# Patient Record
Sex: MALE | Race: WHITE | Employment: OTHER | URBAN - METROPOLITAN AREA
[De-identification: names, ages, dates, MRNs, and addresses within clinical notes are randomized per-mention and may not be internally consistent; named-entity substitution may affect disease eponyms.]

---

## 2017-03-09 ENCOUNTER — OFFICE VISIT (OUTPATIENT)
Dept: INTERNAL MEDICINE CLINIC | Age: 77
End: 2017-03-09

## 2017-03-09 VITALS
DIASTOLIC BLOOD PRESSURE: 90 MMHG | TEMPERATURE: 98.4 F | HEIGHT: 71 IN | WEIGHT: 193 LBS | OXYGEN SATURATION: 97 % | BODY MASS INDEX: 27.02 KG/M2 | SYSTOLIC BLOOD PRESSURE: 150 MMHG | RESPIRATION RATE: 18 BRPM | HEART RATE: 67 BPM

## 2017-03-09 DIAGNOSIS — R10.9 LEFT FLANK PAIN: ICD-10-CM

## 2017-03-09 DIAGNOSIS — M79.10 MUSCULAR PAIN: Primary | ICD-10-CM

## 2017-03-09 LAB
BILIRUB UR QL STRIP: NEGATIVE
GLUCOSE UR-MCNC: NEGATIVE MG/DL
KETONES P FAST UR STRIP-MCNC: NEGATIVE MG/DL
PH UR STRIP: 6.5 [PH] (ref 4.6–8)
PROT UR QL STRIP: NORMAL MG/DL
SP GR UR STRIP: 1.01 (ref 1–1.03)
UA UROBILINOGEN AMB POC: NORMAL (ref 0.2–1)
URINALYSIS CLARITY POC: CLEAR
URINALYSIS COLOR POC: YELLOW
URINE BLOOD POC: NEGATIVE
URINE LEUKOCYTES POC: NEGATIVE
URINE NITRITES POC: NEGATIVE

## 2017-03-09 RX ORDER — TRAMADOL HYDROCHLORIDE 50 MG/1
50 TABLET ORAL
Qty: 15 TAB | Refills: 0 | Status: SHIPPED | OUTPATIENT
Start: 2017-03-09 | End: 2017-04-18

## 2017-03-09 RX ORDER — METAXALONE 800 MG/1
800 TABLET ORAL 3 TIMES DAILY
Qty: 30 TAB | Refills: 0 | Status: SHIPPED | OUTPATIENT
Start: 2017-03-09 | End: 2017-04-18

## 2017-03-09 NOTE — PROGRESS NOTES
Patient presents for   Chief Complaint   Patient presents with    Flank Pain     left side x1 week     Fall risk assessment was not indicated. Depression screening was not indicated Follow up questions were not indicated. 1. Have you been to the ER, urgent care clinic since your last visit? Hospitalized since your last visit? No    2. Have you seen or consulted any other health care providers outside of the [address] [place] [address] since your last visit? Include any pap smears or colon screening. No     POC Urine performed per provider order, provider made aware of results.

## 2017-03-09 NOTE — PROGRESS NOTES
Hortencia Ybarra is a 64 y.o. male presenting today for Flank Pain (left side x1 week)  . HPI:  Hortencia Ybarra presents to the office today for left flank pain x 1 week. Patient states the only thing he can recall is he bent down to  a water hose and the next day he had left flank pain. He denied any urinary symptoms. Pain is increased with flexion of the spine. Review of Systems   Constitutional: Negative for fever. Respiratory: Positive for cough. Negative for sputum production and shortness of breath. Cardiovascular: Negative for chest pain and palpitations. Gastrointestinal: Negative for abdominal pain, nausea and vomiting. Genitourinary: Positive for flank pain (left flank). Negative for dysuria and urgency. Musculoskeletal: Positive for back pain. Allergies   Allergen Reactions    Pcn [Penicillins] Rash       Current Outpatient Prescriptions   Medication Sig Dispense Refill    metaxalone (SKELAXIN) 800 mg tablet Take 1 Tab by mouth three (3) times daily. 30 Tab 0    traMADol (ULTRAM) 50 mg tablet Take 1 Tab by mouth every six (6) hours as needed for Pain. Max Daily Amount: 200 mg. Indications: Pain 15 Tab 0    pantoprazole (PROTONIX) 40 mg tablet TAKE 1 TABLET BY MOUTH ONCE DAILY 90 Tab 3    FOLIC ACID/MULTIVIT-MIN/LUTEIN (CENTRUM SILVER PO) Take  by mouth.  montelukast (SINGULAIR) 10 mg tablet Take 10 mg by mouth daily.  VITAMIN E/QUININE SULFATE (LEG CRAMP TREATMENT PO) Take  by mouth.  terbinafine HCl (LAMISIL) 1 % topical cream Apply  to affected area daily. Use cream for 3 weeks. 30 g 0    levothyroxine (SYNTHROID) 150 mcg tablet Take 1 Tab by mouth daily. 90 Tab 3    Cholecalciferol, Vitamin D3, (VITAMIN D3) 1,000 unit Cap Take  by mouth.  cyanocobalamin (VITAMIN B-12) 1,000 mcg tablet Take 1,000 mcg by mouth daily.            Past Medical History:   Diagnosis Date    Asbestosis [facility])     Chest pain, unspecified     Nl scan and EF    Chronic cough     Chronic kidney disease     Chronic obstructive lung disease (HCC)     Dizziness and giddiness     Erectile dysfunction     GERD (gastroesophageal reflux disease)     Hypothyroidism     Incontinence     Malignant neoplasm of prostate ([facility] [address]     1999 perineal prostatectomy    Osteoarthrosis involving multiple sites     Pure hyperglyceridemia     Right inguinal hernia     Thyroid disease        Past Surgical History:   Procedure Laterality Date    COLONOSCOPY      HX CHOLECYSTECTOMY      HX HERNIA REPAIR Bilateral     inguinal    HX RADICAL PROSTATECTOMY  1-    perineal approach       Social History     Social History    Marital status:      Spouse name: N/A    Number of children: N/A    Years of education: N/A     Occupational History    Not on file. Social History Main Topics    Smoking status: Never Smoker    Smokeless tobacco: Never Used    Alcohol use No    Drug use: No    Sexual activity: Yes     Partners: Female     Other Topics Concern    Not on file     Social History Narrative       Patient does not have an advanced directive on file    Vitals:    03/09/17 1004   BP: 150/90   Pulse: 67   Resp: 18   Temp: 98.4 °F (36.9 °C)   TempSrc: Tympanic   SpO2: 97%   Weight: 193 lb (87.5 kg)   Height: 5' 11\" (1.803 m)   PainSc:   3   PainLoc: Flank       Physical Exam   Cardiovascular: Normal rate, regular rhythm and normal heart sounds. No murmur heard. Pulmonary/Chest: Effort normal and breath sounds normal. No respiratory distress. Abdominal: Soft. There is CVA tenderness. Musculoskeletal: He exhibits no edema. Lymphadenopathy:     He has cervical adenopathy. Neurological: He is alert. Nursing note and vitals reviewed.       Office Visit on 03/09/2017   Component Date Value Ref Range Status    Color (UA POC) 03/09/2017 Yellow   Final    Clarity (UA POC) 03/09/2017 Clear   Final    Glucose (UA POC) 03/09/2017 Negative  Negative Final    Bilirubin (UA POC) 03/09/2017 Negative  Negative Final    Ketones (UA POC) 03/09/2017 Negative  Negative Final    Specific gravity (UA POC) 03/09/2017 1.015  1.001 - 1.035 Final    Blood (UA POC) 03/09/2017 Negative  Negative Final    pH (UA POC) 03/09/2017 6.5  4.6 - 8.0 Final    Protein (UA POC) 03/09/2017 Trace  Negative mg/dL Final    Urobilinogen (UA POC) 03/09/2017 0.2 mg/dL  0.2 - 1 Final    Nitrites (UA POC) 03/09/2017 Negative  Negative Final    Leukocyte esterase (UA POC) 03/09/2017 Negative  Negative Final       .  Results for orders placed or performed in visit on 03/09/17   AMB POC URINALYSIS DIP STICK AUTO W/O MICRO   Result Value Ref Range    Color (UA POC) Yellow     Clarity (UA POC) Clear     Glucose (UA POC) Negative Negative    Bilirubin (UA POC) Negative Negative    Ketones (UA POC) Negative Negative    Specific gravity (UA POC) 1.015 1.001 - 1.035    Blood (UA POC) Negative Negative    pH (UA POC) 6.5 4.6 - 8.0    Protein (UA POC) Trace Negative mg/dL    Urobilinogen (UA POC) 0.2 mg/dL 0.2 - 1    Nitrites (UA POC) Negative Negative    Leukocyte esterase (UA POC) Negative Negative       Assessment / Plan:      ICD-10-CM ICD-9-CM    1. Muscular pain M79.1 729.1 metaxalone (SKELAXIN) 800 mg tablet      traMADol (ULTRAM) 50 mg tablet   2. Left flank pain R10.9 789.09 AMB POC URINALYSIS DIP STICK AUTO W/O MICRO     POC UA- negative  Muscle pain- Skelaxin rx  Tramadol (15) tabs given  Stretching exercises  F/u if no improvement in 7-10 days      Follow-up Disposition:  Return if symptoms worsen or fail to improve. I asked the patient if he  had any questions and answered his  questions.   The patient stated that he understands the treatment plan and agrees with the treatment plan

## 2017-03-09 NOTE — MR AVS SNAPSHOT
Visit Information Date & Time Provider Department Dept. Phone Encounter #  
 3/9/2017 10:00 AM Thanh Daley NP [facility] INTERNAL MEDICINE OF [address] [id number] Follow-up Instructions Return if symptoms worsen or fail to improve. Follow-up and Disposition History Your Appointments 4/18/2017 11:00 AM  
Follow Up with Jocelyn Hyde MD  
[address]) Appt Note: 6mo; 6 mo  
 [address] [place], [address] [facility] [address]  
  
   
 [address] [place], [address] [facility] [address] Upcoming Health Maintenance Date Due DTaP/Tdap/Td series (1 - Tdap) 8/1/1959 ZOSTER VACCINE AGE 60> 8/1/1998 GLAUCOMA SCREENING Q2Y 8/1/2003 Pneumococcal 65+ High/Highest Risk (1 of 2 - PCV13) 8/1/2003 MEDICARE YEARLY EXAM 8/1/2003 Allergies as of 3/9/2017  Review Complete On: 3/9/2017 By: Thanh Daley NP Severity Noted Reaction Type Reactions Pcn [Penicillins]    Rash Current Immunizations  Never Reviewed Name Date Influenza Vaccine (Quad) PF 10/31/2016 Not reviewed this visit You Were Diagnosed With   
  
 Codes Comments Muscular pain    -  Primary ICD-10-CM: M79.1 ICD-9-CM: 729.1 Left flank pain     ICD-10-CM: R10.9 ICD-9-CM: 789.09 Vitals BP Pulse Temp Resp Height(growth percentile) 150/90 (BP 1 Location: Left arm, BP Patient Position: Sitting) 67 98.4 °F (36.9 °C) (Tympanic) 18 5' 11\" (1.803 m) Weight(growth percentile) SpO2 BMI Smoking Status 193 lb (87.5 kg) 97% 26.92 kg/m2 Never Smoker Vitals History BMI and BSA Data Body Mass Index Body Surface Area  
 26.92 kg/m 2 2.09 m 2 Preferred Pharmacy Pharmacy Name Phone [address], [address] [place] [address] [phone] Your Updated Medication List  
  
   
 This list is accurate as of: 3/9/17 11:22 AM.  Always use your most recent med list.  
  
  
  
  
 CENTRUM SILVER PO Take  by mouth. LEG CRAMP TREATMENT PO Take  by mouth.  
  
 levothyroxine 150 mcg tablet Commonly known as:  SYNTHROID Take 1 Tab by mouth daily. metaxalone 800 mg tablet Commonly known as:  SKELAXIN Take 1 Tab by mouth three (3) times daily. pantoprazole 40 mg tablet Commonly known as:  PROTONIX  
TAKE 1 TABLET BY MOUTH ONCE DAILY  
  
 SINGULAIR 10 mg tablet Generic drug:  montelukast  
Take 10 mg by mouth daily. terbinafine HCl 1 % topical cream  
Commonly known as:  LAMISIL Apply  to affected area daily. Use cream for 3 weeks. traMADol 50 mg tablet Commonly known as:  ULTRAM  
Take 1 Tab by mouth every six (6) hours as needed for Pain. Max Daily Amount: 200 mg. Indications: Pain VITAMIN B-12 1,000 mcg tablet Generic drug:  cyanocobalamin Take 1,000 mcg by mouth daily. VITAMIN D3 1,000 unit Cap Generic drug:  cholecalciferol Take  by mouth. Prescriptions Printed Refills  
 traMADol (ULTRAM) 50 mg tablet 0 Sig: Take 1 Tab by mouth every six (6) hours as needed for Pain. Max Daily Amount: 200 mg. Indications: Pain Class: Print Route: Oral  
  
Prescriptions Sent to Pharmacy Refills  
 metaxalone (SKELAXIN) 800 mg tablet 0 Sig: Take 1 Tab by mouth three (3) times daily. Class: Normal  
 Pharmacy: [id number] [place] [address], [address] [facility] Ph #: [phone] Route: Oral  
  
We Performed the Following AMB POC URINALYSIS DIP STICK AUTO W/O MICRO [28958 CPT(R)] Follow-up Instructions Return if symptoms worsen or fail to improve. Patient Instructions Musculoskeletal Pain: Care Instructions Your Care Instructions Different problems with the bones, muscles, nerves, ligaments, and tendons in the body can cause pain. One or more areas of your body may ache or burn. Or they may feel tired, stiff, or sore. The medical term for this type of pain is musculoskeletal pain. It can have many different causes. Sometimes the pain is caused by an injury such as a strain or sprain. Or you might have pain from using one part of your body in the same way over and over again. This is called overuse. In some cases, the cause of the pain is another health problem such as arthritis or fibromyalgia. The doctor will examine you and ask you questions about your health to help find the cause of your pain. Blood tests or imaging tests like an X-ray may also be helpful. But sometimes doctors can't find a cause of the pain. Treatment depends on your symptoms and the cause of the pain, if known. The doctor has checked you carefully, but problems can develop later. If you notice any problems or new symptoms, get medical treatment right away. Follow-up care is a key part of your treatment and safety. Be sure to make and go to all appointments, and call your doctor if you are having problems. It's also a good idea to know your test results and keep a list of the medicines you take. How can you care for yourself at home? · Rest until you feel better. · Do not do anything that makes the pain worse. Return to exercise gradually if you feel better and your doctor says it's okay. · Be safe with medicines. Read and follow all instructions on the label. ¨ If the doctor gave you a prescription medicine for pain, take it as prescribed. ¨ If you are not taking a prescription pain medicine, ask your doctor if you can take an over-the-counter medicine. · Put ice or a cold pack on the area for 10 to 20 minutes at a time to ease pain. Put a thin cloth between the ice and your skin. When should you call for help? Call your doctor now or seek immediate medical care if: 
· You have new pain, or your pain gets worse. · You have new symptoms such as a fever, a rash, or chills. Watch closely for changes in your health, and be sure to contact your doctor if: 
· You do not get better as expected. Where can you learn more? Go to http://sudarshan-shaneka.info/. Enter W127 in the search box to learn more about \"Musculoskeletal Pain: Care Instructions. \" Current as of: February 19, 2016 Content Version: 11.1 © 9341-1078 [employer]. Care instructions adapted under license by [employer] (which disclaims liability or warranty for this information). If you have questions about a medical condition or this instruction, always ask your healthcare professional. [address] any warranty or liability for your use of this information. Back Stretches: Exercises Your Care Instructions Here are some examples of exercises for stretching your back. Start each exercise slowly. Ease off the exercise if you start to have pain. Your doctor or physical therapist will tell you when you can start these exercises and which ones will work best for you. How to do the exercises Overhead stretch 1. Stand comfortably with your feet shoulder-width apart. 2. Looking straight ahead, raise both arms over your head and reach toward the ceiling. Do not allow your head to tilt back. 3. Hold for 15 to 30 seconds, then lower your arms to your sides. 4. Repeat 2 to 4 times. Side stretch 1. Stand comfortably with your feet shoulder-width apart. 2. Raise one arm over your head, and then lean to the other side. 3. Slide your hand down your leg as you let the weight of your arm gently stretch your side muscles. Hold for 15 to 30 seconds. 4. Repeat 2 to 4 times on each side. Press-up 1. Lie on your stomach, supporting your body with your forearms. 2. Press your elbows down into the floor to raise your upper back.  As you do this, relax your stomach muscles and allow your back to arch without using your back muscles. As your press up, do not let your hips or pelvis come off the floor. 3. Hold for 15 to 30 seconds, then relax. 4. Repeat 2 to 4 times. Relax and rest 
 
1. Lie on your back with a rolled towel under your neck and a pillow under your knees. Extend your arms comfortably to your sides. 2. Relax and breathe normally. 3. Remain in this position for about 10 minutes. 4. If you can, do this 2 or 3 times each day. Follow-up care is a key part of your treatment and safety. Be sure to make and go to all appointments, and call your doctor if you are having problems. It's also a good idea to know your test results and keep a list of the medicines you take. Where can you learn more? Go to http://sudarshan-shaneka.info/. Enter R394 in the search box to learn more about \"Back Stretches: Exercises. \" Current as of: May 23, 2016 Content Version: 11.1 © 2878-4505 [employer]. Care instructions adapted under license by [employer] (which disclaims liability or warranty for this information). If you have questions about a medical condition or this instruction, always ask your healthcare professional. [address] any warranty or liability for your use of this information. Plantar Fasciitis: Exercises Your Care Instructions Here are some examples of typical rehabilitation exercises for your condition. Start each exercise slowly. Ease off the exercise if you start to have pain. Your doctor or physical therapist will tell you when you can start these exercises and which ones will work best for you. How to do the exercises Note: Each exercise should create a pulling feeling but should not cause pain. Towel stretch 5. Sit with your legs extended and knees straight. 6. Place a towel around your foot just under the toes. 7. Hold each end of the towel in each hand, with your hands above your knees. 8. Pull back with the towel so that your foot stretches toward you. 9. Hold the position for at least 15 to 30 seconds. 10. Repeat 2 to 4 times a session, up to 5 sessions a day. Calf stretch Note: This exercise stretches the muscles at the back of the lower leg (the calf) and the Achilles tendon. Do this exercise 3 or 4 times a day, 5 days a week. 5. Stand facing a wall with your hands on the wall at about eye level. Put the leg you want to stretch about a step behind your other leg. 6. Keeping your back heel on the floor, bend your front knee until you feel a stretch in the back leg. 7. Hold the stretch for 15 to 30 seconds. Repeat 2 to 4 times. Plantar fascia and calf stretch Note: Stretching the plantar fascia and calf muscles can increase flexibility and decrease heel pain. You can do this exercise several times each day and before and after activity. 5. Stand on a step as shown above. Be sure to hold on to the banister. 6. Slowly let your heels down over the edge of the step as you relax your calf muscles. You should feel a gentle stretch across the bottom of your foot and up the back of your leg to your knee. 7. Hold the stretch about 15 to 30 seconds, and then tighten your calf muscle a little to bring your heel back up to the level of the step. Repeat 2 to 4 times. Towel curls 5. While sitting, place your foot on a towel on the floor and scrunch the towel toward you with your toes. 6. Then, also using your toes, push the towel away from you. Note: Make this exercise more challenging by placing a weighted object, such as a soup can, on the other end of the towel. [place] pickups 1. Put marbles on the floor next to a cup. 
2. Using your toes, try to lift the marbles up from the floor and put them in the cup. Follow-up care is a key part of your treatment and safety.  Be sure to make and go to all appointments, and call your doctor if you are having problems. It's also a good idea to know your test results and keep a list of the medicines you take. Where can you learn more? Go to http://sudarshan-shaneka.info/. Yulia Vale in the search box to learn more about \"Plantar Fasciitis: Exercises. \" Current as of: May 23, 2016 Content Version: 11.1 © 1366-9473 [employer]. Care instructions adapted under license by [employer] (which disclaims liability or warranty for this information). If you have questions about a medical condition or this instruction, always ask your healthcare professional. [address] any warranty or liability for your use of this information. Introducing [facility]! [facility] introduces [employer] patient portal. Now you can access parts of your medical record, email your doctor's office, and request medication refills online. 1. In your internet browser, go to [email or web address] 2. Click on the First Time User? Click Here link in the Sign In box. You will see the New Member Sign Up page. 3. Enter your [employer] Access Code exactly as it appears below. You will not need to use this code after youve completed the sign-up process. If you do not sign up before the expiration date, you must request a new code. · [employer] Access Code: [email or web address] Expires: 6/7/2017 11:22 AM 
 
4. Enter the last four digits of your Social Security Number (xxxx) and Date of Birth (mm/dd/yyyy) as indicated and click Submit. You will be taken to the next sign-up page. 5. Create a [employer] ID. This will be your [employer] login ID and cannot be changed, so think of one that is secure and easy to remember. 6. Create a [employer] password. You can change your password at any time. 7. Enter your Password Reset Question and Answer. This can be used at a later time if you forget your password. 8. Enter your e-mail address. You will receive e-mail notification when new information is available in [address] 9. Click Sign Up. You can now view and download portions of your medical record. 10. Click the Download Summary menu link to download a portable copy of your medical information. If you have questions, please visit the Frequently Asked Questions section of the [employer] website. Remember, [employer] is NOT to be used for urgent needs. For medical emergencies, dial 911. Now available from your iPhone and Android! Please provide this summary of care documentation to your next provider. Your primary care clinician is listed as [place]. If you have any questions after today's visit, please call [phone].

## 2017-03-09 NOTE — PATIENT INSTRUCTIONS
Musculoskeletal Pain: Care Instructions  Your Care Instructions  Different problems with the bones, muscles, nerves, ligaments, and tendons in the body can cause pain. One or more areas of your body may ache or burn. Or they may feel tired, stiff, or sore. The medical term for this type of pain is musculoskeletal pain. It can have many different causes. Sometimes the pain is caused by an injury such as a strain or sprain. Or you might have pain from using one part of your body in the same way over and over again. This is called overuse. In some cases, the cause of the pain is another health problem such as arthritis or fibromyalgia. The doctor will examine you and ask you questions about your health to help find the cause of your pain. Blood tests or imaging tests like an X-ray may also be helpful. But sometimes doctors can't find a cause of the pain. Treatment depends on your symptoms and the cause of the pain, if known. The doctor has checked you carefully, but problems can develop later. If you notice any problems or new symptoms, get medical treatment right away. Follow-up care is a key part of your treatment and safety. Be sure to make and go to all appointments, and call your doctor if you are having problems. It's also a good idea to know your test results and keep a list of the medicines you take. How can you care for yourself at home? · Rest until you feel better. · Do not do anything that makes the pain worse. Return to exercise gradually if you feel better and your doctor says it's okay. · Be safe with medicines. Read and follow all instructions on the label. ¨ If the doctor gave you a prescription medicine for pain, take it as prescribed. ¨ If you are not taking a prescription pain medicine, ask your doctor if you can take an over-the-counter medicine. · Put ice or a cold pack on the area for 10 to 20 minutes at a time to ease pain. Put a thin cloth between the ice and your skin.   When should you call for help? Call your doctor now or seek immediate medical care if:  · You have new pain, or your pain gets worse. · You have new symptoms such as a fever, a rash, or chills. Watch closely for changes in your health, and be sure to contact your doctor if:  · You do not get better as expected. Where can you learn more? Go to http://sudarshan-shaneka.info/. Enter U117 in the search box to learn more about \"Musculoskeletal Pain: Care Instructions. \"  Current as of: February 19, 2016  Content Version: 11.1  © 8587-5316 [employer]. Care instructions adapted under license by [employer] (which disclaims liability or warranty for this information). If you have questions about a medical condition or this instruction, always ask your healthcare professional. [address] any warranty or liability for your use of this information. Back Stretches: Exercises  Your Care Instructions  Here are some examples of exercises for stretching your back. Start each exercise slowly. Ease off the exercise if you start to have pain. Your doctor or physical therapist will tell you when you can start these exercises and which ones will work best for you. How to do the exercises  Overhead stretch    1. Stand comfortably with your feet shoulder-width apart. 2. Looking straight ahead, raise both arms over your head and reach toward the ceiling. Do not allow your head to tilt back. 3. Hold for 15 to 30 seconds, then lower your arms to your sides. 4. Repeat 2 to 4 times. Side stretch    1. Stand comfortably with your feet shoulder-width apart. 2. Raise one arm over your head, and then lean to the other side. 3. Slide your hand down your leg as you let the weight of your arm gently stretch your side muscles. Hold for 15 to 30 seconds. 4. Repeat 2 to 4 times on each side. Press-up    1.  Lie on your stomach, supporting your body with your forearms. 2. Press your elbows down into the floor to raise your upper back. As you do this, relax your stomach muscles and allow your back to arch without using your back muscles. As your press up, do not let your hips or pelvis come off the floor. 3. Hold for 15 to 30 seconds, then relax. 4. Repeat 2 to 4 times. Relax and rest    1. Lie on your back with a rolled towel under your neck and a pillow under your knees. Extend your arms comfortably to your sides. 2. Relax and breathe normally. 3. Remain in this position for about 10 minutes. 4. If you can, do this 2 or 3 times each day. Follow-up care is a key part of your treatment and safety. Be sure to make and go to all appointments, and call your doctor if you are having problems. It's also a good idea to know your test results and keep a list of the medicines you take. Where can you learn more? Go to http://sudarshan-shaneka.info/. Enter Z853 in the search box to learn more about \"Back Stretches: Exercises. \"  Current as of: May 23, 2016  Content Version: 11.1  © 8938-8464 [employer]. Care instructions adapted under license by [employer] (which disclaims liability or warranty for this information). If you have questions about a medical condition or this instruction, always ask your healthcare professional. [place] [address] any warranty or liability for your use of this information. Plantar Fasciitis: Exercises  Your Care Instructions  Here are some examples of typical rehabilitation exercises for your condition. Start each exercise slowly. Ease off the exercise if you start to have pain. Your doctor or physical therapist will tell you when you can start these exercises and which ones will work best for you. How to do the exercises  Note: Each exercise should create a pulling feeling but should not cause pain. Towel stretch    5. Sit with your legs extended and knees straight.   6. Place a towel around your foot just under the toes. 7. Hold each end of the towel in each hand, with your hands above your knees. 8. Pull back with the towel so that your foot stretches toward you. 9. Hold the position for at least 15 to 30 seconds. 10. Repeat 2 to 4 times a session, up to 5 sessions a day. Calf stretch    Note: This exercise stretches the muscles at the back of the lower leg (the calf) and the Achilles tendon. Do this exercise 3 or 4 times a day, 5 days a week. 5. Stand facing a wall with your hands on the wall at about eye level. Put the leg you want to stretch about a step behind your other leg. 6. Keeping your back heel on the floor, bend your front knee until you feel a stretch in the back leg. 7. Hold the stretch for 15 to 30 seconds. Repeat 2 to 4 times. Plantar fascia and calf stretch    Note: Stretching the plantar fascia and calf muscles can increase flexibility and decrease heel pain. You can do this exercise several times each day and before and after activity. 5. Stand on a step as shown above. Be sure to hold on to the banister. 6. Slowly let your heels down over the edge of the step as you relax your calf muscles. You should feel a gentle stretch across the bottom of your foot and up the back of your leg to your knee. 7. Hold the stretch about 15 to 30 seconds, and then tighten your calf muscle a little to bring your heel back up to the level of the step. Repeat 2 to 4 times. Towel curls    5. While sitting, place your foot on a towel on the floor and scrunch the towel toward you with your toes. 6. Then, also using your toes, push the towel away from you. Note: Make this exercise more challenging by placing a weighted object, such as a soup can, on the other end of the towel. [place] pickups    1. Put marbles on the floor next to a cup.  2. Using your toes, try to lift the marbles up from the floor and put them in the cup.   Follow-up care is a key part of your treatment and safety. Be sure to make and go to all appointments, and call your doctor if you are having problems. It's also a good idea to know your test results and keep a list of the medicines you take. Where can you learn more? Go to http://sudarshan-shaneka.info/. Jayna Plata in the search box to learn more about \"Plantar Fasciitis: Exercises. \"  Current as of: May 23, 2016  Content Version: 11.1  © 1605-6831 [employer]. Care instructions adapted under license by [employer] (which disclaims liability or warranty for this information). If you have questions about a medical condition or this instruction, always ask your healthcare professional. [address] any warranty or liability for your use of this information.

## 2017-04-18 PROBLEM — I63.9 CVA (CEREBRAL VASCULAR ACCIDENT) (HCC): Status: ACTIVE | Noted: 2017-04-18

## 2017-04-24 ENCOUNTER — HOSPITAL ENCOUNTER (INPATIENT)
Age: 77
LOS: 18 days | Discharge: HOME HEALTH | DRG: 057 | End: 2017-05-12
Attending: INTERNAL MEDICINE | Admitting: INTERNAL MEDICINE
Payer: MEDICARE

## 2017-04-24 DIAGNOSIS — E78.5 DYSLIPIDEMIA (HIGH LDL; LOW HDL): Chronic | ICD-10-CM

## 2017-04-24 DIAGNOSIS — I63.9 ACUTE ISCHEMIC STROKE (HCC): Primary | ICD-10-CM

## 2017-04-24 PROBLEM — I69.322 DYSARTHRIA AS LATE EFFECT OF CEREBROVASCULAR ACCIDENT (CVA): Status: ACTIVE | Noted: 2017-04-19

## 2017-04-24 PROBLEM — Z78.9 IMPAIRED MOBILITY AND ADLS: Status: ACTIVE | Noted: 2017-04-19

## 2017-04-24 PROBLEM — I69.391 DYSPHAGIA AS LATE EFFECT OF CEREBROVASCULAR ACCIDENT (CVA): Status: ACTIVE | Noted: 2017-04-19

## 2017-04-24 PROBLEM — I69.351 HEMIPARESIS AFFECTING RIGHT SIDE AS LATE EFFECT OF CEREBROVASCULAR ACCIDENT (CVA) (HCC): Status: ACTIVE | Noted: 2017-04-19

## 2017-04-24 PROBLEM — Z53.20 PROCEDURE REFUSED: Status: ACTIVE | Noted: 2017-04-21

## 2017-04-24 PROBLEM — R94.4 DECREASED CALCULATED GLOMERULAR FILTRATION RATE (GFR): Status: ACTIVE | Noted: 2017-04-19

## 2017-04-24 PROBLEM — Z74.09 IMPAIRED MOBILITY AND ADLS: Status: ACTIVE | Noted: 2017-04-19

## 2017-04-24 PROCEDURE — 74011250637 HC RX REV CODE- 250/637: Performed by: INTERNAL MEDICINE

## 2017-04-24 PROCEDURE — 74011250636 HC RX REV CODE- 250/636: Performed by: INTERNAL MEDICINE

## 2017-04-24 PROCEDURE — 65310000000 HC RM PRIVATE REHAB

## 2017-04-24 RX ORDER — HEPARIN SODIUM 5000 [USP'U]/ML
5000 INJECTION, SOLUTION INTRAVENOUS; SUBCUTANEOUS EVERY 12 HOURS
Status: DISCONTINUED | OUTPATIENT
Start: 2017-04-24 | End: 2017-05-11

## 2017-04-24 RX ORDER — FACIAL-BODY WIPES
10 EACH TOPICAL
Status: DISCONTINUED | OUTPATIENT
Start: 2017-04-24 | End: 2017-05-12 | Stop reason: HOSPADM

## 2017-04-24 RX ORDER — CLOPIDOGREL BISULFATE 75 MG/1
75 TABLET ORAL
Status: DISCONTINUED | OUTPATIENT
Start: 2017-04-24 | End: 2017-05-12 | Stop reason: HOSPADM

## 2017-04-24 RX ORDER — PANTOPRAZOLE SODIUM 40 MG/1
40 GRANULE, DELAYED RELEASE ORAL
Status: DISCONTINUED | OUTPATIENT
Start: 2017-04-25 | End: 2017-05-11

## 2017-04-24 RX ORDER — GUAIFENESIN 100 MG/5ML
81 LIQUID (ML) ORAL
Status: DISCONTINUED | OUTPATIENT
Start: 2017-04-25 | End: 2017-05-12 | Stop reason: HOSPADM

## 2017-04-24 RX ORDER — DOCUSATE SODIUM 100 MG/1
100 CAPSULE, LIQUID FILLED ORAL 2 TIMES DAILY
Status: DISCONTINUED | OUTPATIENT
Start: 2017-04-24 | End: 2017-05-12 | Stop reason: HOSPADM

## 2017-04-24 RX ORDER — ATORVASTATIN CALCIUM 10 MG/1
20 TABLET, FILM COATED ORAL
Status: DISCONTINUED | OUTPATIENT
Start: 2017-04-24 | End: 2017-05-12 | Stop reason: HOSPADM

## 2017-04-24 RX ORDER — MELATONIN
1000 DAILY
Status: DISCONTINUED | OUTPATIENT
Start: 2017-04-25 | End: 2017-04-25

## 2017-04-24 RX ADMIN — ATORVASTATIN CALCIUM 20 MG: 10 TABLET, FILM COATED ORAL at 20:49

## 2017-04-24 RX ADMIN — CLOPIDOGREL BISULFATE 75 MG: 75 TABLET, FILM COATED ORAL at 17:39

## 2017-04-24 RX ADMIN — HEPARIN SODIUM 5000 UNITS: 5000 INJECTION, SOLUTION INTRAVENOUS; SUBCUTANEOUS at 17:39

## 2017-04-24 NOTE — IP AVS SNAPSHOT
Current Discharge Medication List  
  
START taking these medications Dose & Instructions Dispensing Information Comments Morning Noon Evening Bedtime  
 aspirin 81 mg chewable tablet Your last dose was: Your next dose is:    
   
   
 Dose:  81 mg Take 1 Tab by mouth daily (with breakfast). Indications: prevention of cerebrovascular accident Quantity:  15 Tab Refills:  0  
     
   
   
   
  
 atorvastatin 20 mg tablet Commonly known as:  LIPITOR Your last dose was: Your next dose is:    
   
   
 Dose:  20 mg Take 1 Tab by mouth nightly. Indications: DYSLIPIDEMIA Quantity:  15 Tab Refills:  0  
     
   
   
   
  
 clopidogrel 75 mg Tab Commonly known as:  PLAVIX Your last dose was: Your next dose is:    
   
   
 Dose:  75 mg Take 1 Tab by mouth daily (with dinner). Indications: Cerebral Thromboembolism Prevention Quantity:  15 Tab Refills:  0 CONTINUE these medications which have NOT CHANGED Dose & Instructions Dispensing Information Comments Morning Noon Evening Bedtime CENTRUM SILVER PO Your last dose was: Your next dose is:    
   
   
 Dose:  1 Tab Take 1 Tab by mouth daily. Refills:  0  
     
   
   
   
  
 levothyroxine 150 mcg tablet Commonly known as:  SYNTHROID Your last dose was: Your next dose is:    
   
   
 Dose:  150 mcg Take 1 Tab by mouth daily. Quantity:  90 Tab Refills:  3 Omeprazole delayed release 20 mg tablet Commonly known as:  PRILOSEC D/R Your last dose was: Your next dose is:    
   
   
 Dose:  20 mg Take 20 mg by mouth Daily (before breakfast). Indications: gastroesophageal reflux disease Refills:  0  
     
   
   
   
  
 VITAMIN D3 1,000 unit Cap Generic drug:  cholecalciferol Your last dose was: Your next dose is:    
   
   
 Dose:  1000 Units Take 1,000 Units by mouth daily. Indications: PREVENTION OF VITAMIN D DEFICIENCY Refills:  0 STOP taking these medications   
 folic acid 1 mg tablet Commonly known as:  FOLVITE  
   
  
 VITAMIN B-12 1,000 mcg tablet Generic drug:  cyanocobalamin Where to Get Your Medications These medications were sent to [id number] [facility], [address], [address] [place] [id number] Phone:  [phone]  
  aspirin 81 mg chewable tablet  
 atorvastatin 20 mg tablet  
 clopidogrel 75 mg Tab

## 2017-04-24 NOTE — IP AVS SNAPSHOT
Kenna Hernandez 
 
 
 [address] Patient: Rubin Singh MRN: [id number] SRG:[id number]/[id number] You are allergic to the following Allergen Reactions Pcn (Penicillins) Rash Recent Documentation Height Weight BMI Smoking Status 1.778 m 81.5 kg 25.78 kg/m2 Never Smoker Emergency Contacts Name Discharge Info Relation Home Work Mobile Enoc Aj  Child [2] [phone] Giuseppe Broach [3] [phone] Asha Nettles  Daughter [21] [phone] Cheri Nettles  Spouse [3] [phone] Sheri Guajardo  Spouse [3] [phone] About your hospitalization You were admitted on:  April 24, 2017 You last received care in the:  [facility] IP REHAB UNIT You were discharged on:  May 12, 2017 Unit phone number:  [phone] Why you were hospitalized Your primary diagnosis was:  Acute Ischemic Stroke (Hcc) Your diagnoses also included:  Impaired Mobility And Adls, Dyslipidemia (High Ldl; Low Hdl), Hemiparesis Affecting Right Side As Late Effect Of Cerebrovascular Accident (Cva) (Hcc), Dysphagia As Late Effect Of Cerebrovascular Accident (Cva), 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  
  
  
 
  
  
Providers Seen During Your Hospitalizations Provider Role Specialty Primary office phone Magan Meyers MD Attending Provider Internal Medicine [phone] Your Primary Care Physician (PCP) Primary Care Physician Office Phone Office Fax [address], [address] [place], [place] [address] [phone] Follow-up Information Follow up With Details Comments Contact Info [address] 
[address] [facility] [address] 
[phone] Lamine Johnson MD On 5/23/2017 Patient has an appointment scheduled with PCP Dr. Manoj Cash on May 23, 2017 @ 10:30am. [place] [address] 6 [facility] [address] 
[phone] Spencer Garvey MD On 7/12/2017 Patient has an appointment scheduled with Neurology Dr. Sandra Bettencourt on July 12, 2017 @ 3:00pm. [address] [place] [address] [address] [place] [address] [facility] [address] 
[phone] Your Appointments Tuesday May 23, 2017 10:30 AM EDT TRANSITIONAL CARE MANAGEMENT with Lamine Johnson MD  
[facility] [place] [facility] [address] [place] [address] 6 [facility] [address] [phone] Current Discharge Medication List  
  
START taking these medications Dose & Instructions Dispensing Information Comments Morning Noon Evening Bedtime  
 aspirin 81 mg chewable tablet Your last dose was: Your next dose is:    
   
   
 Dose:  81 mg Take 1 Tab by mouth daily (with breakfast). Indications: prevention of cerebrovascular accident Quantity:  15 Tab Refills:  0  
     
   
   
   
  
 atorvastatin 20 mg tablet Commonly known as:  LIPITOR Your last dose was: Your next dose is:    
   
   
 Dose:  20 mg Take 1 Tab by mouth nightly. Indications: DYSLIPIDEMIA Quantity:  15 Tab Refills:  0  
     
   
   
   
  
 clopidogrel 75 mg Tab Commonly known as:  PLAVIX Your last dose was: Your next dose is:    
   
   
 Dose:  75 mg Take 1 Tab by mouth daily (with dinner). Indications: Cerebral Thromboembolism Prevention Quantity:  15 Tab Refills:  0 CONTINUE these medications which have NOT CHANGED Dose & Instructions Dispensing Information Comments Morning Noon Evening Bedtime CENTRUM SILVER PO Your last dose was: Your next dose is:    
   
   
 Dose:  1 Tab Take 1 Tab by mouth daily. Refills:  0  
     
   
   
   
  
 levothyroxine 150 mcg tablet Commonly known as:  SYNTHROID Your last dose was: Your next dose is:    
   
   
 Dose:  150 mcg Take 1 Tab by mouth daily. Quantity:  90 Tab Refills:  3 Omeprazole delayed release 20 mg tablet Commonly known as:  PRILOSEC D/R Your last dose was: Your next dose is:    
   
   
 Dose:  20 mg Take 20 mg by mouth Daily (before breakfast). Indications: gastroesophageal reflux disease Refills:  0  
     
   
   
   
  
 VITAMIN D3 1,000 unit Cap Generic drug:  cholecalciferol Your last dose was: Your next dose is:    
   
   
 Dose:  1000 Units Take 1,000 Units by mouth daily. Indications: PREVENTION OF VITAMIN D DEFICIENCY Refills:  0 STOP taking these medications   
 folic acid 1 mg tablet Commonly known as:  FOLVITE  
   
  
 VITAMIN B-12 1,000 mcg tablet Generic drug:  cyanocobalamin Where to Get Your Medications These medications were sent to [address] [place] [address], [address] [place] [address] Phone:  [phone]  
  aspirin 81 mg chewable tablet  
 atorvastatin 20 mg tablet  
 clopidogrel 75 mg Tab Discharge Instructions DISCHARGE SUMMARY from Nurse The following personal items are in your possession at time of discharge: 
 
Dental Appliances: Lowers, Uppers Visual Aid: None Home Medications: None Jewelry: None Clothing: Shirt, Undergarments, Pants, Socks, Slippers, [employer] Other Valuables: None Personal Items Sent to Safe: none PATIENT INSTRUCTIONS: 
 
 
Recognize signs and symptoms of STROKE: 
 
 F-face looks uneven A-arms unable to move or move unevenly S-speech slurred or non-existent T-time-call 911 as soon as signs and symptoms begin-DO NOT go Back to bed or wait to see if you get better-TIME IS BRAIN. Warning Signs of HEART ATTACK Call 911 if you have these symptoms: 
? Chest discomfort. Most heart attacks involve discomfort in the center of the chest that lasts more than a few minutes, or that goes away and comes back. It can feel like uncomfortable pressure, squeezing, fullness, or pain. ? Discomfort in other areas of the upper body. Symptoms can include pain or discomfort in one or both arms, the back, neck, jaw, or stomach. ? Shortness of breath with or without chest discomfort. ? Other signs may include breaking out in a cold sweat, nausea, or lightheadedness. Don't wait more than five minutes to call [address]! Fast action can save your life. Calling 911 is almost always the fastest way to get lifesaving treatment. Emergency Medical Services staff can begin treatment when they arrive  up to an hour sooner than if someone gets to the hospital by car. The discharge information has been reviewed with the patient. The patient verbalized understanding. Discharge medications reviewed with the patient Taking Aspirin to Prevent Heart Attack and Stroke: Care Instructions Your Care Instructions Aspirin acts as a \"blood thinner. \" It prevents blood clots from forming. When taken during and after a heart attack, it can reduce your chance of dying. And it's used if you have a stent in your coronary artery. Also, aspirin helps certain people lower their risk of a heart attack or stroke. Be sure you know what dose of aspirin to take and how often to take it. Low-dose aspirin is typically 81 mg. But the dose for daily aspirin can range from 81 mg to 325 mg. Taking aspirin every day can cause bleeding.  It may not be safe if you have stomach ulcers. And it may not be safe if you have high blood pressure that is not controlled. If you take aspirin pills every day, do not take ones that have other ingredients such as caffeine or sodium. Before you start to take aspirin, tell your doctor all the medicines, vitamins, herbal products, and supplements you take. Follow-up care is a key part of your treatment and safety. Be sure to make and go to all appointments, and call your doctor if you are having problems. It's also a good idea to know your test results and keep a list of the medicines you take. How can you care for yourself at home? · Take aspirin with a full glass of water unless your doctor tells you not to. Do not lie down right after you take it. · If you have a stent in your coronary artery, take your aspirin as your heart doctor says to. If another doctor says to stop taking the aspirin for any reason, talk to your heart doctor before you stop. · Do not chew or crush the coated or sustained-release forms of aspirin. · Ask your doctor if you can drink alcohol while you take aspirin. And ask how much you can drink. Too much alcohol with aspirin can cause stomach bleeding. · Do not take aspirin if you are pregnant, unless your doctor says it is okay. · Keep all aspirin out of children's reach. · Throw aspirin away if it starts to smell like vinegar. · Do not take aspirin if you have gout or if you take prescription blood thinners, unless your doctor has told you to. · Do not take prescription or over-the-counter medicines, vitamins, herbal products, or supplements without talking to your doctor first. Olu Odomin the label before you take another over-the-counter medicine. Many contain aspirin. So they could cause you to take too much aspirin. · Talk with your doctor before you take a pain medicine. Ask which type of medicine you can take and how to take it safely with aspirin. · Tell your doctor or dentist before a surgery or procedure that you take aspirin. He or she will tell you if you should stop taking aspirin before your surgery or procedure. Make sure that you understand exactly what your doctor wants you to do. Where can you learn more? Go to http://sudarshan-shaneka.info/. Enter Y717 in the search box to learn more about \"Taking Aspirin to Prevent Heart Attack and Stroke: Care Instructions. \" Current as of: January 27, 2016 Content Version: 11.2 © 1330-8285 [employer]. Care instructions adapted under license by [employer] (which disclaims liability or warranty for this information). If you have questions about a medical condition or this instruction, always ask your healthcare professional. [address] any warranty or liability for your use of this information. Learning About Antiplatelet Medicines After a Stroke Introduction If you have had a stroke, you may have concerns about having another one. You want to do all you can do to avoid this. If your stroke was caused by a blood clot, one of the best things you can do is to take antiplatelet medicines. They can help prevent another stroke. In most cases, you don't take them if you had a stroke caused by a leak in an artery. These medicines are often called blood thinners. But they don't thin your blood. They work to keep platelets from sticking together and forming blood clots. (A platelet is a type of blood cell.) Blood clots can cause a stroke if they block a blood vessel in the brain. So by preventing blood clots, you are helping to prevent a stroke. Examples · Aspirin (Erica, Bufferin, Ecotrin) · Aspirin with dipyridamole (Aggrenox) · Clopidogrel (Plavix) Possible side effects These medicines make your blood take longer than normal to clot. This can cause bleeding, and you may bruise easily.  In rare cases, they can cause you to bleed inside your body without an injury. If you have an injury, you might have bleeding that is hard to control. These medicines may have other side effects. Depending on which one you take, you may: 
· Have diarrhea. · Feel sick to your stomach. · Have a headache. · Have some mild belly pain. You may have other side effects or reactions not listed here. Check the information that comes with your medicine. What to know about taking this medicine · Be sure you get instructions about how to take your medicine safely. Blood thinners can cause serious bleeding problems. · Be safe with medicines. Take your medicines exactly as prescribed. Call your doctor if you think you are having a problem with your medicine. · Check with your doctor or pharmacist before you use any other medicines, including over-the-counter medicines. Make sure your doctor knows all of the medicines, vitamins, herbal products, and supplements you take. Taking some medicines together can cause problems. Where can you learn more? Go to http://sudarshanTalentEarthshaneka.info/. Enter H458 in the search box to learn more about \"Learning About Antiplatelet Medicines After a Stroke. \" Current as of: January 27, 2016 Content Version: 11.2 © 4238-0874 [employer]. Care instructions adapted under license by [employer] (which disclaims liability or warranty for this information). If you have questions about a medical condition or this instruction, always ask your healthcare professional. [address] any warranty or liability for your use of this information. and appropriate educational materials and side effects teaching were provided. ------------------------------------------------------------------------------------------------------------ 
 
DISCHARGE INSTRUCTIONS 1.  Make sure that when you request refills at the pharmacy that the refill requests are sent to your PCP (NOT to the prescriber at the [facility]) to avoid any delays in getting your medication refills. The physician at the [facility] [address] will not able to order medications or refills after discharge -- Please understand that though we would like to help, it is simply not safe for our physician to order you a medication that we cannot monitor. 2. If any of the prescribed medications require a prior authorization, contact your Primary Care Physician or specialist to EITHER complete prior authorizations and paperwork on your behalf OR prescribe an alternative medication. -- Please understand that though we would like to help, it is simply not safe for our physician to order you a medication that we cannot monitor. 3. Over-the-counter (OTC) medications will NOT be prescribed. You need to buy these medications over-the-counter. ------------------------------------------------------------------------------------------------------------------- Discharge Orders None ACO Transitions of Care Introducing [employer] [address] offers a voluntary care coordination program to provide high quality service and care to [facility] fee-for-service beneficiaries. Chanell Tello was designed to help you enhance your health and well-being through the following services: ? Transitions of Care  support for individuals who are transitioning from one care setting to another (example: Hospital to home). ? Chronic and Complex Care Coordination  support for individuals and caregivers of those with serious or chronic illnesses or with more than one chronic (ongoing) condition and those who take a number of different medications.   
 
 
If you meet specific medical criteria, a [address] [facility] [facility] Coordinator may call you directly to coordinate your care with your primary care physician and your other care providers. For questions about the [facility] programs, please, contact your physicians office. For general questions or additional information about Accountable Care Organizations: 
Please visit www.medicare.gov/acos. html or call 1-800-MEDICARE (3-602.298.4922) TTY users should call 0-678.637.3769. Introducing [facility]! Liza Kain introduces Jamalon patient portal. Now you can access parts of your medical record, email your doctor's office, and request medication refills online. 1. In your internet browser, go to [email or web address] 2. Click on the First Time User? Click Here link in the Sign In box. You will see the New Member Sign Up page. 3. Enter your Jamalon Access Code exactly as it appears below. You will not need to use this code after youve completed the sign-up process. If you do not sign up before the expiration date, you must request a new code. · Jamalon Access Code: [email or web address] Expires: 6/7/2017 12:22 PM 
 
4. Enter the last four digits of your Social Security Number (xxxx) and Date of Birth (mm/dd/yyyy) as indicated and click Submit. You will be taken to the next sign-up page. 5. Create a Jamalon ID. This will be your Jamalon login ID and cannot be changed, so think of one that is secure and easy to remember. 6. Create a Jamalon password. You can change your password at any time. 7. Enter your Password Reset Question and Answer. This can be used at a later time if you forget your password. 8. Enter your e-mail address. You will receive e-mail notification when new information is available in [address] 9. Click Sign Up. You can now view and download portions of your medical record. 10. Click the Download Summary menu link to download a portable copy of your medical information. If you have questions, please visit the Frequently Asked Questions section of the MyChart website. Remember, MyChart is NOT to be used for urgent needs. For medical emergencies, dial 911. Now available from your iPhone and Android! General Information Please provide this summary of care documentation to your next provider. Patient Signature:  ____________________________________________________________ Date:  ____________________________________________________________  
  
Theone Spencer Provider Signature:  ____________________________________________________________ Date:  ____________________________________________________________
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
## 2017-04-24 NOTE — PROGRESS NOTES
physical Therapy Treatment    Patient: Luther Santo (78 y.o. male)  Room: 178/    Date: 4/24/2017  Start Time:  1120  End Time:  1210    Primary Diagnosis: Left CVA  Acute ischemic stroke [facility])          Precautions: Falls and R sided weakness. Weight bearing precautions: WBAT and RLE    Orders reviewed, chart reviewed, and initial evaluation completed on Luther Santo. ASSESSMENT :  Based on the objective data described below,     - patient able to ambulate a distance of 50 feet x 4 using RW with min A x 1  - Patient requires SBA x 1 with bed mobility  - patient requires min A x 1 with transfers  - patient tolerating B LE exercises fairly  - patient pleasant and motivated  - min VC's and cueing needed for correct technique and safety. Patient's response to today's session was tolerated: well with no complications. Progression toward achievement of goals: Good Progression. Patients rehabilitation potential is considered to be Excellent for stated goals          PLAN :  Planned Interventions:  Functional mobility training Gait Training Stair training Balance Training Therapeutic exercises Therapeutic activities AD training Patient/caregiver education    Frequency/Duration:   Patient will be followed by physical therapy to address goals: Continue PT per intial POC. Recommendations:  Physical Therapy  Discharge Recommendations: Inpatient rehab  Further Equipment Recommendations for Discharge: to be determined   Orders reviewed, chart reviewed . Discussed with nurse                  SUBJECTIVE:   Patient Patient agrees to PT, pleasant, motivated    OBJECTIVE DATA SUMMARY:     Patient found: Bed, Telemetry and Family present.     Pain Assessment before PT session: None observed and None reported  Pain Location:    Pain Assessment after PT session: None observed and None reported  Pain Location:    []          Yes, patient had pain medications  []          No, Patient has not had pain medications  []          Nurse notified    COGNITIVE STATUS:        Patient awake, alert, NAD, follows one step commands consistently       Functional Mobility and Balance Status:     Supine to sit -  standby assist, increased time and 1 person  Sit to Supine -  not tested  Sit to Stand -  min. assist, verbal cues, increased time, 1 person, increased height of bed and using RW  Stand to Sit -  min. assist, increased time, 1 person and rail      Transfer to bedside chair in between and after ambulation with min A x 1  Focused treatment on sit to stand, VC's and cueing for correct technique  Focused treatment on compensatory techniques to prevent R foot drag during ambulation      Balance:   Static Sitting Balance -  good  Dynamic Sitting Balance -  good-  Static Standing Balance -  fair and w/ assistive device  Dynamic Standing Balance -  fair- and w/ assistive device      Ambulation/Gait Training:  unsteady, step to, decreased riccardo, decreased step height/length, downward gaze, difficulty/unable to weight shift, rest break(s) required and R foot drag, VC's and cueing required, manual assistance to improve R hand  on RW, VC''s and cueing to correct posture and to regain balance, min R knee buckling.  seated rest breaks required Rolling walker min assist, verbal cues, manual cues, safety concerns, increased time and 1 person  50 feet x 4     Stair Training:  not tested      Therapeutic Exercises:        Seated        EXERCISE    Sets    Reps    Active  Active Assist    Passive  Self ROM    Comments    Heel/Toe raises  1 10 X    Tolerating well/independent    Quad Sets/Glut Sets            Hamstring Sets            Short Arc Quads           Heel Slides  1 10 X    Increased time, fatigues easily   Straight Leg Raises            Hip Abd/Add  1 10 X    Increased time, fatigues easily   Long Arc Quads  1 10 X    Fatigues easily   Mini squats                      Activity Tolerance:   fair+ and requires rest breaks    Final Location:   bedside chair, all needs close, agrees to call for assistance and family present    COMMUNICATION/EDUCATION:   Education: Patient, Benefit of activity while hospitalized, Call for assistance, Out of bed 2-3 times/day, Staff assistance with mobility, Changes positions frequently, Sit out of bed for 45-60 minutes or as tolerated, HEP, Safety, Functional mobility, Demonstrates adequately, Verbalized understanding, Reinforce needed, Teaching method and Verbal  Barriers to Learning/Limitations: None    Please refer to care plan and patient education section for further details. Thank you for this referral.  Radha Vang.  Sarah Seward, [place]  Pager [phone]

## 2017-04-24 NOTE — PROGRESS NOTES
SHIFT CHANGE NOTE FOR [facility]    Bedside and Verbal shift change report given to [address] (oncoming nurse) by Aileen Bowers LPN   (offgoing nurse). Report included the following information SBAR, Kardex, MAR and Recent Results. Situation:   Code Status: Full Code   Reason for Admission: CVA  Hospital Day: 0   Problem List:   Hospital Problems  Date Reviewed: 3/9/2017          Codes Class Noted POA    Procedure refused ICD-10-CM: Z53.29  ICD-9-CM: V64.2  4/21/2017 Yes    Overview Signed 4/24/2017  3:28 PM by Michelet Antunez MD     Speech Pathologist recommended NPO and PEG placement; Patient refused             * (Principal)Acute ischemic stroke [facility]) ICD-10-CM: I63.9  ICD-9-CM: 434.91  4/19/2017 Yes    Overview Signed 4/24/2017  3:15 PM by Michelet Antunez MD     Acute Ischemic Stroke (acute to subacute ischemia involving the posterior limb of the left internal capsule, along the lateral aspect of the left thalamus) with residual right hemiparesis, dysphagia and dysarthria             Impaired mobility and ADLs ICD-10-CM: Z74.09  ICD-9-CM: 799.89  4/19/2017 Yes        Decreased calculated glomerular filtration rate (GFR) ICD-10-CM: R94.4  ICD-9-CM: 794.4  4/19/2017 Yes    Overview Addendum 4/24/2017  3:21 PM by Michelet Antunez MD     Estimated calculated glomerular filtration rate equivalent to that of stage 3 chronic kidney disease = 30-59 ml/min             Dyslipidemia (high LDL; low HDL) (Chronic) ICD-10-CM: E78.4  ICD-9-CM: 272.4  4/19/2017 Yes    Overview Signed 4/24/2017  3:25 PM by Michelet Antunez MD     Lipid profile (4/19/2017): , .  HDL 42, LDL 97             Hemiparesis affecting right side as late effect of cerebrovascular accident (CVA) ([facility] [address] [address] ICD-10-CM: W87.721  ICD-9-CM: 438.20  4/19/2017 Yes        Dysphagia as late effect of cerebrovascular accident (CVA) ICD-10-CM: D21.757  ICD-9-CM: 438.82  4/19/2017 Yes        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) ICD-10-CM: Q05.987  ICD-9-CM: 438.13  4/19/2017 Yes              Background:   Past Medical History:   Past Medical History:   Diagnosis Date    Acute ischemic stroke ([facility] [address] 4/19/2017    Acute Ischemic Stroke (acute to subacute ischemia involving the posterior limb of the left internal capsule, along the lateral aspect of the left thalamus) with residual right hemiparesis, dysphagia and dysarthria    Asbestosis ([facility] [address]     Chronic kidney disease     Chronic obstructive pulmonary disease (COPD) ([facility] [address]     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  HDL 42, LDL 97    Dysphagia as late effect of cerebrovascular accident (CVA) 4/19/2017    Erectile dysfunction     Gastroesophageal reflux disease     Hemiparesis affecting right side as late effect of cerebrovascular accident (CVA) ([facility] [address] 4/19/2017    History of endogenous hypertriglyceridemia     History of malignant neoplasm of prostate     Genoveva score 7     Hypothyroidism     Osteoarthrosis involving multiple sites     Procedure refused 4/21/2017    Speech Pathologist recommended NPO and PEG placement; Patient refused    Urinary incontinence     Male incontinence       Patient taking anticoagulants YES   Patient has a defibrillator: no     Assessment:   Changes in Assessment throughout shift: NONE              Last Vitals:     Vitals:    04/24/17 1600   BP: 166/87   Pulse: 71   Resp: 18   Temp: 96.6 °F (35.9 °C)   SpO2: 97%        PAIN    Pain Assessment    Pain Intensity 1: 0 (04/24/17 1609) Pain Intensity 1: 2 (12/29/14 1105)      Pain Location 1: Abdomen      Pain Intervention(s) 1: Medication (see MAR)  Patient Stated Pain Goal: 0 Patient Stated Pain Goal: 0  o Intervention effective: no    o Other actions taken for pain: NONE     Skin Assessment  Skin color    Condition/Temperature Skin Condition/Temp: Dry  Integrity Skin Integrity: Intact  Turgor Turgor: Non-tenting  Weekly Pressure Ulcer Documentation Weekly Pressure Ulcer Documentation: No Pressure Ulcer Noted-Pressure Ulcer Prevention Initiated  Wound Prevention & Protection Methods  Orientation of wound Orientation of Wound Prevention: Posterior  Location of Prevention Location of Wound Prevention: Buttocks, Sacrum/Coccyx  Dressing Present    Dressing Status    Wound Offloading Wound Offloading (Prevention Methods): Bed, pressure reduction mattress     INTAKE/OUPUT       Recommendations:  1. Patient needs and requests: NONE    2. Diet: CARDIAC PUREED WITH NECTAR THICK LIQUIDS    3. Pending tests/procedures: LABS     4. Functional Level/Equipment: WHEELCHAIR    5. Estimated Discharge Date: TBD Posted on Whiteboard in Patients Room: no     \[facility]\"" Safety Check    A safety check occurred in the patient's room between off going nurse and oncoming nurse listed above. The safety check included the below items  Area Items   H  High Alert Medications - Verify all high alert medication drips (heparin, PCA, etc.)   E  Equipment - Suction is set up for ALL patients (with ton)  - Red plugs utilized for all equipment (IV pumps, etc.)  - WOWs wiped down at end of shift.  - Room stocked with oxygen, suction, and other unit-specific supplies   A  Alarms - Bed alarm is set for fall risk patients  - Ensure chair alarm is in place and activated if patient is up in a chair   L  Lines - Check IV for any infiltration  - Syed bag is empty if patient has a Syed   - Tubing and IV bags are labeled   S  Safety   - Room is clean, patient is clean, and equipment is clean. - Hallways are clear from equipment besides carts. - Fall bracelet on for fall risk patients  - Ensure room is clear and free of clutter  - Suction is set up for ALL patients (with ton)  - Hallways are clear from equipment besides carts.    - Isolation precautions followed, supplies available outside room, sign posted

## 2017-04-25 ENCOUNTER — PATIENT OUTREACH (OUTPATIENT)
Dept: INTERNAL MEDICINE CLINIC | Age: 77
End: 2017-04-25

## 2017-04-25 LAB
25(OH)D3 SERPL-MCNC: 30.1 NG/ML (ref 30–100)
ANION GAP BLD CALC-SCNC: 10 MMOL/L (ref 3–18)
BASOPHILS # BLD AUTO: 0 K/UL (ref 0–0.06)
BASOPHILS # BLD: 0 % (ref 0–2)
BUN SERPL-MCNC: 9 MG/DL (ref 7–18)
BUN/CREAT SERPL: 6 (ref 12–20)
CALCIUM SERPL-MCNC: 9.3 MG/DL (ref 8.5–10.1)
CHLORIDE SERPL-SCNC: 101 MMOL/L (ref 100–108)
CO2 SERPL-SCNC: 24 MMOL/L (ref 21–32)
CREAT SERPL-MCNC: 1.44 MG/DL (ref 0.6–1.3)
DIFFERENTIAL METHOD BLD: ABNORMAL
EOSINOPHIL # BLD: 0.3 K/UL (ref 0–0.4)
EOSINOPHIL NFR BLD: 2 % (ref 0–5)
ERYTHROCYTE [DISTWIDTH] IN BLOOD BY AUTOMATED COUNT: 13.8 % (ref 11.6–14.5)
FOLATE SERPL-MCNC: >20 NG/ML (ref 3.1–17.5)
GLUCOSE SERPL-MCNC: 100 MG/DL (ref 74–99)
HCT VFR BLD AUTO: 43.2 % (ref 36–48)
HGB BLD-MCNC: 15 G/DL (ref 13–16)
LYMPHOCYTES # BLD AUTO: 22 % (ref 21–52)
LYMPHOCYTES # BLD: 2.8 K/UL (ref 0.9–3.6)
MAGNESIUM SERPL-MCNC: 1.9 MG/DL (ref 1.6–2.6)
MCH RBC QN AUTO: 29 PG (ref 24–34)
MCHC RBC AUTO-ENTMCNC: 34.7 G/DL (ref 31–37)
MCV RBC AUTO: 83.4 FL (ref 74–97)
MONOCYTES # BLD: 1.1 K/UL (ref 0.05–1.2)
MONOCYTES NFR BLD AUTO: 9 % (ref 3–10)
NEUTS SEG # BLD: 8.3 K/UL (ref 1.8–8)
NEUTS SEG NFR BLD AUTO: 67 % (ref 40–73)
PLATELET # BLD AUTO: 203 K/UL (ref 135–420)
PMV BLD AUTO: 11.3 FL (ref 9.2–11.8)
POTASSIUM SERPL-SCNC: 4.2 MMOL/L (ref 3.5–5.5)
RBC # BLD AUTO: 5.18 M/UL (ref 4.7–5.5)
SODIUM SERPL-SCNC: 135 MMOL/L (ref 136–145)
VIT B12 SERPL-MCNC: 903 PG/ML (ref 211–911)
WBC # BLD AUTO: 12.5 K/UL (ref 4.6–13.2)

## 2017-04-25 PROCEDURE — 97530 THERAPEUTIC ACTIVITIES: CPT

## 2017-04-25 PROCEDURE — 83090 ASSAY OF HOMOCYSTEINE: CPT | Performed by: INTERNAL MEDICINE

## 2017-04-25 PROCEDURE — 85025 COMPLETE CBC W/AUTO DIFF WBC: CPT | Performed by: INTERNAL MEDICINE

## 2017-04-25 PROCEDURE — 96125 COGNITIVE TEST BY HC PRO: CPT

## 2017-04-25 PROCEDURE — 97162 PT EVAL MOD COMPLEX 30 MIN: CPT

## 2017-04-25 PROCEDURE — 97116 GAIT TRAINING THERAPY: CPT

## 2017-04-25 PROCEDURE — 83735 ASSAY OF MAGNESIUM: CPT | Performed by: INTERNAL MEDICINE

## 2017-04-25 PROCEDURE — 97166 OT EVAL MOD COMPLEX 45 MIN: CPT

## 2017-04-25 PROCEDURE — 92610 EVALUATE SWALLOWING FUNCTION: CPT

## 2017-04-25 PROCEDURE — 74011250637 HC RX REV CODE- 250/637: Performed by: INTERNAL MEDICINE

## 2017-04-25 PROCEDURE — 74011250636 HC RX REV CODE- 250/636: Performed by: INTERNAL MEDICINE

## 2017-04-25 PROCEDURE — 36415 COLL VENOUS BLD VENIPUNCTURE: CPT | Performed by: INTERNAL MEDICINE

## 2017-04-25 PROCEDURE — 82607 VITAMIN B-12: CPT | Performed by: INTERNAL MEDICINE

## 2017-04-25 PROCEDURE — 97535 SELF CARE MNGMENT TRAINING: CPT

## 2017-04-25 PROCEDURE — 80048 BASIC METABOLIC PNL TOTAL CA: CPT | Performed by: INTERNAL MEDICINE

## 2017-04-25 PROCEDURE — 82306 VITAMIN D 25 HYDROXY: CPT | Performed by: INTERNAL MEDICINE

## 2017-04-25 PROCEDURE — 92522 EVALUATE SPEECH PRODUCTION: CPT

## 2017-04-25 PROCEDURE — 96105 ASSESSMENT OF APHASIA: CPT

## 2017-04-25 PROCEDURE — 65310000000 HC RM PRIVATE REHAB

## 2017-04-25 RX ORDER — MELATONIN
2000 DAILY
Status: DISCONTINUED | OUTPATIENT
Start: 2017-04-26 | End: 2017-05-12 | Stop reason: HOSPADM

## 2017-04-25 RX ADMIN — VITAMIN D, TAB 1000IU (100/BT) 1000 UNITS: 25 TAB at 08:48

## 2017-04-25 RX ADMIN — CLOPIDOGREL BISULFATE 75 MG: 75 TABLET, FILM COATED ORAL at 17:27

## 2017-04-25 RX ADMIN — LEVOTHYROXINE SODIUM 150 MCG: 100 TABLET ORAL at 06:37

## 2017-04-25 RX ADMIN — ASPIRIN 81 MG CHEWABLE TABLET 81 MG: 81 TABLET CHEWABLE at 08:47

## 2017-04-25 RX ADMIN — MULTIPLE VITAMIN LIQUID 30 ML: LIQUID at 08:48

## 2017-04-25 RX ADMIN — ATORVASTATIN CALCIUM 20 MG: 10 TABLET, FILM COATED ORAL at 20:27

## 2017-04-25 RX ADMIN — PANTOPRAZOLE SODIUM 40 MG: 40 GRANULE, DELAYED RELEASE ORAL at 06:37

## 2017-04-25 RX ADMIN — HEPARIN SODIUM 5000 UNITS: 5000 INJECTION, SOLUTION INTRAVENOUS; SUBCUTANEOUS at 06:20

## 2017-04-25 RX ADMIN — HEPARIN SODIUM 5000 UNITS: 5000 INJECTION, SOLUTION INTRAVENOUS; SUBCUTANEOUS at 17:27

## 2017-04-25 NOTE — PROGRESS NOTES
[x] Psychology  [] Social Work [] Recreational Therapy    INTERVENTION  UNITS/TIME OF SERVICE   Assessment April 25, 2017   Supportive Counseling    Orientation    Discharge Planning    Resource Linkage              Progress/Current Status    Patient seen for Psychological Evaluation as requested on admission to ARU by Dr. Lucetta Bloch. Patient found lying supine in bed but immediately engaged in conversation about himself, his circumstances and his determination for recovery. 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 Patient will need education to best understand his need to pace himself in recovery and, perhaps, distribute the work he has done at home as her caretaker, while he is in recovery. Fortunately, it appears that the family will be able to meet these needs. Patient is sometimes abruptly labile during conversation but can regain composure. He is not currently Rx psychotropic medication. He will be monitored for emotional and/or behavioral difficulties while in rehabilitation.       Patrick Fenton, THE [facility] 4/25/2017 12:33 PM

## 2017-04-25 NOTE — CONSULTS
ARU PSYCHOLOGICAL SCREENING    Assessment Initiated:  April 25, 2017    Rehab Diagnosis:  Left CVA with Right Side Weakness    Pertinent Physical/Psychiatric History:     Patient Active Problem List   Diagnosis Code    Urinary incontinence R32    History of malignant neoplasm of prostate Z85.46    Hypothyroidism E03.9    [facility]) [facility] J44.9    Asbestosis ([facility] J61    Osteoarthrosis involving multiple sites M15.9    History of endogenous hypertriglyceridemia Z86.39    Acute ischemic stroke ([facility] I63.9    Impaired mobility and ADLs Z74.09    Decreased calculated glomerular filtration rate (GFR) R94.4    Dyslipidemia (high LDL; low HDL) E78.4    Gastroesophageal reflux disease K21.9    Hemiparesis affecting right side as late effect of cerebrovascular accident (CVA) ([facility] I69.351    Dysphagia as late effect of cerebrovascular accident (CVA) I74.200    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 Patient denies history of substance abuse nor dependence. OBJECTIVE  GENERAL OBSERVATIONS  Willingness to participate in program: [x] good   [] fair [] indifferent [] poor    General Appearance:  Patient observed casually and appropriately dressed and groomed for ARU and lying supine and passively in bed. Sensory Impairments:  Patient has mild dysarthria and sometimes difficulty with verbalization; however, he is intelligible and seems to have satisfactory auditory reception and comprehension. He is wearing eyeglasses for correction.     [employer] Affiliation:  Unknown    Admission Assessment  Discharge Status   [x] alert  [] lethargic  [] difficult to arouse  [] fluctuating  [] other: Level of Consciousness [x] alert  [] lethargic  [] difficult to arouse  [] fluctuating  [] other: [x] person  [x] place  [x] time  [x] situation Oriented [x] person  [x] place  [x] time  [x] situation   [x] within normal limits  [x] impaired       [x] mild        [] moderate        [] severe Attention [x] within normal limits  [] impaired       [] mild        [] moderate        [] severe   [x] within normal limits  [x] impaired       [x] mild        [] moderate        [] severe Memory [x] within normal limits  [x] impaired       [x] mild        [] moderate        [] severe   [] appropriate to situation  [x] depressed  [x] anxious  [] angry   [x] fearful  [x] emotionally labile  [] other:  Mood [] appropriate to situation  [] depressed  [x] anxious  [] angry   [x] fearful  [] emotionally labile  [] other:   [] appropriate  [] flat  [x] inappropriate to content of speech Affect [x] appropriate  [] flat  [] inappropriate to content of speech   [x] appropriate  [] aggressive/agitated  [] withdrawn  [] inappropriate  [] other: Behavior [x] appropriate  [] aggressive/agitated  [] withdrawn  [] inappropriate  [] other:   [] good  [x] limited  [] denial  [] none Insight Into Illness [x] good  [] limited  [] denial  [] none   [x] intact  [x] impaired       [x] mild        [] moderate        [] severe       Describe:  Cognition [x] intact  [x] impaired       [x] mild        [] moderate        [] severe       Describe:    [x] coping  [] demonstrates poor adjustment  [] undetermined       As evidenced by:  Patient Adjustment to Disability [] coping  [] demonstrates poor adjustment  [] undetermined       As evidenced by:    [x] coping  [] demonstrates poor adjustment  [] undetermined      As evidenced by: Patient reports having much family support and similarly observed.  Family Adjustment to Disability [] coping  [] demonstrates poor adjustment  [] undetermined      As evidenced by:      ASSESSMENT  Clinical Impression:  Patient is a 64year old, , retired,  gentleman who resides in [place] with spouse in handicap-designed residence that apparently will pose no barriers for him. Patient's wife has been diagnosed with Alzheimer's Dementia for a number of years and he has been her primary caretaker. In addition, he describes himself as fiercely independent and that this stroke occurrence is a traumatic and worrisome event for him. However, he insists that he will be able to have assistance in residence for them, as necessary, post hospital.  He feels well supported by a large, extended family, although several children are out of town. Patient is retired but has remained very active and has especially enjoyed outside activity, such as yard work for himself and his sister, also debilitated. He will certainly benefit from stroke education so that he can increase his insight about all parameters of recovery and in order to identify realistic goals for himself in recovery. Emotionally, patient does not report any history of psychiatric services and he is not Rx psychotropic medication on admit to ARU. However, he appears stressed by the sudden functional changes that he will face and especially his fear of losing his independence. He is sometimes abruptly labile in his conversation but can recover and continue his conversation. While the trauma of stroke is often accompanied by emergent feelings of distress, he may have good resources on which he can draw, including the fact that he has been so active up until now, and even wants to continue, as able. He will be monitored for emotional and/or behavioral difficulties while on ARU. He will be encouraged to understand all safety and pace himself accordingly. Cognitively, patient is alert and oriented and able to understand and respond to all inquiry with intelligibility.   He will benefit from cues, prompts and redirection for maximum attention, concentration, problem solving and recall, especially with novel and/or complex treatment information as well as when possibly distracted secondary to situational circumstances. Patient Strengths:  Alert, oriented, pleasant, cooperative and motivated to improve. Patient Preferences:  Return to home with spouse and support as necessary. Rehab Potential:  Good    Educational Needs: Under each heading list the specific items in which the patient or family will need education/training.  Example: hip precautions, use of walker, ADL equipment, neglect, judgment, adjustment, etc.     Special considerations or accommodations for teaching:  [x] Yes     [] No     [] NA  If Yes, explain: Emotional and cognitive sequelae to stroke Discharge Status    Completed Demonstrated/ Verbalized Understanding    Yes No Yes No   Info regarding disability: Limited insight about stroke recovery  [x] [] [x] []   Adjustment: Forced dependency [x] [] [x] []   Cognition: Variable attention and problem solving [x] [] [x] []   Other: Decreased self esteem and concept [x] [] [x] []   Other: Emotional distress secondary to loss of independence issues [x] [] [x] []   If education not completed, explain: [] [] [] []     PLAN  Problem: Limited insight  Long Term Goal: Increase insight  Intervention: Stroke education  At Discharge - LTG Achieved: [x] Yes [] No If not achieved, explain:    Problem: Emotional sequelae  Long Term Goal: Mood stability  Intervention: Support ; r/o Rx  At Discharge - LTG Achieved: [x] Yes [] No If not achieved, explain:    Problem: Cognitive sequelae  Long Term Goal: Sustain attention and concentration to task  Intervention: Cues, prompts and redirection  At Discharge - LTG Achieved: [x] Yes [] No If not achieved, explain:    Problem: Impaired self concept and self esteem  Long Term Goal: Improve self concept  Intervention: Positive reinforcement and support   At Discharge - LTG Achieved: [x] Yes [] No If not achieved, explain:    Problem: Dependency versus independence issues  Long Term Goal: Accept increased dependency in recovery  Intervention: Support   At Discharge - LTG Achieved: [x] Yes [] No If not achieved, explain:    Ondina Alvarez, THE [facility]  4/25/2017 12:37 PM    DISCHARGE STATUS    Clinical Impressions: Patient persevered in his therapy effort and made excellent gains in his recovery. Patient feeling encouraged by same on discharge and certainly looking forward to his return to home. He has been thoroughly educated about all aspects of stroke recovery and especially risk factors for recurrence. Patient is accepting family decision to have assist in the residence for patient and his spouse.   He has been strongly encouraged to pace himself and to give himself \"time to recover\" post hospital.    Follow-up Services Recommended Purpose                 Ondina Alvarez, PHD  Discharge Date/Time:

## 2017-04-25 NOTE — PROGRESS NOTES
Problem: Mobility Impaired (Adult and Pediatric)  Goal: *Acute Goals and Plan of Care (Insert Text)  PHYSICAL THERAPY EXAMINATION  Time In: 1130  Time Out: 1233  Patient Name: Gene Ortez  Patient Age: 64 y.o. Patient presents A/O x 4 lying supine in bed reporting comfort and demonstrating good mentation. Supine BP: 158/70 mmHg; SaO2: 95% HR: 76 BPM. Patient participates in functional assessment as noted below as well as additional functional challenge including. Picking up object from floor from standing position deferred 22 safety concern. Patient appropriate with safety education verbalizing understanding of call bell and indications for use. Patient reports motivation for therapy stating \"I want to get back like this never happened. \" Patient offered time for questions regarding diagnosis/prognosis as it relate to physical recovery and mobility. Patient educated on motor recovery process following a CVA with education on exercises to perform outside of as well as treatment techniques to improve recovery. Patient positioned sitting up following evaluation; nursing notified of patient status; call bell in reach. Past Medical History:   Past Medical History:   Diagnosis Date    Acute ischemic stroke ([address] 4/19/2017     Acute Ischemic Stroke (acute to subacute ischemia involving the posterior limb of the left internal capsule, along the lateral aspect of the left thalamus) with residual right hemiparesis, dysphagia and dysarthria    Asbestosis ([address]      Chronic obstructive pulmonary disease (COPD) ([address]      CKD (chronic kidney disease) stage 3, GFR 30-59 ml/min      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) 4/19/2017    Dyslipidemia (high LDL; low HDL) 4/19/2017     Lipid profile (4/19/2017): , .  HDL 42, LDL 97    Dysphagia as late effect of cerebrovascular accident (CVA) 4/19/2017    Erectile dysfunction      Gastroesophageal reflux disease      Hemiparesis affecting right side as late effect of cerebrovascular accident (CVA) ([facility] 2017    History of endogenous hypertriglyceridemia      History of malignant neoplasm of prostate       Genoveva score 7     Hypothyroidism      Osteoarthrosis involving multiple sites      Procedure refused 2017     Speech Pathologist recommended NPO and PEG placement; Patient refused    Urinary incontinence       Male incontinence         Pain at start of tx: 0/10  Pain at stop of tx:0/10     Patient identified with name and : YES     Medical Diagnosis:  Left CVA  Acute ischemic stroke ([facility] Acute ischemic stroke ([facility]      Therapy Diagnosis:   Difficulty with bed mobility  [X]     Difficulty with functional transfers  [X]     Difficulty with ambulation  [X]     Difficulty with stair negotiations  [X]        Problem List:    Decreased strength R LE  [X]     Decreased strength trunk/core  [X]     Decreased AROM   [X]     Decreased PROM  [ ]    Decreased endurance  [X]     Decreased balance sitting  [ ]     Decreased balance standing  [X]     Pain   [ ]     Reduced ambulation independence  [X]    Decreased coordination  [X]    Decreased safety awareness  [X]       Functional Limitations:   Decreased independence with bed mobility  [X]     Decreased independence with functional transfers  [X]     Decreased independence with ambulation  [X]     Decreased independence with stair negotiation  [X]        Previous Functional Level: Patient reports complete independence in home and in community. Patient also reports that he is the primary caregiver for his wife who is dx with alzheimer's.       Home Environment: Home Environment: Private residence  # Steps to Enter: 0  One/Two Story Residence: One story  Living Alone: No  Support Systems: Child(blul)  Patient Expects to be Discharged to[de-identified] Rehabilitation facility  Current DME Used/Available at Home: None             Outcome Measures: FIM/MMT                 MMT Initial Assessment    Right Lower Extremity Left Lower Extremity   Hip Flexion 4 5   Knee Extension 4+ 5   Knee Flexion 4+ 5   Ankle Dorsiflexion 3- 5   0/5       No palpable muscle contraction  1/5       Palpable muscle contraction, no joint movement  2-/5      Less than full range of motion in gravity eliminated position  2/5       Able to complete full range of motion in gravity eliminated position  2+/5     Able to initiate movement against gravity  3-/5      More than half but not full range of motion against gravity  3/5       Able to complete full range of motion against gravity  3+/5     Completes full range of motion against gravity with minimal resistance  4-/5      Completes full range of motion against gravity with minimal-moderate resistance  4/5       Completes full range of motion against gravity with moderate resistance  4+/5     Completes full range of motion against gravity with moderate-maximum resistance  5/5       Completes full range of motion against gravity with maximum resistance                 AROM: R DF >50% of AROM, though not full       FIM SCORES Initial Assessment   Bed/Chair/Wheelchair Transfers 3   Wheelchair Mobility 2   Walking Hector 1   Steps/Stairs 0   PRIMARY MODE OF LOCOMOTION: AMB w/o device  Please see [facility] Interdisciplinary Eval: Coordination/Balance Section for details regarding FIM score description. BED/CHAIR/WHEELCHAIR TRANSFERS Initial Assessment   Rolling Right 5 (Supervision)   Rolling Left 5 (Supervision)   Supine to Sit 5 (Supervision)   Sit to Stand Moderate assistance   Sit to Supine 5 (Supervision)   Transfer Assist Score Transfer Type: Other  Other: Patient performs stand step transfer with moderate assist from PT for anterior approach R LE knee block with loading 2/2 hx of buckling, trunk stability, pivot assist, and controlled lowering.  Patient demonstrates increased momentum utilization for STS transfer and R genu recurvatum requiring verbal cueing to correct these; pacing cues successful, however genu recurvatum requires tactile cueing with physical assist to prevent buckling. Patient demonstrates pass retract technique with R LE advancement as well as anterior COG placement to attempts to maintain TKE 2/2 functional quad weakness. Transfer Assistance : 3 (Moderate assistance )  Sit to Stand Assistance: Moderate assistance  Car Transfers: Not tested  Car Type: NA   Transfer Type Other   Comments Patient performs stand step transfer with moderate assist from PT for anterior approach R LE knee block with loading 2/2 hx of buckling, trunk stability, pivot assist, and controlled lowering. Patient demonstrates increased momentum utilization for STS transfer and R genu recurvatum requiring verbal cueing to correct these; pacing cues successful, however genu recurvatum requires tactile cueing with physical assist to prevent buckling. Patient demonstrates pass retract technique with R LE advancement as well as anterior COG placement to attempts to maintain TKE 2/2 functional quad weakness. Car Transfer Not tested   Car Type NA          WHEELCHAIR MOBILITY/MANAGEMENT Initial Assessment   Able to Propel 70 feet (verbal cueing for technique)   Functional Level 2   Curbs/ramps assistance required 0 (Not tested)   Wheelchair set up assistance required 2 (Maximal assistance)   Wheelchair management Manages left brake, Manages right brake          WALKING INDEPENDENCE Initial Assessment   Assistive device Gait belt   Ambulation assistance - level surface 2 (Maximal assistance) (2/2 R LOB)   Distance 5 Feet (ft)   Functional Level 1   Comments 5 ft with decreased step length B shortest on L. Patient also demonstrates significant trunk sway with trendelenburg during R SLS. Patient demonstrates intermittent anterior COG progression requiring physical assist to recover COG w/i CATA.  Patient demonstrates R LE genu recurvatum with pass retract advancement technique; Patient able to ambulates 20 ft with moderate a x 1 for trunk control, R LE advancement, postural assistance and assisted weight shift with pacing cues. Ambulation assistance - unlevel surface NT          STEPS/STAIRS Initial Assessment   Steps/Stairs ambulated Steps/Stairs Ambulated (#): 0  Level of Assist : 0 (Not tested)  Rail Use:  (NA)   Rail Use  (NA)   Functional Level 0   Comments Pt unsafe to perform at this time   Curbs/Ramps NT              PHYSICAL THERAPY PLAN OF CARE     Therapy Diagnosis:   Please see table above     Order received from MD for physical therapy services and chart reviewed. Pt to be seen 5 times per week for 3 hours of total therapy per day for 4 weeks. Thank you for the referral.     Physical Therapy Short Term Goals  Initiated 4/25/2017 and to be accomplished within 7 day(s) (05/02/2017)  1. Patient will move from supine to sit and sit to supine , scoot up and down and roll side to side in bed with modified independence. 2.  Patient will transfer from bed to chair and chair to bed with minimal assistance/contact guard assist using the least restrictive device. 3.  Patient will perform sit to stand with minimal assistance/contact guard assist.  4.  Patient will ambulate with moderate assistance  for 50 feet with the least restrictive device. 5.  Patient will ascend/descend 6 stairs with 2 handrail(s) with moderate assistance . Physical Therapy Long Term Goals  Initiated 4/25/2017 and to be accomplished within 28 day(s) (05/23/2017)  1. Patient will move from supine to sit and sit to supine , scoot up and down and roll side to side in bed with independence. 2.  Patient will transfer from bed to chair and chair to bed with modified independence using the least restrictive device. 3.  Patient will perform sit to stand with modified independence. 4.  Patient will ambulate with modified independence for 250 feet with the least restrictive device.    5.  Patient will ascend/descend 6 stairs with 2 handrail(s) with modified independence. Pt would benefit from skilled physical therapy in order to improve independent functional mobility within the home. Interventions may include range of motion (AROM, PROM B LE/trunk), motor function (B LE/trunk strengthening/coordination), activity tolerance (vitals, oxygen saturation levels), bed mobility training, balance activities, gait training (progressive ambulation program), and functional transfer training. Please see IRC; Interdisciplinary Eval, Care Plan, and Patient Education for further information regarding physical therapy examination and plan of care. Clemente Wiseman, PT DPT  4/25/2017

## 2017-04-25 NOTE — PROGRESS NOTES
Pt is a 64year old male admitted to ARU for CVA. Sw attempted to interview pt however he was in therapy. Two of the pt's daughters were in the room with the pt's wife, who is noted to have dementia. Florina introduced self and role in dc planning. Florina provided pt's daughter with a tour of the unit, orienting her to the room and the schedule board. Florina reviewed team conference noting that initial conference will be this Friday but then occur every Tuesday. Florina will reattempt interview with pt at a later time. Sw was later able to speak with pt who confirms that he lives in a 1 level home with no steps to enter. Pt states that he lives with his wife and was able to self care prior to admission. 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 Pt confirms insurance as medicare and [employer]. Florina provided requested letters of admission, signed by MD for family to cancel a trip.

## 2017-04-25 NOTE — PROGRESS NOTES
NUTRITION    Nutrition Consult: General Nutrition Management & Supplements      RECOMMENDATIONS / PLAN:     - Continue with current nutrition intervention; monitor po intake  - Continue RD inpatient monitoring and evaluation. NUTRITION INTERVENTIONS & DIAGNOSIS:     [x] Meals/Snacks: modified diet  [x] Coordination of care: discussed with SLP     Nutrition Diagnosis:  Inadequate energy intake related to dislike pureed consistency diet as evidenced by fair meal intake of meals    ASSESSMENT:     Subjective/Objective:   Patient unavailable at time of visit. Has fair meal intake per SLP. Pt disliked pureed consistency; noted diet advanced to mechanical soft.  Will continue to monitor    Average po intake adequate to meet patients estimated nutritional needs:   [] Yes     [x] No   [] Unable to determine at this time    Diet: DIET CARDIAC Mechanical Soft; 1 NECTAR      Food Allergies:  None known  Current Appetite:   [] Good     [] Fair     [] Poor     [x] Other: unknown   Appetite/meal intake prior to admission:   [] Good     [] Fair     [] Poor     [x] Other:  Unknown   Feeding Limitations:  [x] Swallowing difficulty: SLP following    [] Chewing difficulty    [] Other:  Current Meal Intake: Patient Vitals for the past 100 hrs:   % Diet Eaten   04/25/17 1330 35 %   04/25/17 0930 65 %   04/24/17 1800 80 %       BM:  4/25  Skin Integrity:  No pressure ulcer or wound  Edema:  None   Pertinent Medications: Reviewed    Recent Labs      04/25/17   0618   NA  135*   K  4.2   CL  101   CO2  24   GLU  100*   BUN  9   CREA  1.44*   CA  9.3   MG  1.9       Intake/Output Summary (Last 24 hours) at 04/25/17 1555  Last data filed at 04/25/17 1330   Gross per 24 hour   Intake              580 ml   Output                0 ml   Net              580 ml       Anthropometrics:  Ht Readings from Last 1 Encounters:   04/25/17 5' 10\" (1.778 m)     Last 3 Recorded Weights in this Encounter    04/25/17 1552   Weight: 81.5 kg (179 lb 10.8 oz) Body mass index is 25.78 kg/(m^2). Weight History:  14 lb (7.2%) weight loss in past 2 months PTA per chart. Per nursing screen, pt denied experiencing unplanned weight loss PTA    Weight Metrics 4/25/2017 4/22/2017 4/18/2017 3/9/2017 10/28/2016 8/11/2016 8/4/2016   Weight 179 lb 10.8 oz 179 lb 10.8 oz - 193 lb 190 lb 186 lb 182 lb 8 oz   BMI 25.78 kg/m2 - 25.78 kg/m2 26.92 kg/m2 26.5 kg/m2 25.94 kg/m2 25.46 kg/m2        Admitting Diagnosis: Left CVA  Acute ischemic stroke [facility])  Past Medical History:   Diagnosis Date    Acute ischemic stroke ([facility] [address] 4/19/2017    Acute Ischemic Stroke (acute to subacute ischemia involving the posterior limb of the left internal capsule, along the lateral aspect of the left thalamus) with residual right hemiparesis, dysphagia and dysarthria    Asbestosis ([facility]     Chronic obstructive pulmonary disease (COPD) ([facility] [facility]     CKD (chronic kidney disease) stage 3, GFR 30-59 ml/min     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  HDL 42, LDL 97    Dysphagia as late effect of cerebrovascular accident (CVA) 4/19/2017    Erectile dysfunction     Gastroesophageal reflux disease     Hemiparesis affecting right side as late effect of cerebrovascular accident (CVA) ([facility] 4/19/2017    History of endogenous hypertriglyceridemia     History of malignant neoplasm of prostate     Genoveva score 7     Hypothyroidism     Osteoarthrosis involving multiple sites     Procedure refused 4/21/2017    Speech Pathologist recommended NPO and PEG placement; Patient refused    Urinary incontinence     Male incontinence        Education Needs:        [x] None identified  [] Identified - Not appropriate at this time  []  Identified and addressed - refer to education log  Learning Limitations:   [x] None identified  [] Identified    Cultural, Bahai & ethnic food preferences: [x] None identified    [] Identified and addressed     ESTIMATED NUTRITION NEEDS:     Calories: 8184-3046 kcal (MSJx1.2-1.3) based on  [x] Actual BW:  82 kg      [] IBW   CHO: 232-251 gm (50% kcal)   Protein: 66-82 gm (0.8-1 gm/kg) based on  [x] Actual BW      [] IBW   Fluid: 1 mL/kcal     MONITORING & EVALUATION:     Nutrition Goal(s):   1. Po intake of meals will meet >75% of patient estimated nutritional needs within the next 7 days.   Outcome:  [] Met/Ongoing    []  Not Met    [x] New/Initial Goal     Monitoring:   [x] Diet tolerance   [x] Meal intake   [] Supplement intake   [] GI symptoms/ability to tolerate po diet   [] Respiratory status   [] Plan of care      Previous Recommendations (for follow-up assessments only):     []   Implemented       []   Not Implemented (RD to address)     [] No Recommendation Made     Discharge Planning:   Cardiac diet; consistency per SLP  [x] Participated in care planning, discharge planning, & interdisciplinary rounds as appropriate      Raymundo Malik, [address] [place] [address]   Pager: [phone]

## 2017-04-25 NOTE — PROGRESS NOTES
Problem: Self Care Deficits Care Plan (Adult)  Goal: *Acute Goals and Plan of Care (Insert Text)  Long Term Goals (to be met upon discharge date) in order to increase pts functional independence and safety, and decrease burden of care:  1. Pt will perform self-feeding with independence. 2. Pt will perform grooming with independence. 3. Pt will perform UB bathing with modified independence. 4. Pt will perform LB bathing with modified independence. 5. Pt will perform tshower transfer with modified independence. 6. Pt will perform UB dressing with independence. 7. Pt will perform LB dressing with modified independence. 8. Pt will perform toileting task with modified independence. 9. Pt will perform toilet transfer with modified independence. Short Term Weekly Goals for (4/25/2017 to 5/2/2017) in order to increase pts functional independence and safety, and decrease burden of care:  1. Pt will perform self-feeding with modified independence. 2. Pt will perform grooming with modified independence. 3. Pt will perform UB bathing with supervision/setup. 4. Pt will perform LB bathing with Min A.  5. Pt will perform shower transfer with Min A.  6. Pt will perform UB dressing with Min A.  7. Pt will perform LB dressing with Mod A.  8. Pt will perform toileting task with Mod A.  9. Pt will perform toilet transfer with [address]     Patient Name: Kris Dickson  Patient Age: 64 y.o.   Past Medical History:   Past Medical History:   Diagnosis Date    Acute ischemic stroke ([facility] [address] 4/19/2017     Acute Ischemic Stroke (acute to subacute ischemia involving the posterior limb of the left internal capsule, along the lateral aspect of the left thalamus) with residual right hemiparesis, dysphagia and dysarthria    Asbestosis ([address]      Chronic obstructive pulmonary disease (COPD) ([address]      CKD (chronic kidney disease) stage 3, GFR 30-59 ml/min      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) 4/19/2017    Dyslipidemia (high LDL; low HDL) 4/19/2017     Lipid profile (4/19/2017): , . HDL 42, LDL 97    Dysphagia as late effect of cerebrovascular accident (CVA) 4/19/2017    Erectile dysfunction      Gastroesophageal reflux disease      Hemiparesis affecting right side as late effect of cerebrovascular accident (CVA) ([facility] [address] 4/19/2017    History of endogenous hypertriglyceridemia      History of malignant neoplasm of prostate       Higginson score 7     Hypothyroidism      Osteoarthrosis involving multiple sites      Procedure refused 4/21/2017     Speech Pathologist recommended NPO and PEG placement; Patient refused    Urinary incontinence       Male incontinence          Medical Diagnosis:  Left CVA  Acute ischemic stroke ([facility] [address] Acute ischemic stroke ([facility] [address]   Therapy Diagnosis:   Difficulty with ADLs  [X]     Difficulty with functional transfers  [X]     Difficulty with ambulation  [X]     Difficulty with IADLs  [X]        Problem List:    Decreased strength B UE  [X]     Decreased strength trunk/core  [X]     Decreased AROM   [ ]     Decreased endurance  [X]     Decreased balance sitting  [X]     Decreased balance standing  [X]     Pain   [ ]     Decreased PROM  [ ]        Functional Limitations:   Decreased independence with ADL  [X]     Decreased independence with functional transfers  [X]     Decreased independence with ambulation  [X]     Decreased independence with IADL  [X]        Previous Functional Level:  Pt reports he was independent with all self-care and functional mobility prior to hospitalization and 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      Home Environment: Home Situation  Home Environment: Private residence  # Steps to Enter: 0  One/Two Story Residence: One story  Living Alone: No  Support Systems: Child(bull)  Patient Expects to be Discharged to[de-identified] Rehabilitation facility  Current DME Used/Available at Home: None Precautions: Fall; Safety     Time In: 8235  Time Out: 1100     Pain at start of tx: Pt does not report any pain or discomfort throughout treatment. Pain at stop of tx: Pt does not report any pain or discomfort throughout treatment. Patient identified with name and : yes  Objective: Pt was seen for initial OT evaluation. Pt performed full body shower; see below for ADL and functional transfer levels. Evaluation session started due to OT being present in team conference meeting with another pt that ran over into the start of this pt's session. OT attempted to see pt in the afternoon to make up missed time, however unit MD meeting with the pt at the time.          Outcome Measures:                  MMT Initial Assessment    Right Upper Extremity  Left Upper Extremity    UE AROM  WFL  WFL; Grossly 4+/5   Shoulder flexion  3  -   Shoulder extension  3  -   Shoulder ABDuction  3  -   Shoulder ADDUction  3  -   Elbow Flexion  3  -   Elbow Extension  3  -   Wrist Extension/Flexion  3  -     3+  -   0/5       No palpable muscle contraction  1/5       Palpable muscle contraction, no joint movement  2-/5      Less than full range of motion in gravity eliminated position  2/5       Able to complete full range of motion in gravity eliminated position  2+/5     Able to initiate movement against gravity  3-/5      More than half but not full range of motion against gravity  3/5       Able to complete full range of motion against gravity  3+/5     Completes full range of motion against gravity with minimal resistance  4-/5      Completes full range of motion against gravity with minimal-moderate resistance  4/5       Completes full range of motion against gravity with moderate resistance  4+/5     Completes full range of motion against gravity with moderate-maximum resistance  5/5       Completes full range of motion against gravity with maximum resistance     Sensation: Impaired RUE  Coordination: Impaired RUE FIM SCORES Initial Assessment   Bladder - level of assist -   Bladder - accident frequency score -   Bowel - level of assist -   Bowel - accident frequency score -   Please see IRC Interdisciplinary Eval: Coordination/Balance Section for details regarding FIM score description. COGNITION/PERCEPTION Initial Assessment   Premorbid Reading Status     Premorbid Writing Status     Arousal/Alertness  Alert   Orientation Level Oriented X4   Visual Fields  (Appears Intact)   Praxis Impaired (RUE)   Body Scheme Cues to maintain midline in standing, Tactile, Verbal, Visual       COMPREHENSION MODE Initial Assessment   Primary Mode of Comprehension Auditory   Hearing Aide     Corrective Lenses     Score 5       EXPRESSION Initial Assessment   Primary Mode of Expression Verbal   Score 5   Comments         SOCIAL INTERACTION/PRAGMATICS Initial Assessment   Score 6   Comments         PROBLEM SOLVING Initial Assessment   Score 4   Comments         MEMORY Initial Assessment   Score 4   Comments         EATING Initial Assessment   Functional Level 5   Comments Pt may require some assistance with opening packages and cutting food during meals. Pt able to feed himself using his L hand. GROOMING Initial Assessment   Functional Level 5     Oral Hygiene FIM: 5   Tasks completed by patient Brushed hair, Brushed teeth, Washed face   Comments Pt combed his hair and brushed his teeth using his L hand and needed extra time in order to open toothpaste tube due to decreased R hand strength and coordination. BATHING Initial Assessment   Functional Level 2 (Pt stood in shower at [facility])   Body parts patient bathed Abdomen, Arm, left, Arm, right, Chest, Devorah area, Thigh, left, Thigh, right   Comments Pt performed full body bathing while seated on shower bench. He requires Min A for thoroughly washing LUE due to decreased RUE coordination.  Pt able to wash devorah area and B thighs when seated with assistance required for thoroughly washing B feet due to difficulty reaching. He needs additional assistance for washing the R side of his buttocks. TUB/SHOWER TRANSFER INDEPENDENCE Initial Assessment   Score 3   Comments Pt performed stand step transfer w/c <> shower bench without an AD with Mod A for balance, weightshift, and posture. UPPER BODY DRESSING/UNDRESSING Initial Assessment   Functional Level 3   Items applied/Steps completed Pullover (4 steps)   Comments Pt required Mod A for managing shirt over head and on BUEs while doffing and donning t-shirt. LOWER BODY DRESSING/UNDRESSING Initial Assessment   Functional Level 2     Sock and/or Shoe Management FIM: 1   Items applied/Steps completed Button/zip pants (4 steps), Sock, left (1 step), Sock, right (1 step), Underpants (3 steps)   Comments Pt requires Max A for managing all LB clothing over B feet. He is able to stand with Min-Mod A for standing balance and safety and pull underwear/pants up over buttocks with slight assistance required for getting them up all the way on the R side due to decreased hand strength and coordination. TOILETING Initial Assessment   Functional Level 3   Comments Pt requires Min-Mod A for static standing balance while managing clothing over his hips and buttocks with Mod A.       TOILET TRANSFER INDEPENDENCE Initial Assessment   Transfer score 3   Comments Based on performance of shower transfer and w/c to bed transfer, pt able to perform stand step transfer to/from commode with Mod A for balance, weightshift, and posture.        INSTRUMENTAL ADL Initial Assessment (PLOF)   Meal preparation Independent   Homemaking Independent   Medicine Management Independent   Financial Management Independent      OCCUPATIONAL THERAPY PLAN OF CARE  Areas to Assess:   Self Care/Functional transfer/IADL  [X]     NMRE/ estim  [X]     UE Ther ex/Ther act  [X]     Cognitive Training  [X]     Patient/Family/Caregiver Education  [X]       Order received from MD for occupational therapy services and chart reviewed. Pt to be seen 5 times per week for 3 hours of total therapy per day for 4 weeks. Thank you for the referral.     LTGs: See Care Plan     Pt would benefit from skilled occupational therapy in order to improve independent functional mobility/ADLs,/IADLs within the home. Interventions may include range of motion (AROM, PROM B UE), motor function (B UE/ strengthening/coordination), activity tolerance (vitals, oxygen saturation levels), balance training, ADL/IADL training and functional transfer training. Please see IRC; Interdisciplinary Eval, Care Plan, and Patient Education for further information regarding occupational therapy examination and plan of care.       Emil Fountain, OTR  4/25/2017

## 2017-04-25 NOTE — PROGRESS NOTES
Transitions of Care Coordination    Sarah Mays was hospitalized at [facility] 4/18-4/24/17 for an acute ischemic CVA, s/p tPA, AMS, CKD,GERD and transferred to the [facility] 4/24 for continued rehab. Discharge Summary written by Dr. Peyton Pérez is below in [address] [facility]. Discharge Diagnoses/Hospital Course:     1. Acute to subacute ischemia involving the posterior limb of the left internal capsule, status post tPA administration. Neurology and Pulmonary critical care was consulted, continue statin, TSH, hemoglobin A1c 5.9, negative 2-D echocardiogram bubble study, carotid PVL no significant stenosis. Off pressor. MRI brain showed acute stroke. Patient tolerating puréed diet. Cont. purée diet. Cont. aspirin and Plavix.      2. Acute metabolic encephalopathy, resolving, most likely due to cerebrovascular accident.       3. Chronic kidney disease stage II, creatinine at baseline. Avoid nephrotoxic medications. Adjust medications to creatinine clearance.      4. Gastroesophageal reflux disease. Continue Protonix.      5. Hypothyroidism. Switch Synthroid to IV      6. History of asbestosis.      7. Deep venous thrombosis prophylaxis with sequential compression devices and heparin. Discharge Diet:  Diet: Nectar thick liquids and pureed food     Treatment Team: Treatment Team: Attending Provider: Peyton Pérez DO; Consulting Provider: Edy Martínez MD; Hospitalist: Peyton Pérez DO; Physical Therapist: Lubna Cameron. Lesia Browne, PT; Occupational Therapist: Eugene Manley OT; Care Manager: Josué Flowers RN; Care Manager: Angeline Estrada RN     Procedures:   * No surgery found *        History of presenting illness:  This is a 73-year-old gentleman with past medical history significant for chest pain, asbestosis, chronic kidney disease, COPD, dizziness, GERD, hypothyroidism who presents to the emergency room with episodes of aphasia and right-sided weakness that started around 6:00 p.m. on 04/18/2017. The patient presented to the emergency room around 9 or 10 p.m. At that time, was found to be a candidate for tPA, which was given. CT of the head and neck with contrast did not reveal any acute findings per radiology read. The case was also discussed with Dr. Jennifer Tamayo and Dr. Alison Moss and patient was admitted to the critical care unit status post tPA administration.     Will follow for discharge planning.

## 2017-04-25 NOTE — PROGRESS NOTES
SHIFT CHANGE NOTE FOR [facility]    Bedside and Verbal shift change report given to  Jennifer Aldridge LPN   (oncoming nurse) by Jesse Santos, RN   (offgoing nurse). Report included the following information SBAR, Kardex, MAR and Recent Results. Situation:   Code Status: Full Code   Reason for Admission: CVA  Hospital Day: 1   Problem List:   Hospital Problems  Date Reviewed: 3/9/2017          Codes Class Noted POA    Procedure refused ICD-10-CM: Z53.29  ICD-9-CM: V64.2  4/21/2017 Yes    Overview Signed 4/24/2017  3:28 PM by Amanda Khan MD     Speech Pathologist recommended NPO and PEG placement; Patient refused             * (Principal)Acute ischemic stroke [facility]) ICD-10-CM: I63.9  ICD-9-CM: 434.91  4/19/2017 Yes    Overview Signed 4/24/2017  3:15 PM by Amanda Khan MD     Acute Ischemic Stroke (acute to subacute ischemia involving the posterior limb of the left internal capsule, along the lateral aspect of the left thalamus) with residual right hemiparesis, dysphagia and dysarthria             Impaired mobility and ADLs ICD-10-CM: Z74.09  ICD-9-CM: 799.89  4/19/2017 Yes        Decreased calculated glomerular filtration rate (GFR) ICD-10-CM: R94.4  ICD-9-CM: 794.4  4/19/2017 Yes    Overview Addendum 4/24/2017  3:21 PM by Amanda Khan MD     Estimated calculated glomerular filtration rate equivalent to that of stage 3 chronic kidney disease = 30-59 ml/min             Dyslipidemia (high LDL; low HDL) (Chronic) ICD-10-CM: E78.4  ICD-9-CM: 272.4  4/19/2017 Yes    Overview Signed 4/24/2017  3:25 PM by Amanda Khan MD     Lipid profile (4/19/2017): , .  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) ICD-10-CM: T03.970  ICD-9-CM: 438.13  4/19/2017 Yes              Background:   Past Medical History:   Past Medical History:   Diagnosis Date    [facility] 4/19/2017    Acute Ischemic Stroke (acute to subacute ischemia involving the posterior limb of the left internal capsule, along the lateral aspect of the left thalamus) with residual right hemiparesis, dysphagia and dysarthria    Asbestosis ([facility] [address]     Chronic obstructive pulmonary disease (COPD) ([facility] [address]     CKD (chronic kidney disease) stage 3, GFR 30-59 ml/min     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  HDL 42, LDL 97    Dysphagia as late effect of cerebrovascular accident (CVA) 4/19/2017    Erectile dysfunction     Gastroesophageal reflux disease     Hemiparesis affecting right side as late effect of cerebrovascular accident ([facility]) [facility] 4/19/2017    History of endogenous hypertriglyceridemia     History of malignant neoplasm of prostate     Talmage score 7     Hypothyroidism     Osteoarthrosis involving multiple sites     Procedure refused 4/21/2017    Speech Pathologist recommended NPO and PEG placement; Patient refused    Urinary incontinence     Male incontinence       Patient taking anticoagulants YES   Patient has a defibrillator: no     Assessment:   Changes in Assessment throughout shift: NONE              Last Vitals:     Vitals:    04/24/17 1600 04/24/17 2006   BP: 166/87 155/83   Pulse: 71 67   Resp: 18 18   Temp: 96.6 °F (35.9 °C) 97.7 °F (36.5 °C)   SpO2: 97% 96%        PAIN    Pain Assessment    Pain Intensity 1: 0 (04/25/17 0400) Pain Intensity 1: 2 (12/29/14 1105)      Pain Location 1: Abdomen      Pain Intervention(s) 1: Medication (see MAR)  Patient Stated Pain Goal: 0 Patient Stated Pain Goal: 0  o Intervention effective: no    o Other actions taken for pain: NONE     Skin Assessment  Skin color    Condition/Temperature Skin Condition/Temp: Dry  Integrity Skin Integrity: Intact  Turgor Turgor: Non-tenting  Weekly Pressure Ulcer Documentation Weekly Pressure Ulcer Documentation: No Pressure Ulcer Noted-Pressure Ulcer Prevention Initiated  Wound Prevention & Protection Methods  Orientation of wound Orientation of Wound Prevention: Posterior  Location of Prevention Location of Wound Prevention: Sacrum/Coccyx  Dressing Present Dressing Present : No  Dressing Status    Wound Offloading Wound Offloading (Prevention Methods): Bed, pressure redistribution/air     INTAKE/OUPUT    Date 04/24/17 0700 - 04/25/17 0659 04/25/17 0700 - 04/26/17 0659   Shift 0700-1859 1900-0659 24 Hour Total 0700-1859 1900-0659 24 Hour Total   I  N  T  A  K  E   P.O. 200  200         P. O. 200  200       Shift Total 200  200      O  U  T  P  U  T   Urine            Urine Occurrence(s) 1 x 4 x 5 x       Stool            Stool Occurrence(s) 1 x 1 x 2 x       Shift Total           200      Weight (kg)             Recommendations:  1. Patient needs and requests: NONE    2. Diet: CARDIAC PUREED WITH NECTAR THICK LIQUIDS    3. Pending tests/procedures: LABS     4. Functional Level/Equipment: WHEELCHAIR    5. Estimated Discharge Date: TBD Posted on Whiteboard in Patients Room: no     [facility] Safety Check    A safety check occurred in the patient's room between off going nurse and oncoming nurse listed above.     The safety check included the below items  Area Items   H  High Alert Medications - Verify all high alert medication drips (heparin, PCA, etc.)   E  Equipment - Suction is set up for ALL patients (with yanker)  - Red plugs utilized for all equipment (IV pumps, etc.)  - WOWs wiped down at end of shift.  - Room stocked with oxygen, suction, and other unit-specific supplies   A  Alarms - Bed alarm is set for fall risk patients  - Ensure chair alarm is in place and activated if patient is up in a chair   L  Lines - Check IV for any infiltration  - Syed bag is empty if patient has a Syed   - Tubing and IV bags are labeled   S  Safety   - Room is clean, patient is clean, and equipment is clean. - Hallways are clear from equipment besides carts. - Fall bracelet on for fall risk patients  - Ensure room is clear and free of clutter  - Suction is set up for ALL patients (with yanker)  - Hallways are clear from equipment besides carts.    - Isolation precautions followed, supplies available outside room, sign posted

## 2017-04-25 NOTE — PROGRESS NOTES
SHIFT CHANGE NOTE FOR [facility]    Bedside and Verbal shift change report given to  [address]   (oncoming nurse) by Alexandra Lyons LPN   (offgoing nurse). Report included the following information SBAR, Kardex, MAR and Recent Results. Situation:   Code Status: Full Code   Reason for Admission: CVA  Hospital Day: 1   Problem List:   Hospital Problems  Date Reviewed: 4/25/2017          Codes Class Noted POA    Procedure refused ICD-10-CM: Z53.29  ICD-9-CM: V64.2  4/21/2017 Yes    Overview Signed 4/24/2017  3:28 PM by Daria Carmona MD     Speech Pathologist recommended NPO and PEG placement; Patient refused             * (Principal)Acute ischemic stroke [facility]) ICD-10-CM: I63.9  ICD-9-CM: 434.91  4/19/2017 Yes    Overview Signed 4/24/2017  3:15 PM by Daria Carmona MD     Acute Ischemic Stroke (acute to subacute ischemia involving the posterior limb of the left internal capsule, along the lateral aspect of the left thalamus) with residual right hemiparesis, dysphagia and dysarthria             Impaired mobility and ADLs ICD-10-CM: Z74.09  ICD-9-CM: 799.89  4/19/2017 Yes        Decreased calculated glomerular filtration rate (GFR) ICD-10-CM: R94.4  ICD-9-CM: 794.4  4/19/2017 Yes    Overview Addendum 4/24/2017  3:21 PM by Daria Carmona MD     Estimated calculated glomerular filtration rate equivalent to that of stage 3 chronic kidney disease = 30-59 ml/min             Dyslipidemia (high LDL; low HDL) (Chronic) ICD-10-CM: E78.4  ICD-9-CM: 272.4  4/19/2017 Yes    Overview Signed 4/24/2017  3:25 PM by Daria Carmona MD     Lipid profile (4/19/2017): , .  HDL 42, LDL 97             Hemiparesis affecting right side as late effect of cerebrovascular accident (CVA) ([facility] ICD-10-CM: R98.645  ICD-9-CM: 438.20  4/19/2017 Yes        Dysphagia as late effect of cerebrovascular accident (CVA) ICD-10-CM: W16.731  ICD-9-CM: 438.82  4/19/2017 Yes        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) ICD-10-CM: R79.033  ICD-9-CM: 438.13  4/19/2017 Yes              Background:   Past Medical History:   Past Medical History:   Diagnosis Date    Acute ischemic stroke ([facility] 4/19/2017    Acute Ischemic Stroke (acute to subacute ischemia involving the posterior limb of the left internal capsule, along the lateral aspect of the left thalamus) with residual right hemiparesis, dysphagia and dysarthria    [facility]     Chronic obstructive pulmonary disease (COPD) ([facility]     CKD (chronic kidney disease) stage 3, GFR 30-59 ml/min     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  HDL 42, LDL 97    Dysphagia as late effect of cerebrovascular accident (CVA) 4/19/2017    Erectile dysfunction     Gastroesophageal reflux disease     Hemiparesis affecting right side as late effect of cerebrovascular accident (CVA) ([facility] 4/19/2017    History of endogenous hypertriglyceridemia     History of malignant neoplasm of prostate     Genoveva score 7     Hypothyroidism     Osteoarthrosis involving multiple sites     Procedure refused 4/21/2017    Speech Pathologist recommended NPO and PEG placement; Patient refused    Urinary incontinence     Male incontinence       Patient taking anticoagulants YES   Patient has a defibrillator: no     Assessment:   Changes in Assessment throughout shift: NONE              Last Vitals:     Vitals:    04/24/17 1600 04/24/17 2006 04/25/17 0744   BP: 166/87 155/83 141/75   Pulse: 71 67 68   Resp: 18 18 18   Temp: 96.6 °F (35.9 °C) 97.7 °F (36.5 °C) 97.6 °F (36.4 °C)   SpO2: 97% 96% 96%        PAIN    Pain Assessment    Pain Intensity 1: 0 (04/25/17 1400) Pain Intensity 1: 2 (12/29/14 1105)      Pain Location 1: Abdomen      Pain Intervention(s) 1: Medication (see MAR)  Patient Stated Pain Goal: 0 Patient Stated Pain Goal: 0  o Intervention effective: no    o Other actions taken for pain: NONE     Skin Assessment  Skin color    Condition/Temperature Skin Condition/Temp: Dry  Integrity Skin Integrity: Intact  Turgor Turgor: Non-tenting  Weekly Pressure Ulcer Documentation  Pressure  Injury Documentation: No Pressure Ulcer Noted-Pressure Ulcer Prevention Initiated  Wound Prevention & Protection Methods  Orientation of wound Orientation of Wound Prevention: Posterior  Location of Prevention Location of Wound Prevention: Buttocks, Sacrum/Coccyx  Dressing Present Dressing Present : No  Dressing Status    Wound Offloading Wound Offloading (Prevention Methods): Bed, pressure redistribution/air     INTAKE/OUPUT    Date 04/24/17 0700 - 04/25/17 0659 04/25/17 0700 - 04/26/17 0659   Shift 0943-5621 2043-7351 24 Hour Total 0700-1859 1900-0659 24 Hour Total   I  N  T  A  K  E   P.O. 200  200 380  380      P. O. 200  200 380  380    Shift Total 200  200 380  380   O  U  T  P  U  T   Urine            Urine Occurrence(s) 1 x 5 x 6 x 1 x  1 x    Stool            Stool Occurrence(s) 1 x 1 x 2 x 1 x  1 x    Shift Total           200 380  380   Weight (kg)             Recommendations:  1. Patient needs and requests: NONE    2. Diet: CARDIAC PUREED WITH NECTAR THICK LIQUIDS    3. Pending tests/procedures: LABS     4. Functional Level/Equipment: WHEELCHAIR    5. Estimated Discharge Date: TBD Posted on Whiteboard in Patients Room: no     [facility] Safety Check    A safety check occurred in the patient's room between off going nurse and oncoming nurse listed above.     The safety check included the below items  Area Items   H  High Alert Medications - Verify all high alert medication drips (heparin, PCA, etc.)   E  Equipment - Suction is set up for ALL patients (with yanker)  - Red plugs utilized for all equipment (IV pumps, etc.)  - WOWs wiped down at end of shift.  - Room stocked with oxygen, suction, and other unit-specific supplies   A  Alarms - Bed alarm is set for fall risk patients  - Ensure chair alarm is in place and activated if patient is up in a chair   L  Lines - Check IV for any infiltration  - Syed bag is empty if patient has a Syed   - Tubing and IV bags are labeled   S  Safety   - Room is clean, patient is clean, and equipment is clean. - Hallways are clear from equipment besides carts. - Fall bracelet on for fall risk patients  - Ensure room is clear and free of clutter  - Suction is set up for ALL patients (with ton)  - Hallways are clear from equipment besides carts.    - Isolation precautions followed, supplies available outside room, sign posted

## 2017-04-26 LAB — HCYS SERPL-SCNC: 11.9 UMOL/L (ref 0–15)

## 2017-04-26 PROCEDURE — 74011250636 HC RX REV CODE- 250/636: Performed by: INTERNAL MEDICINE

## 2017-04-26 PROCEDURE — 92526 ORAL FUNCTION THERAPY: CPT

## 2017-04-26 PROCEDURE — 97530 THERAPEUTIC ACTIVITIES: CPT

## 2017-04-26 PROCEDURE — 92507 TX SP LANG VOICE COMM INDIV: CPT

## 2017-04-26 PROCEDURE — 97116 GAIT TRAINING THERAPY: CPT

## 2017-04-26 PROCEDURE — 74011250637 HC RX REV CODE- 250/637: Performed by: INTERNAL MEDICINE

## 2017-04-26 PROCEDURE — 97112 NEUROMUSCULAR REEDUCATION: CPT

## 2017-04-26 PROCEDURE — 65310000000 HC RM PRIVATE REHAB

## 2017-04-26 RX ADMIN — CHOLECALCIFEROL TAB 25 MCG (1000 UNIT) 2000 UNITS: 25 TAB at 09:00

## 2017-04-26 RX ADMIN — HEPARIN SODIUM 5000 UNITS: 5000 INJECTION, SOLUTION INTRAVENOUS; SUBCUTANEOUS at 17:42

## 2017-04-26 RX ADMIN — PANTOPRAZOLE SODIUM 40 MG: 40 GRANULE, DELAYED RELEASE ORAL at 06:43

## 2017-04-26 RX ADMIN — HEPARIN SODIUM 5000 UNITS: 5000 INJECTION, SOLUTION INTRAVENOUS; SUBCUTANEOUS at 06:10

## 2017-04-26 RX ADMIN — ASPIRIN 81 MG CHEWABLE TABLET 81 MG: 81 TABLET CHEWABLE at 08:29

## 2017-04-26 RX ADMIN — CLOPIDOGREL BISULFATE 75 MG: 75 TABLET, FILM COATED ORAL at 17:42

## 2017-04-26 RX ADMIN — MULTIPLE VITAMIN LIQUID 30 ML: LIQUID at 08:29

## 2017-04-26 RX ADMIN — ATORVASTATIN CALCIUM 20 MG: 10 TABLET, FILM COATED ORAL at 22:00

## 2017-04-26 RX ADMIN — LEVOTHYROXINE SODIUM 150 MCG: 100 TABLET ORAL at 06:43

## 2017-04-26 RX ADMIN — DOCUSATE SODIUM 100 MG: 100 CAPSULE, LIQUID FILLED ORAL at 08:29

## 2017-04-26 NOTE — PROGRESS NOTES
Problem: Self Care Deficits Care Plan (Adult)  Goal: *Acute Goals and Plan of Care (Insert Text)  Long Term Goals (to be met upon discharge date) in order to increase pts functional independence and safety, and decrease burden of care:  1. Pt will perform self-feeding with independence. 2. Pt will perform grooming with independence. 3. Pt will perform UB bathing with modified independence. 4. Pt will perform LB bathing with modified independence. 5. Pt will perform tshower transfer with modified independence. 6. Pt will perform UB dressing with independence. 7. Pt will perform LB dressing with modified independence. 8. Pt will perform toileting task with modified independence. 9. Pt will perform toilet transfer with modified independence. Short Term Weekly Goals for (2017 to 2017) in order to increase pts functional independence and safety, and decrease burden of care:  1. Pt will perform self-feeding with modified independence. 2. Pt will perform grooming with modified independence. 3. Pt will perform UB bathing with supervision/setup. 4. Pt will perform LB bathing with Min A.  5. Pt will perform shower transfer with Min A.  6. Pt will perform UB dressing with Min A.  7. Pt will perform LB dressing with Mod A.  8. Pt will perform toileting task with Mod A.  9. Pt will perform toilet transfer with Min A.   OCCUPATIONAL THERAPY DAILY NOTE  Patient Name:Stewart Rothman  Time Spent With Patient  Time In: 1000  Time Out: 1100     Medical Diagnosis:  Left CVA  Acute ischemic stroke ([facility] Acute ischemic stroke ([facility]      Pain at start of tx: Pt does not report any pain throughout treatment session. Pain at stop of tx: Pt does not report any pain throughout treatment session. Patient identified with name and : yes  Subjective: \"I don't know why I'm so tired. \"     Objective:      THERAPEUTIC ACTIVITY Daily Assessment     Pt worked on RUE functional gross grasp to stack 10 tones on tabletop in front of him. Pt able to complete with intermittent verbal cues for digit extension to release the cone as opposed to sliding his hand off. Pt also worked on [employer] coordination, copying pegboard pattern utilizing 2\" pegs and foam pegboard. He demonstrated increased difficulty with attempting to obtain peg from bin on the table, therefore OT placed ~10 pegs on the table at a time. He needed extra time to pinch pegs to obtain them and coordinate placement in the pegboard. Pt also required moderate verbal and visual cueing in order to accurately copy the asterick pattern of 3 different colors. THERAPEUTIC EXERCISE Daily Assessment     Pt sat unsupported edge of mat while participated in RUE AROM x10 reps each shoulder flexion/extension, elbow flexion/extension, forearm pronation/supination, and wrist flexion/extension against minimal resistance provided by OT. GROOMING Daily Assessment     Grooming  Grooming Assistance : 6 (Modified independent)  Comments: Pt performed hand hygiene at while seated in w/c with extra time and effort required for incorporating use of R hand. TOILETING Daily Assessment     Toileting  Toileting Assistance (FIM Score): 4 (Minimal assistance)  Cues: Verbal cues provided   Pt required Min A for balance and safety while standing in order to manage his clothing. Pt was able to use his R hand with extra time and effort to assist with managing his clothing on his R side today. MOBILITY/TRANSFERS Daily Assessment     Functional Transfers  Toilet Transfer : Stand pivot transfer without device;Grab bars  Amount of Assistance Required: 4 (Minimal assistance)   Pt performed stand step transfers bed to w/c, w/c <> commode, and w/c <> mat table without an AD with Min A for balance and verbal cues for posture and pacing to slow down throughout the transfers. Assessment: Pt demonstrates improved standing balance during toileting and transfers today.       Plan of Care: Continue POC to maximize pt independence and safety performing ADLs and functional transfers/mobility.      SERENITY Castro  4/26/2017

## 2017-04-26 NOTE — PROGRESS NOTES
Late Entry for 4/25/17:    Problem: Neurolinguistics Impaired (Adult)  Goal: *Speech Goal: (INSERT TEXT)  Long term goals:  1. Patient will tolerate a regular/thin liquid diet without overt s/s of aspiration, 4 consecutive meals. 2. Patient will use safe swallowing techniques with supervision for all PO intake. 3. Patient will answer complex yes/no questions with 90% accuracy. 4 patient will follow complex commands for object manipulation wit 80-90% accuracy. 5. Patient will name 10-12 items within simple categories with supervision. 6. Patient will perform varied word retrieval tasks with % accuracy. 7. Patient will recall 3 words after 5 minutes, supervision. 8. Patient will perform functional problem solving/reasoning tasks with supervision. Short term goals (by 5/2/17):  1. Patient will tolerate a soft, nectar thick liquid diet without overt s/s of aspiration, 4 consecutive meals. 2. Patient will use safe swallowing techniques with min-mod cues for all PO intake. 3. Patient will answer complex yes/no questions with 75-80%% accuracy. 4 patient will follow complex commands for object manipulation wit 80-90% accuracy. 5. Patient will name 7-9 items within simple categories with supervision. 6. Patient will perform varied word retrieval tasks with 70-80% accuracy. 7. Patient will recall 3 words after 5 minutes, min-mod assist.  8. Patient will perform functional problem solving/reasoning tasks with 75-85% accuracy. SPEECH LANGUAGE PATHOLOGY EVALUATION     Patient: Mirian Francois (14 y.o. male)  Date: 4/26/2017  Primary Diagnosis: Left CVA  Acute ischemic stroke [facility])        Precautions:   Aspiration, Fall      SUBJECTIVE:   Patient stated I am having trouble thinking of words.       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  HDL 42, LDL 97    Dysphagia as late effect of cerebrovascular accident (CVA) 4/19/2017    Erectile dysfunction      Gastroesophageal reflux disease      Hemiparesis affecting right side as late effect of cerebrovascular accident (CVA) ([facility] 4/19/2017    History of endogenous hypertriglyceridemia      History of malignant neoplasm of prostate       [place] score 7     Hypothyroidism      Osteoarthrosis involving multiple sites      Procedure refused 4/21/2017     Speech Pathologist recommended NPO and PEG placement; Patient refused    Urinary incontinence       Male incontinence      Past Surgical History:   Procedure Laterality Date    COLONOSCOPY        HX CHOLECYSTECTOMY        HX HERNIA REPAIR Bilateral       inguinal    HX RADICAL PROSTATECTOMY   1-     perineal approach     Prior Level of Function/Home Situation:   Home Situation  Home Environment: Private residence  # Steps to Enter: 0  One/Two Story Residence: One story  Living Alone: No  Support Systems: Child(bull)  Patient Expects to be Discharged to[de-identified] Rehabilitation facility  Current DME Used/Available at Home: None  Mental Status:  Neurologic State: Alert  Orientation Level: Oriented X4  Cognition: Impaired decision making, Impulsive, Memory loss, Poor safety awareness  Perception: Appears intact  Perseveration: Perseverates during conversation  Safety/Judgement: Awareness of environment, Decreased awareness of need for assistance, Decreased awareness of need for safety  Motor Speech:  Oral-Motor Structure/Motor Speech  Face: No impairment  Labial: No impairment  Dentition: Upper & lower dentures  Oral Hygiene: WFL  Lingual: Right deviation (Mild)  Velum: No impairment  Mandible: No impairment  Apraxic Characteristics: None  Dysarthric Characteristics: None  Intelligibility: No impairment  Intonation: WFL  Rate: WFL  Prosody: WFL  Overall Impairment Severity: Minimal  Language Comprehension and Expression:  Auditory Comprehension  Auditory Impairment: Yes  Response to Basic Yes/No Questions (%): 100 %  Response to Moderately Complex Yes/No Questions (%): 90 %  Response to Complex Yes/No Questions (%) : 80 %  Three-Step Basic Commands (%): 80 %  Complex Commands (%): 50 %  Interfering Components: Attention to detail; Impulsivity; Working memory  Effective Techniques: Repetition; Slowed speech;Stressing words  Cueing type: Verbal  Cueing amount: Min-mod  Verbal Expression  Primary Mode of Expression: Verbal  Initiation: No impairment  Automatic Speech Task: No impairment  Repetition: Impaired  Word Repetition (%): 100 %  Phrase Repetition (%): 90 %  Sentence Repetition (%): 50 %  Repetition cueing type: Repetition  Repetition cueing amount: Moderate  Naming: Impaired  Divergent (%): 50 %  Sentence Completion: No impairment  Sentence Formulation: Impaired (%)  Sentence forumlation cueing type: Verbal;Phonemic;Sentence completion cue;Imitation  Sentence formulation cueing amount: Moderate  Conversation: Fluent  Speech Characteristics: Perseveration; Word retrieval  Interfering Components: Impaired thought organization; Impulsive  Effective Techniques: Cueing;Decreased rate;Provide extra time; Word retrieval strategies  Reading Comprehension  Visual Impairment: No impairment  Pre-Morbid Reading Status: Literate  Scanning/Tracking : No impairment  Words : Yes  Sentence: No Impairment  Paragraph : Impaired  Oral Reading: No impairment  Interfering Components: Processing time; Working memory  Effective Techniques: [employer] print;Pacing  Overall Impairment Severity: Minimal  Written Expression  Pre-Morbid Dominant Hand: Right  Pre-Morbid Writing Skills: WFL  Legibility : Decreased legibility;Uses dominant hand;Uses non-dominant hand  Dictation : No impairment  Interfering Components: Sputial organization;Utilizes non-dominant hand  Effective Techniques: Language cues  Overall Impairment Severity: Moderate  Neuro-Linguistics:  Verbal Reasoning Tasks: Impaired  Verbal Problem Solving: Impaired  Verbal Organization: Impaired  Thought Organization: Impaired-poor use of strategies  Mathematical:  (DNT)  Memory: Impaired  Attention : No Impairement  Pragmatics:  Pragmatics Impairment: No impairment  Pragmatics Impairment Severity: None  Voice:  Vocal Quality: No impairment (at baseline)      Pain:  Pain Scale 1: Numeric (0 - 10)  Pain Intensity 1: 0     After treatment:   [X]              Patient left in no apparent distress sitting up in chair  [ ]              Patient left in no apparent distress in bed  [ ]              Call bell left within reach  [ ]              Nursing notified  [X]              Caregiver present  [ ]              Bed alarm activated      ASSESSMENT:   Based on the objective data described below, the patient presents with mild to moderate language deficits (comprehension and expression) and modeaate cognitive impairment secondary to left hemisphere CVA. SPEECH LANGUAGE PATHOLOGY BEDSIDE SWALLOW EVALUATION     Patient: Karen Alaniz [de-identified]64 y.o. male)      SUBJECTIVE:   Patient stated I don't hav trouble swallowing. Diet prior to admission: Puree, nectar thick liquids (based on MBS done 4/20/17, SLP at [facility] and recommended NPO). Current Diet:  Puree, nectar thick liquids. Family is bringing in food items not on his prescribed diet.   Cognitive and Communication Status:  Neurologic State: Alert  Orientation Level: Oriented X4  Cognition: Impaired decision making, Impulsive, Memory loss, Poor safety awareness  Perception: Appears intact  Perseveration: Perseverates during conversation  Safety/Judgement: Awareness of environment, Decreased awareness of need for assistance, Decreased awareness of need for safety  Oral Assessment:  Oral Assessment  Labial: No impairment  Dentition: Upper & lower dentures  Oral Hygiene: WFL  Lingual: Right deviation (Mild)  Velum: No impairment  Mandible: No impairment  P.O. Trials:  Patient Position: Seated  Vocal quality prior to P.O.: No impairment (at baseline)  Consistency Presented: Mechanical soft; Nectar thick liquid;Pudding;Puree; Solid; Thin liquid  How Presented: Self-fed/presented;SLP-fed/presented;Cup/sip;Spoon     Bolus Acceptance: No impairment  Bolus Formation/Control: Impaired  Type of Impairment: Piecemeal  Propulsion: No impairment  Oral Residue: 10-50% of bolus; Lingual  Initiation of Swallow: Delayed (# of seconds)  Laryngeal Elevation: Functional  Aspiration Signs/Symptoms: Change vocal quality  Pharyngeal Phase Characteristics: Multiple swallows; Suspected pharyngeal residue  Effective Modifications: Double swallow  Cues for Modifications: Minimal     Oral Phase Severity: Mild  Pharyngeal Phase Severity : Mild-moderate  Pain:  Pain Scale 1: Numeric (0 - 10)  Pain Intensity 1: 0     After treatment:   [X]            Patient left in no apparent distress sitting up in chair  [ ]            Patient left in no apparent distress in bed  [ ]            Call bell left within reach  [ ]            Nursing notified  [X]            Caregiver present  [ ]            Bed alarm activated      COMMUNICATION/EDUCATION:   [X]            Posted safety precautions in patient's room. [X]            Patient/family have participated as able in goal setting and plan of care. [X]            Patient/family agree to work toward stated goals and plan of care. [ ]            Patient understands intent and goals of therapy, but is neutral about his/her participation.   [ ] Patient is unable to participate in goal setting and plan of care. ASSESSMENT:   Based on the objective data described above, the patient presents with mild language deficits (comprehension and expression) as well as mild to moderate oral-pharyngeal dysphagia. Patient will benefit from skilled intervention to address the above impairments. Patients rehabilitation potential is considered to be Good  Factors which may influence rehabilitation potential include:   [ ]            None noted  [ ]            Mental ability/status  [ ]            Medical condition  [X]            Home/family situation and support systems  [X]            Safety awareness  [ ]            Pain tolerance/management  [ ]            Other:      PLAN:   Recommendations and Planned Interventions:  SLP will follow daily to address training in safe swallowing techniques, diet modification and advancement, pharyngeal strengthening exercises, repeat MBS as needed. Frequency/Duration: Patient will be followed by speech-language pathology 1-2 times per day/4-7 days per week to address goals.   Discharge Recommendations: Outpatient     Thank you for this referral.  AUGUSTINA Santillan  Time Calculation: 60 mins

## 2017-04-26 NOTE — PROGRESS NOTES
Problem: Mobility Impaired (Adult and Pediatric)  Goal: *Acute Goals and Plan of Care (Insert Text)  Physical Therapy Short Term Goals  Initiated 2017 and to be accomplished within 7 day(s) (2017)  1. Patient will move from supine to sit and sit to supine , scoot up and down and roll side to side in bed with modified independence. 2. Patient will transfer from bed to chair and chair to bed with minimal assistance/contact guard assist using the least restrictive device. 3. Patient will perform sit to stand with minimal assistance/contact guard assist.  4. Patient will ambulate with moderate assistance for 50 feet with the least restrictive device. 5. Patient will ascend/descend 6 stairs with 2 handrail(s) with moderate assistance . Physical Therapy Long Term Goals  Initiated 2017 and to be accomplished within 28 day(s) (2017)  1. Patient will move from supine to sit and sit to supine , scoot up and down and roll side to side in bed with independence. 2. Patient will transfer from bed to chair and chair to bed with modified independence using the least restrictive device. 3. Patient will perform sit to stand with modified independence. 4. Patient will ambulate with modified independence for 250 feet with the least restrictive device. 5. Patient will ascend/descend 6 stairs with 2 handrail(s) with modified independence. PHYSICAL THERAPY DAILY NOTE  Patient Name:Stewart Montero Luisito  Time In: 1330  Time Out: 1430  Patient Seen For: Wheelchair mobility;Transfer training; Therapeutic exercise;Gait training;Patient education;Balance activities  Diagnosis: Left CVA  Acute ischemic stroke [facility]) Acute ischemic stroke [facility])  Precautions: Fall Risk     Subjective: Patient reports motivation to participate though he notes that he has been \"unusually tired today. \"     Pain at start of tx:0/10  Pain at stop of tx:0/10     Patient identified with name and : YES         Objective: BP: Supine: 148/74 mmHg; BP short sittin/78 mmHg      BED/MAT MOBILITY Daily Assessment     Rolling Right : 5 (Supervision)  Rolling Left : 5 (Supervision)  Supine to Sit : 5 (Supervision)  Sit to Supine : 5 (Supervision)          TRANSFERS Daily Assessment     Transfer Type: Other  Other: Patient performs stand step transfer w/ min a x 1 for trunk stability, assisted weight shift, and cues for technique. Patient utilizes B UE on R distal femur for improved joint approximation and motor return. Patient requires verbal cueing for pacing to slow progression and R LE varus shift prevention to maintain stability and prevent compensation. Patient performs STS transfer x 15 in session with L LE on 6\" block to promote R LE utilization and increased load. Patient utilizes mirror for STS transfer to promote improved understanding of COG positioning as well as to provide biofeedback. Transfer Assistance : 4 (Minimal assistance)  Sit to Stand Assistance: Minimal assistance          GAIT Daily Assessment     Patient ambulates 70 ft with Moderate assist for trunk positional assistance, weight shift, COG control, R LE knee flexion support, postural cues with facilitation of pelvis list as opposed to trunk lean, and assisted step clearance. Patient requires constant verbal cueing for pace and safety with minimal effect at this time. BALANCE Daily Assessment     Sitting - Static: Good (unsupported)  Sitting - Dynamic: Good (unsupported)  Standing - Static: Fair  Standing - Dynamic : Impaired          WHEELCHAIR MOBILITY Daily Assessment     Able to Propel (ft): 232 feet (B LE technique with verbal cueing for inc R LE advancement)  Functional Level: 5      Patient requires safety cues for pacing and R LE advancement 2/2 inattention to R LE placement during reciprocation.                  Assessment: Patient demonstrates improved strength and STS progression, however he remains limited by pacing and inattention to quality of movement resulting in increased risk of falls. Patient will require continued training to improve attendance and carry-over of safety cues. Plan of Care: Cont current POC; focus on pacing with slow progression and increased attention to form. Clemente Wiseman, PT DPT  4/26/2017

## 2017-04-26 NOTE — PROGRESS NOTES
Problem: Neurolinguistics Impaired (Adult)  Goal: *Speech Goal: (INSERT TEXT)  Long term goals:  1. Patient will tolerate a regular/thin liquid diet without overt s/s of aspiration, 4 consecutive meals. 2. Patient will use safe swallowing techniques with supervision for all PO intake. 3. Patient will answer complex yes/no questions with 90% accuracy. 4 patient will follow complex commands for object manipulation wit 80-90% accuracy. 5. Patient will name 10-12 items within simple categories with supervision. 6. Patient will perform varied word retrieval tasks with % accuracy. 7. Patient will recall 3 words after 5 minutes, supervision. 8. Patient will perform functional problem solving/reasoning tasks with supervision. Short term goals (by 5/2/17):  1. Patient will tolerate a soft, nectar thick liquid diet without overt s/s of aspiration, 4 consecutive meals. 2. Patient will use safe swallowing techniques with min-mod cues for all PO intake. 3. Patient will answer complex yes/no questions with 75-80%% accuracy. 4 patient will follow complex commands for object manipulation wit 80-90% accuracy. 5. Patient will name 7-9 items within simple categories with supervision. 6. Patient will perform varied word retrieval tasks with 70-80% accuracy. 7. Patient will recall 3 words after 5 minutes, min-mod assist.  8. Patient will perform functional problem solving/reasoning tasks with 75-85% accuracy. SPEECH LANGUAGE PATHOLOGY TREATMENT     Patient: Juventino Ramos (83 y.o. male)  Date: 4/26/2017  Diagnosis: Left CVA  Acute ischemic stroke [facility]) Acute ischemic stroke [facility])       SUBJECTIVE:   Patient stated They wore me out. OBJECTIVE:   Mental Status:  Mr. Cynthia Gamble was alert and seen in the SLP's office this morning. This afternoon, he was quite fatigued and seen at his bedside.      Treatment & Interventions:   Patient was seen in the dining room at breakfast time this morning in the therapeutic dining group. Patient needed cues to limit bolus size for both liquids and solids. He also needed cuing to slow his rate of intake, as he was quite impulsive with self feeding. Patient with immediate cough after one large bolus nectar thick liquid. Suspected penetration or aspiration. Patient was also seen for two half hour speech therapy sessions. In these sessions, he participated in the following treatment tasks:     Language Comprehension and Expression:  Yes/no responses (mildly complex):  90% accuracy  Name item given a description:          90% accuracy  Generative naming:                              Vegetables:                             Patient named 7, some perseveration noted              US States:                              15 named with sufficient time, again some perseveration     Neuro-Linguistics:   Orientation:                                         Supervision  Recent memory:                                 Min assist  Recall 3 words: Mod assist  Problem solving (basic):                     100% accuracy     Response & Tolerance to Activities:  Mr. Henrry López was cooperative with all tasks presented in speech therapy sessions. However, he was somewhat resistant to cues to limit bolus size and slow his rate while eating lunch. He did not come to the dining room for his lunch. Pain:  Pain Scale 1: Numeric (0 - 10)     After treatment:   [X]       Patient left in no apparent distress sitting up in chair  [X]       Patient left in no apparent distress in bed  [ ]       Call bell left within reach  [ ]       Nursing notified  [X]       Caregiver present  [ ]       Bed alarm activated      ASSESSMENT:   Mr. Bhanu Edward is relatively unaware of noted s/s of aspiration while drinking.   His family has been giving him thinner than recommended liquids since he was at the acute hospital.  Progression toward goals:  [ ]       Improving appropriately and progressing toward goals  [X]       Improving slowly and progressing toward goals  [ ]       Not making progress toward goals and plan of care will be adjusted      PLAN:   Patient continues to benefit from skilled intervention to address the above impairments. Continue treatment per established plan of care.   Discharge Recommendations:  Outpatient     AUGUSTINA Juan  Time Calculation: 90 Minutes

## 2017-04-26 NOTE — PROGRESS NOTES
conducted an initial consultation and Spiritual Assessment for Dominique Bowden, who is a 64 y. o.,male. Patients Primary Language is: [place]. According to the patients EMR Restorationist Affiliation is: Other. The reason the Patient came to the hospital is:   Patient Active Problem List    Diagnosis Date Noted    Gastroesophageal reflux disease     Erectile dysfunction     Procedure refused 04/21/2017    Acute ischemic stroke ([facility] [address] 04/19/2017    Impaired mobility and ADLs 04/19/2017    Decreased calculated glomerular filtration rate (GFR) 04/19/2017    Dyslipidemia (high LDL; low HDL) 04/19/2017    Hemiparesis affecting right side as late effect of cerebrovascular accident (CVA) ([address] 04/19/2017    Dysphagia as late effect of cerebrovascular accident (CVA) 04/19/2017    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) 04/19/2017    Chronic obstructive pulmonary disease (COPD) ([facility] [address] [facility]     Asbestosis ([facility] [address]     Osteoarthrosis involving multiple sites     History of endogenous hypertriglyceridemia     Hypothyroidism     Urinary incontinence     History of malignant neoplasm of prostate         The  provided the following Interventions:  Initiated a relationship of care and support. Explored issues of satinder, belief, spirituality and Cheondoism/ritual needs while hospitalized. Listened empathically. Provided chaplaincy education. Provided information about Spiritual Care Services. Offered prayer and assurance of continued prayers on patient's behalf. Chart reviewed. The following outcomes where achieved:  Patient shared limited information about both their medical narrative and spiritual journey/beliefs.  confirmed Patient's Restorationist Affiliation. Patient processed feeling about current hospitalization. Patient expressed gratitude for 's visit.     Assessment:  Patient does not have any Cheondoism/cultural needs that will affect patients preferences in health care. There are no spiritual or Quaker issues which require intervention at this time. Plan:  Chaplains will continue to follow and will provide pastoral care on an as needed/requested basis.  recommends bedside caregivers page  on duty if patient shows signs of acute spiritual or emotional distress. Chaplain Yasmeen BURGOS  [address] [facility]  [phone]

## 2017-04-26 NOTE — PROGRESS NOTES
SHIFT CHANGE NOTE FOR [facility]    Bedside and Verbal shift change report given to  Gaby Jewell RN   (oncoming nurse) by Jadiel Hunter RN   (offgoing nurse). Report included the following information SBAR, Kardex, MAR and Recent Results. Situation:   Code Status: Full Code   Reason for Admission: CVA  Hospital Day: 2   Problem List:   Hospital Problems  Date Reviewed: 4/25/2017          Codes Class Noted POA    Procedure refused ICD-10-CM: Z53.29  ICD-9-CM: V64.2  4/21/2017 Yes    Overview Signed 4/24/2017  3:28 PM by Tania Jesus MD     Speech Pathologist recommended NPO and PEG placement; Patient refused             * (Principal)Acute ischemic stroke [facility]) ICD-10-CM: I63.9  ICD-9-CM: 434.91  4/19/2017 Yes    Overview Signed 4/24/2017  3:15 PM by Tania Jesus MD     Acute Ischemic Stroke (acute to subacute ischemia involving the posterior limb of the left internal capsule, along the lateral aspect of the left thalamus) with residual right hemiparesis, dysphagia and dysarthria             Impaired mobility and ADLs ICD-10-CM: Z74.09  ICD-9-CM: 799.89  4/19/2017 Yes        Decreased calculated glomerular filtration rate (GFR) ICD-10-CM: R94.4  ICD-9-CM: 794.4  4/19/2017 Yes    Overview Addendum 4/24/2017  3:21 PM by Tania Jesus MD     Estimated calculated glomerular filtration rate equivalent to that of stage 3 chronic kidney disease = 30-59 ml/min             Dyslipidemia (high LDL; low HDL) (Chronic) ICD-10-CM: E78.4  ICD-9-CM: 272.4  4/19/2017 Yes    Overview Signed 4/24/2017  3:25 PM by Tania Jesus MD     Lipid profile (4/19/2017): , .  HDL 42, LDL 97             Hemiparesis affecting right side as late effect of cerebrovascular accident (CVA) ([facility] [address] ICD-10-CM: B78.536  ICD-9-CM: 438.20  4/19/2017 Yes        Dysphagia as late effect of cerebrovascular accident (CVA) ICD-10-CM: T57.466  ICD-9-CM: 438.82  4/19/2017 Yes        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) ICD-10-CM: H54.156  ICD-9-CM: 438.13  4/19/2017 Yes              Background:   Past Medical History:   Past Medical History:   Diagnosis Date    Acute ischemic stroke ([facility] 4/19/2017    Acute Ischemic Stroke (acute to subacute ischemia involving the posterior limb of the left internal capsule, along the lateral aspect of the left thalamus) with residual right hemiparesis, dysphagia and dysarthria    [facility] ([facility]     Chronic obstructive pulmonary disease (COPD) ([facility]     CKD (chronic kidney disease) stage 3, GFR 30-59 ml/min     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  HDL 42, LDL 97    Dysphagia as late effect of cerebrovascular accident (CVA) 4/19/2017    Erectile dysfunction     Gastroesophageal reflux disease     Hemiparesis affecting right side as late effect of cerebrovascular accident (CVA) ([facility] 4/19/2017    History of endogenous hypertriglyceridemia     History of malignant neoplasm of prostate     Genoveva score 7     Hypothyroidism     Osteoarthrosis involving multiple sites     Procedure refused 4/21/2017    Speech Pathologist recommended NPO and PEG placement; Patient refused    Urinary incontinence     Male incontinence       Patient taking anticoagulants YES   Patient has a defibrillator: no     Assessment:   Changes in Assessment throughout shift: NONE              Last Vitals:     Vitals:    04/25/17 0744 04/25/17 1552 04/25/17 1602 04/25/17 2010   BP: 141/75  147/87 110/69   Pulse: 68  78 68   Resp: 18  18 18   Temp: 97.6 °F (36.4 °C)  97.6 °F (36.4 °C) 97.6 °F (36.4 °C)   SpO2: 96%  96% 100%   Weight:  81.5 kg (179 lb 10.8 oz)     Height:  5' 10\" (1.778 m)          PAIN    Pain Assessment    Pain Intensity 1: 0 (04/26/17 0351) Pain Intensity 1: 2 (12/29/14 1105)      Pain Location 1: Abdomen      Pain Intervention(s) 1: Medication (see MAR)  Patient Stated Pain Goal: 0 Patient Stated Pain Goal: 0  o Intervention effective: no    o Other actions taken for pain: NONE     Skin Assessment  Skin color    Condition/Temperature Skin Condition/Temp: Dry  Integrity Skin Integrity: Intact  Turgor Turgor: Non-tenting  Weekly Pressure Ulcer Documentation  Pressure  Injury Documentation: No Pressure Injury Noted-Pressure Ulcer Prevention Initiated  Wound Prevention & Protection Methods  Orientation of wound Orientation of Wound Prevention: Posterior  Location of Prevention Location of Wound Prevention: Sacrum/Coccyx  Dressing Present Dressing Present : No  Dressing Status    Wound Offloading Wound Offloading (Prevention Methods): Bed, pressure redistribution/air     INTAKE/OUPUT    Date 04/25/17 0700 - 04/26/17 0659 04/26/17 0700 - 04/27/17 0659   Shift 3446-0900 4255-5833 24 Hour Total 0700-1859 1900-0659 24 Hour Total   I  N  T  A  K  E   P.O. 620  620         P. O. 620  620       Shift Total  (mL/kg) 620  (7.6)  620  (7.6)      O  U  T  P  U  T   Urine  (mL/kg/hr)            Urine Occurrence(s) 3 x 6 x 9 x       Stool            Stool Occurrence(s) 1 x 0 x 1 x       Shift Total  (mL/kg)           620      Weight (kg) 81.5 81.5 81.5 81.5 81.5 81.5       Recommendations:  1. Patient needs and requests: NONE    2. Diet: CARDIAC PUREED WITH NECTAR THICK LIQUIDS    3. Pending tests/procedures: LABS     4. Functional Level/Equipment: WHEELCHAIR    5. Estimated Discharge Date: TBD Posted on Whiteboard in Patients Room: no     [facility] Safety Check    A safety check occurred in the patient's room between off going nurse and oncoming nurse listed above.     The safety check included the below items  Area Items   H  High Alert Medications - Verify all high alert medication drips (heparin, PCA, etc.)   E  Equipment - Suction is set up for ALL patients (with estherker)  - Red plugs utilized for all equipment (IV pumps, etc.)  - WOWs wiped down at end of shift.  - Room stocked with oxygen, suction, and other unit-specific supplies   A  Alarms - Bed alarm is set for fall risk patients  - Ensure chair alarm is in place and activated if patient is up in a chair   L  Lines - Check IV for any infiltration  - Syed bag is empty if patient has a Syed   - Tubing and IV bags are labeled   S  Safety   - Room is clean, patient is clean, and equipment is clean. - Hallways are clear from equipment besides carts. - Fall bracelet on for fall risk patients  - Ensure room is clear and free of clutter  - Suction is set up for ALL patients (with ton)  - Hallways are clear from equipment besides carts.    - Isolation precautions followed, supplies available outside room, sign posted

## 2017-04-26 NOTE — REHAB NOTE
[facility] PHYSICAL REHABILITATION  [address] [place] [address], [address]     INPATIENT REHABILITATION  OVERALL PLAN OF CARE    Name: Kylah Garcia CSN: [id number]   Age: 64 y.o. MRN: [id number]   Sex: male Admit Date: 4/24/2017     Primary Rehabilitation Diagnosis  1. Impaired Mobility and ADLs  2. Acute Ischemic Stroke (acute to subacute ischemia involving the posterior limb of the left internal capsule, along the lateral aspect of the left thalamus) with residual right hemiparesis, dysphagia and dysarthria      Comorbidities   Urinary incontinence    History of malignant neoplasm of prostate    Hypothyroidism    Chronic obstructive pulmonary disease (COPD)    Asbestosis    Osteoarthrosis involving multiple sites    History of endogenous hypertriglyceridemia    CKD (chronic kidney disease) stage 3, GFR 30-59 ml/min    Dyslipidemia (high LDL; low HDL)    Gastroesophageal reflux disease    Procedure refused    Erectile dysfunction       ANTICIPATED INTERVENTIONS THAT SUPPORT THE MEDICAL NECESSITY OF THIS ADMISSION:    I. Physical Therapy              A. Intensity: 1 hour per day              B. Frequency: 5 times per week              C. Duration: 4 weeks              D. Long Term Goals:    1. Patient will move from supine to sit and sit to supine , scoot up and down and roll side to side in bed with independence. 2. Patient will transfer from bed to chair and chair to bed with modified independence using the least restrictive device. 3. Patient will perform sit to stand with modified independence. 4. Patient will ambulate with modified independence for 250 feet with the least restrictive device. 5. Patient will ascend/descend 6 stairs with 2 handrail(s) with modified independence.    E. Interventions: Interventions may include range of motion (AROM, PROM B LE/trunk), motor function (B LE/trunk strengthening/coordination), activity tolerance (vitals, oxygen saturation levels), bed mobility training, balance activities, gait training (progressive ambulation program), and functional transfer training. II. Occupational Therapy  21 . Intensity: 1 hour per day              B. Frequency: 5 times per week              C. Duration: 4 weeks              D. Long Term Goals:    1. Pt will perform self-feeding with independence. 2. Pt will perform grooming with independence. 3. Pt will perform UB bathing with modified independence. 4. Pt will perform LB bathing with modified independence. 5. Pt will perform tshower transfer with modified independence. 6. Pt will perform UB dressing with independence. 7. Pt will perform LB dressing with modified independence. 8. Pt will perform toileting task with modified independence. 9. Pt will perform toilet transfer with modified independence. E. Interventions: Interventions may include range of motion (AROM, PROM B UE), motor function (B UE/ strengthening/coordination), activity tolerance (vitals, oxygen saturation levels), balance training, ADL/IADL training and functional transfer training. III. Speech Therapy              A. Intensity: 1-2 times per day              B. Frequency: 4-7 times per week              C. Duration: 4 weeks              D. Long Term Goals:    1. Patient will tolerate a regular/thin liquid diet without overt s/s of aspiration, 4 consecutive meals. 2. Patient will use safe swallowing techniques with supervision for all PO intake. 3. Patient will answer complex yes/no questions with 90% accuracy. 4 patient will follow complex commands for object manipulation wit 80-90% accuracy. 5. Patient will name 10-12 items within simple categories with supervision. 6. Patient will perform varied word retrieval tasks with % accuracy. 7. Patient will recall 3 words after 5 minutes, supervision.     8. Patient will perform functional problem solving/reasoning tasks with supervision. E. Interventions: Patient continues to benefit from skilled intervention to address the above impairments. Continue treatment per established plan of care. PHYSICIAN'S ASSESSMENT OF FINDINGS:    Are the established goals sufficient for achieving the optimal level of function? [x]  Yes      []  No    What changes would you recommend to the goals as written? None      Are the interventions noted sufficient for achieving the optimal level of function? [x]  Yes      []  No    What changes would you recommend to the interventions noted? If therapy staff is unable to provide 3 hr of total therapy per day in 5 days due to medical issues or decreased patient tolerance, may modify treatment schedule to 15 hr/week.       Estimated length of stay: 3 weeks      Medical rehabilitation prognosis:    []  Excellent     [x]  Good     []  Fair     []  Guarded       Discharge Destination:     [x]  Home     []  [address]     []  [place]     []  [address]     []  [place]     []  Other:       Signed:    Berlin Mohs, MD    April 26, 2017

## 2017-04-26 NOTE — PROGRESS NOTES
SHIFT CHANGE NOTE FOR [facility]    Bedside and Verbal shift change report given to  Carlette Duverney, RN   (oncoming nurse) by Tequila Duncan RN   (offgoing nurse). Report included the following information SBAR, Kardex, MAR and Recent Results. Situation:   Code Status: Full Code   Reason for Admission: CVA  Hospital Day: 2   Problem List:   Hospital Problems  Date Reviewed: 4/26/2017          Codes Class Noted POA    Procedure refused ICD-10-CM: Z53.29  ICD-9-CM: V64.2  4/21/2017 Yes    Overview Signed 4/24/2017  3:28 PM by Breezy Odell MD     Speech Pathologist recommended NPO and PEG placement; Patient refused             * (Principal)Acute ischemic stroke [facility]) ICD-10-CM: I63.9  ICD-9-CM: 434.91  4/19/2017 Yes    Overview Signed 4/24/2017  3:15 PM by Breezy Odell MD     Acute Ischemic Stroke (acute to subacute ischemia involving the posterior limb of the left internal capsule, along the lateral aspect of the left thalamus) with residual right hemiparesis, dysphagia and dysarthria             Impaired mobility and ADLs ICD-10-CM: Z74.09  ICD-9-CM: 799.89  4/19/2017 Yes        Decreased calculated glomerular filtration rate (GFR) ICD-10-CM: R94.4  ICD-9-CM: 794.4  4/19/2017 Yes    Overview Addendum 4/24/2017  3:21 PM by Breezy Odell MD     Estimated calculated glomerular filtration rate equivalent to that of stage 3 chronic kidney disease = 30-59 ml/min             Dyslipidemia (high LDL; low HDL) (Chronic) ICD-10-CM: E78.4  ICD-9-CM: 272.4  4/19/2017 Yes    Overview Signed 4/24/2017  3:25 PM by Breezy Odell MD     Lipid profile (4/19/2017): , .  HDL 42, LDL 97             Hemiparesis affecting right side as late effect of cerebrovascular accident (CVA) ([facility] [address] ICD-10-CM: Q42.171  ICD-9-CM: 438.20  4/19/2017 Yes        Dysphagia as late effect of cerebrovascular accident (CVA) ICD-10-CM: C57.354  ICD-9-CM: 438.82  4/19/2017 Yes        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) ICD-10-CM: N38.410  ICD-9-CM: 438.13  4/19/2017 Yes              Background:   Past Medical History:   Past Medical History:   Diagnosis Date    Acute ischemic stroke ([facility] 4/19/2017    Acute Ischemic Stroke (acute to subacute ischemia involving the posterior limb of the left internal capsule, along the lateral aspect of the left thalamus) with residual right hemiparesis, dysphagia and dysarthria    [facility]     Chronic obstructive pulmonary disease (COPD) ([facility]     CKD (chronic kidney disease) stage 3, GFR 30-59 ml/min     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  HDL 42, LDL 97    Dysphagia as late effect of cerebrovascular accident (CVA) 4/19/2017    Erectile dysfunction     Gastroesophageal reflux disease     Hemiparesis affecting right side as late effect of cerebrovascular accident (CVA) ([facility] 4/19/2017    History of endogenous hypertriglyceridemia     History of malignant neoplasm of prostate     Genoveva score 7     Hypothyroidism     Osteoarthrosis involving multiple sites     Procedure refused 4/21/2017    Speech Pathologist recommended NPO and PEG placement; Patient refused    Urinary incontinence     Male incontinence       Patient taking anticoagulants YES   Patient has a defibrillator: no     Assessment:   Changes in Assessment throughout shift: NONE              Last Vitals:     Vitals:    04/26/17 1157 04/26/17 1342 04/26/17 1345 04/26/17 1622   BP: (!) 143/91 148/74 154/78 150/89   Pulse: 62   67   Resp: 18   18   Temp: 97.1 °F (36.2 °C)   97.5 °F (36.4 °C)   SpO2: 97%   96%   Weight:       Height:            PAIN    Pain Assessment    Pain Intensity 1: 0 (04/26/17 1600) Pain Intensity 1: 2 (12/29/14 1105)      Pain Location 1: Abdomen      Pain Intervention(s) 1: Medication (see MAR)  Patient Stated Pain Goal: 0 Patient Stated Pain Goal: 0  o Intervention effective: no    o Other actions taken for pain: NONE     Skin Assessment  Skin color Skin Color: Appropriate for ethnicity  Condition/Temperature Skin Condition/Temp: Warm  Integrity Skin Integrity: Intact  Turgor Turgor: Non-tenting  Weekly Pressure Ulcer Documentation  Pressure  Injury Documentation: No Pressure Injury Noted-Pressure Ulcer Prevention Initiated  Wound Prevention & Protection Methods  Orientation of wound Orientation of Wound Prevention: Posterior  Location of Prevention Location of Wound Prevention: Sacrum/Coccyx  Dressing Present Dressing Present : No  Dressing Status    Wound Offloading Wound Offloading (Prevention Methods): Bed, pressure redistribution/air     INTAKE/OUPUT    Date 04/25/17 1900 - 04/26/17 0659 04/26/17 0700 - 04/27/17 0659   Shift 5023-0374 24 Hour Total 5490-8528 0409-2222 24 Hour Total   I  N  T  A  K  E   P.O.  620 720  720      P. O.  620 720  720    Shift Total  (mL/kg)  620  (7.6) 720  (8.8)  720  (8.8)   O  U  T  P  U  T   Urine  (mL/kg/hr)           Urine Occurrence(s) 6 x 9 x 8 x  8 x    Stool           Stool Occurrence(s) 0 x 1 x 0 x  0 x    Shift Total  (mL/kg)        NET  620 720  720   Weight (kg) 81.5 81.5 81.5 81.5 81.5       Recommendations:  1. Patient needs and requests: NONE    2. Diet: CARDIAC PUREED WITH NECTAR THICK LIQUIDS    3. Pending tests/procedures: LABS     4. Functional Level/Equipment: WHEELCHAIR    5. Estimated Discharge Date: TBD Posted on Whiteboard in Patients Room: no     [facility] Safety Check    A safety check occurred in the patient's room between off going nurse and oncoming nurse listed above.     The safety check included the below items  Area Items   H  High Alert Medications - Verify all high alert medication drips (heparin, PCA, etc.)   E  Equipment - Suction is set up for ALL patients (with yanker)  - Red plugs utilized for all equipment (IV pumps, etc.)  - WOWs wiped down at end of shift.  - Room stocked with oxygen, suction, and other unit-specific supplies   A  Alarms - Bed alarm is set for fall risk patients  - Ensure chair alarm is in place and activated if patient is up in a chair   L  Lines - Check IV for any infiltration  - Syed bag is empty if patient has a Syed   - Tubing and IV bags are labeled   S  Safety   - Room is clean, patient is clean, and equipment is clean. - Hallways are clear from equipment besides carts. - Fall bracelet on for fall risk patients  - Ensure room is clear and free of clutter  - Suction is set up for ALL patients (with ton)  - Hallways are clear from equipment besides carts.    - Isolation precautions followed, supplies available outside room, sign posted

## 2017-04-27 LAB
HCT VFR BLD AUTO: 40.5 % (ref 36–48)
HGB BLD-MCNC: 14.2 G/DL (ref 13–16)
PLATELET # BLD AUTO: 222 K/UL (ref 135–420)

## 2017-04-27 PROCEDURE — 97110 THERAPEUTIC EXERCISES: CPT

## 2017-04-27 PROCEDURE — 97112 NEUROMUSCULAR REEDUCATION: CPT

## 2017-04-27 PROCEDURE — 74011250636 HC RX REV CODE- 250/636: Performed by: INTERNAL MEDICINE

## 2017-04-27 PROCEDURE — 85049 AUTOMATED PLATELET COUNT: CPT | Performed by: INTERNAL MEDICINE

## 2017-04-27 PROCEDURE — 97530 THERAPEUTIC ACTIVITIES: CPT

## 2017-04-27 PROCEDURE — 92526 ORAL FUNCTION THERAPY: CPT

## 2017-04-27 PROCEDURE — 74011250637 HC RX REV CODE- 250/637: Performed by: INTERNAL MEDICINE

## 2017-04-27 PROCEDURE — 85018 HEMOGLOBIN: CPT | Performed by: INTERNAL MEDICINE

## 2017-04-27 PROCEDURE — 97116 GAIT TRAINING THERAPY: CPT

## 2017-04-27 PROCEDURE — 36415 COLL VENOUS BLD VENIPUNCTURE: CPT | Performed by: INTERNAL MEDICINE

## 2017-04-27 PROCEDURE — 92507 TX SP LANG VOICE COMM INDIV: CPT

## 2017-04-27 PROCEDURE — 65310000000 HC RM PRIVATE REHAB

## 2017-04-27 PROCEDURE — 97535 SELF CARE MNGMENT TRAINING: CPT

## 2017-04-27 RX ADMIN — PANTOPRAZOLE SODIUM 40 MG: 40 GRANULE, DELAYED RELEASE ORAL at 05:50

## 2017-04-27 RX ADMIN — HEPARIN SODIUM 5000 UNITS: 5000 INJECTION, SOLUTION INTRAVENOUS; SUBCUTANEOUS at 18:00

## 2017-04-27 RX ADMIN — HEPARIN SODIUM 5000 UNITS: 5000 INJECTION, SOLUTION INTRAVENOUS; SUBCUTANEOUS at 05:51

## 2017-04-27 RX ADMIN — ATORVASTATIN CALCIUM 20 MG: 10 TABLET, FILM COATED ORAL at 21:39

## 2017-04-27 RX ADMIN — CHOLECALCIFEROL TAB 25 MCG (1000 UNIT) 2000 UNITS: 25 TAB at 10:15

## 2017-04-27 RX ADMIN — LEVOTHYROXINE SODIUM 150 MCG: 100 TABLET ORAL at 05:50

## 2017-04-27 RX ADMIN — ASPIRIN 81 MG CHEWABLE TABLET 81 MG: 81 TABLET CHEWABLE at 10:15

## 2017-04-27 RX ADMIN — MULTIPLE VITAMIN LIQUID 30 ML: LIQUID at 10:15

## 2017-04-27 RX ADMIN — CLOPIDOGREL BISULFATE 75 MG: 75 TABLET, FILM COATED ORAL at 18:00

## 2017-04-27 NOTE — INTERDISCIPLINARY ROUNDS
[facility] PHYSICAL REHABILITATION  [address] [place] [address], [address]    INPATIENT REHABILITATION  TEAM CONFERENCE SUMMARY     Date of Conference: 4/28/17    Name: Abner Cedeno Age / Sex: 64 y.o. / male   CSN: [id number] MRN: [id number]   [facility] Date: 4/24/2017 Length of Stay: 3 days     Primary Rehab Diagnosis: Impaired Mobility and ADLs secondary to Acute ischemic stroke ([facility]    1. Impaired Mobility and ADLs  2. Acute Ischemic Stroke (acute to subacute ischemia involving the posterior limb of the left internal capsule, along the lateral aspect of the left thalamus) with residual right hemiparesis, dysphagia and dysarthria     Therapy:     FIM SCORES Initial Assessment Weekly Progress Assessment 4/27/2017   Eating Functional Level: 5  Comments: Pt may require some assistance with opening packages and cutting food during meals. Pt able to feed himself using his L hand. 5 - Setup   Swallowing     Grooming  5  5 - Setup   Bathing 2 (Pt stood in shower at PLOF)  UB: 4 - Min A   LB: 3 - Mod A   Upper Body Dressing Functional Level: 3  Items Applied/Steps Completed: Pullover (4 steps)  Comments: Pt required Mod A for managing shirt over head and on BUEs while doffing and donning t-shirt. 3 - Mod A   Lower Body Dressing Functional Level: 2  Items Applied/Steps Completed: Button/zip pants (4 steps), Sock, left (1 step), Sock, right (1 step), Underpants (3 steps)  Comments: Pt requires Max A for managing all LB clothing over B feet. He is able to stand with Min-Mod A for standing balance and safety and pull underwear/pants up over buttocks with slight assistance required for getting them up all the way on the R side due to decreased hand strength and coordination.   2 - Max A   Toileting Functional Level: 3  Comments: Pt requires Min-Mod A for static standing balance while managing clothing over his hips and buttocks with Mod A.  3 - Mod A   Bladder - level of assist 4 6   Bladder - accident frequency score 6 6   Bowel - level of assist 4 4   Bowel - accident frequency score 5     Toilet Transfer [place] Toilet Transfer Score: 3  Comments: Based on performance of shower transfer and w/c to bed transfer, pt able to perform stand step transfer to/from commode with Mod A for balance, weightshift, and posture. 3 - Mod A   Tub/Shower Transfer [place] Tub/Shower Transfer Score: 3  Comments: Pt performed stand step transfer w/c <> shower bench without an AD with Mod A for balance, weightshift, and posture. 3 - Mod A      Comprehension Primary Mode of Comprehension: Auditory  Score: 4 Auditory  4   Expression Primary Mode of Expression: Verbal  Score: 4 Verbal  4   Social Interaction Score: 4 5   Problem Solving Score: 4 3   Memory Score: 4 3     FIM SCORES Initial Assessment Weekly Progress Assessment 4/27/2017   Bed/Chair/Wheelchair Transfers Transfer Type: Other  Other: Patient performs stand step transfer with moderate assist from PT for anterior approach R LE knee block with loading 2/2 hx of buckling, trunk stability, pivot assist, and controlled lowering. Patient demonstrates increased momentum utilization for STS transfer and R genu recurvatum requiring verbal cueing to crrect these; pacing cues successful, however genu recurvatum requires tactile cueing with physical assist to prevent buckling. Patient demosntrates pass retract technique with R LE advancement as well as anterior COG placement to attempts to maintain TKE 2/2 functional quad weakness. Transfer Assistance : 3 (Moderate assistance )  Sit to Stand Assistance: Moderate assistance  Car Transfers: Not tested  Car Type: NA   Patient performs stand step transfer at min a x 1 level requiring assistance for anterior COG positioning and prevention of rocking initiation. Patient requires mild weight shift assistance and verbal sequencing cues with constant cues to maintain appropriate hand placement.    Bed Mobility Rolling Right 5 (Supervision)   Rolling Left 5 (Supervision)   Supine to Sit 5 (Supervision)   Sit to Stand Moderate assistance   Sit to Supine 5 (Supervision)    Rolling Right   SPV   Rolling Left   SPV   Supine to Sit   SPV   Sit to Stand   Min a x 1   Sit to Supine   SPV      Locomotion (W/C) Able to Propel (ft): 70 feet (verbal cueing for technique)  Functional Level: 2  Curbs/Ramps Assist Required (FIM Score): 0 (Not tested)  Wheelchair Setup Assist Required : 2 (Maximal assistance)  Wheelchair Management: Manages left brake, Manages right brake Function 6  Setup Assistance  5     Locomotion (W/C distance) 70 Feet 255 ft   Locomotion (Walk) 2 (Maximal assistance) (2/2 R LOB) Mod/max a x 1      Locomotion (Walk dist.) 5 Feet (ft) 80 ft   Steps/Stairs Steps/Stairs Ambulated (#): 0  Level of Assist : 0 (Not tested)  Rail Use:  (NA) Steps/Stairs Ambulated (#): 0  Level of Assist : 0 (Not tested)  Rail Use:  (NA)         Nursing:     Neuro:   A&O x__2-3__                   Respiratory:   [x] WNL   [] O2   [] LPM ______   Other:  Peripheral Vascular:   [] TEDS present   [] Edema present ____ Grade   Cardiac:   [x] WNL   [] Other  Genitourinary:   [x] continent   [] incontinent   [] marie  Abdominal _______ LBM  GI: _cardiac, soft solids______ Diet ___nectar___ Liquids _____ tube feeds  Musculoskeletal: _stand-step___ ROM Transfers _____ Assistive Device Used  ___mod_ Level of Assistance  Skin Integumentary:   [x] Intact   [] Not Intact   __________Preventative Measures  Details______________________________________________________________  Pain: [x] Controlled   [] Not Controlled   Pain Meds:   [] Scheduled   [] PRN        Registered Dietitian / Nutrition:   Patient Vitals for the past 100 hrs:   % Diet Eaten   04/26/17 1851 25 %   04/26/17 1336 100 %   04/26/17 1016 100 %   04/25/17 1808 80 %   04/25/17 1330 35 %   04/25/17 0930 65 %   04/24/17 1800 80 %     Patient reported having good appetite; variable meal intake due to dislike diet consistency. Discussed adding nutrition supplement; pt agreed with plan; discussed options. Supplements:          [] Yes   [x] No      Amount of supplement consumed:  not applicable       Intake/Output Summary (Last 24 hours) at 04/27/17 1126  Last data filed at 04/26/17 1851   Gross per 24 hour   Intake              480 ml   Output                0 ml   Net              480 ml                                Last bowel movement: 4/26      Interdisciplinary Team Goals:     1. Goal  Patient will ambulate 25 ft with proper pace and step length at min a x 1 level. Barrier  pacing; impulsivity; inattention to verbal cues    Intervention  TA; inhibitory cues; gait training     2. Goal  Pt will utilize RUE to perform self-feeding for 50% of a meal.    Barrier  Decreased RUE coordination, Impulsivity, Decreased attention    Intervention  ADL training, Therapeutic activity, Therapeutic exercise     3. Goal  Patient will use safe swallowing techniques for all PO intake. Barrier  Cognitive-linguistic deficits, dysphagia    Intervention  Language drill, cognitive retraining, safe swallowing techniques, diet modifications and adjustments, MBS as needed. 4. Goal  PATIENT WILL HAVE NO FALLS WHILE ON REHAB    Barrier  COGNITION, BALANCE    Intervention  FALLS PREVENTION, BED/CHAIR ALARM, FREQUENT ROUNDING     5. Goal  Increase insight about parameters of recovery; mood stability    Barrier  Limited insight about stroke occurrence and recovery and situational stressors    Intervention  Patient/stroke education and support      6. Goal  Po intake of meals will meet >75% of patient estimated nutritional needs within the next 7 days. Outcome:  [] Met/Ongoing    [x]  Not Met    [] New/Initial Goal     Barrier  dislike diet consistency    Intervention  modified diet; add nutrition supplement       Disposition / Discharge Planning:      Follow-up therapy services:  tbd   DME recommendations:  tbd   Estimated discharge date:  5/23/17   Discharge Location:  d         Electronic Signatures:      Signature Date Signed   Physical Therapist    Adelita Ames PT DPT  04/27/2017   Occupational Therapist    Rick Lindsey, [address]  4/27/2017   Speech Therapist         Recreational Therapist   Marcie Galindo, [address] 4/27/17   Nursing    Anamaria Zambrano RN  4/28/17   Dietitian    Marc Woods, NABILA  4/27/17   Clinical Psychologist    Hazel Thomas, Ph.D. 4/28/17    Physician    Flaca Jimenez MD    4/28/2017         KYRA Thorpe  4/27/17         The above information has been reviewed with the patient in a language that they can understand. Opportunity for comments and questions has been provided and a signed attestation has been scanned into the \"media tab\" of the EMR.       Patient Signature: ______________________________________________________    Date Signed: __________________________________________________________

## 2017-04-27 NOTE — PROGRESS NOTES
Problem: Mobility Impaired (Adult and Pediatric)  Goal: *Acute Goals and Plan of Care (Insert Text)  Physical Therapy Short Term Goals  Initiated 2017 and to be accomplished within 7 day(s) (2017)  1. Patient will move from supine to sit and sit to supine , scoot up and down and roll side to side in bed with modified independence. 2. Patient will transfer from bed to chair and chair to bed with minimal assistance/contact guard assist using the least restrictive device. 3. Patient will perform sit to stand with minimal assistance/contact guard assist.  4. Patient will ambulate with moderate assistance for 50 feet with the least restrictive device. 5. Patient will ascend/descend 6 stairs with 2 handrail(s) with moderate assistance . Physical Therapy Long Term Goals  Initiated 2017 and to be accomplished within 28 day(s) (2017)  1. Patient will move from supine to sit and sit to supine , scoot up and down and roll side to side in bed with independence. 2. Patient will transfer from bed to chair and chair to bed with modified independence using the least restrictive device. 3. Patient will perform sit to stand with modified independence. 4. Patient will ambulate with modified independence for 250 feet with the least restrictive device. 5. Patient will ascend/descend 6 stairs with 2 handrail(s) with modified independence. PHYSICAL THERAPY DAILY NOTE  Patient Name:Stewrat Mendez Nica  Time In: 1000  Time Out: 1100  Patient Seen For: Wheelchair mobility;Transfer training;Patient education;Gait training;Balance activities  Diagnosis: Left CVA  Acute ischemic stroke ([facility] [address] Acute ischemic stroke [facility])  Precautions: Fall Risk     Subjective: Patient A/O x 4 demonstrating motivation for therapy session. Pain at start of tx:0/10  Pain at stop of tx:0/10     Patient identified with name and : YES         Objective:       TRANSFERS Daily Assessment     Transfer Type:  Other  Other: Patient performs stand step transfer at min a x 1 level requiring assistance for anterior COG positioning and prevention of rocking initiation. Patient requires mild weight shift assistance and verbal sequencing cues with constant cues to maintain appropriate hand placement. Transfer Assistance : 4 (Minimal assistance)  Sit to Stand Assistance: Minimal assistance (for pacing, form balance; mod a x 1 for staggered stance STS)          GAIT Daily Assessment     Amount of Assistance: 3 (Moderate assistance) (trunk stabilization; weight shift; step length cues)  Distance (ft): 85 Feet (ft)  Assistive Device: Gait belt      Patient demonstrates lateral postural sway increased with lateral trunk lean compensation. Patient demonstrates antalgic L step with progressive increase in pace and forward trunk lean. Patient requires verbal cueing and physical assist to prevent significant COG shift outside of CATA. Patient trunk flexion with L LE swing phase and decreased stance time bilaterally. Patient require verbal and tactile cueing to address limitations with fair/poor effect 2/2 pt increased impulsivity. BALANCE Daily Assessment     Sitting - Static: Good (unsupported)  Sitting - Dynamic: Good (unsupported)  Standing - Static: Fair  Standing - Dynamic : Impaired          WHEELCHAIR MOBILITY Daily Assessment     Able to Propel (ft): 255 feet  Functional Level: 5 (B LE technique requiring cueing to ensure proper R LE clearance and step length)                 Assessment: Patient demonstrates improved tolerance for ambulation, however he demonstrates reduced attention to form, pace and safety requiring 1 instance of max a x 1 to prevent gross LOB. Lateral trunk lean compensation likely due to R glut med weakness and decreased proprioceptive awareness of R LE. Plan of Care: Cont current POC; increase focus on pacing and R LE loading with ambulation. Clemente Wiseman, PT DPT  4/27/2017

## 2017-04-27 NOTE — PROGRESS NOTES
SHIFT CHANGE NOTE FOR [facility]    Bedside and Verbal shift change report given to   Arthur Zhou LPN  (oncoming nurse) by Estrella Owens RN   (offgoing nurse). Report included the following information SBAR, Kardex, MAR and Recent Results. Situation:   Code Status: Full Code   Reason for Admission: CVA  Hospital Day: 3   Problem List:   Hospital Problems  Date Reviewed: 4/26/2017          Codes Class Noted POA    Procedure refused ICD-10-CM: Z53.29  ICD-9-CM: V64.2  4/21/2017 Yes    Overview Signed 4/24/2017  3:28 PM by Gisella Bailey MD     Speech Pathologist recommended NPO and PEG placement; Patient refused             * (Principal)Acute ischemic stroke [facility]) ICD-10-CM: I63.9  ICD-9-CM: 434.91  4/19/2017 Yes    Overview Signed 4/24/2017  3:15 PM by Gisella Bailey MD     Acute Ischemic Stroke (acute to subacute ischemia involving the posterior limb of the left internal capsule, along the lateral aspect of the left thalamus) with residual right hemiparesis, dysphagia and dysarthria             Impaired mobility and ADLs ICD-10-CM: Z74.09  ICD-9-CM: 799.89  4/19/2017 Yes        Decreased calculated glomerular filtration rate (GFR) ICD-10-CM: R94.4  ICD-9-CM: 794.4  4/19/2017 Yes    Overview Addendum 4/24/2017  3:21 PM by Gisella Bailey MD     Estimated calculated glomerular filtration rate equivalent to that of stage 3 chronic kidney disease = 30-59 ml/min             Dyslipidemia (high LDL; low HDL) (Chronic) ICD-10-CM: E78.4  ICD-9-CM: 272.4  4/19/2017 Yes    Overview Signed 4/24/2017  3:25 PM by Gisella Bailey MD     Lipid profile (4/19/2017): , .  HDL 42, LDL 97             Hemiparesis affecting right side as late effect of cerebrovascular accident (CVA) ([facility] ICD-10-CM: H43.922  ICD-9-CM: 438.20  4/19/2017 Yes        Dysphagia as late effect of cerebrovascular accident (CVA) ICD-10-CM: M84.933  ICD-9-CM: 438.82  4/19/2017 Yes        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) ICD-10-CM: F99.837  ICD-9-CM: 438.13  4/19/2017 Yes              Background:   Past Medical History:   Past Medical History:   Diagnosis Date    Acute ischemic stroke ([facility] 4/19/2017    Acute Ischemic Stroke (acute to subacute ischemia involving the posterior limb of the left internal capsule, along the lateral aspect of the left thalamus) with residual right hemiparesis, dysphagia and dysarthria    [facility]bestosis ([facility]     Chronic obstructive pulmonary disease (COPD) ([facility]     CKD (chronic kidney disease) stage 3, GFR 30-59 ml/min     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  HDL 42, LDL 97    Dysphagia as late effect of cerebrovascular accident (CVA) 4/19/2017    Erectile dysfunction     Gastroesophageal reflux disease     Hemiparesis affecting right side as late effect of cerebrovascular accident (CVA) ([facility] 4/19/2017    History of endogenous hypertriglyceridemia     History of malignant neoplasm of prostate     [place] score 7     Hypothyroidism     Osteoarthrosis involving multiple sites     Procedure refused 4/21/2017    Speech Pathologist recommended NPO and PEG placement; Patient refused    Urinary incontinence     Male incontinence       Patient taking anticoagulants YES   Patient has a defibrillator: no     Assessment:   Changes in Assessment throughout shift: NONE              Last Vitals:     Vitals:    04/26/17 1342 04/26/17 1345 04/26/17 1622 04/26/17 1930   BP: 148/74 154/78 150/89 142/78   Pulse:   67 73   Resp:   18 18   Temp:   97.5 °F (36.4 °C) 98.6 °F (37 °C)   SpO2:   96% 98%   Weight:       Height:            PAIN    Pain Assessment    Pain Intensity 1: 0 (04/27/17 0400) Pain Intensity 1: 2 (12/29/14 1105)      Pain Location 1: Abdomen      Pain Intervention(s) 1: Medication (see MAR)  Patient Stated Pain Goal: 0 Patient Stated Pain Goal: 0  o Intervention effective: no    o Other actions taken for pain: NONE     Skin Assessment  Skin color Skin Color: Appropriate for ethnicity  Condition/Temperature Skin Condition/Temp: Warm  Integrity Skin Integrity: Intact  Turgor Turgor: Non-tenting  Weekly Pressure Ulcer Documentation  Pressure  Injury Documentation: No Pressure Injury Noted-Pressure Ulcer Prevention Initiated  Wound Prevention & Protection Methods  Orientation of wound Orientation of Wound Prevention: Posterior  Location of Prevention Location of Wound Prevention: Sacrum/Coccyx  Dressing Present Dressing Present : No  Dressing Status    Wound Offloading Wound Offloading (Prevention Methods): Bed, pressure redistribution/air, Chair cushion     INTAKE/OUPUT    Date 04/26/17 0700 - 04/27/17 0659 04/27/17 0700 - 04/28/17 0659   Shift 3838-1860 6052-1855 24 Hour Total 1876-7765 8280-0032 24 Hour Total   I  N  T  A  K  E   P. O. 720  720         P. O. 720  720       Shift Total  (mL/kg) 720  (8.8)  720  (8.8)      O  U  T  P  U  T   Urine  (mL/kg/hr)            Urine Occurrence(s) 8 x 3 x 11 x       Stool            Stool Occurrence(s) 0 x 2 x 2 x       Shift Total  (mL/kg)           720      Weight (kg) 81.5 81.5 81.5 81.5 81.5 81.5       Recommendations:  1. Patient needs and requests: NONE    2. Diet: CARDIAC PUREED WITH NECTAR THICK LIQUIDS    3. Pending tests/procedures: LABS     4. Functional Level/Equipment: WHEELCHAIR    5. Estimated Discharge Date: TBD Posted on Whiteboard in Patients Room: no     [facility] Safety Check    A safety check occurred in the patient's room between off going nurse and oncoming nurse listed above.     The safety check included the below items  Area Items   H  High Alert Medications - Verify all high alert medication drips (heparin, PCA, etc.)   E  Equipment - Suction is set up for ALL patients (with yanker)  - Red plugs utilized for all equipment (IV pumps, etc.)  - WOWs wiped down at end of shift.  - Room stocked with oxygen, suction, and other unit-specific supplies   A  Alarms - Bed alarm is set for fall risk patients  - Ensure chair alarm is in place and activated if patient is up in a chair   L  Lines - Check IV for any infiltration  - Syed bag is empty if patient has a Syed   - Tubing and IV bags are labeled   S  Safety   - Room is clean, patient is clean, and equipment is clean. - Hallways are clear from equipment besides carts. - Fall bracelet on for fall risk patients  - Ensure room is clear and free of clutter  - Suction is set up for ALL patients (with ton)  - Hallways are clear from equipment besides carts.    - Isolation precautions followed, supplies available outside room, sign posted

## 2017-04-27 NOTE — PROGRESS NOTES
SHIFT CHANGE NOTE FOR [facility]    Bedside and Verbal shift change report given to   Paula Alfaro RN  (oncoming nurse) by Jayme Newell LPN   (offgoing nurse). Report included the following information SBAR, Kardex, MAR and Recent Results. Situation:   Code Status: Full Code   Reason for Admission: CVA  Hospital Day: 3   Problem List:   Hospital Problems  Date Reviewed: 4/26/2017          Codes Class Noted POA    Procedure refused ICD-10-CM: Z53.29  ICD-9-CM: V64.2  4/21/2017 Yes    Overview Signed 4/24/2017  3:28 PM by Prashant Duncan MD     Speech Pathologist recommended NPO and PEG placement; Patient refused             * (Principal)Acute ischemic stroke [facility]) ICD-10-CM: I63.9  ICD-9-CM: 434.91  4/19/2017 Yes    Overview Signed 4/24/2017  3:15 PM by Prashant Duncan MD     Acute Ischemic Stroke (acute to subacute ischemia involving the posterior limb of the left internal capsule, along the lateral aspect of the left thalamus) with residual right hemiparesis, dysphagia and dysarthria             Impaired mobility and ADLs ICD-10-CM: Z74.09  ICD-9-CM: 799.89  4/19/2017 Yes        Decreased calculated glomerular filtration rate (GFR) ICD-10-CM: R94.4  ICD-9-CM: 794.4  4/19/2017 Yes    Overview Addendum 4/24/2017  3:21 PM by Prashant Duncan MD     Estimated calculated glomerular filtration rate equivalent to that of stage 3 chronic kidney disease = 30-59 ml/min             Dyslipidemia (high LDL; low HDL) (Chronic) ICD-10-CM: E78.4  ICD-9-CM: 272.4  4/19/2017 Yes    Overview Signed 4/24/2017  3:25 PM by Prashant Duncan MD     Lipid profile (4/19/2017): , .  HDL 42, LDL 97             Hemiparesis affecting right side as late effect of cerebrovascular accident (CVA) ([facility] ICD-10-CM: M04.376  ICD-9-CM: 438.20  4/19/2017 Yes        Dysphagia as late effect of cerebrovascular accident (CVA) ICD-10-CM: L53.045  ICD-9-CM: 438.82  4/19/2017 Yes        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) ICD-10-CM: C86.821  ICD-9-CM: 438.13  4/19/2017 Yes              Background:   Past Medical History:   Past Medical History:   Diagnosis Date    [facility] ([facility] 4/19/2017    Acute Ischemic Stroke (acute to subacute ischemia involving the posterior limb of the left internal capsule, along the lateral aspect of the left thalamus) with residual right hemiparesis, dysphagia and dysarthria    [facility]bestosis ([facility]     Chronic obstructive pulmonary disease (COPD) ([facility]     CKD (chronic kidney disease) stage 3, GFR 30-59 ml/min     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  HDL 42, LDL 97    Dysphagia as late effect of cerebrovascular accident (CVA) 4/19/2017    Erectile dysfunction     Gastroesophageal reflux disease     Hemiparesis affecting right side as late effect of cerebrovascular accident (CVA) ([facility] 4/19/2017    History of endogenous hypertriglyceridemia     History of malignant neoplasm of prostate     [place] score 7     Hypothyroidism     Osteoarthrosis involving multiple sites     Procedure refused 4/21/2017    Speech Pathologist recommended NPO and PEG placement; Patient refused    Urinary incontinence     Male incontinence       Patient taking anticoagulants YES   Patient has a defibrillator: no     Assessment:   Changes in Assessment throughout shift: NONE              Last Vitals:     Vitals:    04/26/17 1345 04/26/17 1622 04/26/17 1930 04/27/17 0746   BP: 154/78 150/89 142/78 157/90   Pulse:  67 73 73   Resp:  18 18 19   Temp:  97.5 °F (36.4 °C) 98.6 °F (37 °C) 97.9 °F (36.6 °C)   SpO2:  96% 98% 98%   Weight:       Height:            PAIN    Pain Assessment    Pain Intensity 1: 0 (04/27/17 0400) Pain Intensity 1: 2 (12/29/14 1105)      Pain Location 1: Abdomen      Pain Intervention(s) 1: Medication (see MAR)  Patient Stated Pain Goal: 0 Patient Stated Pain Goal: 0  o Intervention effective: no    o Other actions taken for pain: NONE     Skin Assessment  Skin color Skin Color: Appropriate for ethnicity  Condition/Temperature Skin Condition/Temp: Warm  Integrity Skin Integrity: Intact  Turgor Turgor: Non-tenting  Weekly Pressure Ulcer Documentation  Pressure  Injury Documentation: No Pressure Injury Noted-Pressure Ulcer Prevention Initiated  Wound Prevention & Protection Methods  Orientation of wound Orientation of Wound Prevention: Posterior  Location of Prevention Location of Wound Prevention: Sacrum/Coccyx  Dressing Present Dressing Present : No  Dressing Status    Wound Offloading Wound Offloading (Prevention Methods): Bed, pressure redistribution/air     INTAKE/OUPUT    Date 04/26/17 0700 - 04/27/17 0659 04/27/17 0700 - 04/28/17 0659   Shift 9757-5239 5597-6769 24 Hour Total 4312-5759 4341-2257 24 Hour Total   I  N  T  A  K  E   P. O. 720  720         P. O. 720  720       Shift Total  (mL/kg) 720  (8.8)  720  (8.8)      O  U  T  P  U  T   Urine  (mL/kg/hr)            Urine Occurrence(s) 8 x 3 x 11 x       Stool            Stool Occurrence(s) 0 x 2 x 2 x       Shift Total  (mL/kg)           720      Weight (kg) 81.5 81.5 81.5 81.5 81.5 81.5       Recommendations:  1. Patient needs and requests: NONE    2. Diet: CARDIAC PUREED WITH NECTAR THICK LIQUIDS    3. Pending tests/procedures: LABS     4. Functional Level/Equipment: WHEELCHAIR    5. Estimated Discharge Date: TBD Posted on Whiteboard in Patients Room: no     [facility] Safety Check    A safety check occurred in the patient's room between off going nurse and oncoming nurse listed above.     The safety check included the below items  Area Items   H  High Alert Medications - Verify all high alert medication drips (heparin, PCA, etc.)   E  Equipment - Suction is set up for ALL patients (with estherker)  - Red plugs utilized for all equipment (IV pumps, etc.)  - WOWs wiped down at end of shift.  - Room stocked with oxygen, suction, and other unit-specific supplies   A  Alarms - Bed alarm is set for fall risk patients  - Ensure chair alarm is in place and activated if patient is up in a chair   L  Lines - Check IV for any infiltration  - Syed bag is empty if patient has a Syed   - Tubing and IV bags are labeled   S  Safety   - Room is clean, patient is clean, and equipment is clean. - Hallways are clear from equipment besides carts. - Fall bracelet on for fall risk patients  - Ensure room is clear and free of clutter  - Suction is set up for ALL patients (with estherker)  - Hallways are clear from equipment besides carts.    - Isolation precautions followed, supplies available outside room, sign posted

## 2017-04-27 NOTE — PROGRESS NOTES
NUTRITION    Nutrition Consult: General Nutrition Management & Supplements      RECOMMENDATIONS / PLAN:     - Add supplement: Ensure Enlive once daily  - Continue RD inpatient monitoring and evaluation. NUTRITION INTERVENTIONS & DIAGNOSIS:     [x] Meals/Snacks: modified diet  [x] Medical food supplement: add    Nutrition Diagnosis:  Inadequate energy intake related to dislike mechanical soft consistency diet as evidenced by variable meal intake    ASSESSMENT:     Subjective/Objective:   Patient reported having good appetite; variable meal intake due to dislike diet consistency. Discussed adding nutrition supplement; pt agreed with plan; discussed options.      Average po intake adequate to meet patients estimated nutritional needs:   [] Yes     [x] No   [] Unable to determine at this time    Diet: DIET CARDIAC Mechanical Soft; 1 NECTAR      Food Allergies:  None known  Current Appetite:   [x] Good     [] Fair     [] Poor     [] Other:     Appetite/meal intake prior to admission:   [x] Good     [] Fair     [] Poor     [] Other:     Feeding Limitations:  [x] Swallowing difficulty: SLP following    [] Chewing difficulty    [] Other:  Current Meal Intake:   Patient Vitals for the past 100 hrs:   % Diet Eaten   04/26/17 1851 25 %   04/26/17 1336 100 %   04/26/17 1016 100 %   04/25/17 1808 80 %   04/25/17 1330 35 %   04/25/17 0930 65 %   04/24/17 1800 80 %       BM:  4/26  Skin Integrity:  No pressure ulcer or wound  Edema:  None   Pertinent Medications: Reviewed    Recent Labs      04/25/17   0618   NA  135*   K  4.2   CL  101   CO2  24   GLU  100*   BUN  9   CREA  1.44*   CA  9.3   MG  1.9       Intake/Output Summary (Last 24 hours) at 04/27/17 1119  Last data filed at 04/26/17 1851   Gross per 24 hour   Intake              480 ml   Output                0 ml   Net              480 ml       Anthropometrics:  Ht Readings from Last 1 Encounters:   04/25/17 5' 10\" (1.778 m)     Last 3 Recorded Weights in this Encounter 04/25/17 1552   Weight: 81.5 kg (179 lb 10.8 oz)     Body mass index is 25.78 kg/(m^2). Weight History:  14 lb (7.2%) weight loss in past 2 months PTA per chart. Patient denied experiencing any recent changes in weight PTA    Weight Metrics 4/25/2017 4/22/2017 4/18/2017 3/9/2017 10/28/2016 8/11/2016 8/4/2016   Weight 179 lb 10.8 oz 179 lb 10.8 oz - 193 lb 190 lb 186 lb 182 lb 8 oz   BMI 25.78 kg/m2 - 25.78 kg/m2 26.92 kg/m2 26.5 kg/m2 25.94 kg/m2 25.46 kg/m2        Admitting Diagnosis: Left CVA  Acute ischemic stroke [facility])  Past Medical History:   Diagnosis Date    Acute ischemic stroke ([facility] 4/19/2017    Acute Ischemic Stroke (acute to subacute ischemia involving the posterior limb of the left internal capsule, along the lateral aspect of the left thalamus) with residual right hemiparesis, dysphagia and dysarthria    Asbestosis ([address]     Chronic obstructive pulmonary disease (COPD) ([facility]     CKD (chronic kidney disease) stage 3, GFR 30-59 ml/min     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  HDL 42, LDL 97    Dysphagia as late effect of cerebrovascular accident (CVA) 4/19/2017    Erectile dysfunction     Gastroesophageal reflux disease     Hemiparesis affecting right side as late effect of cerebrovascular accident (CVA) ([facility] 4/19/2017    History of endogenous hypertriglyceridemia     History of malignant neoplasm of prostate     Genoveva score 7     Hypothyroidism     Osteoarthrosis involving multiple sites     Procedure refused 4/21/2017    Speech Pathologist recommended NPO and PEG placement; Patient refused    Urinary incontinence     Male incontinence        Education Needs:        [x] None identified  [] Identified - Not appropriate at this time  []  Identified and addressed - refer to education log  Learning Limitations:   [x] None identified  [] Identified Cultural, [employer] & ethnic food preferences:  [x] None identified    [] Identified and addressed     ESTIMATED NUTRITION NEEDS:     Calories: 5602-7193 kcal (MSJx1.2-1.3) based on  [x] Actual BW:  82 kg      [] IBW   CHO: 232-251 gm (50% kcal)   Protein: 66-82 gm (0.8-1 gm/kg) based on  [x] Actual BW      [] IBW   Fluid: 1 mL/kcal     MONITORING & EVALUATION:     Nutrition Goal(s):   1. Po intake of meals will meet >75% of patient estimated nutritional needs within the next 7 days.   Outcome:  [] Met/Ongoing    [x]  Not Met; Progressing    [] New/Initial Goal     Monitoring:   [x] Diet tolerance   [x] Meal intake   [x] Supplement intake   [] GI symptoms/ability to tolerate po diet   [] Respiratory status   [] Plan of care      Previous Recommendations (for follow-up assessments only):     [x]   Implemented       []   Not Implemented (RD to address)     [] No Recommendation Made     Discharge Planning:   Cardiac diet; consistency per SLP  [x] Participated in care planning, discharge planning, & interdisciplinary rounds as appropriate      Fidel Baker, [address] [place] [address]   Pager: [phone]

## 2017-04-27 NOTE — PROGRESS NOTES
Problem: Neurolinguistics Impaired (Adult)  Goal: *Speech Goal: (INSERT TEXT)  Long term goals:  1. Patient will tolerate a regular/thin liquid diet without overt s/s of aspiration, 4 consecutive meals. 2. Patient will use safe swallowing techniques with supervision for all PO intake. 3. Patient will answer complex yes/no questions with 90% accuracy. 4 patient will follow complex commands for object manipulation wit 80-90% accuracy. 5. Patient will name 10-12 items within simple categories with supervision. 6. Patient will perform varied word retrieval tasks with % accuracy. 7. Patient will recall 3 words after 5 minutes, supervision. 8. Patient will perform functional problem solving/reasoning tasks with supervision. Short term goals (by 5/2/17):  1. Patient will tolerate a soft, nectar thick liquid diet without overt s/s of aspiration, 4 consecutive meals. 2. Patient will use safe swallowing techniques with min-mod cues for all PO intake. 3. Patient will answer complex yes/no questions with 75-80%% accuracy. 4 patient will follow complex commands for object manipulation wit 80-90% accuracy. 5. Patient will name 7-9 items within simple categories with supervision. 6. Patient will perform varied word retrieval tasks with 70-80% accuracy. 7. Patient will recall 3 words after 5 minutes, min-mod assist.  8. Patient will perform functional problem solving/reasoning tasks with 75-85% accuracy. SPEECH LANGUAGE PATHOLOGY TREATMENT     Patient: Riaz Viera (23 y.o. male)  Date: 4/27/2017  Diagnosis: Left CVA  [facility] Acute [facility] stroke [facility])       SUBJECTIVE:   Patient stated I've known them for more than 40 years . OBJECTIVE:   Mental Status:  Mr. Randy Rueda was awake and alert during treatment sessions. He was quite tired this afternoon and seen at his bedside.      Treatment & Interventions:   Patient was seen in the dining room for breakfast and lunch in the therapeutic dining group. He demonstrates decreased appetite, as with the new menu, some items are less appealing than before (e.g. slurried sandwich). He does not like the nectar thick liquids either, but will drink these. Patient often takes large boluses of liquid and demonstrates frequent throat clearing at meals. When asked he says, \"this thick liquid is causing my cough\". SLP is concerned re: penetration/aspiration given the premature spilling to the pharyngeal recesses seen on he MBS last week. Would like to advance diet to advanced (regular soft) with the nectar thick liquids. MD notified and will advance the diet. In two half hour speech therapy sessions, Mr. Dylon Cox participated in the following treatmint tasks:  Language Comprehension and Expression:   Generating antonyms:                        80% accuracy  ID category given 3 items:      90% accuracy (responses at times delayed)  Name \"Holidays\":        `           Patient provided 6  Name item given description: 90% accuracy  Neuro-Linguistics:   Orientation:                             Supervision  Determine causes:                 85% accuracy     Response & Tolerance to Activities:  Mr. Dylon Cox participated well in all activities presented.   In conversation, mild to moderate word retrieval deficits persist.  Pain:  Pain Scale 1: Numeric (0 - 10)     After treatment:   [X]       Patient left in no apparent distress sitting up in chair  [X]       Patient left in no apparent distress in bed  [X]       Call bell left within reach  [X]       Nursing notified  [X]       Caregiver present  [ ]       Bed alarm activated      ASSESSMENT:      Progression toward goals:  [X]       Improving appropriately and progressing toward goals  [ ]       Improving slowly and progressing toward goals  [ ]       Not making progress toward goals and plan of care will be adjusted      PLAN:   Patient continues to benefit from skilled intervention to address the above impairments. Continue treatment per established plan of care. Discharge Recommendations:   To Be Determined     AUGUSTINA Weems  Time calculation:  120 minutes

## 2017-04-27 NOTE — PROGRESS NOTES
[facility] PHYSICAL REHABILITATION  [address] [place] [address], [address]     INPATIENT REHABILITATION  DAILY PROGRESS NOTE     Date: 4/27/2017    Name: Juventino Ramos Age / Sex: 64 y.o. / male   CSN: [id number] MRN: [id number]   [facility] Date: 4/24/2017 Length of Stay: 3 days     Primary Rehab Diagnosis: Impaired Mobility and ADLs secondary to Acute Ischemic Stroke (acute to subacute ischemia involving the posterior limb of the left internal capsule, along the lateral aspect of the left thalamus) with residual right hemiparesis, dysphagia and dysarthria       Subjective:     Patient in NAD, sitting in a chair , family at bedside      Objective:     Vital Signs:  Patient Vitals for the past 24 hrs:   BP Temp Pulse Resp SpO2   04/27/17 1631 149/87 98.3 °F (36.8 °C) 63 18 95 %   04/27/17 0746 157/90 97.9 °F (36.6 °C) 73 19 98 %   04/26/17 1930 142/78 98.6 °F (37 °C) 73 18 98 %        General:  Awake, alert  Cardiovascular:  S1S2+, RRR  Pulmonary:  CTA b/l  GI:  Soft, BS+, NT, ND  Extremities:  No edema  Right Hemiparesis, dysarthria      Current Medications:  Current Facility-Administered Medications   Medication Dose Route Frequency    cholecalciferol (VITAMIN D3) tablet 2,000 Units  2,000 Units Oral DAILY    bisacodyl (DULCOLAX) suppository 10 mg  10 mg Rectal Q48H PRN    heparin (porcine) injection 5,000 Units  5,000 Units SubCUTAneous Q12H    acetaminophen (TYLENOL) solution 650 mg  650 mg Oral Q4H PRN    docusate sodium (COLACE) capsule 100 mg  100 mg Oral BID    aspirin chewable tablet 81 mg  81 mg Oral DAILY WITH BREAKFAST    clopidogrel (PLAVIX) tablet 75 mg  75 mg Oral DAILY WITH DINNER    atorvastatin (LIPITOR) tablet 20 mg  20 mg Oral QHS    pantoprazole (PROTONIX) granules for oral suspension 40 mg  40 mg Oral ACB    multivitamin (MULTI-DELYN, WELLESSE) oral liquid 30 mL  30 mL Oral DAILY    levothyroxine (SYNTHROID) tablet 150 mcg  150 mcg Oral ACB Allergies: Allergies   Allergen Reactions    Pcn [Penicillins] Rash       Functional Progress:    PHYSICAL THERAPY    ON ADMISSION MOST RECENT   Wheelchair Mobility/Management  Able to Propel (ft): 70 feet (verbal cueing for technique)  Functional Level: 2  Curbs/Ramps Assist Required (FIM Score): 0 (Not tested)  Wheelchair Setup Assist Required : 2 (Maximal assistance)  Wheelchair Management: Manages left brake, Manages right brake Wheelchair Mobility/Management  Able to Propel (ft): 255 feet  Functional Level: 5 (B LE technique requiring cueing to ensure proper R LE clear)  Curbs/Ramps Assist Required (FIM Score): 0 (Not tested)  Wheelchair Setup Assist Required : 2 (Maximal assistance)  Wheelchair Management: Manages left brake, Manages right brake     Gait  Amount of Assistance: 2 (Maximal assistance) (2/2 R LOB)  Distance (ft): 5 Feet (ft)  Assistive Device: Gait belt Gait  Amount of Assistance: 3 (Moderate assistance) (trunk stabilization; weight shift; step length cues)  Distance (ft): 85 Feet (ft)  Assistive Device: Gait belt     Balance-Sitting/Standing  Sitting - Static: Good (unsupported)  Sitting - Dynamic: Good (unsupported)  Standing - Static: Fair  Standing - Dynamic : Impaired Balance-Sitting/Standing  Sitting - Static: Good (unsupported)  Sitting - Dynamic: Good (unsupported)  Standing - Static: Fair  Standing - Dynamic : Impaired     Bed/Mat Mobility  Rolling Right : 5 (Supervision)  Rolling Left : 5 (Supervision)  Supine to Sit : 5 (Supervision)  Sit to Supine : 5 (Supervision) Bed/Mat Mobility  Rolling Right : 5 (Supervision)  Rolling Left : 5 (Supervision)  Supine to Sit : 5 (Supervision)  Sit to Supine : 5 (Supervision)     Transfers  Transfer Type: Other  Other: Patient performs stand step transfer with moderate assist from PT for anterior approach R LE knee block with loading 2/2 hx of buckling, trunk stability, pivot assist, and controlled lowering.  Patient demonstrates increased momentum utilization for STS transfer and R genu recurvatum requiring verbal cueing to crrect these; pacing cues successful, however genu recurvatum requires tactile cueing with physical assist to prevent buckling. Patient demosntrates pass retract technique with R LE advancement as well as anterior COG placement to attempts to maintain TKE 2/2 functional quad weakness. Transfer Assistance : 3 (Moderate assistance )  Sit to Stand Assistance: Moderate assistance  Car Transfers: Not tested  Car Type: NA Transfers  Transfer Type: Other  Other: Patient perfomrs stand step transfer at min a x 1 level requiring assistance for anterior COG positioning and prevention of rocking initiation. Patient requires mild weight shift assistance and verbal sequencing cues with constant cues to maintain appropriate hand placement. Transfer Assistance : 4 (Minimal assistance)  Sit to Stand Assistance: Minimal assistance (for pacing, form balance; mod a x 1 for staggered stance STS)  Car Transfers: Not tested  Car Type: NA     Steps or Stairs  Steps/Stairs Ambulated (#): 0  Level of Assist : 0 (Not tested)  Rail Use:  (NA) Steps or Stairs  Steps/Stairs Ambulated (#): 0  Level of Assist : 0 (Not tested)  Rail Use:  (NA)         Lab/Data Review:  Recent Results (from the past 24 hour(s))   HGB & HCT    Collection Time: 04/27/17  6:16 AM   Result Value Ref Range    HGB 14.2 13.0 - 16.0 g/dL    HCT 40.5 36.0 - 48.0 %   PLATELET    Collection Time: 04/27/17  6:16 AM   Result Value Ref Range    PLATELET 945 990 - 577 K/uL           Assessment:     Primary Rehabilitation Diagnosis  1. Impaired Mobility and ADLs  2.  Acute Ischemic Stroke (acute to subacute ischemia involving the posterior limb of the left internal capsule, along the lateral aspect of the left thalamus) with residual right hemiparesis, dysphagia and dysarthria      Comorbidities   Urinary incontinence    History of malignant neoplasm of prostate    Hypothyroidism    Chronic obstructive pulmonary disease (COPD)    Asbestosis    Osteoarthrosis involving multiple sites    History of endogenous hypertriglyceridemia    CKD (chronic kidney disease) stage 3, GFR 30-59 ml/min    Dyslipidemia (high LDL; low HDL)    Gastroesophageal reflux disease    Procedure refused    Erectile dysfunction       Plan:     1. Justification for continued stay: Good progression towards established rehabilitation goals. 2. Medical Issues being followed closely:    [x]  Fall and safety precautions     []  Wound Care     [x]  Bowel and Bladder Function     [x]  Fluid Electrolyte and Nutrition Balance     []  Pain Control      3. Issues that 24 hour rehabilitation nursing is following:    [x]  Fall and safety precautions     []  Wound Care     [x]  Bowel and Bladder Function     [x]  Fluid Electrolyte and Nutrition Balance     []  Pain Control      [x]  Assistance with and education on in-room safety with transfers to and from the bed, wheelchair, toilet and shower. 4. Acute rehabilitation plan of care:    [x]  Continue current care and rehab. [x]  Physical Therapy           [x]  Occupational Therapy           [x]  Speech Therapy     []  Hold Rehab until further notice     5. Medications:    [x]  MAR Reviewed     [x]  Continue Present Medications     6. DVT Prophylaxis:      []  Lovenox     [x]  Unfractionated Heparin     []  Coumadin     []  NOAC     [x]  GRIS Stockings     [x]  Sequential Compression Device     []  None     7.  Orders:   > Acute Ischemic Stroke (acute to subacute ischemia involving the posterior limb of the left internal capsule, along the lateral aspect of the left thalamus) with residual right hemiparesis, dysphagia and dysarthria    On asa, plavix, statin        > Dyslipidemia   On lipitor      D/w patient and family, d/w KIMMY Wells      Signed:    Kenneth Bentley MD      April 27, 2017

## 2017-04-27 NOTE — PROGRESS NOTES
Patient Name: Alexandre Hamm [id number]  Patient Age: 64 y.o. 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 HDL 42, LDL 97    Dysphagia as late effect of cerebrovascular accident (CVA) 4/19/2017    Erectile dysfunction     Gastroesophageal reflux disease     Hemiparesis affecting right side as late effect of cerebrovascular accident (CVA) ([facility] [address] 4/19/2017    History of endogenous hypertriglyceridemia     History of malignant neoplasm of prostate     New Site score 7     Hypothyroidism     Osteoarthrosis involving multiple sites     Procedure refused 4/21/2017    Speech Pathologist recommended NPO and PEG placement; Patient refused    Urinary incontinence     Male incontinence        Medical Diagnosis:  Left CVA  Acute ischemic stroke ([facility] [address] Acute ischemic stroke [facility])           Recreation Therapy Initial Assessment    Living arrangements:  __ alone   _x_spouse   __relative/family   __roommate    __other (pt is caregiver for spouse)    Transportation: _x_drivers license   __public transportation   __friends/family   __none    Cognitive status:   Oriented to:   _x_person   _x__place   __x_time   _x__situation  Responsiveness:  __x_alert   ___inconsistent   ___unresponsive    Mood: Pt was cooperative and participatory in interview.     Decision-making abilities: _x_demonstrates initiative   __with prompting   __dependent    Attention span: _x_good (+15 min.)   __fair(5-15min.)   __poor(5min. or less)  Follows directions: _x_good   ___fair   __poor prompts needed? __yes   __no    Communication:   _x_verbalizes needs   ___uses gestures   __unable to verbalize  __verbal - difficult to understand __augmentative device    Ability to understand conversation: _x_good   __fair   __poor    Vision: _x_good   __fair   __poor   __blind   __wears glasses    Hearing:  _x_good   __fair   __poor   __deaf   __hearing aid   __sign    Hears best in :___R ear   ___L ear    Premorbid mobility needs: _x_Independent   __assistance   __cane   __walker   __w/c    POSSIBLE LEISURE BARRIERS:         Cognitive Skills  Social Skills/Approp. Communication     Paralysis  Financial x General Weakness    x ROM Limitations decreased in R x Mobility x Endurance     Perceptual Problems x Grasp/Release R UE  Fears/Phobias     Hearing Deficits  Visual Acuity  Motivation     Spasticity  Pain  Self Confidence     Attitude         Patient Goals: Pt reports that he was very independent prior to admission. He is the caregiver for his wife, who he reports has dementia. He endorsed that he enjoys spending time outside completing the yard work and mowing the lawn. Pt reports that he has 3 of his 4 children living in [address], another child lives in [place]. Pt reports he has several grandchildren that live along the [address] [place] [address] as well as great grandchildren. Pt reports that much of his time is spent with his wife, but he is able to attend [employer] and Sunday school. Pt would like to return home as independent as possible.        Darin Ferreira, CTRS  4/27/2017

## 2017-04-27 NOTE — PROGRESS NOTES
Problem: Self Care Deficits Care Plan (Adult)  Goal: *Acute Goals and Plan of Care (Insert Text)  Long Term Goals (to be met upon discharge date) in order to increase pts functional independence and safety, and decrease burden of care:  1. Pt will perform self-feeding with independence. 2. Pt will perform grooming with independence. 3. Pt will perform UB bathing with modified independence. 4. Pt will perform LB bathing with modified independence. 5. Pt will perform tshower transfer with modified independence. 6. Pt will perform UB dressing with independence. 7. Pt will perform LB dressing with modified independence. 8. Pt will perform toileting task with modified independence. 9. Pt will perform toilet transfer with modified independence. Short Term Weekly Goals for (2017 to 2017) in order to increase pts functional independence and safety, and decrease burden of care:  1. Pt will perform self-feeding with modified independence. 2. Pt will perform grooming with modified independence. 3. Pt will perform UB bathing with supervision/setup. 4. Pt will perform LB bathing with Min A.  5. Pt will perform shower transfer with Min A.  6. Pt will perform UB dressing with Min A.  7. Pt will perform LB dressing with Mod A.  8. Pt will perform toileting task with Mod A.  9. Pt will perform toilet transfer with Min A.   OCCUPATIONAL THERAPY DAILY NOTE  Patient Name:Stewart Chance  Time Spent With Patient  Time In: 1330  Time Out: 1430      Time In: 5  Time Out: 18     Medical Diagnosis:  Left CVA  Acute ischemic stroke ([facility] Acute ischemic stroke ([facility]      Pain at start of tx: 0/10  Pain at stop of tx: 0/10     Patient identified with name and : yes  Subjective: Pt states, \"I don't know why I'm so tired all the time. I wish I could stay awake. \"     Objective: Pt presents asleep in bed when OT arrived for first treatment session, however pt easily aroused and willing to participate in OT session. He completed toileting task, w/c mobility ~150 feet utilizing BLEs to the therapy gym, and transferred to low mat table to participate in RUE therex and therapeutic activity. In the second tx session, pt presented sitting on the commode after nursing had assisted pt to commode, and OT assisted pt with finishing the task. After toileting, pt participated in RUE weightbearing and FM coordination task. THERAPEUTIC ACTIVITY Daily Assessment     1st session:   Pt utilized pincer grasp or three jaw ruthann pinch to obtain resistive clothespins from bin placed on the table in front of the pt, then performed R shoulder flexion in order to apply clothespins to the clothespin tree with extra time required for coordinating placement of the clothespin on each gm due to dysmetria noted during reaching. 2nd session:   Pt worked on B FM coordination to complete button board on tabletop in front of pt. He required minimal verbal cues to utilize R handed pinch to assist with holding the cloth or the button during the task to increase functional use of his R hand. Pt also performed light theraputty exercise utilizing yellow theraputty in which pt performed digit extension to roll the putty out and then performed pinch with thumb to each digit with increased focus required to utilize only 1 finger at a time and exclude pinching with other digits. THERAPEUTIC EXERCISE Daily Assessment     1st session:  Pt performed 1 set x10-12 reps each R shoulder flexion/extension, R shoulder ab-/adduction, elbow flexion/extension, forearm pronation/supination, wrist flexion/extension, and digit flexion/extension with minimal-moderate resistance provided by therapist.        NMRE Daily Assessment     2nd session:  Pt sat unsupported edge of mat to perform R lateral trunk flexion onto R hand and forearm to promote increased RUE weightbearing.  He performed 2 sets x 10 reps with min verbal and manual cues for positioning as well as min verbal cues for pacing to promote maximum benefit and increased RUE utilization as pt initially utilized momentum to perform press up to midline. TOILETING Daily Assessment     1st and 2nd session: Toileting  Toileting Assistance (FIM Score): 4 (Minimal assistance)  Cues: Physical assistance for pants up;Verbal cues provided   Pt requires CGA for safety while standing to perform clothing management with slight assistance needed at times for managing underwear and pants completely on the R side due to decreased RUE coordination. MOBILITY/TRANSFERS Daily Assessment     Functional Transfers  Toilet Transfer : Stand pivot transfer without device;Grab bars  Amount of Assistance Required: 4 (Minimal assistance)   Pt performed stand step transfer w/c <> commode utilizing wall bar for support with verbal cues to use NDT technique for sit to stand as opposed to pulling up on the bar. Pt able to maintain balance with CGA. Upon OT arrival in pt's room for afternoon tx session, pt presents sitting on commode after nursing had assisted pt to the commode. After pt finished toileting, OT assisted pt back to w/c via stand step transfer again with CGA and min manual cues for NDT technique with stand to sit. In therapy gym, pt performs stand step transfer w/c <> high mat table x2 today (once in 1st session and once in 2nd session) with Min A for balance and min verbal/manual cues for NDT technique during sit <> stand portions of the transfer. Assessment: Pt is progressing well with RUE strength, however he continues to demonstrate impaired RUE coordination that limits his ability to utilize RUE during functional daily tasks. Plan of Care: Continue POC to maximize pt independence and safety performing ADLs and functional transfers/mobility.      Tonya Blair, OTR  4/27/2017

## 2017-04-28 PROCEDURE — 97112 NEUROMUSCULAR REEDUCATION: CPT

## 2017-04-28 PROCEDURE — 74011250636 HC RX REV CODE- 250/636: Performed by: INTERNAL MEDICINE

## 2017-04-28 PROCEDURE — 74011250637 HC RX REV CODE- 250/637: Performed by: INTERNAL MEDICINE

## 2017-04-28 PROCEDURE — 97530 THERAPEUTIC ACTIVITIES: CPT

## 2017-04-28 PROCEDURE — 97116 GAIT TRAINING THERAPY: CPT

## 2017-04-28 PROCEDURE — 97535 SELF CARE MNGMENT TRAINING: CPT

## 2017-04-28 PROCEDURE — 92507 TX SP LANG VOICE COMM INDIV: CPT

## 2017-04-28 PROCEDURE — 92526 ORAL FUNCTION THERAPY: CPT

## 2017-04-28 PROCEDURE — 65310000000 HC RM PRIVATE REHAB

## 2017-04-28 RX ADMIN — ATORVASTATIN CALCIUM 20 MG: 10 TABLET, FILM COATED ORAL at 22:01

## 2017-04-28 RX ADMIN — PANTOPRAZOLE SODIUM 40 MG: 40 GRANULE, DELAYED RELEASE ORAL at 06:07

## 2017-04-28 RX ADMIN — MULTIPLE VITAMIN LIQUID 30 ML: LIQUID at 08:58

## 2017-04-28 RX ADMIN — HEPARIN SODIUM 5000 UNITS: 5000 INJECTION, SOLUTION INTRAVENOUS; SUBCUTANEOUS at 06:08

## 2017-04-28 RX ADMIN — HEPARIN SODIUM 5000 UNITS: 5000 INJECTION, SOLUTION INTRAVENOUS; SUBCUTANEOUS at 19:01

## 2017-04-28 RX ADMIN — ASPIRIN 81 MG CHEWABLE TABLET 81 MG: 81 TABLET CHEWABLE at 08:58

## 2017-04-28 RX ADMIN — CHOLECALCIFEROL TAB 25 MCG (1000 UNIT) 2000 UNITS: 25 TAB at 08:58

## 2017-04-28 RX ADMIN — CLOPIDOGREL BISULFATE 75 MG: 75 TABLET, FILM COATED ORAL at 19:01

## 2017-04-28 RX ADMIN — LEVOTHYROXINE SODIUM 150 MCG: 100 TABLET ORAL at 06:07

## 2017-04-28 NOTE — PROGRESS NOTES
[facility] PHYSICAL REHABILITATION  [address] [place] [address], [address]     INPATIENT REHABILITATION  DAILY PROGRESS NOTE     Date: 4/28/2017    Name: Eduin Yeh Age / Sex: 64 y.o. / male   CSN: [id number] MRN: [id number]   [facility] Date: 4/24/2017 Length of Stay: 4 days     Primary Rehab Diagnosis: Impaired Mobility and ADLs secondary to Acute Ischemic Stroke (acute to subacute ischemia involving the posterior limb of the left internal capsule, along the lateral aspect of the left thalamus) with residual right hemiparesis, dysphagia and dysarthria       Subjective:     Patient lying in bed in NAD, son at bedside      Objective:     Vital Signs:  Patient Vitals for the past 24 hrs:   BP Temp Pulse Resp SpO2   04/28/17 1614 152/88 96.7 °F (35.9 °C) 78 16 98 %   04/28/17 0743 151/87 98.1 °F (36.7 °C) 63 16 97 %   04/27/17 1930 155/85 98.2 °F (36.8 °C) 63 18 99 %        General:  Awake, alert  Cardiovascular:  S1S2+, RRR  Pulmonary:  CTA b/l  GI:  Soft, BS+, NT, ND  Extremities:  No edema  Right Hemiparesis, dysarthria      Current Medications:  Current Facility-Administered Medications   Medication Dose Route Frequency    cholecalciferol (VITAMIN D3) tablet 2,000 Units  2,000 Units Oral DAILY    bisacodyl (DULCOLAX) suppository 10 mg  10 mg Rectal Q48H PRN    heparin (porcine) injection 5,000 Units  5,000 Units SubCUTAneous Q12H    acetaminophen (TYLENOL) solution 650 mg  650 mg Oral Q4H PRN    docusate sodium (COLACE) capsule 100 mg  100 mg Oral BID    aspirin chewable tablet 81 mg  81 mg Oral DAILY WITH BREAKFAST    clopidogrel (PLAVIX) tablet 75 mg  75 mg Oral DAILY WITH DINNER    atorvastatin (LIPITOR) tablet 20 mg  20 mg Oral QHS    pantoprazole (PROTONIX) granules for oral suspension 40 mg  40 mg Oral ACB    multivitamin (MULTI-DELYN, WELLESSE) oral liquid 30 mL  30 mL Oral DAILY    levothyroxine (SYNTHROID) tablet 150 mcg  150 mcg Oral ACB       Allergies:   Allergies Allergen Reactions    Pcn [Penicillins] Rash       Functional Progress:    PHYSICAL THERAPY    ON ADMISSION MOST RECENT   Wheelchair Mobility/Management  Able to Propel (ft): 70 feet (verbal cueing for technique)  Functional Level: 2  Curbs/Ramps Assist Required (FIM Score): 0 (Not tested)  Wheelchair Setup Assist Required : 2 (Maximal assistance)  Wheelchair Management: Manages left brake, Manages right brake Wheelchair Mobility/Management  Able to Propel (ft): 255 feet  Functional Level: 6  Curbs/Ramps Assist Required (FIM Score): 0 (Not tested)  Wheelchair Setup Assist Required : 2 (Maximal assistance)  Wheelchair Management: Manages left brake, Manages right brake     Gait  Amount of Assistance: 2 (Maximal assistance) (2/2 R LOB)  Distance (ft): 5 Feet (ft)  Assistive Device: Gait belt Gait  Amount of Assistance: 4 (Minimal assistance) (for trunk stabilization and postural cues)  Distance (ft): 25 Feet (ft) (x 3 sets; v/cing for pacing, step clearance and COG mgmt)  Assistive Device: Gait belt     Balance-Sitting/Standing  Sitting - Static: Good (unsupported)  Sitting - Dynamic: Good (unsupported)  Standing - Static: Fair  Standing - Dynamic : Impaired Balance-Sitting/Standing  Sitting - Static: Good (unsupported)  Sitting - Dynamic: Good (unsupported)  Standing - Static: Fair  Standing - Dynamic : Impaired     Bed/Mat Mobility  Rolling Right : 5 (Supervision)  Rolling Left : 5 (Supervision)  Supine to Sit : 5 (Supervision)  Sit to Supine : 5 (Supervision) Bed/Mat Mobility  Rolling Right : 6 (Modified independent)  Rolling Left : 6 (Modified independent)  Supine to Sit : 6 (Modified independent)  Sit to Supine : 6 (Modified independent)     Transfers  Transfer Type: Other  Other: Patient performs stand step transfer with moderate assist from PT for anterior approach R LE knee block with loading 2/2 hx of buckling, trunk stability, pivot assist, and controlled lowering.  Patient demonstrates increased momentum utilization for STS transfer and R genu recurvatum requiring verbal cueing to crrect these; pacing cues successful, however genu recurvatum requires tactile cueing with physical assist to prevent buckling. Patient demosntrates pass retract technique with R LE advancement as well as anterior COG placement to attempts to maintain TKE 2/2 functional quad weakness. Transfer Assistance : 3 (Moderate assistance )  Sit to Stand Assistance: Moderate assistance  Car Transfers: Not tested  Car Type: NA Transfers  Transfer Type: Other  Other: Patient performs stand step transfer x 7 in session with B UE on R distal femur to promote increased weightbearing and motor control. Patient utilizes staggered stance in session with R LE in 3/4 full DF and L foot 6-10\" anterior to R foot to increased challenge of R LE and reduce L LE over-utilization via removal of mechanical advantage. Patietn encouraged to prevent \"rocking\" and increased utilization of momentum to ensure maximal R LE challenge and safety via min A x 1 initially progressing to tactile cueing by the end of the session. Patient requires verbal and tactile cueing for increased step length, reduced pace and increased stance time bilaterally. Transfer Assistance : 4 (Minimal assistance)  Sit to Stand Assistance: Contact guard assistance (min a x 1 progressing to tactile cueing only; 17\" surface)  Car Transfers: Not tested  Car Type: NA     Steps or Stairs  Steps/Stairs Ambulated (#): 0  Level of Assist : 0 (Not tested)  Rail Use:  (NA) Steps or Stairs  Steps/Stairs Ambulated (#): 0  Level of Assist : 0 (Not tested)  Rail Use:  (NA)         Lab/Data Review:  No results found for this or any previous visit (from the past 24 hour(s)). Assessment:     Primary Rehabilitation Diagnosis  1. Impaired Mobility and ADLs  2.  Acute Ischemic Stroke (acute to subacute ischemia involving the posterior limb of the left internal capsule, along the lateral aspect of the left thalamus) with residual right hemiparesis, dysphagia and dysarthria      Comorbidities   Urinary incontinence    History of malignant neoplasm of prostate    Hypothyroidism    Chronic obstructive pulmonary disease (COPD)    Asbestosis    Osteoarthrosis involving multiple sites    History of endogenous hypertriglyceridemia    CKD (chronic kidney disease) stage 3, GFR 30-59 ml/min    Dyslipidemia (high LDL; low HDL)    Gastroesophageal reflux disease    Procedure refused    Erectile dysfunction       Plan:     1. Justification for continued stay: Good progression towards established rehabilitation goals. 2. Medical Issues being followed closely:    [x]  Fall and safety precautions     []  Wound Care     [x]  Bowel and Bladder Function     [x]  Fluid Electrolyte and Nutrition Balance     []  Pain Control      3. Issues that 24 hour rehabilitation nursing is following:    [x]  Fall and safety precautions     []  Wound Care     [x]  Bowel and Bladder Function     [x]  Fluid Electrolyte and Nutrition Balance     []  Pain Control      [x]  Assistance with and education on in-room safety with transfers to and from the bed, wheelchair, toilet and shower. 4. Acute rehabilitation plan of care:    [x]  Continue current care and rehab. [x]  Physical Therapy           [x]  Occupational Therapy           [x]  Speech Therapy     []  Hold Rehab until further notice     5. Medications:    [x]  MAR Reviewed     [x]  Continue Present Medications     6. DVT Prophylaxis:      []  Lovenox     [x]  Unfractionated Heparin     []  Coumadin     []  NOAC     [x]  GRIS Stockings     [x]  Sequential Compression Device     []  None     7.  Orders:   > Acute Ischemic Stroke (acute to subacute ischemia involving the posterior limb of the left internal capsule, along the lateral aspect of the left thalamus) with residual right hemiparesis, dysphagia and dysarthria    Asa, plavix, statin     > Dyslipidemia   lipitor      D/w patient and son at bedside      Signed:    Vicenta Chu MD      April 28, 2017

## 2017-04-28 NOTE — PROGRESS NOTES
[x] Psychology  [] Social Work [] Recreational Therapy    INTERVENTION  UNITS/TIME OF SERVICE   Assessment    Supportive Counseling April 27, 2017   Orientation    Discharge Planning    Resource Linkage              Progress/Current Status    Patient seen for individual support  on ARU and to discuss his integration intro treatment milieu. He is seen immediately following scheduled therapy and he appears tired; in fact, he reflected that therapy is requiring much more effort than he expected. Patient was educated about what has occurred with stroke, including neurological injury and that more effort than is typical is required, often, for what were almost automatic activities. He remains focused on his recovery and is not discouraged. He is preoccupied about his spouse with dementia but acknowledges that his daughters are providing her with care while he is hospitalized. Unfortunately, patient is going to have to alter his expectations and activity in support of his wife, with her progressive illness, and this is obviously a real concern that weighs on him. Further support and education will be offered to him while on ARU.     Umer Ruiz, THE [facility] 4/28/2017 8:49 AM

## 2017-04-28 NOTE — PROGRESS NOTES
Problem: Self Care Deficits Care Plan (Adult)  Goal: *Acute Goals and Plan of Care (Insert Text)  Long Term Goals (to be met upon discharge date) in order to increase pts functional independence and safety, and decrease burden of care:  1. Pt will perform self-feeding with independence. 2. Pt will perform grooming with independence. 3. Pt will perform UB bathing with modified independence. 4. Pt will perform LB bathing with modified independence. 5. Pt will perform tshower transfer with modified independence. 6. Pt will perform UB dressing with independence. 7. Pt will perform LB dressing with modified independence. 8. Pt will perform toileting task with modified independence. 9. Pt will perform toilet transfer with modified independence. Short Term Weekly Goals for (2017 to 2017) in order to increase pts functional independence and safety, and decrease burden of care:  1. Pt will perform self-feeding with modified independence. 2. Pt will perform grooming with modified independence. 3. Pt will perform UB bathing with supervision/setup. 4. Pt will perform LB bathing with Min A.  5. Pt will perform shower transfer with Min A.  6. Pt will perform UB dressing with Min A.  7. Pt will perform LB dressing with Mod A.  8. Pt will perform toileting task with Mod A.  9. Pt will perform toilet transfer with Min A.   OCCUPATIONAL THERAPY DAILY NOTE  Patient Name:Stewart Rothman  Time Spent With Patient  Time In: 200  Time Out: 200     Medical Diagnosis:  Left CVA  Acute ischemic stroke ([address] Acute ischemic stroke ([address]      Pain at start of tx: 0/10  Pain at stop of tx: 0/10     Patient identified with name and : yes  Subjective: \"I feel a little more alert today. I wear out easily though. \"     Objective:      THERAPEUTIC ACTIVITY Daily Assessment     OT provided pt with handout for sign language alphabet and phrases to work on intrinsic hand strengthening and coordination.  Pt required max verbal and visual cues in order to form each sign. OT encouraged pt to work on these exercises in his room for HEP this weekend. Pt also worked on [employer] coordination picking up pennies, one at a time, from the table and pressing them through slit of container lid. Pt needed additional time to pick them up using pincer grasp. GROOMING Daily Assessment     Grooming  Grooming Assistance : 6 (Modified independent)  Comments: Pt completed oral care, combed his hair, and shaved his face while standing at the sink with CGA for safety with standing only. He was able to manage all grooming items with increased time and effort due to using non-dominant L hand. Pt did intermittently initiate using his R hand to assist with holding/stabilizing items while opening/closing them. UPPER BODY BATHING Daily Assessment     Upper Body Bathing  Bathing Assistance, Upper: 5 (Supervision)  Position Performed: Seated in chair  Comments: Pt required supervision with verbal cues for overall thoroughness to wash RUE. Pt was able to utilize his R hand to wash his L arm with extra time and effort. LOWER BODY BATHING Daily Assessment     Lower Body Bathing  Bathing Assistance, Lower : 4 (Minimal assistance)  Position Performed: Seated in chair;Standing  Comments: Pt is able to wash LEs while seated on shower bench with verbal cues and encouragement for thoroughness. Pt requires Min A for balance and safety while standing in order to wash buttocks. UPPER BODY DRESSING Daily Assessment     Upper Body Dressing   Dressing Assistance : 5 (Supervision)  Comments: Pt required assistance for obtaining shirt. He is able to doff and aldo with significantly increased effort.        LOWER BODY DRESSING Daily Assessment     Lower Body Dressing   Dressing Assistance : 4 (Minimal assistance)  Leg Crossed Method Used: Yes  Position Performed: Seated in chair;Standing  Comments: Pt requires CGA/Min A for standing balance while managing underwear and pants over hips and buttocks. Intermittent assistance required for adjusting them on the R side. Pt also requires assistance for tying drawstring due to impaired RUE coordination. Pt is able to thread LEs in and out of underwear and pants while seated with verbal cues to sit down while doing it for improved safety. Pt is able to demo crossed leg technique to aldo socks with education on one-handed technique due to difficulty when attempting to use his R hand to assist. To aldo tennis shoes, pt requires assistance only for tying laces. MOBILITY/TRANSFERS Daily Assessment     Functional Transfers  Tub or Shower Type: Shower  Amount of Assistance Required: 4 (Minimal assistance)  Adaptive Equipment: Tub transfer bench   Pt performed stand step transfer w/c <> shower bench utilizing grab bar for support. He needed max verbal cues for using NDT technique during sit to stand as he attempts to pull up on grab bar during all sit to stand transfers during ADL. Assessment: Pt presents with improved activity tolerance today as he appears less fatigued throughout the treatment session. He demonstrates decreased carryover of transfer techniques as he consistently requires cueing to incorporate use of NDT technique as opposed to pulling up on the grab bars in the bathroom during ADLs. Pt demonstrates progress with LB dressing today as he was able to perform with Min A. Plan of Care: Continue POC to maximize pt independence and safety performing ADLs and functional transfers/mobility.      Zenaida Oscar, OTR  4/28/2017

## 2017-04-28 NOTE — PROGRESS NOTES
Problem: Neurolinguistics Impaired (Adult)  Goal: *Speech Goal: (INSERT TEXT)  Long term goals:  1. Patient will tolerate a regular/thin liquid diet without overt s/s of aspiration, 4 consecutive meals. 2. Patient will use safe swallowing techniques with supervision for all PO intake. 3. Patient will answer complex yes/no questions with 90% accuracy. 4 patient will follow complex commands for object manipulation wit 80-90% accuracy. 5. Patient will name 10-12 items within simple categories with supervision. 6. Patient will perform varied word retrieval tasks with % accuracy. 7. Patient will recall 3 words after 5 minutes, supervision. 8. Patient will perform functional problem solving/reasoning tasks with supervision. Short term goals (by 5/2/17):  1. Patient will tolerate a soft, nectar thick liquid diet without overt s/s of aspiration, 4 consecutive meals. 2. Patient will use safe swallowing techniques with min-mod cues for all PO intake. 3. Patient will answer complex yes/no questions with 75-80%% accuracy. 4 patient will follow complex commands for object manipulation wit 80-90% accuracy. 5. Patient will name 7-9 items within simple categories with supervision. 6. Patient will perform varied word retrieval tasks with 70-80% accuracy. 7. Patient will recall 3 words after 5 minutes, min-mod assist.  8. Patient will perform functional problem solving/reasoning tasks with 75-85% accuracy. SPEECH LANGUAGE PATHOLOGY TREATMENT     Patient: Samantha Andujar (36 y.o. male)  Date: 4/28/2017  Diagnosis: Left CVA  Acute ischemic stroke [facility]) Acute ischemic stroke [facility])       SUBJECTIVE:   Patient stated Clemente wore me out. OBJECTIVE:   Mental Status:  Mr. Dread Hartmann was alert in all treatment sessions today. This afternoon after PT he did appear quite tired. Treatment & Interventions:   Mr. Dread Hartmann was seen in the dining room today for breakfast and lunch.   This morning he was seen concurrently with another patient and this afternoon in the therapeutic dining group. Patient continues to clear his throat after large boluses of the nectar thick liquids. He needs min-mod cues to limit bolus size. He generally tolerates the more solid consistencies well. Patient, at lunchtime agreed that he is drinking too fast and it causes him to clear his throat and at times cough. Patient was seen this morning in the weekly CVA support/educaiton group. This morning the four members present shared their experiences surrounding their CVA and initial hospitalization as well as their experience on the ARU. Patient was very detailed regarding his experiences and the obstacles that he has overcome so far as well as well as those that remain. He was also seen for a 1:1 session late this afternoon. Neuro-Linguistics:   Orientation:                                         Supervision  Recent memory:                                 Supervision  Generative naming: Things in spray form:              Patient named 5; more with cues              Things in a man's pocket:       Patient provided 7 itesm  Answering with restricted features:    80% success     Response & Tolerance to Activities:  Mr. Marco Guzman is consistently cooperative with all tasks presented. He is also realizing that he does have some difficulty with liquid swallows.      Pain:  Pain Scale 1: Numeric (0 - 10)        After treatment:   [X]       Patient left in no apparent distress sitting up in chair  [ ]       Patient left in no apparent distress in bed  [X]       Call bell left within reach  [ ]       Nursing notified  [X]       Caregiver present  [ ]       Bed alarm activated      ASSESSMENT:   Progression toward goals:  [X]       Improving appropriately and progressing toward goals  [ ]       Improving slowly and progressing toward goals  [ ]       Not making progress toward goals and plan of care will be adjusted      PLAN:   Patient continues to benefit from skilled intervention to address the above impairments. Continue treatment per established plan of care. Discharge Recommendations:   To Be Determined     Chely Woodson, SLP  Time calculation 120 minutes

## 2017-04-28 NOTE — PROGRESS NOTES
Problem: Mobility Impaired (Adult and Pediatric)  Goal: *Acute Goals and Plan of Care (Insert Text)  Physical Therapy Short Term Goals  Initiated 4/25/2017 and to be accomplished within 7 day(s) (05/02/2017)  1. Patient will move from supine to sit and sit to supine , scoot up and down and roll side to side in bed with modified independence. 2. Patient will transfer from bed to chair and chair to bed with minimal assistance/contact guard assist using the least restrictive device. 3. Patient will perform sit to stand with minimal assistance/contact guard assist.  4. Patient will ambulate with moderate assistance for 50 feet with the least restrictive device. 5. Patient will ascend/descend 6 stairs with 2 handrail(s) with moderate assistance . Physical Therapy Long Term Goals  Initiated 4/25/2017 and to be accomplished within 28 day(s) (05/23/2017)  1. Patient will move from supine to sit and sit to supine , scoot up and down and roll side to side in bed with independence. 2. Patient will transfer from bed to chair and chair to bed with modified independence using the least restrictive device. 3. Patient will perform sit to stand with modified independence. 4. Patient will ambulate with modified independence for 250 feet with the least restrictive device. 5. Patient will ascend/descend 6 stairs with 2 handrail(s) with modified independence. PHYSICAL THERAPY DAILY NOTE  Patient Name:Stewart Balwdin  Time In: 1000  Time Out: 1030  Time In: 1430  Time Out: 1639  Patient Seen For: Transfer training; Wheelchair mobility; Therapeutic exercise;Patient education;Gait training;Balance activities  Diagnosis: Left CVA  Acute ischemic stroke [facility]) Acute ischemic stroke [facility])  Precautions: Fall risk     Subjective: Patient reports motivation to participate in therapy noting the he \"can really feel it working\" also stating \"it can wear you out\".      Pain at start of tx:0/10  Pain at stop of tx:0/10 Patient identified with name and : YES         Objective: Patient participates in transfer training in restroom x 2 and STS transfer training with static standing while managing self perturbative activities to complete self care as well as hand washing and teeth brushing during treatment session with another patient under SPV of PT.      BED/MAT MOBILITY Daily Assessment     Rolling Right : 6 (Modified independent)  Rolling Left : 6 (Modified independent)  Supine to Sit : 6 (Modified independent)  Sit to Supine : 6 (Modified independent)     Patient participates in bed mobility assessment today x 2 demonstrates consistent MOD I level requiring only bed rails to complete task. TRANSFERS Daily Assessment     Transfer Type: Other  Other: Patient performs stand step transfer x 7 in session with B UE on R distal femur to promote increased weightbearing and motor control. Patient utilizes staggered stance in session with R LE in 3/4 full DF and L foot 6-10\" anterior to R foot to increased challenge of R LE and reduce L LE over-utilization via removal of mechanical advantage. Patient encouraged to prevent \"rocking\" and increased utilization of momentum to ensure maximal R LE challenge and safety via min A x 1 initially progressing to tactile cueing by the end of the session. Patient requires verbal and tactile cueing for increased step length, reduced pace and increased stance time bilaterally.    Transfer Assistance : 4 (Minimal assistance)  Sit to Stand Assistance: Contact guard assistance (min a x 1 progressing to tactile cueing only; 17\" surface)          GAIT Daily Assessment     Amount of Assistance: 4 (Minimal assistance) (for trunk stabilization and postural cues)  Distance (ft): 25 Feet (ft) (x 3 sets; v/cing for pacing, step clearance and COG mgmt)  Assistive Device: Gait belt  Patient educated on rationale for decreased distance with ambulation, reporting understanding that he will utilize this as an opportunity to improve his gait quality. Patient provided tactile cueing and min a x 1 for trunk stabilization demonstrating improved step clearance, pacing, step length, reduced trunk sway, improved pathway adherence, and improved posture. Patient also utilizes mirror for visual biofeedback cues to improve pattern with good results. WHEELCHAIR MOBILITY Daily Assessment     Able to Propel (ft): 255 feet  Functional Level: 6              Assessment: Patient demonstrates improved control, attention to cues, quality of movement today with discussion about expectations and present durations for activities to prevent increased focus on distance or speed and promote attention to quality and safety. Patient responds well to these cues in regards to STS, stand step, and ambulatory training today, however he remains functionally limited and will require continued focus on independence with MRADL's. Plan of Care: Cont current POC; focus on continued quality of movement over pace and quantity. Clemente Wiseman, PT DPT  4/28/2017

## 2017-04-28 NOTE — PROGRESS NOTES
[x] Psychology  [] Social Work [] Recreational Therapy    INTERVENTION  UNITS/TIME OF SERVICE   Assessment    Supportive Counseling April 28, 2017   Orientation    Discharge Planning    Resource Linkage              Progress/Current Status    Patient attended stroke support group and actively participated in discussion; he became significantly labile as he described his personal circumstances leading to admit to ARU and reflected on what he must now do in order to improve his function. If patient's lability becomes an interference in continued therapy effort, then MD might be consulted to consider anti-depressant medication for him, in order to maintain satisfactory mood stability. Patient is still trying to come to terms with the fact that stroke recovery will take longer than he would prefer, and he must realign his expectations.     Krishna Piedra, THE [facility] 4/28/2017 11:26 AM

## 2017-04-28 NOTE — PROGRESS NOTES
SHIFT CHANGE NOTE FOR [facility]    Bedside and Verbal shift change report given to   Miguel Mays LPN (oncoming nurse) by Gabi Price LPN   (offgoing nurse). Report included the following information SBAR, Kardex, MAR and Recent Results. Situation:   Code Status: Full Code   Reason for Admission: CVA  Hospital Day: 4   Problem List:   Hospital Problems  Date Reviewed: 4/26/2017          Codes Class Noted POA    Procedure refused ICD-10-CM: Z53.29  ICD-9-CM: V64.2  4/21/2017 Yes    Overview Signed 4/24/2017  3:28 PM by Delano Holter, MD     Speech Pathologist recommended NPO and PEG placement; Patient refused             * (Principal)Acute ischemic stroke [facility]) ICD-10-CM: I63.9  ICD-9-CM: 434.91  4/19/2017 Yes    Overview Signed 4/24/2017  3:15 PM by Delano Holter, MD     Acute Ischemic Stroke (acute to subacute ischemia involving the posterior limb of the left internal capsule, along the lateral aspect of the left thalamus) with residual right hemiparesis, dysphagia and dysarthria             Impaired mobility and ADLs ICD-10-CM: Z74.09  ICD-9-CM: 799.89  4/19/2017 Yes        Decreased calculated glomerular filtration rate (GFR) ICD-10-CM: R94.4  ICD-9-CM: 794.4  4/19/2017 Yes    Overview Addendum 4/24/2017  3:21 PM by Delano Holter, MD     Estimated calculated glomerular filtration rate equivalent to that of stage 3 chronic kidney disease = 30-59 ml/min             Dyslipidemia (high LDL; low HDL) (Chronic) ICD-10-CM: E78.4  ICD-9-CM: 272.4  4/19/2017 Yes    Overview Signed 4/24/2017  3:25 PM by Delano Holter, MD     Lipid profile (4/19/2017): , .  HDL 42, LDL 97             Hemiparesis affecting right side as late effect of cerebrovascular accident (CVA) ([facility] ICD-10-CM: C53.570  ICD-9-CM: 438.20  4/19/2017 Yes        Dysphagia as late effect of cerebrovascular accident (CVA) ICD-10-CM: X54.348  ICD-9-CM: 438.82  4/19/2017 Yes        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) ICD-10-CM: Q09.763  ICD-9-CM: 438.13  4/19/2017 Yes              Background:   Past Medical History:   Past Medical History:   Diagnosis Date    Acute ischemic stroke ([facility] 4/19/2017    Acute Ischemic Stroke (acute to subacute ischemia involving the posterior limb of the left internal capsule, along the lateral aspect of the left thalamus) with residual right hemiparesis, dysphagia and dysarthria    Asbestosis ([facility]     [facility]) [facility]     CKD (chronic kidney disease) stage 3, GFR 30-59 ml/min     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  HDL 42, LDL 97    Dysphagia as late effect of cerebrovascular accident (CVA) 4/19/2017    Erectile dysfunction     Gastroesophageal reflux disease     Hemiparesis affecting right side as late effect of cerebrovascular accident (CVA) ([facility] 4/19/2017    History of endogenous hypertriglyceridemia     History of malignant neoplasm of prostate     [place] score 7     Hypothyroidism     Osteoarthrosis involving multiple sites     Procedure refused 4/21/2017    Speech Pathologist recommended NPO and PEG placement; Patient refused    Urinary incontinence     Male incontinence       Patient taking anticoagulants YES   Patient has a defibrillator: no     Assessment:   Changes in Assessment throughout shift: NONE              Last Vitals:     Vitals:    04/27/17 0746 04/27/17 1631 04/27/17 1930 04/28/17 0743   BP: 157/90 149/87 155/85 151/87   Pulse: 73 63 63 63   Resp: 19 18 18 16   Temp: 97.9 °F (36.6 °C) 98.3 °F (36.8 °C) 98.2 °F (36.8 °C) 98.1 °F (36.7 °C)   SpO2: 98% 95% 99% 97%   Weight:       Height:            PAIN    Pain Assessment    Pain Intensity 1: 0 (04/28/17 0400) Pain Intensity 1: 2 (12/29/14 1105)      Pain Location 1: Abdomen      Pain Intervention(s) 1: Medication (see MAR)  Patient Stated Pain Goal: 0 Patient Stated Pain Goal: 0  o Intervention effective: no    o Other actions taken for pain: NONE     Skin Assessment  Skin color Skin Color: Appropriate for ethnicity  Condition/Temperature Skin Condition/Temp: Dry, Warm  Integrity Skin Integrity: Intact  Turgor Turgor: Non-tenting  Weekly Pressure Ulcer Documentation  Pressure  Injury Documentation: No Pressure Injury Noted-Pressure Ulcer Prevention Initiated  Wound Prevention & Protection Methods  Orientation of wound Orientation of Wound Prevention: Posterior  Location of Prevention Location of Wound Prevention: Sacrum/Coccyx  Dressing Present Dressing Present : No  Dressing Status    Wound Offloading Wound Offloading (Prevention Methods): Bed, pressure redistribution/air     INTAKE/OUPUT    Date 04/27/17 0700 - 04/28/17 0659 04/28/17 0700 - 04/29/17 0659   Shift 5046-4860 4608-3473 24 Hour Total 0700-1859 1900-0659 24 Hour Total   I  N  T  A  K  E   P. O. 700  700         P. O. 700  700       Shift Total  (mL/kg) 700  (8.6)  700  (8.6)      O  U  T  P  U  T   Urine  (mL/kg/hr)            Urine Occurrence(s) 8 x 4 x 12 x       Stool            Stool Occurrence(s) 3 x 1 x 4 x       Shift Total  (mL/kg)           700      Weight (kg) 81.5 81.5 81.5 81.5 81.5 81.5       Recommendations:  1. Patient needs and requests: NONE    2. Diet: CARDIAC PUREED WITH NECTAR THICK LIQUIDS    3. Pending tests/procedures: LABS     4. Functional Level/Equipment: WHEELCHAIR    5. Estimated Discharge Date: TBD Posted on Whiteboard in Patients Room: [facility] Safety Check    A safety check occurred in the patient's room between off going nurse and oncoming nurse listed above.     The safety check included the below items  Area Items   H  High Alert Medications - Verify all high alert medication drips (heparin, PCA, etc.)   E  Equipment - Suction is set up for ALL patients (with yanker)  - Red plugs utilized for all equipment (IV pumps, etc.)  - WOWs wiped down at end of shift.  - Room stocked with oxygen, suction, and other unit-specific supplies   A  Alarms - Bed alarm is set for fall risk patients  - Ensure chair alarm is in place and activated if patient is up in a chair   L  Lines - Check IV for any infiltration  - Syed bag is empty if patient has a Syed   - Tubing and IV bags are labeled   S  Safety   - Room is clean, patient is clean, and equipment is clean. - Hallways are clear from equipment besides carts. - Fall bracelet on for fall risk patients  - Ensure room is clear and free of clutter  - Suction is set up for ALL patients (with yanker)  - Hallways are clear from equipment besides carts.    - Isolation precautions followed, supplies available outside room, sign posted

## 2017-04-28 NOTE — PROGRESS NOTES
SHIFT CHANGE NOTE FOR [facility]    Bedside and Verbal shift change report given to   Shalini Ashraf LPN (oncoming nurse) by Vikash Andrews RN   (offgoing nurse). Report included the following information SBAR, Kardex, MAR and Recent Results. Situation:   Code Status: Full Code   Reason for Admission: CVA  Hospital Day: 4   Problem List:   Hospital Problems  Date Reviewed: 4/26/2017          Codes Class Noted POA    Procedure refused ICD-10-CM: Z53.29  ICD-9-CM: V64.2  4/21/2017 Yes    Overview Signed 4/24/2017  3:28 PM by Kermit Cassidy MD     Speech Pathologist recommended NPO and PEG placement; Patient refused             * (Principal)Acute ischemic stroke [facility]) ICD-10-CM: I63.9  ICD-9-CM: 434.91  4/19/2017 Yes    Overview Signed 4/24/2017  3:15 PM by Kermit Cassidy MD     Acute Ischemic Stroke (acute to subacute ischemia involving the posterior limb of the left internal capsule, along the lateral aspect of the left thalamus) with residual right hemiparesis, dysphagia and dysarthria             Impaired mobility and ADLs ICD-10-CM: Z74.09  ICD-9-CM: 799.89  4/19/2017 Yes        Decreased calculated glomerular filtration rate (GFR) ICD-10-CM: R94.4  ICD-9-CM: 794.4  4/19/2017 Yes    Overview Addendum 4/24/2017  3:21 PM by Kermit Cassidy MD     Estimated calculated glomerular filtration rate equivalent to that of stage 3 chronic kidney disease = 30-59 ml/min             Dyslipidemia (high LDL; low HDL) (Chronic) ICD-10-CM: E78.4  ICD-9-CM: 272.4  4/19/2017 Yes    Overview Signed 4/24/2017  3:25 PM by Kermit Cassidy MD     Lipid profile (4/19/2017): , .  HDL 42, LDL 97             Hemiparesis affecting right side as late effect of cerebrovascular accident (CVA) ([facility] ICD-10-CM: Q56.452  ICD-9-CM: 438.20  4/19/2017 Yes        Dysphagia as late effect of cerebrovascular accident (CVA) ICD-10-CM: B31.156  ICD-9-CM: 438.82  4/19/2017 Yes        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) ICD-10-CM: G83.382  ICD-9-CM: 438.13  4/19/2017 Yes              Background:   Past Medical History:   Past Medical History:   Diagnosis Date    Acute ischemic stroke ([facility] 4/19/2017    Acute Ischemic Stroke (acute to subacute ischemia involving the posterior limb of the left internal capsule, along the lateral aspect of the left thalamus) with residual right hemiparesis, dysphagia and dysarthria    [facility]     Chronic obstructive pulmonary disease (COPD) ([facility]     CKD (chronic kidney disease) stage 3, GFR 30-59 ml/min     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  HDL 42, LDL 97    Dysphagia as late effect of cerebrovascular accident (CVA) 4/19/2017    Erectile dysfunction     Gastroesophageal reflux disease     Hemiparesis affecting right side as late effect of cerebrovascular accident (CVA) ([facility] 4/19/2017    History of endogenous hypertriglyceridemia     History of malignant neoplasm of prostate     [place] score 7     Hypothyroidism     Osteoarthrosis involving multiple sites     Procedure refused 4/21/2017    Speech Pathologist recommended NPO and PEG placement; Patient refused    Urinary incontinence     Male incontinence       Patient taking anticoagulants YES   Patient has a defibrillator: no     Assessment:   Changes in Assessment throughout shift: NONE              Last Vitals:     Vitals:    04/26/17 1930 04/27/17 0746 04/27/17 1631 04/27/17 1930   BP: 142/78 157/90 149/87 155/85   Pulse: 73 73 63 63   Resp: 18 19 18 18   Temp: 98.6 °F (37 °C) 97.9 °F (36.6 °C) 98.3 °F (36.8 °C) 98.2 °F (36.8 °C)   SpO2: 98% 98% 95% 99%   Weight:       Height:            PAIN    Pain Assessment    Pain Intensity 1: 0 (04/28/17 0400) Pain Intensity 1: 2 (12/29/14 1105)      Pain Location 1: Abdomen      Pain Intervention(s) 1: Medication (see MAR)  Patient Stated Pain Goal: 0 Patient Stated Pain Goal: 0  o Intervention effective: no    o Other actions taken for pain: NONE     Skin Assessment  Skin color Skin Color: Appropriate for ethnicity  Condition/Temperature Skin Condition/Temp: Dry, Warm  Integrity Skin Integrity: Intact  Turgor Turgor: Non-tenting  Weekly Pressure Ulcer Documentation  Pressure  Injury Documentation: No Pressure Injury Noted-Pressure Ulcer Prevention Initiated  Wound Prevention & Protection Methods  Orientation of wound Orientation of Wound Prevention: Posterior  Location of Prevention Location of Wound Prevention: Sacrum/Coccyx  Dressing Present Dressing Present : No  Dressing Status    Wound Offloading Wound Offloading (Prevention Methods): Bed, pressure redistribution/air     INTAKE/OUPUT    Date 04/27/17 0700 - 04/28/17 0659 04/28/17 0700 - 04/29/17 0659   Shift 9558-1396 4902-6502 24 Hour Total 0700-1859 1900-0659 24 Hour Total   I  N  T  A  K  E   P. O. 700  700         P. O. 700  700       Shift Total  (mL/kg) 700  (8.6)  700  (8.6)      O  U  T  P  U  T   Urine  (mL/kg/hr)            Urine Occurrence(s) 8 x 4 x 12 x       Stool            Stool Occurrence(s) 3 x 1 x 4 x       Shift Total  (mL/kg)           700      Weight (kg) 81.5 81.5 81.5 81.5 81.5 81.5       Recommendations:  1. Patient needs and requests: NONE    2. Diet: CARDIAC PUREED WITH NECTAR THICK LIQUIDS    3. Pending tests/procedures: LABS     4. Functional Level/Equipment: WHEELCHAIR    5. Estimated Discharge Date: TBD Posted on Whiteboard in Patients Room: no     [facility] Safety Check    A safety check occurred in the patient's room between off going nurse and oncoming nurse listed above.     The safety check included the below items  Area Items   H  High Alert Medications - Verify all high alert medication drips (heparin, PCA, etc.)   E  Equipment - Suction is set up for ALL patients (with yanker)  - Red plugs utilized for all equipment (IV pumps, etc.)  - WOWs wiped down at end of shift.  - Room stocked with oxygen, suction, and other unit-specific supplies   A  Alarms - Bed alarm is set for fall risk patients  - Ensure chair alarm is in place and activated if patient is up in a chair   L  Lines - Check IV for any infiltration  - Syed bag is empty if patient has a Syed   - Tubing and IV bags are labeled   S  Safety   - Room is clean, patient is clean, and equipment is clean. - Hallways are clear from equipment besides carts. - Fall bracelet on for fall risk patients  - Ensure room is clear and free of clutter  - Suction is set up for ALL patients (with yanker)  - Hallways are clear from equipment besides carts.    - Isolation precautions followed, supplies available outside room, sign posted

## 2017-04-29 PROCEDURE — 97110 THERAPEUTIC EXERCISES: CPT

## 2017-04-29 PROCEDURE — 74011250637 HC RX REV CODE- 250/637: Performed by: INTERNAL MEDICINE

## 2017-04-29 PROCEDURE — 65310000000 HC RM PRIVATE REHAB

## 2017-04-29 PROCEDURE — 97530 THERAPEUTIC ACTIVITIES: CPT

## 2017-04-29 PROCEDURE — 74011250636 HC RX REV CODE- 250/636: Performed by: INTERNAL MEDICINE

## 2017-04-29 RX ADMIN — PANTOPRAZOLE SODIUM 40 MG: 40 GRANULE, DELAYED RELEASE ORAL at 06:33

## 2017-04-29 RX ADMIN — CHOLECALCIFEROL TAB 25 MCG (1000 UNIT) 2000 UNITS: 25 TAB at 09:12

## 2017-04-29 RX ADMIN — ASPIRIN 81 MG CHEWABLE TABLET 81 MG: 81 TABLET CHEWABLE at 09:12

## 2017-04-29 RX ADMIN — ATORVASTATIN CALCIUM 20 MG: 10 TABLET, FILM COATED ORAL at 20:56

## 2017-04-29 RX ADMIN — HEPARIN SODIUM 5000 UNITS: 5000 INJECTION, SOLUTION INTRAVENOUS; SUBCUTANEOUS at 17:30

## 2017-04-29 RX ADMIN — LEVOTHYROXINE SODIUM 150 MCG: 100 TABLET ORAL at 06:32

## 2017-04-29 RX ADMIN — CLOPIDOGREL BISULFATE 75 MG: 75 TABLET, FILM COATED ORAL at 17:30

## 2017-04-29 RX ADMIN — HEPARIN SODIUM 5000 UNITS: 5000 INJECTION, SOLUTION INTRAVENOUS; SUBCUTANEOUS at 06:33

## 2017-04-29 RX ADMIN — MULTIPLE VITAMIN LIQUID 30 ML: LIQUID at 09:12

## 2017-04-29 NOTE — PROGRESS NOTES
SHIFT CHANGE NOTE FOR [facility]    Bedside and Verbal shift change report given to PRAVEENA Francisco RN (oncoming nurse) by Chance Sher LPN   (offgoing nurse). Report included the following information SBAR, Kardex, MAR and Recent Results. Situation:   Code Status: Full Code   Reason for Admission: CVA  Hospital Day: 5   Problem List:   Hospital Problems  Date Reviewed: 4/26/2017          Codes Class Noted POA    Procedure refused ICD-10-CM: Z53.29  ICD-9-CM: V64.2  4/21/2017 Yes    Overview Signed 4/24/2017  3:28 PM by Magan Meyers MD     Speech Pathologist recommended NPO and PEG placement; Patient refused             * (Principal)Acute ischemic stroke [facility]) ICD-10-CM: I63.9  ICD-9-CM: 434.91  4/19/2017 Yes    Overview Signed 4/24/2017  3:15 PM by Magan Meyers MD     Acute Ischemic Stroke (acute to subacute ischemia involving the posterior limb of the left internal capsule, along the lateral aspect of the left thalamus) with residual right hemiparesis, dysphagia and dysarthria             Impaired mobility and ADLs ICD-10-CM: Z74.09  ICD-9-CM: 799.89  4/19/2017 Yes        Decreased calculated glomerular filtration rate (GFR) ICD-10-CM: R94.4  ICD-9-CM: 794.4  4/19/2017 Yes    Overview Addendum 4/24/2017  3:21 PM by Magan Meyers MD     Estimated calculated glomerular filtration rate equivalent to that of stage 3 chronic kidney disease = 30-59 ml/min             Dyslipidemia (high LDL; low HDL) (Chronic) ICD-10-CM: E78.4  ICD-9-CM: 272.4  4/19/2017 Yes    Overview Signed 4/24/2017  3:25 PM by Magan Meyers MD     Lipid profile (4/19/2017): , .  HDL 42, LDL 97             Hemiparesis affecting right side as late effect of cerebrovascular accident (CVA) ([facility] ICD-10-CM: Z73.082  ICD-9-CM: 438.20  4/19/2017 Yes        Dysphagia as late effect of cerebrovascular accident (CVA) ICD-10-CM: Y20.841  ICD-9-CM: 438.82  4/19/2017 Yes        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) ICD-10-CM: P98.515  ICD-9-CM: 438.13  4/19/2017 Yes              Background:   Past Medical History:   Past Medical History:   Diagnosis Date    Acute ischemic stroke ([facility] 4/19/2017    Acute Ischemic Stroke (acute to subacute ischemia involving the posterior limb of the left internal capsule, along the lateral aspect of the left thalamus) with residual right hemiparesis, dysphagia and dysarthria    Asbestosis ([facility]     Chronic obstructive pulmonary disease (COPD) ([facility]     CKD (chronic kidney disease) stage 3, GFR 30-59 ml/min     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  HDL 42, LDL 97    Dysphagia as late effect of cerebrovascular accident (CVA) 4/19/2017    Erectile dysfunction     Gastroesophageal reflux disease     Hemiparesis affecting right side as late effect of cerebrovascular accident (CVA) ([facility] 4/19/2017    History of endogenous hypertriglyceridemia     History of malignant neoplasm of prostate     [place] score 7     Hypothyroidism     Osteoarthrosis involving multiple sites     Procedure refused 4/21/2017    Speech Pathologist recommended NPO and PEG placement; Patient refused    Urinary incontinence     Male incontinence       Patient taking anticoagulants YES   Patient has a defibrillator: no     Assessment:   Changes in Assessment throughout shift: NONE              Last Vitals:     Vitals:    04/28/17 0743 04/28/17 1614 04/28/17 1930 04/29/17 0737   BP: 151/87 152/88 151/84 146/79   Pulse: 63 78 68 68   Resp: 16 16 18 18   Temp: 98.1 °F (36.7 °C) 96.7 °F (35.9 °C) 98.2 °F (36.8 °C) 97.6 °F (36.4 °C)   SpO2: 97% 98% 97% 93%   Weight:       Height:            PAIN    Pain Assessment    Pain Intensity 1: 0 (04/29/17 0600) Pain Intensity 1: 2 (12/29/14 1105)      Pain Location 1: Abdomen      Pain Intervention(s) 1: Medication (see MAR)  Patient Stated Pain Goal: 0 Patient Stated Pain Goal: 0  o Intervention effective: no    o Other actions taken for pain: NONE     Skin Assessment  Skin color Skin Color: Appropriate for ethnicity  Condition/Temperature Skin Condition/Temp: Dry, Warm  Integrity Skin Integrity: Intact  Turgor Turgor: Non-tenting  Weekly Pressure Ulcer Documentation  Pressure  Injury Documentation: No Pressure Injury Noted-Pressure Ulcer Prevention Initiated  Wound Prevention & Protection Methods  Orientation of wound Orientation of Wound Prevention: Posterior  Location of Prevention Location of Wound Prevention: Buttocks, Sacrum/Coccyx  Dressing Present Dressing Present : No  Dressing Status    Wound Offloading Wound Offloading (Prevention Methods): Bed, pressure reduction mattress     INTAKE/OUPUT    Date 04/28/17 0700 - 04/29/17 0659 04/29/17 0700 - 04/30/17 0659   Shift 9629-1266 9397-8373 24 Hour Total 0700-1859 1900-0659 24 Hour Total   I  N  T  A  K  E   P. O. 560  560         P. O. 560  560       Shift Total  (mL/kg) 560  (6.9)  560  (6.9)      O  U  T  P  U  T   Urine  (mL/kg/hr)            Urine Occurrence(s) 1 x 3 x 4 x       Stool            Stool Occurrence(s) 0 x 0 x 0 x       Shift Total  (mL/kg)           560      Weight (kg) 81.5 81.5 81.5 81.5 81.5 81.5       Recommendations:  1. Patient needs and requests: NONE    2. Diet: CARDIAC PUREED WITH NECTAR THICK LIQUIDS    3. Pending tests/procedures: LABS     4. Functional Level/Equipment: WHEELCHAIR    5. Estimated Discharge Date: TBD Posted on Whiteboard in Patients Room: [facility] Safety Check    A safety check occurred in the patient's room between off going nurse and oncoming nurse listed above.     The safety check included the below items  Area Items   H  High Alert Medications - Verify all high alert medication drips (heparin, PCA, etc.)   E  Equipment - Suction is set up for ALL patients (with estherker)  - Red plugs utilized for all equipment (IV pumps, etc.)  - WOWs wiped down at end of shift.  - Room stocked with oxygen, suction, and other unit-specific supplies   A  Alarms - Bed alarm is set for fall risk patients  - Ensure chair alarm is in place and activated if patient is up in a chair   L  Lines - Check IV for any infiltration  - Syed bag is empty if patient has a Syed   - Tubing and IV bags are labeled   S  Safety   - Room is clean, patient is clean, and equipment is clean. - Hallways are clear from equipment besides carts. - Fall bracelet on for fall risk patients  - Ensure room is clear and free of clutter  - Suction is set up for ALL patients (with yanker)  - Hallways are clear from equipment besides carts.    - Isolation precautions followed, supplies available outside room, sign posted

## 2017-04-29 NOTE — PROGRESS NOTES
SHIFT CHANGE NOTE FOR [facility]    Bedside and Verbal shift change report given to   ANN MCCLELLAND (oncoming nurse) by Alexandra Lyons LPN   (offgoing nurse). Report included the following information SBAR, Kardex, MAR and Recent Results. Situation:   Code Status: Full Code   Reason for Admission: CVA  Hospital Day: 4   Problem List:   Hospital Problems  Date Reviewed: 4/26/2017          Codes Class Noted POA    Procedure refused ICD-10-CM: Z53.29  ICD-9-CM: V64.2  4/21/2017 Yes    Overview Signed 4/24/2017  3:28 PM by Daria Carmona MD     Speech Pathologist recommended NPO and PEG placement; Patient refused             * (Principal)Acute ischemic stroke [facility]) ICD-10-CM: I63.9  ICD-9-CM: 434.91  4/19/2017 Yes    Overview Signed 4/24/2017  3:15 PM by Daria Carmona MD     Acute Ischemic Stroke (acute to subacute ischemia involving the posterior limb of the left internal capsule, along the lateral aspect of the left thalamus) with residual right hemiparesis, dysphagia and dysarthria             Impaired mobility and ADLs ICD-10-CM: Z74.09  ICD-9-CM: 799.89  4/19/2017 Yes        Decreased calculated glomerular filtration rate (GFR) ICD-10-CM: R94.4  ICD-9-CM: 794.4  4/19/2017 Yes    Overview Addendum 4/24/2017  3:21 PM by Daria Carmona MD     Estimated calculated glomerular filtration rate equivalent to that of stage 3 chronic kidney disease = 30-59 ml/min             Dyslipidemia (high LDL; low HDL) (Chronic) ICD-10-CM: E78.4  ICD-9-CM: 272.4  4/19/2017 Yes    Overview Signed 4/24/2017  3:25 PM by Daria Carmona MD     Lipid profile (4/19/2017): , .  HDL 42, LDL 97             Hemiparesis affecting right side as late effect of cerebrovascular accident (CVA) ([facility] ICD-10-CM: G57.955  ICD-9-CM: 438.20  4/19/2017 Yes        Dysphagia as late effect of cerebrovascular accident (CVA) ICD-10-CM: C80.905  ICD-9-CM: 438.82  4/19/2017 Yes        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) ICD-10-CM: Y11.893  ICD-9-CM: 438.13  4/19/2017 Yes              Background:   Past Medical History:   Past Medical History:   Diagnosis Date    Acute ischemic stroke ([facility] 4/19/2017    Acute Ischemic Stroke (acute to subacute ischemia involving the posterior limb of the left internal capsule, along the lateral aspect of the left thalamus) with residual right hemiparesis, dysphagia and dysarthria    [facility]     Chronic obstructive pulmonary disease (COPD) ([facility]     CKD (chronic kidney disease) stage 3, GFR 30-59 ml/min     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  HDL 42, LDL 97    Dysphagia as late effect of cerebrovascular accident (CVA) 4/19/2017    Erectile dysfunction     Gastroesophageal reflux disease     Hemiparesis affecting right side as late effect of cerebrovascular accident (CVA) ([facility] 4/19/2017    History of endogenous hypertriglyceridemia     History of malignant neoplasm of prostate     Genoveva score 7     Hypothyroidism     Osteoarthrosis involving multiple sites     Procedure refused 4/21/2017    Speech Pathologist recommended NPO and PEG placement; Patient refused    Urinary incontinence     Male incontinence       Patient taking anticoagulants YES   Patient has a defibrillator: no     Assessment:   Changes in Assessment throughout shift: NONE              Last Vitals:     Vitals:    04/27/17 1930 04/28/17 0743 04/28/17 1614 04/28/17 1930   BP: 155/85 151/87 152/88 151/84   Pulse: 63 63 78 68   Resp: 18 16 16 18   Temp: 98.2 °F (36.8 °C) 98.1 °F (36.7 °C) 96.7 °F (35.9 °C) 98.2 °F (36.8 °C)   SpO2: 99% 97% 98% 97%   Weight:       Height:            PAIN    Pain Assessment    Pain Intensity 1: 0 (04/28/17 2200) Pain Intensity 1: 2 (12/29/14 1105)      Pain Location 1: Abdomen      Pain Intervention(s) 1: Medication (see MAR)  Patient Stated Pain Goal: 0 Patient Stated Pain Goal: 0  o Intervention effective: no    o Other actions taken for pain: NONE     Skin Assessment  Skin color Skin Color: Appropriate for ethnicity  Condition/Temperature Skin Condition/Temp: Dry, Warm  Integrity Skin Integrity: Intact  Turgor Turgor: Non-tenting  Weekly Pressure Ulcer Documentation  Pressure  Injury Documentation: No Pressure Injury Noted-Pressure Ulcer Prevention Initiated  Wound Prevention & Protection Methods  Orientation of wound Orientation of Wound Prevention: Posterior  Location of Prevention Location of Wound Prevention: Buttocks, Sacrum/Coccyx  Dressing Present Dressing Present : No  Dressing Status    Wound Offloading Wound Offloading (Prevention Methods): Bed, pressure redistribution/air     INTAKE/OUPUT    Date 04/27/17 1900 - 04/28/17 0659 04/28/17 0700 - 04/29/17 0659   Shift 5076-0288 24 Hour Total 1673-2021 6177-6017 24 Hour Total   I  N  T  A  K  E   P. O.  700 560  560      P. O.  700 560  560    Shift Total  (mL/kg)  700  (8.6) 560  (6.9)  560  (6.9)   O  U  T  P  U  T   Urine  (mL/kg/hr)           Urine Occurrence(s) 4 x 12 x 1 x 0 x 1 x    Stool           Stool Occurrence(s) 1 x 4 x 0 x 0 x 0 x    Shift Total  (mL/kg)        NET  700 560  560   Weight (kg) 81.5 81.5 81.5 81.5 81.5       Recommendations:  1. Patient needs and requests: NONE    2. Diet: CARDIAC PUREED WITH NECTAR THICK LIQUIDS    3. Pending tests/procedures: LABS     4. Functional Level/Equipment: WHEELCHAIR    5. Estimated Discharge Date: TBD Posted on Whiteboard in Patients Room: no     [facility] Safety Check    A safety check occurred in the patient's room between off going nurse and oncoming nurse listed above.     The safety check included the below items  Area Items   H  High Alert Medications - Verify all high alert medication drips (heparin, PCA, etc.)   E  Equipment - Suction is set up for ALL patients (with ton)  - Red plugs utilized for all equipment (IV pumps, etc.)  - WOWs wiped down at end of shift.  - Room stocked with oxygen, suction, and other unit-specific supplies   A  Alarms - Bed alarm is set for fall risk patients  - Ensure chair alarm is in place and activated if patient is up in a chair   L  Lines - Check IV for any infiltration  - Syed bag is empty if patient has a Syed   - Tubing and IV bags are labeled   S  Safety   - Room is clean, patient is clean, and equipment is clean. - Hallways are clear from equipment besides carts. - Fall bracelet on for fall risk patients  - Ensure room is clear and free of clutter  - Suction is set up for ALL patients (with yanker)  - Hallways are clear from equipment besides carts.    - Isolation precautions followed, supplies available outside room, sign posted

## 2017-04-29 NOTE — PROGRESS NOTES
Problem: Self Care Deficits Care Plan (Adult)  Goal: *Acute Goals and Plan of Care (Insert Text)  Long Term Goals (to be met upon discharge date) in order to increase pts functional independence and safety, and decrease burden of care:  1. Pt will perform self-feeding with independence. 2. Pt will perform grooming with independence. 3. Pt will perform UB bathing with modified independence. 4. Pt will perform LB bathing with modified independence. 5. Pt will perform tshower transfer with modified independence. 6. Pt will perform UB dressing with independence. 7. Pt will perform LB dressing with modified independence. 8. Pt will perform toileting task with modified independence. 9. Pt will perform toilet transfer with modified independence. Short Term Weekly Goals for (2017 to 2017) in order to increase pts functional independence and safety, and decrease burden of care:  1. Pt will perform self-feeding with modified independence. 2. Pt will perform grooming with modified independence. 3. Pt will perform UB bathing with supervision/setup. 4. Pt will perform LB bathing with Min A.  5. Pt will perform shower transfer with Min A.  6. Pt will perform UB dressing with Min A.  7. Pt will perform LB dressing with Mod A.  8. Pt will perform toileting task with Mod A.  9. Pt will perform toilet transfer with Min A.   OCCUPATIONAL THERAPY DAILY NOTE  Patient Name:Stewart Bardales Ericka  Time Spent With Patient  Time In: 1000  Time Out: 1100  Patient Seen For[de-identified] Other (see progress notes)     Medical Diagnosis:  Left CVA  Acute ischemic stroke ([address] Acute ischemic stroke ([address]      Pain at start of tx: 0/10  Pain at stop of tx: 0/10     Patient identified with name and : Yes  Subjective: \"I don't know why I'm so tired. \" \"I clipped my fingernails yesterday and it's harder to  these coins. \"     Objective:      THERAPEUTIC ACTIVITY Daily Assessment    C with coins in slotted container, initially manipulation & translation and with repetition pincer pinch for individual coins. Increased time and cues to decrease compensatory strategy sliding coin to edge of table. THERAPEUTIC EXERCISE Daily Assessment   10' arm bike     Dowel#2 20 reps for shoulder flex/ext, horizontal abduction/adduction, rows, elbow flex/ext     2# free weight 20 reps for sup/pron, wrist flex/ext Requires 1 rest break during arm bike secondary to fatigue. MOBILITY/TRANSFERS Daily Assessment    W/C ambulation from patient room half way to gym. Fatigued end of ambulation. Assessment: Requires 1 rest break due to fatigue. Unable to manipulate & translate coins and requires cues to decrease compensatory strategy sliding coin to edge of table. Plan of Care: Continue per OT. Verena Stevens, OT  4/29/2017

## 2017-04-29 NOTE — PROGRESS NOTES
Progress Note    Patient: Samantha Andujar MRN: [id number]  CSN: [id number]    YOB: 1940  Age: 64 y.o. Sex: male    DOA: 4/24/2017 LOS:  LOS: 5 days                    Subjective:     Impaired Mobility and ADLs secondary to Acute Ischemic Stroke (acute to subacute ischemia involving the posterior limb of the left internal capsule, along the lateral aspect of the left thalamus) with residual right hemiparesis, dysphagia and dysarthria       Review of systems  General: No fevers or chills. Cardiovascular: No chest pain or pressure. No palpitations. Pulmonary: No shortness of breath, cough or wheeze. Gastrointestinal: No abdominal pain, nausea, vomiting or diarrhea. Genitourinary: No urinary frequency, urgency, hesitancy or dysuria. Musculoskeletal: No joint or muscle pain, no back pain, no recent trauma. Neurologic: No headache, numbness, tingling or weakness Rt side    Objective:     Physical Exam:  Visit Vitals    /79    Pulse (!) 58    Temp 97.6 °F (36.4 °C)    Resp 18    Ht 5' 10\" (1.778 m)    Wt 81.5 kg (179 lb 10.8 oz)    SpO2 99%    BMI 25.78 kg/m2        General:         Alert, cooperative, no acute distress    HEENT: NC, Atraumatic. PERRLA, anicteric sclerae. Lungs: CTA Bilaterally. No Wheezing/Rhonchi/Rales. Heart:  Regular  rhythm,  No murmur, No Rubs, No Gallops  Abdomen: Soft, Non distended, Non tender.  +Bowel sounds, no HSM  Extremities: No c/c/e  Psych:   Not anxious or agitated. Neurologic:  CN 2-12 grossly intact, Alert and oriented X 3. Weakness Rt arm and Rt leg      Intake and Output:  Current Shift:  04/29 0701 - 04/29 1900  In: 360 [P.O.:360]  Out: -   Last three shifts:  04/27 1901 - 04/29 0700  In: 560 [P.O.:560]  Out: -     Labs: Results:       Chemistry No results for input(s): GLU, NA, K, CL, CO2, BUN, CREA, CA, AGAP, BUCR, TBIL, GPT, AP, TP, ALB, GLOB, AGRAT in the last 72 hours.    CBC w/Diff Recent Labs      04/27/17   0616   HGB 14.2   HCT  40.5   PLT  222      Cardiac Enzymes No results for input(s): CPK, CKND1, ELLA in the last 72 hours. No lab exists for component: CKRMB, TROIP   Coagulation No results for input(s): PTP, INR, APTT in the last 72 hours. No lab exists for component: INREXT    Lipid Panel Lab Results   Component Value Date/Time    Cholesterol, total 162 04/19/2017 05:47 AM    HDL Cholesterol 42 04/19/2017 05:47 AM    LDL, calculated 97 04/19/2017 05:47 AM    Triglyceride 116 04/19/2017 05:47 AM    CHOL/HDL Ratio 3.9 04/19/2017 05:47 AM      BNP No results for input(s): BNPP in the last 72 hours. Liver Enzymes No results for input(s): TP, ALB, TBIL, AP, SGOT, GPT in the last 72 hours.     No lab exists for component: DBIL   Thyroid Studies Lab Results   Component Value Date/Time    TSH 0.07 10/28/2016 03:41 PM          Procedures/imaging: see electronic medical records for all procedures/Xrays and details which were not copied into this note but were reviewed prior to creation of Plan    Medications:   Current Facility-Administered Medications   Medication Dose Route Frequency    cholecalciferol (VITAMIN D3) tablet 2,000 Units  2,000 Units Oral DAILY    bisacodyl (DULCOLAX) suppository 10 mg  10 mg Rectal Q48H PRN    heparin (porcine) injection 5,000 Units  5,000 Units SubCUTAneous Q12H    acetaminophen (TYLENOL) solution 650 mg  650 mg Oral Q4H PRN    docusate sodium (COLACE) capsule 100 mg  100 mg Oral BID    aspirin chewable tablet 81 mg  81 mg Oral DAILY WITH BREAKFAST    clopidogrel (PLAVIX) tablet 75 mg  75 mg Oral DAILY WITH DINNER    atorvastatin (LIPITOR) tablet 20 mg  20 mg Oral QHS    pantoprazole (PROTONIX) granules for oral suspension 40 mg  40 mg Oral ACB    multivitamin (MULTI-DELYN, WELLESSE) oral liquid 30 mL  30 mL Oral DAILY    levothyroxine (SYNTHROID) tablet 150 mcg  150 mcg Oral ACB       Assessment/Plan     Principal Problem:    Acute ischemic stroke ([facility] (4/19/2017)      Overview: Acute Ischemic Stroke (acute to subacute ischemia involving the posterior       limb of the left internal capsule, along the lateral aspect of the left       thalamus) with residual right hemiparesis, dysphagia and dysarthria    Active Problems:    Impaired mobility and ADLs (4/19/2017)      Decreased calculated glomerular filtration rate (GFR) (4/19/2017)      Overview: Estimated calculated glomerular filtration rate equivalent to that of       stage 3 chronic kidney disease = 30-59 ml/min      Dyslipidemia (high LDL; low HDL) (4/19/2017)      Overview: Lipid profile (4/19/2017): , .  HDL 42, LDL 97      Hemiparesis affecting right side as late effect of cerebrovascular accident (CVA) ([facility] [address] (4/19/2017)      Dysphagia as late effect of cerebrovascular accident (CVA) (4/19/2017)      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) (4/19/2017)      Procedure refused (4/21/2017)      Overview: Speech Pathologist recommended NPO and PEG placement; Patient refused    plan  Continue ASA and plavix  Monitor cbc  Monitor oral intake h/o dysphagia  Aspiration precautions  F/u TSH level  Continue lipitor monitor kidney function    Jonathan Haynes MD  4/29/2017 4:09 PM

## 2017-04-30 PROCEDURE — 74011250636 HC RX REV CODE- 250/636: Performed by: INTERNAL MEDICINE

## 2017-04-30 PROCEDURE — 74011250637 HC RX REV CODE- 250/637: Performed by: INTERNAL MEDICINE

## 2017-04-30 PROCEDURE — 65310000000 HC RM PRIVATE REHAB

## 2017-04-30 RX ADMIN — CLOPIDOGREL BISULFATE 75 MG: 75 TABLET, FILM COATED ORAL at 17:41

## 2017-04-30 RX ADMIN — LEVOTHYROXINE SODIUM 150 MCG: 100 TABLET ORAL at 06:51

## 2017-04-30 RX ADMIN — ASPIRIN 81 MG CHEWABLE TABLET 81 MG: 81 TABLET CHEWABLE at 09:08

## 2017-04-30 RX ADMIN — MULTIPLE VITAMIN LIQUID 30 ML: LIQUID at 09:08

## 2017-04-30 RX ADMIN — ATORVASTATIN CALCIUM 20 MG: 10 TABLET, FILM COATED ORAL at 21:09

## 2017-04-30 RX ADMIN — HEPARIN SODIUM 5000 UNITS: 5000 INJECTION, SOLUTION INTRAVENOUS; SUBCUTANEOUS at 06:10

## 2017-04-30 RX ADMIN — PANTOPRAZOLE SODIUM 40 MG: 40 GRANULE, DELAYED RELEASE ORAL at 06:51

## 2017-04-30 RX ADMIN — CHOLECALCIFEROL TAB 25 MCG (1000 UNIT) 2000 UNITS: 25 TAB at 09:08

## 2017-04-30 RX ADMIN — DOCUSATE SODIUM 100 MG: 100 CAPSULE, LIQUID FILLED ORAL at 09:08

## 2017-04-30 RX ADMIN — HEPARIN SODIUM 5000 UNITS: 5000 INJECTION, SOLUTION INTRAVENOUS; SUBCUTANEOUS at 17:41

## 2017-04-30 NOTE — PROGRESS NOTES
Bedside shift change report given to Vandana Alexandra (oncoming nurse) by Emmanuelle Campoverde (offgoing nurse). Report included the following information SBAR.

## 2017-04-30 NOTE — PROGRESS NOTES
Progress Note    Patient: Ruma Fletcher MRN: [id number]  CSN: [id number]    YOB: 1940  Age: 64 y.o. Sex: male    DOA: 4/24/2017 LOS:  LOS: 6 days                    Subjective:     Impaired Mobility and ADLs secondary to Acute Ischemic Stroke (acute to subacute ischemia involving the posterior limb of the left internal capsule, along the lateral aspect of the left thalamus) with residual right hemiparesis, dysphagia and dysarthria       Review of systems  General: No fevers or chills. Cardiovascular: No chest pain or pressure. No palpitations. Pulmonary: No shortness of breath, cough or wheeze. Gastrointestinal: No abdominal pain, nausea, vomiting or diarrhea. Genitourinary: No urinary frequency, urgency, hesitancy or dysuria. Musculoskeletal: No joint or muscle pain, no back pain, no recent trauma. Neurologic: No headache, numbness, tingling or weakness Rt side    Objective:     Physical Exam:  Visit Vitals    /72    Pulse 67    Temp 96.8 °F (36 °C)    Resp 18    Ht 5' 10\" (1.778 m)    Wt 81.5 kg (179 lb 10.8 oz)    SpO2 99%    BMI 25.78 kg/m2        General:         Alert, cooperative, no acute distress    HEENT: NC, Atraumatic. PERRLA, anicteric sclerae. Lungs: CTA Bilaterally. No Wheezing/Rhonchi/Rales. Heart:  Regular  rhythm,  No murmur, No Rubs, No Gallops  Abdomen: Soft, Non distended, Non tender.  +Bowel sounds, no HSM  Extremities: No c/c/e  Psych:   Not anxious or agitated. Neurologic:  CN 2-12 grossly intact, Alert and oriented X 3. Weakness Rt arm and Rt leg      Intake and Output:  Current Shift:  04/30 0701 - 04/30 1900  In: 360 [P.O.:360]  Out: -   Last three shifts:  04/28 1901 - 04/30 0700  In: 540 [P.O.:540]  Out: -     Labs: Results:       Chemistry No results for input(s): GLU, NA, K, CL, CO2, BUN, CREA, CA, AGAP, BUCR, TBIL, GPT, AP, TP, ALB, GLOB, AGRAT in the last 72 hours.    CBC w/Diff No results for input(s): WBC, RBC, HGB, HCT, PLT, GRANS, LYMPH, EOS, HGBEXT, HCTEXT, PLTEXT, HGBEXT, HCTEXT, PLTEXT in the last 72 hours. Cardiac Enzymes No results for input(s): CPK, CKND1, ELLA in the last 72 hours. No lab exists for component: CKRMB, TROIP   Coagulation No results for input(s): PTP, INR, APTT in the last 72 hours. No lab exists for component: INREXT, INREXT    Lipid Panel Lab Results   Component Value Date/Time    Cholesterol, total 162 04/19/2017 05:47 AM    HDL Cholesterol 42 04/19/2017 05:47 AM    LDL, calculated 97 04/19/2017 05:47 AM    Triglyceride 116 04/19/2017 05:47 AM    CHOL/HDL Ratio 3.9 04/19/2017 05:47 AM      BNP No results for input(s): BNPP in the last 72 hours. Liver Enzymes No results for input(s): TP, ALB, TBIL, AP, SGOT, GPT in the last 72 hours.     No lab exists for component: DBIL   Thyroid Studies Lab Results   Component Value Date/Time    TSH 0.07 10/28/2016 03:41 PM          Procedures/imaging: see electronic medical records for all procedures/Xrays and details which were not copied into this note but were reviewed prior to creation of Plan    Medications:   Current Facility-Administered Medications   Medication Dose Route Frequency    cholecalciferol (VITAMIN D3) tablet 2,000 Units  2,000 Units Oral DAILY    bisacodyl (DULCOLAX) suppository 10 mg  10 mg Rectal Q48H PRN    heparin (porcine) injection 5,000 Units  5,000 Units SubCUTAneous Q12H    acetaminophen (TYLENOL) solution 650 mg  650 mg Oral Q4H PRN    docusate sodium (COLACE) capsule 100 mg  100 mg Oral BID    aspirin chewable tablet 81 mg  81 mg Oral DAILY WITH BREAKFAST    clopidogrel (PLAVIX) tablet 75 mg  75 mg Oral DAILY WITH DINNER    atorvastatin (LIPITOR) tablet 20 mg  20 mg Oral QHS    pantoprazole (PROTONIX) granules for oral suspension 40 mg  40 mg Oral ACB    multivitamin (MULTI-DELYN, WELLESSE) oral liquid 30 mL  30 mL Oral DAILY    levothyroxine (SYNTHROID) tablet 150 mcg  150 mcg Oral ACB       Assessment/Plan     Principal Problem:    [facility] (4/19/2017)      Overview: Acute Ischemic Stroke (acute to subacute ischemia involving the posterior       limb of the left internal capsule, along the lateral aspect of the left       thalamus) with residual right hemiparesis, dysphagia and dysarthria    Active Problems:    Impaired mobility and ADLs (4/19/2017)      Decreased calculated glomerular filtration rate (GFR) (4/19/2017)      Overview: Estimated calculated glomerular filtration rate equivalent to that of       stage 3 chronic kidney disease = 30-59 ml/min      Dyslipidemia (high LDL; low HDL) (4/19/2017)      Overview: Lipid profile (4/19/2017): , .  HDL 42, LDL 97      Hemiparesis affecting right side as late effect of cerebrovascular accident (CVA) ([facility] [facility] (4/19/2017)      Dysphagia as late effect of cerebrovascular accident (CVA) (4/19/2017)      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) (4/19/2017)      Procedure refused (4/21/2017)      Overview: Speech Pathologist recommended NPO and PEG placement; Patient refused    plan  Continue ASA and plavix  Monitor cbc will order for tomorrow  Monitor oral intake h/o dysphagia  Aspiration precautions  F/u TSH level  Continue lipitor monitor kidney function  Cbc, cmp, TSH in AM    Frandy Barbosa MD  4/30/2017 4:10 PM

## 2017-04-30 NOTE — PROGRESS NOTES
SHIFT CHANGE NOTE FOR [facility]    Bedside and Verbal shift change report given to Franky Hart RN(oncoming nurse) by Richelle Sim RN   (offgoing nurse). Report included the following information SBAR, Kardex, MAR and Recent Results. Situation:   Code Status: Full Code   Reason for Admission: CVA  Hospital Day: 6   Problem List:   Hospital Problems  Date Reviewed: 4/26/2017          Codes Class Noted POA    Procedure refused ICD-10-CM: Z53.29  ICD-9-CM: V64.2  4/21/2017 Yes    Overview Signed 4/24/2017  3:28 PM by Laurent Reynoso MD     Speech Pathologist recommended NPO and PEG placement; Patient refused             * (Principal)Acute ischemic stroke [facility]) ICD-10-CM: I63.9  ICD-9-CM: 434.91  4/19/2017 Yes    Overview Signed 4/24/2017  3:15 PM by Laurent Reynoso MD     Acute Ischemic Stroke (acute to subacute ischemia involving the posterior limb of the left internal capsule, along the lateral aspect of the left thalamus) with residual right hemiparesis, dysphagia and dysarthria             Impaired mobility and ADLs ICD-10-CM: Z74.09  ICD-9-CM: 799.89  4/19/2017 Yes        Decreased calculated glomerular filtration rate (GFR) ICD-10-CM: R94.4  ICD-9-CM: 794.4  4/19/2017 Yes    Overview Addendum 4/24/2017  3:21 PM by Laurent Reynoso MD     Estimated calculated glomerular filtration rate equivalent to that of stage 3 chronic kidney disease = 30-59 ml/min             Dyslipidemia (high LDL; low HDL) (Chronic) ICD-10-CM: E78.4  ICD-9-CM: 272.4  4/19/2017 Yes    Overview Signed 4/24/2017  3:25 PM by Laurent Reynoso MD     Lipid profile (4/19/2017): , .  HDL 42, LDL 97             Hemiparesis affecting right side as late effect of cerebrovascular accident (CVA) ([facility] ICD-10-CM: E33.076  ICD-9-CM: 438.20  4/19/2017 Yes        Dysphagia as late effect of cerebrovascular accident (CVA) ICD-10-CM: H88.623  ICD-9-CM: 438.82  4/19/2017 Yes        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) ICD-10-CM: W35.436  ICD-9-CM: 438.13  4/19/2017 Yes              Background:   Past Medical History:   Past Medical History:   Diagnosis Date    Acute ischemic stroke ([facility] 4/19/2017    Acute Ischemic Stroke (acute to subacute ischemia involving the posterior limb of the left internal capsule, along the lateral aspect of the left thalamus) with residual right hemiparesis, dysphagia and dysarthria    Asbestosis ([facility]     Chronic obstructive pulmonary disease (COPD) ([facility]     CKD (chronic kidney disease) stage 3, GFR 30-59 ml/min     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  HDL 42, LDL 97    Dysphagia as late effect of cerebrovascular accident (CVA) 4/19/2017    Erectile dysfunction     Gastroesophageal reflux disease     Hemiparesis affecting right side as late effect of cerebrovascular accident (CVA) ([facility] 4/19/2017    History of endogenous hypertriglyceridemia     History of malignant neoplasm of prostate     Genoveva score 7     Hypothyroidism     Osteoarthrosis involving multiple sites     Procedure refused 4/21/2017    Speech Pathologist recommended NPO and PEG placement; Patient refused    Urinary incontinence     Male incontinence       Patient taking anticoagulants YES   Patient has a defibrillator: no     Assessment:   Changes in Assessment throughout shift: NONE              Last Vitals:     Vitals:    04/28/17 1930 04/29/17 0737 04/29/17 1604 04/29/17 2002   BP: 151/84 146/79 135/79 150/80   Pulse: 68 68 (!) 58 69   Resp: 18 18 18 18   Temp: 98.2 °F (36.8 °C) 97.6 °F (36.4 °C) 97.6 °F (36.4 °C) 98.3 °F (36.8 °C)   SpO2: 97% 93% 99% 98%   Weight:       Height:            PAIN    Pain Assessment    Pain Intensity 1: 0 (04/30/17 0400) Pain Intensity 1: 2 (12/29/14 1105)      Pain Location 1: Abdomen      Pain Intervention(s) 1: Medication (see MAR)  Patient Stated Pain Goal: 0 Patient Stated Pain Goal: 0  o Intervention effective: no    o Other actions taken for pain: NONE     Skin Assessment  Skin color Skin Color: Appropriate for ethnicity  Condition/Temperature Skin Condition/Temp: Dry, Warm  Integrity Skin Integrity: Intact  Turgor Turgor: Non-tenting  Weekly Pressure Ulcer Documentation  Pressure  Injury Documentation: No Pressure Injury Noted-Pressure Ulcer Prevention Initiated  Wound Prevention & Protection Methods  Orientation of wound Orientation of Wound Prevention: Posterior  Location of Prevention Location of Wound Prevention: Sacrum/Coccyx  Dressing Present Dressing Present : No  Dressing Status    Wound Offloading Wound Offloading (Prevention Methods): Bed, pressure redistribution/air     INTAKE/OUPUT    Date 04/29/17 0700 - 04/30/17 0659 04/30/17 0700 - 05/01/17 0659   Shift 6347-0953 8135-6007 24 Hour Total 0700-1859 1900-0659 24 Hour Total   I  N  T  A  K  E   P. O. 540  540         P. O. 540  540       Shift Total  (mL/kg) 540  (6.6)  540  (6.6)      O  U  T  P  U  T   Urine  (mL/kg/hr)            Urine Occurrence(s) 2 x 3 x 5 x       Stool            Stool Occurrence(s) 1 x 0 x 1 x       Shift Total  (mL/kg)           540      Weight (kg) 81.5 81.5 81.5 81.5 81.5 81.5       Recommendations:  1. Patient needs and requests: NONE    2. Diet: CARDIAC PUREED WITH NECTAR THICK LIQUIDS    3. Pending tests/procedures: LABS     4. Functional Level/Equipment: WHEELCHAIR    5. Estimated Discharge Date: TBD Posted on Whiteboard in Patients Room: [facility] Safety Check    A safety check occurred in the patient's room between off going nurse and oncoming nurse listed above.     The safety check included the below items  Area Items   H  High Alert Medications - Verify all high alert medication drips (heparin, PCA, etc.)   E  Equipment - Suction is set up for ALL patients (with yanker)  - Red plugs utilized for all equipment (IV pumps, etc.)  - WOWs wiped down at end of shift.  - Room stocked with oxygen, suction, and other unit-specific supplies   A  Alarms - Bed alarm is set for fall risk patients  - Ensure chair alarm is in place and activated if patient is up in a chair   L  Lines - Check IV for any infiltration  - Syed bag is empty if patient has a Syed   - Tubing and IV bags are labeled   S  Safety   - Room is clean, patient is clean, and equipment is clean. - Hallways are clear from equipment besides carts. - Fall bracelet on for fall risk patients  - Ensure room is clear and free of clutter  - Suction is set up for ALL patients (with yanker)  - Hallways are clear from equipment besides carts.    - Isolation precautions followed, supplies available outside room, sign posted

## 2017-05-01 LAB
ALBUMIN SERPL BCP-MCNC: 3.8 G/DL (ref 3.4–5)
ALBUMIN/GLOB SERPL: 0.8 {RATIO} (ref 0.8–1.7)
ALP SERPL-CCNC: 88 U/L (ref 45–117)
ALT SERPL-CCNC: 26 U/L (ref 16–61)
ANION GAP BLD CALC-SCNC: 9 MMOL/L (ref 3–18)
AST SERPL W P-5'-P-CCNC: 21 U/L (ref 15–37)
BASOPHILS # BLD AUTO: 0 K/UL (ref 0–0.06)
BASOPHILS # BLD: 0 % (ref 0–2)
BILIRUB SERPL-MCNC: 0.8 MG/DL (ref 0.2–1)
BUN SERPL-MCNC: 13 MG/DL (ref 7–18)
BUN/CREAT SERPL: 9 (ref 12–20)
CALCIUM SERPL-MCNC: 9.4 MG/DL (ref 8.5–10.1)
CHLORIDE SERPL-SCNC: 97 MMOL/L (ref 100–108)
CO2 SERPL-SCNC: 26 MMOL/L (ref 21–32)
CREAT SERPL-MCNC: 1.49 MG/DL (ref 0.6–1.3)
DIFFERENTIAL METHOD BLD: ABNORMAL
EOSINOPHIL # BLD: 0.2 K/UL (ref 0–0.4)
EOSINOPHIL NFR BLD: 2 % (ref 0–5)
ERYTHROCYTE [DISTWIDTH] IN BLOOD BY AUTOMATED COUNT: 13.8 % (ref 11.6–14.5)
GLOBULIN SER CALC-MCNC: 4.5 G/DL (ref 2–4)
GLUCOSE SERPL-MCNC: 114 MG/DL (ref 74–99)
HCT VFR BLD AUTO: 43.4 % (ref 36–48)
HGB BLD-MCNC: 15 G/DL (ref 13–16)
LYMPHOCYTES # BLD AUTO: 30 % (ref 21–52)
LYMPHOCYTES # BLD: 2.9 K/UL (ref 0.9–3.6)
MCH RBC QN AUTO: 28.8 PG (ref 24–34)
MCHC RBC AUTO-ENTMCNC: 34.6 G/DL (ref 31–37)
MCV RBC AUTO: 83.5 FL (ref 74–97)
MONOCYTES # BLD: 1 K/UL (ref 0.05–1.2)
MONOCYTES NFR BLD AUTO: 11 % (ref 3–10)
NEUTS SEG # BLD: 5.5 K/UL (ref 1.8–8)
NEUTS SEG NFR BLD AUTO: 57 % (ref 40–73)
PLATELET # BLD AUTO: 264 K/UL (ref 135–420)
PMV BLD AUTO: 11.1 FL (ref 9.2–11.8)
POTASSIUM SERPL-SCNC: 4 MMOL/L (ref 3.5–5.5)
PROT SERPL-MCNC: 8.3 G/DL (ref 6.4–8.2)
RBC # BLD AUTO: 5.2 M/UL (ref 4.7–5.5)
SODIUM SERPL-SCNC: 132 MMOL/L (ref 136–145)
TSH SERPL DL<=0.05 MIU/L-ACNC: 3.93 UIU/ML (ref 0.36–3.74)
WBC # BLD AUTO: 9.7 K/UL (ref 4.6–13.2)

## 2017-05-01 PROCEDURE — 65310000000 HC RM PRIVATE REHAB

## 2017-05-01 PROCEDURE — 92526 ORAL FUNCTION THERAPY: CPT

## 2017-05-01 PROCEDURE — 74011250637 HC RX REV CODE- 250/637: Performed by: INTERNAL MEDICINE

## 2017-05-01 PROCEDURE — 74011250636 HC RX REV CODE- 250/636: Performed by: INTERNAL MEDICINE

## 2017-05-01 PROCEDURE — 36415 COLL VENOUS BLD VENIPUNCTURE: CPT | Performed by: FAMILY MEDICINE

## 2017-05-01 PROCEDURE — 97530 THERAPEUTIC ACTIVITIES: CPT

## 2017-05-01 PROCEDURE — 80053 COMPREHEN METABOLIC PANEL: CPT | Performed by: FAMILY MEDICINE

## 2017-05-01 PROCEDURE — 97112 NEUROMUSCULAR REEDUCATION: CPT

## 2017-05-01 PROCEDURE — 92507 TX SP LANG VOICE COMM INDIV: CPT

## 2017-05-01 PROCEDURE — 84443 ASSAY THYROID STIM HORMONE: CPT | Performed by: FAMILY MEDICINE

## 2017-05-01 PROCEDURE — 85025 COMPLETE CBC W/AUTO DIFF WBC: CPT | Performed by: FAMILY MEDICINE

## 2017-05-01 PROCEDURE — 97116 GAIT TRAINING THERAPY: CPT

## 2017-05-01 RX ADMIN — HEPARIN SODIUM 5000 UNITS: 5000 INJECTION, SOLUTION INTRAVENOUS; SUBCUTANEOUS at 05:58

## 2017-05-01 RX ADMIN — DOCUSATE SODIUM 100 MG: 100 CAPSULE, LIQUID FILLED ORAL at 18:13

## 2017-05-01 RX ADMIN — ATORVASTATIN CALCIUM 20 MG: 10 TABLET, FILM COATED ORAL at 21:58

## 2017-05-01 RX ADMIN — MULTIPLE VITAMIN LIQUID 30 ML: LIQUID at 09:18

## 2017-05-01 RX ADMIN — PANTOPRAZOLE SODIUM 40 MG: 40 GRANULE, DELAYED RELEASE ORAL at 06:32

## 2017-05-01 RX ADMIN — ASPIRIN 81 MG CHEWABLE TABLET 81 MG: 81 TABLET CHEWABLE at 09:17

## 2017-05-01 RX ADMIN — LEVOTHYROXINE SODIUM 150 MCG: 100 TABLET ORAL at 06:32

## 2017-05-01 RX ADMIN — DOCUSATE SODIUM 100 MG: 100 CAPSULE, LIQUID FILLED ORAL at 09:18

## 2017-05-01 RX ADMIN — CHOLECALCIFEROL TAB 25 MCG (1000 UNIT) 2000 UNITS: 25 TAB at 09:17

## 2017-05-01 RX ADMIN — HEPARIN SODIUM 5000 UNITS: 5000 INJECTION, SOLUTION INTRAVENOUS; SUBCUTANEOUS at 18:13

## 2017-05-01 RX ADMIN — CLOPIDOGREL BISULFATE 75 MG: 75 TABLET, FILM COATED ORAL at 18:13

## 2017-05-01 NOTE — PROGRESS NOTES
SHIFT CHANGE NOTE FOR [facility]    Bedside and Verbal shift change report given to Omi Ribeiro LPN (oncoming nurse) by Vikki Cadena RN   (offgoing nurse). Report included the following information SBAR, Kardex, MAR and Recent Results. Situation:   Code Status: Full Code   Reason for Admission: CVA  Hospital Day: 7   Problem List:   Hospital Problems  Date Reviewed: 4/26/2017          Codes Class Noted POA    Procedure refused ICD-10-CM: Z53.29  ICD-9-CM: V64.2  4/21/2017 Yes    Overview Signed 4/24/2017  3:28 PM by Kermit Cassidy MD     Speech Pathologist recommended NPO and PEG placement; Patient refused             * (Principal)Acute ischemic stroke [facility]) ICD-10-CM: I63.9  ICD-9-CM: 434.91  4/19/2017 Yes    Overview Signed 4/24/2017  3:15 PM by Kermit Cassidy MD     Acute Ischemic Stroke (acute to subacute ischemia involving the posterior limb of the left internal capsule, along the lateral aspect of the left thalamus) with residual right hemiparesis, dysphagia and dysarthria             Impaired mobility and ADLs ICD-10-CM: Z74.09  ICD-9-CM: 799.89  4/19/2017 Yes        Decreased calculated glomerular filtration rate (GFR) ICD-10-CM: R94.4  ICD-9-CM: 794.4  4/19/2017 Yes    Overview Addendum 4/24/2017  3:21 PM by Kermit Cassidy MD     Estimated calculated glomerular filtration rate equivalent to that of stage 3 chronic kidney disease = 30-59 ml/min             Dyslipidemia (high LDL; low HDL) (Chronic) ICD-10-CM: E78.4  ICD-9-CM: 272.4  4/19/2017 Yes    Overview Signed 4/24/2017  3:25 PM by Kermit Cassidy MD     Lipid profile (4/19/2017): , .  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) ICD-10-CM: N53.521  ICD-9-CM: 438.13  4/19/2017 Yes              Background:   Past Medical History:   Past Medical History:   Diagnosis Date    Acute ischemic stroke ([facility] 4/19/2017    Acute Ischemic Stroke (acute to subacute ischemia involving the posterior limb of the left internal capsule, along the lateral aspect of the left thalamus) with residual right hemiparesis, dysphagia and dysarthria    [facility]     Chronic obstructive pulmonary disease (COPD) ([facility]     CKD (chronic kidney disease) stage 3, GFR 30-59 ml/min     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  HDL 42, LDL 97    Dysphagia as late effect of cerebrovascular accident (CVA) 4/19/2017    Erectile dysfunction     Gastroesophageal reflux disease     Hemiparesis affecting right side as late effect of cerebrovascular accident (CVA) ([facility] 4/19/2017    History of endogenous hypertriglyceridemia     History of malignant neoplasm of prostate     Genoveva score 7     Hypothyroidism     Osteoarthrosis involving multiple sites     Procedure refused 4/21/2017    Speech Pathologist recommended NPO and PEG placement; Patient refused    Urinary incontinence     Male incontinence       Patient taking anticoagulants YES   Patient has a defibrillator: no     Assessment:   Changes in Assessment throughout shift: NONE              Last Vitals:     Vitals:    04/29/17 2002 04/30/17 0832 04/30/17 1628 04/30/17 2000   BP: 150/80 122/72 (!) 167/92 134/79   Pulse: 69 67 64 66   Resp: 18 18 18 18   Temp: 98.3 °F (36.8 °C) 96.8 °F (36 °C) 96 °F (35.6 °C) 97.8 °F (36.6 °C)   SpO2: 98% 99% 95% 97%   Weight:       Height:            PAIN    Pain Assessment    Pain Intensity 1: 0 (05/01/17 0400) Pain Intensity 1: 2 (12/29/14 1105)      Pain Location 1: Abdomen      Pain Intervention(s) 1: Medication (see MAR)  Patient Stated Pain Goal: 0 Patient Stated Pain Goal: 0  o Intervention effective: no    o Other actions taken for pain: NONE     Skin Assessment  Skin color Skin Color: Appropriate for ethnicity  Condition/Temperature Skin Condition/Temp: Dry, Warm  Integrity Skin Integrity: Intact  Turgor Turgor: Epidermis thin w/ loss of subcut tissue  Weekly Pressure Ulcer Documentation  Pressure  Injury Documentation: No Pressure Injury Noted-Pressure Ulcer Prevention Initiated  Wound Prevention & Protection Methods  Orientation of wound Orientation of Wound Prevention: Posterior  Location of Prevention Location of Wound Prevention: Sacrum/Coccyx  Dressing Present Dressing Present : No  Dressing Status    Wound Offloading Wound Offloading (Prevention Methods): Bed, pressure redistribution/air     INTAKE/OUPUT    Date 04/30/17 0700 - 05/01/17 0659 05/01/17 0700 - 05/02/17 0659   Shift 5067-6041 9791-3245 24 Hour Total 0700-1859 1900-0659 24 Hour Total   I  N  T  A  K  E   P. O. 540  540         P. O. 540  540       Shift Total  (mL/kg) 540  (6.6)  540  (6.6)      O  U  T  P  U  T   Urine  (mL/kg/hr)            Urine Occurrence(s) 4 x 6 x 10 x       Stool            Stool Occurrence(s) 1 x 0 x 1 x       Shift Total  (mL/kg)           540      Weight (kg) 81.5 81.5 81.5 81.5 81.5 81.5       Recommendations:  1. Patient needs and requests: NONE    2. Diet: CARDIAC PUREED WITH NECTAR THICK LIQUIDS    3. Pending tests/procedures: LABS     4. Functional Level/Equipment: WHEELCHAIR    5. Estimated Discharge Date: TBD Posted on Whiteboard in Patients Room: [facility] Safety Check    A safety check occurred in the patient's room between off going nurse and oncoming nurse listed above.     The safety check included the below items  Area Items   H  High Alert Medications - Verify all high alert medication drips (heparin, PCA, etc.)   E  Equipment - Suction is set up for ALL patients (with ton)  - Red plugs utilized for all equipment (IV pumps, etc.)  - WOWs wiped down at end of shift.  - Room stocked with oxygen, suction, and other unit-specific supplies   A  Alarms - Bed alarm is set for fall risk patients  - Ensure chair alarm is in place and activated if patient is up in a chair   L  Lines - Check IV for any infiltration  - Syed bag is empty if patient has a Syed   - Tubing and IV bags are labeled   S  Safety   - Room is clean, patient is clean, and equipment is clean. - Hallways are clear from equipment besides carts. - Fall bracelet on for fall risk patients  - Ensure room is clear and free of clutter  - Suction is set up for ALL patients (with yanker)  - Hallways are clear from equipment besides carts.    - Isolation precautions followed, supplies available outside room, sign posted

## 2017-05-01 NOTE — PROGRESS NOTES
[facility] PHYSICAL REHABILITATION  [address] [place] [address], [address]     INPATIENT REHABILITATION  DAILY PROGRESS NOTE     Date: 5/1/2017    Name: Sudha Dumas Age / Sex: 64 y.o. / male   CSN: [id number] MRN: [id number]   [facility] Date: 4/24/2017 Length of Stay: 7 days     Primary Rehab Diagnosis: Impaired Mobility and ADLs secondary to Acute Ischemic Stroke (acute to subacute ischemia involving the posterior limb of the left internal capsule, along the lateral aspect of the left thalamus) with residual right hemiparesis, dysphagia and dysarthria       Subjective:     Patient in NAD      Objective:     Vital Signs:  Patient Vitals for the past 24 hrs:   BP Temp Pulse Resp SpO2   05/01/17 1724 146/81 97.9 °F (36.6 °C) 71 18 99 %   05/01/17 0800 138/77 98.1 °F (36.7 °C) 67 19 98 %   04/30/17 2000 134/79 97.8 °F (36.6 °C) 66 18 97 %        General:  Awake, alert  Cardiovascular:  S1S2+, RRR  Pulmonary:  CTA b/l  GI:  Soft, BS+, NT, ND  Extremities:  No edema  Right Hemiparesis, dysarthria      Current Medications:  Current Facility-Administered Medications   Medication Dose Route Frequency    cholecalciferol (VITAMIN D3) tablet 2,000 Units  2,000 Units Oral DAILY    bisacodyl (DULCOLAX) suppository 10 mg  10 mg Rectal Q48H PRN    heparin (porcine) injection 5,000 Units  5,000 Units SubCUTAneous Q12H    acetaminophen (TYLENOL) solution 650 mg  650 mg Oral Q4H PRN    docusate sodium (COLACE) capsule 100 mg  100 mg Oral BID    aspirin chewable tablet 81 mg  81 mg Oral DAILY WITH BREAKFAST    clopidogrel (PLAVIX) tablet 75 mg  75 mg Oral DAILY WITH DINNER    atorvastatin (LIPITOR) tablet 20 mg  20 mg Oral QHS    pantoprazole (PROTONIX) granules for oral suspension 40 mg  40 mg Oral ACB    multivitamin (MULTI-DELYN, WELLESSE) oral liquid 30 mL  30 mL Oral DAILY    levothyroxine (SYNTHROID) tablet 150 mcg  150 mcg Oral ACB       Allergies:   Allergies   Allergen Reactions    Pcn [Penicillins] Rash       Functional Progress:    PHYSICAL THERAPY    ON ADMISSION MOST RECENT   Wheelchair Mobility/Management  Able to Propel (ft): 70 feet (verbal cueing for technique)  Functional Level: 2  Curbs/Ramps Assist Required (FIM Score): 0 (Not tested)  Wheelchair Setup Assist Required : 2 (Maximal assistance)  Wheelchair Management: Manages left brake, Manages right brake Wheelchair Mobility/Management  Able to Propel (ft): 255 feet  Functional Level: 6  Curbs/Ramps Assist Required (FIM Score): 0 (Not tested)  Wheelchair Setup Assist Required : 2 (Maximal assistance)  Wheelchair Management: Manages left brake, Manages right brake     Gait  Amount of Assistance: 2 (Maximal assistance) (2/2 R LOB)  Distance (ft): 5 Feet (ft)  Assistive Device: Gait belt Gait  Amount of Assistance: 3 (Moderate assistance) (surinder x 1 for posture cues; mod a for LOB x 2 in session)  Distance (ft): 250 Feet (ft)  Assistive Device: Gait belt     Balance-Sitting/Standing  Sitting - Static: Good (unsupported)  Sitting - Dynamic: Good (unsupported)  Standing - Static: Fair  Standing - Dynamic : Impaired Balance-Sitting/Standing  Sitting - Static: Good (unsupported)  Sitting - Dynamic: Good (unsupported)  Standing - Static: Fair  Standing - Dynamic : Impaired     Bed/Mat Mobility  Rolling Right : 5 (Supervision)  Rolling Left : 5 (Supervision)  Supine to Sit : 5 (Supervision)  Sit to Supine : 5 (Supervision) Bed/Mat Mobility  Rolling Right : 6 (Modified independent)  Rolling Left : 6 (Modified independent)  Supine to Sit : 6 (Modified independent)  Sit to Supine : 6 (Modified independent)     Transfers  Transfer Type: Other  Other: Patient performs stand step transfer with moderate assist from PT for anterior approach R LE knee block with loading 2/2 hx of buckling, trunk stability, pivot assist, and controlled lowering.  Patient demonstrates increased momentum utilization for STS transfer and R genu recurvatum requiring verbal cueing to crrect these; pacing cues successful, however genu recurvatum requires tactile cueing with physical assist to prevent buckling. Patient demosntrates pass retract technique with R LE advancement as well as anterior COG placement to attempts to maintain TKE 2/2 functional quad weakness. Transfer Assistance : 3 (Moderate assistance )  Sit to Stand Assistance: Moderate assistance  Car Transfers: Not tested  Car Type: NA Transfers  Transfer Type: Other  Other: Patient performs stand step transfer x 8 in session CGA level with B UE over R distal femur to promote increased weightbearing, increased motor return, and prevent over-utilization of unaffected side. Patient performs this activity with surinder x 1 to assist with maintaining anterior COG and additionally requires verbal cueing for improved pacing and prevention of rocking compensation. Patient required to utilize slow progression to increased gluteal loading and focus on smooth concentric/eccentric contraction. Patient performs STS trials as noted above x 15 reps for 2 sets in session. The patient then performs car transfer training with stand step technique also at [facility] initially with door management assist x 3 trials progressing to self management of door with continued CGA and increased v/cing to promote proper gait pattern. In addition, the patient performs slow progression L LE stepping up on 4\" box with heel down stepping stone press focusing on slow progression x 20 trials with surinder x 1 for trunk stability. Patient then progresses to alternating steps with stepping stone press as previously noted x 20 reps each; increased smoothness of motion and improved pacing noted with repetition.   Transfer Assistance : 4 (Minimal assistance)  Sit to Stand Assistance: Minimal assistance  Car Transfers: Minimum assistance  Car Type: car simulator     Steps or Stairs  Steps/Stairs Ambulated (#): 0  Level of Assist : 0 (Not tested)  Rail Use:  (NA) Steps or Stairs  Steps/Stairs Ambulated (#): 0  Level of Assist : 0 (Not tested)  Rail Use:  (NA)         Lab/Data Review:  Recent Results (from the past 24 hour(s))   CBC WITH AUTOMATED DIFF    Collection Time: 05/01/17  6:35 AM   Result Value Ref Range    WBC 9.7 4.6 - 13.2 K/uL    RBC 5.20 4.70 - 5.50 M/uL    HGB 15.0 13.0 - 16.0 g/dL    HCT 43.4 36.0 - 48.0 %    MCV 83.5 74.0 - 97.0 FL    MCH 28.8 24.0 - 34.0 PG    MCHC 34.6 31.0 - 37.0 g/dL    RDW 13.8 11.6 - 14.5 %    PLATELET 935 531 - 992 K/uL    MPV 11.1 9.2 - 11.8 FL    NEUTROPHILS 57 40 - 73 %    LYMPHOCYTES 30 21 - 52 %    MONOCYTES 11 (H) 3 - 10 %    EOSINOPHILS 2 0 - 5 %    BASOPHILS 0 0 - 2 %    ABS. NEUTROPHILS 5.5 1.8 - 8.0 K/UL    ABS. LYMPHOCYTES 2.9 0.9 - 3.6 K/UL    ABS. MONOCYTES 1.0 0.05 - 1.2 K/UL    ABS. EOSINOPHILS 0.2 0.0 - 0.4 K/UL    ABS. BASOPHILS 0.0 0.0 - 0.06 K/UL    DF AUTOMATED     METABOLIC PANEL, COMPREHENSIVE    Collection Time: 05/01/17  6:35 AM   Result Value Ref Range    Sodium 132 (L) 136 - 145 mmol/L    Potassium 4.0 3.5 - 5.5 mmol/L    Chloride 97 (L) 100 - 108 mmol/L    CO2 26 21 - 32 mmol/L    Anion gap 9 3.0 - 18 mmol/L    Glucose 114 (H) 74 - 99 mg/dL    BUN 13 7.0 - 18 MG/DL    Creatinine 1.49 (H) 0.6 - 1.3 MG/DL    BUN/Creatinine ratio 9 (L) 12 - 20      GFR est AA 55 (L) >60 ml/min/1.73m2    GFR est non-AA 46 (L) >60 ml/min/1.73m2    Calcium 9.4 8.5 - 10.1 MG/DL    Bilirubin, total 0.8 0.2 - 1.0 MG/DL    ALT (SGPT) 26 16 - 61 U/L    AST (SGOT) 21 15 - 37 U/L    Alk. phosphatase 88 45 - 117 U/L    Protein, total 8.3 (H) 6.4 - 8.2 g/dL    Albumin 3.8 3.4 - 5.0 g/dL    Globulin 4.5 (H) 2.0 - 4.0 g/dL    A-G Ratio 0.8 0.8 - 1.7     TSH 3RD GENERATION    Collection Time: 05/01/17  6:35 AM   Result Value Ref Range    TSH 3.93 (H) 0.36 - 3.74 uIU/mL           Assessment:     Primary Rehabilitation Diagnosis  1. Impaired Mobility and ADLs  2.  Acute Ischemic Stroke (acute to subacute ischemia involving the posterior limb of the left internal capsule, along the lateral aspect of the left thalamus) with residual right hemiparesis, dysphagia and dysarthria      Comorbidities   Urinary incontinence    History of malignant neoplasm of prostate    Hypothyroidism    Chronic obstructive pulmonary disease (COPD)    Asbestosis    Osteoarthrosis involving multiple sites    History of endogenous hypertriglyceridemia    CKD (chronic kidney disease) stage 3, GFR 30-59 ml/min    Dyslipidemia (high LDL; low HDL)    Gastroesophageal reflux disease    Procedure refused    Erectile dysfunction       Plan:     1. Justification for continued stay: Good progression towards established rehabilitation goals. 2. Medical Issues being followed closely:    [x]  Fall and safety precautions     []  Wound Care     [x]  Bowel and Bladder Function     [x]  Fluid Electrolyte and Nutrition Balance     []  Pain Control      3. Issues that 24 hour rehabilitation nursing is following:    [x]  Fall and safety precautions     []  Wound Care     [x]  Bowel and Bladder Function     [x]  Fluid Electrolyte and Nutrition Balance     []  Pain Control      [x]  Assistance with and education on in-room safety with transfers to and from the bed, wheelchair, toilet and shower. 4. Acute rehabilitation plan of care:    [x]  Continue current care and rehab. [x]  Physical Therapy           [x]  Occupational Therapy           [x]  Speech Therapy     []  Hold Rehab until further notice     5. Medications:    [x]  MAR Reviewed     [x]  Continue Present Medications     6. DVT Prophylaxis:      []  Lovenox     [x]  Unfractionated Heparin     []  Coumadin     []  NOAC     [x]  GRIS Stockings     [x]  Sequential Compression Device     []  None     7.  Orders:   > Acute Ischemic Stroke (acute to subacute ischemia involving the posterior limb of the left internal capsule, along the lateral aspect of the left thalamus) with residual right hemiparesis, dysphagia and dysarthria On asa, plavix, statin     > Dyslipidemia   lipitor      D/w patient      Signed:    Sharlyn Schirmer, MD      May 1, 2017

## 2017-05-01 NOTE — PROGRESS NOTES
Problem: Neurolinguistics Impaired (Adult)  Goal: *Speech Goal: (INSERT TEXT)  Long term goals:  1. Patient will tolerate a regular/thin liquid diet without overt s/s of aspiration, 4 consecutive meals. 2. Patient will use safe swallowing techniques with supervision for all PO intake. 3. Patient will answer complex yes/no questions with 90% accuracy. 4 patient will follow complex commands for object manipulation wit 80-90% accuracy. 5. Patient will name 10-12 items within simple categories with supervision. 6. Patient will perform varied word retrieval tasks with % accuracy. 7. Patient will recall 3 words after 5 minutes, supervision. 8. Patient will perform functional problem solving/reasoning tasks with supervision. Short term goals (by 5/2/17):  1. Patient will tolerate a soft, nectar thick liquid diet without overt s/s of aspiration, 4 consecutive meals. 2. Patient will use safe swallowing techniques with min-mod cues for all PO intake. 3. Patient will answer complex yes/no questions with 75-80%% accuracy. 4 patient will follow complex commands for object manipulation wit 80-90% accuracy. 5. Patient will name 7-9 items within simple categories with supervision. 6. Patient will perform varied word retrieval tasks with 70-80% accuracy. 7. Patient will recall 3 words after 5 minutes, min-mod assist.  8. Patient will perform functional problem solving/reasoning tasks with 75-85% accuracy. Pt was seen for a 60 min tx session focusing on dysarthria and oropharyngeal dysphagia tx. Pt pleasant and cooperative. Pt was able to verbalize accurate and immediate word responses during structured sentence completion tasks with 80% accuracy; provided with associational and contextual cues, accuracy increased to 100%. Pt completed recall of 3 words: immediate recall at 100% and delayed recall, greater than 5 min, at 75% accuracy.       Pt completed sips of nectar thick liquids utilizing head down maneuver and small sips without overt signs or symptoms of aspiration at 100% accuracy; min verbal cues. Pt completed the Faina maneuver in 5/9 trials; subsequent trials of ice chips tolerated without pharyngeal residue, cough; throat clear in 2/10 trials. Pt may benefit from a repeat MBS to assess ability to tolerate thin liquids and evidence of penetration/aspiration. IRISH Moran,CCC-SLP

## 2017-05-01 NOTE — INTERDISCIPLINARY ROUNDS
[facility] PHYSICAL REHABILITATION  [address] [place] [address], [address]    INPATIENT REHABILITATION  TEAM CONFERENCE SUMMARY     Date of Conference: 5/2/17    Name: Rubin Singh Age / Sex: 64 y.o. / male   CSN: [id number] MRN: [id number]   [facility] Date: 4/24/2017 Length of Stay: 7 days     Primary Rehab Diagnosis: Impaired Mobility and ADLs secondary to Acute ischemic stroke [facility])      Therapy:     FIM SCORES Initial Assessment Weekly Progress Assessment 5/1/2017   Eating Functional Level: 3  Comments: Pt may require some assistance with opening packages and cutting food during meals. Pt able to feed himself using his L hand. 5 - Setup   Swallowing     Grooming    6 - Mod I   Bathing 2 (Pt stood in shower at PLOF)  5 - Setup   4 - Min A   Upper Body Dressing Functional Level: 3  Items Applied/Steps Completed: Pullover (4 steps)  Comments: Pt required Mod A for managing shirt over head and on BUEs while doffing and donning t-shirt. 5 - Setup/Supervision   Lower Body Dressing Functional Level: 2  Items Applied/Steps Completed: Button/zip pants (4 steps), Sock, left (1 step), Sock, right (1 step), Underpants (3 steps)  Comments: Pt requires Max A for managing all LB clothing over B feet. He is able to stand with Min-Mod A for standing balance and safety and pull underwear/pants up over buttocks with slight assistance required for getting them up all the way on the R side due to decreased hand strength and coordination.   4 - Min A   Toileting Functional Level: 4  Comments: Pt requires Min-Mod A for static standing balance while managing clothing over his hips and buttocks with Mod A.  4 - Min A   Bladder - level of assist 4 4   Bladder - accident frequency score 6     Bowel - level of assist 4 4   Bowel - accident frequency score 5 4   Toilet Transfer [place] Toilet Transfer Score: 3  Comments: Based on performance of shower transfer and w/c to bed transfer, pt able to perform stand step transfer to/from commode with Mod A for balance, weightshift, and posture. 4 - Min A   Tub/Shower Transfer [place] Tub or Shower Type: Shower  Tub/Shower Transfer Score: 3  Comments: Pt performed stand step transfer w/c <> shower bench without an AD with Mod A for balance, weightshift, and posture. 4 - Min A      Comprehension Primary Mode of Comprehension: Auditory  Score: 4 Auditory  4   Expression Primary Mode of Expression: Verbal  Score: 4 Verbal  4   Social Interaction Score: 4 5   Problem Solving Score: 4 4   Memory Score: 4 3     FIM SCORES Initial Assessment Weekly Progress Assessment 5/1/2017   Bed/Chair/Wheelchair Transfers Transfer Type: Other  Other: Patient performs stand step transfer with moderate assist from PT for anterior approach R LE knee block with loading 2/2 hx of buckling, trunk stability, pivot assist, and controlled lowering. Patient demonstrates increased momentum utilization for STS transfer and R genu recurvatum requiring verbal cueing to crrect these; pacing cues successful, however genu recurvatum requires tactile cueing with physical assist to prevent buckling. Patient demosntrates pass retract technique with R LE advancement as well as anterior COG placement to attempts to maintain TKE 2/2 functional quad weakness. Transfer Assistance : 3 (Moderate assistance )  Sit to Stand Assistance: Moderate assistance  Car Transfers: Not tested  Car Type: NA   Patient performs stand step transfer x 8 in session CGA level with B UE over R distal femur to promote increased weightbearing, increased motor return, and prevent over-utilization of unaffected side. Patient performs this activity with min a x 1 to assist with maintaining anterior COG and additionally requires verbal cueing for improved pacing and prevention of rocking compensation. Patient required to utilize slow progression to increased gluteal loading and focus on smooth concentric/eccentric contraction.  Patient performs STS trials as noted above x 15 reps for 2 sets in session. The patient then performs car transfer training with stand step technique also at [facility] initially with door management assist x 3 trials progressing to self management of door with continued CGA and increased v/cing to promote proper gait pattern.     Bed Mobility Rolling Right 5 (Supervision)   Rolling Left 5 (Supervision)   Supine to Sit 5 (Supervision)   Sit to Stand Moderate assistance   Sit to Supine 5 (Supervision)    Rolling Right   6   Rolling Left   6   Supine to Sit   6   Sit to Stand   4   Sit to Supine   6      Locomotion (W/C) Able to Propel (ft): 70 feet (verbal cueing for technique)  Functional Level: 2  Curbs/Ramps Assist Required (FIM Score): 0 (Not tested)  Wheelchair Setup Assist Required : 2 (Maximal assistance)  Wheelchair Management: Manages left brake, Manages right brake Function 6  Setup Assistance  5      Locomotion (W/C distance) 70 Feet 255 ft   Locomotion (Walk) 2 (Maximal assistance) (2/2 R LOB) Min a x 1       Locomotion (Walk dist.) 5 Feet (ft) 250 ft   Steps/Stairs Steps/Stairs Ambulated (#): 0  Level of Assist : 0 (Not tested)  Rail Use:  (NA)   Steps/Stairs Ambulated (#): 0  Level of Assist : 0 (Not tested)  Rail Use:  (NA)         Nursing:     Neuro:   A&O x___4_                   Respiratory:   [x] WNL   [] O2   [] LPM ______   Other:  Peripheral Vascular:   [x] TEDS present   [] Edema present ____ Grade   Cardiac:   [x] WNL   [] Other  Genitourinary:   [x] continent   [] incontinent   [] marie  Abdominal ____4/30/17___ LBM  GI: _cardiac______ Diet ____nectar__ Liquids _____ tube feeds  Musculoskeletal: ____ ROM Transfers __wheelchair___ Assistive Device Used  ___minimal_ Level of Assistance  Skin Integumentary:   [x] Intact   [] Not Intact   __________Preventative Measures  Details______________________________________________________________  Pain: [x] Controlled   [] Not Controlled   Pain Meds:   [] Scheduled [] PRN        Registered Dietitian / Nutrition:     Patient Vitals for the past 100 hrs:   % Diet Eaten   05/01/17 1337 100 %   05/01/17 0951 75 %   04/30/17 1800 100 %   04/30/17 1400 100 %   04/30/17 0948 100 %   04/29/17 1806 100 %   04/29/17 1324 100 %   04/29/17 1040 60 %   04/28/17 1823 75 %   04/28/17 1300 75 %   04/28/17 0943 50 %   04/27/17 1800 50 %   04/27/17 1330 60 %     Patient reported good appetite and meal intake; improved since advanced to soft solid diet. Discussed discontinue nutrition supplement; pt agreed with plan    Supplements:          [x] Yes: Ensure Enlive once daily   [] No      Amount of supplement consumed: 100%       Intake/Output Summary (Last 24 hours) at 05/01/17 1623  Last data filed at 05/01/17 1337   Gross per 24 hour   Intake              480 ml   Output                0 ml   Net              480 ml                                Last bowel movement: 4/30      Interdisciplinary Team Goals:     1. Goal  Patient will use chin tuck/effortful swallow to tolerate sips of water with the SLP without s/s of aspiration. Barrier  Mild-mod dysphagia, mild-mod cognitive linguistic deficits    Intervention  Training in swallowing techniques, diet modifications, MBS as needed, cognitive retraining. 2. Goal  Pt will use his R hand to perform self-feeding for 50% of meal with minimal encouragement. Barrier  Decreased RUE coordination, Fatigue, Impulsivity, Decreased coordination    Intervention  ADL training, Therapeutic activity, NMRE     3. Goal  Patient will perform STS x 15 in session at [facility] level with mirror and no compensation. Barrier  Impulsivity; LE weakness    Intervention  TE; TA; NMRE     4. Goal  NURSING: Patient will be free from falls during stay on rehab    Barrier  balance    Intervention  falls prevention. Bed and chair alarms     5. Goal  Po intake of meals will continue to meet >75% of patient estimated nutritional needs within the next 7 days.   Outcome:  [x] Met/Ongoing    []  Not Met    [] New/Initial Goal     Barrier  none known     Intervention  modified diet; discontinue supplement     6. Goal  Improve self concept and self esteem    Barrier  Frustration with stroke occurrence and forced dependency    Intervention  Stroke education and support ; ego support       Disposition / Discharge Planning: Follow-up therapy services:  tbd   DME recommendations:  tbd   Estimated discharge date:  5/23/17   Discharge Location:  home         Electronic Signatures:      Signature Date Signed   Physical Therapist    Sommer Cristobal PT DPT  05/01/2017   Occupational Therapist    Elizabeth Sarabia, [address]  5/1/2017   Speech Therapist   Jesus Valenzuela, [address] [facility]  5/1/17   Recreational Therapist    Camelia Samano, CTRS 5/2/17   Nursing    Jeffrey aGlo LPN  [birth year]   Dietitian    Marie Peguero RD  5/1/17   Clinical Psychologist    Shruti Fernández, Ph.D. 5/1/17    Physician            Asha Garcia, MSW  5/1/17         The above information has been reviewed with the patient in a language that they can understand. Opportunity for comments and questions has been provided and a signed attestation has been scanned into the \"media tab\" of the EMR.       Patient Signature: ______________________________________________________    Date Signed: __________________________________________________________

## 2017-05-01 NOTE — PROGRESS NOTES
Problem: Self Care Deficits Care Plan (Adult)  Goal: *Acute Goals and Plan of Care (Insert Text)  Long Term Goals (to be met upon discharge date) in order to increase pts functional independence and safety, and decrease burden of care:  1. Pt will perform self-feeding with independence. 2. Pt will perform grooming with independence. 3. Pt will perform UB bathing with modified independence. 4. Pt will perform LB bathing with modified independence. 5. Pt will perform tshower transfer with modified independence. 6. Pt will perform UB dressing with independence. 7. Pt will perform LB dressing with modified independence. 8. Pt will perform toileting task with modified independence. 9. Pt will perform toilet transfer with modified independence. Short Term Weekly Goals for (2017 to 2017) in order to increase pts functional independence and safety, and decrease burden of care:  1. Pt will perform self-feeding with modified independence. 2. Pt will perform grooming with modified independence. 3. Pt will perform UB bathing with supervision/setup. 4. Pt will perform LB bathing with Min A.  5. Pt will perform shower transfer with Min A.  6. Pt will perform UB dressing with Min A.  7. Pt will perform LB dressing with Mod A.  8. Pt will perform toileting task with Mod A.  9. Pt will perform toilet transfer with Min A.   OCCUPATIONAL THERAPY DAILY NOTE  Patient Name:Stewart Lucero   Time In: 1100  Time Out: 1200     Medical Diagnosis:  Left CVA  Acute ischemic stroke ([address] Acute ischemic stroke ([address]      Pain at start of tx: Pt does not report any pain at the start of tx session. Pain at stop of tx: Pt c/o increased discomfort in his RLE with R knee extension during static standing activity. Patient identified with name and : yes  Subjective: \"I'm so tired. \"     Objective:      THERAPEUTIC ACTIVITY Daily Assessment     Pt worked on R hand coordination and strengthening while completing small bead retrieval, palm press, and pinch from thumb to each digit pinch using yellow theraputty. Pt also used R tip to tip pinch to place 24 \"Perfection\" pegs in their matching shape slots in 5:04 minutes with min verbal and visual cues required for accurately identifying all identical matches. Pt stood for 1 trial of ~7 minutes without an AD with CGA and max verbal/manual cues to promote equal BLE weightbearing while pt performed RUE reaching to play \"Connect 4.\" Pt was able to use his R hand to  game discs from the table with increased effort. Pt required moderate verbal and visual cueing to follow the strategy of the game. MOBILITY/TRANSFERS Daily Assessment      Pt performed stand step transfer recliner chair to w/c without an AD. He required Mod A for ant and up weightshift to complete sit to stand utilizing NDT technique. Min A required for maintaining balance while stepping and Mod A again needed to control descent with stand to sit. Assessment: Pt is demonstrating improved FM coordination in his R hand though he requires cueing to avoid using compensatory techniques. Plan of Care: Continue POC to maximize pt independence and safety performing ADLs and functional transfers/mobility.      Brenda Reynoso OTR  5/1/2017

## 2017-05-01 NOTE — PROGRESS NOTES
NUTRITION    Nutrition Consult: General Nutrition Management & Supplements      RECOMMENDATIONS / PLAN:     - Discontinue supplements 2/2 pt's po intake is adequate  - Continue RD inpatient monitoring and evaluation. NUTRITION INTERVENTIONS & DIAGNOSIS:     [x] Medical food supplement: Ensure Enlive once daily    Nutrition Diagnosis:  No nutrition diagnosis at this time    ASSESSMENT:     Subjective/Objective:   Patient reported good appetite and meal intake; improved since advanced to soft solid diet.  Discussed discontinue nutrition supplement; pt agreed with plan     Average po intake adequate to meet patients estimated nutritional needs:   [x] Yes     [] No   [] Unable to determine at this time    Diet: DIET NUTRITIONAL SUPPLEMENTS Lunch; ENSURE ENLIVE  DIET CARDIAC Soft Solids; 1 NECTAR      Food Allergies:  None known  Current Appetite:   [x] Good     [] Fair     [] Poor     [] Other:     Appetite/meal intake prior to admission:   [x] Good     [] Fair     [] Poor     [] Other:     Feeding Limitations:  [x] Swallowing difficulty: SLP following    [] Chewing difficulty    [] Other:  Current Meal Intake:   Patient Vitals for the past 100 hrs:   % Diet Eaten   05/01/17 1813 75 %   05/01/17 1337 100 %   05/01/17 0951 75 %   04/30/17 1800 100 %   04/30/17 1400 100 %   04/30/17 0948 100 %   04/29/17 1806 100 %   04/29/17 1324 100 %   04/29/17 1040 60 %   04/28/17 1823 75 %   04/28/17 1300 75 %   04/28/17 0943 50 %   04/27/17 1800 50 %       BM:  4/30  Skin Integrity:  No pressure ulcer or wound  Edema:  None   Pertinent Medications: Reviewed    Recent Labs      05/01/17   0635   NA  132*   K  4.0   CL  97*   CO2  26   GLU  114*   BUN  13   CREA  1.49*   CA  9.4   ALB  3.8   SGOT  21   ALT  26       Intake/Output Summary (Last 24 hours) at 05/01/17 1911  Last data filed at 05/01/17 1813   Gross per 24 hour   Intake              480 ml   Output                0 ml   Net              480 ml       Anthropometrics:  Ht Readings from Last 1 Encounters:   04/25/17 5' 10\" (1.778 m)     Last 3 Recorded Weights in this Encounter    04/25/17 1552   Weight: 81.5 kg (179 lb 10.8 oz)     Body mass index is 25.78 kg/(m^2). Weight History:  14 lb (7.2%) weight loss in past 2 months PTA per chart. Patient denied experiencing any recent changes in weight PTA    Weight Metrics 4/25/2017 4/22/2017 4/18/2017 3/9/2017 10/28/2016 8/11/2016 8/4/2016   Weight 179 lb 10.8 oz 179 lb 10.8 oz - 193 lb 190 lb 186 lb 182 lb 8 oz   BMI 25.78 kg/m2 - 25.78 kg/m2 26.92 kg/m2 26.5 kg/m2 25.94 kg/m2 25.46 kg/m2        Admitting Diagnosis: Left CVA  Acute ischemic stroke [facility])  Past Medical History:   Diagnosis Date    Acute ischemic stroke ([address] 4/19/2017    Acute Ischemic Stroke (acute to subacute ischemia involving the posterior limb of the left internal capsule, along the lateral aspect of the left thalamus) with residual right hemiparesis, dysphagia and dysarthria    Asbestosis ([address]     Chronic obstructive pulmonary disease (COPD) ([address]     CKD (chronic kidney disease) stage 3, GFR 30-59 ml/min     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  HDL 42, LDL 97    Dysphagia as late effect of cerebrovascular accident (CVA) 4/19/2017    Erectile dysfunction     Gastroesophageal reflux disease     Hemiparesis affecting right side as late effect of cerebrovascular accident (CVA) ([address] 4/19/2017    History of endogenous hypertriglyceridemia     History of malignant neoplasm of prostate     Cornish score 7     Hypothyroidism     Osteoarthrosis involving multiple sites     Procedure refused 4/21/2017    Speech Pathologist recommended NPO and PEG placement; Patient refused    Urinary incontinence     Male incontinence        Education Needs:        [x] None identified  [] Identified - Not appropriate at this time  []  Identified and addressed - refer to education log  Learning Limitations:   [x] None identified  [] Identified    Cultural, Sabianist & ethnic food preferences:  [x] None identified    [] Identified and addressed     ESTIMATED NUTRITION NEEDS:     Calories: 1927-6934 kcal (MSJx1.2-1.3) based on  [x] Actual BW:  82 kg      [] IBW   CHO: 232-251 gm (50% kcal)   Protein: 66-82 gm (0.8-1 gm/kg) based on  [x] Actual BW      [] IBW   Fluid: 1 mL/kcal     MONITORING & EVALUATION:     Nutrition Goal(s):   1. Po intake of meals will meet >75% of patient estimated nutritional needs within the next 7 days.   Outcome:  [x] Met/Ongoing    []  Not Met    [] New/Initial Goal     Monitoring:   [x] Diet tolerance   [x] Meal intake   [x] Supplement intake   [] GI symptoms/ability to tolerate po diet   [] Respiratory status   [] Plan of care      Previous Recommendations (for follow-up assessments only):     [x]   Implemented       []   Not Implemented (RD to address)     [] No Recommendation Made     Discharge Planning:   Cardiac diet; consistency per SLP  [x] Participated in care planning, discharge planning, & interdisciplinary rounds as appropriate      Tony Ansari, [address] [place] [address]   Pager: [phone]

## 2017-05-01 NOTE — PROGRESS NOTES
SHIFT CHANGE NOTE FOR [facility]    Bedside and Verbal shift change report given to [facility] RN(oncoming nurse) by Taya Dean LPN   (offgoing nurse). Report included the following information SBAR, Kardex, MAR and Recent Results. Situation:   Code Status: Full Code   Reason for Admission: CVA  Hospital Day: 7   Problem List:   Hospital Problems  Date Reviewed: 4/26/2017          Codes Class Noted POA    Procedure refused ICD-10-CM: Z53.29  ICD-9-CM: V64.2  4/21/2017 Yes    Overview Signed 4/24/2017  3:28 PM by Idalia Cummins MD     Speech Pathologist recommended NPO and PEG placement; Patient refused             * (Principal)Acute ischemic stroke [facility]) ICD-10-CM: I63.9  ICD-9-CM: 434.91  4/19/2017 Yes    Overview Signed 4/24/2017  3:15 PM by Idalia Cummins MD     Acute Ischemic Stroke (acute to subacute ischemia involving the posterior limb of the left internal capsule, along the lateral aspect of the left thalamus) with residual right hemiparesis, dysphagia and dysarthria             Impaired mobility and ADLs ICD-10-CM: Z74.09  ICD-9-CM: 799.89  4/19/2017 Yes        Decreased calculated glomerular filtration rate (GFR) ICD-10-CM: R94.4  ICD-9-CM: 794.4  4/19/2017 Yes    Overview Addendum 4/24/2017  3:21 PM by Idalia Cummins MD     Estimated calculated glomerular filtration rate equivalent to that of stage 3 chronic kidney disease = 30-59 ml/min             Dyslipidemia (high LDL; low HDL) (Chronic) ICD-10-CM: E78.4  ICD-9-CM: 272.4  4/19/2017 Yes    Overview Signed 4/24/2017  3:25 PM by Idalia Cummins MD     Lipid profile (4/19/2017): , .  HDL 42, LDL 97             Hemiparesis affecting right side as late effect of cerebrovascular accident (CVA) ([facility] [address] ICD-10-CM: C95.219  ICD-9-CM: 438.20  4/19/2017 Yes        Dysphagia as late effect of cerebrovascular accident (CVA) ICD-10-CM: H14.487  ICD-9-CM: 438.82  4/19/2017 Yes        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) ICD-10-CM: A67.648  ICD-9-CM: 438.13  4/19/2017 Yes              Background:   Past Medical History:   Past Medical History:   Diagnosis Date    Acute ischemic stroke ([facility] [address] 4/19/2017    Acute Ischemic Stroke (acute to subacute ischemia involving the posterior limb of the left internal capsule, along the lateral aspect of the left thalamus) with residual right hemiparesis, dysphagia and dysarthria    Asbestosis ([facility] [address]     [facility] pulmonary [facility]     CKD (chronic kidney disease) stage 3, GFR 30-59 ml/min     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  HDL 42, LDL 97    Dysphagia as late effect of cerebrovascular accident (CVA) 4/19/2017    Erectile dysfunction     Gastroesophageal reflux disease     Hemiparesis affecting right side as late effect of cerebrovascular accident (CVA) ([facility] [address] 4/19/2017    History of endogenous hypertriglyceridemia     History of malignant neoplasm of prostate     [facility] score 7     Hypothyroidism     Osteoarthrosis involving multiple sites     Procedure refused 4/21/2017    Speech Pathologist recommended NPO and PEG placement; Patient refused    Urinary incontinence     Male incontinence       Patient taking anticoagulants YES   Patient has a defibrillator: no     Assessment:   Changes in Assessment throughout shift: NONE              Last Vitals:     Vitals:    04/30/17 1628 04/30/17 2000 05/01/17 0800 05/01/17 1724   BP: (!) 167/92 134/79 138/77 146/81   Pulse: 64 66 67 71   Resp: 18 18 19 18   Temp: 96 °F (35.6 °C) 97.8 °F (36.6 °C) 98.1 °F (36.7 °C) 97.9 °F (36.6 °C)   SpO2: 95% 97% 98% 99%   Weight:       Height:            PAIN    Pain Assessment    Pain Intensity 1: 0 (05/01/17 1800) Pain Intensity 1: 2 (12/29/14 1105)      Pain Location 1: Abdomen      Pain Intervention(s) 1: Medication (see MAR)  Patient Stated Pain Goal: 0 Patient Stated Pain Goal: 0  o Intervention effective: no    o Other actions taken for pain: NONE     Skin Assessment  Skin color Skin Color: Appropriate for ethnicity  Condition/Temperature Skin Condition/Temp: Dry, Warm  Integrity Skin Integrity: Intact  Turgor Turgor: Epidermis thin w/ loss of subcut tissue  Weekly Pressure Ulcer Documentation  Pressure  Injury Documentation: No Pressure Injury Noted-Pressure Ulcer Prevention Initiated  Wound Prevention & Protection Methods  Orientation of wound Orientation of Wound Prevention: Posterior  Location of Prevention Location of Wound Prevention: Buttocks, Sacrum/Coccyx  Dressing Present Dressing Present : No  Dressing Status    Wound Offloading Wound Offloading (Prevention Methods): Bed, pressure redistribution/air     INTAKE/OUPUT    Date 04/30/17 0700 - 05/01/17 0659 05/01/17 0700 - 05/02/17 0659   Shift 6299-8193 0528-8065 24 Hour Total 0700-1859 1900-0659 24 Hour Total   I  N  T  A  K  E   P. O. 540  540 300  300      P. O. 540  540 300  300    Shift Total  (mL/kg) 540  (6.6)  540  (6.6) 300  (3.7)  300  (3.7)   O  U  T  P  U  T   Urine  (mL/kg/hr)            Urine Occurrence(s) 4 x 6 x 10 x 2 x  2 x    Stool            Stool Occurrence(s) 1 x 0 x 1 x 0 x  0 x    Shift Total  (mL/kg)           540 300  300   Weight (kg) 81.5 81.5 81.5 81.5 81.5 81.5       Recommendations:  1. Patient needs and requests: NONE    2. Diet: CARDIAC PUREED WITH NECTAR THICK LIQUIDS    3. Pending tests/procedures: LABS     4. Functional Level/Equipment: WHEELCHAIR    5. Estimated Discharge Date: TBD Posted on Whiteboard in Patients Room: no     HEALS Safety Check    A safety check occurred in the patient's room between off going nurse and oncoming nurse listed above.     The safety check included the below items  Area Items   H  High Alert Medications - Verify all high alert medication drips (heparin, PCA, etc.)   E  Equipment - Suction is set up for ALL patients (with yanker)  - Red plugs utilized for all equipment (IV pumps, etc.)  - WOWs wiped down at end of shift.  - Room stocked with oxygen, suction, and other unit-specific supplies   A  Alarms - Bed alarm is set for fall risk patients  - Ensure chair alarm is in place and activated if patient is up in a chair   L  Lines - Check IV for any infiltration  - Syed bag is empty if patient has a Syed   - Tubing and IV bags are labeled   S  Safety   - Room is clean, patient is clean, and equipment is clean. - Hallways are clear from equipment besides carts. - Fall bracelet on for fall risk patients  - Ensure room is clear and free of clutter  - Suction is set up for ALL patients (with yanker)  - Hallways are clear from equipment besides carts.    - Isolation precautions followed, supplies available outside room, sign posted

## 2017-05-01 NOTE — PROGRESS NOTES
Problem: Mobility Impaired (Adult and Pediatric)  Goal: *Acute Goals and Plan of Care (Insert Text)  Physical Therapy Short Term Goals  Initiated 2017 and to be accomplished within 7 day(s) (2017)  1. Patient will move from supine to sit and sit to supine , scoot up and down and roll side to side in bed with modified independence. 2. Patient will transfer from bed to chair and chair to bed with minimal assistance/contact guard assist using the least restrictive device. 3. Patient will perform sit to stand with minimal assistance/contact guard assist.  4. Patient will ambulate with moderate assistance for 50 feet with the least restrictive device. 5. Patient will ascend/descend 6 stairs with 2 handrail(s) with moderate assistance . Physical Therapy Long Term Goals  Initiated 2017 and to be accomplished within 28 day(s) (2017)  1. Patient will move from supine to sit and sit to supine , scoot up and down and roll side to side in bed with independence. 2. Patient will transfer from bed to chair and chair to bed with modified independence using the least restrictive device. 3. Patient will perform sit to stand with modified independence. 4. Patient will ambulate with modified independence for 250 feet with the least restrictive device. 5. Patient will ascend/descend 6 stairs with 2 handrail(s) with modified independence. PHYSICAL THERAPY DAILY NOTE  Patient Name:Stewart Beckwith  Time In: 3915  Time Out: 1430  Patient Seen For: Wheelchair mobility;Transfer training; Therapeutic exercise;Patient education;Gait training;Balance activities  Diagnosis: Left CVA  Acute ischemic stroke [facility]) Acute ischemic stroke [facility])  Precautions: Fall risk     Subjective: Patient reaffirms motivation improve everyday in therapy.      Pain at start of tx:0/10  Pain at stop of tx:0/10     Patient identified with name and : YES         Objective:       TRANSFERS Daily Assessment     Transfer Type: Other  Other: Patient performs stand step transfer x 8 in session CGA level with B UE over R distal femur to promote increased weightbearing, increased motor return, and prevent over-utilization of unaffected side. Patient performs this activity with min a x 1 to assist with maintaining anterior COG and additionally requires verbal cueing for improved pacing and prevention of rocking compensation. Patient required to utilize slow progression to increased gluteal loading and focus on smooth concentric/eccentric contraction. Patient performs STS trials as noted above x 15 reps for 2 sets in session. The patient then performs car transfer training with stand step technique also at [facility] initially with door management assist x 3 trials progressing to self management of door with continued CGA and increased v/cing to promote proper gait pattern. Transfer Assistance : 4 (Minimal assistance)  Sit to Stand Assistance: Minimal assistance  Car Transfers: Minimum assistance  Car Type: car simulator          GAIT Daily Assessment     Amount of Assistance: 3 (Moderate assistance) (surinder x 1 for posture cues; mod a for LOB x 2 in session)  Distance (ft): 250 Feet (ft)  Assistive Device: Gait belt      Patient demonstrates antalgic gait pattern with decrease L step length and L trunk collapse in ipsilateral swing phase requiring min a x 1 to stabilize self. Patient requires verbal cueing for increased L swing phase and R stance time as well as improved L step length and controlled descent. Patient demonstrates decreased R step clearance though all impairments improve significantly with verbal cueing and progressive ambulation. Patient demonstrated 2 LOB 2/2 COG R anterolateral progression requiring mod a x 1 to correct.        WHEELCHAIR MOBILITY Daily Assessment     Able to Propel (ft): 255 feet  Functional Level: 6          LOWER EXTREMITY EXERCISES Daily Assessment     The patient performs slow progression L LE stepping up on 4\" box with heel down stepping stone press focusing on slow progression x 20 trials with min a x 1 for trunk stability. Patient then progresses to alternating steps with stepping stone press as previously noted x 20 reps each; increased smoothness of motion and improved pacing noted with repetition. Assessment: Patient demonstrates increased rate of improvement with CPG approach to ambulatory training allowing maximized number of steps with safety assistance and opportunity to self correction as patient progresses with ambulatory trial. Improved endurance today as well, though intermittent rest breaks are still required. Plan of Care: Cont current POC; focus on ambulatory and stair training. Clemente Wiseman, PT DPT  5/1/2017

## 2017-05-01 NOTE — INTERDISCIPLINARY ROUNDS
[facility] PHYSICAL REHABILITATION  [address] [place] [address], [address]    INPATIENT REHABILITATION  TEAM CONFERENCE SUMMARY     Date of Conference: 5/2/17    Name: Carline Rangel Age / Sex: 64 y.o. / male   CSN: [id number] MRN: [id number]   [facility] Date: 4/24/2017 Length of Stay: 7 days     Primary Rehab Diagnosis: Impaired Mobility and ADLs secondary to Acute ischemic stroke [facility])      Therapy:     FIM SCORES Initial Assessment Weekly Progress Assessment 5/1/2017   Eating Functional Level: 3  Comments: Pt may require some assistance with opening packages and cutting food during meals. Pt able to feed himself using his L hand. Swallowing     Grooming       Bathing 2 (Pt stood in shower at [facility])        Upper Body Dressing Functional Level: 3  Items Applied/Steps Completed: Pullover (4 steps)  Comments: Pt required Mod A for managing shirt over head and on BUEs while doffing and donning t-shirt. Lower Body Dressing Functional Level: 2  Items Applied/Steps Completed: Button/zip pants (4 steps), Sock, left (1 step), Sock, right (1 step), Underpants (3 steps)  Comments: Pt requires Max A for managing all LB clothing over B feet. He is able to stand with Min-Mod A for standing balance and safety and pull underwear/pants up over buttocks with slight assistance required for getting them up all the way on the R side due to decreased hand strength and coordination.      Toileting Functional Level: 4  Comments: Pt requires Min-Mod A for static standing balance while managing clothing over his hips and buttocks with Mod A.     Bladder - level of assist 4 4   Bladder - accident frequency score 6 6   Bowel - level of assist 4 4   Bowel - accident frequency score 5 4   Toilet Transfer [place] Toilet Transfer Score: 3  Comments: Based on performance of shower transfer and w/c to bed transfer, pt able to perform stand step transfer to/from commode with Mod A for balance, weightshift, and posture. Tub/Shower Transfer [place] Tub or Shower Type: Shower  Tub/Shower Transfer Score: 3  Comments: Pt performed stand step transfer w/c <> shower bench without an AD with Mod A for balance, weightshift, and posture. Comprehension Primary Mode of Comprehension: Auditory  Score: 4 Auditory  4   Expression Primary Mode of Expression: Verbal  Score: 4 Verbal  4   Social Interaction Score: 4 5   Problem Solving Score: 4 4   Memory Score: 4 3     FIM SCORES Initial Assessment Weekly Progress Assessment 5/1/2017   Bed/Chair/Wheelchair Transfers Transfer Type: Other  Other: Patient performs stand step transfer with moderate assist from PT for anterior approach R LE knee block with loading 2/2 hx of buckling, trunk stability, pivot assist, and controlled lowering. Patient demonstrates increased momentum utilization for STS transfer and R genu recurvatum requiring verbal cueing to crrect these; pacing cues successful, however genu recurvatum requires tactile cueing with physical assist to prevent buckling. Patient demosntrates pass retract technique with R LE advancement as well as anterior COG placement to attempts to maintain TKE 2/2 functional quad weakness. Transfer Assistance : 3 (Moderate assistance )  Sit to Stand Assistance:  Moderate assistance  Car Transfers: Not tested  Car Type: NA     Bed Mobility Rolling Right 5 (Supervision)   Rolling Left 5 (Supervision)   Supine to Sit 5 (Supervision)   Sit to Stand Moderate assistance   Sit to Supine 5 (Supervision)    Rolling Right   6 (Modified independent)   Rolling Left   6 (Modified independent)   Supine to Sit   6 (Modified independent)   Sit to Stand       Sit to Supine          Locomotion (W/C) Able to Propel (ft): 70 feet (verbal cueing for technique)  Functional Level: 2  Curbs/Ramps Assist Required (FIM Score): 0 (Not tested)  Wheelchair Setup Assist Required : 2 (Maximal assistance)  Wheelchair Management: Manages left brake, Manages right brake Function 6  Setup Assistance         Locomotion (W/C distance) 70 Feet     Locomotion (Walk) 2 (Maximal assistance) (2/2 R LOB)        Locomotion (Walk dist.) 5 Feet (ft)     Steps/Stairs Steps/Stairs Ambulated (#): 0  Level of Assist : 0 (Not tested)  Rail Use:  (NA)           Nursing:     Neuro:   A&O x____                   Respiratory:   [] WNL   [] O2   [] LPM ______   Other:  Peripheral Vascular:   [] TEDS present   [] Edema present ____ Grade   Cardiac:   [] WNL   [] Other  Genitourinary:   [] continent   [] incontinent   [] marie  Abdominal _______ LBM  GI: _______ Diet ______ Liquids _____ tube feeds  Musculoskeletal: ____ ROM Transfers _____ Assistive Device Used  ____ Level of Assistance  Skin Integumentary:   [] Intact   [] Not Intact   __________Preventative Measures  Details______________________________________________________________  Pain: [] Controlled   [] Not Controlled   Pain Meds:   [] Scheduled   [] PRN        Registered Dietitian / Nutrition:   Patient Vitals for the past 100 hrs:   % Diet Eaten   04/30/17 1800 100 %   04/30/17 1400 100 %   04/30/17 0948 100 %   04/29/17 1806 100 %   04/29/17 1324 100 %   04/29/17 1040 60 %   04/28/17 1823 75 %   04/28/17 1300 75 %   04/28/17 0943 50 %   04/27/17 1800 50 %   04/27/17 1330 60 %   04/27/17 0930 45 %               Supplements:          [] Yes   [] No      Amount of supplement consumed:        Intake/Output Summary (Last 24 hours) at 05/01/17 1132  Last data filed at 04/30/17 1800   Gross per 24 hour   Intake              360 ml   Output                0 ml   Net              360 ml                              Last bowel movement:         Interdisciplinary Team Goals:     1. Goal  Patient will use chin tuck/effortful swallow to tolerate sips of water with the SLP without s/s of aspiration.     Barrier  Mild-mod dysphagia, mild-mod cognitive linguistic deficits    Intervention  Training in swallowing techniques, diet modifications, MBS as needed, cognitive retraining. 2. Goal      Barrier      Intervention       3. Goal      Barrier      Intervention       4. Goal      Barrier      Intervention       5. Goal      Barrier      Intervention       6. Goal  Improve self concept and self esteem    Barrier  Frustration with stroke occurrence and forced dependency    Intervention  Stroke education and support ; ego support       Disposition / Discharge Planning: Follow-up therapy services:  tbd   DME recommendations:  tbd   Estimated discharge date:  5/23/17   Discharge Location:  home         Electronic Signatures:      Signature Date Signed   Physical Therapist         Occupational Therapist         Speech Therapist   Wes Eduardo, [address]  5/1/17   Recreational Therapist         Nursing         Dietitian         Clinical Psychologist    Tejal Keith, Ph.D. 5/1/17    Physician            Mario Menendez, KYRA  5/1/17         The above information has been reviewed with the patient in a language that they can understand. Opportunity for comments and questions has been provided and a signed attestation has been scanned into the \"media tab\" of the EMR.       Patient Signature: ______________________________________________________    Date Signed: __________________________________________________________

## 2017-05-02 PROCEDURE — 92526 ORAL FUNCTION THERAPY: CPT

## 2017-05-02 PROCEDURE — 74011250637 HC RX REV CODE- 250/637: Performed by: INTERNAL MEDICINE

## 2017-05-02 PROCEDURE — 97530 THERAPEUTIC ACTIVITIES: CPT

## 2017-05-02 PROCEDURE — 97535 SELF CARE MNGMENT TRAINING: CPT

## 2017-05-02 PROCEDURE — 82043 UR ALBUMIN QUANTITATIVE: CPT | Performed by: INTERNAL MEDICINE

## 2017-05-02 PROCEDURE — 97112 NEUROMUSCULAR REEDUCATION: CPT

## 2017-05-02 PROCEDURE — 65310000000 HC RM PRIVATE REHAB

## 2017-05-02 PROCEDURE — 74011250636 HC RX REV CODE- 250/636: Performed by: INTERNAL MEDICINE

## 2017-05-02 PROCEDURE — 92507 TX SP LANG VOICE COMM INDIV: CPT

## 2017-05-02 PROCEDURE — 97116 GAIT TRAINING THERAPY: CPT

## 2017-05-02 RX ADMIN — HEPARIN SODIUM 5000 UNITS: 5000 INJECTION, SOLUTION INTRAVENOUS; SUBCUTANEOUS at 18:43

## 2017-05-02 RX ADMIN — HEPARIN SODIUM 5000 UNITS: 5000 INJECTION, SOLUTION INTRAVENOUS; SUBCUTANEOUS at 05:42

## 2017-05-02 RX ADMIN — MULTIPLE VITAMIN LIQUID 30 ML: LIQUID at 08:55

## 2017-05-02 RX ADMIN — ASPIRIN 81 MG CHEWABLE TABLET 81 MG: 81 TABLET CHEWABLE at 08:54

## 2017-05-02 RX ADMIN — CLOPIDOGREL BISULFATE 75 MG: 75 TABLET, FILM COATED ORAL at 18:43

## 2017-05-02 RX ADMIN — DOCUSATE SODIUM 100 MG: 100 CAPSULE, LIQUID FILLED ORAL at 08:54

## 2017-05-02 RX ADMIN — LEVOTHYROXINE SODIUM 150 MCG: 100 TABLET ORAL at 07:30

## 2017-05-02 RX ADMIN — LEVOTHYROXINE SODIUM 150 MCG: 100 TABLET ORAL at 05:38

## 2017-05-02 RX ADMIN — ATORVASTATIN CALCIUM 20 MG: 10 TABLET, FILM COATED ORAL at 21:45

## 2017-05-02 RX ADMIN — CHOLECALCIFEROL TAB 25 MCG (1000 UNIT) 2000 UNITS: 25 TAB at 08:54

## 2017-05-02 RX ADMIN — PANTOPRAZOLE SODIUM 40 MG: 40 GRANULE, DELAYED RELEASE ORAL at 05:38

## 2017-05-02 RX ADMIN — PANTOPRAZOLE SODIUM 40 MG: 40 GRANULE, DELAYED RELEASE ORAL at 07:30

## 2017-05-02 NOTE — PROGRESS NOTES
[facility] PHYSICAL REHABILITATION  [address] [place] [address], [address]     INPATIENT REHABILITATION  DAILY PROGRESS NOTE     Date: 5/2/2017    Name: Juventino Ramos Age / Sex: 64 y.o. / male   CSN: [id number] MRN: [id number]   [facility] Date: 4/24/2017 Length of Stay: 8 days     Primary Rehab Diagnosis: Impaired Mobility and ADLs secondary to Acute Ischemic Stroke (acute to subacute ischemia involving the posterior limb of the left internal capsule, along the lateral aspect of the left thalamus) with residual right hemiparesis, dysphagia and dysarthria       Subjective:     Patient seen and examined. Blood pressure controlled. Team conference was held at bedside this AM.     Patient's Complaint:   No significant medical complaints    Pain Control: no current joint or muscle symptoms, essentially pain-free      Objective:     Vital Signs:  Patient Vitals for the past 24 hrs:   BP Temp Pulse Resp SpO2   05/02/17 0752 151/77 95.5 °F (35.3 °C) 71 18 97 %   05/01/17 1930 145/84 97.4 °F (36.3 °C) 72 18 97 %   05/01/17 1724 146/81 97.9 °F (36.6 °C) 71 18 99 %        Physical Examination:  GENERAL SURVEY: Patient is awake, alert, oriented x 3, sitting comfortably on the chair, not in acute respiratory distress. HEENT: pink palpebral conjunctivae, anicteric sclerae, no nasoaural discharge, moist oral mucosa  NECK: supple, no jugular venous distention, no palpable lymph nodes  CHEST/LUNGS: symmetrical chest expansion, good air entry, clear breath sounds  HEART: adynamic precordium, good S1 S2, no S3, regular rhythm, no murmurs  ABDOMEN: flat, bowel sounds appreciated, soft, non-tender  EXTREMITIES: pink nailbeds, no edema, full and equal pulses, no calf tenderness   NEUROLOGICAL EXAM: The patient is awake, alert and oriented x3, able to answer questions fairly appropriately, able to follow 1 and 2 step commands. Able to tell time from the wall clock.  Cranial nerves II-XII are grossly intact except for slurred speech and dysphagia. No gross sensory deficit. Motor strength is 4/5 on the RUE, 5/5 on the LUE, 4/5 on the right hip, 4+/5 on the right knee, 3/5 on the right ankle, 5/5 on the LLE. Current Medications:  Current Facility-Administered Medications   Medication Dose Route Frequency    cholecalciferol (VITAMIN D3) tablet 2,000 Units  2,000 Units Oral DAILY    bisacodyl (DULCOLAX) suppository 10 mg  10 mg Rectal Q48H PRN    heparin (porcine) injection 5,000 Units  5,000 Units SubCUTAneous Q12H    acetaminophen (TYLENOL) solution 650 mg  650 mg Oral Q4H PRN    docusate sodium (COLACE) capsule 100 mg  100 mg Oral BID    aspirin chewable tablet 81 mg  81 mg Oral DAILY WITH BREAKFAST    clopidogrel (PLAVIX) tablet 75 mg  75 mg Oral DAILY WITH DINNER    atorvastatin (LIPITOR) tablet 20 mg  20 mg Oral QHS    pantoprazole (PROTONIX) granules for oral suspension 40 mg  40 mg Oral ACB    multivitamin (MULTI-DELYN, WELLESSE) oral liquid 30 mL  30 mL Oral DAILY    levothyroxine (SYNTHROID) tablet 150 mcg  150 mcg Oral ACB       Allergies:   Allergies   Allergen Reactions    Pcn [Penicillins] Rash       Functional Progress:    OCCUPATIONAL THERAPY    ON ADMISSION MOST RECENT   Eating  Functional Level: 3   Eating  Functional Level: 6     Grooming  Functional Level: 6   Grooming  Functional Level: 6     Bathing  Functional Level: 2 (Pt stood in shower at PLOF)   Bathing  Functional Level: 4     Upper Body Dressing  Functional Level: 3   Upper Body Dressing  Functional Level: 5     Lower Body Dressing  Functional Level: 2   Lower Body Dressing  Functional Level: 4     Toileting  Functional Level: 4   Toileting  Functional Level: 4     Toilet Transfers  Toilet Transfer Score: 3   Toilet Transfers  Toilet Transfer Score: 4     Tub /Shower Transfers  Tub/Shower Transfer Score: 3   Tub/Shower Transfers  Tub/Shower Transfer Score: 4       Legend:   7 - Independent   6 - Modified Independent   5 - Standby Assistance / Supervision / Set-up   4 - Minimum Assistance / Contact Guard Assistance   3 - Moderate Assistance   2 - Maximum Assistance   1 - Total Assistance / Dependent       Lab/Data Review:  No results found for this or any previous visit (from the past 24 hour(s)). Estimated Glomerular Filtration Rate:  CKD-EPI:   On admission, estimated GFR was 46.2 mL/min/1.73m2 based on a Creatinine of 1.44 mg/dl. Most recent estimated GFR was 44.3 mL/min/1.73m2 based on a Creatinine of 1.49 mg/dl. MDRD:   On admission, estimated GFR was 50.4 mL/min/1.73m2 based on a Creatinine of 1.44 mg/dl. Most recent estimated GFR was 48.5 mL/min/1.73m2 based on a Creatinine of 1.49 mg/dl. Assessment:     Primary Rehabilitation Diagnosis  1. Impaired Mobility and ADLs  2. Acute Ischemic Stroke (acute to subacute ischemia involving the posterior limb of the left internal capsule, along the lateral aspect of the left thalamus) with residual right hemiparesis, dysphagia and dysarthria      Comorbidities   Urinary incontinence    History of malignant neoplasm of prostate    Hypothyroidism    Chronic obstructive pulmonary disease (COPD)    Asbestosis    Osteoarthrosis involving multiple sites    History of endogenous hypertriglyceridemia    CKD (chronic kidney disease) stage 3, GFR 30-59 ml/min    Dyslipidemia (high LDL; low HDL)    Gastroesophageal reflux disease    Procedure refused    Erectile dysfunction       Plan:     1. Justification for continued stay: Good progression towards established rehabilitation goals. 2. Medical Issues being followed closely:    [x]  Fall and safety precautions     []  Wound Care     [x]  Bowel and Bladder Function     [x]  Fluid Electrolyte and Nutrition Balance     []  Pain Control      3.  Issues that 24 hour rehabilitation nursing is following:    [x]  Fall and safety precautions     []  Wound Care     [x]  Bowel and Bladder Function     [x]  Fluid Electrolyte and Nutrition Balance     []  Pain Control      [x]  Assistance with and education on in-room safety with transfers to and from the bed, wheelchair, toilet and shower. 4. Acute rehabilitation plan of care:    [x]  Continue current care and rehab. [x]  Physical Therapy           [x]  Occupational Therapy           [x]  Speech Therapy     []  Hold Rehab until further notice     5. Medications:    [x]  MAR Reviewed     [x]  Continue Present Medications     6. DVT Prophylaxis:      []  Lovenox     [x]  Unfractionated Heparin     []  Coumadin     []  NOAC     [x]  GRIS Stockings     [x]  Sequential Compression Device     []  None     7. Orders:   > Acute Ischemic Stroke (acute to subacute ischemia involving the posterior limb of the left internal capsule, along the lateral aspect of the left thalamus) with residual right hemiparesis, dysphagia and dysarthria    > Continue:    > Aspirin 81 mg PO once daily with breakfast    > Atorvastatin 20 mg PO q HS    > Clopidogrel 75 mg PO once daily with dinner     > Decreased calculated GFR / CKD (chronic kidney disease) stage 3, GFR 30-59 ml/min   > On admission, based on a Creatinine of 1.4 mg/dl:    > Estimated GFR using CKD-EPI = 47.8 mL/min/1.73m2    > Estimated GFR using MDRD = 52.1 mL/min/1.73m2   > Estimated calculated glomerular filtration rate equivalent to that of stage 3 chronic kidney disease = 30-59 ml/min   > Urine Microalbumin/Creatinine ordered     > Dyslipidemia   > Lipid profile (4/19/2017): , . HDL 42, LDL 97   > On 4/20/2017, patient was started at [facility] on Atorvastatin 20 mg PO q HS   > Continue Atorvastatin 20 mg PO q HS      8. Patient's progress in rehabilitation and medical issues discussed with the patient. All questions answered to the best of my ability. Care plan discussed with patient and nurse.       Signed:    Juwan Barton MD    May 2, 2017

## 2017-05-02 NOTE — PROGRESS NOTES
SHIFT CHANGE NOTE FOR [facility]    Bedside and Verbal shift change report given to Edwar GUPTA(oncoming nurse) by Jaleesa Muro, RN   (offgoing nurse). Report included the following information SBAR, Kardex, MAR and Recent Results. Situation:   Code Status: Full Code   Reason for Admission: CVA  Hospital Day: 8   Problem List:   Hospital Problems  Date Reviewed: 4/26/2017          Codes Class Noted POA    Procedure refused ICD-10-CM: Z53.29  ICD-9-CM: V64.2  4/21/2017 Yes    Overview Signed 4/24/2017  3:28 PM by Gloria Camacho MD     Speech Pathologist recommended NPO and PEG placement; Patient refused             * (Principal)Acute ischemic stroke [facility]) ICD-10-CM: I63.9  ICD-9-CM: 434.91  4/19/2017 Yes    Overview Signed 4/24/2017  3:15 PM by Gloria Camacho MD     Acute Ischemic Stroke (acute to subacute ischemia involving the posterior limb of the left internal capsule, along the lateral aspect of the left thalamus) with residual right hemiparesis, dysphagia and dysarthria             Impaired mobility and ADLs ICD-10-CM: Z74.09  ICD-9-CM: 799.89  4/19/2017 Yes        Decreased calculated glomerular filtration rate (GFR) ICD-10-CM: R94.4  ICD-9-CM: 794.4  4/19/2017 Yes    Overview Addendum 4/24/2017  3:21 PM by Gloria Camacho MD     Estimated calculated glomerular filtration rate equivalent to that of stage 3 chronic kidney disease = 30-59 ml/min             Dyslipidemia (high LDL; low HDL) (Chronic) ICD-10-CM: E78.4  ICD-9-CM: 272.4  4/19/2017 Yes    Overview Signed 4/24/2017  3:25 PM by Gloria Camacho MD     Lipid profile (4/19/2017): , .  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) ICD-10-CM: U17.428  ICD-9-CM: 438.13  4/19/2017 Yes              Background:   Past Medical History:   Past Medical History:   Diagnosis Date    Acute ischemic stroke ([facility] [address] 4/19/2017    Acute Ischemic Stroke (acute to subacute ischemia involving the posterior limb of the left internal capsule, along the lateral aspect of the left thalamus) with residual right hemiparesis, dysphagia and dysarthria    Asbestosis ([facility] [address]     Chronic obstructive pulmonary disease (COPD) ([facility] [address]     CKD (chronic kidney disease) stage 3, GFR 30-59 ml/min     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  HDL 42, LDL 97    Dysphagia as late effect of cerebrovascular accident (CVA) 4/19/2017    Erectile dysfunction     Gastroesophageal reflux disease     Hemiparesis affecting right side as late effect of cerebrovascular accident (CVA) ([facility] [address] 4/19/2017    History of endogenous hypertriglyceridemia     History of malignant neoplasm of prostate     [place] score 7     Hypothyroidism     Osteoarthrosis involving multiple sites     Procedure refused 4/21/2017    Speech Pathologist recommended NPO and PEG placement; Patient refused    Urinary incontinence     Male incontinence       Patient taking anticoagulants YES   Patient has a defibrillator: no     Assessment:   Changes in Assessment throughout shift: NONE              Last Vitals:     Vitals:    04/30/17 2000 05/01/17 0800 05/01/17 1724 05/01/17 1930   BP: 134/79 138/77 146/81 145/84   Pulse: 66 67 71 72   Resp: 18 19 18 18   Temp: 97.8 °F (36.6 °C) 98.1 °F (36.7 °C) 97.9 °F (36.6 °C) 97.4 °F (36.3 °C)   SpO2: 97% 98% 99% 97%   Weight:       Height:            PAIN    Pain Assessment    Pain Intensity 1: 0 (05/02/17 0400) Pain Intensity 1: 2 (12/29/14 1105)      Pain Location 1: Abdomen      Pain Intervention(s) 1: Medication (see MAR)  Patient Stated Pain Goal: 0 Patient Stated Pain Goal: 0  o Intervention effective: no    o Other actions taken for pain: NONE     Skin Assessment  Skin color Skin Color: Appropriate for ethnicity  Condition/Temperature Skin Condition/Temp: Dry  Integrity Skin Integrity: Intact  Turgor Turgor: Non-tenting  Weekly Pressure Ulcer Documentation  Pressure  Injury Documentation: No Pressure Injury Noted-Pressure Ulcer Prevention Initiated  Wound Prevention & Protection Methods  Orientation of wound Orientation of Wound Prevention: Posterior  Location of Prevention Location of Wound Prevention: Sacrum/Coccyx  Dressing Present Dressing Present : No  Dressing Status    Wound Offloading Wound Offloading (Prevention Methods): Bed, pressure redistribution/air, Chair cushion     INTAKE/OUPUT    Date 05/01/17 0700 - 05/02/17 0659 05/02/17 0700 - 05/03/17 0659   Shift 6857-0840 6042-8164 24 Hour Total 2522-2838 2010-8700 24 Hour Total   I  N  T  A  K  E   P.O. 480  480         P. O. 480  480       Shift Total  (mL/kg) 480  (5.9)  480  (5.9)      O  U  T  P  U  T   Urine  (mL/kg/hr)            Urine Occurrence(s) 3 x 1 x 4 x       Stool            Stool Occurrence(s) 1 x 0 x 1 x       Shift Total  (mL/kg)           480      Weight (kg) 81.5 81.5 81.5 81.5 81.5 81.5       Recommendations:  1. Patient needs and requests: NONE    2. Diet: CARDIAC PUREED WITH NECTAR THICK LIQUIDS    3. Pending tests/procedures: LABS     4. Functional Level/Equipment: WHEELCHAIR    5. Estimated Discharge Date: TBD Posted on Whiteboard in Patients Room: no     [facility] Safety Check    A safety check occurred in the patient's room between off going nurse and oncoming nurse listed above.     The safety check included the below items  Area Items   H  High Alert Medications - Verify all high alert medication drips (heparin, PCA, etc.)   E  Equipment - Suction is set up for ALL patients (with yanker)  - Red plugs utilized for all equipment (IV pumps, etc.)  - WOWs wiped down at end of shift.  - Room stocked with oxygen, suction, and other unit-specific supplies   A  Alarms - Bed alarm is set for fall risk patients  - Ensure chair alarm is in place and activated if patient is up in a chair   L  Lines - Check IV for any infiltration  - Syed bag is empty if patient has a Syed   - Tubing and IV bags are labeled   S  Safety   - Room is clean, patient is clean, and equipment is clean. - Hallways are clear from equipment besides carts. - Fall bracelet on for fall risk patients  - Ensure room is clear and free of clutter  - Suction is set up for ALL patients (with yanker)  - Hallways are clear from equipment besides carts.    - Isolation precautions followed, supplies available outside room, sign posted

## 2017-05-02 NOTE — INTERDISCIPLINARY ROUNDS
[facility] PHYSICAL REHABILITATION  [address] [place] [address] SUMMARY     Date: 5/2/2017    Name: Alan Don Age / Sex: 64 y.o. / male   CSN: [id number] MRN: [id number]   [address] [place] [address] Date: 4/24/2017 Length of Stay: 8 days       Primary Rehabilitation Diagnosis  1. Impaired Mobility and ADLs  2. Acute Ischemic Stroke (acute to subacute ischemia involving the posterior limb of the left internal capsule, along the lateral aspect of the left thalamus) with residual right hemiparesis, dysphagia and dysarthria       Comorbidities   Urinary incontinence    History of malignant neoplasm of prostate    Hypothyroidism    Chronic obstructive pulmonary disease (COPD)    Asbestosis    Osteoarthrosis involving multiple sites    History of endogenous hypertriglyceridemia    CKD (chronic kidney disease) stage 3, GFR 30-59 ml/min    Dyslipidemia (high LDL; low HDL)    Gastroesophageal reflux disease    Procedure refused    Erectile dysfunction         Therapy:     FIM SCORES Initial Assessment Weekly Progress Assessment 5/2/2017   Eating Functional Level: 3  Comments: Pt may require some assistance with opening packages and cutting food during meals. Pt able to feed himself using his L hand. Feeding/Eating Assistance: 6 (Modified independent)  Comments: Pt able to self-setup meal tray with increased effort due to decreased R hand coordination. Pt utilized his L hand to feed himself despite encouragement from OT prior to initiation of meal to attempt to use his R hand. 5 - Setup   Swallowing     Grooming 6 Grooming Assistance : 6 (Modified independent)  Comments: Pt brushed his hair and performed oral care while standing at the sink with CGA needed for safety with standing only. Pt used B hands to hold and open containers as needed and used his L hand for task of brushing his teeth due to impaired R hand coordination. 6 - Mod I   Bathing 2 (Pt stood in shower at [facility]) [address], Upper: 5 (Supervision)  Position Performed: Seated in chair  Comments: Pt performed UB bathing while seated on shower bench with setup of items and verbal cues for overall thoroughness. Pt needs additional time in order to wash L upper arm and axilla area due to impaired R hand strength and coordination. 5 - Setup  Bathing Assistance, Lower : 4 (Minimal assistance)  Position Performed: Seated in chair;Standing  Comments: Pt uses bending forward method with SBA for safety while seated on shower bench to wash B lower legs and feet. He stood with CGA/Min A for standing balance and safety to wash buttocks and devorah area while maintining R hand support on the grab bar. 4 - Min A   Upper Body Dressing Functional Level: 3  Items Applied/Steps Completed: Pullover (4 steps)  Comments: Pt required Mod A for managing shirt over head and on BUEs while doffing and donning t-shirt. Dressing Assistance : 5 (Supervision)  Comments: Assistance for obtaining shirt only. Pt able to doff and aldo shirt without difficulty. 5 - Setup/Supervision   Lower Body Dressing Functional Level: 2  Items Applied/Steps Completed: Button/zip pants (4 steps), Sock, left (1 step), Sock, right (1 step), Underpants (3 steps)  Comments: Pt requires Max A for managing all LB clothing over B feet. He is able to stand with Min-Mod A for standing balance and safety and pull underwear/pants up over buttocks with slight assistance required for getting them up all the way on the R side due to decreased hand strength and coordination. Dressing Assistance : 4 (Minimal assistance)  Leg Crossed Method Used: Yes  Position Performed: Seated in chair;Standing  Comments: Pt needs additional effort for threading LEs into underwear and pants while seated and bending forward. Pt stood with CGA/Min A for standing balance and safety while pulling underwear and pants up over buttocks.  Assistance required for tying drawstring due to decreased R hand coordination. Pt used crossed-leg technique to aldo B socks with increased time and effort required for managing the R sock using L one-handed technique. Pt donned B tennis shoes with assistance for tying laces only. 4 - Min A   Toileting Functional Level: 4  Comments: Pt requires Min-Mod A for static standing balance while managing clothing over his hips and buttocks with Mod A. Toileting Assistance (FIM Score): 4 (Minimal assistance)  Cues: Physical assistance for pants down4 - Min A   Bladder - level of assist 4     Bladder - accident frequency score 6     Bowel - level of assist 4     Bowel - accident frequency score 5     Toilet Transfer [place] Toilet Transfer Score: 3  Comments: Based on performance of shower transfer and w/c to bed transfer, pt able to perform stand step transfer to/from commode with Mod A for balance, weightshift, and posture. 4 (Minimal assistance)4 - Min A   Tub/Shower Transfer Starke Tub or Shower Type: Shower  Tub/Shower Transfer Score: 3  Comments: Pt performed stand step transfer w/c <> shower bench without an AD with Mod A for balance, weightshift, and posture. Shower4 - Min A  4 (Minimal assistance)   Comprehension Primary Mode of Comprehension: Auditory  Score: 4 Auditory  4   Expression Primary Mode of Expression: Verbal  Score: 4 Verbal  4   Social Interaction Score: 4 5   Problem Solving Score: 4 4   Memory Score: 4 4     FIM SCORES Initial Assessment Weekly Progress Assessment 5/2/2017   Bed/Chair/Wheelchair Transfers Transfer Type: Other  Other: Patient performs stand step transfer with moderate assist from PT for anterior approach R LE knee block with loading 2/2 hx of buckling, trunk stability, pivot assist, and controlled lowering.  Patient demonstrates increased momentum utilization for STS transfer and R genu recurvatum requiring verbal cueing to crrect these; pacing cues successful, however genu recurvatum requires tactile cueing with physical assist to prevent buckling. Patient demosntrates pass retract technique with R LE advancement as well as anterior COG placement to attempts to maintain TKE 2/2 functional quad weakness. Transfer Assistance : 3 (Moderate assistance )  Sit to Stand Assistance: Moderate assistance  Car Transfers: Not tested  Car Type: NA Transfer Type: Other  Other: Patient performs stand step transfer at [facility] level requiring tactile cueing for anteiror COG position; pateitn with improved COG control following education with demonstration. Patient requires CGA for anterior COG positioning and weight shift cueing with verbal cues for step clearance and pacing. Transfer Assistance : 4 (Contact guard assistance)  Sit to Stand Assistance: Contact guard assistance  Car Transfers: Minimum assistance  Car Type: Car simulator Patient performs stand step transfer x 8 in session CGA level with B UE over R distal femur to promote increased weightbearing, increased motor return, and prevent over-utilization of unaffected side. Patient performs this activity with min a x 1 to assist with maintaining anterior COG and additionally requires verbal cueing for improved pacing and prevention of rocking compensation. Patient required to utilize slow progression to increased gluteal loading and focus on smooth concentric/eccentric contraction. Patient performs STS trials as noted above x 15 reps for 2 sets in session. The patient then performs car transfer training with stand step technique also at [facility] initially with door management assist x 3 trials progressing to self management of door with continued CGA and increased v/cing to promote proper gait pattern.     Bed Mobility Rolling Right 5 (Supervision)   Rolling Left 5 (Supervision)   Supine to Sit 5 (Supervision)   Sit to Stand Moderate assistance   Sit to Supine 5 (Supervision)    Rolling Right   6   Rolling Left   6   Supine to Sit   6   Sit to Stand   4   Sit to Supine   6      Locomotion (W/C) Able to Propel (ft): 70 feet (verbal cueing for technique)  Functional Level: 2  Curbs/Ramps Assist Required (FIM Score): 0 (Not tested)  Wheelchair Setup Assist Required : 2 (Maximal assistance)  Wheelchair Management: Manages left brake, Manages right brake Function 6  Setup Assistance  5      Locomotion (W/C distance) 70 Feet 255 ft   Locomotion (Walk) 2 (Maximal assistance) (2/2 R LOB) Min a x 1   Gait belt   Locomotion (Walk dist.) 5 Feet (ft) 250 ft   Steps/Stairs Steps/Stairs Ambulated (#): 0  Level of Assist : 0 (Not tested)  Rail Use:  (NA)   Steps/Stairs Ambulated (#): 0  Level of Assist : 0 (Not tested)  Rail Use:  (NA)         Nursing:     Neuro:   A&O x___4_                   Respiratory:   [x] WNL   [] O2   [] LPM ______   Other:  Peripheral Vascular:   [x] TEDS present   [] Edema present ____ Grade   Cardiac:   [x] WNL   [] Other  Genitourinary:   [x] continent   [] incontinent   [] marie  Abdominal ____4/30/17___ LBM  GI: _cardiac______ Diet ____nectar__ Liquids _____ tube feeds  Musculoskeletal: ____ ROM Transfers __wheelchair___ Assistive Device Used  ___minimal_ Level of Assistance  Skin Integumentary:   [x] Intact   [] Not Intact   __________Preventative Measures  Details______________________________________________________________  Pain: [x] Controlled   [] Not Controlled   Pain Meds:   [] Scheduled   [] PRN        Registered Dietitian / Nutrition:     Patient Vitals for the past 100 hrs:   % Diet Eaten   05/02/17 1334 60 %   05/02/17 0900 50 %   05/01/17 1813 75 %   05/01/17 1337 100 %   05/01/17 0951 75 %   04/30/17 1800 100 %   04/30/17 1400 100 %   04/30/17 0948 100 %   04/29/17 1806 100 %   04/29/17 1324 100 %   04/29/17 1040 60 %   04/28/17 1823 75 %     Patient reported good appetite and meal intake; improved since advanced to soft solid diet.  Discussed discontinue nutrition supplement; pt agreed with plan    Supplements:          [x] Yes: Ensure Enlive once daily   [] No      Amount of supplement consumed: 100%       Intake/Output Summary (Last 24 hours) at 05/02/17 1819  Last data filed at 05/02/17 1334   Gross per 24 hour   Intake              436 ml   Output                0 ml   Net              436 ml                                Last bowel movement: 4/30      Interdisciplinary Team Goals:     1. Goal  Patient will use chin tuck/effortful swallow to tolerate sips of water with the SLP without s/s of aspiration. Barrier  Mild-mod dysphagia, mild-mod cognitive linguistic deficits    Intervention  Training in swallowing techniques, diet modifications, MBS as needed, cognitive retraining. 2. Goal  Pt will use his R hand to perform self-feeding for 50% of meal with minimal encouragement. Barrier  Decreased RUE coordination, Fatigue, Impulsivity, Decreased coordination    Intervention  ADL training, Therapeutic activity, NMRE     3. Goal  Patient will perform STS x 15 in session at [facility] level with mirror and no compensation. Barrier  Impulsivity; LE weakness    Intervention  TE; TA; NMRE     4. Goal  NURSING: Patient will be free from falls during stay on rehab    Barrier  balance    Intervention  falls prevention. Bed and chair alarms     5. Goal  Po intake of meals will continue to meet >75% of patient estimated nutritional needs within the next 7 days. Outcome:  [x] Met/Ongoing    []  Not Met    [] New/Initial Goal     Barrier  none known     Intervention  modified diet; discontinue supplement     6. Goal  Improve self concept and self esteem    Barrier  Frustration with stroke occurrence and forced dependency    Intervention  Stroke education and support ; ego support       Disposition / Discharge Planning:      Follow-up therapy services:  tbd   DME recommendations:  tbd   Estimated discharge date:  5/23/17   Discharge Location:  home         Interdisciplinary team rounds were held this AM with the following team members:      Name   Physical Therapist    Isla Koyanagi PT DPT   Occupational Therapist    Holley Fam OTR   Speech Therapist   Rizwan Celaya, [address] [facility]   Recreational Therapist       Nursing    Sarabjit Sanders, ALONSO   Dietitian    Kathryn Love, NABILA   Clinical Psychologist    Td Koenig, Ph.D.   Physician    Ariela Suárez.  Scott Torres MD       Franklin King MSW       Signed:  Jie Rangel MD     May 2, 2017

## 2017-05-02 NOTE — PROGRESS NOTES
Problem: Neurolinguistics Impaired (Adult)  Goal: *Speech Goal: (INSERT TEXT)  Long term goals:  1. Patient will tolerate a regular/thin liquid diet without overt s/s of aspiration, 4 consecutive meals. 2. Patient will use safe swallowing techniques with supervision for all PO intake. 3. Patient will answer complex yes/no questions with 90% accuracy. 4 patient will follow complex commands for object manipulation wit 80-90% accuracy. 5. Patient will name 10-12 items within simple categories with supervision. 6. Patient will perform varied word retrieval tasks with % accuracy. 7. Patient will recall 3 words after 5 minutes, supervision. 8. Patient will perform functional problem solving/reasoning tasks with supervision. Short term goals (by 5/2/17):  1. Patient will tolerate a soft, nectar thick liquid diet without overt s/s of aspiration, 4 consecutive meals. 2. Patient will use safe swallowing techniques with min-mod cues for all PO intake. 3. Patient will answer complex yes/no questions with 75-80%% accuracy. 4 patient will follow complex commands for object manipulation wit 80-90% accuracy. 5. Patient will name 7-9 items within simple categories with supervision. 6. Patient will perform varied word retrieval tasks with 70-80% accuracy. 7. Patient will recall 3 words after 5 minutes, min-mod assist.  8. Patient will perform functional problem solving/reasoning tasks with 75-85% accuracy. SPEECH LANGUAGE PATHOLOGY TREATMENT     Patient: Jim Campos (89 y.o. male)  Date: 5/2/2017  Diagnosis: Left CVA  Acute ischemic stroke ([facility] [address] Acute ischemic stroke ([facility] [address]      SUBJECTIVE:   Patient stated I haven't been sleeping well. OBJECTIVE:   Mental Status:  Mr. Khadra Kat though very tired was alert and cooperative in therapy. Treatment & Interventions:   Patient was seen at mealtime in the dining room for breakfast and lunch.   This morning he was a part of the therapeutic dining group. He continues to need cues to limit bolus size, particularly with liquids. Patient with occasional throat clearing after larger liquid boluses. SLP provided cues for small sips of liquids. His appetite is good and he generally eats 90% of his meals. Mr. Lorenzo Mata was also seen for a 30 minute speech therapy session this afternoon. He participated in the following treatment tasks:     Neuro-Linguistics:   Orientation/recent memory:   Supervision  Moderately complex yes/no responses: 100% accuracy  Provide additional member to category: 80% accuracy  Recall 3 words after 5 minutes:  Min assist  Discuss words with multiple meanings: 100% appropriate responses     Response & Tolerance to Activities: Though fatigued this afternoon, patient participated well in all tasks presented. He enjoys conversation. Use of safe swallowing techniques at mealtime is slowly improving. Pain:  Patient had no complaints of pain in today's sessions     After treatment:   [x]       Patient left in no apparent distress sitting up in chair  [x]       Patient left in no apparent distress in bed  [x]       Call bell left within reach  [ ]       Nursing notified  [ ]       Caregiver present  [ ]       Bed alarm activated      ASSESSMENT:   Progression toward goals:  [x]       Improving appropriately and progressing toward goals  [ ]       Improving slowly and progressing toward goals  [ ]       Not making progress toward goals and plan of care will be adjusted      PLAN:   Patient continues to benefit from skilled intervention to address the above impairments. Continue treatment per established plan of care. Discharge Recommendations:   To Be Determined     AUGUSTINA Ramírez  Time Calculation:  120 minutes

## 2017-05-02 NOTE — PROGRESS NOTES
Problem: Mobility Impaired (Adult and Pediatric)  Goal: *Acute Goals and Plan of Care (Insert Text)  Physical Therapy Short Term Goals  Initiated 4/25/2017 and to be accomplished within 7 day(s) (05/02/2017)  1. Patient will move from supine to sit and sit to supine , scoot up and down and roll side to side in bed with modified independence. 2. Patient will transfer from bed to chair and chair to bed with minimal assistance/contact guard assist using the least restrictive device. 3. Patient will perform sit to stand with minimal assistance/contact guard assist.  4. Patient will ambulate with moderate assistance for 50 feet with the least restrictive device. 5. Patient will ascend/descend 6 stairs with 2 handrail(s) with moderate assistance . Physical Therapy Long Term Goals  Initiated 4/25/2017 and to be accomplished within 28 day(s) (05/23/2017)  1. Patient will move from supine to sit and sit to supine , scoot up and down and roll side to side in bed with independence. 2. Patient will transfer from bed to chair and chair to bed with modified independence using the least restrictive device. 3. Patient will perform sit to stand with modified independence. 4. Patient will ambulate with modified independence for 250 feet with the least restrictive device. 5. Patient will ascend/descend 6 stairs with 2 handrail(s) with modified independence. PT WEEKLY PROGRESS NOTE  Patient Name:Stewart Chance   Time In: 0827   Time Out: 4368   Time In: 1330   Time Out: 1429     Subjective: Patient reports comfort and motivation to participate in session in both AM and PM sessions. Objective:   Patient performs NDT STS transfer training requiring verbal cueing for form and education as noted below in transfer session. Patient demonstrates STS challenge at the completion of physical assessment as noted below for additional challenge.  Patient performs STS x 5 reps prior to fatigue requiring rest breaks between 3 sets. Patient then performs lorelei technique w/c mobility requiring R UE/LE utilization only in order to promote increased coordination, fine motor skill, and functional endurance in affected side. Outcome Measures: FIM/MMT  Pain at start of tx:0/10  Pain at stop of tx:0/10     Patient identified with name and : YES                    AROM: WFL      FIM SCORES Initial Assessment Weekly Progress Assessment 2017   Bed/Chair/Wheelchair Transfers 3 4   Wheelchair Mobility 2 6   Walking [place]agoner 1 4   Steps/Stairs 0 4   Please see [facility] Interdisciplinary Eval: Coordination/Balance Section for details regarding FIM score description. BED/CHAIR/WHEELCHAIR TRANSFERS Initial Assessment Weekly Progress Assessment 2017   Rolling Right 5 (Supervision) 6 (Modified independent)   Rolling Left 5 (Supervision) 6 (Modified independent)   Supine to Sit 5 (Supervision) 6 (Modified independent)   Sit to Stand Moderate assistance Contact guard assistance   Sit to Supine 5 (Supervision) 6 (Modified independent)   Transfer Type Other Other   Comments Patient performs stand step transfer with moderate assist from PT for anterior approach R LE knee block with loading 2/2 hx of buckling, trunk stability, pivot assist, and controlled lowering. Patient demonstrates increased momentum utilization for STS transfer and R genu recurvatum requiring verbal cueing to correct these; pacing cues successful, however genu recurvatum requires tactile cueing with physical assist to prevent buckling. Patient demonstrates pass retract technique with R LE advancement as well as anterior COG placement to attempts to maintain TKE 2/2 functional quad weakness. Patient performs stand step transfer at [facility] level requiring tactile cueing for anterior COG position; patient with improved COG control following education with demonstration.  Patient requires CGA for anterior COG positioning and weight shift cueing with verbal cues for step clearance and pacing. Car Transfer Not tested Minimum assistance   Car Type NA Car simulator       GROSS ASSESSMENT Weekly Progress Assessment 5/2/2017   AROM Within functional limits   Strength Generally decreased, functional (R LE/UE)   Coordination Generally decreased, functional (R LE/UE)   Tone Normal   Sensation Impaired (somatosensation and proprioceptive deficits)   PROM Within functional limits       POSTURE Weekly Progress Assessment 5/2/2017   Posture (WDL) Exceptions to WDL   Posture Assessment Forward head;Genu recurvatum right (intermittent genu recurvatum on R with STS only)       WHEELCHAIR MOBILITY/MANAGEMENT Initial Assessment Weekly Progress Assessment 5/2/2017   Able to Propel 70 feet (verbal cueing for technique) 255 feet   Curbs/ramps assistance required 0 (Not tested) 3 (Moderate assistance)   Wheelchair set up assistance required 2 (Maximal assistance) SPV   Wheelchair management Manages left brake, Manages right brake Manages left brake;Manages right brake;Manages left footrest;Manages right footrest;Manages left armrest;Manages right armrest (w/ verbal cueing)       WALKING INDEPENDENCE Initial Assessment Weekly Progress Assessment 5/2/2017   Assistive device Gait belt Gait belt   Ambulation assistance - level surface Mod a x 1 Min A x 1   Distance 5 Feet (ft) 255 Feet (ft)   Comments 5 ft with decreased step length B shortest on L. Patient also demonstrates significant trunk sway with trendelenburg during R SLS. Patient demonstrates intermittent anterior COG progression requiring physical assist to recover COG w/i CATA. Patient demonstrates R LE genu recurvatum with pass retract advancement technique; Patient able to ambulate 20 ft with moderate a x 1 for trunk control, R LE advancement, postural assistance and assisted weight shift with pacing cues. 255 ft w/ min a x 1 for trunk stability with LOB not immediately preceding a hip or ankle balance strategy.  Gait belt utilized with PT assist. decreased step length with mild antalgia in R stance phase. GAIT Weekly Progress Assessment 5/2/2017   Gait Description (WDL) Exceptions to WDL   Gait Abnormalities Antalgic;Decreased step clearance;Trunk sway increased;Trendelenburg (Patient demsontrates improved step length to step-through)       STEPS/STAIRS Initial Assessment Weekly Progress Assessment 5/2/2017   Steps/Stairs ambulated 0 24   Rail Use  (NA) Left    Comments Pt unsafe to perform at this time 24 steps with L HR non-reciprocal pattern, patient requires verbal cueing in order to achieve and maintain appropriate sequencing   Curbs/Ramps NA Mod a x 1               Assessment: Patient has completed all STG's at this time demonstrating excellent progress toward LTG's with the expectation to demonstrate continued strong progress with intensive skilled physical therapy. Plan of Care: Please see Care Plan for updated LTGs. Family Training: GLENN uGy.  Ivone, PT DPT  5/2/2017

## 2017-05-02 NOTE — PROGRESS NOTES
Problem: Self Care Deficits Care Plan (Adult)  Goal: *Acute Goals and Plan of Care (Insert Text)  Long Term Goals (to be met upon discharge date) in order to increase pts functional independence and safety, and decrease burden of care:  1. Pt will perform self-feeding with independence. 2. Pt will perform grooming with independence. 3. Pt will perform UB bathing with modified independence. 4. Pt will perform LB bathing with modified independence. 5. Pt will perform tshower transfer with modified independence. 6. Pt will perform UB dressing with independence. 7. Pt will perform LB dressing with modified independence. 8. Pt will perform toileting task with modified independence. 9. Pt will perform toilet transfer with modified independence. Short Term Weekly Goals for (2017 to 2017) in order to increase pts functional independence and safety, and decrease burden of care:  1. Pt will perform self-feeding with modified independence. 2. Pt will perform grooming with modified independence. 3. Pt will perform UB bathing with supervision/setup. 4. Pt will perform LB bathing with Min A.  5. Pt will perform shower transfer with Min A.  6. Pt will perform UB dressing with Min A.  7. Pt will perform LB dressing with Mod A.  8. Pt will perform toileting task with Mod A.  9. Pt will perform toilet transfer with Min A.   OT WEEKLY PROGRESS NOTE  Patient Name:Stewart Reinoso  Time Spent With Patient  Time In: 0800  Time Out: 0900      Time In: 1215  Time Out: 200     Medical Diagnosis:  Left CVA  Acute ischemic stroke ([address] Acute ischemic stroke ([address]      Pain at start of tx: 0/10  Pain at stop of tx: 0/10     Patient identified with name and : yes  Subjective: Pt continues to report being tired during both treatment sessions, however pt is very motivated and agreeable to participate in all treatment activities.              Objective: AM Session: Pt performed full body shower; see below for ADL and functional transfers/mobility. PM Session: Pt worked on [employer] coordination, completing keyhole peg placement utilizing R pincer grasp. Pt needed slightly additional time and effort to perform in-hand manipulation to orient pegs in the correct direction to place them in the pegboard. To remove all pegs, pt obtained each one from the board and performed digit to palm translation and maintained ordoñez grasp of all pegs collected. Pt also worked on B FM coordination stringing small beads onto shoelace. Pt held shoestring with his L hand and picked up beans from the bin with slightly increased effort needed for pinching bead as opposed to compensating and using a raking motion.         Outcome Measures:                  AROM: WFL UEs         COGNITION/PERCEPTION Initial Assessment Weekly Progress Assessment 5/2/2017   Premorbid Reading Status Literate     Premorbid Writing Status WFL     Arousal/Alertness    Alert; Generalized responses   Orientation Level Oriented X4  Oriented x4   Visual Fields  (Appears Intact)  Appears Intact   Praxis Impaired (RUE)  Impaired RUE   Body Scheme Cues to maintain midline in standing, Tactile, Verbal, Visual  Cues to maintain midline in standing   COMPREHENSION MODE Initial Assessment Weekly Progress Assessment 5/2/2017   Primary Mode of Comprehension Auditory Auditory   Hearing Aide       Corrective Lenses       Score 5 5       EXPRESSION Initial Assessment Weekly Progress Assessment 5/2/2017   Primary Mode of Expression Verbal Verbal   Score 5 5   Comments           SOCIAL INTERACTION/ PRAGMATICS Initial Assessment Weekly Progress Assessment 5/2/2017   Score 6 6   Comments           PROBLEM SOLVING Initial Assessment Weekly Progress Assessment 5/2/2017   Score 4 4   Comments           MEMORY Initial Assessment Weekly Progress Assessment 5/2/2017   Score 4 4   Comments           EATING Initial Assessment Weekly Progress Assessment 5/2/2017   Functional Level 3 Feeding/Eating  Feeding/Eating Assistance: 6 (Modified independent)  Comments: Pt able to self-setup meal tray with increased effort due to decreased R hand coordination. Pt utilized his L hand to feed himself despite encouragement from OT prior to initiation of meal to attempt to use his R hand. Comments Pt may require some assistance with opening packages and cutting food during meals. Pt able to feed himself using his L hand. GROOMING Initial Assessment Weekly Progress Assessment 5/2/2017   Functional Level   Grooming  Grooming Assistance : 6 (Modified independent)  Comments: Pt brushed his hair and performed oral care while standing at the sink with CGA needed for safety with standing only. Pt used B hands to hold and open containers as needed and used his L hand for task of brushing his teeth due to impaired R hand coordination. Tasks completed by patient Brushed hair, Brushed teeth, Washed face     Comments Pt combed his hair and brushed his teeth using his L hand and needed extra time in order to open toothpaste tube due to decreased R hand strength and coordination. BATHING Initial Assessment Weekly Progress Assessment 5/2/2017   Functional Level 2 (Pt stood in shower at PLOF) Upper Body Bathing  Bathing Assistance, Upper: 5 (Supervision)  Position Performed: Seated in chair  Comments: Pt performed UB bathing while seated on shower bench with setup of items and verbal cues for overall thoroughness. Pt needs additional time in order to wash L upper arm and axilla area due to impaired R hand strength and coordination. Lower Body Bathing  Bathing Assistance, Lower : 4 (Minimal assistance)  Position Performed: Seated in chair;Standing  Comments: Pt uses bending forward method with SBA for safety while seated on shower bench to wash B lower legs and feet. He stood with CGA/Min A for standing balance and safety to wash buttocks and devorah area while maintining R hand support on the grab bar. Body parts patient bathed Abdomen, Arm, left, Arm, right, Chest, Devorah area, Thigh, left, Thigh, right     Comments Pt performed full body bathing while seated on shower bench. He requires Min A for thoroughly washing LUE due to decreased RUE coordination. Pt able to wash devorah area and B thighs when seated with assistance required for thoroughly washing B feet due to difficulty reaching. He needs additional assistance for washing the R side of his buttocks. TUB/SHOWER TRANSFER INDEPENDENCE Initial Assessment Weekly Progress Assessment 5/2/2017   Score 3 Functional Transfers  Toilet Transfer : Grab bars (Stand step transfer without AD)  Amount of Assistance Required: 4 (Minimal assistance)  Tub or Shower Type: Shower  Amount of Assistance Required: 4 (Minimal assistance)  Adaptive Equipment: Tub transfer bench;Grab bars   Comments Pt performed stand step transfer w/c <> shower bench without an AD with Mod A for balance, weightshift, and posture. Pt performed stand step transfer w/c <> shower bench using NDT technique for sit <> stand with Min A for ant and up weightshift and to control descent with stand to sit. Pt utilized grab bar for support while standing and able to step with CGA. UPPER BODY DRESSING/UNDRESSING Initial Assessment Weekly Progress Assessment 5/2/2017   Functional Level 3 Upper Body Dressing   Dressing Assistance : 5 (Supervision)  Comments: Assistance for obtaining shirt only. Pt able to doff and aldo shirt without difficulty. Items applied/Steps completed Pullover (4 steps)     Comments Pt required Mod A for managing shirt over head and on BUEs while doffing and donning t-shirt.          LOWER BODY DRESSING/UNDRESSING Initial Assessment Weekly Progress Assessment 5/2/2017   Functional Level 2 Lower Body Dressing   Dressing Assistance : 4 (Minimal assistance)  Leg Crossed Method Used: Yes  Position Performed: Seated in chair;Standing  Comments: Pt needs additional effort for threading LEs into underwear and pants while seated and bending forward. Pt stood with CGA/Min A for standing balance and safety while pulling underwear and pants up over buttocks. Assistance required for tying drawstring due to decreased R hand coordination. Pt used crossed-leg technique to aldo B socks with increased time and effort required for managing the R sock using L one-handed technique. Pt donned B tennis shoes with assistance for tying laces only. Items applied/Steps completed Button/zip pants (4 steps), Sock, left (1 step), Sock, right (1 step), Underpants (3 steps)     Comments Pt requires Max A for managing all LB clothing over B feet. He is able to stand with Min-Mod A for standing balance and safety and pull underwear/pants up over buttocks with slight assistance required for getting them up all the way on the R side due to decreased hand strength and coordination. TOILETING Initial Assessment Weekly Progress Assessment 5/2/2017   Functional Level 4 Toileting  Toileting Assistance (FIM Score): 4 (Minimal assistance)  Cues: Physical assistance for pants down   Comments Pt requires Min-Mod A for static standing balance while managing clothing over his hips and buttocks with Mod A. Pt required CGA/Min A for standing balance and safety while managing underwear and pants. Pt needed assistance only for untying/tying drawstring to manage his pants.        TOILET TRANSFER INDEPENDENCE Initial Assessment Weekly Progress Assessment 5/2/2017   Transfer score 3 Functional Transfers  Toilet Transfer : Grab bars (Stand step transfer without AD)  Amount of Assistance Required: 4 (Minimal assistance)  Tub or Shower Type: Shower  Amount of Assistance Required: 4 (Minimal assistance)  Adaptive Equipment: Tub transfer bench;Grab bars   Comments Based on performance of shower transfer and w/c to bed transfer, pt able to perform stand step transfer to/from commode with Mod A for balance, weightshift, and posture. Pt performed stand step transfer w/c <> commode using NDT technique for sit <> stand with Min A for ant and up weightshift and to control descent with stand to sit. Pt utilized grab bar for support while standing and able to step with CGA. Assessment: Pt has made good progress this week and achieved all STGs. Plan of Care: Please see Care Plan for updated LTGs.       SERENITY Lopes  5/2/2017

## 2017-05-03 PROBLEM — N18.31 CKD STAGE G3A/A1, GFR 45-59 AND ALBUMIN CREATININE RATIO <30 MG/G (HCC): Status: ACTIVE | Noted: 2017-05-03

## 2017-05-03 LAB
CREAT UR-MCNC: 216 MG/DL (ref 30–125)
MICROALBUMIN UR-MCNC: 6.37 MG/DL (ref 0–3)
MICROALBUMIN/CREAT UR-RTO: 29 MG/G (ref 0–30)

## 2017-05-03 PROCEDURE — 97535 SELF CARE MNGMENT TRAINING: CPT

## 2017-05-03 PROCEDURE — 65310000000 HC RM PRIVATE REHAB

## 2017-05-03 PROCEDURE — 92526 ORAL FUNCTION THERAPY: CPT

## 2017-05-03 PROCEDURE — 74011250637 HC RX REV CODE- 250/637: Performed by: INTERNAL MEDICINE

## 2017-05-03 PROCEDURE — 92507 TX SP LANG VOICE COMM INDIV: CPT

## 2017-05-03 PROCEDURE — 97112 NEUROMUSCULAR REEDUCATION: CPT

## 2017-05-03 PROCEDURE — 74011250636 HC RX REV CODE- 250/636: Performed by: INTERNAL MEDICINE

## 2017-05-03 PROCEDURE — 97530 THERAPEUTIC ACTIVITIES: CPT

## 2017-05-03 RX ADMIN — HEPARIN SODIUM 5000 UNITS: 5000 INJECTION, SOLUTION INTRAVENOUS; SUBCUTANEOUS at 18:20

## 2017-05-03 RX ADMIN — MULTIPLE VITAMIN LIQUID 30 ML: LIQUID at 10:55

## 2017-05-03 RX ADMIN — ATORVASTATIN CALCIUM 20 MG: 10 TABLET, FILM COATED ORAL at 21:28

## 2017-05-03 RX ADMIN — ASPIRIN 81 MG CHEWABLE TABLET 81 MG: 81 TABLET CHEWABLE at 10:55

## 2017-05-03 RX ADMIN — CLOPIDOGREL BISULFATE 75 MG: 75 TABLET, FILM COATED ORAL at 18:21

## 2017-05-03 RX ADMIN — LEVOTHYROXINE SODIUM 150 MCG: 100 TABLET ORAL at 05:30

## 2017-05-03 RX ADMIN — PANTOPRAZOLE SODIUM 40 MG: 40 GRANULE, DELAYED RELEASE ORAL at 05:26

## 2017-05-03 RX ADMIN — CHOLECALCIFEROL TAB 25 MCG (1000 UNIT) 2000 UNITS: 25 TAB at 10:55

## 2017-05-03 RX ADMIN — HEPARIN SODIUM 5000 UNITS: 5000 INJECTION, SOLUTION INTRAVENOUS; SUBCUTANEOUS at 05:32

## 2017-05-03 NOTE — PROGRESS NOTES
SHIFT CHANGE NOTE FOR [facility]    Bedside and Verbal shift change report given to Gale Ewing RN(oncoming nurse) by Paula Goldmann, LPN   (offgoing nurse). Report included the following information SBAR, Kardex, MAR and Recent Results. Situation:   Code Status: Full Code   Reason for Admission: CVA  Hospital Day: 9   Problem List:   Hospital Problems  Date Reviewed: 5/2/2017          Codes Class Noted POA    Procedure refused ICD-10-CM: Z53.29  ICD-9-CM: V64.2  4/21/2017 Yes    Overview Signed 4/24/2017  3:28 PM by Lyle Diaz MD     Speech Pathologist recommended NPO and PEG placement; Patient refused             * (Principal)Acute ischemic stroke [facility]) ICD-10-CM: I63.9  ICD-9-CM: 434.91  4/19/2017 Yes    Overview Signed 4/24/2017  3:15 PM by Lyle Diaz MD     Acute Ischemic Stroke (acute to subacute ischemia involving the posterior limb of the left internal capsule, along the lateral aspect of the left thalamus) with residual right hemiparesis, dysphagia and dysarthria             Impaired mobility and ADLs ICD-10-CM: Z74.09  ICD-9-CM: 799.89  4/19/2017 Yes        Decreased calculated glomerular filtration rate (GFR) ICD-10-CM: R94.4  ICD-9-CM: 794.4  4/19/2017 Yes    Overview Addendum 4/24/2017  3:21 PM by Lyle Diaz MD     Estimated calculated glomerular filtration rate equivalent to that of stage 3 chronic kidney disease = 30-59 ml/min             Dyslipidemia (high LDL; low HDL) (Chronic) ICD-10-CM: E78.4  ICD-9-CM: 272.4  4/19/2017 Yes    Overview Signed 4/24/2017  3:25 PM by Lyle Diaz MD     Lipid profile (4/19/2017): , .  HDL 42, LDL 97             Hemiparesis affecting right side as late effect of cerebrovascular accident (CVA) ([facility] ICD-10-CM: P03.475  ICD-9-CM: 438.20  4/19/2017 Yes        Dysphagia as late effect of cerebrovascular accident (CVA) ICD-10-CM: C34.243  ICD-9-CM: 438.82  4/19/2017 Yes        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) ICD-10-CM: W37.307  ICD-9-CM: 438.13  4/19/2017 Yes              Background:   Past Medical History:   Past Medical History:   Diagnosis Date    Acute ischemic stroke ([facility] 4/19/2017    Acute Ischemic Stroke (acute to subacute ischemia involving the posterior limb of the left internal capsule, along the lateral aspect of the left thalamus) with residual right hemiparesis, dysphagia and dysarthria    [facility]     Chronic obstructive pulmonary disease (COPD) ([facility]     CKD (chronic kidney disease) stage 3, GFR 30-59 ml/min     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  HDL 42, LDL 97    Dysphagia as late effect of cerebrovascular accident (CVA) 4/19/2017    Erectile dysfunction     Gastroesophageal reflux disease     Hemiparesis affecting right side as late effect of cerebrovascular accident (CVA) ([facility] 4/19/2017    History of endogenous hypertriglyceridemia     History of malignant neoplasm of prostate     Genoveva score 7     Hypothyroidism     Osteoarthrosis involving multiple sites     Procedure refused 4/21/2017    Speech Pathologist recommended NPO and PEG placement; Patient refused    Urinary incontinence     Male incontinence       Patient taking anticoagulants YES   Patient has a defibrillator: no     Assessment:   Changes in Assessment throughout shift: NONE              Last Vitals:     Vitals:    05/02/17 0752 05/02/17 1620 05/02/17 1930 05/03/17 0739   BP: 151/77 131/77 144/79 147/83   Pulse: 71 70 72 65   Resp: 18 19 18 20   Temp: 95.5 °F (35.3 °C) 97 °F (36.1 °C) 98.2 °F (36.8 °C) 97.3 °F (36.3 °C)   SpO2: 97% 97% 98% 98%   Weight:       Height:            PAIN    Pain Assessment    Pain Intensity 1: 0 (05/03/17 0400) Pain Intensity 1: 2 (12/29/14 1105)      Pain Location 1: Abdomen      Pain Intervention(s) 1: Medication (see MAR)  Patient Stated Pain Goal: 0 Patient Stated Pain Goal: 0  o Intervention effective: no    o Other actions taken for pain: NONE     Skin Assessment  Skin color Skin Color: Appropriate for ethnicity  Condition/Temperature Skin Condition/Temp: Dry, Warm  Integrity Skin Integrity: Intact  Turgor Turgor: Non-tenting  Weekly Pressure Ulcer Documentation  Pressure  Injury Documentation: No Pressure Injury Noted-Pressure Ulcer Prevention Initiated  Wound Prevention & Protection Methods  Orientation of wound Orientation of Wound Prevention: Posterior  Location of Prevention Location of Wound Prevention: Sacrum/Coccyx  Dressing Present Dressing Present : No  Dressing Status    Wound Offloading Wound Offloading (Prevention Methods): Bed, pressure redistribution/air     INTAKE/OUPUT    Date 05/02/17 0700 - 05/03/17 0659 05/03/17 0700 - 05/04/17 0659   Shift 3551-5173 6193-0215 24 Hour Total 6042-4823 6397-4715 24 Hour Total   I  N  T  A  K  E   P.O. 676  676         P. O. 676  676       Shift Total  (mL/kg) 676  (8.3)  676  (8.3)      O  U  T  P  U  T   Urine  (mL/kg/hr)            Urine Occurrence(s) 6 x 3 x 9 x       Stool            Stool Occurrence(s) 3 x 3 x 6 x       Shift Total  (mL/kg)           676      Weight (kg) 81.5 81.5 81.5 81.5 81.5 81.5       Recommendations:  1. Patient needs and requests: NONE    2. Diet: CARDIAC PUREED WITH NECTAR THICK LIQUIDS    3. Pending tests/procedures: LABS     4. Functional Level/Equipment: WHEELCHAIR    5. Estimated Discharge Date: TBD Posted on Whiteboard in Patients Room: no     [facility] Safety Check    A safety check occurred in the patient's room between off going nurse and oncoming nurse listed above.     The safety check included the below items  Area Items   H  High Alert Medications - Verify all high alert medication drips (heparin, PCA, etc.)   E  Equipment - Suction is set up for ALL patients (with estherker)  - Red plugs utilized for all equipment (IV pumps, etc.)  - WOWs wiped down at end of shift.  - Room stocked with oxygen, suction, and other unit-specific supplies   A  Alarms - Bed alarm is set for fall risk patients  - Ensure chair alarm is in place and activated if patient is up in a chair   L  Lines - Check IV for any infiltration  - Syed bag is empty if patient has a Syed   - Tubing and IV bags are labeled   S  Safety   - Room is clean, patient is clean, and equipment is clean. - Hallways are clear from equipment besides carts. - Fall bracelet on for fall risk patients  - Ensure room is clear and free of clutter  - Suction is set up for ALL patients (with yanker)  - Hallways are clear from equipment besides carts.    - Isolation precautions followed, supplies available outside room, sign posted

## 2017-05-03 NOTE — PROGRESS NOTES
[facility]    Bedside and Verbal shift change report given to Gurwinder Costa LPN(oncoming nurse) by Sonido Tom RN   (offgoing nurse). Report included the following information SBAR, Kardex, MAR and Recent Results. Situation:   Code Status: Full Code   Reason for Admission: CVA  Hospital Day: 9   Problem List:   Hospital Problems  Date Reviewed: 5/2/2017          Codes Class Noted POA    Procedure refused ICD-10-CM: Z53.29  ICD-9-CM: V64.2  4/21/2017 Yes    Overview Signed 4/24/2017  3:28 PM by Vanessa Jin MD     Speech Pathologist recommended NPO and PEG placement; Patient refused             * (Principal)Acute ischemic stroke [facility]) ICD-10-CM: I63.9  ICD-9-CM: 434.91  4/19/2017 Yes    Overview Signed 4/24/2017  3:15 PM by Vanessa Jin MD     Acute Ischemic Stroke (acute to subacute ischemia involving the posterior limb of the left internal capsule, along the lateral aspect of the left thalamus) with residual right hemiparesis, dysphagia and dysarthria             Impaired mobility and ADLs ICD-10-CM: Z74.09  ICD-9-CM: 799.89  4/19/2017 Yes        Decreased calculated glomerular filtration rate (GFR) ICD-10-CM: R94.4  ICD-9-CM: 794.4  4/19/2017 Yes    Overview Addendum 4/24/2017  3:21 PM by Vanessa Jin MD     Estimated calculated glomerular filtration rate equivalent to that of stage 3 chronic kidney disease = 30-59 ml/min             Dyslipidemia (high LDL; low HDL) (Chronic) ICD-10-CM: E78.4  ICD-9-CM: 272.4  4/19/2017 Yes    Overview Signed 4/24/2017  3:25 PM by Vanessa Jin MD     Lipid profile (4/19/2017): , .  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) ICD-10-CM: O76.130  ICD-9-CM: 438.13  4/19/2017 Yes              Background:   Past Medical History:   Past Medical History:   Diagnosis Date    Acute ischemic stroke ([facility] 4/19/2017    Acute Ischemic Stroke (acute to subacute ischemia involving the posterior limb of the left internal capsule, along the lateral aspect of the left thalamus) with residual right hemiparesis, dysphagia and dysarthria    [facility]     [facility] pulmonary disease (COPD) [facility]     CKD (chronic kidney disease) stage 3, GFR 30-59 ml/min     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  HDL 42, LDL 97    Dysphagia as late effect of cerebrovascular accident (CVA) 4/19/2017    Erectile dysfunction     Gastroesophageal reflux disease     Hemiparesis affecting right side as late effect of cerebrovascular accident (CVA) ([facility] 4/19/2017    History of endogenous hypertriglyceridemia     History of malignant neoplasm of prostate     Genoveva score 7     Hypothyroidism     Osteoarthrosis involving multiple sites     Procedure refused 4/21/2017    Speech Pathologist recommended NPO and PEG placement; Patient refused    Urinary incontinence     Male incontinence       Patient taking anticoagulants YES   Patient has a defibrillator: no     Assessment:   Changes in Assessment throughout shift: NONE              Last Vitals:     Vitals:    05/01/17 1930 05/02/17 0752 05/02/17 1620 05/02/17 1930   BP: 145/84 151/77 131/77 144/79   Pulse: 72 71 70 72   Resp: 18 18 19 18   Temp: 97.4 °F (36.3 °C) 95.5 °F (35.3 °C) 97 °F (36.1 °C) 98.2 °F (36.8 °C)   SpO2: 97% 97% 97% 98%   Weight:       Height:            PAIN    Pain Assessment    Pain Intensity 1: 0 (05/03/17 0400) Pain Intensity 1: 2 (12/29/14 1105)      Pain Location 1: Abdomen      Pain Intervention(s) 1: Medication (see MAR)  Patient Stated Pain Goal: 0 Patient Stated Pain Goal: 0  o Intervention effective: no    o Other actions taken for pain: NONE     Skin Assessment  Skin color Skin Color: Appropriate for ethnicity  Condition/Temperature Skin Condition/Temp: Dry, Warm  Integrity Skin Integrity: Intact  Turgor Turgor: Non-tenting  Weekly Pressure Ulcer Documentation  Pressure  Injury Documentation: No Pressure Injury Noted-Pressure Ulcer Prevention Initiated  Wound Prevention & Protection Methods  Orientation of wound Orientation of Wound Prevention: Posterior  Location of Prevention Location of Wound Prevention: Sacrum/Coccyx  Dressing Present Dressing Present : No  Dressing Status    Wound Offloading Wound Offloading (Prevention Methods): Bed, pressure redistribution/air, Chair cushion     INTAKE/OUPUT    Date 05/02/17 0700 - 05/03/17 0659 05/03/17 0700 - 05/04/17 0659   Shift 1822-4822 7281-0234 24 Hour Total 7275-5348 2977-4002 24 Hour Total   I  N  T  A  K  E   P.O. 676  676         P. O. 676  676       Shift Total  (mL/kg) 676  (8.3)  676  (8.3)      O  U  T  P  U  T   Urine  (mL/kg/hr)            Urine Occurrence(s) 6 x 3 x 9 x       Stool            Stool Occurrence(s) 3 x 3 x 6 x       Shift Total  (mL/kg)           676      Weight (kg) 81.5 81.5 81.5 81.5 81.5 81.5       Recommendations:  1. Patient needs and requests: NONE    2. Diet: CARDIAC PUREED WITH NECTAR THICK LIQUIDS    3. Pending tests/procedures: LABS     4. Functional Level/Equipment: WHEELCHAIR    5. Estimated Discharge Date: TBD Posted on Whiteboard in Patients Room: no     [facility] Safety Check    A safety check occurred in the patient's room between off going nurse and oncoming nurse listed above.     The safety check included the below items  Area Items   H  High Alert Medications - Verify all high alert medication drips (heparin, PCA, etc.)   E  Equipment - Suction is set up for ALL patients (with estherker)  - Red plugs utilized for all equipment (IV pumps, etc.)  - WOWs wiped down at end of shift.  - Room stocked with oxygen, suction, and other unit-specific supplies   A  Alarms - Bed alarm is set for fall risk patients  - Ensure chair alarm is in place and activated if patient is up in a chair   L  Lines - Check IV for any infiltration  - Syed bag is empty if patient has a Syed   - Tubing and IV bags are labeled   S  Safety   - Room is clean, patient is clean, and equipment is clean. - Hallways are clear from equipment besides carts. - Fall bracelet on for fall risk patients  - Ensure room is clear and free of clutter  - Suction is set up for ALL patients (with yanker)  - Hallways are clear from equipment besides carts.    - Isolation precautions followed, supplies available outside room, sign posted

## 2017-05-03 NOTE — PROGRESS NOTES
[address]  [address] [place] [address] [address]     INPATIENT REHABILITATION  DAILY PROGRESS NOTE     Date: 5/3/2017    Name: Jessica Weiss Age / Sex: 64 y.o. / male   CSN: [id number] MRN: [id number]   [facility] Date: 4/24/2017 Length of Stay: 9 days     Primary Rehab Diagnosis: Impaired Mobility and ADLs secondary to Acute Ischemic Stroke (acute to subacute ischemia involving the posterior limb of the left internal capsule, along the lateral aspect of the left thalamus) with residual right hemiparesis, dysphagia and dysarthria       Subjective:     Patient seen and examined. Blood pressure controlled. Patient's Complaint:   No significant medical complaints    Pain Control: no current joint or muscle symptoms, essentially pain-free      Objective:     Vital Signs:  Patient Vitals for the past 24 hrs:   BP Temp Pulse Resp SpO2   05/03/17 0739 147/83 97.3 °F (36.3 °C) 65 20 98 %   05/02/17 1930 144/79 98.2 °F (36.8 °C) 72 18 98 %   05/02/17 1620 131/77 97 °F (36.1 °C) 70 19 97 %        Physical Examination:  GENERAL SURVEY: Patient is awake, alert, oriented x 3, sitting comfortably on the chair, not in acute respiratory distress. HEENT: pink palpebral conjunctivae, anicteric sclerae, no nasoaural discharge, moist oral mucosa  NECK: supple, no jugular venous distention, no palpable lymph nodes  CHEST/LUNGS: symmetrical chest expansion, good air entry, clear breath sounds  HEART: adynamic precordium, good S1 S2, no S3, regular rhythm, no murmurs  ABDOMEN: flat, bowel sounds appreciated, soft, non-tender  EXTREMITIES: pink nailbeds, no edema, full and equal pulses, no calf tenderness   NEUROLOGICAL EXAM: The patient is awake, alert and oriented x3, able to answer questions fairly appropriately, able to follow 1 and 2 step commands. Able to tell time from the wall clock. Cranial nerves II-XII are grossly intact except for slurred speech and dysphagia.  No gross sensory deficit. Motor strength is 4/5 on the RUE, 5/5 on the LUE, 4/5 on the right hip, 4+/5 on the right knee, 3/5 on the right ankle, 5/5 on the LLE. Current Medications:  Current Facility-Administered Medications   Medication Dose Route Frequency    cholecalciferol (VITAMIN D3) tablet 2,000 Units  2,000 Units Oral DAILY    bisacodyl (DULCOLAX) suppository 10 mg  10 mg Rectal Q48H PRN    heparin (porcine) injection 5,000 Units  5,000 Units SubCUTAneous Q12H    acetaminophen (TYLENOL) solution 650 mg  650 mg Oral Q4H PRN    docusate sodium (COLACE) capsule 100 mg  100 mg Oral BID    aspirin chewable tablet 81 mg  81 mg Oral DAILY WITH BREAKFAST    clopidogrel (PLAVIX) tablet 75 mg  75 mg Oral DAILY WITH DINNER    atorvastatin (LIPITOR) tablet 20 mg  20 mg Oral QHS    pantoprazole (PROTONIX) granules for oral suspension 40 mg  40 mg Oral ACB    multivitamin (MULTI-DELYN, WELLESSE) oral liquid 30 mL  30 mL Oral DAILY    levothyroxine (SYNTHROID) tablet 150 mcg  150 mcg Oral ACB       Allergies:   Allergies   Allergen Reactions    Pcn [Penicillins] Rash       Functional Progress:    PHYSICAL THERAPY    ON ADMISSION MOST RECENT   Wheelchair Mobility/Management  Able to Propel (ft): 70 feet (verbal cueing for technique)  Functional Level: 2  Curbs/Ramps Assist Required (FIM Score): 0 (Not tested)  Wheelchair Setup Assist Required : 2 (Maximal assistance)  Wheelchair Management: Manages left brake, Manages right brake Wheelchair Mobility/Management  Able to Propel (ft): 255 feet  Functional Level: 6  Curbs/Ramps Assist Required (FIM Score): 3 (Moderate assistance)  Wheelchair Setup Assist Required : 5 (Supervision/setup)  Wheelchair Management: Manages left brake, Manages right brake, Manages left footrest, Manages right footrest, Manages left armrest, Manages right armrest (w/ verbal cueing)     Gait  Amount of Assistance: 2 (Maximal assistance) (2/2 R LOB)  Distance (ft): 5 Feet (ft)  Assistive Device: Gait belt Gait  Amount of Assistance: 4 (Minimal assistance)  Distance (ft): 255 Feet (ft)  Assistive Device: Gait belt     Balance-Sitting/Standing  Sitting - Static: Good (unsupported)  Sitting - Dynamic: Good (unsupported)  Standing - Static: Fair  Standing - Dynamic : Impaired Balance-Sitting/Standing  Sitting - Static: Good (unsupported)  Sitting - Dynamic: Good (unsupported)  Standing - Static: Good  Standing - Dynamic : Impaired     Bed/Mat Mobility  Rolling Right : 5 (Supervision)  Rolling Left : 5 (Supervision)  Supine to Sit : 5 (Supervision)  Sit to Supine : 5 (Supervision) Bed/Mat Mobility  Rolling Right : 6 (Modified independent)  Rolling Left : 6 (Modified independent)  Supine to Sit : 6 (Modified independent)  Sit to Supine : 6 (Modified independent)     Transfers  Transfer Type: Other  Other: Patient performs stand step transfer with moderate assist from PT for anterior approach R LE knee block with loading 2/2 hx of buckling, trunk stability, pivot assist, and controlled lowering. Patient demonstrates increased momentum utilization for STS transfer and R genu recurvatum requiring verbal cueing to crrect these; pacing cues successful, however genu recurvatum requires tactile cueing with physical assist to prevent buckling. Patient demosntrates pass retract technique with R LE advancement as well as anterior COG placement to attempts to maintain TKE 2/2 functional quad weakness. Transfer Assistance : 3 (Moderate assistance )  Sit to Stand Assistance: Moderate assistance  Car Transfers: Not tested  Car Type: NA Transfers  Transfer Type: Other  Other: Patient performs stand step transfer x 8 in session with increased focus B UE over distal femur of affected LE to imporve hemiparetic UE/LE loading. Patient requires verbal cueing for pace and minimal tactile cueing to promote proper COG positioning for adequate force production and control with STS.  Patient performs stepping portion of transfer with CGA for increased somatosensory input 2/2 decreased balance. Transfer Assistance : 4 (Contact guard assistance)  Sit to Stand Assistance: Contact guard assistance  Car Transfers: Minimum assistance  Car Type: Car simulator     Steps or Stairs  Steps/Stairs Ambulated (#): 0  Level of Assist : 0 (Not tested)  Rail Use:  (NA) Steps or Stairs  Steps/Stairs Ambulated (#): 24  Level of Assist : 4 (Contact guard assistance)  Rail Use: Left          Lab/Data Review:  No results found for this or any previous visit (from the past 24 hour(s)). Estimated Glomerular Filtration Rate:  CKD-EPI:   On admission, estimated GFR was 46.2 mL/min/1.73m2 based on a Creatinine of 1.44 mg/dl. Most recent estimated GFR was 44.3 mL/min/1.73m2 based on a Creatinine of 1.49 mg/dl. MDRD:   On admission, estimated GFR was 50.4 mL/min/1.73m2 based on a Creatinine of 1.44 mg/dl. Most recent estimated GFR was 48.5 mL/min/1.73m2 based on a Creatinine of 1.49 mg/dl. Assessment:     Primary Rehabilitation Diagnosis  1. Impaired Mobility and ADLs  2. Acute Ischemic Stroke (acute to subacute ischemia involving the posterior limb of the left internal capsule, along the lateral aspect of the left thalamus) with residual right hemiparesis, dysphagia and dysarthria      Comorbidities   Urinary incontinence    History of malignant neoplasm of prostate    Hypothyroidism    Chronic obstructive pulmonary disease (COPD)    Asbestosis    Osteoarthrosis involving multiple sites    History of endogenous hypertriglyceridemia    CKD stage G3a/A1, GFR 45-59 and albumin creatinine ratio <30 mg/g    Dyslipidemia (high LDL; low HDL)    Gastroesophageal reflux disease    Procedure refused    Erectile dysfunction       Plan:     1. Justification for continued stay: Good progression towards established rehabilitation goals.     2. Medical Issues being followed closely:    [x]  Fall and safety precautions     []  Wound Care     [x]  Bowel and Bladder Function     [x]  Fluid Electrolyte and Nutrition Balance     []  Pain Control      3. Issues that 24 hour rehabilitation nursing is following:    [x]  Fall and safety precautions     []  Wound Care     [x]  Bowel and Bladder Function     [x]  Fluid Electrolyte and Nutrition Balance     []  Pain Control      [x]  Assistance with and education on in-room safety with transfers to and from the bed, wheelchair, toilet and shower. 4. Acute rehabilitation plan of care:    [x]  Continue current care and rehab. [x]  Physical Therapy           [x]  Occupational Therapy           [x]  Speech Therapy     []  Hold Rehab until further notice     5. Medications:    [x]  MAR Reviewed     [x]  Continue Present Medications     6. DVT Prophylaxis:      []  Lovenox     [x]  Unfractionated Heparin     []  Coumadin     []  NOAC     [x]  GRIS Stockings     [x]  Sequential Compression Device     []  None     7. Orders:   > Acute Ischemic Stroke (acute to subacute ischemia involving the posterior limb of the left internal capsule, along the lateral aspect of the left thalamus) with residual right hemiparesis, dysphagia and dysarthria    > Continue:    > Aspirin 81 mg PO once daily with breakfast    > Atorvastatin 20 mg PO q HS    > Clopidogrel 75 mg PO once daily with dinner     > CKD stage G3a/A1, GFR 45-59 and albumin creatinine ratio <30 mg/g   > On admission, based on a Creatinine of 1.4 mg/dl:    > Estimated GFR using CKD-EPI = 47.8 mL/min/1.73m2    > Estimated GFR using MDRD = 52.1 mL/min/1.73m2   > Estimated calculated glomerular filtration rate equivalent to that of stage 3 chronic kidney disease = 30-59 ml/min   > Urine Microalbumin/Creatinine ratio (5/3/2017) = 29     > Dyslipidemia   > Lipid profile (4/19/2017): , . HDL 42, LDL 97   > On 4/20/2017, patient was started at [facility] on Atorvastatin 20 mg PO q HS   > Continue Atorvastatin 20 mg PO q HS      8.  Patient's progress in rehabilitation and medical issues discussed with the patient. All questions answered to the best of my ability. Care plan discussed with patient and nurse.       Signed:    Kathrin Reid MD    May 3, 2017

## 2017-05-03 NOTE — PROGRESS NOTES
Problem: Mobility Impaired (Adult and Pediatric)  Goal: *Acute Goals and Plan of Care (Insert Text)  Physical Therapy Short Term Goals  Initiated 2017 and to be accomplished within 7 day(s) (2017)  1. Patient will move from supine to sit and sit to supine , scoot up and down and roll side to side in bed with modified independence. 2. Patient will transfer from bed to chair and chair to bed with minimal assistance/contact guard assist using the least restrictive device. 3. Patient will perform sit to stand with minimal assistance/contact guard assist.  4. Patient will ambulate with moderate assistance for 50 feet with the least restrictive device. 5. Patient will ascend/descend 6 stairs with 2 handrail(s) with moderate assistance . Physical Therapy Long Term Goals  Initiated 2017 and to be accomplished within 28 day(s) (2017)  1. Patient will move from supine to sit and sit to supine , scoot up and down and roll side to side in bed with independence. 2. Patient will transfer from bed to chair and chair to bed with modified independence using the least restrictive device. 3. Patient will perform sit to stand with modified independence. 4. Patient will ambulate with modified independence for 250 feet with the least restrictive device. 5. Patient will ascend/descend 6 stairs with 2 handrail(s) with modified independence. PHYSICAL THERAPY DAILY NOTE  Patient Name:Stewart Lopez Jay  Time In: 0800  Time Out: 0900  Patient Seen For: Wheelchair mobility;Transfer training; Therapeutic exercise;Patient education;Balance activities  Diagnosis: Left CVA  Acute ischemic stroke [facility]) Acute ischemic stroke [facility])  Precautions: Fall risk     Subjective: Patient reports excitement for therapy though he presents supine in bed.      Pain at start of tx:0/10  Pain at stop of tx:0/10     Patient identified with name and : YES         Objective: Patient requests a drink during session; nectar thick water provide with cues for small sips and reduced pace with drinking. BED/MAT MOBILITY Daily Assessment     Rolling Right : 6 (Modified independent)  Rolling Left : 6 (Modified independent)  Supine to Sit : 6 (Modified independent)  Sit to Supine : 6 (Modified independent)          TRANSFERS Daily Assessment     Transfer Type: Other  Other: Patient performs stand step transfer x 8 in session with increased focus B UE over distal femur of affected LE to improve hemiparetic UE/LE loading. Patient requires verbal cueing for pace and minimal tactile cueing to promote proper COG positioning for adequate force production and control with STS. Patient performs stepping portion of transfer with CGA for increased somatosensory input 2/2 decreased balance. Transfer Assistance : 4 (Contact guard assistance)  Sit to Stand Assistance: Contact guard assistance          WHEELCHAIR MOBILITY Daily Assessment     Able to Propel (ft): 255 feet  Functional Level: 6     Patient challenged to perform lorelei technique with R UE/LE in order to improve coordination, motor control and strength in affected UE/LE. Patient requires additional time to complete this task, but does so with mod I.          LOWER EXTREMITY EXERCISES Daily Assessment     Patient participates in R UE D2 flexion PNF pattern in short sitting position requiring initial physical assist (min/mod) progressing to verbal cueing only for palm up/thumb back with end range and palm down hands closed at initial position. Patient demonstrates activity at [facility] level w/o difficulty and progresses activity with static standing. Patient requires mirror for improved biofeedback for form. Patient completes activity x 20 reps before progressing to Unilateral squatted D2 flexion PNF with R UE; performed 20 reps x 3 sets with min a x 1 for squat/dead lift hip hinge assistance.  Patient progresses activity to B UE squatted D2 flexion PNF with increased focus on hip hinge, neutral spine, gluteal contraction, and thrust production with mirror for postural cues and min/mod a x1 to maintain neutral spine. Assessment: Patient demonstrates continued improvement with STS and stand step transfers utilizing increased R UE/LE participation. Patient's motivation remains ever present though he continues to exhibit fatigue with closed chain, full body challenges requiring repetition. Patient demonstrates difficulty integrating squat/dead lift hip hinge form today though mild improvements were shown with min a x 1 for form cues. Plan of Care: Cont current POC; focus on continued coordination training with closed chain loading. Clemente Wiseman, PT DPT  5/3/2017

## 2017-05-03 NOTE — PROGRESS NOTES
Problem: Neurolinguistics Impaired (Adult)  Goal: *Speech Goal: (INSERT TEXT)  Long term goals:  1. Patient will tolerate a regular/thin liquid diet without overt s/s of aspiration, 4 consecutive meals. 2. Patient will use safe swallowing techniques with supervision for all PO intake. 3. Patient will answer complex yes/no questions with 90% accuracy. 4 patient will follow complex commands for object manipulation wit 80-90% accuracy. 5. Patient will name 10-12 items within simple categories with supervision. 6. Patient will perform varied word retrieval tasks with % accuracy. 7. Patient will recall 3 words after 5 minutes, supervision. 8. Patient will perform functional problem solving/reasoning tasks with supervision. Short term goals (by 5/9/17):  1. Patient will tolerate a soft, nectar thick liquid diet without overt s/s of aspiration, 4 consecutive meals. 2. Patient will drink sips of thin liquids with the SLP using safe swallowing techniques without overt s/s of aspiration. 3. Patient will use safe swallowing techniques with min-mod cues for all PO intake. 4. Patient will answer complex yes/no questions with 80-90% accuracy. 5 patient will follow complex commands for object manipulation wit 80-90% accuracy. 6. Patient will name 7-9 items within simple categories with supervision. 7. Patient will perform varied word retrieval tasks with 80-90% accuracy. 8. Patient will recall 3 words after 5 minutes, min assist.  9. Patient will perform functional problem solving/reasoning tasks with 80-90% accuracy. SPEECH LANGUAGE PATHOLOGY TREATMENT     Patient: Shy Jackson (41 y.o. male)  Date: 5/3/2017  Diagnosis: Left CVA  Acute ischemic stroke ([facility] [address] Acute ischemic stroke ([facility] [address]       SUBJECTIVE:   Patient stated I'm tired, but I need to do the work. OBJECTIVE:   Mental Status:  Patient was consistently a bit fatigued, but more than motivated to participate in therapy. Treatment & Interventions:   Mr. Meri Lowe was seen in the dining room for breakfast and lunch today. He is tolerating his soft diet well, drinking nectar thick liquids with better use of safe swallowing techniques. SLP must still provide cues to take smaller boluses, and patient with less coughing, but continues to clear his throat frequently after liquid swallows. SLP able to provide additional instruction this morning as he was seen concurrently with a fellow patient (needing different strategies). For lunch, he was seen as a part of the therapeutic dining group. Patient was also seen as for a thirty minute speech therapy session this afternoon. He was seen at his bedside as he ws quite tired after his other therapies. Mr. Meri Lowe participated in the following treatment tasks:     Neuro-Linguistics:   Orientation:                             [id number] [employer] Presidents:          Patient listed 9 (all relatively recent)  \"People who work outside\":    6 named  Patient and SLP also engaged in a long discussion of presidents and their effects upon the economy. Patient was quite passionate about his views and made good points during the discussion. Response & Tolerance to Activities:  Mr. Meri Lowe is consistently cooperative with all tasks presented and cues at mealtime. He expresses that he needs the therapy to improve to return home.      Pain:  Pain Scale 1: Numeric (0 - 10)     After treatment:   [X]       Patient left in no apparent distress sitting up in chair  [X]       Patient left in no apparent distress in bed  [X]       Call bell left within reach  [ ]       Nursing notified  [ ]       Caregiver present  [ ]       Bed alarm activated      ASSESSMENT:      Progression toward goals:  [X]       Improving appropriately and progressing toward goals  [ ]       Improving slowly and progressing toward goals  [ ]       Not making progress toward goals and plan of care will be adjusted      PLAN:   Patient continues to benefit from skilled intervention to address the above impairments. Continue treatment per established plan of care. Discharge Recommendations:   To Be Determined     AUGUSTINA Hansen  Time Calculation:  90 minutes

## 2017-05-04 LAB
HCT VFR BLD AUTO: 40.1 % (ref 36–48)
HGB BLD-MCNC: 13.6 G/DL (ref 13–16)
PLATELET # BLD AUTO: 236 K/UL (ref 135–420)

## 2017-05-04 PROCEDURE — 36415 COLL VENOUS BLD VENIPUNCTURE: CPT | Performed by: INTERNAL MEDICINE

## 2017-05-04 PROCEDURE — 97112 NEUROMUSCULAR REEDUCATION: CPT

## 2017-05-04 PROCEDURE — 85018 HEMOGLOBIN: CPT | Performed by: INTERNAL MEDICINE

## 2017-05-04 PROCEDURE — 65310000000 HC RM PRIVATE REHAB

## 2017-05-04 PROCEDURE — 85049 AUTOMATED PLATELET COUNT: CPT | Performed by: INTERNAL MEDICINE

## 2017-05-04 PROCEDURE — 97530 THERAPEUTIC ACTIVITIES: CPT

## 2017-05-04 PROCEDURE — 92526 ORAL FUNCTION THERAPY: CPT

## 2017-05-04 PROCEDURE — 74011250636 HC RX REV CODE- 250/636: Performed by: INTERNAL MEDICINE

## 2017-05-04 PROCEDURE — 92507 TX SP LANG VOICE COMM INDIV: CPT

## 2017-05-04 PROCEDURE — 74011250637 HC RX REV CODE- 250/637: Performed by: INTERNAL MEDICINE

## 2017-05-04 RX ADMIN — PANTOPRAZOLE SODIUM 40 MG: 40 GRANULE, DELAYED RELEASE ORAL at 06:30

## 2017-05-04 RX ADMIN — CHOLECALCIFEROL TAB 25 MCG (1000 UNIT) 2000 UNITS: 25 TAB at 09:20

## 2017-05-04 RX ADMIN — HEPARIN SODIUM 5000 UNITS: 5000 INJECTION, SOLUTION INTRAVENOUS; SUBCUTANEOUS at 18:43

## 2017-05-04 RX ADMIN — CLOPIDOGREL BISULFATE 75 MG: 75 TABLET, FILM COATED ORAL at 18:43

## 2017-05-04 RX ADMIN — MULTIPLE VITAMIN LIQUID 30 ML: LIQUID at 09:20

## 2017-05-04 RX ADMIN — HEPARIN SODIUM 5000 UNITS: 5000 INJECTION, SOLUTION INTRAVENOUS; SUBCUTANEOUS at 06:33

## 2017-05-04 RX ADMIN — LEVOTHYROXINE SODIUM 150 MCG: 100 TABLET ORAL at 06:30

## 2017-05-04 RX ADMIN — ASPIRIN 81 MG CHEWABLE TABLET 81 MG: 81 TABLET CHEWABLE at 09:20

## 2017-05-04 RX ADMIN — ATORVASTATIN CALCIUM 20 MG: 10 TABLET, FILM COATED ORAL at 21:22

## 2017-05-04 NOTE — PROGRESS NOTES
SHIFT CHANGE NOTE FOR [facility]    Bedside and Verbal shift change report given to PRAVEENA Cormier RN  N(oncoming nurse) by Sylvia Rubalcava LPN   (offgoing nurse). Report included the following information SBAR, Kardex, MAR and Recent Results. Situation:   Code Status: Full Code   Reason for Admission: CVA  Hospital Day: 10   Problem List:   Hospital Problems  Date Reviewed: 5/3/2017          Codes Class Noted POA    CKD stage G3a/A1, GFR 45-59 and albumin creatinine ratio <30 mg/g ICD-10-CM: N18.3  ICD-9-CM: 585.3  5/3/2017 Yes        Procedure refused ICD-10-CM: Z53.29  ICD-9-CM: V64.2  4/21/2017 Yes    Overview Signed 4/24/2017  3:28 PM by Michelet Antunez MD     Speech Pathologist recommended NPO and PEG placement; Patient refused             * (Principal)Acute ischemic stroke [facility]) ICD-10-CM: I63.9  ICD-9-CM: 434.91  4/19/2017 Yes    Overview Signed 4/24/2017  3:15 PM by Michelet Antunez MD     Acute Ischemic Stroke (acute to subacute ischemia involving the posterior limb of the left internal capsule, along the lateral aspect of the left thalamus) with residual right hemiparesis, dysphagia and dysarthria             Impaired mobility and ADLs ICD-10-CM: Z74.09  ICD-9-CM: 799.89  4/19/2017 Yes        Dyslipidemia (high LDL; low HDL) (Chronic) ICD-10-CM: E78.4  ICD-9-CM: 272.4  4/19/2017 Yes    Overview Signed 4/24/2017  3:25 PM by Michelet Antunez MD     Lipid profile (4/19/2017): , .  HDL 42, LDL 97             Hemiparesis affecting right side as late effect of cerebrovascular accident (CVA) ([facility] [address] ICD-10-CM: K49.592  ICD-9-CM: 438.20  4/19/2017 Yes        Dysphagia as late effect of cerebrovascular accident (CVA) ICD-10-CM: F37.307  ICD-9-CM: 438.82  4/19/2017 Yes        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) ICD-10-CM: G48.243  ICD-9-CM: 438.13  4/19/2017 Yes              Background:   Past Medical History:   Past Medical History:   Diagnosis Date    [facility] 4/19/2017    Acute Ischemic Stroke (acute to subacute ischemia involving the posterior limb of the left internal capsule, along the lateral aspect of the left thalamus) with residual right hemiparesis, dysphagia and dysarthria    Asbestosis [facility] [address]     Chronic obstructive pulmonary disease (COPD) ([facility] [address]     CKD stage G3a/A1, GFR 45-59 and albumin creatinine ratio <30 mg/g 5/3/2017    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  HDL 42, LDL 97    Dysphagia as late effect of cerebrovascular accident (CVA) 4/19/2017    Erectile dysfunction     Gastroesophageal reflux disease     Hemiparesis affecting right side as late effect of cerebrovascular accident (CVA) ([facility] [address] 4/19/2017    History of endogenous hypertriglyceridemia     History of malignant neoplasm of prostate     Genoveva score 7     Hypothyroidism     Osteoarthrosis involving multiple sites     Procedure refused 4/21/2017    Speech Pathologist recommended NPO and PEG placement; Patient refused    Urinary incontinence     Male incontinence       Patient taking anticoagulants YES   Patient has a defibrillator: no     Assessment:   Changes in Assessment throughout shift: NONE              Last Vitals:     Vitals:    05/02/17 1930 05/03/17 0739 05/03/17 1553 05/03/17 1944   BP: 144/79 147/83 138/78 137/77   Pulse: 72 65 66 67   Resp: 18 20 20 18   Temp: 98.2 °F (36.8 °C) 97.3 °F (36.3 °C) 97 °F (36.1 °C) 98.7 °F (37.1 °C)   SpO2: 98% 98% 98% 96%   Weight:       Height:            PAIN    Pain Assessment    Pain Intensity 1: 0 (05/04/17 0400) Pain Intensity 1: 2 (12/29/14 1105)      Pain Location 1: Abdomen      Pain Intervention(s) 1: Medication (see MAR)  Patient Stated Pain Goal: 0 Patient Stated Pain Goal: 0  o Intervention effective: no    o Other actions taken for pain: NONE     Skin Assessment  Skin color Skin Color: Appropriate for ethnicity  Condition/Temperature Skin Condition/Temp: Dry  Integrity Skin Integrity: Intact  Turgor Turgor: Non-tenting  Weekly Pressure Ulcer Documentation  Pressure  Injury Documentation: No Pressure Injury Noted-Pressure Ulcer Prevention Initiated  Wound Prevention & Protection Methods  Orientation of wound Orientation of Wound Prevention: Posterior  Location of Prevention Location of Wound Prevention: Sacrum/Coccyx  Dressing Present Dressing Present : No  Dressing Status    Wound Offloading Wound Offloading (Prevention Methods): Bed, pressure redistribution/air     INTAKE/OUPUT    Date 05/03/17 0700 - 05/04/17 0659 05/04/17 0700 - 05/05/17 0659   Shift 0700-1859 1900-0659 24 Hour Total 0700-1859 1900-0659 24 Hour Total   I  N  T  A  K  E   P. O. 700  700         P. O. 700  700       Shift Total  (mL/kg) 700  (8.6)  700  (8.6)      O  U  T  P  U  T   Urine  (mL/kg/hr)            Urine Occurrence(s) 4 x 1 x 5 x       Stool            Stool Occurrence(s) 0 x 0 x 0 x       Shift Total  (mL/kg)           700      Weight (kg) 81.5 81.5 81.5 81.5 81.5 81.5       Recommendations:  1. Patient needs and requests: NONE    2. Diet: CARDIAC PUREED WITH NECTAR THICK LIQUIDS    3. Pending tests/procedures: LABS     4. Functional Level/Equipment: WHEELCHAIR    5. Estimated Discharge Date: TBD Posted on Whiteboard in Patients Room: [facility] Safety Check    A safety check occurred in the patient's room between off going nurse and oncoming nurse listed above.     The safety check included the below items  Area Items   H  High Alert Medications - Verify all high alert medication drips (heparin, PCA, etc.)   E  Equipment - Suction is set up for ALL patients (with yanker)  - Red plugs utilized for all equipment (IV pumps, etc.)  - WOWs wiped down at end of shift.  - Room stocked with oxygen, suction, and other unit-specific supplies   A  Alarms - Bed alarm is set for fall risk patients  - Ensure chair alarm is in place and activated if patient is up in a chair   L  Lines - Check IV for any infiltration  - Syed bag is empty if patient has a Syed   - Tubing and IV bags are labeled   S  Safety   - Room is clean, patient is clean, and equipment is clean. - Hallways are clear from equipment besides carts. - Fall bracelet on for fall risk patients  - Ensure room is clear and free of clutter  - Suction is set up for ALL patients (with yanker)  - Hallways are clear from equipment besides carts.    - Isolation precautions followed, supplies available outside room, sign posted

## 2017-05-04 NOTE — PROGRESS NOTES
SHIFT CHANGE NOTE FOR [facility]    Bedside and Verbal shift change report given to [facility] Mayda(oncoming nurse) by Rossana Richmond RN   (offgoing nurse). Report included the following information SBAR, Kardex, MAR and Recent Results. Situation:   Code Status: Full Code   Reason for Admission: CVA  Hospital Day: 10   Problem List:   Hospital Problems  Date Reviewed: 5/3/2017          Codes Class Noted POA    CKD stage G3a/A1, GFR 45-59 and albumin creatinine ratio <30 mg/g ICD-10-CM: N18.3  ICD-9-CM: 585.3  5/3/2017 Yes        Procedure refused ICD-10-CM: Z53.29  ICD-9-CM: V64.2  4/21/2017 Yes    Overview Signed 4/24/2017  3:28 PM by Yamila Killian MD     Speech Pathologist recommended NPO and PEG placement; Patient refused             * (Principal)Acute ischemic stroke [facility]) ICD-10-CM: I63.9  ICD-9-CM: 434.91  4/19/2017 Yes    Overview Signed 4/24/2017  3:15 PM by Yamila Killian MD     Acute Ischemic Stroke (acute to subacute ischemia involving the posterior limb of the left internal capsule, along the lateral aspect of the left thalamus) with residual right hemiparesis, dysphagia and dysarthria             Impaired mobility and ADLs ICD-10-CM: Z74.09  ICD-9-CM: 799.89  4/19/2017 Yes        Dyslipidemia (high LDL; low HDL) (Chronic) ICD-10-CM: E78.4  ICD-9-CM: 272.4  4/19/2017 Yes    Overview Signed 4/24/2017  3:25 PM by Yamila Killian MD     Lipid profile (4/19/2017): , .  HDL 42, LDL 97             Hemiparesis affecting right side as late effect of cerebrovascular accident (CVA) ([facility] [address] ICD-10-CM: V38.258  ICD-9-CM: 438.20  4/19/2017 Yes        Dysphagia as late effect of cerebrovascular accident (CVA) ICD-10-CM: B33.880  ICD-9-CM: 438.82  4/19/2017 Yes        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) ICD-10-CM: G02.897  ICD-9-CM: 438.13  4/19/2017 Yes              Background:   Past Medical History:   Past Medical History:   Diagnosis Date    Acute ischemic stroke ([facility] [address] 4/19/2017    Acute Ischemic Stroke (acute to subacute ischemia involving the posterior limb of the left internal capsule, along the lateral aspect of the left thalamus) with residual right hemiparesis, dysphagia and dysarthria    [facility]osis [facility] [address]     Chronic obstructive pulmonary disease (COPD) ([facility] [address] [facility]     CKD stage G3a/A1, GFR 45-59 and albumin creatinine ratio <30 mg/g 5/3/2017    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  HDL 42, LDL 97    Dysphagia as late effect of cerebrovascular accident (CVA) 4/19/2017    Erectile dysfunction     Gastroesophageal reflux disease     Hemiparesis affecting right side as late effect of cerebrovascular accident (CVA) ([facility] [address] 4/19/2017    History of endogenous hypertriglyceridemia     History of malignant neoplasm of prostate     Genoveva score 7     Hypothyroidism     Osteoarthrosis involving multiple sites     Procedure refused 4/21/2017    Speech Pathologist recommended NPO and PEG placement; Patient refused    Urinary incontinence     Male incontinence       Patient taking anticoagulants YES   Patient has a defibrillator: no     Assessment:   Changes in Assessment throughout shift: NONE              Last Vitals:     Vitals:    05/02/17 1930 05/03/17 0739 05/03/17 1553 05/03/17 1944   BP: 144/79 147/83 138/78 137/77   Pulse: 72 65 66 67   Resp: 18 20 20 18   Temp: 98.2 °F (36.8 °C) 97.3 °F (36.3 °C) 97 °F (36.1 °C) 98.7 °F (37.1 °C)   SpO2: 98% 98% 98% 96%   Weight:       Height:            PAIN    Pain Assessment    Pain Intensity 1: 0 (05/04/17 0400) Pain Intensity 1: 2 (12/29/14 1105)      Pain Location 1: Abdomen      Pain Intervention(s) 1: Medication (see MAR)  Patient Stated Pain Goal: 0 Patient Stated Pain Goal: 0  o Intervention effective: no    o Other actions taken for pain: NONE     Skin Assessment  Skin color Skin Color: Appropriate for ethnicity  Condition/Temperature Skin Condition/Temp: Dry  Integrity Skin Integrity: Intact  Turgor Turgor: Non-tenting  Weekly Pressure Ulcer Documentation  Pressure  Injury Documentation: No Pressure Injury Noted-Pressure Ulcer Prevention Initiated  Wound Prevention & Protection Methods  Orientation of wound Orientation of Wound Prevention: Posterior  Location of Prevention Location of Wound Prevention: Sacrum/Coccyx  Dressing Present Dressing Present : No  Dressing Status    Wound Offloading Wound Offloading (Prevention Methods): Bed, pressure redistribution/air     INTAKE/OUPUT    Date 05/03/17 0700 - 05/04/17 0659 05/04/17 0700 - 05/05/17 0659   Shift 0700-1859 1900-0659 24 Hour Total 0700-1859 1900-0659 24 Hour Total   I  N  T  A  K  E   P. O. 700  700         P. O. 700  700       Shift Total  (mL/kg) 700  (8.6)  700  (8.6)      O  U  T  P  U  T   Urine  (mL/kg/hr)            Urine Occurrence(s) 4 x 1 x 5 x       Stool            Stool Occurrence(s) 0 x 0 x 0 x       Shift Total  (mL/kg)           700      Weight (kg) 81.5 81.5 81.5 81.5 81.5 81.5       Recommendations:  1. Patient needs and requests: NONE    2. Diet: CARDIAC PUREED WITH NECTAR THICK LIQUIDS    3. Pending tests/procedures: LABS     4. Functional Level/Equipment: WHEELCHAIR    5. Estimated Discharge Date: TBD Posted on Whiteboard in Patients Room: [facility] Safety Check    A safety check occurred in the patient's room between off going nurse and oncoming nurse listed above.     The safety check included the below items  Area Items   H  High Alert Medications - Verify all high alert medication drips (heparin, PCA, etc.)   E  Equipment - Suction is set up for ALL patients (with yanker)  - Red plugs utilized for all equipment (IV pumps, etc.)  - WOWs wiped down at end of shift.  - Room stocked with oxygen, suction, and other unit-specific supplies   A  Alarms - Bed alarm is set for fall risk patients  - Ensure chair alarm is in place and activated if patient is up in a chair   L  Lines - Check IV for any infiltration  - Syed bag is empty if patient has a Syed   - Tubing and IV bags are labeled   S  Safety   - Room is clean, patient is clean, and equipment is clean. - Hallways are clear from equipment besides carts. - Fall bracelet on for fall risk patients  - Ensure room is clear and free of clutter  - Suction is set up for ALL patients (with yanker)  - Hallways are clear from equipment besides carts.    - Isolation precautions followed, supplies available outside room, sign posted

## 2017-05-04 NOTE — PROGRESS NOTES
Problem: Neurolinguistics Impaired (Adult)  Goal: *Speech Goal: (INSERT TEXT)  Long term goals:  1. Patient will tolerate a regular/thin liquid diet without overt s/s of aspiration, 4 consecutive meals. 2. Patient will use safe swallowing techniques with supervision for all PO intake. 3. Patient will answer complex yes/no questions with 90% accuracy. 4 patient will follow complex commands for object manipulation wit 80-90% accuracy. 5. Patient will name 10-12 items within simple categories with supervision. 6. Patient will perform varied word retrieval tasks with % accuracy. 7. Patient will recall 3 words after 5 minutes, supervision. 8. Patient will perform functional problem solving/reasoning tasks with supervision. Short term goals (by 5/9/17):  1. Patient will tolerate a soft, nectar thick liquid diet without overt s/s of aspiration, 4 consecutive meals. 2. Patient will drink sips of thin liquids with the SLP using safe swallowing techniques without overt s/s of aspiration. 3. Patient will use safe swallowing techniques with min-mod cues for all PO intake. 4. Patient will answer complex yes/no questions with 80-90% accuracy. 5 patient will follow complex commands for object manipulation wit 80-90% accuracy. 6. Patient will name 7-9 items within simple categories with supervision. 7. Patient will perform varied word retrieval tasks with 80-90% accuracy. 8. Patient will recall 3 words after 5 minutes, min assist.  9. Patient will perform functional problem solving/reasoning tasks with 80-90% accuracy. SPEECH LANGUAGE PATHOLOGY TREATMENT     Patient: Kurt Mann (41 y.o. male)  Date: 5/4/2017  Diagnosis: Left CVA  Acute ischemic stroke ([facility] [address] Acute ischemic stroke [facility])       SUBJECTIVE:   Patient stated I want a drink of water. OBJECTIVE:   Mental Status:  Mr. Jose Smith generally appears fatigued at mealtime and during treatment sessions with the SLP.   When asked if he was sleeping better he responded, \"A little\". Treatment & Interventions:   Patient was seen in the dining room at mealtime for breakfast and lunch today. He managed his soft diet and nectar thick liquids well. His appetite is fair to good. Patient needs occasional cues to reduce bolus size for safety. Mr. Dario Ribeiro was also seen in his room for a thirty minute speech therapy session this afternoon. He requested and was given regular ice water. SLP had him sit on the side of the bed to drink it and provided consistent cues to take small sips. He naturally used a chin tuck for swallowing. Patient consumed 8 ounces without overt s/s of aspiration. He also participated in the following treatment tasks:     Neuro-Linguistics:   Orientation:                 Independent  Recall of therapy:        Independent  Providing synonyms:  80% accuracy     Response & Tolerance to Activities: Though visibly fatigued, Mr. Dario Ribeiro participates well in all activities presented. SLP will present thin liquids with his meals tomorrow for breakfast and lunch when she is available to provide coaching for safe swallowing. Pain:  Pain Scale 1: Numeric (0 - 10)     After treatment:   [X]       Patient left in no apparent distress sitting up in chair  [X]       Patient left in no apparent distress in bed  [X]       Call bell left within reach  [ ]       Nursing notified  [ ]       Caregiver present  [ ]       Bed alarm activated      ASSESSMENT:      Progression toward goals:  [X]       Improving appropriately and progressing toward goals  [ ]       Improving slowly and progressing toward goals  [ ]       Not making progress toward goals and plan of care will be adjusted      PLAN:   Patient continues to benefit from skilled intervention to address the above impairments. Continue treatment per established plan of care. Discharge Recommendations:   To Be Determined     AUGUSTINA Sen  Time Calculation:  90 minutes

## 2017-05-04 NOTE — PROGRESS NOTES
[address]  [address] [place] [address], [address]     INPATIENT REHABILITATION  DAILY PROGRESS NOTE     Date: 5/4/2017    Name: Samantha Andujar Age / Sex: 64 y.o. / male   CSN: [id number] MRN: [id number]   [facility] Date: 4/24/2017 Length of Stay: 10 days     Primary Rehab Diagnosis: Impaired Mobility and ADLs secondary to Acute Ischemic Stroke (acute to subacute ischemia involving the posterior limb of the left internal capsule, along the lateral aspect of the left thalamus) with residual right hemiparesis, dysphagia and dysarthria       Subjective:     Patient seen and examined. Blood pressure controlled. Patient's Complaint:   No significant medical complaints    Pain Control: no current joint or muscle symptoms, essentially pain-free      Objective:     Vital Signs:  Patient Vitals for the past 24 hrs:   BP Temp Pulse Resp SpO2   05/04/17 0700 139/75 97 °F (36.1 °C) 67 18 98 %   05/03/17 1944 137/77 98.7 °F (37.1 °C) 67 18 96 %   05/03/17 1553 138/78 97 °F (36.1 °C) 66 20 98 %        Physical Examination:  GENERAL SURVEY: Patient is awake, alert, oriented x 3, sitting comfortably on the chair, not in acute respiratory distress. HEENT: pink palpebral conjunctivae, anicteric sclerae, no nasoaural discharge, moist oral mucosa  NECK: supple, no jugular venous distention, no palpable lymph nodes  CHEST/LUNGS: symmetrical chest expansion, good air entry, clear breath sounds  HEART: adynamic precordium, good S1 S2, no S3, regular rhythm, no murmurs  ABDOMEN: flat, bowel sounds appreciated, soft, non-tender  EXTREMITIES: pink nailbeds, no edema, full and equal pulses, no calf tenderness   NEUROLOGICAL EXAM: The patient is awake, alert and oriented x3, able to answer questions fairly appropriately, able to follow 1 and 2 step commands. Able to tell time from the wall clock. Cranial nerves II-XII are grossly intact except for slurred speech and dysphagia.  No gross sensory deficit. Motor strength is 4/5 on the RUE, 5/5 on the LUE, 4/5 on the right hip, 4+/5 on the right knee, 3/5 on the right ankle, 5/5 on the LLE. Current Medications:  Current Facility-Administered Medications   Medication Dose Route Frequency    cholecalciferol (VITAMIN D3) tablet 2,000 Units  2,000 Units Oral DAILY    bisacodyl (DULCOLAX) suppository 10 mg  10 mg Rectal Q48H PRN    heparin (porcine) injection 5,000 Units  5,000 Units SubCUTAneous Q12H    acetaminophen (TYLENOL) solution 650 mg  650 mg Oral Q4H PRN    docusate sodium (COLACE) capsule 100 mg  100 mg Oral BID    aspirin chewable tablet 81 mg  81 mg Oral DAILY WITH BREAKFAST    clopidogrel (PLAVIX) tablet 75 mg  75 mg Oral DAILY WITH DINNER    atorvastatin (LIPITOR) tablet 20 mg  20 mg Oral QHS    pantoprazole (PROTONIX) granules for oral suspension 40 mg  40 mg Oral ACB    multivitamin (MULTI-DELYN, WELLESSE) oral liquid 30 mL  30 mL Oral DAILY    levothyroxine (SYNTHROID) tablet 150 mcg  150 mcg Oral ACB       Allergies:   Allergies   Allergen Reactions    Pcn [Penicillins] Rash       Functional Progress:    SPEECH AND LANGUAGE PATHOLOGY    ON ADMISSION MOST RECENT   Comprehension (Native Language)  Primary Mode of Comprehension: Auditory  Score: 4 Comprehension (Native Language)  Primary Mode of Comprehension: Auditory  Score: 4     Expression (Native Language)  Primary Mode of Expression: Verbal  Score: 4   Expression (Native Language)  Primary Mode of Expression: Verbal  Score: 4     Social Interaction/Pragmatics  Score: 4 Social Interaction/Pragmatics  Score: 5     Problem Solving  Score: 4   Problem Solving  Score: 3     Memory  Score: 4 Memory  Score: 4       Legend:   7 - Independent   6 - Modified Independent   5 - Standby Assistance / Supervision / Set-up   4 - Minimum Assistance / Contact Guard Assistance   3 - Moderate Assistance   2 - Maximum Assistance   1 - Total Assistance / Dependent       Lab/Data Review:  Recent Results (from the past 24 hour(s))   HGB & HCT    Collection Time: 05/04/17  6:10 AM   Result Value Ref Range    HGB 13.6 13.0 - 16.0 g/dL    HCT 40.1 36.0 - 48.0 %   PLATELET    Collection Time: 05/04/17  6:10 AM   Result Value Ref Range    PLATELET 807 874 - 710 K/uL       Estimated Glomerular Filtration Rate:  CKD-EPI:   On admission, estimated GFR was 46.2 mL/min/1.73m2 based on a Creatinine of 1.44 mg/dl. Most recent estimated GFR was 44.3 mL/min/1.73m2 based on a Creatinine of 1.49 mg/dl. MDRD:   On admission, estimated GFR was 50.4 mL/min/1.73m2 based on a Creatinine of 1.44 mg/dl. Most recent estimated GFR was 48.5 mL/min/1.73m2 based on a Creatinine of 1.49 mg/dl. Assessment:     Primary Rehabilitation Diagnosis  1. Impaired Mobility and ADLs  2. Acute Ischemic Stroke (acute to subacute ischemia involving the posterior limb of the left internal capsule, along the lateral aspect of the left thalamus) with residual right hemiparesis, dysphagia and dysarthria      Comorbidities   Urinary incontinence    History of malignant neoplasm of prostate    Hypothyroidism    Chronic obstructive pulmonary disease (COPD)    Asbestosis    Osteoarthrosis involving multiple sites    History of endogenous hypertriglyceridemia    CKD stage G3a/A1, GFR 45-59 and albumin creatinine ratio <30 mg/g    Dyslipidemia (high LDL; low HDL)    Gastroesophageal reflux disease    Procedure refused    Erectile dysfunction       Plan:     1. Justification for continued stay: Good progression towards established rehabilitation goals. 2. Medical Issues being followed closely:    [x]  Fall and safety precautions     []  Wound Care     [x]  Bowel and Bladder Function     [x]  Fluid Electrolyte and Nutrition Balance     []  Pain Control      3.  Issues that 24 hour rehabilitation nursing is following:    [x]  Fall and safety precautions     []  Wound Care     [x]  Bowel and Bladder Function     [x]  Fluid Electrolyte and Nutrition Balance     []  Pain Control      [x]  Assistance with and education on in-room safety with transfers to and from the bed, wheelchair, toilet and shower. 4. Acute rehabilitation plan of care:    [x]  Continue current care and rehab. [x]  Physical Therapy           [x]  Occupational Therapy           [x]  Speech Therapy     []  Hold Rehab until further notice     5. Medications:    [x]  MAR Reviewed     [x]  Continue Present Medications     6. DVT Prophylaxis:      []  Lovenox     [x]  Unfractionated Heparin     []  Coumadin     []  NOAC     [x]  GRIS Stockings     [x]  Sequential Compression Device     []  None     7. Orders:   > Acute Ischemic Stroke (acute to subacute ischemia involving the posterior limb of the left internal capsule, along the lateral aspect of the left thalamus) with residual right hemiparesis, dysphagia and dysarthria    > Continue:    > Aspirin 81 mg PO once daily with breakfast    > Atorvastatin 20 mg PO q HS    > Clopidogrel 75 mg PO once daily with dinner     > CKD stage G3a/A1, GFR 45-59 and albumin creatinine ratio <30 mg/g   > On admission, based on a Creatinine of 1.4 mg/dl:    > Estimated GFR using CKD-EPI = 47.8 mL/min/1.73m2    > Estimated GFR using MDRD = 52.1 mL/min/1.73m2   > Estimated calculated glomerular filtration rate equivalent to that of stage 3 chronic kidney disease = 30-59 ml/min   > Urine Microalbumin/Creatinine ratio (5/3/2017) = 29     > Dyslipidemia   > Lipid profile (4/19/2017): , . HDL 42, LDL 97   > On 4/20/2017, patient was [facility] at [facility] on Atorvastatin 20 mg PO q HS   > Continue Atorvastatin 20 mg PO q HS      8. Patient's progress in rehabilitation and medical issues discussed with the patient. All questions answered to the best of my ability. Care plan discussed with patient and nurse.       Signed:    Berlin Mohs, MD    May 4, 2017

## 2017-05-04 NOTE — PROGRESS NOTES
Problem: Self Care Deficits Care Plan (Adult)  Goal: *Acute Goals and Plan of Care (Insert Text)  Long Term Goals (to be met upon discharge date) in order to increase pts functional independence and safety, and decrease burden of care:  1. Pt will perform self-feeding with independence. 2. Pt will perform grooming with independence. 3. Pt will perform UB bathing with modified independence. 4. Pt will perform LB bathing with modified independence. 5. Pt will perform tshower transfer with modified independence. 6. Pt will perform UB dressing with independence. 7. Pt will perform LB dressing with modified independence. 8. Pt will perform toileting task with modified independence. 9. Pt will perform toilet transfer with modified independence. Short Term Weekly Goals for (2017 to 2017) in order to increase pts functional independence and safety, and decrease burden of care:  1. Pt will perform UB bathing with modified independence. 2. Pt will perform LB bathing with supervision. 3. Pt will perform shower transfer with CGA. 4. Pt will perform UB dressing with modified independence. 5. Pt will perform LB dressing with supervision. 6. Pt will perform toileting task with supervision. 7. Pt will perform toilet transfer with supervision   OCCUPATIONAL THERAPY DAILY NOTE  Patient Name:Stewart Thompson   Time In: 0930  Time Out: 56     Time In: 1430  Time Out: 56     Medical Diagnosis:  Left CVA  Acute ischemic stroke (HCC) Acute ischemic stroke ([facility] [address]      Pain at start of tx: 0/10  Pain at stop of tx: 0/10     Patient identified with name and : yes  Subjective: Pt very pleasant and cooperative without any complaints or concerns noted during either treatment session today.      Objective:      THERAPEUTIC ACTIVITY Daily Assessment     Utilizing red theraputty, pt completed small bead retrieval followed by finger extension to roll out putty, and then performed thumb to individual digit pinch to promote increased R hand strengthening and coordination. Pt played checkers game utilizing wooden pegs with his R hand to focus on R hand coordination and cognition. Pt needed significantly increased time for problem solving and strategizing moves during the game. IADL Daily Assessment     Pt performed functional mobility, ambulating 165 feet without an AD with CGA for balance and safety while bending and reaching to  12 items from low surfaces. Pt required intermittent Min A for balance while squatting and bending to  items from the floor. Pt utilized his R hand to  items with verbal reminders to use his R hand and then carried all items in a shopping bag in his L hand. MOBILITY/TRANSFERS Daily Assessment      Pt participated in sit <> stand transfer training utilizing NDT technique in order to increase pt's functional return and independence to carryover to ADLs and functional transfers. He performed 2 sets x 10 reps each at a CGA level with increased verbal cueing for pushing down through RLE during sit <> stand transition. Assessment: Pt demonstrates improved ability to perform functional mobility during IADL tasks with only CGA needed for balance and safety today. Plan of Care: Continue POC to maximize pt independence and safety performing ADLs and functional transfers/mobility.      Raegan Jackson, OTR  5/4/2017

## 2017-05-04 NOTE — PROGRESS NOTES
Problem: Mobility Impaired (Adult and Pediatric)  Goal: *Acute Goals and Plan of Care (Insert Text)  Physical Therapy Short Term Goals  Initiated 2017 and to be accomplished within 7 day(s) (2017)  1. Patient will move from supine to sit and sit to supine , scoot up and down and roll side to side in bed with modified independence. 2. Patient will transfer from bed to chair and chair to bed with minimal assistance/contact guard assist using the least restrictive device. 3. Patient will perform sit to stand with minimal assistance/contact guard assist.  4. Patient will ambulate with moderate assistance for 50 feet with the least restrictive device. 5. Patient will ascend/descend 6 stairs with 2 handrail(s) with moderate assistance . Physical Therapy Long Term Goals  Initiated 2017 and to be accomplished within 28 day(s) (2017)  1. Patient will move from supine to sit and sit to supine , scoot up and down and roll side to side in bed with independence. 2. Patient will transfer from bed to chair and chair to bed with modified independence using the least restrictive device. 3. Patient will perform sit to stand with modified independence. 4. Patient will ambulate with modified independence for 250 feet with the least restrictive device. 5. Patient will ascend/descend 6 stairs with 2 handrail(s) with modified independence. PHYSICAL THERAPY DAILY NOTE  Patient Name:Stewart Newsome  Time In: 0801  Time Out: 5342  Patient Seen For: Wheelchair mobility;Transfer training; Therapeutic exercise;Patient education;Gait training;Balance activities  Diagnosis: Left CVA  Acute ischemic stroke [facility]) Acute ischemic stroke [facility])  Precautions: Fall Risk     Subjective: Patient reports that he is prepared to work hard in therapy despite noting that he feels \"a little tired\".      Pain at start of tx:0/10  Pain at stop of tx:0/10     Patient identified with name and :YES         Objective: BED/MAT MOBILITY Daily Assessment     Rolling Right : 6 (Modified independent)  Rolling Left : 6 (Modified independent)  Supine to Sit : 6 (Modified independent)  Sit to Supine : 6 (Modified independent)          TRANSFERS Daily Assessment     Transfer Type: Other  Other: Patient performs stand step transfer with SBA requiring verbal cueing for COG positioning and pacing. Patient utilizes B UE on distal third of affected femur to promoted increased loading of hemiparetic side. Verbal cueing provided for neutral spine with anterior COG to promote anterior/up force. Transfer Assistance : 5 (Stand-by assistance)  Sit to Stand Assistance: Stand-by assistance          GAIT Daily Assessment     Amount of Assistance: 4 (Contact guard assistance)  Distance (ft): 300 Feet (ft) (x 3 trials; including uneven surface concrete)  Assistive Device: Gait belt          BALANCE Daily Assessment     Sitting - Static: Good (unsupported)  Sitting - Dynamic: Good (unsupported)  Standing - Static: Good  Standing - Dynamic : Impaired          WHEELCHAIR MOBILITY Daily Assessment     Able to Propel (ft): 255 feet  Functional Level: 6   Reverse lorelei technique requiring R UE/LE to perform mobilization in order to promote improved motor control and force production. LOWER EXTREMITY EXERCISES Daily Assessment     Patient performs R UE unilateral squatted PNF patterns with mirror for biofeedback cues. Patient requires tactile cues for form 20 reps x 2 sets. Patient performs B UE squatted PNF pattern with mirror for biofeedback cues and tactile cueing for form with improved hip hinge 20 reps x 3 sets. Assessment: Patient demonstrates improved form and coordination of B UE/LE with squatted PNF exercise today requiring only verbal cueing and mirror biofeedback cues to improve form.  Patient demonstrates appropriate progression toward physical therapy goals, however he will require increased focus on functional mobility to safely manage ambulation and MRADL's in home. Plan of Care: Cont current POC; increase focus on independence level with ambulation up to 150 ft to reduce burden of care in home. Clemente Wiseman, PT DPT  5/4/2017

## 2017-05-05 PROCEDURE — 65310000000 HC RM PRIVATE REHAB

## 2017-05-05 PROCEDURE — 92526 ORAL FUNCTION THERAPY: CPT

## 2017-05-05 PROCEDURE — 97530 THERAPEUTIC ACTIVITIES: CPT

## 2017-05-05 PROCEDURE — 97116 GAIT TRAINING THERAPY: CPT

## 2017-05-05 PROCEDURE — 74011250637 HC RX REV CODE- 250/637: Performed by: INTERNAL MEDICINE

## 2017-05-05 PROCEDURE — 74011250636 HC RX REV CODE- 250/636: Performed by: INTERNAL MEDICINE

## 2017-05-05 PROCEDURE — 92507 TX SP LANG VOICE COMM INDIV: CPT

## 2017-05-05 PROCEDURE — 97535 SELF CARE MNGMENT TRAINING: CPT

## 2017-05-05 PROCEDURE — 77030027138 HC INCENT SPIROMETER -A

## 2017-05-05 RX ADMIN — ATORVASTATIN CALCIUM 20 MG: 10 TABLET, FILM COATED ORAL at 20:52

## 2017-05-05 RX ADMIN — HEPARIN SODIUM 5000 UNITS: 5000 INJECTION, SOLUTION INTRAVENOUS; SUBCUTANEOUS at 05:48

## 2017-05-05 RX ADMIN — ASPIRIN 81 MG CHEWABLE TABLET 81 MG: 81 TABLET CHEWABLE at 08:53

## 2017-05-05 RX ADMIN — HEPARIN SODIUM 5000 UNITS: 5000 INJECTION, SOLUTION INTRAVENOUS; SUBCUTANEOUS at 17:40

## 2017-05-05 RX ADMIN — LEVOTHYROXINE SODIUM 150 MCG: 100 TABLET ORAL at 07:30

## 2017-05-05 RX ADMIN — PANTOPRAZOLE SODIUM 40 MG: 40 GRANULE, DELAYED RELEASE ORAL at 07:30

## 2017-05-05 RX ADMIN — MULTIPLE VITAMIN LIQUID 30 ML: LIQUID at 08:53

## 2017-05-05 RX ADMIN — CHOLECALCIFEROL TAB 25 MCG (1000 UNIT) 2000 UNITS: 25 TAB at 08:53

## 2017-05-05 RX ADMIN — CLOPIDOGREL BISULFATE 75 MG: 75 TABLET, FILM COATED ORAL at 17:40

## 2017-05-05 NOTE — PROGRESS NOTES
Problem: Mobility Impaired (Adult and Pediatric)  Goal: *Acute Goals and Plan of Care (Insert Text)  Physical Therapy Short Term Goals  Initiated 4/25/2017 and to be accomplished within 7 day(s) (05/02/2017) (MET) (STG=LTG) (05/09/2017)  1. Patient will move from supine to sit and sit to supine , scoot up and down and roll side to side in bed with modified independence. 2. Patient will transfer from bed to chair and chair to bed with minimal assistance/contact guard assist using the least restrictive device. 3. Patient will perform sit to stand with minimal assistance/contact guard assist.  4. Patient will ambulate with moderate assistance for 50 feet with the least restrictive device. 5. Patient will ascend/descend 6 stairs with 2 handrail(s) with moderate assistance . Physical Therapy Long Term Goals  Initiated 4/25/2017 and to be accomplished within 28 day(s) (05/23/2017)  1. Patient will move from supine to sit and sit to supine , scoot up and down and roll side to side in bed with independence. 2. Patient will transfer from bed to chair and chair to bed with modified independence using the least restrictive device. 3. Patient will perform sit to stand with modified independence. 4. Patient will ambulate with modified independence for 250 feet with the least restrictive device. 5. Patient will ascend/descend 6 stairs with 2 handrail(s) with modified independence. PHYSICAL THERAPY DAILY NOTE  Patient Name:Stewart Antony Alt  Time In: 1137  Time Out: 8894  Patient Seen For: Wheelchair mobility;Transfer training;Patient education;Gait training;Balance activities  Diagnosis: Left CVA  Acute ischemic stroke [facility]) Acute ischemic stroke [facility])  Precautions: Fall Risk     Subjective: Patient with late start 2/2 initial presentation in restroom, recovered with extended session.  Patient requires safety education for transfers 2/2 self transfer off of commode despite education to use call bell for assistance; Patient agrees. Pain at start of tx:0/10  Pain at stop of tx:0/10     Patient identified with name and : YES         Objective:       BED/MAT MOBILITY Daily Assessment     Rolling Right : 6 (Modified independent)  Rolling Left : 6 (Modified independent)  Supine to Sit : 6 (Modified independent)  Sit to Supine : 6 (Modified independent)          TRANSFERS Daily Assessment     Transfer Type: Other  Other: Patient performs stand step transfer with  B UE on R distal femur with proper anterior COG positioning as well as appropriate pacing with SPV from therapist for cues to maintain safe pace and form through final  of transfer. Transfer Assistance : 5 (Supervision/setup)  Sit to Stand Assistance: Supervision          GAIT Daily Assessment     Amount of Assistance: 5 (Stand-by assistance)  Distance (ft): 475 Feet (ft) (wet, uneven, and inclined/declined surfaces)  Assistive Device: Gait belt      Patient ambulates with increased R UE rigidity and L UE adduction to increase trunk stability when ambulating; verbal cueing provided for arm swing with mild improvement in technique. Mild trunk sway noted with slight R LE rigidity causing increased attention to R LE with swing phase. Step length and stance time normalizing bilaterally with reduced initial contact sound demonstrating increased control. Activity performed x 2 in session with uneven concrete, damp concrete, and ramps (up and down) to complete. BALANCE Daily Assessment     Sitting - Static: Good (unsupported)  Sitting - Dynamic: Good (unsupported)  Standing - Static: Good  Standing - Dynamic : Impaired      Patient requires only intermittent cueing for postural adjustments in standing and during ambulation.           WHEELCHAIR MOBILITY Daily Assessment     Able to Propel (ft): 255 feet  Functional Level: 6      Reverse lorelei-technique utilized with R UE/LE for propulsion/steering respectively in order to improve motor control, coordination, and increased general somatosensory input for recovery. Patient manages 255 ft with increased time to complete activity; performed x 2 trials. Assessment: Patient demonstrates improved ability to ambulate with smooth and safe gait pattern despite residual UE rigidity for trunk support. Patient demonstrating increased carry-over of cueing and safety techniques. Patient would benefit from continued skilled PT to improve understanding of self cues and increased independence with mobility. Plan of Care: Cont current POC; increase focus on ambulatory independence and self cues for pacing/safety. Clemente Wiseman, PT DPT  5/5/2017

## 2017-05-05 NOTE — PROGRESS NOTES
SHIFT CHANGE NOTE FOR [facility]    Bedside and Verbal shift change report given to  PRAVEENA Bridges RN (oncoming nurse) by Claudio Rendon LPN   (offgoing nurse). Report included the following information SBAR, Kardex, MAR and Recent Results. Situation:   Code Status: Full Code   Reason for Admission: CVA  Hospital Day: 11   Problem List:   Hospital Problems  Date Reviewed: 5/4/2017          Codes Class Noted POA    CKD stage G3a/A1, GFR 45-59 and albumin creatinine ratio <30 mg/g ICD-10-CM: N18.3  ICD-9-CM: 585.3  5/3/2017 Yes        Procedure refused ICD-10-CM: Z53.29  ICD-9-CM: V64.2  4/21/2017 Yes    Overview Signed 4/24/2017  3:28 PM by Ronal Hale MD     Speech Pathologist recommended NPO and PEG placement; Patient refused             * (Principal)Acute ischemic stroke [facility]) ICD-10-CM: I63.9  ICD-9-CM: 434.91  4/19/2017 Yes    Overview Signed 4/24/2017  3:15 PM by Ronal Hale MD     Acute Ischemic Stroke (acute to subacute ischemia involving the posterior limb of the left internal capsule, along the lateral aspect of the left thalamus) with residual right hemiparesis, dysphagia and dysarthria             Impaired mobility and ADLs ICD-10-CM: Z74.09  ICD-9-CM: 799.89  4/19/2017 Yes        Dyslipidemia (high LDL; low HDL) (Chronic) ICD-10-CM: E78.4  ICD-9-CM: 272.4  4/19/2017 Yes    Overview Signed 4/24/2017  3:25 PM by Ronal Hale MD     Lipid profile (4/19/2017): , .  HDL 42, LDL 97             Hemiparesis affecting right side as late effect of cerebrovascular accident (CVA) ([facility] [address] ICD-10-CM: E78.850  ICD-9-CM: 438.20  4/19/2017 Yes        Dysphagia as late effect of cerebrovascular accident (CVA) ICD-10-CM: K77.305  ICD-9-CM: 438.82  4/19/2017 Yes        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) ICD-10-CM: M52.014  ICD-9-CM: 438.13  4/19/2017 Yes              Background:   Past Medical History:   Past Medical History:   Diagnosis Date    Acute ischemic stroke ([facility] [address] 4/19/2017    Acute Ischemic Stroke (acute to subacute ischemia involving the posterior limb of the left internal capsule, along the lateral aspect of the left thalamus) with residual right hemiparesis, dysphagia and dysarthria    Asbestosis ([facility] [address]     Chronic obstructive pulmonary disease (COPD) ([facility] [address]     CKD stage G3a/A1, GFR 45-59 and albumin creatinine ratio <30 mg/g 5/3/2017    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  HDL 42, LDL 97    Dysphagia as late effect of cerebrovascular accident (CVA) 4/19/2017    Erectile dysfunction     Gastroesophageal reflux disease     Hemiparesis affecting right side as late effect of cerebrovascular accident (CVA) ([facility] [address] 4/19/2017    History of endogenous hypertriglyceridemia     History of malignant neoplasm of prostate     Genoveva score 7     Hypothyroidism     Osteoarthrosis involving multiple sites     Procedure refused 4/21/2017    Speech Pathologist recommended NPO and PEG placement; Patient refused    Urinary incontinence     Male incontinence       Patient taking anticoagulants YES   Patient has a defibrillator: no     Assessment:   Changes in Assessment throughout shift: NONE              Last Vitals:     Vitals:    05/03/17 1944 05/04/17 0700 05/04/17 1600 05/04/17 2100   BP: 137/77 139/75 (!) 158/93 135/76   Pulse: 67 67 78 67   Resp: 18 18 16 18   Temp: 98.7 °F (37.1 °C) 97 °F (36.1 °C) 97.2 °F (36.2 °C) 98.2 °F (36.8 °C)   SpO2: 96% 98% 100% 96%   Weight:       Height:            PAIN    Pain Assessment    Pain Intensity 1: 0 (05/05/17 0400) Pain Intensity 1: 2 (12/29/14 1105)      Pain Location 1: Abdomen      Pain Intervention(s) 1: Medication (see MAR)  Patient Stated Pain Goal: 0 Patient Stated Pain Goal: 0  o Intervention effective: no    o Other actions taken for pain: NONE     Skin Assessment  Skin color Skin Color: Appropriate for ethnicity  Condition/Temperature Skin Condition/Temp: Dry  Integrity Skin Integrity: Intact  Turgor Turgor: Non-tenting  Weekly Pressure Ulcer Documentation  Pressure  Injury Documentation: No Pressure Injury Noted-Pressure Ulcer Prevention Initiated  Wound Prevention & Protection Methods  Orientation of wound Orientation of Wound Prevention: Posterior  Location of Prevention Location of Wound Prevention: Sacrum/Coccyx  Dressing Present Dressing Present : No  Dressing Status    Wound Offloading Wound Offloading (Prevention Methods): Bed, pressure redistribution/air     INTAKE/OUPUT    Date 05/04/17 0700 - 05/05/17 0659 05/05/17 0700 - 05/06/17 0659   Shift 8280-8934 4703-4775 24 Hour Total 1962-7105 6909-5825 24 Hour Total   I  N  T  A  K  E   P. O. 740  740         P. O. 740  740       Shift Total  (mL/kg) 740  (9.1)  740  (9.1)      O  U  T  P  U  T   Urine  (mL/kg/hr)            Urine Occurrence(s) 1 x 3 x 4 x       Stool            Stool Occurrence(s) 0 x 0 x 0 x       Shift Total  (mL/kg)           740      Weight (kg) 81.5 81.5 81.5 81.5 81.5 81.5       Recommendations:  1. Patient needs and requests: NONE    2. Diet: CARDIAC PUREED WITH NECTAR THICK LIQUIDS    3. Pending tests/procedures: LABS     4. Functional Level/Equipment: WHEELCHAIR    5. Estimated Discharge Date: TBD Posted on Whiteboard in Patients Room: [facility] Safety Check    A safety check occurred in the patient's room between off going nurse and oncoming nurse listed above.     The safety check included the below items  Area Items   H  High Alert Medications - Verify all high alert medication drips (heparin, PCA, etc.)   E  Equipment - Suction is set up for ALL patients (with yanker)  - Red plugs utilized for all equipment (IV pumps, etc.)  - WOWs wiped down at end of shift.  - Room stocked with oxygen, suction, and other unit-specific supplies   A  Alarms - Bed alarm is set for fall risk patients  - Ensure chair alarm is in place and activated if patient is up in a chair   L  Lines - Check IV for any infiltration  - Syed bag is empty if patient has a Syed   - Tubing and IV bags are labeled   S  Safety   - Room is clean, patient is clean, and equipment is clean. - Hallways are clear from equipment besides carts. - Fall bracelet on for fall risk patients  - Ensure room is clear and free of clutter  - Suction is set up for ALL patients (with yanker)  - Hallways are clear from equipment besides carts.    - Isolation precautions followed, supplies available outside room, sign posted

## 2017-05-05 NOTE — PROGRESS NOTES
[x] Psychology  [] Social Work [] Recreational Therapy    INTERVENTION  UNITS/TIME OF SERVICE   Assessment    Supportive Counseling May 4, 2017   Orientation    Discharge Planning    Resource Linkage              Progress/Current Status    Individual support  with patient on ARU this date. He is initially found lying supine in bed but immediately indicates that he would like to discuss his effort and status in rehabilitation thus far. Patient is, in turn, feeling gratified with his progress and now seems to be more realistic in his assessment of his course of recovery. Because he is able to identify progress, he is more positive in his outlook; however, he also admits that he is saddened by what has occurred for him. He seems to continue to view himself as the sole caretaker for his spouse and makes little reference to the fact that he will be forced to alter his routine. In fact, he is only beginning to better understand what will be the course of his recovery and the time it will take. Patient was thoroughly educated about many of these issues. He is very responsive to discussion about himself, his marriage, his health and recovery and the support he feels from his family. Quite simply, he is not accustomed to rely on others and increased dependency is very difficult for him.     Akanksha Moore, PHD 5/5/2017 3:50 PM

## 2017-05-05 NOTE — PROGRESS NOTES
SHIFT CHANGE NOTE FOR [facility]    Bedside and Verbal shift change report given to  Brandy Long LPN (oncoming nurse) by Sunshine Alvarez, RN   (offgoing nurse). Report included the following information SBAR, Kardex, MAR and Recent Results. Situation:   Code Status: Full Code   Reason for Admission: CVA  Hospital Day: 11   Problem List:   Hospital Problems  Date Reviewed: 5/4/2017          Codes Class Noted POA    CKD stage G3a/A1, GFR 45-59 and albumin creatinine ratio <30 mg/g ICD-10-CM: N18.3  ICD-9-CM: 585.3  5/3/2017 Yes        Procedure refused ICD-10-CM: Z53.29  ICD-9-CM: V64.2  4/21/2017 Yes    Overview Signed 4/24/2017  3:28 PM by Daria Carmona MD     Speech Pathologist recommended NPO and PEG placement; Patient refused             * (Principal)Acute ischemic stroke [facility]) ICD-10-CM: I63.9  ICD-9-CM: 434.91  4/19/2017 Yes    Overview Signed 4/24/2017  3:15 PM by Daria Carmona MD     Acute Ischemic Stroke (acute to subacute ischemia involving the posterior limb of the left internal capsule, along the lateral aspect of the left thalamus) with residual right hemiparesis, dysphagia and dysarthria             Impaired mobility and ADLs ICD-10-CM: Z74.09  ICD-9-CM: 799.89  4/19/2017 Yes        Dyslipidemia (high LDL; low HDL) (Chronic) ICD-10-CM: E78.4  ICD-9-CM: 272.4  4/19/2017 Yes    Overview Signed 4/24/2017  3:25 PM by Daria Carmona MD     Lipid profile (4/19/2017): , .  HDL 42, LDL 97             Hemiparesis affecting right side as late effect of cerebrovascular accident (CVA) ([facility] [address] ICD-10-CM: O06.893  ICD-9-CM: 438.20  4/19/2017 Yes        Dysphagia as late effect of cerebrovascular accident (CVA) ICD-10-CM: M09.738  ICD-9-CM: 438.82  4/19/2017 Yes        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) ICD-10-CM: L79.163  ICD-9-CM: 438.13  4/19/2017 Yes              Background:   Past Medical History:   Past Medical History:   Diagnosis Date    Acute ischemic stroke ([facility] [address] 4/19/2017    Acute Ischemic Stroke (acute to subacute ischemia involving the posterior limb of the left internal capsule, along the lateral aspect of the left thalamus) with residual right hemiparesis, dysphagia and dysarthria    Asbestosis ([facility] [address]     Chronic obstructive pulmonary disease (COPD) ([facility] [address]     CKD stage G3a/A1, GFR 45-59 and albumin creatinine ratio <30 mg/g 5/3/2017    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  HDL 42, LDL 97    Dysphagia as late effect of cerebrovascular accident (CVA) 4/19/2017    Erectile dysfunction     Gastroesophageal reflux disease     Hemiparesis affecting right side as late effect of cerebrovascular accident (CVA) ([facility] [address] 4/19/2017    History of endogenous hypertriglyceridemia     History of malignant neoplasm of prostate     Genoveva score 7     Hypothyroidism     Osteoarthrosis involving multiple sites     Procedure refused 4/21/2017    Speech Pathologist recommended NPO and PEG placement; Patient refused    Urinary incontinence     Male incontinence       Patient taking anticoagulants YES   Patient has a defibrillator: no     Assessment:   Changes in Assessment throughout shift: NONE              Last Vitals:     Vitals:    05/03/17 1944 05/04/17 0700 05/04/17 1600 05/04/17 2100   BP: 137/77 139/75 (!) 158/93 135/76   Pulse: 67 67 78 67   Resp: 18 18 16 18   Temp: 98.7 °F (37.1 °C) 97 °F (36.1 °C) 97.2 °F (36.2 °C) 98.2 °F (36.8 °C)   SpO2: 96% 98% 100% 96%   Weight:       Height:            PAIN    Pain Assessment    Pain Intensity 1: 0 (05/05/17 0400) Pain Intensity 1: 2 (12/29/14 1105)      Pain Location 1: Abdomen      Pain Intervention(s) 1: Medication (see MAR)  Patient Stated Pain Goal: 0 Patient Stated Pain Goal: 0  o Intervention effective: no    o Other actions taken for pain: NONE     Skin Assessment  Skin color Skin Color: Appropriate for ethnicity  Condition/Temperature Skin Condition/Temp: Dry  Integrity Skin Integrity: Intact  Turgor Turgor: Non-tenting  Weekly Pressure Ulcer Documentation  Pressure  Injury Documentation: No Pressure Injury Noted-Pressure Ulcer Prevention Initiated  Wound Prevention & Protection Methods  Orientation of wound Orientation of Wound Prevention: Posterior  Location of Prevention Location of Wound Prevention: Sacrum/Coccyx  Dressing Present Dressing Present : No  Dressing Status    Wound Offloading Wound Offloading (Prevention Methods): Bed, pressure redistribution/air     INTAKE/OUPUT    Date 05/04/17 0700 - 05/05/17 0659 05/05/17 0700 - 05/06/17 0659   Shift 0723-9370 3199-7927 24 Hour Total 1264-8568 8565-4557 24 Hour Total   I  N  T  A  K  E   P. O. 740  740         P. O. 740  740       Shift Total  (mL/kg) 740  (9.1)  740  (9.1)      O  U  T  P  U  T   Urine  (mL/kg/hr)            Urine Occurrence(s) 1 x 2 x 3 x       Stool            Stool Occurrence(s) 0 x 0 x 0 x       Shift Total  (mL/kg)           740      Weight (kg) 81.5 81.5 81.5 81.5 81.5 81.5       Recommendations:  1. Patient needs and requests: NONE    2. Diet: CARDIAC PUREED WITH NECTAR THICK LIQUIDS    3. Pending tests/procedures: LABS     4. Functional Level/Equipment: WHEELCHAIR    5. Estimated Discharge Date: TBD Posted on Whiteboard in Patients Room: [facility] Safety Check    A safety check occurred in the patient's room between off going nurse and oncoming nurse listed above.     The safety check included the below items  Area Items   H  High Alert Medications - Verify all high alert medication drips (heparin, PCA, etc.)   E  Equipment - Suction is set up for ALL patients (with yanker)  - Red plugs utilized for all equipment (IV pumps, etc.)  - WOWs wiped down at end of shift.  - Room stocked with oxygen, suction, and other unit-specific supplies   A  Alarms - Bed alarm is set for fall risk patients  - Ensure chair alarm is in place and activated if patient is up in a chair   L  Lines - Check IV for any infiltration  - Syed bag is empty if patient has a Syed   - Tubing and IV bags are labeled   S  Safety   - Room is clean, patient is clean, and equipment is clean. - Hallways are clear from equipment besides carts. - Fall bracelet on for fall risk patients  - Ensure room is clear and free of clutter  - Suction is set up for ALL patients (with yanker)  - Hallways are clear from equipment besides carts.    - Isolation precautions followed, supplies available outside room, sign posted

## 2017-05-05 NOTE — PROGRESS NOTES
[address]  [address] [place] [address], [address]     INPATIENT REHABILITATION  DAILY PROGRESS NOTE     Date: 5/5/2017    Name: Juventino Ramos Age / Sex: 64 y.o. / male   CSN: [id number] MRN: [id number]   [facility] Date: 4/24/2017 Length of Stay: 11 days     Primary Rehab Diagnosis: Impaired Mobility and ADLs secondary to Acute Ischemic Stroke (acute to subacute ischemia involving the posterior limb of the left internal capsule, along the lateral aspect of the left thalamus) with residual right hemiparesis, dysphagia and dysarthria       Subjective:     Patient seen and examined. Blood pressure controlled. Patient's Complaint:   No significant medical complaints    Pain Control: no current joint or muscle symptoms, essentially pain-free      Objective:     Vital Signs:  Patient Vitals for the past 24 hrs:   BP Temp Pulse Resp SpO2   05/05/17 0730 158/78 97 °F (36.1 °C) 77 18 96 %   05/04/17 2100 135/76 98.2 °F (36.8 °C) 67 18 96 %   05/04/17 1600 (!) 158/93 97.2 °F (36.2 °C) 78 16 100 %        Physical Examination:  GENERAL SURVEY: Patient is awake, alert, oriented x 3, sitting comfortably on the chair, not in acute respiratory distress. HEENT: pink palpebral conjunctivae, anicteric sclerae, no nasoaural discharge, moist oral mucosa  NECK: supple, no jugular venous distention, no palpable lymph nodes  CHEST/LUNGS: symmetrical chest expansion, good air entry, clear breath sounds  HEART: adynamic precordium, good S1 S2, no S3, regular rhythm, no murmurs  ABDOMEN: flat, bowel sounds appreciated, soft, non-tender  EXTREMITIES: pink nailbeds, no edema, full and equal pulses, no calf tenderness   NEUROLOGICAL EXAM: The patient is awake, alert and oriented x3, able to answer questions fairly appropriately, able to follow 1 and 2 step commands. Able to tell time from the wall clock. Cranial nerves II-XII are grossly intact except for slurred speech and dysphagia.  No gross sensory deficit. Motor strength is 4/5 on the RUE, 5/5 on the LUE, 4/5 on the right hip, 4+/5 on the right knee, 3/5 on the right ankle, 5/5 on the LLE. Current Medications:  Current Facility-Administered Medications   Medication Dose Route Frequency    cholecalciferol (VITAMIN D3) tablet 2,000 Units  2,000 Units Oral DAILY    bisacodyl (DULCOLAX) suppository 10 mg  10 mg Rectal Q48H PRN    heparin (porcine) injection 5,000 Units  5,000 Units SubCUTAneous Q12H    acetaminophen (TYLENOL) solution 650 mg  650 mg Oral Q4H PRN    docusate sodium (COLACE) capsule 100 mg  100 mg Oral BID    aspirin chewable tablet 81 mg  81 mg Oral DAILY WITH BREAKFAST    clopidogrel (PLAVIX) tablet 75 mg  75 mg Oral DAILY WITH DINNER    atorvastatin (LIPITOR) tablet 20 mg  20 mg Oral QHS    pantoprazole (PROTONIX) granules for oral suspension 40 mg  40 mg Oral ACB    multivitamin (MULTI-DELYN, WELLESSE) oral liquid 30 mL  30 mL Oral DAILY    levothyroxine (SYNTHROID) tablet 150 mcg  150 mcg Oral ACB       Allergies: Allergies   Allergen Reactions    Pcn [Penicillins] Rash       Lab/Data Review:  No results found for this or any previous visit (from the past 24 hour(s)). Estimated Glomerular Filtration Rate:  CKD-EPI:   On admission, estimated GFR was 46.2 mL/min/1.73m2 based on a Creatinine of 1.44 mg/dl. Most recent estimated GFR was 44.3 mL/min/1.73m2 based on a Creatinine of 1.49 mg/dl. MDRD:   On admission, estimated GFR was 50.4 mL/min/1.73m2 based on a Creatinine of 1.44 mg/dl. Most recent estimated GFR was 48.5 mL/min/1.73m2 based on a Creatinine of 1.49 mg/dl. Assessment:     Primary Rehabilitation Diagnosis  1. Impaired Mobility and ADLs  2.  Acute Ischemic Stroke (acute to subacute ischemia involving the posterior limb of the left internal capsule, along the lateral aspect of the left thalamus) with residual right hemiparesis, dysphagia and dysarthria      Comorbidities   Urinary incontinence  History of malignant neoplasm of prostate    Hypothyroidism    Chronic obstructive pulmonary disease (COPD)    Asbestosis    Osteoarthrosis involving multiple sites    History of endogenous hypertriglyceridemia    CKD stage G3a/A1, GFR 45-59 and albumin creatinine ratio <30 mg/g    Dyslipidemia (high LDL; low HDL)    Gastroesophageal reflux disease    Procedure refused    Erectile dysfunction       Plan:     1. Justification for continued stay: Good progression towards established rehabilitation goals. 2. Medical Issues being followed closely:    [x]  Fall and safety precautions     []  Wound Care     [x]  Bowel and Bladder Function     [x]  Fluid Electrolyte and Nutrition Balance     []  Pain Control      3. Issues that 24 hour rehabilitation nursing is following:    [x]  Fall and safety precautions     []  Wound Care     [x]  Bowel and Bladder Function     [x]  Fluid Electrolyte and Nutrition Balance     []  Pain Control      [x]  Assistance with and education on in-room safety with transfers to and from the bed, wheelchair, toilet and shower. 4. Acute rehabilitation plan of care:    [x]  Continue current care and rehab. [x]  Physical Therapy           [x]  Occupational Therapy           [x]  Speech Therapy     []  Hold Rehab until further notice     5. Medications:    [x]  MAR Reviewed     [x]  Continue Present Medications     6. DVT Prophylaxis:      []  Lovenox     [x]  Unfractionated Heparin     []  Coumadin     []  NOAC     [x]  GRIS Stockings     [x]  Sequential Compression Device     []  None     7.  Orders:   > Acute Ischemic Stroke (acute to subacute ischemia involving the posterior limb of the left internal capsule, along the lateral aspect of the left thalamus) with residual right hemiparesis, dysphagia and dysarthria    > Continue:    > Aspirin 81 mg PO once daily with breakfast    > Atorvastatin 20 mg PO q HS    > Clopidogrel 75 mg PO once daily with dinner     > CKD stage G3a/A1, GFR 45-59 and albumin creatinine ratio <30 mg/g   > On admission, based on a Creatinine of 1.4 mg/dl:    > Estimated GFR using CKD-EPI = 47.8 mL/min/1.73m2    > Estimated GFR using MDRD = 52.1 mL/min/1.73m2   > Estimated calculated glomerular filtration rate equivalent to that of stage 3 chronic kidney disease = 30-59 ml/min   > Urine Microalbumin/Creatinine ratio (5/3/2017) = 29     > Dyslipidemia   > Lipid profile (4/19/2017): , . HDL 42, LDL 97   > On 4/20/2017, patient was started at [facility] on Atorvastatin 20 mg PO q HS   > Continue Atorvastatin 20 mg PO q HS      8. Patient's progress in rehabilitation and medical issues discussed with the patient. All questions answered to the best of my ability. Care plan discussed with patient, wife, daughter and nurse.       Signed:    Vern Powell MD    May 5, 2017

## 2017-05-05 NOTE — PROGRESS NOTES
Problem: Neurolinguistics Impaired (Adult)  Goal: *Speech Goal: (INSERT TEXT)  Long term goals:  1. Patient will tolerate a regular/thin liquid diet without overt s/s of aspiration, 4 consecutive meals. 2. Patient will use safe swallowing techniques with supervision for all PO intake. 3. Patient will answer complex yes/no questions with 90% accuracy. 4 patient will follow complex commands for object manipulation wit 80-90% accuracy. 5. Patient will name 10-12 items within simple categories with supervision. 6. Patient will perform varied word retrieval tasks with % accuracy. 7. Patient will recall 3 words after 5 minutes, supervision. 8. Patient will perform functional problem solving/reasoning tasks with supervision. Short term goals (by 5/9/17):  1. Patient will tolerate a soft, nectar thick liquid diet without overt s/s of aspiration, 4 consecutive meals. 2. Patient will drink sips of thin liquids with the SLP using safe swallowing techniques without overt s/s of aspiration. 3. Patient will use safe swallowing techniques with min-mod cues for all PO intake. 4. Patient will answer complex yes/no questions with 80-90% accuracy. 5 patient will follow complex commands for object manipulation wit 80-90% accuracy. 6. Patient will name 7-9 items within simple categories with supervision. 7. Patient will perform varied word retrieval tasks with 80-90% accuracy. 8. Patient will recall 3 words after 5 minutes, min assist.  9. Patient will perform functional problem solving/reasoning tasks with 80-90% accuracy. SPEECH LANGUAGE PATHOLOGY TREATMENT     Patient: Juventino Ramos (75 y.o. male)  Date: 5/5/2017  Diagnosis: Left CVA  Acute ischemic stroke ([facility] [address] Acute ischemic stroke [facility])       SUBJECTIVE:   Patient stated I try to behave when you are watching me. OBJECTIVE:   Mental Status:  Mr. Cynthia Gamble is alert but fatigued most of the tie when seen by the SLP.      Treatment & Interventions:   Patient was seen in the dining room at mealtime for breakfast and lunch. SLP provided thin liquids for both meals. Patient with occasional mild cough, but stated that this was not related to the liquides (patient does have a history of COPD and asbestosis). He is tolerating his meals quite well, but is at times discouraged by the limited choices. His family often brings him foods that are not consistent with his cardiac diet. SLP has requested that the MD advance him to a regular diet with thin liquids. SLP will be working tomorrow and can be present at his meals to cue in safe swallowing strategies. Mr. Dylon Cox was also given water during his afternoon speech therapy session. He tolerated this well. Patient also participated in the following treatment tasks:     Neuro-Linguistics:   Orientation/recent memory:                Independent  Recall 3 words after 5 minutes:          Supervision  Discussing advantages:                     100% accuracy  Discussing disadvantages:                 100% accuracy (same situations)    In CVA Support Group this morning, patient watched a video of professionals and stroke survivors discussing the symptoms, etiology, and prevention of stroke. Patient was attentive throughout the presentation, and later asked appropriate questions regarding its content. Response & Tolerance to Activities:  Mr. Dylon Cox is consistently cooperative with all tasks presented. He is very pleasant and enjoys conversation.      Pain:  Pain Scale 1: Numeric (0 - 10)     After treatment:   [X]       Patient left in no apparent distress sitting up in chair  [ ]       Patient left in no apparent distress in bed  [X]       Call bell left within reach  [ ]       Nursing notified  [ ]       Caregiver present  [ ]       Bed alarm activated      ASSESSMENT:   Progression toward goals:  [X]       Improving appropriately and progressing toward goals  [ ]       Improving slowly and progressing toward goals  [ ]       Not making progress toward goals and plan of care will be adjusted      PLAN:   Patient continues to benefit from skilled intervention to address the above impairments. Continue treatment per established plan of care.   Discharge Recommendations:  [place], SLP  Time Calculation:  90 minutes

## 2017-05-05 NOTE — PROGRESS NOTES
Problem: Self Care Deficits Care Plan (Adult)  Goal: *Acute Goals and Plan of Care (Insert Text)  Long Term Goals (to be met upon discharge date) in order to increase pts functional independence and safety, and decrease burden of care:  1. Pt will perform self-feeding with independence. 2. Pt will perform grooming with independence. 3. Pt will perform UB bathing with modified independence. 4. Pt will perform LB bathing with modified independence. 5. Pt will perform tshower transfer with modified independence. 6. Pt will perform UB dressing with independence. 7. Pt will perform LB dressing with modified independence. 8. Pt will perform toileting task with modified independence. 9. Pt will perform toilet transfer with modified independence. Short Term Weekly Goals for (2017 to 2017) in order to increase pts functional independence and safety, and decrease burden of care:  1. Pt will perform UB bathing with modified independence. 2. Pt will perform LB bathing with supervision. 3. Pt will perform shower transfer with CGA. 4. Pt will perform UB dressing with modified independence. 5. Pt will perform LB dressing with supervision. 6. Pt will perform toileting task with supervision. 7. Pt will perform toilet transfer with supervision   OCCUPATIONAL THERAPY DAILY NOTE  Patient Name:Stewart Disla  Time Spent With Patient  Time In: 0800  Time Out: 0900      Time In: 1410  Time Out: 1440     Medical Diagnosis:  Left CVA  Acute ischemic stroke (HCC) Acute ischemic stroke ([facility] [address]      Pain at start of tx: 0/10  Pain at stop of tx: 0/10     Patient identified with name and : yes  Subjective: Pt pleasant and cooperative without complaints or concerns noted throughout both treatment sessions. Objective: Pt performed full body shower; see below for ADL and functional transfers/mobility.  Pt also seen in co-treat with speech therapy for feeding during breakfast. OT cued pt for using his R hand to feed himself while SLP addressed safe swallowing. THERAPEUTIC ACTIVITY Daily Assessment     Pt worked on B hand coordination to practice ADL buttoning and shoelace tying on tabletop in front of him. Pt unbuttoned/buttoned shirt with 8 buttons 3x and was able to untie/tie shoelace 5x with only additional time and effort required. Pt played \"Don't Spill the Beans\" game to focus on opposition for increased R hand FM coordination. Each turn, pt alternated opposition of thumb to each digit to  and place beans on the bucket. Pt needed additional time while performing opposition to thumb with both his ring finger and pinky finger and well as increased focus to appropriately time releasing the bean. FEEDING/EATING Daily Assessment     Feeding/Eating  Feeding/Eating Assistance: 6 (Modified independent)  Comments: Pt was able to open packages and containers with additional time and effort. Pt encouraged to utilize his R hand to perform self-feeding with slight additional time and effort only. GROOMING Daily Assessment     Grooming  Grooming Assistance : 6 (Modified independent)  Comments: Pt completed oral care with only slight additional time and effort needed while using R hand. UPPER BODY BATHING Daily Assessment     Upper Body Bathing  Bathing Assistance, Upper: 5 (Supervision)  Position Performed: Seated in chair  Comments: Pt requires setup assistance to prepare for UB bathing task. He is able to wash full UB with additional time needed for using R hand to wash LUE. LOWER BODY BATHING Daily Assessment     Lower Body Bathing  Bathing Assistance, Lower : 5 (Supervision)  Position Performed: Seated in chair;Standing  Comments: Pt washes B legs while seated on shower bench and bending forward to wash B legs and feet. He requires close supervision for safety while standing with support of grab bar to wash devorah area and buttocks.        TOILETING Daily Assessment Toileting  Toileting Assistance (FIM Score): 5 (Supervision) (close supervision for safety while standing to manage cothes) Pt utilizes intermittent support of grab for balance while standing. UPPER BODY DRESSING Daily Assessment     Upper Body Dressing   Dressing Assistance : 5 (Supervision)  Comments: Setup only       LOWER BODY DRESSING Daily Assessment     Lower Body Dressing   Dressing Assistance : 5 (Supervision)  Leg Crossed Method Used: Yes  Position Performed: Seated in chair;Standing  Comments: Pt requires supervision for safety while standing and managing clothing over his buttocks. Pt able to tie drawstring and tennis shoes with increased time and effort only. MOBILITY/TRANSFERS Daily Assessment     Functional Transfers  Toilet Transfer : Grab bars (Stand step transfer without AD)  Amount of Assistance Required: 4 (Minimal assistance)  Tub or Shower Type: Shower  Amount of Assistance Required: 4 (Minimal assistance)  Adaptive Equipment: Tub transfer bench;Grab bars   Pt ambulated in room to bathroom without an AD with Min A for balance and safety. Pt completed stand step transfers in bathroom to/from shower bench and to/from commode utilizing NDT technique with Min A for safety. Assessment: Pt demonstrates good progress with OT as he was able to perform all ADLs at at least a supervision level today. He was able to manage tying laces with additional time and effort. Plan of Care: Continue POC to maximize pt independence and safety performing ADLs and functional transfers/mobility.      Eiml Fountain, OTR  5/5/2017

## 2017-05-05 NOTE — PROGRESS NOTES
[x] Psychology  [] Social Work [] Recreational Therapy    INTERVENTION  UNITS/TIME OF SERVICE   Assessment    Supportive Counseling May 5, 2017   Orientation    Discharge Planning    Resource Linkage              Progress/Current Status    Patient attended stroke support group on ARU and was thoroughly educated about all aspects of stroke occurrence and recovery with emphasis on risk factors for recurrence and health maintenance for long term stability. Patient reflected that he was doing all that he could to maintain good health, though he avoided identifying the extent to which spousal demands with wife's Alzheimer's Dementia might have impacted him. Now, at least, he is beginning to better acknowledge his forced changes at hand.     Ondina Alvarez, THE [facility] 5/5/2017 3:55 PM

## 2017-05-06 PROCEDURE — 74011250636 HC RX REV CODE- 250/636: Performed by: INTERNAL MEDICINE

## 2017-05-06 PROCEDURE — 74011250637 HC RX REV CODE- 250/637: Performed by: INTERNAL MEDICINE

## 2017-05-06 PROCEDURE — 65310000000 HC RM PRIVATE REHAB

## 2017-05-06 PROCEDURE — 92507 TX SP LANG VOICE COMM INDIV: CPT

## 2017-05-06 PROCEDURE — 92526 ORAL FUNCTION THERAPY: CPT

## 2017-05-06 RX ADMIN — ATORVASTATIN CALCIUM 20 MG: 10 TABLET, FILM COATED ORAL at 20:27

## 2017-05-06 RX ADMIN — MULTIPLE VITAMIN LIQUID 30 ML: LIQUID at 08:42

## 2017-05-06 RX ADMIN — DOCUSATE SODIUM 100 MG: 100 CAPSULE, LIQUID FILLED ORAL at 08:43

## 2017-05-06 RX ADMIN — LEVOTHYROXINE SODIUM 150 MCG: 100 TABLET ORAL at 06:35

## 2017-05-06 RX ADMIN — ASPIRIN 81 MG CHEWABLE TABLET 81 MG: 81 TABLET CHEWABLE at 08:43

## 2017-05-06 RX ADMIN — PANTOPRAZOLE SODIUM 40 MG: 40 GRANULE, DELAYED RELEASE ORAL at 06:35

## 2017-05-06 RX ADMIN — HEPARIN SODIUM 5000 UNITS: 5000 INJECTION, SOLUTION INTRAVENOUS; SUBCUTANEOUS at 17:19

## 2017-05-06 RX ADMIN — CLOPIDOGREL BISULFATE 75 MG: 75 TABLET, FILM COATED ORAL at 17:19

## 2017-05-06 RX ADMIN — HEPARIN SODIUM 5000 UNITS: 5000 INJECTION, SOLUTION INTRAVENOUS; SUBCUTANEOUS at 06:13

## 2017-05-06 RX ADMIN — CHOLECALCIFEROL TAB 25 MCG (1000 UNIT) 2000 UNITS: 25 TAB at 08:43

## 2017-05-06 NOTE — PROGRESS NOTES
SHIFT CHANGE NOTE FOR [facility]    Bedside and Verbal shift change report given to  PRAVEENA Dallas RN (oncoming nurse) by Ishmael Head LPN   (offgoing nurse). Report included the following information SBAR, Kardex, MAR and Recent Results. Situation:   Code Status: Full Code   Reason for Admission: CVA  Hospital Day: 11   Problem List:   Hospital Problems  Date Reviewed: 5/5/2017          Codes Class Noted POA    CKD stage G3a/A1, GFR 45-59 and albumin creatinine ratio <30 mg/g ICD-10-CM: N18.3  ICD-9-CM: 585.3  5/3/2017 Yes        Procedure refused ICD-10-CM: Z53.29  ICD-9-CM: V64.2  4/21/2017 Yes    Overview Signed 4/24/2017  3:28 PM by Kermit Cassidy MD     Speech Pathologist recommended NPO and PEG placement; Patient refused             * (Principal)Acute ischemic stroke [facility]) ICD-10-CM: I63.9  ICD-9-CM: 434.91  4/19/2017 Yes    Overview Signed 4/24/2017  3:15 PM by Kermit Cassidy MD     Acute Ischemic Stroke (acute to subacute ischemia involving the posterior limb of the left internal capsule, along the lateral aspect of the left thalamus) with residual right hemiparesis, dysphagia and dysarthria             Impaired mobility and ADLs ICD-10-CM: Z74.09  ICD-9-CM: 799.89  4/19/2017 Yes        Dyslipidemia (high LDL; low HDL) (Chronic) ICD-10-CM: E78.4  ICD-9-CM: 272.4  4/19/2017 Yes    Overview Signed 4/24/2017  3:25 PM by Kermit Cassidy MD     Lipid profile (4/19/2017): , .  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) ICD-10-CM: C74.591  ICD-9-CM: 438.13  4/19/2017 Yes              Background:   Past Medical History:   Past Medical History:   Diagnosis Date    Acute ischemic stroke ([facility] [address] 4/19/2017    Acute Ischemic Stroke (acute to subacute ischemia involving the posterior limb of the left internal capsule, along the lateral aspect of the left thalamus) with residual right hemiparesis, dysphagia and dysarthria    Asbestosis ([facility] [address]     Chronic obstructive pulmonary disease (COPD) ([facility] [address]     CKD stage G3a/A1, GFR 45-59 and albumin creatinine ratio <30 mg/g 5/3/2017    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  HDL 42, LDL 97    Dysphagia as late effect of cerebrovascular accident (CVA) 4/19/2017    Erectile dysfunction     Gastroesophageal reflux disease     Hemiparesis affecting right side as late effect of cerebrovascular accident (CVA) ([facility] [address] 4/19/2017    History of endogenous hypertriglyceridemia     History of malignant neoplasm of prostate     Genoveva score 7     Hypothyroidism     Osteoarthrosis involving multiple sites     Procedure refused 4/21/2017    Speech Pathologist recommended NPO and PEG placement; Patient refused    Urinary incontinence     Male incontinence       Patient taking anticoagulants YES   Patient has a defibrillator: no     Assessment:   Changes in Assessment throughout shift: NONE              Last Vitals:     Vitals:    05/04/17 2100 05/05/17 0730 05/05/17 1230 05/05/17 1558   BP: 135/76 158/78 161/84 149/81   Pulse: 67 77 78 76   Resp: 18 18 19 18   Temp: 98.2 °F (36.8 °C) 97 °F (36.1 °C) 96.9 °F (36.1 °C) 97.2 °F (36.2 °C)   SpO2: 96% 96% 100% 100%   Weight:       Height:            PAIN    Pain Assessment    Pain Intensity 1: 0 (05/05/17 2000) Pain Intensity 1: 2 (12/29/14 1105)      Pain Location 1: Abdomen      Pain Intervention(s) 1: Medication (see MAR)  Patient Stated Pain Goal: 0 Patient Stated Pain Goal: 0  o Intervention effective: no    o Other actions taken for pain: NONE     Skin Assessment  Skin color Skin Color: Appropriate for ethnicity  Condition/Temperature Skin Condition/Temp: Dry  Integrity Skin Integrity: Intact  Turgor Turgor: Non-tenting  Weekly Pressure Ulcer Documentation  Pressure  Injury Documentation: No Pressure Injury Noted-Pressure Ulcer Prevention Initiated  Wound Prevention & Protection Methods  Orientation of wound Orientation of Wound Prevention: Posterior  Location of Prevention Location of Wound Prevention: Sacrum/Coccyx  Dressing Present Dressing Present : No  Dressing Status    Wound Offloading Wound Offloading (Prevention Methods): Bed, pressure redistribution/air     INTAKE/OUPUT    Date 05/04/17 1900 - 05/05/17 0659 05/05/17 0700 - 05/06/17 0659   Shift 5588-3757 24 Hour Total 0769-8928 6606-5842 24 Hour Total   I  N  T  A  K  E   P. O.  740 400  400      P. O.  740 400  400    Shift Total  (mL/kg)  740  (9.1) 400  (4.9)  400  (4.9)   O  U  T  P  U  T   Urine  (mL/kg/hr)           Urine Occurrence(s) 3 x 4 x 2 x  2 x    Stool           Stool Occurrence(s) 0 x 0 x 0 x  0 x    Shift Total  (mL/kg)        NET  740 400  400   Weight (kg) 81.5 81.5 81.5 81.5 81.5       Recommendations:  1. Patient needs and requests: NONE    2. Diet: CARDIAC PUREED WITH NECTAR THICK LIQUIDS    3. Pending tests/procedures: LABS     4. Functional Level/Equipment: WHEELCHAIR    5. Estimated Discharge Date: TBD Posted on Whiteboard in Patients Room: [facility] Safety Check    A safety check occurred in the patient's room between off going nurse and oncoming nurse listed above.     The safety check included the below items  Area Items   H  High Alert Medications - Verify all high alert medication drips (heparin, PCA, etc.)   E  Equipment - Suction is set up for ALL patients (with yanker)  - Red plugs utilized for all equipment (IV pumps, etc.)  - WOWs wiped down at end of shift.  - Room stocked with oxygen, suction, and other unit-specific supplies   A  Alarms - Bed alarm is set for fall risk patients  - Ensure chair alarm is in place and activated if patient is up in a chair   L  Lines - Check IV for any infiltration  - Syed bag is empty if patient has a Syed   - Tubing and IV bags are labeled   S  Safety   - Room is clean, patient is clean, and equipment is clean. - Hallways are clear from equipment besides carts. - Fall bracelet on for fall risk patients  - Ensure room is clear and free of clutter  - Suction is set up for ALL patients (with yanker)  - Hallways are clear from equipment besides carts.    - Isolation precautions followed, supplies available outside room, sign posted

## 2017-05-06 NOTE — PROGRESS NOTES
Progress Note    Patient: Eduin Yeh MRN: [id number]  SSN: [id number]    YOB: 1940  Age: 64 y.o. Sex: male      Admit Date: 4/24/2017    LOS: 12 days     Subjective:     Patient admitted for theraspy s/p CVA. No acute problems. Objective:     Vitals:    05/05/17 1230 05/05/17 1558 05/05/17 2000 05/06/17 0756   BP: 161/84 149/81 154/84 151/81   Pulse: 78 76 68 (!) 54   Resp: 19 18 18 18   Temp: 96.9 °F (36.1 °C) 97.2 °F (36.2 °C) 97.9 °F (36.6 °C) 96.8 °F (36 °C)   SpO2: 100% 100% 100% 98%   Weight:       Height:            Intake and Output:  Current Shift: 05/06 0701 - 05/06 1900  In: 320 [P.O.:320]  Out: -   Last three shifts: 05/04 1901 - 05/06 0700  In: 400 [P.O.:400]  Out: -     Physical Exam:   GENERAL: alert, cooperative, no distress, appears stated age  LUNG: clear to auscultation bilaterally  HEART: regular rate and rhythm, S1, S2 normal, no murmur, click, rub or gallop    Lab/Data Review: All lab results for the last 24 hours reviewed. No new labs    Assessment:     Principal Problem:    [facility] (4/19/2017)      Overview: Acute Ischemic Stroke (acute to subacute ischemia involving the posterior       limb of the left internal capsule, along the lateral aspect of the left       thalamus) with residual right hemiparesis, dysphagia and dysarthria    Active Problems:    Impaired mobility and ADLs (4/19/2017)      Dyslipidemia (high LDL; low HDL) (4/19/2017)      Overview: Lipid profile (4/19/2017): , .  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     Signed By: Graeme Kawasaki, MD     May 6, 2017

## 2017-05-06 NOTE — PROGRESS NOTES
SHIFT CHANGE NOTE FOR [facility]    Bedside and Verbal shift change report given to  ANN MCCLELLAND  (oncoming nurse) by Isaac Steele LPN   (offgoing nurse). Report included the following information SBAR, Kardex, MAR and Recent Results. Situation:   Code Status: Full Code   Reason for Admission: CVA  Hospital Day: 12   Problem List:   Hospital Problems  Date Reviewed: 5/5/2017          Codes Class Noted POA    CKD stage G3a/A1, GFR 45-59 and albumin creatinine ratio <30 mg/g ICD-10-CM: N18.3  ICD-9-CM: 585.3  5/3/2017 Yes        Procedure refused ICD-10-CM: Z53.29  ICD-9-CM: V64.2  4/21/2017 Yes    Overview Signed 4/24/2017  3:28 PM by Stephanie Wills MD     Speech Pathologist recommended NPO and PEG placement; Patient refused             * (Principal)Acute ischemic stroke [facility]) ICD-10-CM: I63.9  ICD-9-CM: 434.91  4/19/2017 Yes    Overview Signed 4/24/2017  3:15 PM by Stephanie Wills MD     Acute Ischemic Stroke (acute to subacute ischemia involving the posterior limb of the left internal capsule, along the lateral aspect of the left thalamus) with residual right hemiparesis, dysphagia and dysarthria             Impaired mobility and ADLs ICD-10-CM: Z74.09  ICD-9-CM: 799.89  4/19/2017 Yes        Dyslipidemia (high LDL; low HDL) (Chronic) ICD-10-CM: E78.4  ICD-9-CM: 272.4  4/19/2017 Yes    Overview Signed 4/24/2017  3:25 PM by Stephanie Wills MD     Lipid profile (4/19/2017): , .  HDL 42, LDL 97             Hemiparesis affecting right side as late effect of cerebrovascular accident (CVA) ([facility] [address] ICD-10-CM: M90.527  ICD-9-CM: 438.20  4/19/2017 Yes        Dysphagia as late effect of cerebrovascular accident (CVA) ICD-10-CM: G09.379  ICD-9-CM: 438.82  4/19/2017 Yes        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) ICD-10-CM: K37.363  ICD-9-CM: 438.13  4/19/2017 Yes              Background:   Past Medical History:   Past Medical History:   Diagnosis Date    Acute ischemic stroke ([facility] [address] 4/19/2017    Acute Ischemic Stroke (acute to subacute ischemia involving the posterior limb of the left internal capsule, along the lateral aspect of the left thalamus) with residual right hemiparesis, dysphagia and dysarthria    Asbestosis ([facility] [address]     Chronic obstructive pulmonary disease (COPD) ([facility] [address]     CKD stage G3a/A1, GFR 45-59 and albumin creatinine ratio <30 mg/g 5/3/2017    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  HDL 42, LDL 97    Dysphagia as late effect of cerebrovascular accident (CVA) 4/19/2017    Erectile dysfunction     Gastroesophageal reflux disease     Hemiparesis affecting right side as late effect of cerebrovascular accident (CVA) ([facility] [address] 4/19/2017    History of endogenous hypertriglyceridemia     History of malignant neoplasm of prostate     Genoveva score 7     Hypothyroidism     Osteoarthrosis involving multiple sites     Procedure refused 4/21/2017    Speech Pathologist recommended NPO and PEG placement; Patient refused    Urinary incontinence     Male incontinence       Patient taking anticoagulants YES   Patient has a defibrillator: no     Assessment:   Changes in Assessment throughout shift: NONE              Last Vitals:     Vitals:    05/05/17 1558 05/05/17 2000 05/06/17 0756 05/06/17 1600   BP: 149/81 154/84 151/81 129/71   Pulse: 76 68 (!) 54 67   Resp: 18 18 18 19   Temp: 97.2 °F (36.2 °C) 97.9 °F (36.6 °C) 96.8 °F (36 °C) 97.8 °F (36.6 °C)   SpO2: 100% 100% 98% 99%   Weight:       Height:            PAIN    Pain Assessment    Pain Intensity 1: 0 (05/06/17 1800) Pain Intensity 1: 2 (12/29/14 1105)      Pain Location 1: Abdomen      Pain Intervention(s) 1: Medication (see MAR)  Patient Stated Pain Goal: 0 Patient Stated Pain Goal: 0  o Intervention effective: no    o Other actions taken for pain: NONE     Skin Assessment  Skin color Skin Color: Appropriate for ethnicity  Condition/Temperature Skin Condition/Temp: Dry  Integrity Skin Integrity: Intact  Turgor Turgor: Non-tenting  Weekly Pressure Ulcer Documentation  Pressure  Injury Documentation: No Pressure Injury Noted-Pressure Ulcer Prevention Initiated  Wound Prevention & Protection Methods  Orientation of wound Orientation of Wound Prevention: Posterior  Location of Prevention Location of Wound Prevention: Buttocks, Sacrum/Coccyx  Dressing Present Dressing Present : No  Dressing Status    Wound Offloading Wound Offloading (Prevention Methods): Bed, pressure reduction mattress     INTAKE/OUPUT    Date 05/05/17 1900 - 05/06/17 0659 05/06/17 0700 - 05/07/17 0659   Shift 9815-9627 24 Hour Total 3921-0222 9933-9721 24 Hour Total   I  N  T  A  K  E   P.O.  400 800  800      P. O.  400 800  800    Shift Total  (mL/kg)  400  (4.9) 800  (9.8)  800  (9.8)   O  U  T  P  U  T   Urine  (mL/kg/hr)           Urine Occurrence(s) 4 x 6 x 3 x  3 x    Stool           Stool Occurrence(s) 0 x 0 x 0 x  0 x    Shift Total  (mL/kg)        NET  400 800  800   Weight (kg) 81.5 81.5 81.5 81.5 81.5       Recommendations:  1. Patient needs and requests: NONE    2. Diet: CARDIAC PUREED WITH NECTAR THICK LIQUIDS    3. Pending tests/procedures: LABS     4. Functional Level/Equipment: WHEELCHAIR    5. Estimated Discharge Date: TBD Posted on Whiteboard in Patients Room: [facility] Safety Check    A safety check occurred in the patient's room between off going nurse and oncoming nurse listed above.     The safety check included the below items  Area Items   H  High Alert Medications - Verify all high alert medication drips (heparin, PCA, etc.)   E  Equipment - Suction is set up for ALL patients (with yanker)  - Red plugs utilized for all equipment (IV pumps, etc.)  - WOWs wiped down at end of shift.  - Room stocked with oxygen, suction, and other unit-specific supplies   A  Alarms - Bed alarm is set for fall risk patients  - Ensure chair alarm is in place and activated if patient is up in a chair   L  Lines - Check IV for any infiltration  - Syed bag is empty if patient has a Syed   - Tubing and IV bags are labeled   S  Safety   - Room is clean, patient is clean, and equipment is clean. - Hallways are clear from equipment besides carts. - Fall bracelet on for fall risk patients  - Ensure room is clear and free of clutter  - Suction is set up for ALL patients (with yanker)  - Hallways are clear from equipment besides carts.    - Isolation precautions followed, supplies available outside room, sign posted

## 2017-05-06 NOTE — PROGRESS NOTES
SHIFT CHANGE NOTE FOR [facility]    Bedside and Verbal shift change report given to  Bao Herzog LPN  (oncoming nurse) by Richard Rock RN   (offgoing nurse). Report included the following information SBAR, Kardex, MAR and Recent Results. Situation:   Code Status: Full Code   Reason for Admission: CVA  Hospital Day: 12   Problem List:   Hospital Problems  Date Reviewed: 5/5/2017          Codes Class Noted POA    CKD stage G3a/A1, GFR 45-59 and albumin creatinine ratio <30 mg/g ICD-10-CM: N18.3  ICD-9-CM: 585.3  5/3/2017 Yes        Procedure refused ICD-10-CM: Z53.29  ICD-9-CM: V64.2  4/21/2017 Yes    Overview Signed 4/24/2017  3:28 PM by Val Coe MD     Speech Pathologist recommended NPO and PEG placement; Patient refused             * (Principal)Acute ischemic stroke [facility]) ICD-10-CM: I63.9  ICD-9-CM: 434.91  4/19/2017 Yes    Overview Signed 4/24/2017  3:15 PM by Val Coe MD     Acute Ischemic Stroke (acute to subacute ischemia involving the posterior limb of the left internal capsule, along the lateral aspect of the left thalamus) with residual right hemiparesis, dysphagia and dysarthria             Impaired mobility and ADLs ICD-10-CM: Z74.09  ICD-9-CM: 799.89  4/19/2017 Yes        Dyslipidemia (high LDL; low HDL) (Chronic) ICD-10-CM: E78.4  ICD-9-CM: 272.4  4/19/2017 Yes    Overview Signed 4/24/2017  3:25 PM by Val Coe MD     Lipid profile (4/19/2017): , .  HDL 42, LDL 97             Hemiparesis affecting right side as late effect of cerebrovascular accident (CVA) ([facility] [address] ICD-10-CM: S32.688  ICD-9-CM: 438.20  4/19/2017 Yes        Dysphagia as late effect of cerebrovascular accident (CVA) ICD-10-CM: Q87.278  ICD-9-CM: 438.82  4/19/2017 Yes        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) ICD-10-CM: U90.326  ICD-9-CM: 438.13  4/19/2017 Yes              Background:   Past Medical History:   Past Medical History:   Diagnosis Date    Acute ischemic stroke ([facility] [address] 4/19/2017    Acute Ischemic Stroke (acute to subacute ischemia involving the posterior limb of the left internal capsule, along the lateral aspect of the left thalamus) with residual right hemiparesis, dysphagia and dysarthria    Asbestosis ([facility] [address]     Chronic obstructive pulmonary disease (COPD) ([facility] [address]     CKD stage G3a/A1, GFR 45-59 and albumin creatinine ratio <30 mg/g 5/3/2017    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  HDL 42, LDL 97    Dysphagia as late effect of cerebrovascular accident (CVA) 4/19/2017    Erectile dysfunction     Gastroesophageal reflux disease     Hemiparesis affecting right side as late effect of cerebrovascular accident (CVA) ([facility] [address] 4/19/2017    History of endogenous hypertriglyceridemia     History of malignant neoplasm of prostate     Genoveva score 7     Hypothyroidism     Osteoarthrosis involving multiple sites     Procedure refused 4/21/2017    Speech Pathologist recommended NPO and PEG placement; Patient refused    Urinary incontinence     Male incontinence       Patient taking anticoagulants YES   Patient has a defibrillator: no     Assessment:   Changes in Assessment throughout shift: NONE              Last Vitals:     Vitals:    05/05/17 0730 05/05/17 1230 05/05/17 1558 05/05/17 2000   BP: 158/78 161/84 149/81 154/84   Pulse: 77 78 76 68   Resp: 18 19 18 18   Temp: 97 °F (36.1 °C) 96.9 °F (36.1 °C) 97.2 °F (36.2 °C) 97.9 °F (36.6 °C)   SpO2: 96% 100% 100% 100%   Weight:       Height:            PAIN    Pain Assessment    Pain Intensity 1: 0 (05/06/17 0400) Pain Intensity 1: 2 (12/29/14 1105)      Pain Location 1: Abdomen      Pain Intervention(s) 1: Medication (see MAR)  Patient Stated Pain Goal: 0 Patient Stated Pain Goal: 0  o Intervention effective: no    o Other actions taken for pain: NONE     Skin Assessment  Skin color Skin Color: Appropriate for ethnicity  Condition/Temperature Skin Condition/Temp: Dry  Integrity Skin Integrity: Intact  Turgor Turgor: Non-tenting  Weekly Pressure Ulcer Documentation  Pressure  Injury Documentation: No Pressure Injury Noted-Pressure Ulcer Prevention Initiated  Wound Prevention & Protection Methods  Orientation of wound Orientation of Wound Prevention: Posterior  Location of Prevention Location of Wound Prevention: Sacrum/Coccyx  Dressing Present Dressing Present : No  Dressing Status    Wound Offloading Wound Offloading (Prevention Methods): Bed, pressure redistribution/air     INTAKE/OUPUT    Date 05/05/17 0700 - 05/06/17 0659 05/06/17 0700 - 05/07/17 0659   Shift 0700-1859 1900-0659 24 Hour Total 0700-1859 1900-0659 24 Hour Total   I  N  T  A  K  E   P.O. 400  400         P. O. 400  400       Shift Total  (mL/kg) 400  (4.9)  400  (4.9)      O  U  T  P  U  T   Urine  (mL/kg/hr)            Urine Occurrence(s) 2 x 2 x 4 x       Stool            Stool Occurrence(s) 0 x 0 x 0 x       Shift Total  (mL/kg)           400      Weight (kg) 81.5 81.5 81.5 81.5 81.5 81.5       Recommendations:  1. Patient needs and requests: NONE    2. Diet: CARDIAC PUREED WITH NECTAR THICK LIQUIDS    3. Pending tests/procedures: LABS     4. Functional Level/Equipment: WHEELCHAIR    5. Estimated Discharge Date: TBD Posted on Whiteboard in Patients Room: [facility] Safety Check    A safety check occurred in the patient's room between off going nurse and oncoming nurse listed above.     The safety check included the below items  Area Items   H  High Alert Medications - Verify all high alert medication drips (heparin, PCA, etc.)   E  Equipment - Suction is set up for ALL patients (with yanker)  - Red plugs utilized for all equipment (IV pumps, etc.)  - WOWs wiped down at end of shift.  - Room stocked with oxygen, suction, and other unit-specific supplies   A  Alarms - Bed alarm is set for fall risk patients  - Ensure chair alarm is in place and activated if patient is up in a chair   L  Lines - Check IV for any infiltration  - Syed bag is empty if patient has a Syed   - Tubing and IV bags are labeled   S  Safety   - Room is clean, patient is clean, and equipment is clean. - Hallways are clear from equipment besides carts. - Fall bracelet on for fall risk patients  - Ensure room is clear and free of clutter  - Suction is set up for ALL patients (with yanker)  - Hallways are clear from equipment besides carts.    - Isolation precautions followed, supplies available outside room, sign posted

## 2017-05-07 PROCEDURE — 74011250636 HC RX REV CODE- 250/636: Performed by: INTERNAL MEDICINE

## 2017-05-07 PROCEDURE — 65310000000 HC RM PRIVATE REHAB

## 2017-05-07 PROCEDURE — 74011250637 HC RX REV CODE- 250/637: Performed by: INTERNAL MEDICINE

## 2017-05-07 RX ADMIN — MULTIPLE VITAMIN LIQUID 30 ML: LIQUID at 08:49

## 2017-05-07 RX ADMIN — CHOLECALCIFEROL TAB 25 MCG (1000 UNIT) 2000 UNITS: 25 TAB at 08:49

## 2017-05-07 RX ADMIN — PANTOPRAZOLE SODIUM 40 MG: 40 GRANULE, DELAYED RELEASE ORAL at 07:30

## 2017-05-07 RX ADMIN — ASPIRIN 81 MG CHEWABLE TABLET 81 MG: 81 TABLET CHEWABLE at 08:49

## 2017-05-07 RX ADMIN — LEVOTHYROXINE SODIUM 150 MCG: 100 TABLET ORAL at 07:30

## 2017-05-07 RX ADMIN — ATORVASTATIN CALCIUM 20 MG: 10 TABLET, FILM COATED ORAL at 21:14

## 2017-05-07 RX ADMIN — CLOPIDOGREL BISULFATE 75 MG: 75 TABLET, FILM COATED ORAL at 17:43

## 2017-05-07 RX ADMIN — HEPARIN SODIUM 5000 UNITS: 5000 INJECTION, SOLUTION INTRAVENOUS; SUBCUTANEOUS at 06:18

## 2017-05-07 RX ADMIN — HEPARIN SODIUM 5000 UNITS: 5000 INJECTION, SOLUTION INTRAVENOUS; SUBCUTANEOUS at 17:43

## 2017-05-07 NOTE — PROGRESS NOTES
Progress Note    Patient: Yinka Carrion MRN: [id number]  SSN: [id number]    YOB: 1940  Age: 64 y.o. Sex: male      Admit Date: 4/24/2017    LOS: 13 days     Subjective:     Patient s/p CVA admitted for therapy. No acute problems. Objective:     Vitals:    05/06/17 0756 05/06/17 1600 05/06/17 1930 05/07/17 0813   BP: 151/81 129/71 139/80 149/89   Pulse: (!) 54 67 66 60   Resp: 18 19 18 18   Temp: 96.8 °F (36 °C) 97.8 °F (36.6 °C) 97.9 °F (36.6 °C) 97.2 °F (36.2 °C)   SpO2: 98% 99% 97% 100%   Weight:       Height:            Intake and Output:  Current Shift: 05/07 0701 - 05/07 1900  In: 480 [P.O.:480]  Out: -   Last three shifts: 05/05 1901 - 05/07 0700  In: 800 [P.O.:800]  Out: -     Physical Exam:   GENERAL: alert, cooperative, no distress, appears stated age  LUNG: clear to auscultation bilaterally  HEART: regular rate and rhythm, S1, S2 normal, no murmur, click, rub or gallop    Lab/Data Review: All lab results for the last 24 hours reviewed. No new labs    Assessment:     Principal Problem:    Acute ischemic stroke ([facility] (4/19/2017)      Overview: Acute Ischemic Stroke (acute to subacute ischemia involving the posterior       limb of the left internal capsule, along the lateral aspect of the left       thalamus) with residual right hemiparesis, dysphagia and dysarthria    Active Problems:    Impaired mobility and ADLs (4/19/2017)      Dyslipidemia (high LDL; low HDL) (4/19/2017)      Overview: Lipid profile (4/19/2017): , .  HDL 42, LDL 97      Hemiparesis affecting right side as late effect of cerebrovascular accident (CVA) ([facility] [address] (4/19/2017)      Dysphagia as late effect of cerebrovascular accident (CVA) (4/19/2017)      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) (4/19/2017)      Procedure refused (4/21/2017)      Overview: Speech Pathologist recommended NPO and PEG placement; Patient refused      CKD stage G3a/A1, GFR 45-59 and albumin creatinine ratio <30 mg/g (5/3/2017)        Plan:     Continue present management.     Signed By: Jyoit Snatillan MD     May 7, 2017

## 2017-05-07 NOTE — PROGRESS NOTES
SHIFT CHANGE NOTE FOR [facility]    Bedside and Verbal shift change report given to Catrachito Jama LPN  (oncoming nurse) by Ruth Mueller LPN   (offgoing nurse). Report included the following information SBAR, Kardex, MAR and Recent Results. Situation:   Code Status: Full Code   Reason for Admission: CVA  Hospital Day: 12   Problem List:   Hospital Problems  Date Reviewed: 5/5/2017          Codes Class Noted POA    CKD stage G3a/A1, GFR 45-59 and albumin creatinine ratio <30 mg/g ICD-10-CM: N18.3  ICD-9-CM: 585.3  5/3/2017 Yes        Procedure refused ICD-10-CM: Z53.29  ICD-9-CM: V64.2  4/21/2017 Yes    Overview Signed 4/24/2017  3:28 PM by Shweta Galvez MD     Speech Pathologist recommended NPO and PEG placement; Patient refused             * (Principal)Acute ischemic stroke [facility]) ICD-10-CM: I63.9  ICD-9-CM: 434.91  4/19/2017 Yes    Overview Signed 4/24/2017  3:15 PM by Shweta Galvez MD     Acute Ischemic Stroke (acute to subacute ischemia involving the posterior limb of the left internal capsule, along the lateral aspect of the left thalamus) with residual right hemiparesis, dysphagia and dysarthria             Impaired mobility and ADLs ICD-10-CM: Z74.09  ICD-9-CM: 799.89  4/19/2017 Yes        Dyslipidemia (high LDL; low HDL) (Chronic) ICD-10-CM: E78.4  ICD-9-CM: 272.4  4/19/2017 Yes    Overview Signed 4/24/2017  3:25 PM by Shweta Galvez MD     Lipid profile (4/19/2017): , .  HDL 42, LDL 97             Hemiparesis affecting right side as late effect of cerebrovascular accident (CVA) ([facility] [address] ICD-10-CM: H21.487  ICD-9-CM: 438.20  4/19/2017 Yes        Dysphagia as late effect of cerebrovascular accident (CVA) ICD-10-CM: Q37.673  ICD-9-CM: 438.82  4/19/2017 Yes        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) ICD-10-CM: M35.221  ICD-9-CM: 438.13  4/19/2017 Yes              Background:   Past Medical History:   Past Medical History:   Diagnosis Date    Acute ischemic stroke ([facility] [address] 4/19/2017    Acute Ischemic Stroke (acute to subacute ischemia involving the posterior limb of the left internal capsule, along the lateral aspect of the left thalamus) with residual right hemiparesis, dysphagia and dysarthria    Asbestosis ([facility] [address]     Chronic obstructive pulmonary disease (COPD) ([facility] [address]     CKD stage G3a/A1, GFR 45-59 and albumin creatinine ratio <30 mg/g 5/3/2017    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  HDL 42, LDL 97    Dysphagia as late effect of cerebrovascular accident (CVA) 4/19/2017    Erectile dysfunction     Gastroesophageal reflux disease     Hemiparesis affecting right side as late effect of cerebrovascular accident (CVA) ([facility] [address] 4/19/2017    History of endogenous hypertriglyceridemia     History of malignant neoplasm of prostate     Genoveva score 7     Hypothyroidism     Osteoarthrosis involving multiple sites     Procedure refused 4/21/2017    Speech Pathologist recommended NPO and PEG placement; Patient refused    Urinary incontinence     Male incontinence       Patient taking anticoagulants YES   Patient has a defibrillator: no     Assessment:   Changes in Assessment throughout shift: NONE              Last Vitals:     Vitals:    05/05/17 2000 05/06/17 0756 05/06/17 1600 05/06/17 1930   BP: 154/84 151/81 129/71 139/80   Pulse: 68 (!) 54 67 66   Resp: 18 18 19 18   Temp: 97.9 °F (36.6 °C) 96.8 °F (36 °C) 97.8 °F (36.6 °C) 97.9 °F (36.6 °C)   SpO2: 100% 98% 99% 97%   Weight:       Height:            PAIN    Pain Assessment    Pain Intensity 1: 0 (05/06/17 2000) Pain Intensity 1: 2 (12/29/14 1105)      Pain Location 1: Abdomen      Pain Intervention(s) 1: Medication (see MAR)  Patient Stated Pain Goal: 0 Patient Stated Pain Goal: 0  o Intervention effective: no    o Other actions taken for pain: NONE     Skin Assessment  Skin color Skin Color: Appropriate for ethnicity  Condition/Temperature Skin Condition/Temp: Dry  Integrity Skin Integrity: Intact  Turgor Turgor: Non-tenting  Weekly Pressure Ulcer Documentation  Pressure  Injury Documentation: No Pressure Injury Noted-Pressure Ulcer Prevention Initiated  Wound Prevention & Protection Methods  Orientation of wound Orientation of Wound Prevention: Posterior  Location of Prevention Location of Wound Prevention: Buttocks, Sacrum/Coccyx  Dressing Present Dressing Present : No  Dressing Status    Wound Offloading Wound Offloading (Prevention Methods): Bed, pressure redistribution/air     INTAKE/OUPUT    Date 05/05/17 1900 - 05/06/17 0659 05/06/17 0700 - 05/07/17 0659   Shift 3663-2149 24 Hour Total 3909-4805 7292-7636 24 Hour Total   I  N  T  A  K  E   P.O.  400 800  800      P. O.  400 800  800    Shift Total  (mL/kg)  400  (4.9) 800  (9.8)  800  (9.8)   O  U  T  P  U  T   Urine  (mL/kg/hr)           Urine Occurrence(s) 4 x 6 x 3 x 1 x 4 x    Stool           Stool Occurrence(s) 0 x 0 x 0 x 0 x 0 x    Shift Total  (mL/kg)        NET  400 800  800   Weight (kg) 81.5 81.5 81.5 81.5 81.5       Recommendations:  1. Patient needs and requests: NONE    2. Diet: CARDIAC PUREED WITH NECTAR THICK LIQUIDS    3. Pending tests/procedures: LABS     4. Functional Level/Equipment: WHEELCHAIR    5. Estimated Discharge Date: TBD Posted on Whiteboard in Patients Room: [facility] Safety Check    A safety check occurred in the patient's room between off going nurse and oncoming nurse listed above.     The safety check included the below items  Area Items   H  High Alert Medications - Verify all high alert medication drips (heparin, PCA, etc.)   E  Equipment - Suction is set up for ALL patients (with yanker)  - Red plugs utilized for all equipment (IV pumps, etc.)  - WOWs wiped down at end of shift.  - Room stocked with oxygen, suction, and other unit-specific supplies   A  Alarms - Bed alarm is set for fall risk patients  - Ensure chair alarm is in place and activated if patient is up in a chair   L  Lines - Check IV for any infiltration  - Syed bag is empty if patient has a Syed   - Tubing and IV bags are labeled   S  Safety   - Room is clean, patient is clean, and equipment is clean. - Hallways are clear from equipment besides carts. - Fall bracelet on for fall risk patients  - Ensure room is clear and free of clutter  - Suction is set up for ALL patients (with ton)  - Hallways are clear from equipment besides carts.    - Isolation precautions followed, supplies available outside room, sign posted

## 2017-05-07 NOTE — PROGRESS NOTES
SHIFT CHANGE NOTE FOR [facility]    Bedside and Verbal shift change report given to  ANN MCCLELLAND  (oncoming nurse) by Adan Contreras LPN   (offgoing nurse). Report included the following information SBAR, Kardex, MAR and Recent Results. Situation:   Code Status: Full Code   Reason for Admission: CVA  Hospital Day: 13   Problem List:   Hospital Problems  Date Reviewed: 5/5/2017          Codes Class Noted POA    CKD stage G3a/A1, GFR 45-59 and albumin creatinine ratio <30 mg/g ICD-10-CM: N18.3  ICD-9-CM: 585.3  5/3/2017 Yes        Procedure refused ICD-10-CM: Z53.29  ICD-9-CM: V64.2  4/21/2017 Yes    Overview Signed 4/24/2017  3:28 PM by Caio Fatima MD     Speech Pathologist recommended NPO and PEG placement; Patient refused             * (Principal)Acute ischemic stroke [facility]) ICD-10-CM: I63.9  ICD-9-CM: 434.91  4/19/2017 Yes    Overview Signed 4/24/2017  3:15 PM by Ciao Fatima MD     Acute Ischemic Stroke (acute to subacute ischemia involving the posterior limb of the left internal capsule, along the lateral aspect of the left thalamus) with residual right hemiparesis, dysphagia and dysarthria             Impaired mobility and ADLs ICD-10-CM: Z74.09  ICD-9-CM: 799.89  4/19/2017 Yes        Dyslipidemia (high LDL; low HDL) (Chronic) ICD-10-CM: E78.4  ICD-9-CM: 272.4  4/19/2017 Yes    Overview Signed 4/24/2017  3:25 PM by Caio Fatima MD     Lipid profile (4/19/2017): , .  HDL 42, LDL 97             Hemiparesis affecting right side as late effect of cerebrovascular accident (CVA) ([facility] [address] ICD-10-CM: L61.393  ICD-9-CM: 438.20  4/19/2017 Yes        Dysphagia as late effect of cerebrovascular accident (CVA) ICD-10-CM: A54.652  ICD-9-CM: 438.82  4/19/2017 Yes        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) ICD-10-CM: Z23.166  ICD-9-CM: 438.13  4/19/2017 Yes              Background:   Past Medical History:   Past Medical History:   Diagnosis Date    Acute ischemic stroke ([facility] [address] 4/19/2017    Acute Ischemic Stroke (acute to subacute ischemia involving the posterior limb of the left internal capsule, along the lateral aspect of the left thalamus) with residual right hemiparesis, dysphagia and dysarthria    Asbestosis ([facility]     Chronic obstructive pulmonary disease (COPD) ([facility] [address]     CKD stage G3a/A1, GFR 45-59 and albumin creatinine ratio <30 mg/g 5/3/2017    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  HDL 42, LDL 97    Dysphagia as late effect of cerebrovascular accident (CVA) 4/19/2017    Erectile dysfunction     Gastroesophageal reflux disease     Hemiparesis affecting right side as late effect of cerebrovascular accident (CVA) ([facility] [address] 4/19/2017    History of endogenous hypertriglyceridemia     History of malignant neoplasm of prostate     Genoveva score 7     Hypothyroidism     Osteoarthrosis involving multiple sites     Procedure refused 4/21/2017    Speech Pathologist recommended NPO and PEG placement; Patient refused    Urinary incontinence     Male incontinence       Patient taking anticoagulants YES   Patient has a defibrillator: no     Assessment:   Changes in Assessment throughout shift: NONE              Last Vitals:     Vitals:    05/06/17 0756 05/06/17 1600 05/06/17 1930 05/07/17 0813   BP: 151/81 129/71 139/80 149/89   Pulse: (!) 54 67 66 60   Resp: 18 19 18 18   Temp: 96.8 °F (36 °C) 97.8 °F (36.6 °C) 97.9 °F (36.6 °C) 97.2 °F (36.2 °C)   SpO2: 98% 99% 97% 100%   Weight:       Height:            PAIN    Pain Assessment    Pain Intensity 1: 0 (05/07/17 1400) Pain Intensity 1: 2 (12/29/14 1105)      Pain Location 1: Abdomen      Pain Intervention(s) 1: Medication (see MAR)  Patient Stated Pain Goal: 0 Patient Stated Pain Goal: 0  o Intervention effective: no    o Other actions taken for pain: NONE     Skin Assessment  Skin color Skin Color: Appropriate for ethnicity  Condition/Temperature Skin Condition/Temp: Dry  Integrity Skin Integrity: Intact  Turgor Turgor: Non-tenting  Weekly Pressure Ulcer Documentation  Pressure  Injury Documentation: No Pressure Injury Noted-Pressure Ulcer Prevention Initiated  Wound Prevention & Protection Methods  Orientation of wound Orientation of Wound Prevention: Posterior  Location of Prevention Location of Wound Prevention: Buttocks, Sacrum/Coccyx  Dressing Present Dressing Present : No  Dressing Status    Wound Offloading Wound Offloading (Prevention Methods): Bed, pressure redistribution/air     INTAKE/OUPUT    Date 05/06/17 0700 - 05/07/17 0659 05/07/17 0700 - 05/08/17 0659   Shift 0700-1859 1900-0659 24 Hour Total 0700-1859 1900-0659 24 Hour Total   I  N  T  A  K  E   P.O. 800  800 480  480      P. O. 800  800 480  480    Shift Total  (mL/kg) 800  (9.8)  800  (9.8) 480  (5.9)  480  (5.9)   O  U  T  P  U  T   Urine  (mL/kg/hr)            Urine Occurrence(s) 3 x 4 x 7 x 1 x  1 x    Stool            Stool Occurrence(s) 0 x 1 x 1 x 1 x  1 x    Shift Total  (mL/kg)           800 480  480   Weight (kg) 81.5 81.5 81.5 81.5 81.5 81.5       Recommendations:  1. Patient needs and requests: NONE    2. Diet: CARDIAC PUREED WITH NECTAR THICK LIQUIDS    3. Pending tests/procedures: LABS     4. Functional Level/Equipment: WHEELCHAIR    5. Estimated Discharge Date: TBD Posted on Whiteboard in Patients Room: [facility] Safety Check    A safety check occurred in the patient's room between off going nurse and oncoming nurse listed above.     The safety check included the below items  Area Items   H  High Alert Medications - Verify all high alert medication drips (heparin, PCA, etc.)   E  Equipment - Suction is set up for ALL patients (with yanker)  - Red plugs utilized for all equipment (IV pumps, etc.)  - WOWs wiped down at end of shift.  - Room stocked with oxygen, suction, and other unit-specific supplies   A  Alarms - Bed alarm is set for fall risk patients  - Ensure chair alarm is in place and activated if patient is up in a chair   L  Lines - Check IV for any infiltration  - Syed bag is empty if patient has a Syed   - Tubing and IV bags are labeled   S  Safety   - Room is clean, patient is clean, and equipment is clean. - Hallways are clear from equipment besides carts. - Fall bracelet on for fall risk patients  - Ensure room is clear and free of clutter  - Suction is set up for ALL patients (with estherker)  - Hallways are clear from equipment besides carts.    - Isolation precautions followed, supplies available outside room, sign posted

## 2017-05-08 LAB
ANION GAP BLD CALC-SCNC: 9 MMOL/L (ref 3–18)
BUN SERPL-MCNC: 12 MG/DL (ref 7–18)
BUN/CREAT SERPL: 8 (ref 12–20)
CALCIUM SERPL-MCNC: 9.5 MG/DL (ref 8.5–10.1)
CHLORIDE SERPL-SCNC: 95 MMOL/L (ref 100–108)
CO2 SERPL-SCNC: 26 MMOL/L (ref 21–32)
CREAT SERPL-MCNC: 1.45 MG/DL (ref 0.6–1.3)
GLUCOSE SERPL-MCNC: 95 MG/DL (ref 74–99)
HCT VFR BLD AUTO: 41.8 % (ref 36–48)
HGB BLD-MCNC: 14.4 G/DL (ref 13–16)
PLATELET # BLD AUTO: 227 K/UL (ref 135–420)
POTASSIUM SERPL-SCNC: 4.5 MMOL/L (ref 3.5–5.5)
SODIUM SERPL-SCNC: 130 MMOL/L (ref 136–145)

## 2017-05-08 PROCEDURE — 97535 SELF CARE MNGMENT TRAINING: CPT

## 2017-05-08 PROCEDURE — 65310000000 HC RM PRIVATE REHAB

## 2017-05-08 PROCEDURE — 92507 TX SP LANG VOICE COMM INDIV: CPT

## 2017-05-08 PROCEDURE — 92526 ORAL FUNCTION THERAPY: CPT

## 2017-05-08 PROCEDURE — 97116 GAIT TRAINING THERAPY: CPT

## 2017-05-08 PROCEDURE — 80048 BASIC METABOLIC PNL TOTAL CA: CPT | Performed by: INTERNAL MEDICINE

## 2017-05-08 PROCEDURE — 36415 COLL VENOUS BLD VENIPUNCTURE: CPT | Performed by: INTERNAL MEDICINE

## 2017-05-08 PROCEDURE — 97530 THERAPEUTIC ACTIVITIES: CPT

## 2017-05-08 PROCEDURE — 85049 AUTOMATED PLATELET COUNT: CPT | Performed by: INTERNAL MEDICINE

## 2017-05-08 PROCEDURE — 74011250637 HC RX REV CODE- 250/637: Performed by: INTERNAL MEDICINE

## 2017-05-08 PROCEDURE — 85018 HEMOGLOBIN: CPT | Performed by: INTERNAL MEDICINE

## 2017-05-08 PROCEDURE — 74011250636 HC RX REV CODE- 250/636: Performed by: INTERNAL MEDICINE

## 2017-05-08 PROCEDURE — 97110 THERAPEUTIC EXERCISES: CPT

## 2017-05-08 RX ADMIN — LEVOTHYROXINE SODIUM 150 MCG: 100 TABLET ORAL at 07:08

## 2017-05-08 RX ADMIN — ATORVASTATIN CALCIUM 20 MG: 10 TABLET, FILM COATED ORAL at 20:47

## 2017-05-08 RX ADMIN — PANTOPRAZOLE SODIUM 40 MG: 40 GRANULE, DELAYED RELEASE ORAL at 07:08

## 2017-05-08 RX ADMIN — HEPARIN SODIUM 5000 UNITS: 5000 INJECTION, SOLUTION INTRAVENOUS; SUBCUTANEOUS at 07:08

## 2017-05-08 RX ADMIN — DOCUSATE SODIUM 100 MG: 100 CAPSULE, LIQUID FILLED ORAL at 17:44

## 2017-05-08 RX ADMIN — DOCUSATE SODIUM 100 MG: 100 CAPSULE, LIQUID FILLED ORAL at 08:52

## 2017-05-08 RX ADMIN — HEPARIN SODIUM 5000 UNITS: 5000 INJECTION, SOLUTION INTRAVENOUS; SUBCUTANEOUS at 17:44

## 2017-05-08 RX ADMIN — MULTIPLE VITAMIN LIQUID 30 ML: LIQUID at 08:52

## 2017-05-08 RX ADMIN — CLOPIDOGREL BISULFATE 75 MG: 75 TABLET, FILM COATED ORAL at 17:44

## 2017-05-08 RX ADMIN — ASPIRIN 81 MG CHEWABLE TABLET 81 MG: 81 TABLET CHEWABLE at 08:52

## 2017-05-08 RX ADMIN — CHOLECALCIFEROL TAB 25 MCG (1000 UNIT) 2000 UNITS: 25 TAB at 08:52

## 2017-05-08 NOTE — PROGRESS NOTES
Problem: Self Care Deficits Care Plan (Adult)  Goal: *Acute Goals and Plan of Care (Insert Text)  Long Term Goals (to be met upon discharge date) in order to increase pts functional independence and safety, and decrease burden of care:  1. Pt will perform self-feeding with independence. 2. Pt will perform grooming with independence. 3. Pt will perform UB bathing with modified independence. 4. Pt will perform LB bathing with modified independence. 5. Pt will perform tshower transfer with modified independence. 6. Pt will perform UB dressing with independence. 7. Pt will perform LB dressing with modified independence. 8. Pt will perform toileting task with modified independence. 9. Pt will perform toilet transfer with modified independence. Short Term Weekly Goals for (2017 to 2017) in order to increase pts functional independence and safety, and decrease burden of care:  1. Pt will perform UB bathing with modified independence. 2. Pt will perform LB bathing with supervision. 3. Pt will perform shower transfer with CGA. 4. Pt will perform UB dressing with modified independence. 5. Pt will perform LB dressing with supervision. 6. Pt will perform toileting task with supervision. 7. Pt will perform toilet transfer with supervision   OCCUPATIONAL THERAPY DAILY NOTE  Patient Name:Stewart Nye  Time In: 0930  Time Out: 3379    Time In: 1435  Time Out: 3198     Medical Diagnosis:  Left CVA  Acute ischemic stroke (HCC) Acute ischemic stroke ([facility]      Pain at start of tx: 0/10  Pain at stop of tx: 0/10     Patient identified with name and : yes  Subjective: Pt pleasant and cooperative throughout treatment without any complaints or concerns noted. Objective: Family training completed today with pt's caregiver, son, and daughter-in-law present and attentive.  All members verbalized understanding to OT recommendation for 24-hour supervision for ADLs and related mobility/transfers and state intent to provide this for the pt upon discharge. THERAPEUTIC ACTIVITY Daily Assessment     Pt participated in theraputty exercise for hand strengthening and coordination to include small bead retrieval using red theraputty followed by finger extension, finger spread, finger scissor, finger pinch, thumb extension, and thumb adduction with yellow theraputty. THERAPEUTIC EXERCISE Daily Assessment     Pt performed pinch with each finger to thumb using 5# digiflex 3 sets x 10 reps. IADL Daily Assessment     Pt completed medication management task requiring pt to read labels and organize medication into an AM/PM pill organizer. He needed 1 verbal cue for confirming meaning on labels. (2x/day)       MOBILITY/TRANSFERS Daily Assessment     Pt performed functional mobility in ADL apartment with close supervision and transferred to various surfaces including couch, chair with arms, chair without arms, recliner, and bed. Pt able to perform all transfers with supervision utilizing NDT technique except for the reclining chair in which pt required SBA for safety and to stabilize the chair from rocking. Due to low seat height of the reclining chair, pt also required use of B hands on arm rests in order to perform sit to stand. Assessment: Pt progressing well with FM strength and coordination. He continues to demonstrate balance deficits during functional mobility and ADLs. Pt's family and caregiver are receptive to education and demonstrate ability to safely provide supervision and supplemental assistance as needed. Plan of Care: Continue POC to maximize pt independence and safety performing ADLs and functional transfers/mobility.      Indio Johnson, OTR  5/8/2017

## 2017-05-08 NOTE — PROGRESS NOTES
SHIFT CHANGE NOTE FOR [facility]    Bedside and Verbal shift change report given to Armand Maria  RN  (oncoming nurse) by Aaron Matson RN   (offgoing nurse). Report included the following information SBAR, Kardex, MAR and Recent Results. Situation:   Code Status: Full Code   Reason for Admission: CVA  Hospital Day: 14   Problem List:   Hospital Problems  Date Reviewed: 5/8/2017          Codes Class Noted POA    CKD stage G3a/A1, GFR 45-59 and albumin creatinine ratio <30 mg/g ICD-10-CM: N18.3  ICD-9-CM: 585.3  5/3/2017 Yes        Procedure refused ICD-10-CM: Z53.29  ICD-9-CM: V64.2  4/21/2017 Yes    Overview Signed 4/24/2017  3:28 PM by Gloria Camacho MD     Speech Pathologist recommended NPO and PEG placement; Patient refused             * (Principal)Acute ischemic stroke [facility]) ICD-10-CM: I63.9  ICD-9-CM: 434.91  4/19/2017 Yes    Overview Signed 4/24/2017  3:15 PM by Gloria Camacho MD     Acute Ischemic Stroke (acute to subacute ischemia involving the posterior limb of the left internal capsule, along the lateral aspect of the left thalamus) with residual right hemiparesis, dysphagia and dysarthria             Impaired mobility and ADLs ICD-10-CM: Z74.09  ICD-9-CM: 799.89  4/19/2017 Yes        Dyslipidemia (high LDL; low HDL) (Chronic) ICD-10-CM: E78.4  ICD-9-CM: 272.4  4/19/2017 Yes    Overview Signed 4/24/2017  3:25 PM by Gloria Camacho MD     Lipid profile (4/19/2017): , .  HDL 42, LDL 97             Hemiparesis affecting right side as late effect of cerebrovascular accident (CVA) ([facility] [address] ICD-10-CM: G58.862  ICD-9-CM: 438.20  4/19/2017 Yes        Dysphagia as late effect of cerebrovascular accident (CVA) ICD-10-CM: Y69.786  ICD-9-CM: 438.82  4/19/2017 Yes        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) ICD-10-CM: V87.515  ICD-9-CM: 438.13  4/19/2017 Yes              Background:   Past Medical History:   Past Medical History:   Diagnosis Date    Acute ischemic stroke ([facility] [address] 4/19/2017    Acute Ischemic Stroke (acute to subacute ischemia involving the posterior limb of the left internal capsule, along the lateral aspect of the left thalamus) with residual right hemiparesis, dysphagia and dysarthria    Asbestosis ([facility] [address]     Chronic obstructive pulmonary disease (COPD) ([facility] [address]     CKD stage G3a/A1, GFR 45-59 and albumin creatinine ratio <30 mg/g 5/3/2017    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  HDL 42, LDL 97    Dysphagia as late effect of cerebrovascular accident (CVA) 4/19/2017    Erectile dysfunction     Gastroesophageal reflux disease     Hemiparesis affecting right side as late effect of cerebrovascular accident (CVA) ([facility] [address] 4/19/2017    History of endogenous hypertriglyceridemia     History of malignant neoplasm of prostate     Genoveva score 7     Hypothyroidism     Osteoarthrosis involving multiple sites     Procedure refused 4/21/2017    Speech Pathologist recommended NPO and PEG placement; Patient refused    Urinary incontinence     Male incontinence       Patient taking anticoagulants YES   Patient has a defibrillator: no     Assessment:   Changes in Assessment throughout shift: NONE              Last Vitals:     Vitals:    05/07/17 1615 05/07/17 1930 05/08/17 0851 05/08/17 1600   BP: 132/79 144/85 139/86 137/85   Pulse: 75 76 66 71   Resp: 20 18 19 20   Temp: 96.6 °F (35.9 °C) 98.2 °F (36.8 °C) 97.3 °F (36.3 °C) 97 °F (36.1 °C)   SpO2: 99% 98% 98% 97%   Weight:       Height:            PAIN    Pain Assessment    Pain Intensity 1: 0 (05/08/17 1552) Pain Intensity 1: 2 (12/29/14 1105)      Pain Location 1: Abdomen      Pain Intervention(s) 1: Medication (see MAR)  Patient Stated Pain Goal: 0 Patient Stated Pain Goal: 0  o Intervention effective: no    o Other actions taken for pain: NONE     Skin Assessment  Skin color Skin Color: Appropriate for ethnicity  Condition/Temperature Skin Condition/Temp: Warm  Integrity Skin Integrity: Intact  Turgor Turgor: Non-tenting  Weekly Pressure Ulcer Documentation  Pressure  Injury Documentation: No Pressure Injury Noted-Pressure Ulcer Prevention Initiated  Wound Prevention & Protection Methods  Orientation of wound Orientation of Wound Prevention: Posterior  Location of Prevention Location of Wound Prevention: Sacrum/Coccyx  Dressing Present Dressing Present : No  Dressing Status    Wound Offloading Wound Offloading (Prevention Methods): Bed, pressure redistribution/air     INTAKE/OUPUT    Date 05/07/17 1900 - 05/08/17 0659 05/08/17 0700 - 05/09/17 0659   Shift 5591-0408 24 Hour Total 0579-2763 5478-8645 24 Hour Total   I  N  T  A  K  E   P.O.  716 760  760      P. O.  716 760  760    Shift Total  (mL/kg)  716  (8.8) 760  (9.3)  760  (9.3)   O  U  T  P  U  T   Urine  (mL/kg/hr)           Urine Occurrence(s) 2 x 7 x 1 x  1 x    Stool           Stool Occurrence(s) 0 x 3 x 0 x  0 x    Shift Total  (mL/kg)        NET  716 760  760   Weight (kg) 81.5 81.5 81.5 81.5 81.5       Recommendations:  1. Patient needs and requests: NONE    2. Diet: CARDIAC PUREED WITH NECTAR THICK LIQUIDS    3. Pending tests/procedures: LABS     4. Functional Level/Equipment: WHEELCHAIR    5. Estimated Discharge Date: TBD Posted on Whiteboard in Patients Room: [facility] Safety Check    A safety check occurred in the patient's room between off going nurse and oncoming nurse listed above.     The safety check included the below items  Area Items   H  High Alert Medications - Verify all high alert medication drips (heparin, PCA, etc.)   E  Equipment - Suction is set up for ALL patients (with yanker)  - Red plugs utilized for all equipment (IV pumps, etc.)  - WOWs wiped down at end of shift.  - Room stocked with oxygen, suction, and other unit-specific supplies   A  Alarms - Bed alarm is set for fall risk patients  - Ensure chair alarm is in place and activated if patient is up in a chair   L  Lines - Check IV for any infiltration  - Syed bag is empty if patient has a Syed   - Tubing and IV bags are labeled   S  Safety   - Room is clean, patient is clean, and equipment is clean. - Hallways are clear from equipment besides carts. - Fall bracelet on for fall risk patients  - Ensure room is clear and free of clutter  - Suction is set up for ALL patients (with yanker)  - Hallways are clear from equipment besides carts.    - Isolation precautions followed, supplies available outside room, sign posted

## 2017-05-08 NOTE — INTERDISCIPLINARY ROUNDS
[facility] PHYSICAL REHABILITATION  [address] [place] [address], [address]    INPATIENT REHABILITATION  PRE-TEAM CONFERENCE SUMMARY     Date of Conference: 5/9/2017    Name: Taco Aguilar Age / Sex: 64 y.o. / male   CSN: [id number] MRN: [id number]   [facility] Date: 4/24/2017 Length of Stay: 14 days     Primary Rehabilitation Diagnosis  1. Impaired Mobility and ADLs  2. Acute Ischemic Stroke (acute to subacute ischemia involving the posterior limb of the left internal capsule, along the lateral aspect of the left thalamus) with residual right hemiparesis, dysphagia and dysarthria       Comorbidities   Urinary incontinence    History of malignant neoplasm of prostate    Hypothyroidism    Chronic obstructive pulmonary disease (COPD)    Asbestosis    Osteoarthrosis involving multiple sites    History of endogenous hypertriglyceridemia    CKD stage G3a/A1, GFR 45-59 and albumin creatinine ratio <30 mg/g    Dyslipidemia (high LDL; low HDL)    Gastroesophageal reflux disease    Procedure refused    Erectile dysfunction         Therapy:     FIM SCORES Initial Assessment Weekly Progress Assessment 5/8/2017   Eating Functional Level: 3  Comments: Pt may require some assistance with opening packages and cutting food during meals. Pt able to feed himself using his L hand. 6 - Mod I   Swallowing     Grooming 6  6 - Mod I   Bathing 2 (Pt stood in shower at PLOF)  5 - Setup/supervision      Upper Body Dressing Functional Level: 3  Items Applied/Steps Completed: Pullover (4 steps)  Comments: Pt required Mod A for managing shirt over head and on BUEs while doffing and donning t-shirt. 5 - Setup   Lower Body Dressing Functional Level: 2  Items Applied/Steps Completed: Button/zip pants (4 steps), Sock, left (1 step), Sock, right (1 step), Underpants (3 steps)  Comments: Pt requires Max A for managing all LB clothing over B feet.  He is able to stand with Min-Mod A for standing balance and safety and pull underwear/pants up over buttocks with slight assistance required for getting them up all the way on the R side due to decreased hand strength and coordination. 5 - Supervision   Toileting Functional Level: 4  Comments: Pt requires Min-Mod A for static standing balance while managing clothing over his hips and buttocks with Mod A.  5 - Supervision   Bladder - level of assist 4     Bladder - accident frequency score 6     Bowel - level of assist 4     Bowel - accident frequency score 5     Toilet Transfer [place] Toilet Transfer Score: 3  Comments: Based on performance of shower transfer and w/c to bed transfer, pt able to perform stand step transfer to/from commode with Mod A for balance, weightshift, and posture. 4 - Min A   Tub/Shower Transfer [place] Tub or Shower Type: Shower  Tub/Shower Transfer Score: 3  Comments: Pt performed stand step transfer w/c <> shower bench without an AD with Mod A for balance, weightshift, and posture. 4 - Min A      Comprehension Primary Mode of Comprehension: Auditory  Score: 4     5   Expression Primary Mode of Expression: Verbal  Score: 4     5   Social Interaction Score: 4  6   Problem Solving Score: 4  5   Memory Score: 4  5     FIM SCORES Initial Assessment Weekly Progress Assessment 5/8/2017   Bed/Chair/Wheelchair Transfers Transfer Type: Other  Other: Patient performs stand step transfer with moderate assist from PT for anterior approach R LE knee block with loading 2/2 hx of buckling, trunk stability, pivot assist, and controlled lowering. Patient demonstrates increased momentum utilization for STS transfer and R genu recurvatum requiring verbal cueing to crrect these; pacing cues successful, however genu recurvatum requires tactile cueing with physical assist to prevent buckling. Patient demosntrates pass retract technique with R LE advancement as well as anterior COG placement to attempts to maintain TKE 2/2 functional quad weakness.    Transfer Assistance : 3 (Moderate assistance )  Sit to Stand Assistance:  Moderate assistance  Car Transfers: Not tested  Car Type: NA     Bed Mobility Rolling Right 5 (Supervision)   Rolling Left 5 (Supervision)   Supine to Sit 5 (Supervision)   Sit to Stand Moderate assistance   Sit to Supine 5 (Supervision)    Rolling Right       Rolling Left       Supine to Sit       Sit to Stand       Sit to Supine          Locomotion (W/C) Able to Propel (ft): 70 feet (verbal cueing for technique)  Functional Level: 2  Curbs/Ramps Assist Required (FIM Score): 0 (Not tested)  Wheelchair Setup Assist Required : 2 (Maximal assistance)  Wheelchair Management: Manages left brake, Manages right brake Function    Setup Assistance         Locomotion (W/C distance) 70 Feet     Locomotion (Walk) 2 (Maximal assistance) (2/2 R LOB)        Locomotion (Walk dist.) 5 Feet (ft)     Steps/Stairs Steps/Stairs Ambulated (#): 0  Level of Assist : 0 (Not tested)  Rail Use:  (NA)           Nursing:     Neuro:   A&O x____                   Respiratory:   [] WNL   [] O2   [] LPM ______   Other:  Peripheral Vascular:   [] TEDS present   [] Edema present ____ Grade   Cardiac:   [] WNL   [] Other  Genitourinary:   [] continent   [] incontinent   [] marie  Abdominal _______ LBM  GI: _______ Diet ______ Liquids _____ tube feeds  Musculoskeletal: ____ ROM Transfers _____ Assistive Device Used  ____ Level of Assistance  Skin Integumentary:   [] Intact   [] Not Intact   __________Preventative Measures  Details______________________________________________________________  Pain: [] Controlled   [] Not Controlled   Pain Meds:   [] Scheduled   [] PRN        Registered Dietitian / Nutrition:   Patient Vitals for the past 100 hrs:   % Diet Eaten   05/08/17 0851 100 %   05/07/17 1800 90 %   05/07/17 1329 100 %   05/07/17 0930 100 %   05/06/17 1802 75 %   05/06/17 1330 80 %   05/06/17 0939 100 %   05/05/17 1754 100 %   05/04/17 1800 100 %   05/04/17 1233 75 %   05/04/17 0900 50 % Patient reported appetite and meal intake remain good. Denied having any concerns at time of visit    Supplements:          [] Yes   [x] No      Amount of supplement consumed: not applicable       Intake/Output Summary (Last 24 hours) at 05/08/17 1226  Last data filed at 05/08/17 0851   Gross per 24 hour   Intake              716 ml   Output                0 ml   Net              716 ml                                Last bowel movement:  5/7      Interdisciplinary Team Goals:     1. Goal  Pt will perform ADLs with modified independence. Barrier  Impaired balance, Impaired coordination, Decreased safety awareness, Impulsiveness    Intervention  ADL training, Therapeutic activity, Patient education     2. Goal  Patient will perform word finding tasks with 80-90% accuracy. Barrier  Mild cognitive linguistic deficits, mild dysphagia    Intervention  Training in safe swallowing techniques, word finding activities, cognitive retraining     3. Goal      Barrier      Intervention       4. Goal      Barrier      Intervention       5. Goal      Barrier      Intervention       6. Goal  Po intake of meals will continue to meet >75% of patient estimated nutritional needs within the next 7 days. Outcome:  [x] Met/Ongoing    []  Not Met    [] New/Initial Goal     Barrier  none known     Intervention  modified diet       Disposition / Discharge Planning: Follow-up therapy services:  home health for pt, ot, st   DME recommendations:  no new DME is needed   Estimated discharge date:  5/12/17   Discharge Location:  home         Electronic Signatures:      Signature Date Signed   Physical Therapist         Occupational Therapist    Jonathan Kidd, [address]  5/8/2017   Speech Therapist    Cody Roland, [id number] [facility]  5/8/17   Recreational Therapist    Amy Barakat, [address] [place] [address] 5/8/17   Nursing         Dietitian    Brian Evans, [address]  5/8/17   Clinical Psychologist         Physician    Elinor Ardon.  Nora Schumacher MD  5/8/2017    Sia Ruelas, MSW  5/8/17         The above information has been reviewed with the patient in a language that they can understand. Opportunity for comments and questions has been provided and a signed attestation has been scanned into the \"media tab\" of the EMR.       Patient Signature: ______________________________________________________    Date Signed: __________________________________________________________

## 2017-05-08 NOTE — PROGRESS NOTES
Problem: Neurolinguistics Impaired (Adult)  Goal: *Speech Goal: (INSERT TEXT)  Long term goals:  1. Patient will tolerate a regular/thin liquid diet without overt s/s of aspiration, 4 consecutive meals. 2. Patient will use safe swallowing techniques with supervision for all PO intake. 3. Patient will answer complex yes/no questions with 90% accuracy. 4 patient will follow complex commands for object manipulation wit 80-90% accuracy. 5. Patient will name 10-12 items within simple categories with supervision. 6. Patient will perform varied word retrieval tasks with % accuracy. 7. Patient will recall 3 words after 5 minutes, supervision. 8. Patient will perform functional problem solving/reasoning tasks with supervision. Short term goals (by 5/9/17):  1. Patient will tolerate a soft, nectar thick liquid diet without overt s/s of aspiration, 4 consecutive meals. 2. Patient will drink sips of thin liquids with the SLP using safe swallowing techniques without overt s/s of aspiration. 3. Patient will use safe swallowing techniques with min-mod cues for all PO intake. 4. Patient will answer complex yes/no questions with 80-90% accuracy. 5 patient will follow complex commands for object manipulation wit 80-90% accuracy. 6. Patient will name 7-9 items within simple categories with supervision. 7. Patient will perform varied word retrieval tasks with 80-90% accuracy. 8. Patient will recall 3 words after 5 minutes, min assist.  9. Patient will perform functional problem solving/reasoning tasks with 80-90% accuracy. SPEECH LANGUAGE PATHOLOGY TREATMENT     Patient: Carline Rangel (89 y.o. male)  Date: 5/8/2017  Diagnosis: Left CVA  Acute ischemic stroke ([facility] [address] Acute ischemic stroke ([facility] [address]       SUBJECTIVE:   Patient stated You mean I can have a Pepsi? . OBJECTIVE:   Mental Status:  Mr. Clif Nicholson was seen in the dining room for breakfast and lunch today.   He needs occasional cues to limit bolus size, but is tolerating his regular diet well. Patient was without overt s/s of aspiration or other difficulty with his meals. During a discussion with his family this afternoon, SLP shared his updated diet and that he needs only supervision to encourage slow rate of intake and small bites/sips for safety. Patient was also seen for a thirty minute session this afternoon with his family and caregiver present. He participated in the following treatment tasks:     Treatment & Interventions:   Language Comprehension and Expression:   Divergent naming:      \"People who wear hats at work\":        Patient named 4; more with cues                                      \"Things you sit on\":                             8 named with encouragement                                      \"Things that are round\":                      4 listed with min cues  ID category given 3 items:                  90% accuracy  ID item from description:                    80% accuracy                Neuro-Linguistics:   Orientation:                                         Independent  Recent memory:                                 Independent     Response & Tolerance to Activities:  Although at times Mr. Aislinn Santana is very tired after his other therapies, He is consistently cooperative with speech and swallowing therapy.   His diet has been normalized and he is making steady progress in speech and cognitive tasks     Pain:  Pain Scale 1: Numeric (0 - 10)     After treatment:   [X]       Patient left in no apparent distress sitting up in chair  [ ]       Patient left in no apparent distress in bed  [ ]       Call bell left within reach  [ ]       Nursing notified  [X]       Caregiver present  [ ]       Bed alarm activated      ASSESSMENT:      Progression toward goals:  [X]       Improving appropriately and progressing toward goals  [ ]       Improving slowly and progressing toward goals  [ ]       Not making progress toward goals and plan of care will be adjusted      PLAN:   Patient continues to benefit from skilled intervention to address the above impairments. Continue treatment per established plan of care.   Discharge Recommendations:  [place], SLP  Time Calculation:  90 Minutes

## 2017-05-08 NOTE — PROGRESS NOTES
SHIFT CHANGE NOTE FOR [facility]    Bedside and Verbal shift change report given to Rosemarie Chua RN  (oncoming nurse) by Ismael Barrow LPN   (offgoing nurse). Report included the following information SBAR, Kardex, MAR and Recent Results. Situation:   Code Status: Full Code   Reason for Admission: CVA  Hospital Day: 13   Problem List:   Hospital Problems  Date Reviewed: 5/5/2017          Codes Class Noted POA    CKD stage G3a/A1, GFR 45-59 and albumin creatinine ratio <30 mg/g ICD-10-CM: N18.3  ICD-9-CM: 585.3  5/3/2017 Yes        Procedure refused ICD-10-CM: Z53.29  ICD-9-CM: V64.2  4/21/2017 Yes    Overview Signed 4/24/2017  3:28 PM by Amanda Khan MD     Speech Pathologist recommended NPO and PEG placement; Patient refused             * (Principal)Acute ischemic stroke [facility]) ICD-10-CM: I63.9  ICD-9-CM: 434.91  4/19/2017 Yes    Overview Signed 4/24/2017  3:15 PM by Amanda Khan MD     Acute Ischemic Stroke (acute to subacute ischemia involving the posterior limb of the left internal capsule, along the lateral aspect of the left thalamus) with residual right hemiparesis, dysphagia and dysarthria             Impaired mobility and ADLs ICD-10-CM: Z74.09  ICD-9-CM: 799.89  4/19/2017 Yes        Dyslipidemia (high LDL; low HDL) (Chronic) ICD-10-CM: E78.4  ICD-9-CM: 272.4  4/19/2017 Yes    Overview Signed 4/24/2017  3:25 PM by Amanda Khan MD     Lipid profile (4/19/2017): , .  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) ICD-10-CM: K82.773  ICD-9-CM: 438.13  4/19/2017 Yes              Background:   Past Medical History:   Past Medical History:   Diagnosis Date    Acute ischemic stroke ([facility] [address] 4/19/2017    Acute Ischemic Stroke (acute to subacute ischemia involving the posterior limb of the left internal capsule, along the lateral aspect of the left thalamus) with residual right hemiparesis, dysphagia and dysarthria    Asbestosis ([facility] [address]     Chronic obstructive pulmonary disease (COPD) ([facility] [address]     CKD stage G3a/A1, GFR 45-59 and albumin creatinine ratio <30 mg/g 5/3/2017    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  HDL 42, LDL 97    Dysphagia as late effect of cerebrovascular accident (CVA) 4/19/2017    Erectile dysfunction     Gastroesophageal reflux disease     Hemiparesis affecting right side as late effect of cerebrovascular accident (CVA) ([facility] [address] 4/19/2017    History of endogenous hypertriglyceridemia     History of malignant neoplasm of prostate     Genoveva score 7     Hypothyroidism     Osteoarthrosis involving multiple sites     Procedure refused 4/21/2017    Speech Pathologist recommended NPO and PEG placement; Patient refused    Urinary incontinence     Male incontinence       Patient taking anticoagulants YES   Patient has a defibrillator: no     Assessment:   Changes in Assessment throughout shift: NONE              Last Vitals:     Vitals:    05/06/17 1930 05/07/17 0813 05/07/17 1615 05/07/17 1930   BP: 139/80 149/89 132/79 144/85   Pulse: 66 60 75 76   Resp: 18 18 20 18   Temp: 97.9 °F (36.6 °C) 97.2 °F (36.2 °C) 96.6 °F (35.9 °C) 98.2 °F (36.8 °C)   SpO2: 97% 100% 99% 98%   Weight:       Height:            PAIN    Pain Assessment    Pain Intensity 1: 0 (05/07/17 2000) Pain Intensity 1: 2 (12/29/14 1105)      Pain Location 1: Abdomen      Pain Intervention(s) 1: Medication (see MAR)  Patient Stated Pain Goal: 0 Patient Stated Pain Goal: 0  o Intervention effective: no    o Other actions taken for pain: NONE     Skin Assessment  Skin color Skin Color: Appropriate for ethnicity  Condition/Temperature Skin Condition/Temp: Dry  Integrity Skin Integrity: Intact  Turgor Turgor: Non-tenting  Weekly Pressure Ulcer Documentation  Pressure  Injury Documentation: No Pressure Injury Noted-Pressure Ulcer Prevention Initiated  Wound Prevention & Protection Methods  Orientation of wound Orientation of Wound Prevention: Posterior  Location of Prevention Location of Wound Prevention: Buttocks, Sacrum/Coccyx  Dressing Present Dressing Present : No  Dressing Status    Wound Offloading Wound Offloading (Prevention Methods): Bed, pressure redistribution/air     INTAKE/OUPUT    Date 05/06/17 1900 - 05/07/17 0659 05/07/17 0700 - 05/08/17 0659   Shift 4815-9941 24 Hour Total 8947-8079 0702-4927 24 Hour Total   I  N  T  A  K  E   P.O.  800 716  716      P. O.  800 716  716    Shift Total  (mL/kg)  800  (9.8) 716  (8.8)  716  (8.8)   O  U  T  P  U  T   Urine  (mL/kg/hr)           Urine Occurrence(s) 4 x 7 x 5 x 0 x 5 x    Stool           Stool Occurrence(s) 1 x 1 x 3 x 0 x 3 x    Shift Total  (mL/kg)        NET  800 716  716   Weight (kg) 81.5 81.5 81.5 81.5 81.5       Recommendations:  1. Patient needs and requests: NONE    2. Diet: CARDIAC PUREED WITH NECTAR THICK LIQUIDS    3. Pending tests/procedures: LABS     4. Functional Level/Equipment: WHEELCHAIR    5. Estimated Discharge Date: TBD Posted on Whiteboard in Patients Room: [facility] Safety Check    A safety check occurred in the patient's room between off going nurse and oncoming nurse listed above.     The safety check included the below items  Area Items   H  High Alert Medications - Verify all high alert medication drips (heparin, PCA, etc.)   E  Equipment - Suction is set up for ALL patients (with yanker)  - Red plugs utilized for all equipment (IV pumps, etc.)  - WOWs wiped down at end of shift.  - Room stocked with oxygen, suction, and other unit-specific supplies   A  Alarms - Bed alarm is set for fall risk patients  - Ensure chair alarm is in place and activated if patient is up in a chair   L  Lines - Check IV for any infiltration  - Syed bag is empty if patient has a Syed   - Tubing and IV bags are labeled   S  Safety   - Room is clean, patient is clean, and equipment is clean. - Hallways are clear from equipment besides carts. - Fall bracelet on for fall risk patients  - Ensure room is clear and free of clutter  - Suction is set up for ALL patients (with ton)  - Hallways are clear from equipment besides carts.    - Isolation precautions followed, supplies available outside room, sign posted

## 2017-05-08 NOTE — PROGRESS NOTES
[id number]  [address] [place] [address], [address]     INPATIENT REHABILITATION  DAILY PROGRESS NOTE     Date: 5/8/2017    Name: Juventino Ramos Age / Sex: 64 y.o. / male   CSN: [id number] MRN: [id number]   [facility] Date: 4/24/2017 Length of Stay: 14 days     Primary Rehab Diagnosis: Impaired Mobility and ADLs secondary to Acute Ischemic Stroke (acute to subacute ischemia involving the posterior limb of the left internal capsule, along the lateral aspect of the left thalamus) with residual right hemiparesis, dysphagia and dysarthria       Subjective:     Patient seen and examined. Blood pressure controlled. Patient's Complaint:   No significant medical complaints    Pain Control: no current joint or muscle symptoms, essentially pain-free      Objective:     Vital Signs:  Patient Vitals for the past 24 hrs:   BP Temp Pulse Resp SpO2   05/08/17 0851 139/86 97.3 °F (36.3 °C) 66 19 98 %   05/07/17 1930 144/85 98.2 °F (36.8 °C) 76 18 98 %   05/07/17 1615 132/79 96.6 °F (35.9 °C) 75 20 99 %        Physical Examination:  GENERAL SURVEY: Patient is awake, alert, oriented x 3, sitting comfortably on the chair, not in acute respiratory distress. HEENT: pink palpebral conjunctivae, anicteric sclerae, no nasoaural discharge, moist oral mucosa  NECK: supple, no jugular venous distention, no palpable lymph nodes  CHEST/LUNGS: symmetrical chest expansion, good air entry, clear breath sounds  HEART: adynamic precordium, good S1 S2, no S3, regular rhythm, no murmurs  ABDOMEN: flat, bowel sounds appreciated, soft, non-tender  EXTREMITIES: pink nailbeds, no edema, full and equal pulses, no calf tenderness   NEUROLOGICAL EXAM: The patient is awake, alert and oriented x3, able to answer questions fairly appropriately, able to follow 1 and 2 step commands. Able to tell time from the wall clock. Cranial nerves II-XII are grossly intact except for slurred speech and dysphagia.  No gross sensory deficit. Motor strength is 4/5 on the RUE, 5/5 on the LUE, 4/5 on the right hip, 4+/5 on the right knee, 3/5 on the right ankle, 5/5 on the LLE. Current Medications:  Current Facility-Administered Medications   Medication Dose Route Frequency    cholecalciferol (VITAMIN D3) tablet 2,000 Units  2,000 Units Oral DAILY    bisacodyl (DULCOLAX) suppository 10 mg  10 mg Rectal Q48H PRN    heparin (porcine) injection 5,000 Units  5,000 Units SubCUTAneous Q12H    acetaminophen (TYLENOL) solution 650 mg  650 mg Oral Q4H PRN    docusate sodium (COLACE) capsule 100 mg  100 mg Oral BID    aspirin chewable tablet 81 mg  81 mg Oral DAILY WITH BREAKFAST    clopidogrel (PLAVIX) tablet 75 mg  75 mg Oral DAILY WITH DINNER    atorvastatin (LIPITOR) tablet 20 mg  20 mg Oral QHS    pantoprazole (PROTONIX) granules for oral suspension 40 mg  40 mg Oral ACB    multivitamin (MULTI-DELYN, WELLESSE) oral liquid 30 mL  30 mL Oral DAILY    levothyroxine (SYNTHROID) tablet 150 mcg  150 mcg Oral ACB       Allergies:   Allergies   Allergen Reactions    Pcn [Penicillins] Rash       Lab/Data Review:  Recent Results (from the past 24 hour(s))   METABOLIC PANEL, BASIC    Collection Time: 05/08/17  6:14 AM   Result Value Ref Range    Sodium 130 (L) 136 - 145 mmol/L    Potassium 4.5 3.5 - 5.5 mmol/L    Chloride 95 (L) 100 - 108 mmol/L    CO2 26 21 - 32 mmol/L    Anion gap 9 3.0 - 18 mmol/L    Glucose 95 74 - 99 mg/dL    BUN 12 7.0 - 18 MG/DL    Creatinine 1.45 (H) 0.6 - 1.3 MG/DL    BUN/Creatinine ratio 8 (L) 12 - 20      GFR est AA 57 (L) >60 ml/min/1.73m2    GFR est non-AA 47 (L) >60 ml/min/1.73m2    Calcium 9.5 8.5 - 10.1 MG/DL   HGB & HCT    Collection Time: 05/08/17  6:14 AM   Result Value Ref Range    HGB 14.4 13.0 - 16.0 g/dL    HCT 41.8 36.0 - 48.0 %   PLATELET    Collection Time: 05/08/17  6:14 AM   Result Value Ref Range    PLATELET 463 985 - 506 K/uL       Estimated Glomerular Filtration Rate:  CKD-EPI:   On admission, estimated GFR was 46.2 mL/min/1.73m2 based on a Creatinine of 1.44 mg/dl. Most recent estimated GFR was 45.8 mL/min/1.73m2 based on a Creatinine of 1.45 mg/dl. MDRD:   On admission, estimated GFR was 50.4 mL/min/1.73m2 based on a Creatinine of 1.44 mg/dl. Most recent estimated GFR was 50 mL/min/1.73m2 based on a Creatinine of 1.45 mg/dl. Assessment:     Primary Rehabilitation Diagnosis  1. Impaired Mobility and ADLs  2. Acute Ischemic Stroke (acute to subacute ischemia involving the posterior limb of the left internal capsule, along the lateral aspect of the left thalamus) with residual right hemiparesis, dysphagia and dysarthria      Comorbidities   Urinary incontinence    History of malignant neoplasm of prostate    Hypothyroidism    Chronic obstructive pulmonary disease (COPD)    Asbestosis    Osteoarthrosis involving multiple sites    History of endogenous hypertriglyceridemia    CKD stage G3a/A1, GFR 45-59 and albumin creatinine ratio <30 mg/g    Dyslipidemia (high LDL; low HDL)    Gastroesophageal reflux disease    Procedure refused    Erectile dysfunction       Plan:     1. Justification for continued stay: Good progression towards established rehabilitation goals. 2. Medical Issues being followed closely:    [x]  Fall and safety precautions     []  Wound Care     [x]  Bowel and Bladder Function     [x]  Fluid Electrolyte and Nutrition Balance     []  Pain Control      3. Issues that 24 hour rehabilitation nursing is following:    [x]  Fall and safety precautions     []  Wound Care     [x]  Bowel and Bladder Function     [x]  Fluid Electrolyte and Nutrition Balance     []  Pain Control      [x]  Assistance with and education on in-room safety with transfers to and from the bed, wheelchair, toilet and shower. 4. Acute rehabilitation plan of care:    [x]  Continue current care and rehab.            [x]  Physical Therapy           [x]  Occupational Therapy           [x] Speech Therapy     []  Hold Rehab until further notice     5. Medications:    [x]  MAR Reviewed     [x]  Continue Present Medications     6. DVT Prophylaxis:      []  Lovenox     [x]  Unfractionated Heparin     []  Coumadin     []  NOAC     [x]  GRIS Stockings     [x]  Sequential Compression Device     []  None     7. Orders:   > Acute Ischemic Stroke (acute to subacute ischemia involving the posterior limb of the left internal capsule, along the lateral aspect of the left thalamus) with residual right hemiparesis, dysphagia and dysarthria    > Continue:    > Aspirin 81 mg PO once daily with breakfast    > Atorvastatin 20 mg PO q HS    > Clopidogrel 75 mg PO once daily with dinner     > CKD stage G3a/A1, GFR 45-59 and albumin creatinine ratio <30 mg/g   > On admission, based on a Creatinine of 1.4 mg/dl:    > Estimated GFR using CKD-EPI = 47.8 mL/min/1.73m2    > Estimated GFR using MDRD = 52.1 mL/min/1.73m2   > Estimated calculated glomerular filtration rate equivalent to that of stage 3 chronic kidney disease = 30-59 ml/min   > Urine Microalbumin/Creatinine ratio (5/3/2017) = 29     > Dyslipidemia   > Lipid profile (4/19/2017): , . HDL 42, LDL 97   > On 4/20/2017, patient was started at [facility] on Atorvastatin 20 mg PO q HS   > Continue Atorvastatin 20 mg PO q HS      8. Patient's progress in rehabilitation and medical issues discussed with the patient. All questions answered to the best of my ability. Care plan discussed with patient, wife, daughter and nurse.       Signed:    Randi Ramirez MD    May 8, 2017

## 2017-05-08 NOTE — INTERDISCIPLINARY ROUNDS
[facility] PHYSICAL REHABILITATION  [address] [place] [address], [address]    INPATIENT REHABILITATION  PRE-TEAM CONFERENCE SUMMARY     Date of Conference: 5/9/2017    Name: Linh Holt Age / Sex: 64 y.o. / male   CSN: [id number] MRN: [id number]   [facility] Date: 4/24/2017 Length of Stay: 14 days     Primary Rehabilitation Diagnosis  1. Impaired Mobility and ADLs  2. Acute Ischemic Stroke (acute to subacute ischemia involving the posterior limb of the left internal capsule, along the lateral aspect of the left thalamus) with residual right hemiparesis, dysphagia and dysarthria       Comorbidities   Urinary incontinence    History of malignant neoplasm of prostate    Hypothyroidism    Chronic obstructive pulmonary disease (COPD)    Asbestosis    Osteoarthrosis involving multiple sites    History of endogenous hypertriglyceridemia    CKD stage G3a/A1, GFR 45-59 and albumin creatinine ratio <30 mg/g    Dyslipidemia (high LDL; low HDL)    Gastroesophageal reflux disease    Procedure refused    Erectile dysfunction         Therapy:     FIM SCORES Initial Assessment Weekly Progress Assessment 5/8/2017   Eating Functional Level: 3  Comments: Pt may require some assistance with opening packages and cutting food during meals. Pt able to feed himself using his L hand. 6 - Mod I   Swallowing     Grooming 6  6 - Mod I   Bathing 2 (Pt stood in shower at PLOF)  5 - Setup/supervision      Upper Body Dressing Functional Level: 3  Items Applied/Steps Completed: Pullover (4 steps)  Comments: Pt required Mod A for managing shirt over head and on BUEs while doffing and donning t-shirt. 5 - Setup   Lower Body Dressing Functional Level: 2  Items Applied/Steps Completed: Button/zip pants (4 steps), Sock, left (1 step), Sock, right (1 step), Underpants (3 steps)  Comments: Pt requires Max A for managing all LB clothing over B feet.  He is able to stand with Min-Mod A for standing balance and safety and pull underwear/pants up over buttocks with slight assistance required for getting them up all the way on the R side due to decreased hand strength and coordination. 5 - Supervision   Toileting Functional Level: 4  Comments: Pt requires Min-Mod A for static standing balance while managing clothing over his hips and buttocks with Mod A.  5 - Supervision   Bladder - level of assist 4 4   Bladder - accident frequency score 6     Bowel - level of assist 4 4   Bowel - accident frequency score 5 4   Toilet Transfer [place] Toilet Transfer Score: 3  Comments: Based on performance of shower transfer and w/c to bed transfer, pt able to perform stand step transfer to/from commode with Mod A for balance, weightshift, and posture. 4 - Min A   Tub/Shower Transfer [place] Tub or Shower Type: Shower  Tub/Shower Transfer Score: 3  Comments: Pt performed stand step transfer w/c <> shower bench without an AD with Mod A for balance, weightshift, and posture. 4 - Min A      Comprehension Primary Mode of Comprehension: Auditory  Score: 4 Auditory  45   Expression Primary Mode of Expression: Verbal  Score: 4 Verbal  45   Social Interaction Score: 4 56   Problem Solving Score: 4 35   Memory Score: 4 45     FIM SCORES Initial Assessment Weekly Progress Assessment 5/8/2017   Bed/Chair/Wheelchair Transfers Transfer Type: Other  Other: Patient performs stand step transfer with moderate assist from PT for anterior approach R LE knee block with loading 2/2 hx of buckling, trunk stability, pivot assist, and controlled lowering. Patient demonstrates increased momentum utilization for STS transfer and R genu recurvatum requiring verbal cueing to crrect these; pacing cues successful, however genu recurvatum requires tactile cueing with physical assist to prevent buckling. Patient demosntrates pass retract technique with R LE advancement as well as anterior COG placement to attempts to maintain TKE 2/2 functional quad weakness. Transfer Assistance : 3 (Moderate assistance )  Sit to Stand Assistance: Moderate assistance  Car Transfers: Not tested  Car Type: NA Transfer Type: Other  Other: Patient performs stand step transfer self selecting B UE on R distal femur for increased lorelei participation and L LE slightly anterir to R to inhibit L LE over-utilization completing transfer safely w/o verbal cueing though patient's form improves with singular verbal cue for reduced \"rocking to utilize momentum during STS portion of transfer. Rogelio completes transfers from 22.5\" tall surface to 16\" tall surface w/o any necessary adjustments.   Transfer Assistance : 5 (Supervision/setup) (Distant SPV)  Sit to Stand Assistance:  (Distant SPV)  Car Transfers: Supervision (distant SPV)  Car Type: Car simulator   Bed Mobility Rolling Right 5 (Supervision)   Rolling Left 5 (Supervision)   Supine to Sit 5 (Supervision)   Sit to Stand Moderate assistance   Sit to Supine 5 (Supervision)    Rolling Right   6 (Modified independent)   Rolling Left   6 (Modified independent)   Supine to Sit   6 (Modified independent)   Sit to Stand    (Distant SPV)   Sit to Supine   6 (Modified independent)      Locomotion (W/C) Able to Propel (ft): 70 feet (verbal cueing for technique)  Functional Level: 2  Curbs/Ramps Assist Required (FIM Score): 0 (Not tested)  Wheelchair Setup Assist Required : 2 (Maximal assistance)  Wheelchair Management: Manages left brake, Manages right brake Function 6 (challenged to perform B UE tech w/o LE assist; add'l time)  Setup Assistance  5     Locomotion (W/C distance) 70 Feet 255 feet   Locomotion (Walk) 2 (Maximal assistance) (2/2 R LOB) 5 (Supervision/setup) (distant SPV with intermittent pacing/R LE clearance cues)  Gait belt   Locomotion (Walk dist.) 5 Feet (ft) 700 Feet (ft) (x 3 trials multiple surfaces indoor/outdoor, texture/smooth)   Steps/Stairs Steps/Stairs Ambulated (#): 0  Level of Assist : 0 (Not tested)  Rail Use:  (NA) 24 steps w/ 1 HR SPV  Reciprocal pattern         Nursing:     Neuro:   A&O x__4__                   Respiratory:   [x] WNL   [] O2   [] LPM ______   Other:  Peripheral Vascular:   [x] TEDS present   [] Edema present ____ Grade   Cardiac:   [x] WNL   [] Other  Genitourinary:   [x] continent   [] incontinent   [] marie  Abdominal _______ LBM  GI: _cardiac regular______ Diet ___thin___ Liquids _____ tube feeds  Musculoskeletal: _stand/step___ ROM Transfers _____ Assistive Device Used  __min__ Level of Assistance  Skin Integumentary:   [x] Intact   [] Not Intact   __________Preventative Measures  Details______________________________________________________________  Pain: [x] Controlled   [] Not Controlled   Pain Meds:   [] Scheduled   [] PRN        Registered Dietitian / Nutrition:     Patient Vitals for the past 100 hrs:   % Diet Eaten   05/08/17 1351 100 %   05/08/17 0851 100 %   05/07/17 1800 90 %   05/07/17 1329 100 %   05/07/17 0930 100 %   05/06/17 1802 75 %   05/06/17 1330 80 %   05/06/17 0939 100 %   05/05/17 1754 100 %   05/04/17 1800 100 %     Patient reported appetite and meal intake remain good. Denied having any concerns at time of visit    Supplements:          [] Yes   [x] No      Amount of supplement consumed: not applicable       Intake/Output Summary (Last 24 hours) at 05/08/17 1823  Last data filed at 05/08/17 1351   Gross per 24 hour   Intake              520 ml   Output                0 ml   Net              520 ml                                Last bowel movement:  5/7      Interdisciplinary Team Goals:     1. Goal  Pt will perform ADLs with modified independence. Barrier  Impaired balance, Impaired coordination, Decreased safety awareness, Impulsiveness    Intervention  ADL training, Therapeutic activity, Patient education     2. Goal  Patient will perform word finding tasks with 80-90% accuracy.     Barrier  Mild cognitive linguistic deficits, mild dysphagia    Intervention  Training in safe swallowing techniques, word finding activities, cognitive retraining     3. Goal  Patient will ambulate mod I for 150 ft w/o AD. Barrier  R LE clearance; pacing; balance    Intervention  Gait training; NMRE     4. Goal  Pt will be free of falls during rehab stay. Barrier  balance    Intervention  bed/chair alarms     5. Goal  Identify concerns regarding return to home: dependency versus independence    Barrier  Difficulty accepting increased dependency, especially for safety    Intervention  Patient education; reinforce all safety and pace     6. Goal  Po intake of meals will continue to meet >75% of patient estimated nutritional needs within the next 7 days. Outcome:  [x] Met/Ongoing    []  Not Met    [] New/Initial Goal     Barrier  none known     Intervention  modified diet       Disposition / Discharge Planning: Follow-up therapy services:  home health for pt, ot, st   DME recommendations:  no new DME is needed   Estimated discharge date:  5/12/17   Discharge Location:  home         Electronic Signatures:      Signature Date Signed   Physical Therapist    Mariam Campbell PT DPT  05/08/2017   Occupational Therapist    Seth Johnson [place]  5/8/2017   Speech Therapist    Akanksha Delgadillo, [id number] [facility]  5/8/17   Recreational Therapist    Ofelia Lang, CTRS 5/8/17   Nursing    Talon Patton, ALONSO 5/9/17   Dietitian    Clarisa Ryan, NABILA  5/8/17   Clinical Psychologist    Ping Sampson, Ph.D. 5/8/17    Physician    Stephie Lowe. Shree Knott MD  5/8/2017       Vivien Garrett, MSW  5/8/17         The above information has been reviewed with the patient in a language that they can understand. Opportunity for comments and questions has been provided and a signed attestation has been scanned into the \"media tab\" of the EMR.       Patient Signature: ______________________________________________________    Date Signed: __________________________________________________________

## 2017-05-08 NOTE — PROGRESS NOTES
Problem: Mobility Impaired (Adult and Pediatric)  Goal: *Acute Goals and Plan of Care (Insert Text)  Physical Therapy Short Term Goals  Initiated 4/25/2017 and to be accomplished within 7 day(s) (05/02/2017) (MET) (STG=LTG) (05/09/2017)  1. Patient will move from supine to sit and sit to supine , scoot up and down and roll side to side in bed with modified independence. 2. Patient will transfer from bed to chair and chair to bed with minimal assistance/contact guard assist using the least restrictive device. 3. Patient will perform sit to stand with minimal assistance/contact guard assist.  4. Patient will ambulate with moderate assistance for 50 feet with the least restrictive device. 5. Patient will ascend/descend 6 stairs with 2 handrail(s) with moderate assistance . Physical Therapy Long Term Goals  Initiated 4/25/2017 and to be accomplished within 28 day(s) (05/23/2017)  1. Patient will move from supine to sit and sit to supine , scoot up and down and roll side to side in bed with independence. 2. Patient will transfer from bed to chair and chair to bed with modified independence using the least restrictive device. 3. Patient will perform sit to stand with modified independence. 4. Patient will ambulate with modified independence for 250 feet with the least restrictive device. 5. Patient will ascend/descend 6 stairs with 2 handrail(s) with modified independence. PHYSICAL THERAPY DAILY NOTE  Patient Name:Stewart Diez  Time In: 1100  Time Out: 9393  Time In: 1400  Time Out: 1430  Patient Seen For: Wheelchair mobility;Transfer training;Patient education;Gait training;Balance activities; Family training  Diagnosis: Left CVA  Acute ischemic stroke [facility]) Acute ischemic stroke [facility])  Precautions: Fall risk     Subjective:  Patient motivated; A/O x 4 in AM session. PM: Patient present with family; prepared to participate in session w/ family training.      Pain at start of tx:0/10  Pain at stop of tx:0/10     Patient identified with name and : YES         Objective:       BED/MAT MOBILITY Daily Assessment     Rolling Right : 6 (Modified independent)  Rolling Left : 6 (Modified independent)  Supine to Sit : 6 (Modified independent)  Sit to Supine : 6 (Modified independent)          TRANSFERS Daily Assessment     Transfer Type: Other  Other: Patient performs stand step transfer self selecting B UE on R distal femur for increased lorelei participation and L LE slightly anterior to R to inhibit L LE over-utilization completing transfer safely w/o verbal cueing though patient's form improves with singular verbal cue for reduced \"rocking to utilize momentum during STS portion of transfer. Patient completes transfers from 22.5\" tall surface to 16\" tall surface w/o any necessary adjustments. Transfer Assistance : 5 (Supervision/setup) (Distant SPV)  Sit to Stand Assistance:  (Distant SPV)  Car Transfers: Supervision (distant SPV)  Car Type: Car simulator          GAIT Daily Assessment     Amount of Assistance: 5 (Supervision/setup) (distant SPV with intermittent pacing/R LE clearance cues)  Distance (ft): 700 Feet (ft) (x 3 trials multiple surfaces indoor/outdoor, texture/smooth)  Assistive Device: Gait belt      Patient ambulated on multiple surfaces with incline/decline, compliant/non-compliant, textured/smooth requiring only intermittent verbal cueing for R LE step clearance 2/2 intermittent mobility deficit and pacing cues to ensure pt remains safe; especially when transitioning from ambulation back to sitting on target surface. Family present for and appropriate with verbal cueing for these areas. Patient also manages 6\" curb x 5 in session requiring verbal cueing for sequencing (lead foot) and minimal tactile cues for balance to complete task safely. Pt family present and receptive to providing verbal cues despite mild hesitance to initiate.           STEPS or STAIRS Daily Assessment Steps/Stairs Ambulated (#): 24 (24 steps x 2 sets 1 HR SPV level; reciprocal)  Level of Assist : 5 (Supervision/setup)  Rail Use: Left       Patient performs reciprocal technique with unilateral HR requiring v/c for R LE step clearance x 1 in each stair training session. BALANCE Daily Assessment     Sitting - Static: Good (unsupported)  Sitting - Dynamic: Good (unsupported)  Standing - Static: Good  Standing - Dynamic : Impaired          WHEELCHAIR MOBILITY Daily Assessment     Able to Propel (ft): 255 feet  Functional Level: 6 (challenged to perform B UE tech w/o LE assist; add'l time)      Patient requires cueing initially to understand expected challenge, however he is able to consistently self cue to ensure carry-over of technique. LOWER EXTREMITY EXERCISES Daily Assessment     Patient performs stand to kneel transition via squat and half kneel utilizing low mat table for B UE support to learn motor pattern SPV for safety and form cues. Patient performs activity stand<>kneel<>stand x 6 with L LE lead and x 6 with R LE lead before transitioning to B UE on eccentric control limb instead of on external support completing the transition x 5 each with CGA for safety and form cues. Assessment: Patient demonstrates consistent progress with stability and tolerance for activity; patient and family appropriate with training and highly receptive to recommendations for appropriate balance of safety/challenge. Moderate eccentric control deficit noted in R quadriceps during stand<>kneel<>stand transition. Patient continues to demonstrates positive outlook and motivation to improve. Plan of Care: Cont current POC; increase focus on planar transitions and independence with ambulation. Clemente Wiseman, PT DPT  5/8/2017

## 2017-05-09 PROCEDURE — 97112 NEUROMUSCULAR REEDUCATION: CPT

## 2017-05-09 PROCEDURE — 92526 ORAL FUNCTION THERAPY: CPT

## 2017-05-09 PROCEDURE — 74011250636 HC RX REV CODE- 250/636: Performed by: INTERNAL MEDICINE

## 2017-05-09 PROCEDURE — 97110 THERAPEUTIC EXERCISES: CPT

## 2017-05-09 PROCEDURE — 92507 TX SP LANG VOICE COMM INDIV: CPT

## 2017-05-09 PROCEDURE — 65310000000 HC RM PRIVATE REHAB

## 2017-05-09 PROCEDURE — 74011250637 HC RX REV CODE- 250/637: Performed by: INTERNAL MEDICINE

## 2017-05-09 PROCEDURE — 97116 GAIT TRAINING THERAPY: CPT

## 2017-05-09 PROCEDURE — 97535 SELF CARE MNGMENT TRAINING: CPT

## 2017-05-09 RX ADMIN — MULTIPLE VITAMIN LIQUID 30 ML: LIQUID at 08:45

## 2017-05-09 RX ADMIN — DOCUSATE SODIUM 100 MG: 100 CAPSULE, LIQUID FILLED ORAL at 17:26

## 2017-05-09 RX ADMIN — DOCUSATE SODIUM 100 MG: 100 CAPSULE, LIQUID FILLED ORAL at 08:45

## 2017-05-09 RX ADMIN — PANTOPRAZOLE SODIUM 40 MG: 40 GRANULE, DELAYED RELEASE ORAL at 06:31

## 2017-05-09 RX ADMIN — CHOLECALCIFEROL TAB 25 MCG (1000 UNIT) 2000 UNITS: 25 TAB at 08:45

## 2017-05-09 RX ADMIN — HEPARIN SODIUM 5000 UNITS: 5000 INJECTION, SOLUTION INTRAVENOUS; SUBCUTANEOUS at 17:26

## 2017-05-09 RX ADMIN — ASPIRIN 81 MG CHEWABLE TABLET 81 MG: 81 TABLET CHEWABLE at 08:45

## 2017-05-09 RX ADMIN — HEPARIN SODIUM 5000 UNITS: 5000 INJECTION, SOLUTION INTRAVENOUS; SUBCUTANEOUS at 06:22

## 2017-05-09 RX ADMIN — ATORVASTATIN CALCIUM 20 MG: 10 TABLET, FILM COATED ORAL at 20:13

## 2017-05-09 RX ADMIN — LEVOTHYROXINE SODIUM 150 MCG: 100 TABLET ORAL at 06:31

## 2017-05-09 RX ADMIN — CLOPIDOGREL BISULFATE 75 MG: 75 TABLET, FILM COATED ORAL at 17:26

## 2017-05-09 NOTE — PROGRESS NOTES
SHIFT CHANGE NOTE FOR [facility]    Bedside and Verbal shift change report given to [address]  (oncoming nurse) by Kathy Gary LPN   (offgoing nurse). Report included the following information SBAR, Kardex, MAR and Recent Results. Situation:   Code Status: Full Code   Reason for Admission: CVA  Hospital Day: 15   Problem List:   Hospital Problems  Date Reviewed: 5/9/2017          Codes Class Noted POA    CKD stage G3a/A1, GFR 45-59 and albumin creatinine ratio <30 mg/g ICD-10-CM: N18.3  ICD-9-CM: 585.3  5/3/2017 Yes        Procedure refused ICD-10-CM: Z53.29  ICD-9-CM: V64.2  4/21/2017 Yes    Overview Signed 4/24/2017  3:28 PM by Jie Rangel MD     Speech Pathologist recommended NPO and PEG placement; Patient refused             * (Principal)Acute ischemic stroke [facility]) ICD-10-CM: I63.9  ICD-9-CM: 434.91  4/19/2017 Yes    Overview Signed 4/24/2017  3:15 PM by Jie Rangel MD     Acute Ischemic Stroke (acute to subacute ischemia involving the posterior limb of the left internal capsule, along the lateral aspect of the left thalamus) with residual right hemiparesis, dysphagia and dysarthria             Impaired mobility and ADLs ICD-10-CM: Z74.09  ICD-9-CM: 799.89  4/19/2017 Yes        Dyslipidemia (high LDL; low HDL) (Chronic) ICD-10-CM: E78.4  ICD-9-CM: 272.4  4/19/2017 Yes    Overview Signed 4/24/2017  3:25 PM by Jie Rangel MD     Lipid profile (4/19/2017): , .  HDL 42, LDL 97             Hemiparesis affecting right side as late effect of cerebrovascular accident (CVA) ([facility] [address] ICD-10-CM: I70.661  ICD-9-CM: 438.20  4/19/2017 Yes        Dysphagia as late effect of cerebrovascular accident (CVA) ICD-10-CM: M45.401  ICD-9-CM: 438.82  4/19/2017 Yes        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) ICD-10-CM: T82.580  ICD-9-CM: 438.13  4/19/2017 Yes              Background:   Past Medical History:   Past Medical History:   Diagnosis Date    Acute ischemic stroke ([facility] [address] 4/19/2017 Acute Ischemic Stroke (acute to subacute ischemia involving the posterior limb of the left internal capsule, along the lateral aspect of the left thalamus) with residual right hemiparesis, dysphagia and dysarthria    Asbestosis ([facility] [address]     Chronic obstructive pulmonary disease (COPD) ([facility] [address]     CKD stage G3a/A1, GFR 45-59 and albumin creatinine ratio <30 mg/g 5/3/2017    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  HDL 42, LDL 97    Dysphagia as late effect of cerebrovascular accident (CVA) 4/19/2017    Erectile dysfunction     Gastroesophageal reflux disease     Hemiparesis affecting right side as late effect of cerebrovascular accident (CVA) ([facility] [address] 4/19/2017    History of endogenous hypertriglyceridemia     History of malignant neoplasm of prostate     [place] score 7     Hypothyroidism     Osteoarthrosis involving multiple sites     Procedure refused 4/21/2017    Speech Pathologist recommended NPO and PEG placement; Patient refused    Urinary incontinence     Male incontinence       Patient taking anticoagulants YES   Patient has a defibrillator: no     Assessment:   Changes in Assessment throughout shift: NONE              Last Vitals:     Vitals:    05/08/17 0851 05/08/17 1600 05/08/17 2012 05/09/17 0744   BP: 139/86 137/85 148/85 125/76   Pulse: 66 71 68 61   Resp: 19 20 24 18   Temp: 97.3 °F (36.3 °C) 97 °F (36.1 °C) 98.6 °F (37 °C) 97.3 °F (36.3 °C)   SpO2: 98% 97% 100% 99%   Weight:       Height:            PAIN    Pain Assessment    Pain Intensity 1: 0 (05/09/17 1400) Pain Intensity 1: 2 (12/29/14 1105)      Pain Location 1: Abdomen      Pain Intervention(s) 1: Medication (see MAR)  Patient Stated Pain Goal: 0 Patient Stated Pain Goal: 0  o Intervention effective: no    o Other actions taken for pain: NONE     Skin Assessment  Skin color Skin Color: Appropriate for ethnicity  Condition/Temperature Skin Condition/Temp: Warm  Integrity Skin Integrity: Intact  Turgor Turgor: Non-tenting  Weekly Pressure Ulcer Documentation  Pressure  Injury Documentation: No Pressure Injury Noted-Pressure Ulcer Prevention Initiated  Wound Prevention & Protection Methods  Orientation of wound Orientation of Wound Prevention: Posterior  Location of Prevention Location of Wound Prevention: Buttocks, Sacrum/Coccyx  Dressing Present Dressing Present : No  Dressing Status    Wound Offloading Wound Offloading (Prevention Methods): Bed, pressure redistribution/air     INTAKE/OUPUT    Date 05/08/17 0700 - 05/09/17 0659 05/09/17 0700 - 05/10/17 0659   Shift 0700-1859 1900-0659 24 Hour Total 0700-1859 1900-0659 24 Hour Total   I  N  T  A  K  E   P. O. 760  760 240  240      P. O. 760  760 240  240    Shift Total  (mL/kg) 760  (9.3)  760  (9.3) 240  (2.9)  240  (2.9)   O  U  T  P  U  T   Urine  (mL/kg/hr)            Urine Occurrence(s) 1 x 4 x 5 x 0 x  0 x    Stool            Stool Occurrence(s) 0 x 0 x 0 x 0 x  0 x    Shift Total  (mL/kg)           760 240  240   Weight (kg) 81.5 81.5 81.5 81.5 81.5 81.5       Recommendations:  1. Patient needs and requests: NONE    2. Diet: CARDIAC PUREED WITH NECTAR THICK LIQUIDS    3. Pending tests/procedures: LABS     4. Functional Level/Equipment: WHEELCHAIR    5. Estimated Discharge Date: TBD Posted on Whiteboard in Patients Room: [facility] Safety Check    A safety check occurred in the patient's room between off going nurse and oncoming nurse listed above.     The safety check included the below items  Area Items   H  High Alert Medications - Verify all high alert medication drips (heparin, PCA, etc.)   E  Equipment - Suction is set up for ALL patients (with yanker)  - Red plugs utilized for all equipment (IV pumps, etc.)  - WOWs wiped down at end of shift.  - Room stocked with oxygen, suction, and other unit-specific supplies   A  Alarms - Bed alarm is set for fall risk patients  - Ensure chair alarm is in place and activated if patient is up in a chair   L  Lines - Check IV for any infiltration  - Syed bag is empty if patient has a Syed   - Tubing and IV bags are labeled   S  Safety   - Room is clean, patient is clean, and equipment is clean. - Hallways are clear from equipment besides carts. - Fall bracelet on for fall risk patients  - Ensure room is clear and free of clutter  - Suction is set up for ALL patients (with ton)  - Hallways are clear from equipment besides carts.    - Isolation precautions followed, supplies available outside room, sign posted

## 2017-05-09 NOTE — PROGRESS NOTES
Problem: Neurolinguistics Impaired (Adult)  Goal: *Speech Goal: (INSERT TEXT)  Long term goals:  1. Patient will tolerate a regular/thin liquid diet without overt s/s of aspiration, 4 consecutive meals. 2. Patient will use safe swallowing techniques with supervision for all PO intake. 3. Patient will answer complex yes/no questions with 90% accuracy. 4 patient will follow complex commands for object manipulation wit 80-90% accuracy. 5. Patient will name 10-12 items within simple categories with supervision. 6. Patient will perform varied word retrieval tasks with % accuracy. 7. Patient will recall 3 words after 5 minutes, supervision. 8. Patient will perform functional problem solving/reasoning tasks with supervision. Short term goals (by 5/9/17):  1. Patient will tolerate a soft, nectar thick liquid diet without overt s/s of aspiration, 4 consecutive meals. 2. Patient will drink sips of thin liquids with the SLP using safe swallowing techniques without overt s/s of aspiration. 3. Patient will use safe swallowing techniques with min-mod cues for all PO intake. 4. Patient will answer complex yes/no questions with 80-90% accuracy. 5 patient will follow complex commands for object manipulation wit 80-90% accuracy. 6. Patient will name 7-9 items within simple categories with supervision. 7. Patient will perform varied word retrieval tasks with 80-90% accuracy. 8. Patient will recall 3 words after 5 minutes, min assist.  9. Patient will perform functional problem solving/reasoning tasks with 80-90% accuracy. SPEECH LANGUAGE PATHOLOGY TREATMENT     Patient: Issa Oneal (81 y.o. male)  Date: 5/9/2017  Diagnosis: Left CVA  Acute ischemic stroke ([facility] [address] Acute ischemic stroke [facility])       SUBJECTIVE:   Patient stated Remembering names has been hard since I was 10years old. .      OBJECTIVE:   Mental Status:  Mr. Paulie Polk was consistently awake, alert and motivated in therapy. Treatment & Interventions:   Patient was seen in the dining room at mealtime for breakfast and lunch. He tolerated his regular diet, thin liquids well. Patient with throat clearing x 2 but, when asked, stated that it was not related to swallowing. He had no coughing during either meal.  He needs only occasional cues to limit bolus size and slow his rate of intake. Patient was also seen for two half hour speech therapy sessions to address language and cognition. He participated in the following treatment tasks:     Neuro-Linguistics:  Orientation:                                         Min assist  Recent memory:                                 Supervision  Recall 3 words:                                   Min cue for one of the words  Answering \"why\" questions:               100% accuracy  Generating opposites:                        92% accuracy  Local people/positions assns:75% accuracy  \"Things that spoil vacations\":             Patient listed 5     Response & Tolerance to Activities:  Mr. Jose Smith is consistently cooperative with all tasks and cues provided by the SLP. Pain:  Pain Scale 1: Numeric (0 - 10)     After treatment:   [X]       Patient left in no apparent distress sitting up in chair  [X]       Patient left in no apparent distress in bed  [X]       Call bell left within reach  [ ]       Nursing notified  [ ]       Caregiver present  [X]       Bed alarm activated      ASSESSMENT:   Progression toward goals:  [X]       Improving appropriately and progressing toward goals  [ ]       Improving slowly and progressing toward goals  [ ]       Not making progress toward goals and plan of care will be adjusted      PLAN:   Patient continues to benefit from skilled intervention to address the above impairments. Continue treatment per established plan of care.   Discharge Recommendations:  [place], SLP  Time Calculation:      120 minutes

## 2017-05-09 NOTE — PROGRESS NOTES
Sw spoke with pt regarding dc this Friday and offered [facility] for home health. Pt selects [facility]. Sw placed referral and notified liaison. No DME is needed.

## 2017-05-09 NOTE — PROGRESS NOTES
SHIFT CHANGE NOTE FOR [facility]    Bedside and Verbal shift change report given to Praneeth Harry LPN  (oncoming nurse) by Aaron Hines RN   (offgoing nurse). Report included the following information SBAR, Kardex, MAR and Recent Results. Situation:   Code Status: Full Code   Reason for Admission: CVA  Hospital Day: 15   Problem List:   Hospital Problems  Date Reviewed: 5/8/2017          Codes Class Noted POA    CKD stage G3a/A1, GFR 45-59 and albumin creatinine ratio <30 mg/g ICD-10-CM: N18.3  ICD-9-CM: 585.3  5/3/2017 Yes        Procedure refused ICD-10-CM: Z53.29  ICD-9-CM: V64.2  4/21/2017 Yes    Overview Signed 4/24/2017  3:28 PM by Vick Kuhn MD     Speech Pathologist recommended NPO and PEG placement; Patient refused             * (Principal)Acute ischemic stroke [facility]) ICD-10-CM: I63.9  ICD-9-CM: 434.91  4/19/2017 Yes    Overview Signed 4/24/2017  3:15 PM by Vick Kuhn MD     Acute Ischemic Stroke (acute to subacute ischemia involving the posterior limb of the left internal capsule, along the lateral aspect of the left thalamus) with residual right hemiparesis, dysphagia and dysarthria             Impaired mobility and ADLs ICD-10-CM: Z74.09  ICD-9-CM: 799.89  4/19/2017 Yes        Dyslipidemia (high LDL; low HDL) (Chronic) ICD-10-CM: E78.4  ICD-9-CM: 272.4  4/19/2017 Yes    Overview Signed 4/24/2017  3:25 PM by Vick Kuhn MD     Lipid profile (4/19/2017): , .  HDL 42, LDL 97             Hemiparesis affecting right side as late effect of cerebrovascular accident (CVA) ([facility] [address] ICD-10-CM: Q71.169  ICD-9-CM: 438.20  4/19/2017 Yes        Dysphagia as late effect of cerebrovascular accident (CVA) ICD-10-CM: Y36.694  ICD-9-CM: 438.82  4/19/2017 Yes        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) ICD-10-CM: L52.518  ICD-9-CM: 438.13  4/19/2017 Yes              Background:   Past Medical History:   Past Medical History:   Diagnosis Date    Acute ischemic stroke ([facility] [address] 4/19/2017    Acute Ischemic Stroke (acute to subacute ischemia involving the posterior limb of the left internal capsule, along the lateral aspect of the left thalamus) with residual right hemiparesis, dysphagia and dysarthria    Asbestosis ([facility] [address]     Chronic obstructive pulmonary disease (COPD) ([facility] [address]     CKD stage G3a/A1, GFR 45-59 and albumin creatinine ratio <30 mg/g 5/3/2017    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  HDL 42, LDL 97    Dysphagia as late effect of cerebrovascular accident (CVA) 4/19/2017    Erectile dysfunction     Gastroesophageal reflux disease     Hemiparesis affecting right side as late effect of cerebrovascular accident (CVA) ([facility] [address] 4/19/2017    History of endogenous hypertriglyceridemia     History of malignant neoplasm of prostate     [place] score 7     Hypothyroidism     Osteoarthrosis involving multiple sites     Procedure refused 4/21/2017    Speech Pathologist recommended NPO and PEG placement; Patient refused    Urinary incontinence     Male incontinence       Patient taking anticoagulants YES   Patient has a defibrillator: no     Assessment:   Changes in Assessment throughout shift: NONE              Last Vitals:     Vitals:    05/07/17 1930 05/08/17 0851 05/08/17 1600 05/08/17 2012   BP: 144/85 139/86 137/85 148/85   Pulse: 76 66 71 68   Resp: 18 19 20 24   Temp: 98.2 °F (36.8 °C) 97.3 °F (36.3 °C) 97 °F (36.1 °C) 98.6 °F (37 °C)   SpO2: 98% 98% 97% 100%   Weight:       Height:            PAIN    Pain Assessment    Pain Intensity 1: 0 (05/09/17 0400) Pain Intensity 1: 2 (12/29/14 1105)      Pain Location 1: Abdomen      Pain Intervention(s) 1: Medication (see MAR)  Patient Stated Pain Goal: 0 Patient Stated Pain Goal: 0  o Intervention effective: no    o Other actions taken for pain: NONE     Skin Assessment  Skin color Skin Color: Appropriate for ethnicity  Condition/Temperature Skin Condition/Temp: Warm  Integrity Skin Integrity: Intact  Turgor Turgor: Non-tenting  Weekly Pressure Ulcer Documentation  Pressure  Injury Documentation: No Pressure Injury Noted-Pressure Ulcer Prevention Initiated  Wound Prevention & Protection Methods  Orientation of wound Orientation of Wound Prevention: Posterior  Location of Prevention Location of Wound Prevention: Sacrum/Coccyx  Dressing Present Dressing Present : No  Dressing Status    Wound Offloading Wound Offloading (Prevention Methods): Bed, pressure redistribution/air     INTAKE/OUPUT    Date 05/08/17 0700 - 05/09/17 0659 05/09/17 0700 - 05/10/17 0659   Shift 0700-1859 1900-0659 24 Hour Total 0700-1859 1900-0659 24 Hour Total   I  N  T  A  K  E   P. O. 760  760         P. O. 760  760       Shift Total  (mL/kg) 760  (9.3)  760  (9.3)      O  U  T  P  U  T   Urine  (mL/kg/hr)            Urine Occurrence(s) 1 x 3 x 4 x       Stool            Stool Occurrence(s) 0 x 0 x 0 x       Shift Total  (mL/kg)           760      Weight (kg) 81.5 81.5 81.5 81.5 81.5 81.5       Recommendations:  1. Patient needs and requests: NONE    2. Diet: CARDIAC PUREED WITH NECTAR THICK LIQUIDS    3. Pending tests/procedures: LABS     4. Functional Level/Equipment: WHEELCHAIR    5. Estimated Discharge Date: TBD Posted on Whiteboard in Patients Room: [facility] Safety Check    A safety check occurred in the patient's room between off going nurse and oncoming nurse listed above.     The safety check included the below items  Area Items   H  High Alert Medications - Verify all high alert medication drips (heparin, PCA, etc.)   E  Equipment - Suction is set up for ALL patients (with yanker)  - Red plugs utilized for all equipment (IV pumps, etc.)  - WOWs wiped down at end of shift.  - Room stocked with oxygen, suction, and other unit-specific supplies   A  Alarms - Bed alarm is set for fall risk patients  - Ensure chair alarm is in place and activated if patient is up in a chair   L  Lines - Check IV for any infiltration  - Syed bag is empty if patient has a Syed   - Tubing and IV bags are labeled   S  Safety   - Room is clean, patient is clean, and equipment is clean. - Hallways are clear from equipment besides carts. - Fall bracelet on for fall risk patients  - Ensure room is clear and free of clutter  - Suction is set up for ALL patients (with yanker)  - Hallways are clear from equipment besides carts.    - Isolation precautions followed, supplies available outside room, sign posted

## 2017-05-09 NOTE — PROGRESS NOTES
Problem: Self Care Deficits Care Plan (Adult)  Goal: *Acute Goals and Plan of Care (Insert Text)  Long Term Goals (to be met upon discharge date) in order to increase pts functional independence and safety, and decrease burden of care:  1. Pt will perform self-feeding with independence. 2. Pt will perform grooming with independence. 3. Pt will perform UB bathing with modified independence. 4. Pt will perform LB bathing with modified independence. 5. Pt will perform tshower transfer with modified independence. 6. Pt will perform UB dressing with independence. 7. Pt will perform LB dressing with modified independence. 8. Pt will perform toileting task with modified independence. 9. Pt will perform toilet transfer with modified independence. Short Term Weekly Goals for (2017 to 2017) in order to increase pts functional independence and safety, and decrease burden of care:  1. Pt will perform UB bathing with modified independence. 2. Pt will perform LB bathing with supervision. 3. Pt will perform shower transfer with CGA. 4. Pt will perform UB dressing with modified independence. 5. Pt will perform LB dressing with supervision. 6. Pt will perform toileting task with supervision. 7. Pt will perform toilet transfer with supervision   OT WEEKLY PROGRESS NOTE  Patient Name:Stewart Morrow   Time Spent With Patient  Time In: 1306  Time Out: 7957      Time In: 1330  Time Out: 0     Medical Diagnosis:  Left CVA  Acute ischemic stroke ([address] Acute ischemic stroke ([facility] [address]      Pain at start of tx: 0/10  Pain at stop of tx: 0/10     Patient identified with name and : yes  Subjective: Pt asked this OT multiple questions about stroke recovery and what to expect moving forward. Pt receptive to stroke education provided by OT. Objective: AM Session: Pt performed full body shower; see below for ADL and functional transfer levels.  PM Session: Pt participated in seated UE therex for UE strengthening with focus also on coordination. He performed 3 sets x 10 reps each chest press, shoulder flexion, forward rows, and backward rows with 4# dowel with minimal cueing required for pacing and not using momentum to complete the exercise. Outcome Measures: FIM/MMT                 AROM: WFL UEs         COGNITION/PERCEPTION Initial Assessment Weekly Progress Assessment 5/9/2017   Premorbid Reading Status Literate -   Premorbid Writing Status WFL -   Arousal/Alertness   -   Orientation Level Oriented X4 -   Visual Fields  (Appears Intact) -   Praxis Impaired (RUE) -   Body Scheme Cues to maintain midline in standing, Tactile, Verbal, Visual -   COMPREHENSION MODE Initial Assessment Weekly Progress Assessment 5/9/2017   Primary Mode of Comprehension Auditory  Auditory   Hearing Aide       Corrective Lenses       Score 5  5       EXPRESSION Initial Assessment Weekly Progress Assessment 5/9/2017   Primary Mode of Expression Verbal Verbal   Score 5 5   Comments           SOCIAL INTERACTION/ PRAGMATICS Initial Assessment Weekly Progress Assessment 5/9/2017   Score 6 6   Comments           PROBLEM SOLVING Initial Assessment Weekly Progress Assessment 5/9/2017   Score 4 4   Comments           MEMORY Initial Assessment Weekly Progress Assessment 5/9/2017   Score 4 5   Comments           EATING Initial Assessment Weekly Progress Assessment 5/9/2017   Functional Level 5 Feeding/Eating  Feeding/Eating Assistance: 6 (Modified independent)  Comments: Pt needs additional time for using B hands to open packages and cut food on his tray. Pt is able to feed himself using his R hand though switches to his L as he fatigues. Comments Pt may require some assistance with opening packages and cutting food during meals. Pt able to feed himself using his L hand.          GROOMING Initial Assessment Weekly Progress Assessment 5/9/2017   Functional Level 5 Grooming  Grooming Assistance : 6 (Modified independent)  Comments: Pt stood at the sink to shave his face, comb his hair, and brush his teeth this morning and uses his R hand for stabilizing/opening items as needed. Tasks completed by patient Brushed hair, Brushed teeth, Washed face     Comments Pt combed his hair and brushed his teeth using his L hand and needed extra time in order to open toothpaste tube due to decreased R hand strength and coordination. BATHING Initial Assessment Weekly Progress Assessment 5/9/2017   Functional Level 2 (Pt stood in shower at PLOF)    Upper Body Bathing  Bathing Assistance, Upper: 5 (Supervision)  Position Performed: Seated in chair  Comments: Pt able to wash full UB after OT provided pt with washcloth. Lower Body Bathing  Bathing Assistance, Lower : 5 (Supervision)  Position Performed: Seated in chair;Standing  Comments: Pt needs close supervision for safety while standing with intermittent support of grab bar to wash buttocks. Body parts patient bathed Abdomen, Arm, left, Arm, right, Chest, [place] area, Thigh, left, Thigh, right     Comments Pt performed full body bathing while seated on shower bench. He requires Min A for thoroughly washing LUE due to decreased RUE coordination. Pt able to wash devorah area and B thighs when seated with assistance required for thoroughly washing B feet due to difficulty reaching. He needs additional assistance for washing the R side of his buttocks. TUB/SHOWER TRANSFER INDEPENDENCE Initial Assessment Weekly Progress Assessment 5/9/2017   Score 3 Functional Transfers  Toilet Transfer :  (Stand step transfer)  Amount of Assistance Required: 5 (stand-by assistance)  Tub or Shower Type: Shower  Amount of Assistance Required: 5 (Stand-by assistance)  Adaptive Equipment: Tub transfer bench;Grab bars   Comments Pt performed stand step transfer w/c <> shower bench without an AD with Mod A for balance, weightshift, and posture.   Pt performed stand step transfer w/c <> shower bench utilizing grab bar for support in standing with SBA and verbal cues for safety with not pulling up on grab bar. UPPER BODY DRESSING/UNDRESSING Initial Assessment Weekly Progress Assessment 5/9/2017   Functional Level 3 Upper Body Dressing   Dressing Assistance : 6 (Modified independent)  Comments: Slight additional time/effort needed for obtaining, doffing, and donning t-shirt. Items applied/Steps completed Pullover (4 steps)     Comments Pt required Mod A for managing shirt over head and on BUEs while doffing and donning t-shirt. LOWER BODY DRESSING/UNDRESSING Initial Assessment Weekly Progress Assessment 5/9/2017   Functional Level 2 Lower Body Dressing   Dressing Assistance : 4 (Contact guard assistance)  Leg Crossed Method Used: Yes  Position Performed: Seated in chair;Standing  Comments: Pt uses crossed leg method while seated to doff and aldo socks and tennis shoes. Pt able to bend forward while seated in order to tie shoelaces. Pt threads LEs into underwear and pants while seated and is able to pull them up over his buttocks while standing. Pt experienced 1 minor LOB while standing and managing his pants and required CGA to stabilize and correct. With additional time and effort, pt able to tie drawstring on pants. Items applied/Steps completed Button/zip pants (4 steps), Sock, left (1 step), Sock, right (1 step), Underpants (3 steps)     Comments Pt requires Max A for managing all LB clothing over B feet. He is able to stand with Min-Mod A for standing balance and safety and pull underwear/pants up over buttocks with slight assistance required for getting them up all the way on the R side due to decreased hand strength and coordination.          TOILETING Initial Assessment Weekly Progress Assessment 5/9/2017   Functional Level 3 Toileting  Toileting Assistance (FIM Score): 5 (Supervision) (Close supervision)   Comments Pt requires Min-Mod A for static standing balance while managing clothing over his hips and buttocks with Mod A. Pt requires close supervision for safety while standing for clothing management. TOILET TRANSFER INDEPENDENCE Initial Assessment Weekly Progress Assessment 5/9/2017   Transfer score 3 Functional Transfers  Toilet Transfer :  (Stand step transfer)  Amount of Assistance Required: 5 (stand-by assistance)  Tub or Shower Type: Shower  Amount of Assistance Required: 5 (Stand-by assistance)  Adaptive Equipment: Tub transfer bench;Grab bars   Comments Based on performance of shower transfer and w/c to bed transfer, pt able to perform stand step transfer to/from commode with Mod A for balance, weightshift, and posture. Based on performance of shower transfer today, pt able to perform toilet transfer with SBA for safety and cueing for not utilizing grab bar to pull up on to stand. Assessment: Pt has achieved 5/7 STGs this week (2-4, 6-7). He continues to require cueing for safety with functional transfers and standing during ADLs due to impaired balance, impulsivity, and decreased safety awareness. Plan of Care: Please see Care Plan for updated LTGs.     Family Training:  Completed 5/8/2017     SERENITY Burger  5/9/2017

## 2017-05-09 NOTE — PROGRESS NOTES
Problem: Mobility Impaired (Adult and Pediatric)  Goal: *Acute Goals and Plan of Care (Insert Text)  Physical Therapy Short Term Goals  Initiated 4/25/2017 and to be accomplished within 7 day(s) (05/02/2017) (MET) (STG=LTG) (05/09/2017)  1. Patient will move from supine to sit and sit to supine , scoot up and down and roll side to side in bed with modified independence. 2. Patient will transfer from bed to chair and chair to bed with minimal assistance/contact guard assist using the least restrictive device. 3. Patient will perform sit to stand with minimal assistance/contact guard assist.  4. Patient will ambulate with moderate assistance for 50 feet with the least restrictive device. 5. Patient will ascend/descend 6 stairs with 2 handrail(s) with moderate assistance . Physical Therapy Long Term Goals  Initiated 4/25/2017 and to be accomplished within 28 day(s) (05/23/2017)  1. Patient will move from supine to sit and sit to supine , scoot up and down and roll side to side in bed with independence. 2. Patient will transfer from bed to chair and chair to bed with modified independence using the least restrictive device. 3. Patient will perform sit to stand with modified independence. 4. Patient will ambulate with modified independence for 250 feet with the least restrictive device. 5. Patient will ascend/descend 6 stairs with 2 handrail(s) with modified independence. PT WEEKLY PROGRESS NOTE  Patient Name:Stewart Diez   Time In: 1000   Time Out: 1101   Time In: 1400   Time Out: 1435     Subjective: Patient reports to therapy motivated to participate; A/O x 4             Objective:   Patient performs all functional assessment as noted below; in addition the patient also performs Squatted R UE unilateral D2 flexion PNF with mirror for biofeedback cues and tactile cueing for increased load at end range of squat for improved force production.  Patient requires verbal and tactile cueing to increase R distal radioulnar supination during apex of PNF pattern with good results. Patient performs activity 20 reps x 4 sets. Patient also performs planked plantigrade position on inverted BOSU ball (31\" surface height) with alternating weightbearing through B UE's. Patient tolerates 30 sec bouts x 6 sets with support at R elbow in case of \"buckling\" which did not occur. Outcome Measures: FIM/MMT  Pain at start of tx:0/10  Pain at stop of tx:0/10     Patient identified with name and : YES                    AROM: WFL      FIM SCORES Initial Assessment Weekly Progress Assessment 2017   Bed/Chair/Wheelchair Transfers 3 5   Wheelchair Mobility 2 6   Walking [place] 1 5   Steps/Stairs 0 5   Please see [facility] Interdisciplinary Eval: Coordination/Balance Section for details regarding FIM score description. BED/CHAIR/WHEELCHAIR TRANSFERS Initial Assessment Weekly Progress Assessment 2017   Rolling Right 5 (Supervision) 6 (Modified independent)   Rolling Left 5 (Supervision) 6 (Modified independent)   Supine to Sit 5 (Supervision) 6 (Modified independent)   Sit to Stand Moderate assistance Supervision   Sit to Supine 5 (Supervision) 6 (Modified independent)   Transfer Type Other Other   Comments Patient performs stand step transfer with moderate assist from PT for anterior approach R LE knee block with loading 2/2 hx of buckling, trunk stability, pivot assist, and controlled lowering. Patient demonstrates increased momentum utilization for STS transfer and R genu recurvatum requiring verbal cueing to crrect these; pacing cues successful, however genu recurvatum requires tactile cueing with physical assist to prevent buckling. Patient demosntrates pass retract technique with R LE advancement as well as anterior COG placement to attempts to maintain TKE 2/2 functional quad weakness.   Patient performs stand step transfer requiring no verbal cueing or physical assist to complete activity x 12 in session. Car Transfer Not tested Supervision   Car Type NA Car Simulator       GROSS ASSESSMENT Weekly Progress Assessment 5/9/2017   AROM Within functional limits   Strength Generally decreased, functional   Coordination Generally decreased, functional   Tone Normal   Sensation Impaired   PROM Within functional limits       POSTURE Weekly Progress Assessment 5/9/2017   Posture (WDL) Exceptions to WDL   Posture Assessment Rounded shoulders       WHEELCHAIR MOBILITY/MANAGEMENT Initial Assessment Weekly Progress Assessment 5/9/2017   Able to Propel 70 feet (verbal cueing for technique) 255 feet (B UE technique)   Curbs/ramps assistance required 0 (Not tested) 5 (Supervision)   Ambulating w/o AD   Wheelchair set up assistance required 2 (Maximal assistance) 6   Wheelchair management Manages left brake, Manages right brake B brakes; B foot rests; B arm rests       WALKING INDEPENDENCE Initial Assessment Weekly Progress Assessment 5/9/2017   Assistive device Gait belt  (none)   Ambulation assistance - level surface   Distant SPV   Distance 5 Feet (ft) 700 Feet (ft)   Comments 5 ft with decreased step length B shortest on L. Patient also demonstrates significant trunk sway with trendelenburg during R SLS. Patient demonstrates intermittent anterior COG progression requiring physical assist to recover COG w/i CATA. Patient demonstrates R LE genu recurvatum with pass retract advancement technique; Patient able to ambulates 20 ft with moderate a x 1 for trunk control, R LE advancement, postural assistance and assisted weight shift with pacing cues. Patient ambulates on multiple surfaces with distant supervision managing curbs, textured concrete and low cut grass.  Patient requires no verbal cueing or physical assistance following education for self-cueing technique       GAIT Weekly Progress Assessment 5/9/2017   Gait Description (WDL) Exceptions to WDL   Gait Abnormalities Decreased step clearance       STEPS/STAIRS Initial Assessment Weekly Progress Assessment 5/9/2017   Steps/Stairs ambulated 0 24 (x 2 sets)   Rail Use  (NA) Left    Comments Pt unsafe to perform at this time  reciprocal pattern L hand rail   Curbs/Ramps NA SPV              Assessment: Patient has achieved all STG at this time and demonstrates excellent progress toward LTG's though they have not yet been me 2/2 residual self-cueing safety concerns. Patient would benefit from continued skilled PT at this time to promote proper self cueing and safety with MRADL's. Plan of Care: Please see Care Plan for updated LTGs. Family Training: Completed 05/08/2017     Ashley Arredondo.  [place], PT DPT  5/9/2017

## 2017-05-09 NOTE — INTERDISCIPLINARY ROUNDS
[facility] PHYSICAL REHABILITATION  [address] [place] [address], [address]    INPATIENT REHABILITATION  TEAM CONFERENCE SUMMARY     Date of Conference: 5/9/2017    Name: Sudha Dumas Age / Sex: 64 y.o. / male   CSN: [id number] MRN: [id number]   [facility] Date: 4/24/2017 Length of Stay: 15 days     Primary Rehabilitation Diagnosis  1. Impaired Mobility and ADLs  2. Acute Ischemic Stroke (acute to subacute ischemia involving the posterior limb of the left internal capsule, along the lateral aspect of the left thalamus) with residual right hemiparesis, dysphagia and dysarthria       Comorbidities   Urinary incontinence    History of malignant neoplasm of prostate    Hypothyroidism    Chronic obstructive pulmonary disease (COPD)    Asbestosis    Osteoarthrosis involving multiple sites    History of endogenous hypertriglyceridemia    CKD stage G3a/A1, GFR 45-59 and albumin creatinine ratio <30 mg/g    Dyslipidemia (high LDL; low HDL)    Gastroesophageal reflux disease    Procedure refused    Erectile dysfunction         Therapy:     FIM SCORES Initial Assessment Weekly Progress Assessment 5/9/2017   Eating Functional Level: 3  Comments: Pt may require some assistance with opening packages and cutting food during meals. Pt able to feed himself using his L hand. Feeding/Eating Assistance: 6 (Modified independent)  Comments: Pt needs additional time for using B hands to open packages and cut food on his tray. Pt is able to feed himself using his R hand though switches to his L as he fatigues. 6 - Mod I   Swallowing     Grooming 6 Grooming Assistance : 6 (Modified independent)  Comments: Pt stood at the sink to shave his face, comb his hair, and brush his teeth this morning and uses his R hand for stabilizing/opening items as needed. 6 - Mod I   Bathing 2 (Pt stood in shower at [employer]) [address], Upper: 5 (Supervision)  Position Performed: Seated in chair  Comments: Pt able to wash full UB after OT provided pt with washcloth. 5 - Setup/supervision  Bathing Assistance, Lower : 5 (Supervision)  Position Performed: Seated in chair;Standing  Comments: Pt needs close supervision for safety while standing with intermittent support of grab bar to wash buttocks. Upper Body Dressing Functional Level: 3  Items Applied/Steps Completed: Pullover (4 steps)  Comments: Pt required Mod A for managing shirt over head and on BUEs while doffing and donning t-shirt. Dressing Assistance : 6 (Modified independent)  Comments: Slight additional time/effort needed for obtaining, doffing, and donning t-shirt. 5 - Setup   Lower Body Dressing Functional Level: 2  Items Applied/Steps Completed: Button/zip pants (4 steps), Sock, left (1 step), Sock, right (1 step), Underpants (3 steps)  Comments: Pt requires Max A for managing all LB clothing over B feet. He is able to stand with Min-Mod A for standing balance and safety and pull underwear/pants up over buttocks with slight assistance required for getting them up all the way on the R side due to decreased hand strength and coordination. Dressing Assistance : 4 (Contact guard assistance)  Leg Crossed Method Used: Yes  Position Performed: Seated in chair;Standing  Comments: Pt uses crossed leg method while seated to doff and aldo socks and tennis shoes. Pt able to bend forward while seated in order to tie shoelaces. Pt threads LEs into underwear and pants while seated and is able to pull them up over his buttocks while standing. Pt experienced 1 minor LOB while standing and managing his pants and required CGA to stabilize and correct. With additional time and effort, pt able to tie drawstring on pants. 5 - Supervision   Toileting Functional Level: 4  Comments: Pt requires Min-Mod A for static standing balance while managing clothing over his hips and buttocks with Mod A.  Toileting Assistance (FIM Score): 5 (Supervision) (Close supervision)5 - Supervision   Bladder - level of assist 4 4   Bladder - accident frequency score 6 6   Bowel - level of assist 4 4   Bowel - accident frequency score 5     Toilet Transfer [place] Toilet Transfer Score: 3  Comments: Based on performance of shower transfer and w/c to bed transfer, pt able to perform stand step transfer to/from commode with Mod A for balance, weightshift, and posture. 5 (stand-by assistance)4 - Min A   Tub/Shower Transfer [place] Tub or Shower Type: Shower  Tub/Shower Transfer Score: 3  Comments: Pt performed stand step transfer w/c <> shower bench without an AD with Mod A for balance, weightshift, and posture. Shower4 - Min A  5 (Stand-by assistance)   Comprehension Primary Mode of Comprehension: Auditory  Score: 4     5   Expression Primary Mode of Expression: Verbal  Score: 4     5   Social Interaction Score: 4  6   Problem Solving Score: 4  5   Memory Score: 4  5     FIM SCORES Initial Assessment Weekly Progress Assessment 5/9/2017   Bed/Chair/Wheelchair Transfers Transfer Type: Other  Other: Patient performs stand step transfer with moderate assist from PT for anterior approach R LE knee block with loading 2/2 hx of buckling, trunk stability, pivot assist, and controlled lowering. Patient demonstrates increased momentum utilization for STS transfer and R genu recurvatum requiring verbal cueing to crrect these; pacing cues successful, however genu recurvatum requires tactile cueing with physical assist to prevent buckling. Patient demosntrates pass retract technique with R LE advancement as well as anterior COG placement to attempts to maintain TKE 2/2 functional quad weakness. Transfer Assistance : 3 (Moderate assistance )  Sit to Stand Assistance:  Moderate assistance  Car Transfers: Not tested  Car Type: NA     Bed Mobility Rolling Right 5 (Supervision)   Rolling Left 5 (Supervision)   Supine to Sit 5 (Supervision)   Sit to Stand Moderate assistance   Sit to Supine 5 (Supervision)    Rolling Right   6 (Modified independent)   Rolling Left   6 (Modified independent)   Supine to Sit   6 (Modified independent)   Sit to Stand       Sit to Supine          Locomotion (W/C) Able to Propel (ft): 70 feet (verbal cueing for technique)  Functional Level: 2  Curbs/Ramps Assist Required (FIM Score): 0 (Not tested)  Wheelchair Setup Assist Required : 2 (Maximal assistance)  Wheelchair Management: Manages left brake, Manages right brake Function    Setup Assistance         Locomotion (W/C distance) 70 Feet     Locomotion (Walk) 2 (Maximal assistance) (2/2 R LOB)        Locomotion (Walk dist.) 5 Feet (ft)     Steps/Stairs Steps/Stairs Ambulated (#): 0  Level of Assist : 0 (Not tested)  Rail Use:  (NA)           Nursing:     Neuro:   A&O x____                   Respiratory:   [] WNL   [] O2   [] LPM ______   Other:  Peripheral Vascular:   [] TEDS present   [] Edema present ____ Grade   Cardiac:   [] WNL   [] Other  Genitourinary:   [] continent   [] incontinent   [] marie  Abdominal _______ LBM  GI: _______ Diet ______ Liquids _____ tube feeds  Musculoskeletal: ____ ROM Transfers _____ Assistive Device Used  ____ Level of Assistance  Skin Integumentary:   [] Intact   [] Not Intact   __________Preventative Measures  Details______________________________________________________________  Pain: [] Controlled   [] Not Controlled   Pain Meds:   [] Scheduled   [] PRN        Registered Dietitian / Nutrition:     Patient Vitals for the past 100 hrs:   % Diet Eaten   05/09/17 0956 75 %   05/08/17 1800 75 %   05/08/17 1351 100 %   05/08/17 0851 100 %   05/07/17 1800 90 %   05/07/17 1329 100 %   05/07/17 0930 100 %   05/06/17 1802 75 %   05/06/17 1330 80 %   05/06/17 0939 100 %   05/05/17 1754 100 %     Patient reported appetite and meal intake remain good.  Denied having any concerns at time of visit    Supplements:          [] Yes   [x] No      Amount of supplement consumed: not applicable       Intake/Output Summary (Last 24 hours) at 05/09/17 [address] filed at 05/09/17 0956   Gross per 24 hour   Intake              760 ml   Output                0 ml   Net              760 ml                                Last bowel movement:  5/7      Interdisciplinary Team Goals:     1. Goal  Pt will perform ADLs with modified independence. Barrier  Impaired balance, Impaired coordination, Decreased safety awareness, Impulsiveness    Intervention  ADL training, Therapeutic activity, Patient education     2. Goal  Patient will perform word finding tasks with 80-90% accuracy. Barrier  Mild cognitive linguistic deficits, mild dysphagia    Intervention  Training in safe swallowing techniques, word finding activities, cognitive retraining     3. Goal      Barrier      Intervention       4. Goal      Barrier      Intervention       5. Goal      Barrier      Intervention       6. Goal  Po intake of meals will continue to meet >75% of patient estimated nutritional needs within the next 7 days. Outcome:  [x] Met/Ongoing    []  Not Met    [] New/Initial Goal     Barrier  none known     Intervention  modified diet       Disposition / Discharge Planning: Follow-up therapy services:  home health for pt, ot, st   DME recommendations:  no new DME is needed   Estimated discharge date:  5/12/17   Discharge Location:  home         Interdisciplinary team rounds were held this AM with the following team members:       Name   Physical Therapist    Diogenes Molina, PT, DPT   Occupational Therapist    Ayaan Villanueva OTR   Speech Therapist    Alma Foster, [address] [facility]   Recreational Therapist    Hoda Ramierz, [address] [address], RN   Dietitian       Clinical Psychologist    Sanchez Cardoso, PhD   Physician    Lou Wong.  Mimi Manuel MD       Gorge Parr MSW       Signed:  Gisella Bailey MD    May 9, 2017

## 2017-05-09 NOTE — PROGRESS NOTES
[address]  [address] [place] [address], [address]     INPATIENT REHABILITATION  DAILY PROGRESS NOTE     Date: 5/9/2017    Name: Debbie Morris Age / Sex: 64 y.o. / male   CSN: [id number] MRN: [id number]   [facility] Date: 4/24/2017 Length of Stay: 15 days     Primary Rehab Diagnosis: Impaired Mobility and ADLs secondary to Acute Ischemic Stroke (acute to subacute ischemia involving the posterior limb of the left internal capsule, along the lateral aspect of the left thalamus) with residual right hemiparesis, dysphagia and dysarthria       Subjective:     Patient seen and examined. Blood pressure controlled. Team conference was held at bedside this AM.     Patient's Complaint:   No significant medical complaints    Pain Control: no current joint or muscle symptoms, essentially pain-free      Objective:     Vital Signs:  Patient Vitals for the past 24 hrs:   BP Temp Pulse Resp SpO2   05/09/17 0744 125/76 97.3 °F (36.3 °C) 61 18 99 %   05/08/17 2012 148/85 98.6 °F (37 °C) 68 24 100 %   05/08/17 1600 137/85 97 °F (36.1 °C) 71 20 97 %        Physical Examination:  GENERAL SURVEY: Patient is awake, alert, oriented x 3, sitting comfortably on the chair, not in acute respiratory distress. HEENT: pink palpebral conjunctivae, anicteric sclerae, no nasoaural discharge, moist oral mucosa  NECK: supple, no jugular venous distention, no palpable lymph nodes  CHEST/LUNGS: symmetrical chest expansion, good air entry, clear breath sounds  HEART: adynamic precordium, good S1 S2, no S3, regular rhythm, no murmurs  ABDOMEN: flat, bowel sounds appreciated, soft, non-tender  EXTREMITIES: pink nailbeds, no edema, full and equal pulses, no calf tenderness   NEUROLOGICAL EXAM: The patient is awake, alert and oriented x3, able to answer questions fairly appropriately, able to follow 1 and 2 step commands. Able to tell time from the wall clock.  Cranial nerves II-XII are grossly intact except for slurred speech and dysphagia. No gross sensory deficit. Motor strength is 4/5 on the RUE, 5/5 on the LUE, 4/5 on the right hip, 4+/5 on the right knee, 3/5 on the right ankle, 5/5 on the LLE. Current Medications:  Current Facility-Administered Medications   Medication Dose Route Frequency    cholecalciferol (VITAMIN D3) tablet 2,000 Units  2,000 Units Oral DAILY    bisacodyl (DULCOLAX) suppository 10 mg  10 mg Rectal Q48H PRN    heparin (porcine) injection 5,000 Units  5,000 Units SubCUTAneous Q12H    acetaminophen (TYLENOL) solution 650 mg  650 mg Oral Q4H PRN    docusate sodium (COLACE) capsule 100 mg  100 mg Oral BID    aspirin chewable tablet 81 mg  81 mg Oral DAILY WITH BREAKFAST    clopidogrel (PLAVIX) tablet 75 mg  75 mg Oral DAILY WITH DINNER    atorvastatin (LIPITOR) tablet 20 mg  20 mg Oral QHS    pantoprazole (PROTONIX) granules for oral suspension 40 mg  40 mg Oral ACB    multivitamin (MULTI-DELYN, WELLESSE) oral liquid 30 mL  30 mL Oral DAILY    levothyroxine (SYNTHROID) tablet 150 mcg  150 mcg Oral ACB       Allergies:   Allergies   Allergen Reactions    Pcn [Penicillins] Rash       Functional Progress:    OCCUPATIONAL THERAPY    ON ADMISSION MOST RECENT   Eating  Functional Level: 3   Eating  Functional Level: 6     Grooming  Functional Level: 6   Grooming  Functional Level: 6     Bathing  Functional Level: 2 (Pt stood in shower at P[facility])   Bathing  Functional Level: 4     Upper Body Dressing  Functional Level: 3   Upper Body Dressing  Functional Level: 5     Lower Body Dressing  Functional Level: 2   Lower Body Dressing  Functional Level: 4     Toileting  Functional Level: 4   Toileting  Functional Level: 4     Toilet Transfers  Toilet Transfer Score: 3   Toilet Transfers  Toilet Transfer Score: 4     Tub /Shower Transfers  Tub/Shower Transfer Score: 3   Tub/Shower Transfers  Tub/Shower Transfer Score: 4       Legend:   7 - Independent   6 - Modified Independent   5 - Standby Assistance / Supervision / Set-up   4 - Minimum Assistance / Contact Guard Assistance   3 - Moderate Assistance   2 - Maximum Assistance   1 - Total Assistance / Dependent       Lab/Data Review:  No results found for this or any previous visit (from the past 24 hour(s)). Estimated Glomerular Filtration Rate:  CKD-EPI:   On admission, estimated GFR was 46.2 mL/min/1.73m2 based on a Creatinine of 1.44 mg/dl. Most recent estimated GFR was 45.8 mL/min/1.73m2 based on a Creatinine of 1.45 mg/dl. MDRD:   On admission, estimated GFR was 50.4 mL/min/1.73m2 based on a Creatinine of 1.44 mg/dl. Most recent estimated GFR was 50 mL/min/1.73m2 based on a Creatinine of 1.45 mg/dl. Assessment:     Primary Rehabilitation Diagnosis  1. Impaired Mobility and ADLs  2. Acute Ischemic Stroke (acute to subacute ischemia involving the posterior limb of the left internal capsule, along the lateral aspect of the left thalamus) with residual right hemiparesis, dysphagia and dysarthria      Comorbidities   Urinary incontinence    History of malignant neoplasm of prostate    Hypothyroidism    Chronic obstructive pulmonary disease (COPD)    Asbestosis    Osteoarthrosis involving multiple sites    History of endogenous hypertriglyceridemia    CKD stage G3a/A1, GFR 45-59 and albumin creatinine ratio <30 mg/g    Dyslipidemia (high LDL; low HDL)    Gastroesophageal reflux disease    Procedure refused    Erectile dysfunction       Plan:     1. Justification for continued stay: Good progression towards established rehabilitation goals. 2. Medical Issues being followed closely:    [x]  Fall and safety precautions     []  Wound Care     [x]  Bowel and Bladder Function     [x]  Fluid Electrolyte and Nutrition Balance     []  Pain Control      3.  Issues that 24 hour rehabilitation nursing is following:    [x]  Fall and safety precautions     []  Wound Care     [x]  Bowel and Bladder Function     [x]  Fluid Electrolyte and Nutrition Balance     []  Pain Control      [x]  Assistance with and education on in-room safety with transfers to and from the bed, wheelchair, toilet and shower. 4. Acute rehabilitation plan of care:    [x]  Continue current care and rehab. [x]  Physical Therapy           [x]  Occupational Therapy           [x]  Speech Therapy     []  Hold Rehab until further notice     5. Medications:    [x]  MAR Reviewed     [x]  Continue Present Medications     6. DVT Prophylaxis:      []  Lovenox     [x]  Unfractionated Heparin     []  Coumadin     []  NOAC     [x]  GRIS Stockings     [x]  Sequential Compression Device     []  None     7. Orders:   > Acute Ischemic Stroke (acute to subacute ischemia involving the posterior limb of the left internal capsule, along the lateral aspect of the left thalamus) with residual right hemiparesis, dysphagia and dysarthria    > Continue:    > Aspirin 81 mg PO once daily with breakfast    > Atorvastatin 20 mg PO q HS    > Clopidogrel 75 mg PO once daily with dinner     > CKD stage G3a/A1, GFR 45-59 and albumin creatinine ratio <30 mg/g   > On admission, based on a Creatinine of 1.4 mg/dl:    > Estimated GFR using CKD-EPI = 47.8 mL/min/1.73m2    > Estimated GFR using MDRD = 52.1 mL/min/1.73m2   > Estimated calculated glomerular filtration rate equivalent to that of stage 3 chronic kidney disease = 30-59 ml/min   > Urine Microalbumin/Creatinine ratio (5/3/2017) = 29     > Dyslipidemia   > Lipid profile (4/19/2017): , . HDL 42, LDL 97   > On 4/20/2017, patient was started at [facility] on Atorvastatin 20 mg PO q HS   > Continue Atorvastatin 20 mg PO q HS      8. Patient's progress in rehabilitation and medical issues discussed with the patient. All questions answered to the best of my ability. Care plan discussed with patient and nurse.     9. Discharge Planning:   > For discharge to home on Friday (5/12/2017)   [address] [place] [address] Physical Therapy (to progress to outpatient PT), Occupational Therapy safety evaluation (to progress to outpatient OT) and Speech Therapy   > F/U: 1. PCP (Dr. Everton Giraldo)    2.  Neurology (Dr. Maria Dolores Dennis)      Signed:    Ana Moreno MD    May 9, 2017

## 2017-05-10 PROCEDURE — 92507 TX SP LANG VOICE COMM INDIV: CPT

## 2017-05-10 PROCEDURE — 74011250637 HC RX REV CODE- 250/637: Performed by: INTERNAL MEDICINE

## 2017-05-10 PROCEDURE — 97530 THERAPEUTIC ACTIVITIES: CPT

## 2017-05-10 PROCEDURE — 97110 THERAPEUTIC EXERCISES: CPT

## 2017-05-10 PROCEDURE — 65310000000 HC RM PRIVATE REHAB

## 2017-05-10 PROCEDURE — 92526 ORAL FUNCTION THERAPY: CPT

## 2017-05-10 PROCEDURE — 74011250636 HC RX REV CODE- 250/636: Performed by: INTERNAL MEDICINE

## 2017-05-10 PROCEDURE — 97116 GAIT TRAINING THERAPY: CPT

## 2017-05-10 RX ADMIN — DOCUSATE SODIUM 100 MG: 100 CAPSULE, LIQUID FILLED ORAL at 09:16

## 2017-05-10 RX ADMIN — LEVOTHYROXINE SODIUM 150 MCG: 100 TABLET ORAL at 06:00

## 2017-05-10 RX ADMIN — ASPIRIN 81 MG CHEWABLE TABLET 81 MG: 81 TABLET CHEWABLE at 09:16

## 2017-05-10 RX ADMIN — MULTIPLE VITAMIN LIQUID 30 ML: LIQUID at 09:16

## 2017-05-10 RX ADMIN — CHOLECALCIFEROL TAB 25 MCG (1000 UNIT) 2000 UNITS: 25 TAB at 09:16

## 2017-05-10 RX ADMIN — HEPARIN SODIUM 5000 UNITS: 5000 INJECTION, SOLUTION INTRAVENOUS; SUBCUTANEOUS at 06:05

## 2017-05-10 RX ADMIN — CLOPIDOGREL BISULFATE 75 MG: 75 TABLET, FILM COATED ORAL at 17:25

## 2017-05-10 RX ADMIN — PANTOPRAZOLE SODIUM 40 MG: 40 GRANULE, DELAYED RELEASE ORAL at 06:05

## 2017-05-10 RX ADMIN — HEPARIN SODIUM 5000 UNITS: 5000 INJECTION, SOLUTION INTRAVENOUS; SUBCUTANEOUS at 17:24

## 2017-05-10 RX ADMIN — ATORVASTATIN CALCIUM 20 MG: 10 TABLET, FILM COATED ORAL at 21:51

## 2017-05-10 NOTE — PROGRESS NOTES
[address]  [address] [place] [address] [address]     INPATIENT REHABILITATION  DAILY PROGRESS NOTE     Date: 5/10/2017    Name: Ashley Garcia Age / Sex: 64 y.o. / male   CSN: [id number] MRN: [id number]   [facility] Date: 4/24/2017 Length of Stay: 16 days     Primary Rehab Diagnosis: Impaired Mobility and ADLs secondary to Acute Ischemic Stroke (acute to subacute ischemia involving the posterior limb of the left internal capsule, along the lateral aspect of the left thalamus) with residual right hemiparesis, dysphagia and dysarthria       Subjective:     Patient seen and examined. Blood pressure controlled. Patient's Complaint:   No significant medical complaints    Pain Control: no current joint or muscle symptoms, essentially pain-free      Objective:     Vital Signs:  Patient Vitals for the past 24 hrs:   BP Temp Pulse Resp SpO2   05/10/17 0751 133/73 97.5 °F (36.4 °C) 66 20 98 %   05/09/17 1930 123/66 98.2 °F (36.8 °C) 63 18 95 %   05/09/17 1600 118/61 97.6 °F (36.4 °C) 66 18 94 %        Physical Examination:  GENERAL SURVEY: Patient is awake, alert, oriented x 3, sitting comfortably on the chair, not in acute respiratory distress. HEENT: pink palpebral conjunctivae, anicteric sclerae, no nasoaural discharge, moist oral mucosa  NECK: supple, no jugular venous distention, no palpable lymph nodes  CHEST/LUNGS: symmetrical chest expansion, good air entry, clear breath sounds  HEART: adynamic precordium, good S1 S2, no S3, regular rhythm, no murmurs  ABDOMEN: flat, bowel sounds appreciated, soft, non-tender  EXTREMITIES: pink nailbeds, no edema, full and equal pulses, no calf tenderness   NEUROLOGICAL EXAM: The patient is awake, alert and oriented x3, able to answer questions fairly appropriately, able to follow 1 and 2 step commands. Able to tell time from the wall clock. Cranial nerves II-XII are grossly intact except for slurred speech and dysphagia.  No gross sensory deficit. Motor strength is 4/5 on the RUE, 5/5 on the LUE, 4/5 on the right hip, 4+/5 on the right knee, 3/5 on the right ankle, 5/5 on the LLE. Current Medications:  Current Facility-Administered Medications   Medication Dose Route Frequency    cholecalciferol (VITAMIN D3) tablet 2,000 Units  2,000 Units Oral DAILY    bisacodyl (DULCOLAX) suppository 10 mg  10 mg Rectal Q48H PRN    heparin (porcine) injection 5,000 Units  5,000 Units SubCUTAneous Q12H    acetaminophen (TYLENOL) solution 650 mg  650 mg Oral Q4H PRN    docusate sodium (COLACE) capsule 100 mg  100 mg Oral BID    aspirin chewable tablet 81 mg  81 mg Oral DAILY WITH BREAKFAST    clopidogrel (PLAVIX) tablet 75 mg  75 mg Oral DAILY WITH DINNER    atorvastatin (LIPITOR) tablet 20 mg  20 mg Oral QHS    pantoprazole (PROTONIX) granules for oral suspension 40 mg  40 mg Oral ACB    multivitamin (MULTI-DELYN, WELLESSE) oral liquid 30 mL  30 mL Oral DAILY    levothyroxine (SYNTHROID) tablet 150 mcg  150 mcg Oral ACB       Allergies:   Allergies   Allergen Reactions    Pcn [Penicillins] Rash       Functional Progress:    PHYSICAL THERAPY    ON ADMISSION MOST RECENT   Wheelchair Mobility/Management  Able to Propel (ft): 70 feet (verbal cueing for technique)  Functional Level: 2  Curbs/Ramps Assist Required (FIM Score): 0 (Not tested)  Wheelchair Setup Assist Required : 2 (Maximal assistance)  Wheelchair Management: Manages left brake, Manages right brake Wheelchair Mobility/Management  Able to Propel (ft): 255 feet (B UE technique)  Functional Level: 6  Curbs/Ramps Assist Required (FIM Score): 5 (Supervision)  Wheelchair Setup Assist Required : 5 (Supervision/setup)  Wheelchair Management: Manages left brake, Manages right brake, Manages left footrest, Manages right footrest, Manages left armrest, Manages right armrest (w/ verbal cueing)     Gait  Amount of Assistance: 2 (Maximal assistance) (2/2 R LOB)  Distance (ft): 5 Feet (ft)  Assistive Device: Gait belt Gait  Amount of Assistance: 6 (Modified independent)  Distance (ft): 150 Feet (ft) (150 ft Distant SPV progressing to 150 ft Mod I)  Assistive Device:  (None)     Balance-Sitting/Standing  Sitting - Static: Good (unsupported)  Sitting - Dynamic: Good (unsupported)  Standing - Static: Fair  Standing - Dynamic : Impaired Balance-Sitting/Standing  Sitting - Static: Good (unsupported)  Sitting - Dynamic: Good (unsupported)  Standing - Static: Good  Standing - Dynamic : Impaired     Bed/Mat Mobility  Rolling Right : 5 (Supervision)  Rolling Left : 5 (Supervision)  Supine to Sit : 5 (Supervision)  Sit to Supine : 5 (Supervision) Bed/Mat Mobility  Rolling Right : 7 (Independent)  Rolling Left : 7 (Independent)  Supine to Sit : 6 (Modified independent)  Sit to Supine : 6 (Modified independent)     Transfers  Transfer Type: Other  Other: Patient performs stand step transfer with moderate assist from PT for anterior approach R LE knee block with loading 2/2 hx of buckling, trunk stability, pivot assist, and controlled lowering. Patient demonstrates increased momentum utilization for STS transfer and R genu recurvatum requiring verbal cueing to crrect these; pacing cues successful, however genu recurvatum requires tactile cueing with physical assist to prevent buckling. Patient demosntrates pass retract technique with R LE advancement as well as anterior COG placement to attempts to maintain TKE 2/2 functional quad weakness. Transfer Assistance : 3 (Moderate assistance )  Sit to Stand Assistance: Moderate assistance  Car Transfers: Not tested  Car Type: NA Transfers  Transfer Type: Other  Other: Patient performs stand step transfer with self selected B UE on R distal femur for increased R UE/LE participation with decreased L over-utilization.  Patient performs this transfer with mild decrease in R step clearance, however, he demsontrates appropriate self cueing requiring no verbal cues or physical assistance to complete this activity. Transfer Assistance : 6 (Modified independent)  Sit to Stand Assistance: Modified independent  Car Transfers: Supervision  Car Type: Car Simulator     Steps or Stairs  Steps/Stairs Ambulated (#): 0  Level of Assist : 0 (Not tested)  Rail Use:  (NA) Steps or Stairs  Steps/Stairs Ambulated (#): 24 (x 2 sets reciprocal pattern L hand rail)  Level of Assist : 5 (Supervision/setup)  Rail Use: Left           Lab/Data Review:  No results found for this or any previous visit (from the past 24 hour(s)). Estimated Glomerular Filtration Rate:  CKD-EPI:   On admission, estimated GFR was 46.2 mL/min/1.73m2 based on a Creatinine of 1.44 mg/dl. Most recent estimated GFR was 45.8 mL/min/1.73m2 based on a Creatinine of 1.45 mg/dl. MDRD:   On admission, estimated GFR was 50.4 mL/min/1.73m2 based on a Creatinine of 1.44 mg/dl. Most recent estimated GFR was 50 mL/min/1.73m2 based on a Creatinine of 1.45 mg/dl. Assessment:     Primary Rehabilitation Diagnosis  1. Impaired Mobility and ADLs  2. Acute Ischemic Stroke (acute to subacute ischemia involving the posterior limb of the left internal capsule, along the lateral aspect of the left thalamus) with residual right hemiparesis, dysphagia and dysarthria      Comorbidities   Urinary incontinence    History of malignant neoplasm of prostate    Hypothyroidism    Chronic obstructive pulmonary disease (COPD)    Asbestosis    Osteoarthrosis involving multiple sites    History of endogenous hypertriglyceridemia    CKD stage G3a/A1, GFR 45-59 and albumin creatinine ratio <30 mg/g    Dyslipidemia (high LDL; low HDL)    Gastroesophageal reflux disease    Procedure refused    Erectile dysfunction       Plan:     1. Justification for continued stay: Good progression towards established rehabilitation goals.     2. Medical Issues being followed closely:    [x]  Fall and safety precautions     []  Wound Care     [x]  Bowel and Bladder Function     [x]  Fluid Electrolyte and Nutrition Balance     []  Pain Control      3. Issues that 24 hour rehabilitation nursing is following:    [x]  Fall and safety precautions     []  Wound Care     [x]  Bowel and Bladder Function     [x]  Fluid Electrolyte and Nutrition Balance     []  Pain Control      [x]  Assistance with and education on in-room safety with transfers to and from the bed, wheelchair, toilet and shower. 4. Acute rehabilitation plan of care:    [x]  Continue current care and rehab. [x]  Physical Therapy           [x]  Occupational Therapy           [x]  Speech Therapy     []  Hold Rehab until further notice     5. Medications:    [x]  MAR Reviewed     [x]  Continue Present Medications     6. DVT Prophylaxis:      []  Lovenox     [x]  Unfractionated Heparin     []  Coumadin     []  NOAC     [x]  GRIS Stockings     [x]  Sequential Compression Device     []  None     7. Orders:   > Acute Ischemic Stroke (acute to subacute ischemia involving the posterior limb of the left internal capsule, along the lateral aspect of the left thalamus) with residual right hemiparesis, dysphagia and dysarthria    > Continue:    > Aspirin 81 mg PO once daily with breakfast    > Atorvastatin 20 mg PO q HS    > Clopidogrel 75 mg PO once daily with dinner     > CKD stage G3a/A1, GFR 45-59 and albumin creatinine ratio <30 mg/g   > On admission, based on a Creatinine of 1.4 mg/dl:    > Estimated GFR using CKD-EPI = 47.8 mL/min/1.73m2    > Estimated GFR using MDRD = 52.1 mL/min/1.73m2   > Estimated calculated glomerular filtration rate equivalent to that of stage 3 chronic kidney disease = 30-59 ml/min   > Urine Microalbumin/Creatinine ratio (5/3/2017) = 29     > Dyslipidemia   > Lipid profile (4/19/2017): , . HDL 42, LDL 97   > On 4/20/2017, patient was started at [facility] on Atorvastatin 20 mg PO q HS   > Continue Atorvastatin 20 mg PO q HS      8.  Patient's progress in rehabilitation and medical issues discussed with the patient. All questions answered to the best of my ability. Care plan discussed with patient, wife, daughter and nurse. 9. Discharge Planning:   > For discharge to home on Friday (5/12/2017)   [facility] Physical Therapy (to progress to outpatient PT), Occupational Therapy safety evaluation (to progress to outpatient OT) and Speech Therapy   > F/U: 1. PCP (Dr. Sherley Gonzalez)    2.  Neurology (Dr. Leonid Abebe)      Signed:    Nemo Whitmore MD    May 10, 2017

## 2017-05-10 NOTE — PROGRESS NOTES
Problem: Neurolinguistics Impaired (Adult)  Goal: *Speech Goal: (INSERT TEXT)  Long term goals:  1. Patient will tolerate a regular/thin liquid diet without overt s/s of aspiration, 4 consecutive meals. 2. Patient will use safe swallowing techniques with supervision for all PO intake. 3. Patient will answer complex yes/no questions with 90% accuracy. 4 patient will follow complex commands for object manipulation wit 80-90% accuracy. 5. Patient will name 10-12 items within simple categories with supervision. 6. Patient will perform varied word retrieval tasks with % accuracy. 7. Patient will recall 3 words after 5 minutes, supervision. 8. Patient will perform functional problem solving/reasoning tasks with supervision. Short term goals (by 5/13/17):  1. Patient will tolerate a regular, thin liquid diet without overt s/s of aspiration, 4 consecutive meals. 2. Patient will use safe swallowing techniques with supervision for all PO intake. 4. Patient will answer complex yes/no questions with 90% accuracy. 5. Patient will follow complex commands for object manipulation wit 80-90% accuracy. 6. Patient will name 10-12 items within simple categories with supervision. 7. Patient will perform varied word retrieval tasks with 80-90% accuracy. 8. Patient will recall 3 words after 5 minutes, min assist-supervision. 9. Patient will perform functional problem solving/reasoning tasks with 80-90% accuracy. SPEECH LANGUAGE PATHOLOGY TREATMENT     Patient: Hien Morales (48 y.o. male)  Date: 5/10/2017  Diagnosis: Left CVA  Acute ischemic stroke [facility]) Acute ischemic stroke [facility])       SUBJECTIVE:   Patient stated My friend brought Car Jackson from [address] [place] [address]. OBJECTIVE:   Mental Status:  Mr. Bhanu Edawrd ws consistently tired today after his OT and PT sessions. Nevertheless, he was alert and motivated in all sessions with the SLP.      Treatment & Interventions:   Patient was seen in the dining room this morning for breakfast.  He did quite well with his regular breakfast with only occasional cues to watch bolus size. He demonstrated no overt s/s of aspiration or other difficulty. He did not come to the dining room at lunchtime as his friend and family had brought him alternative foods for the meal.     Mr. Kellee Castro also participated in the following treatment tasks in  Speech therapy. Language Comprehension and Expression:  Complex yes/no questions:    95% accuracy  ID item from description:        97% accuracy (morning and afternoon)  Things he likes to eat:             Patient named 10  Naming tools:                          Patient listed 8     Neuro-Linguistics:   Orientation:                             Supervision  Recent memory:                     Supervision  Recall 3 words:                       Min assist  Problem solving tasks:           100% accuracy  Reasoning tasks:                    100% accuracy                Response & Tolerance to Activities:  Mr. Kellee Castro is consistently cooperative in all speech therapy tasks. He enjoys conversational interaction. Pain:  Pain Scale 1: Numeric (0 - 10)     After treatment:   [ ]       Patient left in no apparent distress sitting up in chair  [X]       Patient left in no apparent distress in bed  [X]       Call bell left within reach  [ ]       Nursing notified  [ ]       Caregiver present  [ ]       Bed alarm activated      ASSESSMENT:   Progression toward goals:  [X]       Improving appropriately and progressing toward goals  [ ]       Improving slowly and progressing toward goals  [ ]       Not making progress toward goals and plan of care will be adjusted      PLAN:   Patient continues to benefit from skilled intervention to address the above impairments. Continue treatment per established plan of care.   Discharge Recommendations:  [place], SLP  Time Calculation:  90 Minutes

## 2017-05-10 NOTE — PROGRESS NOTES
SHIFT CHANGE NOTE FOR [facility]    Bedside and Verbal shift change report given to [facility] Emerado LPN  (oncoming nurse) by Mayra Albright, RN   (offgoing nurse). Report included the following information SBAR, Kardex, MAR and Recent Results. Situation:   Code Status: Full Code   Reason for Admission: CVA  Hospital Day: 16   Problem List:   Hospital Problems  Date Reviewed: 5/9/2017          Codes Class Noted POA    CKD stage G3a/A1, GFR 45-59 and albumin creatinine ratio <30 mg/g ICD-10-CM: N18.3  ICD-9-CM: 585.3  5/3/2017 Yes        Procedure refused ICD-10-CM: Z53.29  ICD-9-CM: V64.2  4/21/2017 Yes    Overview Signed 4/24/2017  3:28 PM by Caio Fatima MD     Speech Pathologist recommended NPO and PEG placement; Patient refused             * (Principal)Acute ischemic stroke [facility]) ICD-10-CM: I63.9  ICD-9-CM: 434.91  4/19/2017 Yes    Overview Signed 4/24/2017  3:15 PM by Caio Fatima MD     Acute Ischemic Stroke (acute to subacute ischemia involving the posterior limb of the left internal capsule, along the lateral aspect of the left thalamus) with residual right hemiparesis, dysphagia and dysarthria             Impaired mobility and ADLs ICD-10-CM: Z74.09  ICD-9-CM: 799.89  4/19/2017 Yes        Dyslipidemia (high LDL; low HDL) (Chronic) ICD-10-CM: E78.4  ICD-9-CM: 272.4  4/19/2017 Yes    Overview Signed 4/24/2017  3:25 PM by Caio Fatima MD     Lipid profile (4/19/2017): , .  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) ICD-10-CM: X85.953  ICD-9-CM: 438.13  4/19/2017 Yes              Background:   Past Medical History:   Past Medical History:   Diagnosis Date    Acute ischemic stroke ([facility] [address] 4/19/2017 Acute Ischemic Stroke (acute to subacute ischemia involving the posterior limb of the left internal capsule, along the lateral aspect of the left thalamus) with residual right hemiparesis, dysphagia and dysarthria    Asbestosis ([facility] [address]     Chronic obstructive pulmonary disease (COPD) ([facility] [address]     CKD stage G3a/A1, GFR 45-59 and albumin creatinine ratio <30 mg/g 5/3/2017    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  HDL 42, LDL 97    Dysphagia as late effect of cerebrovascular accident (CVA) 4/19/2017    Erectile dysfunction     Gastroesophageal reflux disease     Hemiparesis affecting right side as late effect of cerebrovascular accident (CVA) ([facility] [address] 4/19/2017    History of endogenous hypertriglyceridemia     History of malignant neoplasm of prostate     [place] score 7     Hypothyroidism     Osteoarthrosis involving multiple sites     Procedure refused 4/21/2017    Speech Pathologist recommended NPO and PEG placement; Patient refused    Urinary incontinence     Male incontinence       Patient taking anticoagulants YES   Patient has a defibrillator: no     Assessment:   Changes in Assessment throughout shift: NONE              Last Vitals:     Vitals:    05/08/17 2012 05/09/17 0744 05/09/17 1600 05/09/17 1930   BP: 148/85 125/76 118/61 123/66   Pulse: 68 61 66 63   Resp: 24 18 18 18   Temp: 98.6 °F (37 °C) 97.3 °F (36.3 °C) 97.6 °F (36.4 °C) 98.2 °F (36.8 °C)   SpO2: 100% 99% 94% 95%   Weight:       Height:            PAIN    Pain Assessment    Pain Intensity 1: 0 (05/10/17 0400) Pain Intensity 1: 2 (12/29/14 1105)      Pain Location 1: Abdomen      Pain Intervention(s) 1: Medication (see MAR)  Patient Stated Pain Goal: 0 Patient Stated Pain Goal: 0  o Intervention effective: no    o Other actions taken for pain: NONE     Skin Assessment  Skin color Skin Color: Appropriate for ethnicity  Condition/Temperature Skin Condition/Temp: Warm  Integrity Skin Integrity: Intact  Turgor Turgor: Non-tenting  Weekly Pressure Ulcer Documentation  Pressure  Injury Documentation: No Pressure Injury Noted-Pressure Ulcer Prevention Initiated  Wound Prevention & Protection Methods  Orientation of wound Orientation of Wound Prevention: Posterior  Location of Prevention Location of Wound Prevention: Scrotum  Dressing Present Dressing Present : No  Dressing Status    Wound Offloading Wound Offloading (Prevention Methods): Bed, pressure redistribution/air     INTAKE/OUPUT    Date 05/09/17 0700 - 05/10/17 0659 05/10/17 0700 - 05/11/17 0659   Shift 0700-1859 1900-0659 24 Hour Total 0700-1859 1900-0659 24 Hour Total   I  N  T  A  K  E   P.O. 480  480         P. O. 480  480       Shift Total  (mL/kg) 480  (5.9)  480  (5.9)      O  U  T  P  U  T   Urine  (mL/kg/hr)            Urine Occurrence(s) 1 x 3 x 4 x       Stool            Stool Occurrence(s) 0 x 0 x 0 x       Shift Total  (mL/kg)           480      Weight (kg) 81.5 81.5 81.5 81.5 81.5 81.5       Recommendations:  1. Patient needs and requests: NONE    2. Diet: CARDIAC PUREED WITH NECTAR THICK LIQUIDS    3. Pending tests/procedures: LABS     4. Functional Level/Equipment: WHEELCHAIR    5. Estimated Discharge Date: TBD Posted on Whiteboard in Patients Room: [facility] Safety Check    A safety check occurred in the patient's room between off going nurse and oncoming nurse listed above.     The safety check included the below items  Area Items   H  High Alert Medications - Verify all high alert medication drips (heparin, PCA, etc.)   E  Equipment - Suction is set up for ALL patients (with yanker)  - Red plugs utilized for all equipment (IV pumps, etc.)  - WOWs wiped down at end of shift.  - Room stocked with oxygen, suction, and other unit-specific supplies   A  Alarms - Bed alarm is set for fall risk patients  - Ensure chair alarm is in place and activated if patient is up in a chair   L  Lines - Check IV for any infiltration  - Syed bag is empty if patient has a Syed   - Tubing and IV bags are labeled   S  Safety   - Room is clean, patient is clean, and equipment is clean. - Hallways are clear from equipment besides carts. - Fall bracelet on for fall risk patients  - Ensure room is clear and free of clutter  - Suction is set up for ALL patients (with yanker)  - Hallways are clear from equipment besides carts.    - Isolation precautions followed, supplies available outside room, sign posted

## 2017-05-10 NOTE — PROGRESS NOTES
SHIFT CHANGE NOTE FOR [facility]    Bedside and Verbal shift change report given to [address]  (oncoming nurse) by Kathy Gary LPN   (offgoing nurse). Report included the following information SBAR, Kardex, MAR and Recent Results. Situation:   Code Status: Full Code   Reason for Admission: CVA  Hospital Day: 16   Problem List:   Hospital Problems  Date Reviewed: 5/10/2017          Codes Class Noted POA    CKD stage G3a/A1, GFR 45-59 and albumin creatinine ratio <30 mg/g ICD-10-CM: N18.3  ICD-9-CM: 585.3  5/3/2017 Yes        Procedure refused ICD-10-CM: Z53.29  ICD-9-CM: V64.2  4/21/2017 Yes    Overview Signed 4/24/2017  3:28 PM by Jie Rangel MD     Speech Pathologist recommended NPO and PEG placement; Patient refused             * (Principal)Acute ischemic stroke [facility]) ICD-10-CM: I63.9  ICD-9-CM: 434.91  4/19/2017 Yes    Overview Signed 4/24/2017  3:15 PM by Jie Rangel MD     Acute Ischemic Stroke (acute to subacute ischemia involving the posterior limb of the left internal capsule, along the lateral aspect of the left thalamus) with residual right hemiparesis, dysphagia and dysarthria             Impaired mobility and ADLs ICD-10-CM: Z74.09  ICD-9-CM: 799.89  4/19/2017 Yes        Dyslipidemia (high LDL; low HDL) (Chronic) ICD-10-CM: E78.4  ICD-9-CM: 272.4  4/19/2017 Yes    Overview Signed 4/24/2017  3:25 PM by Jie Rangel MD     Lipid profile (4/19/2017): , .  HDL 42, LDL 97             Hemiparesis affecting right side as late effect of cerebrovascular accident (CVA) ([facility] [address] ICD-10-CM: E27.369  ICD-9-CM: 438.20  4/19/2017 Yes        Dysphagia as late effect of cerebrovascular accident (CVA) ICD-10-CM: P43.484  ICD-9-CM: 438.82  4/19/2017 Yes        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) ICD-10-CM: D13.084  ICD-9-CM: 438.13  4/19/2017 Yes              Background:   Past Medical History:   Past Medical History:   Diagnosis Date    Acute ischemic stroke ([facility] [address] 4/19/2017 Acute Ischemic Stroke (acute to subacute ischemia involving the posterior limb of the left internal capsule, along the lateral aspect of the left thalamus) with residual right hemiparesis, dysphagia and dysarthria    Asbestosis ([facility] [address]     Chronic obstructive pulmonary disease (COPD) ([facility] [address]     CKD stage G3a/A1, GFR 45-59 and albumin creatinine ratio <30 mg/g 5/3/2017    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  HDL 42, LDL 97    Dysphagia as late effect of cerebrovascular accident (CVA) 4/19/2017    Erectile dysfunction     Gastroesophageal reflux disease     Hemiparesis affecting right side as late effect of cerebrovascular accident (CVA) ([facility] [address] 4/19/2017    History of endogenous hypertriglyceridemia     History of malignant neoplasm of prostate     [place] score 7     Hypothyroidism     Osteoarthrosis involving multiple sites     Procedure refused 4/21/2017    Speech Pathologist recommended NPO and PEG placement; Patient refused    Urinary incontinence     Male incontinence       Patient taking anticoagulants YES   Patient has a defibrillator: no     Assessment:   Changes in Assessment throughout shift: NONE              Last Vitals:     Vitals:    05/09/17 0744 05/09/17 1600 05/09/17 1930 05/10/17 0751   BP: 125/76 118/61 123/66 133/73   Pulse: 61 66 63 66   Resp: 18 18 18 20   Temp: 97.3 °F (36.3 °C) 97.6 °F (36.4 °C) 98.2 °F (36.8 °C) 97.5 °F (36.4 °C)   SpO2: 99% 94% 95% 98%   Weight:       Height:            PAIN    Pain Assessment    Pain Intensity 1: 0 (05/10/17 1400) Pain Intensity 1: 2 (12/29/14 1105)      Pain Location 1: Abdomen      Pain Intervention(s) 1: Medication (see MAR)  Patient Stated Pain Goal: 0 Patient Stated Pain Goal: 0  o Intervention effective: no    o Other actions taken for pain: NONE     Skin Assessment  Skin color Skin Color: Appropriate for ethnicity  Condition/Temperature Skin Condition/Temp: Warm  Integrity Skin Integrity: Intact  Turgor Turgor: Non-tenting  Weekly Pressure Ulcer Documentation  Pressure  Injury Documentation: No Pressure Injury Noted-Pressure Ulcer Prevention Initiated  Wound Prevention & Protection Methods  Orientation of wound Orientation of Wound Prevention: Posterior  Location of Prevention Location of Wound Prevention: Buttocks, Sacrum/Coccyx  Dressing Present Dressing Present : No  Dressing Status    Wound Offloading Wound Offloading (Prevention Methods): Bed, pressure redistribution/air     INTAKE/OUPUT    Date 05/09/17 0700 - 05/10/17 0659 05/10/17 0700 - 05/11/17 0659   Shift 0700-1859 1900-0659 24 Hour Total 0700-1859 1900-0659 24 Hour Total   I  N  T  A  K  E   P.O. 480  480 540  540      P. O. 480  480 540  540    Shift Total  (mL/kg) 480  (5.9)  480  (5.9) 540  (6.6)  540  (6.6)   O  U  T  P  U  T   Urine  (mL/kg/hr)            Urine Occurrence(s) 1 x 4 x 5 x 3 x  3 x    Stool            Stool Occurrence(s) 0 x 0 x 0 x 1 x  1 x    Shift Total  (mL/kg)           480 540  540   Weight (kg) 81.5 81.5 81.5 81.5 81.5 81.5       Recommendations:  1. Patient needs and requests: NONE    2. Diet: CARDIAC PUREED WITH NECTAR THICK LIQUIDS    3. Pending tests/procedures: LABS     4. Functional Level/Equipment: WHEELCHAIR    5. Estimated Discharge Date: TBD Posted on Whiteboard in Patients Room: [facility] Safety Check    A safety check occurred in the patient's room between off going nurse and oncoming nurse listed above.     The safety check included the below items  Area Items   H  High Alert Medications - Verify all high alert medication drips (heparin, PCA, etc.)   E  Equipment - Suction is set up for ALL patients (with yanker)  - Red plugs utilized for all equipment (IV pumps, etc.)  - WOWs wiped down at end of shift.  - Room stocked with oxygen, suction, and other unit-specific supplies   A  Alarms - Bed alarm is set for fall risk patients  - Ensure chair alarm is in place and activated if patient is up in a chair   L  Lines - Check IV for any infiltration  - Syed bag is empty if patient has a Syed   - Tubing and IV bags are labeled   S  Safety   - Room is clean, patient is clean, and equipment is clean. - Hallways are clear from equipment besides carts. - Fall bracelet on for fall risk patients  - Ensure room is clear and free of clutter  - Suction is set up for ALL patients (with ton)  - Hallways are clear from equipment besides carts.    - Isolation precautions followed, supplies available outside room, sign posted

## 2017-05-10 NOTE — PROGRESS NOTES
Problem: Self Care Deficits Care Plan (Adult)  Goal: *Acute Goals and Plan of Care (Insert Text)  Long Term Goals (to be met upon discharge date) in order to increase pts functional independence and safety, and decrease burden of care:  1. Pt will perform self-feeding with independence. 2. Pt will perform grooming with independence. 3. Pt will perform UB bathing with modified independence. 4. Pt will perform LB bathing with modified independence. 5. Pt will perform tshower transfer with modified independence. 6. Pt will perform UB dressing with independence. 7. Pt will perform LB dressing with modified independence. 8. Pt will perform toileting task with modified independence. 9. Pt will perform toilet transfer with modified independence. Short Term Weekly Goals for (2017 to 2017) in order to increase pts functional independence and safety, and decrease burden of care:  1. Pt will perform UB bathing with modified independence. 2. Pt will perform LB bathing with supervision. 3. Pt will perform shower transfer with CGA. 4. Pt will perform UB dressing with modified independence. 5. Pt will perform LB dressing with supervision. 6. Pt will perform toileting task with supervision. 7. Pt will perform toilet transfer with supervision   OCCUPATIONAL THERAPY DAILY NOTE  Patient Name:Stewart Antony Alt  Time In: 56  Time Out: 18     Medical Diagnosis:  Left CVA  Acute ischemic stroke ([address] Acute ischemic stroke ([address]      Pain at start of tx: 0/10  Pain at stop of tx: 0/10     Patient identified with name and : yes  Subjective: Pt appears frustrated with appearance of his handwriting. Objective:      THERAPEUTIC ACTIVITY Daily Assessment     Pt worked on pre-handwriting task to attempt a word search puzzle for focus on controlled use of pen to [place] words.  Pt was able to find and [place] 1 word in the puzzle with significantly increased time, however reports increased difficulty and slight frustration with the task therefore it was discontinued to work on handwriting itself. Pt copied 4 sentences with cues to attempt to maintain letters on the given line. He also was required to come up with and write 3 items in 4 different categories. Pt able to name items without difficulty, though has more trouble with writing. Moderate intelligibility noted with all handwriting. Pt also worked on [employer] coordination task requiring pt to perform in-hand manipulation to sort pegs in pegboard. He picked up ~7 pegs at a time from container on the table and worked on maintaining ordoñez grasp while performing palm to digit translation of 1 peg at a time to place them in pegboard        THERAPEUTIC EXERCISE Daily Assessment     Pt performed x15 minutes on the arm bike using RUE with minimal-moderate resistance while seated upright edge of chair to promote RUE strengthening and endurance. MOBILITY/TRANSFERS Daily Assessment      Pt ambulated on unit from his room to therapy gym at the start of tx session and then to speech therapy office at end of tx with supervision to promote increased endurance and activity tolerance. Assessment: Pt is improving with his overall activity tolerance and RUE strength, however he continues to demonstrate RUE FM coordination deficits. Plan of Care: Continue POC to maximize pt independence and safety performing ADLs and functional transfers/mobility.      Raphael Galarza OTR  5/10/2017

## 2017-05-10 NOTE — PROGRESS NOTES
Problem: Mobility Impaired (Adult and Pediatric)  Goal: *Acute Goals and Plan of Care (Insert Text)  Physical Therapy Short Term Goals  Initiated 2017 and to be accomplished within 7 day(s) (2017) (MET) (STG=LTG) (2017)  1. Patient will move from supine to sit and sit to supine , scoot up and down and roll side to side in bed with modified independence. 2. Patient will transfer from bed to chair and chair to bed with minimal assistance/contact guard assist using the least restrictive device. 3. Patient will perform sit to stand with minimal assistance/contact guard assist.  4. Patient will ambulate with moderate assistance for 50 feet with the least restrictive device. 5. Patient will ascend/descend 6 stairs with 2 handrail(s) with moderate assistance . Physical Therapy Long Term Goals  Initiated 2017 and to be accomplished within 28 day(s) (2017)  1. Patient will move from supine to sit and sit to supine , scoot up and down and roll side to side in bed with independence. 2. Patient will transfer from bed to chair and chair to bed with modified independence using the least restrictive device. 3. Patient will perform sit to stand with modified independence. 4. Patient will ambulate with modified independence for 250 feet with the least restrictive device. 5. Patient will ascend/descend 6 stairs with 2 handrail(s) with modified independence. PHYSICAL THERAPY DAILY NOTE  Patient Name:Stewart Kelly  Time In: 08  Time Out: 46  Patient Seen For: Transfer training; Therapeutic exercise;Balance activities; Patient education;Gait training  Diagnosis: Left CVA  Acute ischemic stroke [facility]) Acute ischemic stroke [facility])  Precautions: Fall risk     Subjective: Patient reports motivation to improve independence in therapy today. A/O x 4.      Pain at start of tx:0/10  Pain at stop of tx:0/10     Patient identified with name and : YES         Objective:      BED/MAT MOBILITY Daily Assessment     Rolling Right : 7 (Independent)  Rolling Left : 7 (Independent)  Supine to Sit : 6 (Modified independent)  Sit to Supine : 6 (Modified independent)          TRANSFERS Daily Assessment     Transfer Type: Other  Other: Patient performs stand step transfer with self selected B UE on R distal femur for increased R UE/LE participation with decreased L over-utilization. Patient performs this transfer with mild decrease in R step clearance, however, he demonstrates appropriate self cueing requiring no verbal cues or physical assistance to complete this activity. Transfer Assistance : 6 (Modified independent)  Sit to Stand Assistance: Modified independent          GAIT Daily Assessment     Amount of Assistance: 6 (Modified independent)  Distance (ft): 150 Feet (ft) (150 ft Distant SPV progressing to 150 ft Mod I)  Assistive Device:  (None)      Patient performs initial 150 ft ambulatory trial demonstrating increased pace and decreased attention to R LE clearance; patient requires verbal cueing (feedback) to adjust these deficits demonstrating 150 ft ambulation x 2 set with Mod I following education. Will reassess on 05/11/2017 to determine appropriateness of carry-over. BALANCE Daily Assessment     Sitting - Static: Good (unsupported)  Sitting - Dynamic: Good (unsupported)  Standing - Static: Good  Standing - Dynamic : Impaired          LOWER EXTREMITY EXERCISES Daily Assessment     Patient performs plantigrade B UE loading on inverted BOSU ball with feet ~3ft from 31\" height surface performing 30 sec bouts of alternating lateral weight shifts x 5 sets in session. Patient requires CGA for safety with available R elbow block pending \"buckling\" though this did not occur.      Patient performs Supine to prone roll with B UE above head and B UE elevated to challenge core strength and coordination requiring tactile cues to promote proper pattern performed 5 rep x 2 set to L and to R. Patient performs ambulation with R UE D2 flexion PNF pattern requiring significant reduction in pace and demonstrating R LE intermittent synergy affecting smooth progression of gait pattern; 25 ft x 2 trials. Assessment: Patient demonstrates improving R UE control with shoulder stabilization exercises, especially in closed chain. Patient demonstrates difficulty with UE/LE dissociation as evidenced by gait impairments with R UE PNF patterns. Patient will require continued skilled PT to improve Limb dissociation as well as safety and independence training with mobility exercises. Plan of Care: Cont current POC; focus on coordination of full body movements. Clemente Wiseman, PT DPT  5/10/2017

## 2017-05-11 LAB
HCT VFR BLD AUTO: 38.5 % (ref 36–48)
HGB BLD-MCNC: 13.5 G/DL (ref 13–16)
PLATELET # BLD AUTO: 211 K/UL (ref 135–420)

## 2017-05-11 PROCEDURE — 97530 THERAPEUTIC ACTIVITIES: CPT

## 2017-05-11 PROCEDURE — 74011250636 HC RX REV CODE- 250/636: Performed by: INTERNAL MEDICINE

## 2017-05-11 PROCEDURE — 74011250637 HC RX REV CODE- 250/637: Performed by: INTERNAL MEDICINE

## 2017-05-11 PROCEDURE — 92507 TX SP LANG VOICE COMM INDIV: CPT

## 2017-05-11 PROCEDURE — 97535 SELF CARE MNGMENT TRAINING: CPT

## 2017-05-11 PROCEDURE — 92526 ORAL FUNCTION THERAPY: CPT

## 2017-05-11 PROCEDURE — 85018 HEMOGLOBIN: CPT | Performed by: INTERNAL MEDICINE

## 2017-05-11 PROCEDURE — 36415 COLL VENOUS BLD VENIPUNCTURE: CPT | Performed by: INTERNAL MEDICINE

## 2017-05-11 PROCEDURE — 97116 GAIT TRAINING THERAPY: CPT

## 2017-05-11 PROCEDURE — 65310000000 HC RM PRIVATE REHAB

## 2017-05-11 PROCEDURE — 97110 THERAPEUTIC EXERCISES: CPT

## 2017-05-11 PROCEDURE — 85049 AUTOMATED PLATELET COUNT: CPT | Performed by: INTERNAL MEDICINE

## 2017-05-11 RX ORDER — PHENOL/SODIUM PHENOLATE
20 AEROSOL, SPRAY (ML) MUCOUS MEMBRANE
COMMUNITY
End: 2017-06-05 | Stop reason: CLARIF

## 2017-05-11 RX ORDER — GUAIFENESIN 100 MG/5ML
81 LIQUID (ML) ORAL
Qty: 15 TAB | Refills: 0 | Status: SHIPPED | OUTPATIENT
Start: 2017-05-11 | End: 2017-06-05 | Stop reason: CLARIF

## 2017-05-11 RX ORDER — CLOPIDOGREL BISULFATE 75 MG/1
75 TABLET ORAL
Qty: 15 TAB | Refills: 0 | Status: SHIPPED | OUTPATIENT
Start: 2017-05-11 | End: 2017-05-15 | Stop reason: SDUPTHER

## 2017-05-11 RX ORDER — FOLIC ACID 1 MG/1
1 TABLET ORAL DAILY
COMMUNITY
End: 2017-05-12

## 2017-05-11 RX ORDER — ATORVASTATIN CALCIUM 20 MG/1
20 TABLET, FILM COATED ORAL
Qty: 15 TAB | Refills: 0 | Status: SHIPPED | OUTPATIENT
Start: 2017-05-11 | End: 2017-05-15 | Stop reason: SDUPTHER

## 2017-05-11 RX ORDER — THERA TABS 400 MCG
1 TAB ORAL DAILY
Status: DISCONTINUED | OUTPATIENT
Start: 2017-05-12 | End: 2017-05-12 | Stop reason: HOSPADM

## 2017-05-11 RX ORDER — PANTOPRAZOLE SODIUM 40 MG/1
40 TABLET, DELAYED RELEASE ORAL
Status: DISCONTINUED | OUTPATIENT
Start: 2017-05-12 | End: 2017-05-12 | Stop reason: HOSPADM

## 2017-05-11 RX ADMIN — ATORVASTATIN CALCIUM 20 MG: 10 TABLET, FILM COATED ORAL at 21:24

## 2017-05-11 RX ADMIN — HEPARIN SODIUM 5000 UNITS: 5000 INJECTION, SOLUTION INTRAVENOUS; SUBCUTANEOUS at 06:53

## 2017-05-11 RX ADMIN — ASPIRIN 81 MG CHEWABLE TABLET 81 MG: 81 TABLET CHEWABLE at 09:08

## 2017-05-11 RX ADMIN — CLOPIDOGREL BISULFATE 75 MG: 75 TABLET, FILM COATED ORAL at 17:11

## 2017-05-11 RX ADMIN — MULTIPLE VITAMIN LIQUID 30 ML: LIQUID at 09:09

## 2017-05-11 RX ADMIN — LEVOTHYROXINE SODIUM 150 MCG: 100 TABLET ORAL at 06:53

## 2017-05-11 RX ADMIN — CHOLECALCIFEROL TAB 25 MCG (1000 UNIT) 2000 UNITS: 25 TAB at 09:08

## 2017-05-11 RX ADMIN — PANTOPRAZOLE SODIUM 40 MG: 40 GRANULE, DELAYED RELEASE ORAL at 09:09
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
## 2017-05-11 NOTE — PROGRESS NOTES
SHIFT CHANGE NOTE FOR [facility]    Bedside and Verbal shift change report given to [facility] RN  (oncoming nurse) by Lexus Kendrick RN   (offgoing nurse). Report included the following information SBAR, Kardex, MAR and Recent Results. Situation:   Code Status: Full Code   Reason for Admission: CVA  Hospital Day: 17   Problem List:   Hospital Problems  Date Reviewed: 5/10/2017          Codes Class Noted POA    CKD stage G3a/A1, GFR 45-59 and albumin creatinine ratio <30 mg/g ICD-10-CM: N18.3  ICD-9-CM: 585.3  5/3/2017 Yes        Procedure refused ICD-10-CM: Z53.29  ICD-9-CM: V64.2  4/21/2017 Yes    Overview Signed 4/24/2017  3:28 PM by Laurent Reynoso MD     Speech Pathologist recommended NPO and PEG placement; Patient refused             * (Principal)Acute ischemic stroke [facility]) ICD-10-CM: I63.9  ICD-9-CM: 434.91  4/19/2017 Yes    Overview Signed 4/24/2017  3:15 PM by Laurent Reynoso MD     Acute Ischemic Stroke (acute to subacute ischemia involving the posterior limb of the left internal capsule, along the lateral aspect of the left thalamus) with residual right hemiparesis, dysphagia and dysarthria             Impaired mobility and ADLs ICD-10-CM: Z74.09  ICD-9-CM: 799.89  4/19/2017 Yes        Dyslipidemia (high LDL; low HDL) (Chronic) ICD-10-CM: E78.4  ICD-9-CM: 272.4  4/19/2017 Yes    Overview Signed 4/24/2017  3:25 PM by Laurent Reynoso MD     Lipid profile (4/19/2017): , .  HDL 42, LDL 97             Hemiparesis affecting right side as late effect of cerebrovascular accident (CVA) ([facility] ICD-10-CM: B05.367  ICD-9-CM: 438.20  4/19/2017 Yes        Dysphagia as late effect of cerebrovascular accident (CVA) ICD-10-CM: D35.269  ICD-9-CM: 438.82  4/19/2017 Yes        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) ICD-10-CM: M71.180  ICD-9-CM: 438.13  4/19/2017 Yes              Background:   Past Medical History:   Past Medical History:   Diagnosis Date    Acute ischemic stroke ([facility] 4/19/2017 Acute Ischemic Stroke (acute to subacute ischemia involving the posterior limb of the left internal capsule, along the lateral aspect of the left thalamus) with residual right hemiparesis, dysphagia and dysarthria    As[facility]osis ([facility]     Chronic obstructive pulmonary disease (COPD) ([facility]     CKD stage G3a/A1, GFR 45-59 and albumin creatinine ratio <30 mg/g 5/3/2017    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  HDL 42, LDL 97    Dysphagia as late effect of cerebrovascular accident (CVA) 4/19/2017    Erectile dysfunction     Gastroesophageal reflux disease     Hemiparesis affecting right side as late effect of cerebrovascular accident (CVA) ([facility] 4/19/2017    History of endogenous hypertriglyceridemia     History of malignant neoplasm of prostate     [place] score 7     Hypothyroidism     Osteoarthrosis involving multiple sites     Procedure refused 4/21/2017    Speech Pathologist recommended NPO and PEG placement; Patient refused    Urinary incontinence     Male incontinence       Patient taking anticoagulants YES   Patient has a defibrillator: no     Assessment:   Changes in Assessment throughout shift: NONE              Last Vitals:     Vitals:    05/09/17 1930 05/10/17 0751 05/10/17 1614 05/10/17 1930   BP: 123/66 133/73 155/85 174/88   Pulse: 63 66 67 71   Resp: 18 20 20 20   Temp: 98.2 °F (36.8 °C) 97.5 °F (36.4 °C) 96 °F (35.6 °C) 98.3 °F (36.8 °C)   SpO2: 95% 98% 99% 100%   Weight:       Height:            PAIN    Pain Assessment    Pain Intensity 1: 0 (05/11/17 0400) Pain Intensity 1: 2 (12/29/14 1105)      Pain Location 1: Abdomen      Pain Intervention(s) 1: Medication (see MAR)  Patient Stated Pain Goal: 0 Patient Stated Pain Goal: 0  o Intervention effective: no    o Other actions taken for pain: NONE     Skin Assessment  Skin color Skin Color: Appropriate for ethnicity  Condition/Temperature Skin Condition/Temp: Warm  Integrity Skin Integrity: Intact  Turgor Turgor: Non-tenting  Weekly Pressure Ulcer Documentation  Pressure  Injury Documentation: No Pressure Injury Noted-Pressure Ulcer Prevention Initiated  Wound Prevention & Protection Methods  Orientation of wound Orientation of Wound Prevention: Posterior  Location of Prevention Location of Wound Prevention: Buttocks  Dressing Present Dressing Present : No  Dressing Status    Wound Offloading Wound Offloading (Prevention Methods): Bed, pressure redistribution/air     INTAKE/OUPUT    Date 05/10/17 0700 - 05/11/17 0659 05/11/17 0700 - 05/12/17 0659   Shift 0700-1859 1900-0659 24 Hour Total 0700-1859 1900-0659 24 Hour Total   I  N  T  A  K  E   P.O. 800  800         P. O. 800  800       Shift Total  (mL/kg) 800  (9.8)  800  (9.8)      O  U  T  P  U  T   Urine  (mL/kg/hr)            Urine Occurrence(s) 5 x 1 x 6 x       Stool            Stool Occurrence(s) 1 x 0 x 1 x       Shift Total  (mL/kg)           800      Weight (kg) 81.5 81.5 81.5 81.5 81.5 81.5       Recommendations:  1. Patient needs and requests: NONE    2. Diet: CARDIAC PUREED WITH NECTAR THICK LIQUIDS    3. Pending tests/procedures: LABS     4. Functional Level/Equipment: WHEELCHAIR    5. Estimated Discharge Date: TBD Posted on Whiteboard in Patients Room: no     [facility] Safety Check    A safety check occurred in the patient's room between off going nurse and oncoming nurse listed above.     The safety check included the below items  Area Items   H  High Alert Medications - Verify all high alert medication drips (heparin, PCA, etc.)   E  Equipment - Suction is set up for ALL patients (with yanker)  - Red plugs utilized for all equipment (IV pumps, etc.)  - WOWs wiped down at end of shift.  - Room stocked with oxygen, suction, and other unit-specific supplies   A  Alarms - Bed alarm is set for fall risk patients  - Ensure chair alarm is in place and activated if patient is up in a chair   L  Lines - Check IV for any infiltration  - Syed bag is empty if patient has a Syed   - Tubing and IV bags are labeled   S  Safety   - Room is clean, patient is clean, and equipment is clean. - Hallways are clear from equipment besides carts. - Fall bracelet on for fall risk patients  - Ensure room is clear and free of clutter  - Suction is set up for ALL patients (with yanker)  - Hallways are clear from equipment besides carts.    - Isolation precautions followed, supplies available outside room, sign posted

## 2017-05-11 NOTE — PROGRESS NOTES
Problem: Mobility Impaired (Adult and Pediatric)  Goal: *Acute Goals and Plan of Care (Insert Text)  Physical Therapy Short Term Goals  Initiated 2017 and to be accomplished within 7 day(s) (2017) (MET) (STG=LTG) (2017)  1. Patient will move from supine to sit and sit to supine , scoot up and down and roll side to side in bed with modified independence. 2. Patient will transfer from bed to chair and chair to bed with minimal assistance/contact guard assist using the least restrictive device. 3. Patient will perform sit to stand with minimal assistance/contact guard assist.  4. Patient will ambulate with moderate assistance for 50 feet with the least restrictive device. 5. Patient will ascend/descend 6 stairs with 2 handrail(s) with moderate assistance . Physical Therapy Long Term Goals  Initiated 2017 and to be accomplished within 28 day(s) (2017)  1. Patient will move from supine to sit and sit to supine , scoot up and down and roll side to side in bed with independence. 2. Patient will transfer from bed to chair and chair to bed with modified independence using the least restrictive device. 3. Patient will perform sit to stand with modified independence. 4. Patient will ambulate with modified independence for 250 feet with the least restrictive device. 5. Patient will ascend/descend 6 stairs with 2 handrail(s) with modified independence.    PHYSICAL THERAPY DISCHARGE SUMMARY  Patient Name:Stewart Disla   Precautions at discharge: Fall Risk     Pain at start of tx: 0/10  Pain at stop of tx:0/10     Patient identified with name and : YES     Problem List:    Decreased strength R LE  [X]     Decreased strength trunk/core  [X]     Decreased AROM   [ ]     Decreased PROM  [ ]     Decreased balance sitting  [ ]     Decreased balance standing  [X]     Decreased endurance  [X]     Pain  [ ]        Functional Limitations:   Decreased independence with bed mobility  [ ]     Decreased independence with functional transfers  [X]     Decreased independence with ambulation  [X]     Decreased independence with stair negotiation  [X]                Outcome Measures: FIM/MMT     Patient performs full functional assessment as noted below. In addition, Patient also performs the following therapeutic exercise:     Extremity: Right  Exercise Type #1: Other (comment) (Standing: SKTC; hip ABD; HS curls;  Ankle DF/PF)  Sets Performed: 2  Reps Performed: 20  Level of Assist: Supervision (B UE support on parallel bar)  Exercise Type #2: Other (comment) (Supine SLR with slight IR)  Sets Performed: 3  Reps Performed: 15  Level of Assist: Contact guard assistance (tactile cues for form/slow progression)                    MMT Initial Asssessment    Right Lower Extremity Left Lower Extremity   Hip Flexion 4 5   Knee Extension 4+ 5   Knee Flexion 4+ 5   Ankle Dorsiflexion 3- 5                  MMT Discharge Assessment    Right Lower Extremity Left Lower Extremity   Hip Flexion 4+ 5   Knee Extension 5 5   Knee Flexion 4+ 5   Ankle Dorsiflexion 4+ 5   0/5       No palpable muscle contraction  1/5       Palpable muscle contraction, no joint movement  2-/5      Less than full range of motion in gravity eliminated position  2/5       Able to complete full range of motion in gravity eliminated position  2+/5     Able to initiate movement against gravity  3-/5      More than half but not full range of motion against gravity  3/5       Able to complete full range of motion against gravity  3+/5     Completes full range of motion against gravity with minimal resistance  4-/5      Completes full range of motion against gravity with minimal-moderate resistance  4/5       Completes full range of motion against gravity with moderate resistance  4+/5     Completes full range of motion against gravity with moderate-maximum resistance  5/5       Completes full range of motion against gravity with maximum resistance AROM: WFL      FIM SCORES Initial Assessment Discharge Assessment   Bed/Chair/Wheelchair Transfers 3 6   Wheelchair Mobility 2 6   Walking [place] 1 6   Steps/Stairs 0 5   PRIMARY MODE OF LOCOMOTION: AMB w/o AD  Please see [facility] Interdisciplinary Eval: Coordination/Balance Section for details regarding FIM score description. BED/CHAIR/WHEELCHAIR TRANSFERS Initial Assessment Discharge Assessment   Rolling Right 5 (Supervision) 7 (Independent)   Rolling Left 5 (Supervision) 7 (Independent)   Supine to Sit 5 (Supervision) 7 (Independent)   Sit to Stand Moderate assistance Modified independent   Sit to Supine 5 (Supervision) 7 (Independent)   Transfer Assist Score 3 6   Transfer Type Other Other   Comments Patient performs stand step transfer with moderate assist from PT for anterior approach R LE knee block with loading 2/2 hx of buckling, trunk stability, pivot assist, and controlled lowering. Patient demonstrates increased momentum utilization for STS transfer and R genu recurvatum requiring verbal cueing to crrect these; pacing cues successful, however genu recurvatum requires tactile cueing with physical assist to prevent buckling. Patient demonstrates pass retract technique with R LE advancement as well as anterior COG placement to attempts to maintain TKE 2/2 functional quad weakness. Patient performs consistent and appropriate stand step transfer with B UE on R distal femur as self selected technique with good control and pace. Patient requires not verbal cues at this time to remain safe.    Car Transfer Not tested Independent   Car Type NA Car Simulator          WHEELCHAIR MOBILITY/MANAGEMENT Initial Assessment Discharge Assessment   Able to Propel 70 feet (verbal cueing for technique) 255 feet   Functional Level 2 6   Curbs/ramps assistance required 0 (Not tested) 5 (Supervision) (Amb w/o device)   Wheelchair set up assistance required 2 (Maximal assistance) 6 (Modified independent) Wheelchair management Manages left brake, Manages right brake Manages left brake;Manages right brake;Manages left armrest;Manages right armrest;Manages left footrest;Manages right footrest          WALKING INDEPENDENCE Initial Assessment Discharge Assessment   Assistive device Gait belt None   Ambulation assistance - level surface Mod A Mod I   Distance 5 Feet (ft) 700 Feet (ft)   Functional Level 1 6   Comments 5 ft with decreased step length B shortest on L. Patient also demonstrates significant trunk sway with trendelenburg during R SLS. Patient demonstrates intermittent anterior COG progression requiring physical assist to recover COG w/i CATA. Patient demonstrates R LE genu recurvatum with pass retract advancement technique; Patient able to ambulates 20 ft with moderate a x 1 for trunk control, R LE advancement, postural assistance and assisted weight shift with pacing cues. 900 ft w/ Modified independent with mild decreased in R step clearance though he demonstrates self cueing for pace and pattern. Patient requires no verbal cueing for pattern or safety. Ambulation assistance - unlevel surface NT SP          STEPS/STAIRS Initial Assessment Discharge Assessment   Steps/Stairs ambulated 0 24 (x 2 sets; L HR; reciprocal pattern)   Rail Use  (NA) Left    Functional Level 0 5   Comments Pt unsafe to perform at this time 24 steps x 2 sets L HR w/ reciprocal pattern distant SPV level demonstrating R LE decreased DF x 1 step causing self recoverable LOB 2/2 R toe catch. Curbs/Ramps NA SP              PHYSICAL THERAPY PLAN OF CARE     LTGs: Patient has achieved all therapy goals at this time and reports that he is prepared for discharge home on 05/12/2017. Pt would benefit from continued skilled physical therapy in order to improve independent functional mobility within the home with use of least restrictive device.  Interventions may include range of motion (AROM, PROM B LE/trunk), motor function (B LE/trunk strengthening/coordination), activity tolerance (vitals, oxygen saturation levels), bed mobility training, balance activities, gait training (progressive ambulation program), and functional transfer training. HEP handout:  HEP discussed 05/10/2017     Pt to be discharged 05/12/2017 with Assistance provided by Family/care aide. Therapy Recommendations upon discharge: HHPT to progress to OPPT as appropriate  Equipment needs at discharge: None for mobility     Please see [facility]; Interdisciplinary Eval, Care Plan, and Patient Education for further information regarding physical therapy discharge summary and plan of care. Clemente Wiseman, PT DPT  5/11/2017

## 2017-05-11 NOTE — PROGRESS NOTES
Problem: Self Care Deficits Care Plan (Adult)  Goal: *Acute Goals and Plan of Care (Insert Text)  Long Term Goals (to be met upon discharge date) in order to increase pts functional independence and safety, and decrease burden of care:  1. Pt will perform self-feeding with independence. 2. Pt will perform grooming with independence. 3. Pt will perform UB bathing with modified independence. 4. Pt will perform LB bathing with modified independence. 5. Pt will perform tshower transfer with modified independence. 6. Pt will perform UB dressing with independence. 7. Pt will perform LB dressing with modified independence. 8. Pt will perform toileting task with modified independence. 9. Pt will perform toilet transfer with modified independence. Short Term Weekly Goals for (2017 to 2017) in order to increase pts functional independence and safety, and decrease burden of care:  1. Pt will perform UB bathing with modified independence. 2. Pt will perform LB bathing with supervision. 3. Pt will perform shower transfer with CGA. 4. Pt will perform UB dressing with modified independence. 5. Pt will perform LB dressing with supervision. 6. Pt will perform toileting task with supervision. 7. Pt will perform toilet transfer with supervision   OCCUPATIONAL THERAPY DISCHARGE SUMMARY  Patient Name:Stewart Conley  Time Spent With Patient  Time In: 1100  Time Out: 1230    Pain at start of tx: 0/10  Pain at stop of tx: 0/10     Patient identified with name and : yes  Objective: Pt performed full body shower this morning. He ambulated in room with modified independence while setting himself up for ADLs. See below for ADL and functional transfer details. Pt also worked on [employer] coordination in therapy gym as he completed picking up pennies from the table and pressing them through small slit cut into container lid followed by theraputty exercises for R hand strengthening utilized red theraputty. Exercises included small bead removal, palm press, finger spread, finger abduction, and finger pinch. Pt also completed [place] Pegboard Test. Pt able to place 11 pegs with his L hand in 30 seconds, 3 pegs with his R hand, and 2 pegs on each side using B hands.      Problem List:    Decreased strength B UE  [X]     Decreased strength trunk/core  [X]     Decreased AROM   [ ]     Decreased PROM  [ ]     Decreased balance sitting  [ ]     Decreased balance standing  [X]     Decreased endurance  [X]     Pain  [ ]        Functional Limitations:   Decreased independence with ADL  [X]     Decreased independence with functional transfers  [X]     Decreased independence with ambulation  [X]     Decreased independence with IADL  [X]        Outcome Measures:                  MMT Initial Assessment    Right Upper Extremity  Left Upper Extermity    UE AROM  WFL  WFL; Grossly 4+/5   Shoulder flexion  3  -   Shoulder extension  3  -   Shoulder ABDuction  3  -   Shoulder ADDUction  3  -   Elbow Flexion  3  -   Elbow Extension  3  -   Wrist Extension/Flexion  3  -     3+  -                          MMT Discharge Assessment    Right Upper Extremity  Left Upper Extermity    UE AROM WFL WFL; Grossly 4+/5   Shoulder flexion 4- -   Shoulder extension 4- -   Shoulder ABDuction 4- -   Shoulder ADDUction 4- -   Elbow Flexion 3+ -   Elbow Extension 3+ -   Wrist Extension/Flexion 3+  -    4- -         0/5       No palpable muscle contraction  1/5       Palpable muscle contraction, no joint movement  2-/5      Less than full range of motion in gravity eliminated position  2/5       Able to complete full range of motion in gravity eliminated position  2+/5     Able to initiate movement against gravity  3-/5      More than half but not full range of motion against gravity  3/5       Able to complete full range of motion against gravity  3+/5     Completes full range of motion against gravity with minimal resistance  4-/5      Completes full range of motion against gravity with minimal-moderate resistance  4/5       Completes full range of motion against gravity with moderate resistance  4+/5     Completes full range of motion against gravity with moderate-maximum resistance  5/5       Completes full range of motion against gravity with maximum resistance     Coordination: Impaired RUE  Sensation: Intact UEs      FIM SCORES Initial Assessment Discharge Assessment   Eating 3 Feeding/Eating  Feeding/Eating Assistance: 6 (Modified independent)  Comments: Pt needs additional time for opening packages and cutting food. He is able to feed himself using his R hand with increased effort, however does switch to using his L hand as he fatigues. Grooming 6 Grooming  Grooming Assistance : 6 (Modified independent)  Comments: Pt able to stand at the sink in order to perform grooming tasks with increased time and effort needed when using his R hand for brushing his feet. Pt uses his L hand for safety with shaving due to decreased RUE coordination. Oral Hygiene FIM: 6    Bathing 2 (Pt stood in shower at [facility]) Upper Body [address] [facility], Upper: 6 (Modified independent)  Position Performed: Seated in chair  Lower [address], Lower : 6 (Modified independent)  Position Performed: Seated in chair;Standing  Adaptive Equipment: Grab bar; Shower chair   Upper Body Dressing 3 Upper Body Dressing   Dressing Assistance : 6 (Modified independent)   Lower Body Dressing 2 Lower Body Dressing   Dressing Assistance : 6 (Modified independent)  Leg Crossed Method Used: Yes  Position Performed: Seated in chair;Standing     Sock and/or Shoe Management FIM: 6   Toileting 4 Toileting  Toileting Assistance (FIM Score): 6 (Modified independent)   Bladder - level of assist 4 6    Bladder - accident frequency score 6  6   Bowel - level of assist 4  7   Bowel - accident frequency score 5  7   Tub/Shower Transfer 3     Toilet Transfer 3 Functional Transfers  Toilet Transfer :  (Stand step transfer without AD)  Amount of Assistance Required: 6 (Modified independent)  Tub or Shower Type: Shower  Amount of Assistance Required: 6 (Modified independent)  Adaptive Equipment: Tub transfer bench;Grab bars   Comprehension 5 6   Expression 5 6   Social Interaction 6 6   Problem Solving 4 5   Memory 4 6   Please see [facility] Interdisciplinary Eval: Coordination/Balance Section for details regarding FIM score description. OCCUPATIONAL THERAPY PLAN OF CARE     LTGs: Pt has achieved 6/9 STGs during his stay in rehab. He has not yet achieved independence with self-feeding, grooming, and UB dressing due to impaired RUE coordination and requiring some increased time and effort to complete ADLs. Pt would benefit from continued skilled occupational therapy in order to improve self care and functional mobility within the home with use of least restrictive device. Interventions may include ADL training, NMRE, cognitive re-training,  range of motion (AROM, PROM B UE), motor function (B UE strengthening/coordination), activity tolerance (vitals, oxygen saturation levels),and functional transfer training. Pt to be discharged home with supervision provided by personal care aide and family. Family Trainin2017  Therapy recommendations: Home Health Safety Evaluation - Progress to outpatient   Equipment recommendations: None     Please see [facility]; Interdisciplinary Eval, Care Plan, and Patient Education for further information regarding occupational therapy discharge summary and plan of care.       SERENITY Harrison  2017

## 2017-05-11 NOTE — PROGRESS NOTES
[facility] PHYSICAL REHABILITATION  [address] [place] [address], [address]     INPATIENT REHABILITATION  DAILY PROGRESS NOTE     Date: 5/11/2017    Name: Abner Cedeno Age / Sex: 64 y.o. / male   CSN: [id number] MRN: [id number]   [facility] Date: 4/24/2017 Length of Stay: 17 days     Primary Rehab Diagnosis: Impaired Mobility and ADLs secondary to Acute Ischemic Stroke (acute to subacute ischemia involving the posterior limb of the left internal capsule, along the lateral aspect of the left thalamus) with residual right hemiparesis, dysphagia and dysarthria       Subjective:     Patient seen and examined. Blood pressure controlled. Patient's Complaint:   No significant medical complaints    Pain Control: no current joint or muscle symptoms, essentially pain-free      Objective:     Vital Signs:  Patient Vitals for the past 24 hrs:   BP Temp Pulse Resp SpO2   05/11/17 0651 125/69 97.2 °F (36.2 °C) 66 18 97 %   05/10/17 1930 174/88 98.3 °F (36.8 °C) 71 20 100 %   05/10/17 1614 155/85 96 °F (35.6 °C) 67 20 99 %        Physical Examination:  GENERAL SURVEY: Patient is awake, alert, oriented x 3, sitting comfortably on the chair, not in acute respiratory distress. HEENT: pink palpebral conjunctivae, anicteric sclerae, no nasoaural discharge, moist oral mucosa  NECK: supple, no jugular venous distention, no palpable lymph nodes  CHEST/LUNGS: symmetrical chest expansion, good air entry, clear breath sounds  HEART: adynamic precordium, good S1 S2, no S3, regular rhythm, no murmurs  ABDOMEN: flat, bowel sounds appreciated, soft, non-tender  EXTREMITIES: pink nailbeds, no edema, full and equal pulses, no calf tenderness   NEUROLOGICAL EXAM: The patient is awake, alert and oriented x3, able to answer questions fairly appropriately, able to follow 1 and 2 step commands. Able to tell time from the wall clock. Cranial nerves II-XII are grossly intact except for slurred speech and dysphagia.  No gross sensory deficit. Motor strength is 4/5 on the RUE, 5/5 on the LUE, 4/5 on the right hip, 4+/5 on the right knee, 3/5 on the right ankle, 5/5 on the LLE. Current Medications:  Current Facility-Administered Medications   Medication Dose Route Frequency    cholecalciferol (VITAMIN D3) tablet 2,000 Units  2,000 Units Oral DAILY    bisacodyl (DULCOLAX) suppository 10 mg  10 mg Rectal Q48H PRN    heparin (porcine) injection 5,000 Units  5,000 Units SubCUTAneous Q12H    acetaminophen (TYLENOL) solution 650 mg  650 mg Oral Q4H PRN    docusate sodium (COLACE) capsule 100 mg  100 mg Oral BID    aspirin chewable tablet 81 mg  81 mg Oral DAILY WITH BREAKFAST    clopidogrel (PLAVIX) tablet 75 mg  75 mg Oral DAILY WITH DINNER    atorvastatin (LIPITOR) tablet 20 mg  20 mg Oral QHS    pantoprazole (PROTONIX) granules for oral suspension 40 mg  40 mg Oral ACB    multivitamin (MULTI-DELYN, WELLESSE) oral liquid 30 mL  30 mL Oral DAILY    levothyroxine (SYNTHROID) tablet 150 mcg  150 mcg Oral ACB       Allergies:   Allergies   Allergen Reactions    Pcn [Penicillins] Rash       Functional Progress:    SPEECH AND LANGUAGE PATHOLOGY    ON ADMISSION MOST RECENT   Comprehension (Native Language)  Primary Mode of Comprehension: Auditory  Score: 4 Comprehension (Native Language)  Primary Mode of Comprehension: Auditory  Score: 5     Expression (Native Language)  Primary Mode of Expression: Verbal  Score: 4   Expression (Native Language)  Primary Mode of Expression: Verbal  Score: 5     Social Interaction/Pragmatics  Score: 4 Social Interaction/Pragmatics  Score: 5     Problem Solving  Score: 4   Problem Solving  Score: 5     Memory  Score: 4 Memory  Score: 5       Legend:   7 - Independent   6 - Modified Independent   5 - Standby Assistance / Supervision / Set-up   4 - Minimum Assistance / Contact Guard Assistance   3 - Moderate Assistance   2 - Maximum Assistance   1 - Total Assistance / Dependent       Lab/Data Review:  Recent Results (from the past 24 hour(s))   HGB & HCT    Collection Time: 05/11/17  6:50 AM   Result Value Ref Range    HGB 13.5 13.0 - 16.0 g/dL    HCT 38.5 36.0 - 48.0 %   PLATELET    Collection Time: 05/11/17  6:50 AM   Result Value Ref Range    PLATELET 744 261 - 423 K/uL         Estimated Glomerular Filtration Rate:  CKD-EPI:   On admission, estimated GFR was 46.2 mL/min/1.73m2 based on a Creatinine of 1.44 mg/dl. Most recent estimated GFR was 45.8 mL/min/1.73m2 based on a Creatinine of 1.45 mg/dl. MDRD:   On admission, estimated GFR was 50.4 mL/min/1.73m2 based on a Creatinine of 1.44 mg/dl. Most recent estimated GFR was 50 mL/min/1.73m2 based on a Creatinine of 1.45 mg/dl. Assessment:     Primary Rehabilitation Diagnosis  1. Impaired Mobility and ADLs  2. Acute Ischemic Stroke (acute to subacute ischemia involving the posterior limb of the left internal capsule, along the lateral aspect of the left thalamus) with residual right hemiparesis, dysphagia and dysarthria      Comorbidities   Urinary incontinence    History of malignant neoplasm of prostate    Hypothyroidism    Chronic obstructive pulmonary disease (COPD)    Asbestosis    Osteoarthrosis involving multiple sites    History of endogenous hypertriglyceridemia    CKD stage G3a/A1, GFR 45-59 and albumin creatinine ratio <30 mg/g    Dyslipidemia (high LDL; low HDL)    Gastroesophageal reflux disease    Procedure refused    Erectile dysfunction       Plan:     1. Justification for continued stay: Good progression towards established rehabilitation goals. 2. Medical Issues being followed closely:    [x]  Fall and safety precautions     []  Wound Care     [x]  Bowel and Bladder Function     [x]  Fluid Electrolyte and Nutrition Balance     []  Pain Control      3.  Issues that 24 hour rehabilitation nursing is following:    [x]  Fall and safety precautions     []  Wound Care     [x]  Bowel and Bladder Function     [x] Fluid Electrolyte and Nutrition Balance     []  Pain Control      [x]  Assistance with and education on in-room safety with transfers to and from the bed, wheelchair, toilet and shower. 4. Acute rehabilitation plan of care:    [x]  Continue current care and rehab. [x]  Physical Therapy           [x]  Occupational Therapy           [x]  Speech Therapy     []  Hold Rehab until further notice     5. Medications:    [x]  MAR Reviewed     [x]  Continue Present Medications     6. DVT Prophylaxis:      []  Lovenox     [x]  Discontinue Unfractionated Heparin     []  Coumadin     []  NOAC     [x]  GRIS Stockings     [x]  Sequential Compression Device     []  None     7. Orders:   > Acute Ischemic Stroke (acute to subacute ischemia involving the posterior limb of the left internal capsule, along the lateral aspect of the left thalamus) with residual right hemiparesis, dysphagia and dysarthria    > Continue:    > Aspirin 81 mg PO once daily with breakfast    > Atorvastatin 20 mg PO q HS    > Clopidogrel 75 mg PO once daily with dinner     > CKD stage G3a/A1, GFR 45-59 and albumin creatinine ratio <30 mg/g   > On admission, based on a Creatinine of 1.4 mg/dl:    > Estimated GFR using CKD-EPI = 47.8 mL/min/1.73m2    > Estimated GFR using MDRD = 52.1 mL/min/1.73m2   > Estimated calculated glomerular filtration rate equivalent to that of stage 3 chronic kidney disease = 30-59 ml/min   > Urine Microalbumin/Creatinine ratio (5/3/2017) = 29     > Dyslipidemia   > Lipid profile (4/19/2017): , . HDL 42, LDL 97   > On 4/20/2017, patient was started at [facility] on Atorvastatin 20 mg PO q HS   > Continue Atorvastatin 20 mg PO q HS      8. Patient's progress in rehabilitation and medical issues discussed with the patient. All questions answered to the best of my ability. Care plan discussed with patient, wife, daughter and nurse.     9. Discharge Planning:   > For discharge to home tomorrow   [address] [place] [address] Therapy (to progress to outpatient PT), Occupational Therapy safety evaluation (to progress to outpatient OT) and Speech Therapy   > F/U: 1. PCP (Dr. Gabi Bowden)    2.  Neurology (Dr. René Manzanares)      Signed:    Juwan Barton MD    May 11, 2017

## 2017-05-11 NOTE — PROGRESS NOTES
Problem: Neurolinguistics Impaired (Adult)  Goal: *Speech Goal: (INSERT TEXT)  Long term goals:  1. Patient will tolerate a regular/thin liquid diet without overt s/s of aspiration, 4 consecutive meals. 2. Patient will use safe swallowing techniques with supervision for all PO intake. 3. Patient will answer complex yes/no questions with 90% accuracy. 4 patient will follow complex commands for object manipulation wit 80-90% accuracy. 5. Patient will name 10-12 items within simple categories with supervision. 6. Patient will perform varied word retrieval tasks with % accuracy. 7. Patient will recall 3 words after 5 minutes, supervision. 8. Patient will perform functional problem solving/reasoning tasks with supervision. Short term goals (by 5/13/17):  1. Patient will tolerate a regular, thin liquid diet without overt s/s of aspiration, 4 consecutive meals. 2. Patient will use safe swallowing techniques with supervision for all PO intake. 4. Patient will answer complex yes/no questions with 90% accuracy. 5. Patient will follow complex commands for object manipulation wit 80-90% accuracy. 6. Patient will name 10-12 items within simple categories with supervision. 7. Patient will perform varied word retrieval tasks with 80-90% accuracy. 8. Patient will recall 3 words after 5 minutes, min assist-supervision. 9. Patient will perform functional problem solving/reasoning tasks with 80-90% accuracy. Outcome: Resolved/Met Date Met:  05/11/17     SPEECH LANGUAGE PATHOLOGY TREATMENT     Patient: Criss Mckenzie (22 y.o. male)  Date: 5/11/2017  Diagnosis: Left CVA  Acute ischemic stroke ([facility] [address] Acute ischemic stroke [facility])       SUBJECTIVE:   Patient stated Molina Daigles brought me fish from [facility]. OBJECTIVE:   Mental Status:  Mr. Sharyn Albright was fatigued today after his therapies. He had PT before breakfast and more therapy after lunch.      Treatment & Interventions:   Mr. Sharyn Albright was seen in the dining room this morning with his breakfast.  He is again eating slowly as his son reported to be his habit at home. He tolerated his regular diet without overt s/s of aspiration and needed only occasional cues to take smaller bites (while feeding himself with his affected hand). A friend and her grandson were in his room at lunch time and patient asked to eat in the room with them. Mr. Santosh Nicole participated in the weekly CVA support/education group this morning. In today's group the acronym BE FAST was used to discuss the symptoms of stroke and the importance of timely arrival to the emergency room to be evaluated for the best outcomes. Patient was very active in the group discussion. Language Comprehension and Expression:   Mr. Santosh Nicole continues to demonstrate mild to moderate difficulty with word retrieval.  However, he is able to find the word he wants or a similar word given sufficient time. Neuro-Linguistics: Favorite restaurants:                          Patient named 7  Discussion on current events:              Mr. Santosh Nicole is well inform about current issues in the news and has strong opinions about politics. Regarding taxation and healthcare he stated, \"Bill Matias hs the answer\". It was interesting that as a part of the conversation, patient described 3 types of people: \"Those who make things happen, Those that watch things happen, and Those who wonder what happened\". Response & Tolerance to Activities:  Patient was very fatigued this afternoon when seen in his room. However, he and the SLP engaged in an interesting conversation where he voiced his views on the government. Patent was quite articulate and had only mild word finding difficulty while discussing topics that really interest him. Pain:  Patient had no complaints of pain.      After treatment:   [ ]       Patient left in no apparent distress sitting up in chair  [X]       Patient left in no apparent distress in bed  [X]       Call bell left within reach  [ ]       Nursing notified  [ ]       Caregiver present  [ ]       Bed alarm activated      ASSESSMENT:   Patient has made good recovery and has integrated strategies for anomia presented by the SLP. Progression toward goals:  [X]       Improving appropriately and progressing toward goals  [ ]       Improving slowly and progressing toward goals  [ ]       Not making progress toward goals and plan of care will be adjusted      PLAN:   Patient continues to benefit from skilled intervention to address the above impairments. Continue treatment per established plan of care.   Discharge Recommendations:  [place], SLP  Time Calculation:  60 minutes

## 2017-05-12 ENCOUNTER — HOME HEALTH ADMISSION (OUTPATIENT)
Dept: HOME HEALTH SERVICES | Facility: HOME HEALTH | Age: 77
End: 2017-05-12
Payer: MEDICARE

## 2017-05-12 VITALS
OXYGEN SATURATION: 98 % | TEMPERATURE: 97.2 F | HEIGHT: 70 IN | HEART RATE: 68 BPM | DIASTOLIC BLOOD PRESSURE: 62 MMHG | WEIGHT: 179.68 LBS | RESPIRATION RATE: 18 BRPM | BODY MASS INDEX: 25.72 KG/M2 | SYSTOLIC BLOOD PRESSURE: 126 MMHG

## 2017-05-12 PROCEDURE — 74011250637 HC RX REV CODE- 250/637: Performed by: INTERNAL MEDICINE

## 2017-05-12 RX ADMIN — ASPIRIN 81 MG CHEWABLE TABLET 81 MG: 81 TABLET CHEWABLE at 08:45

## 2017-05-12 RX ADMIN — PANTOPRAZOLE SODIUM 40 MG: 40 TABLET, DELAYED RELEASE ORAL at 08:45

## 2017-05-12 RX ADMIN — CHOLECALCIFEROL TAB 25 MCG (1000 UNIT) 2000 UNITS: 25 TAB at 08:45

## 2017-05-12 RX ADMIN — DOCUSATE SODIUM 100 MG: 100 CAPSULE, LIQUID FILLED ORAL at 08:45

## 2017-05-12 RX ADMIN — THERA TABS 1 TABLET: TAB at 08:45

## 2017-05-12 RX ADMIN — LEVOTHYROXINE SODIUM 150 MCG: 100 TABLET ORAL at 06:45

## 2017-05-12 NOTE — HOME CARE
Received HH referral from [facility]. worker Claudia Sultana), Discharge noted today, [facility] will follow pt for SN x1 visit ,and PT/OT/ST , no DME needed or ordered from [facility], pt states that his wife has Alzheimers and that his daughter will be coming from out of town today to be with him and also son will be assisting with his care also, [facility] will follow. KRYSTIN THOMASON.

## 2017-05-12 NOTE — DISCHARGE INSTRUCTIONS
DISCHARGE SUMMARY from Nurse    The following personal items are in your possession at time of discharge:    Dental Appliances: Lowers, Uppers  Visual Aid: None     Home Medications: None  Jewelry: None  Clothing: Shirt, Undergarments, Pants, Socks, Slippers, Footwear  Other Valuables: None  Personal Items Sent to Safe: none          PATIENT INSTRUCTIONS:    After general anesthesia or intravenous sedation, for 24 hours or while taking prescription Narcotics:  · Limit your activities  · Do not drive and operate hazardous machinery  · Do not make important personal or business decisions  · Do  not drink alcoholic beverages  · If you have not urinated within 8 hours after discharge, please contact your surgeon on call. Report the following to your surgeon:  · Excessive pain, swelling, redness or odor of or around the surgical area  · Temperature over 100.5  · Nausea and vomiting lasting longer than 4 hours or if unable to take medications  · Any signs of decreased circulation or nerve impairment to extremity: change in color, persistent  numbness, tingling, coldness or increase pain  · Any questions        What to do at Home:  Recommended activity: Activity as tolerated,     If you experience any of the following symptoms chest pain, shortness of breath, or any change in neurological status please follow up with the emergency department or call 911. *  Please give a list of your current medications to your Primary Care Provider. *  Please update this list whenever your medications are discontinued, doses are      changed, or new medications (including over-the-counter products) are added. *  Please carry medication information at all times in case of emergency situations.           These are general instructions for a healthy lifestyle:    No smoking/ No tobacco products/ Avoid exposure to second hand smoke    Surgeon General's Warning:  Quitting smoking now greatly reduces serious risk to your health. Obesity, smoking, and sedentary lifestyle greatly increases your risk for illness    A healthy diet, regular physical exercise & weight monitoring are important for maintaining a healthy lifestyle    You may be retaining fluid if you have a history of heart failure or if you experience any of the following symptoms:  Weight gain of 3 pounds or more overnight or 5 pounds in a week, increased swelling in our hands or feet or shortness of breath while lying flat in bed. Please call your doctor as soon as you notice any of these symptoms; do not wait until your next office visit. Recognize signs and symptoms of STROKE:    F-face looks uneven    A-arms unable to move or move unevenly    S-speech slurred or non-existent    T-time-call 911 as soon as signs and symptoms begin-DO NOT go       Back to bed or wait to see if you get better-TIME IS BRAIN. Warning Signs of HEART ATTACK     Call 911 if you have these symptoms:   Chest discomfort. Most heart attacks involve discomfort in the center of the chest that lasts more than a few minutes, or that goes away and comes back. It can feel like uncomfortable pressure, squeezing, fullness, or pain.  Discomfort in other areas of the upper body. Symptoms can include pain or discomfort in one or both arms, the back, neck, jaw, or stomach.  Shortness of breath with or without chest discomfort.  Other signs may include breaking out in a cold sweat, nausea, or lightheadedness. Don't wait more than five minutes to call 911 - MINUTES MATTER! Fast action can save your life. Calling 911 is almost always the fastest way to get lifesaving treatment. Emergency Medical Services staff can begin treatment when they arrive -- up to an hour sooner than if someone gets to the hospital by car. The discharge information has been reviewed with the patient. The patient verbalized understanding.     Discharge medications reviewed with the patient       Taking Aspirin to Prevent Heart Attack and Stroke: Care Instructions  Your Care Instructions  Aspirin acts as a \"blood thinner. \" It prevents blood clots from forming. When taken during and after a heart attack, it can reduce your chance of dying. And it's used if you have a stent in your coronary artery. Also, aspirin helps certain people lower their risk of a heart attack or stroke. Be sure you know what dose of aspirin to take and how often to take it. Low-dose aspirin is typically 81 mg. But the dose for daily aspirin can range from 81 mg to 325 mg. Taking aspirin every day can cause bleeding. It may not be safe if you have stomach ulcers. And it may not be safe if you have high blood pressure that is not controlled. If you take aspirin pills every day, do not take ones that have other ingredients such as caffeine or sodium. Before you start to take aspirin, tell your doctor all the medicines, vitamins, herbal products, and supplements you take. Follow-up care is a key part of your treatment and safety. Be sure to make and go to all appointments, and call your doctor if you are having problems. It's also a good idea to know your test results and keep a list of the medicines you take. How can you care for yourself at home? · Take aspirin with a full glass of water unless your doctor tells you not to. Do not lie down right after you take it. · If you have a stent in your coronary artery, take your aspirin as your heart doctor says to. If another doctor says to stop taking the aspirin for any reason, talk to your heart doctor before you stop. · Do not chew or crush the coated or sustained-release forms of aspirin. · Ask your doctor if you can drink alcohol while you take aspirin. And ask how much you can drink. Too much alcohol with aspirin can cause stomach bleeding. · Do not take aspirin if you are pregnant, unless your doctor says it is okay. · Keep all aspirin out of children's reach.   · Throw aspirin away if it starts to smell like vinegar. · Do not take aspirin if you have gout or if you take prescription blood thinners, unless your doctor has told you to. · Do not take prescription or over-the-counter medicines, vitamins, herbal products, or supplements without talking to your doctor first. Conrado Mao the label before you take another over-the-counter medicine. Many contain aspirin. So they could cause you to take too much aspirin. · Talk with your doctor before you take a pain medicine. Ask which type of medicine you can take and how to take it safely with aspirin. · Tell your doctor or dentist before a surgery or procedure that you take aspirin. He or she will tell you if you should stop taking aspirin before your surgery or procedure. Make sure that you understand exactly what your doctor wants you to do. Where can you learn more? Go to http://sudarshan-shaneka.info/. Enter T805 in the search box to learn more about \"Taking Aspirin to Prevent Heart Attack and Stroke: Care Instructions. \"  Current as of: January 27, 2016  Content Version: 11.2  © 6475-0240 [employer]. Care instructions adapted under license by [employer] (which disclaims liability or warranty for this information). If you have questions about a medical condition or this instruction, always ask your healthcare professional. [address] any warranty or liability for your use of this information. Learning About Antiplatelet Medicines After a Stroke  Introduction  If you have had a stroke, you may have concerns about having another one. You want to do all you can do to avoid this. If your stroke was caused by a blood clot, one of the best things you can do is to take antiplatelet medicines. They can help prevent another stroke. In most cases, you don't take them if you had a stroke caused by a leak in an artery. These medicines are often called blood thinners. But they don't thin your blood. They work to keep platelets from sticking together and forming blood clots. (A platelet is a type of blood cell.) Blood clots can cause a stroke if they block a blood vessel in the brain. So by preventing blood clots, you are helping to prevent a stroke. Examples  · Aspirin (Erica, Bufferin, Ecotrin)  · Aspirin with dipyridamole (Aggrenox)  · Clopidogrel (Plavix)  Possible side effects  These medicines make your blood take longer than normal to clot. This can cause bleeding, and you may bruise easily. In rare cases, they can cause you to bleed inside your body without an injury. If you have an injury, you might have bleeding that is hard to control. These medicines may have other side effects. Depending on which one you take, you may:  · Have diarrhea. · Feel sick to your stomach. · Have a headache. · Have some mild belly pain. You may have other side effects or reactions not listed here. Check the information that comes with your medicine. What to know about taking this medicine  · Be sure you get instructions about how to take your medicine safely. Blood thinners can cause serious bleeding problems. · Be safe with medicines. Take your medicines exactly as prescribed. Call your doctor if you think you are having a problem with your medicine. · Check with your doctor or pharmacist before you use any other medicines, including over-the-counter medicines. Make sure your doctor knows all of the medicines, vitamins, herbal products, and supplements you take. Taking some medicines together can cause problems. Where can you learn more? Go to http://sudarshan-shaneka.info/. Enter D252 in the search box to learn more about \"Learning About Antiplatelet Medicines After a Stroke. \"  Current as of: January 27, 2016  Content Version: 11.2  © 9054-2265 [employer]. Care instructions adapted under license by [employer] (which disclaims liability or warranty for this information). If you have questions about a medical condition or this instruction, always ask your healthcare professional. [address] any warranty or liability for your use of this information. and appropriate educational materials and side effects teaching were provided. ------------------------------------------------------------------------------------------------------------    DISCHARGE INSTRUCTIONS    1. Make sure that when you request refills at the pharmacy that the refill requests are sent to your PCP (NOT to the prescriber at the [facility]) to avoid any delays in getting your medication refills. The physician at the [facility] will not able to order medications or refills after discharge -- Please understand that though we would like to help, it is simply not safe for our physician to order you a medication that we cannot monitor. 2. If any of the prescribed medications require a prior authorization, contact your Primary Care Physician or specialist to EITHER complete prior authorizations and paperwork on your behalf OR prescribe an alternative medication. -- Please understand that though we would like to help, it is simply not safe for our physician to order you a medication that we cannot monitor. 3. Over-the-counter (OTC) medications will NOT be prescribed. You need to buy these medications over-the-counter.     -------------------------------------------------------------------------------------------------------------------

## 2017-05-12 NOTE — PROGRESS NOTES
Patient discharged from facility with all belongings. Patient escorted to vehicle with his son and RN. No acute distress noted. Patient denies any further need.
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
## 2017-05-12 NOTE — PROGRESS NOTES
[x] Psychology  [] Social Work [] Recreational Therapy    INTERVENTION  UNITS/TIME OF SERVICE   Assessment    Supportive Counseling May 11, 2017   Orientation    Discharge Planning    Resource Linkage              Progress/Current Status    Patient seen for individual support  on ARU this date and in anticipation of his scheduled discharge to home. Patient is obviously feeling very positive about his discharge; he fully understands that his family has made provisions for him and spouse to be monitored and assisted in residence and he is accepting same. He remains pensive and I suspect is not really expressing his underlying fears and concerns, both for himself and his wife. He reiterates over and over that his wife has dementia and he has cared for her; and, in response, he is again educated about changes he must make in order to reduce his own stress and allow others to provide support, when and as necessary. However, as he counters: \"You know, I've always done everything for myself. \"  Patient has been thoroughly educated about stroke recovery and his need to pace himself in recovery for safety, at least.  He acknowledges understanding for same. Patient also attended stroke support group on same date and further educated about various aspects of stroke recovery, and symptoms indicative of stroke occurrence and need to seek medical support immediately, as he apparently did for himself. He was active and proactive in the group discussion.     Marybeth Teresa, THE [facility] 5/12/2017 3:54 PM

## 2017-05-12 NOTE — PROGRESS NOTES
SHIFT CHANGE NOTE FOR [facility]    Bedside and Verbal shift change report given to Reginaldo Jones RN  (oncoming nurse) by Jadiel Hunter RN   (offgoing nurse). Report included the following information SBAR, Kardex, MAR and Recent Results. Situation:   Code Status: Full Code   Reason for Admission: CVA  Hospital Day: 18   Problem List:   Hospital Problems  Date Reviewed: 5/11/2017          Codes Class Noted POA    CKD stage G3a/A1, GFR 45-59 and albumin creatinine ratio <30 mg/g ICD-10-CM: N18.3  ICD-9-CM: 585.3  5/3/2017 Yes        Procedure refused ICD-10-CM: Z53.29  ICD-9-CM: V64.2  4/21/2017 Yes    Overview Signed 4/24/2017  3:28 PM by Tania Jesus MD     Speech Pathologist recommended NPO and PEG placement; Patient refused             * (Principal)Acute ischemic stroke [facility]) ICD-10-CM: I63.9  ICD-9-CM: 434.91  4/19/2017 Yes    Overview Signed 4/24/2017  3:15 PM by Tania Jessu MD     Acute Ischemic Stroke (acute to subacute ischemia involving the posterior limb of the left internal capsule, along the lateral aspect of the left thalamus) with residual right hemiparesis, dysphagia and dysarthria             Impaired mobility and ADLs ICD-10-CM: Z74.09  ICD-9-CM: 799.89  4/19/2017 Yes        Dyslipidemia (high LDL; low HDL) (Chronic) ICD-10-CM: E78.4  ICD-9-CM: 272.4  4/19/2017 Yes    Overview Signed 4/24/2017  3:25 PM by Tania Jesus MD     Lipid profile (4/19/2017): , .  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) ICD-10-CM: T34.726  ICD-9-CM: 438.13  4/19/2017 Yes              Background:   Past Medical History:   Past Medical History:   Diagnosis Date    [facility] ([facility] 4/19/2017    Acute Ischemic Stroke (acute to subacute ischemia involving the posterior limb of the left internal capsule, along the lateral aspect of the left thalamus) with residual right hemiparesis, dysphagia and dysarthria    Asbestosis ([facility]     Chronic obstructive pulmonary disease (COPD) ([facility]     CKD stage G3a/A1, GFR 45-59 and albumin creatinine ratio <30 mg/g 5/3/2017    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  HDL 42, LDL 97    Dysphagia as late effect of cerebrovascular accident (CVA) 4/19/2017    Erectile dysfunction     Gastroesophageal reflux disease     Hemiparesis affecting right side as late effect of cerebrovascular accident (CVA) ([facility] 4/19/2017    History of endogenous hypertriglyceridemia     History of malignant neoplasm of prostate     Genoveva score 7     Hypothyroidism     Osteoarthrosis involving multiple sites     Procedure refused 4/21/2017    Speech Pathologist recommended NPO and PEG placement; Patient refused    Urinary incontinence     Male incontinence       Patient taking anticoagulants YES   Patient has a defibrillator: no     Assessment:   Changes in Assessment throughout shift: NONE              Last Vitals:     Vitals:    05/10/17 1930 05/11/17 0651 05/11/17 1608 05/11/17 2100   BP: 174/88 125/69 138/83 151/83   Pulse: 71 66 71 69   Resp: 20 18 16 18   Temp: 98.3 °F (36.8 °C) 97.2 °F (36.2 °C) 97.3 °F (36.3 °C) 98.1 °F (36.7 °C)   SpO2: 100% 97% 97% 98%   Weight:       Height:            PAIN    Pain Assessment    Pain Intensity 1: 0 (05/12/17 0400) Pain Intensity 1: 2 (12/29/14 1105)      Pain Location 1: Abdomen      Pain Intervention(s) 1: Medication (see MAR)  Patient Stated Pain Goal: 0 Patient Stated Pain Goal: 0  o Intervention effective: no    o Other actions taken for pain: NONE     Skin Assessment  Skin color Skin Color: Appropriate for ethnicity  Condition/Temperature Skin Condition/Temp: Warm  Integrity Skin Integrity: Intact  Turgor Turgor: Non-tenting  Weekly Pressure Ulcer Documentation  Pressure  Injury Documentation: No Pressure Injury Noted-Pressure Ulcer Prevention Initiated  Wound Prevention & Protection Methods  Orientation of wound Orientation of Wound Prevention: Posterior  Location of Prevention Location of Wound Prevention: Sacrum/Coccyx  Dressing Present Dressing Present : No  Dressing Status    Wound Offloading Wound Offloading (Prevention Methods): Bed, pressure redistribution/air     INTAKE/OUPUT    Date 05/11/17 0700 - 05/12/17 0659 05/12/17 0700 - 05/13/17 0659   Shift 0700-1859 1900-0659 24 Hour Total 0700-1859 1900-0659 24 Hour Total   I  N  T  A  K  E   Shift Total  (mL/kg)         O  U  T  P  U  T   Urine  (mL/kg/hr)            Urine Occurrence(s) 1 x 2 x 3 x       Stool            Stool Occurrence(s) 0 x 0 x 0 x       Shift Total  (mL/kg)         NET         Weight (kg) 81.5 81.5 81.5 81.5 81.5 81.5       Recommendations:  1. Patient needs and requests: NONE    2. Diet: CARDIAC PUREED WITH NECTAR THICK LIQUIDS    3. Pending tests/procedures: LABS     4. Functional Level/Equipment: WHEELCHAIR    5. Estimated Discharge Date: TBD Posted on Whiteboard in Patients Room: [facility] Safety Check    A safety check occurred in the patient's room between off going nurse and oncoming nurse listed above.     The safety check included the below items  Area Items   H  High Alert Medications - Verify all high alert medication drips (heparin, PCA, etc.)   E  Equipment - Suction is set up for ALL patients (with yanker)  - Red plugs utilized for all equipment (IV pumps, etc.)  - WOWs wiped down at end of shift.  - Room stocked with oxygen, suction, and other unit-specific supplies   A  Alarms - Bed alarm is set for fall risk patients  - Ensure chair alarm is in place and activated if patient is up in a chair   L  Lines - Check IV for any infiltration  - Syed bag is empty if patient has a Syed   - Tubing and IV bags are labeled   S  Safety   - Room is clean, patient is clean, and equipment is clean. - Hallways are clear from equipment besides carts. - Fall bracelet on for fall risk patients  - Ensure room is clear and free of clutter  - Suction is set up for ALL patients (with yanker)  - Hallways are clear from equipment besides carts.    - Isolation precautions followed, supplies available outside room, sign posted

## 2017-05-12 NOTE — ROUTINE PROCESS
Discharge instructions given to patient and explained in detail. Patient verbalizes understanding of his follow up appointments, where to  his medications, and what he should return to the hospital for. He denies any questions at this time.

## 2017-05-13 ENCOUNTER — HOME CARE VISIT (OUTPATIENT)
Dept: SCHEDULING | Facility: HOME HEALTH | Age: 77
End: 2017-05-13
Payer: MEDICARE

## 2017-05-13 PROCEDURE — 3331090001 HH PPS REVENUE CREDIT

## 2017-05-13 PROCEDURE — 400013 HH SOC

## 2017-05-13 PROCEDURE — 3331090002 HH PPS REVENUE DEBIT

## 2017-05-13 PROCEDURE — G0299 HHS/HOSPICE OF RN EA 15 MIN: HCPCS

## 2017-05-14 PROCEDURE — 3331090002 HH PPS REVENUE DEBIT

## 2017-05-14 PROCEDURE — 3331090001 HH PPS REVENUE CREDIT

## 2017-05-15 ENCOUNTER — HOME CARE VISIT (OUTPATIENT)
Dept: SCHEDULING | Facility: HOME HEALTH | Age: 77
End: 2017-05-15
Payer: MEDICARE

## 2017-05-15 ENCOUNTER — HOME CARE VISIT (OUTPATIENT)
Dept: HOME HEALTH SERVICES | Facility: HOME HEALTH | Age: 77
End: 2017-05-15
Payer: MEDICARE

## 2017-05-15 ENCOUNTER — DOCUMENTATION ONLY (OUTPATIENT)
Dept: INTERNAL MEDICINE CLINIC | Age: 77
End: 2017-05-15

## 2017-05-15 VITALS — DIASTOLIC BLOOD PRESSURE: 70 MMHG | HEART RATE: 89 BPM | SYSTOLIC BLOOD PRESSURE: 130 MMHG | OXYGEN SATURATION: 98 %

## 2017-05-15 VITALS — SYSTOLIC BLOOD PRESSURE: 124 MMHG | OXYGEN SATURATION: 98 % | DIASTOLIC BLOOD PRESSURE: 80 MMHG | HEART RATE: 72 BPM

## 2017-05-15 DIAGNOSIS — E78.5 DYSLIPIDEMIA (HIGH LDL; LOW HDL): Chronic | ICD-10-CM

## 2017-05-15 DIAGNOSIS — I63.9 ACUTE ISCHEMIC STROKE (HCC): ICD-10-CM

## 2017-05-15 PROCEDURE — G0152 HHCP-SERV OF OT,EA 15 MIN: HCPCS

## 2017-05-15 PROCEDURE — 3331090002 HH PPS REVENUE DEBIT

## 2017-05-15 PROCEDURE — G0151 HHCP-SERV OF PT,EA 15 MIN: HCPCS

## 2017-05-15 PROCEDURE — 3331090001 HH PPS REVENUE CREDIT

## 2017-05-15 PROCEDURE — 3331090003 HH PPS REVENUE ADJ

## 2017-05-15 RX ORDER — BENAZEPRIL HYDROCHLORIDE 20 MG/1
TABLET ORAL
Qty: 30 TAB | Refills: 4 | Status: SHIPPED | OUTPATIENT
Start: 2017-05-15 | End: 2017-11-29 | Stop reason: SDUPTHER

## 2017-05-15 RX ORDER — ATORVASTATIN CALCIUM 20 MG/1
TABLET, FILM COATED ORAL
Qty: 30 TAB | Refills: 6 | Status: SHIPPED | OUTPATIENT
Start: 2017-05-15 | End: 2017-09-20 | Stop reason: ALTCHOICE

## 2017-05-15 RX ORDER — CLOPIDOGREL BISULFATE 75 MG/1
75 TABLET ORAL
Qty: 30 TAB | Refills: 4 | Status: SHIPPED | OUTPATIENT
Start: 2017-05-15 | End: 2017-11-21 | Stop reason: SDUPTHER

## 2017-05-16 ENCOUNTER — PATIENT OUTREACH (OUTPATIENT)
Dept: INTERNAL MEDICINE CLINIC | Age: 77
End: 2017-05-16

## 2017-05-16 DIAGNOSIS — I10 ESSENTIAL HYPERTENSION: Primary | ICD-10-CM

## 2017-05-16 PROCEDURE — 3331090001 HH PPS REVENUE CREDIT

## 2017-05-16 PROCEDURE — 3331090002 HH PPS REVENUE DEBIT

## 2017-05-16 NOTE — PROGRESS NOTES
The patient is being evaluated by me today at home for the chief complaint of right sided weakness and transition of care    HPI     The patient was recently hospitalized at [facility] for an acute left hemispheric CVA. The patient was discharged on 4/28/17 and transferred to [facility]. The patient was discharged from the Rehab unit to home on Friday 5/12/17. The patient was evaluated by me at home today. The patient initially had progressive right sided weakness that over an hour went to a flaccid right side. The patient was treated with thrombolysis with improvement of function of his right side. The patient continued with intensive care and then rehab. He had improvement of right sided strength with the arm stronger than the leg. The patient continued with apraxia of use with the right arm and the right leg. The patient also has apraxia of speech. The patient is now home with continued improvement. The situation at home is complicated by the patient's wife behavior secondary to dementia. At times the patient's wife becomes agitated. Review of Systems   Respiratory: Negative for shortness of breath. Cardiovascular: Negative for chest pain and leg swelling. Neurological: Positive for speech change (Mild problems with speech). Right sided weakness and apraxia       Allergies   Allergen Reactions    Pcn [Penicillins] Rash       Current Outpatient Prescriptions   Medication Sig Dispense Refill    atorvastatin (LIPITOR) 20 mg tablet 1 tablet daily  Indications: DYSLIPIDEMIA 30 Tab 6    clopidogrel (PLAVIX) 75 mg tab Take 1 Tab by mouth daily (with dinner). Indications: Cerebral Thromboembolism Prevention 30 Tab 4    benazepril (LOTENSIN) 20 mg tablet 1 tablet daily (for BP) 30 Tab 4    Omeprazole delayed release (PRILOSEC D/R) 20 mg tablet Take 20 mg by mouth Daily (before breakfast).  Indications: gastroesophageal reflux disease      aspirin 81 mg chewable tablet Take 1 Tab by mouth daily (with breakfast). Indications: prevention of cerebrovascular accident 15 Tab 0    FOLIC ACID/MULTIVIT-MIN/LUTEIN (CENTRUM SILVER PO) Take 1 Tab by mouth daily.  levothyroxine (SYNTHROID) 150 mcg tablet Take 1 Tab by mouth daily. 90 Tab 3    Cholecalciferol, Vitamin D3, (VITAMIN D3) 1,000 unit Cap Take 1,000 Units by mouth daily. 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  HDL 42, LDL 97    Dysphagia as late effect of cerebrovascular accident (CVA) 4/19/2017    Erectile dysfunction     Gastroesophageal reflux disease     Hemiparesis affecting right side as late effect of cerebrovascular accident (CVA) ([address] 4/19/2017    History of endogenous hypertriglyceridemia     History of malignant neoplasm of prostate     Genoveav score 7     Hypothyroidism     Osteoarthrosis involving multiple sites     Procedure refused 4/21/2017    Speech Pathologist recommended NPO and PEG placement; Patient refused    Urinary incontinence     Male incontinence        Past Surgical History:   Procedure Laterality Date    COLONOSCOPY      HX CHOLECYSTECTOMY      HX HERNIA REPAIR Bilateral     inguinal    HX RADICAL PROSTATECTOMY  1-    perineal approach       Social History     Social History    Marital status:      Spouse name: N/A    Number of children: N/A    Years of education: N/A     Occupational History    Not on file. Social History Main Topics    Smoking status: Never Smoker    Smokeless tobacco: Never Used    Alcohol use No    Drug use: No    Sexual activity: Yes     Partners: Female     Other Topics Concern    Not on file     Social History Narrative       Patient does not have an advanced directive on file    VS:   /82   P 78       Pain: 0/10    Physical Exam   Neck: Carotid bruit is not present. Cardiovascular: Normal rate and regular rhythm. Exam reveals no gallop. No murmur heard. Pulmonary/Chest: He has no wheezes. He has no rales. Abdominal: Soft. He exhibits no distension. There is no tenderness. Musculoskeletal: He exhibits no edema. Neurological: He displays weakness (Mild right sided weakness leg worse than arm ). Gait (Gait somewhat stiff -- swings the right leg lut) abnormal.   Apraxia of the right hand, arm and leg  Speech is slightly slow and halting          Admission on 04/24/2017, Discharged on 05/12/2017   Component Date Value Ref Range Status    WBC 04/25/2017 12.5  4.6 - 13.2 K/uL Final    RBC 04/25/2017 5.18  4.70 - 5.50 M/uL Final    HGB 04/25/2017 15.0  13.0 - 16.0 g/dL Final    HCT 04/25/2017 43.2  36.0 - 48.0 % Final    MCV 04/25/2017 83.4  74.0 - 97.0 FL Final    MCH 04/25/2017 29.0  24.0 - 34.0 PG Final    MCHC 04/25/2017 34.7  31.0 - 37.0 g/dL Final    RDW 04/25/2017 13.8  11.6 - 14.5 % Final    PLATELET 67/51/7201 601  135 - 420 K/uL Final    MPV 04/25/2017 11.3  9.2 - 11.8 FL Final    NEUTROPHILS 04/25/2017 67  40 - 73 % Final    LYMPHOCYTES 04/25/2017 22  21 - 52 % Final    MONOCYTES 04/25/2017 9  3 - 10 % Final    EOSINOPHILS 04/25/2017 2  0 - 5 % Final    BASOPHILS 04/25/2017 0  0 - 2 % Final    ABS. NEUTROPHILS 04/25/2017 8.3* 1.8 - 8.0 K/UL Final    ABS. LYMPHOCYTES 04/25/2017 2.8  0.9 - 3.6 K/UL Final    ABS.  MONOCYTES 04/25/2017 1.1  0.05 - 1.2 K/UL Final    ABS. EOSINOPHILS 04/25/2017 0.3  0.0 - 0.4 K/UL Final    ABS. BASOPHILS 04/25/2017 0.0  0.0 - 0.06 K/UL Final    DF 04/25/2017 AUTOMATED    Final    Magnesium 04/25/2017 1.9  1.6 - 2.6 mg/dL Final    PLEASE NOTE NEW REFERENCE RANGE    Sodium 04/25/2017 135* 136 - 145 mmol/L Final    Potassium 04/25/2017 4.2  3.5 - 5.5 mmol/L Final    Chloride 04/25/2017 101  100 - 108 mmol/L Final    CO2 04/25/2017 24  21 - 32 mmol/L Final    Anion gap 04/25/2017 10  3.0 - 18 mmol/L Final    Glucose 04/25/2017 100* 74 - 99 mg/dL Final    BUN 04/25/2017 9  7.0 - 18 MG/DL Final    Creatinine 04/25/2017 1.44* 0.6 - 1.3 MG/DL Final    BUN/Creatinine ratio 04/25/2017 6* 12 - 20   Final    GFR est AA 04/25/2017 58* >60 ml/min/1.73m2 Final    GFR est non-AA 04/25/2017 47* >60 ml/min/1.73m2 Final    Comment: (NOTE)  Estimated GFR is calculated using the Modification of Diet in Renal   Disease (MDRD) Study equation, reported for both  Americans   (GFRAA) and non- Americans (GFRNA), and normalized to 1.73m2   body surface area. The physician must decide which value applies to   the patient. The MDRD study equation should only be used in   individuals age 25 or older. It has not been validated for the   following: pregnant women, patients with serious comorbid conditions,   or on certain medications, or persons with extremes of body size,   muscle mass, or nutritional status.  Calcium 04/25/2017 9.3  8.5 - 10.1 MG/DL Final    Vitamin D 25-Hydroxy 04/25/2017 30.1  30 - 100 ng/mL Final    Comment: (NOTE)  Deficiency               <20 ng/mL  Insufficiency          20-30 ng/mL  Sufficient             ng/mL  Possible toxicity       >100 ng/mL    The Method used is Siemens Advia Centaur currently standardized to a   Center of Disease Control and Prevention (CDC) certified reference   [address]  Samples containing fluorescein dye can produce falsely   elevated values when tested with the ADVIA Centaur Vitamin D Assay. It is recommended that results in the toxic range, >100 ng/mL, be   retested 72 hours post fluorescein exposure.  Vitamin B12 04/25/2017 903  211 - 911 pg/mL Final    Folate 04/25/2017 >20.0* 3.10 - 17.50 ng/mL Final    Homocysteine, plasma 04/25/2017 11.9  0.0 - 15.0 umol/L Final    Comment: (NOTE)  Performed At: [address] [id number]  Robert Mclain MD AS:[id number]      HGB 04/27/2017 14.2  13.0 - 16.0 g/dL Final    HCT 04/27/2017 40.5  36.0 - 48.0 % Final    PLATELET 78/28/3306 561  135 - 420 K/uL Final    WBC 05/01/2017 9.7  4.6 - 13.2 K/uL Final    RBC 05/01/2017 5.20  4.70 - 5.50 M/uL Final    HGB 05/01/2017 15.0  13.0 - 16.0 g/dL Final    HCT 05/01/2017 43.4  36.0 - 48.0 % Final    MCV 05/01/2017 83.5  74.0 - 97.0 FL Final    MCH 05/01/2017 28.8  24.0 - 34.0 PG Final    MCHC 05/01/2017 34.6  31.0 - 37.0 g/dL Final    RDW 05/01/2017 13.8  11.6 - 14.5 % Final    PLATELET 50/48/6053 997  135 - 420 K/uL Final    MPV 05/01/2017 11.1  9.2 - 11.8 FL Final    NEUTROPHILS 05/01/2017 57  40 - 73 % Final    LYMPHOCYTES 05/01/2017 30  21 - 52 % Final    MONOCYTES 05/01/2017 11* 3 - 10 % Final    EOSINOPHILS 05/01/2017 2  0 - 5 % Final    BASOPHILS 05/01/2017 0  0 - 2 % Final    ABS. NEUTROPHILS 05/01/2017 5.5  1.8 - 8.0 K/UL Final    ABS. LYMPHOCYTES 05/01/2017 2.9  0.9 - 3.6 K/UL Final    ABS. MONOCYTES 05/01/2017 1.0  0.05 - 1.2 K/UL Final    ABS. EOSINOPHILS 05/01/2017 0.2  0.0 - 0.4 K/UL Final    ABS.  BASOPHILS 05/01/2017 0.0  0.0 - 0.06 K/UL Final    DF 05/01/2017 AUTOMATED    Final    Sodium 05/01/2017 132* 136 - 145 mmol/L Final    Potassium 05/01/2017 4.0  3.5 - 5.5 mmol/L Final    Chloride 05/01/2017 97* 100 - 108 mmol/L Final    CO2 05/01/2017 26  21 - 32 mmol/L Final    Anion gap 05/01/2017 9  3.0 - 18 mmol/L Final    Glucose 05/01/2017 114* 74 - 99 mg/dL Final    BUN 05/01/2017 13  7.0 - 18 MG/DL Final    Creatinine 05/01/2017 1.49* 0.6 - 1.3 MG/DL Final    BUN/Creatinine ratio 05/01/2017 9* 12 - 20   Final    GFR est AA 05/01/2017 55* >60 ml/min/1.73m2 Final    GFR est non-AA 05/01/2017 46* >60 ml/min/1.73m2 Final    Comment: (NOTE)  Estimated GFR is calculated using the Modification of Diet in Renal   Disease (MDRD) Study equation, reported for both  Americans   (GFRAA) and non- Americans (GFRNA), and normalized to 1.73m2   body surface area. The physician must decide which value applies to   the patient. The MDRD study equation should only be used in   individuals age 25 or older. It has not been validated for the   following: pregnant women, patients with serious comorbid conditions,   or on certain medications, or persons with extremes of body size,   muscle mass, or nutritional status.  Calcium 05/01/2017 9.4  8.5 - 10.1 MG/DL Final    Bilirubin, total 05/01/2017 0.8  0.2 - 1.0 MG/DL Final    ALT (SGPT) 05/01/2017 26  16 - 61 U/L Final    AST (SGOT) 05/01/2017 21  15 - 37 U/L Final    Alk.  phosphatase 05/01/2017 88  45 - 117 U/L Final    Protein, total 05/01/2017 8.3* 6.4 - 8.2 g/dL Final    Albumin 05/01/2017 3.8  3.4 - 5.0 g/dL Final    Globulin 05/01/2017 4.5* 2.0 - 4.0 g/dL Final    A-G Ratio 05/01/2017 0.8  0.8 - 1.7   Final    TSH 05/01/2017 3.93* 0.36 - 3.74 uIU/mL Final    Microalbumin,urine random 05/02/2017 6.37* 0 - 3.0 MG/DL Final    Creatinine, urine 05/02/2017 216.00* 30 - 125 mg/dL Final    Microalbumin/Creat ratio (mg/g cre* 05/02/2017 29  0 - 30 mg/g Final    HGB 05/04/2017 13.6  13.0 - 16.0 g/dL Final    HCT 05/04/2017 40.1  36.0 - 48.0 % Final    PLATELET 60/18/7381 220  135 - 420 K/uL Final    Sodium 05/08/2017 130* 136 - 145 mmol/L Final    Potassium 05/08/2017 4.5  3.5 - 5.5 mmol/L Final    Chloride 05/08/2017 95* 100 - 108 mmol/L Final    CO2 05/08/2017 26  21 - 32 mmol/L Final    Anion gap 05/08/2017 9  3.0 - 18 mmol/L Final    Glucose 05/08/2017 95  74 - 99 mg/dL Final    BUN 05/08/2017 12  7.0 - 18 MG/DL Final    Creatinine 05/08/2017 1.45* 0.6 - 1.3 MG/DL Final    BUN/Creatinine ratio 05/08/2017 8* 12 - 20   Final    GFR est AA 05/08/2017 57* >60 ml/min/1.73m2 Final    GFR est non-AA 05/08/2017 47* >60 ml/min/1.73m2 Final    Comment: (NOTE)  Estimated GFR is calculated using the Modification of Diet in Renal   Disease (MDRD) Study equation, reported for both  Americans   (GFRAA) and non- Americans (GFRNA), and normalized to 1.73m2   body surface area. The physician must decide which value applies to   the patient. The MDRD study equation should only be used in   individuals age 25 or older. It has not been validated for the   following: pregnant women, patients with serious comorbid conditions,   or on certain medications, or persons with extremes of body size,   muscle mass, or nutritional status.       Calcium 05/08/2017 9.5  8.5 - 10.1 MG/DL Final    HGB 05/08/2017 14.4  13.0 - 16.0 g/dL Final    HCT 05/08/2017 41.8  36.0 - 48.0 % Final    PLATELET 39/37/3521 101  135 - 420 K/uL Final    HGB 05/11/2017 13.5  13.0 - 16.0 g/dL Final    HCT 05/11/2017 38.5  36.0 - 48.0 % Final    PLATELET 36/67/8123 457  135 - 420 K/uL Final   Admission on 04/18/2017, Discharged on 04/24/2017   Component Date Value Ref Range Status    WBC 04/18/2017 7.6  4.0 - 11.0 1000/mm3 Final    RBC 04/18/2017 5.34  3.80 - 5.70 M/uL Final    HGB 04/18/2017 14.8  12.4 - 17.2 gm/dl Final    HCT 04/18/2017 44.6  37.0 - 50.0 % Final    MCV 04/18/2017 83.5  80.0 - 98.0 fL Final    MCH 04/18/2017 27.7  23.0 - 34.6 pg Final    MCHC 04/18/2017 33.2  30.0 - 36.0 gm/dl Final    PLATELET 90/79/6870 298  140 - 450 1000/mm3 Final    MPV 04/18/2017 10.6* 6.0 - 10.0 fL Final    RDW-SD 04/18/2017 41.7  35.1 - 43.9   Final    NRBC 04/18/2017 0  0 - 0   Final    IMMATURE GRANULOCYTES 04/18/2017 0.3  0.0 - 3.0 % Final    Comment: IG - Immature granulocytes (promyelocytes + myelocytes + metamyelocytes), their presence  indicates a left shift. An IG > 3% may predict positive blood cultures with 98% specificity.  (P<0.04) and 92% Positive Predictive Value (Jessica)1. Increased immature granulocytes  assist with the detection of infection and/or inflammation and may be present at an early stage  and are more sensitive and specific than band counts.  NEUTROPHILS 04/18/2017 48.6  34 - 64 % Final    LYMPHOCYTES 04/18/2017 36.5  28 - 48 % Final    MONOCYTES 04/18/2017 10.3  1 - 13 % Final    EOSINOPHILS 04/18/2017 3.8  0 - 5 % Final    BASOPHILS 04/18/2017 0.5  0 - 3 % Final    Sodium 04/18/2017 136  136 - 145 mEq/L Final    Potassium 04/18/2017 4.7  3.5 - 5.1 mEq/L Final    Chloride 04/18/2017 102  98 - 107 mEq/L Final    CO2 04/18/2017 28  21 - 32 mEq/L Final    Glucose 04/18/2017 100  74 - 106 mg/dl Final    BUN 04/18/2017 18  7 - 25 mg/dl Final    Creatinine 04/18/2017 1.6* 0.6 - 1.3 mg/dl Final    GFR est AA 04/18/2017 54.0    Final    Comment: THE [facility] [employer]ING GROUP STATES THAT THE MDRD STUDY EQUATION SHOULD ONLY BE USED ON  INDIVIDUALS 18 OR OLDER. THE REPORT ALSO NOTES THAT THE MDRD STUDY EQUATION HAS NOT BEEN  VALIDATED FOR USE WITH THE ELDERLY (OVER 79YEARS OF AGE), PREGNANT WOMEN, PATIENTS WITH SERIOUS  COMORBID CONDITIONS, OR PERSONS WITH EXTREMES OF BODY SIZE, MUSCLE MASS, OR NUTRITIONAL STATUS. APPLICATION OF THE EQUATION TO THESE PATIENT GROUPS MAY LEAD TO ERRORS IN GFR ESTIMATION. GFR  ESTIMATING EQUATIONS HAVE POORER AGREEMENT WITH MEASURED GFR FOR ILL HOSPITALIZED PATIENTS AND  FOR PEOPLE WITH NEAR NORMAL KIDNEY FUNCTION THAN FOR SUBJECTS IN THE MDRD STUDY. VALIDATION  STUDIES ARE IN PROGRESS TO EVALUATE THE MDRD STUDY EQUATION FOR ADDITIONAL ETHNIC GROUPS, THE  ELDERLY, VARIOUS DISEASE CONDITIONS, AND PEOPLE WITH NORMAL KIDNEY FUNCTION. GFRA----REFERS TO   GFRO---REFERS TO OTHER RACES  REFERENCES AVAILABLE UPON REQUEST.  GFR est non-AA 04/18/2017 45    Final    Calcium 04/18/2017 9.0  8.5 - 10.1 mg/dl Final    AST (SGOT) 04/18/2017 24  15 - 37 U/L Final    ALT (SGPT) 04/18/2017 20  12 - 78 U/L Final    Alk.  phosphatase 04/18/2017 74  45 - 117 U/L Final    Bilirubin, total 04/18/2017 0.9  0.2 - 1.0 mg/dl Final    Protein, total 04/18/2017 7.4  6.4 - 8.2 gm/dl Final    Albumin 04/18/2017 3.9  3.4 - 5.0 gm/dl Final    Ventricular Rate 04/18/2017 57  BPM Final    Atrial Rate 04/18/2017 57  BPM Final    P-R Interval 04/18/2017 156  ms Final    QRS Duration 04/18/2017 88  ms Final    Q-T Interval 04/18/2017 430  ms Final    QTC Calculation (Bezet) 04/18/2017 418  ms Final    Calculated P Axis 04/18/2017 39  degrees Final    Calculated R Axis 04/18/2017 21  degrees Final    Calculated T Axis 04/18/2017 57  degrees Final    Diagnosis 04/18/2017    Final                    Value:Sinus bradycardia  Otherwise normal ECG  When compared with ECG of 21-APR-2015 21:43,  No significant change was found  Confirmed by Pravin Sr M.D., Gray Mancia ([id number]) on 4/19/2017 7:46:58 AM      Sodium 04/18/2017 138  136 - 145 mEq/L Final    Potassium 04/18/2017 4.6  3.5 - 4.9 mEq/L Final    Chloride 04/18/2017 100  98 - 107 mEq/L Final    CO2, TOTAL 04/18/2017 26  21 - 32 mmol/L Final    Glucose 04/18/2017 103  74 - 106 mg/dL Final    BUN 04/18/2017 19  7 - 25 mg/dl Final    Creatinine 04/18/2017 1.6* 0.6 - 1.3 mg/dl Final    HCT 04/18/2017 44  40 - 54 % Final    HGB 04/18/2017 15.0  12.4 - 17.2 gm/dl Final    CALCIUM,IONIZED 04/18/2017 4.70  4.40 - 5.40 mg/dL Final    Prothrombin time 04/18/2017 12.9  11.5 - 14.0 seconds Final    INR 04/18/2017 1.0  0.1 - 1.1   Final    aPTT 04/18/2017 31.6  24.9 - 35.6 seconds Final    Troponin-I 04/18/2017 0.00  0.00 - 0.07 ng/ml Final    WBC 04/19/2017 10.3  4.0 - 11.0 1000/mm3 Final    RBC 04/19/2017 5.13  3.80 - 5.70 M/uL Final    HGB 04/19/2017 14.1  12.4 - 17.2 gm/dl Final    HCT 04/19/2017 42.2  37.0 - 50.0 % Final    MCV 04/19/2017 82.3  80.0 - 98.0 fL Final    MCH 04/19/2017 27.5  23.0 - 34.6 pg Final    MCHC 04/19/2017 33.4  30.0 - 36.0 gm/dl Final    PLATELET 18/77/0918 423  140 - 450 1000/mm3 Final    MPV 04/19/2017 10.6* 6.0 - 10.0 fL Final    RDW-SD 04/19/2017 40.7  35.1 - 43.9   Final    NRBC 04/19/2017 0  0 - 0   Final    IMMATURE GRANULOCYTES 04/19/2017 0.3  0.0 - 3.0 % Final    Comment: IG - Immature granulocytes (promyelocytes + myelocytes + metamyelocytes), their presence  indicates a left shift. An IG > 3% may predict positive blood cultures with 98% specificity.  (P<0.04) and 92% Positive Predictive Value (Vimal-Jeanie)1. Increased immature granulocytes  assist with the detection of infection and/or inflammation and may be present at an early stage  and are more sensitive and specific than band counts.  NEUTROPHILS 04/19/2017 70.6* 34 - 64 % Final    LYMPHOCYTES 04/19/2017 20.6* 28 - 48 % Final    MONOCYTES 04/19/2017 7.2  1 - 13 % Final    EOSINOPHILS 04/19/2017 1.0  0 - 5 % Final    BASOPHILS 04/19/2017 0.3  0 - 3 % Final    Sodium 04/19/2017 139  136 - 145 mEq/L Final    Potassium 04/19/2017 4.0  3.5 - 5.1 mEq/L Final    Chloride 04/19/2017 107  98 - 107 mEq/L Final    CO2 04/19/2017 22  21 - 32 mEq/L Final    Glucose 04/19/2017 106  74 - 106 mg/dl Final    BUN 04/19/2017 17  7 - 25 mg/dl Final    Creatinine 04/19/2017 1.4* 0.6 - 1.3 mg/dl Final    GFR est AA 04/19/2017 >60    Final    Comment: THE [facility] WORKING GROUP STATES THAT THE MDRD STUDY EQUATION SHOULD ONLY BE USED ON  INDIVIDUALS 18 OR OLDER. THE REPORT ALSO NOTES THAT THE MDRD STUDY EQUATION HAS NOT BEEN  VALIDATED FOR USE WITH THE ELDERLY (OVER 79YEARS OF AGE), PREGNANT WOMEN, PATIENTS WITH SERIOUS  COMORBID CONDITIONS, OR PERSONS WITH EXTREMES OF BODY SIZE, MUSCLE MASS, OR NUTRITIONAL STATUS. APPLICATION OF THE EQUATION TO THESE PATIENT GROUPS MAY LEAD TO ERRORS IN GFR ESTIMATION. GFR  ESTIMATING EQUATIONS HAVE POORER AGREEMENT WITH MEASURED GFR FOR ILL HOSPITALIZED PATIENTS AND  FOR PEOPLE WITH NEAR NORMAL KIDNEY FUNCTION THAN FOR SUBJECTS IN THE MDRD STUDY. VALIDATION  STUDIES ARE IN PROGRESS TO EVALUATE THE MDRD STUDY EQUATION FOR ADDITIONAL ETHNIC GROUPS, THE  ELDERLY, VARIOUS DISEASE CONDITIONS, AND PEOPLE WITH NORMAL KIDNEY FUNCTION. GFRA----REFERS TO   GFRO---REFERS TO OTHER RACES  REFERENCES AVAILABLE UPON REQUEST.      GFR est non-AA 04/19/2017 52    Final    Calcium 04/19/2017 8.7  8.5 - 10.1 mg/dl Final    AST (SGOT) 04/19/2017 18  15 - 37 U/L Final    ALT (SGPT) 04/19/2017 17  12 - 78 U/L Final    Alk. phosphatase 04/19/2017 70  45 - 117 U/L Final    Bilirubin, total 04/19/2017 0.9  0.2 - 1.0 mg/dl Final    Protein, total 04/19/2017 6.9  6.4 - 8.2 gm/dl Final    Albumin 04/19/2017 3.4  3.4 - 5.0 gm/dl Final    Cholesterol, total 04/19/2017 162  140 - 199 mg/dl Final    HDL Cholesterol 04/19/2017 42  40 - 96 mg/dl Final    Triglyceride 04/19/2017 116  29 - 150 mg/dl Final    LDL, calculated 04/19/2017 97  0 - 130 mg/dl Final    Comment: TARGET/DECISION VALUES DEPEND ON A NUMBER OF RISK FACTORS. LDL CALCULATION NOT VALID IF TRIGLYCERIDES ARE GREATER THAN 400 mg/dL.  CHOL/HDL Ratio 04/19/2017 3.9  0.0 - 5.0 Ratio Final    Troponin-I 04/18/2017 <0.015  0.000 - 0.045 ng/ml Final    Comment: The presence of detectable troponin above the reference range indicates myocardial injury which  maybe due to ischemia, myocarditis, trauma, etc. Clinical correlation is necessary to establish  the significance of this finding.    NOTE: The reference range is based on the 99th percentile of the referent population         Troponin-I 04/19/2017 <0.015  0.000 - 0.045 ng/ml Final    Comment: The presence of detectable troponin above the reference range indicates myocardial injury which  maybe due to ischemia, myocarditis, trauma, etc. Clinical correlation is necessary to establish  the significance of this finding. NOTE: The reference range is based on the 99th percentile of the referent population         Magnesium 04/18/2017 2.3  1.6 - 2.6 mg/dl Final    Hemoglobin A1c 04/19/2017 5.9  4.8 - 6.0 % Final    Troponin-I 04/19/2017 0.016  0.000 - 0.045 ng/ml Final    Comment: The presence of detectable troponin above the reference range indicates myocardial injury which  maybe due to ischemia, myocarditis, trauma, etc. Clinical correlation is necessary to establish  the significance of this finding. NOTE: The reference range is based on the 99th percentile of the referent population         WBC 04/20/2017 11.1* 4.0 - 11.0 1000/mm3 Final    RBC 04/20/2017 4.89  3.80 - 5.70 M/uL Final    HGB 04/20/2017 13.5  12.4 - 17.2 gm/dl Final    HCT 04/20/2017 40.6  37.0 - 50.0 % Final    MCV 04/20/2017 83.0  80.0 - 98.0 fL Final    MCH 04/20/2017 27.6  23.0 - 34.6 pg Final    MCHC 04/20/2017 33.3  30.0 - 36.0 gm/dl Final    PLATELET 82/09/5963 325  140 - 450 1000/mm3 Final    MPV 04/20/2017 10.7* 6.0 - 10.0 fL Final    RDW-SD 04/20/2017 41.5  35.1 - 43.9   Final    NRBC 04/20/2017 0  0 - 0   Final    IMMATURE GRANULOCYTES 04/20/2017 0.4  0.0 - 3.0 % Final    Comment: IG - Immature granulocytes (promyelocytes + myelocytes + metamyelocytes), their presence  indicates a left shift. An IG > 3% may predict positive blood cultures with 98% specificity.  (P<0.04) and 92% Positive Predictive Value (Vimal-Jeanie)1. Increased immature granulocytes  assist with the detection of infection and/or inflammation and may be present at an early stage  and are more sensitive and specific than band counts.       NEUTROPHILS 04/20/2017 67.0* 34 - 64 % Final    LYMPHOCYTES 04/20/2017 22.4* 28 - 48 % Final    MONOCYTES 04/20/2017 9.1  1 - 13 % Final    EOSINOPHILS 04/20/2017 0.6  0 - 5 % Final    BASOPHILS 04/20/2017 0.5  0 - 3 % Final    Sodium 04/20/2017 137  136 - 145 mEq/L Final  Potassium 04/20/2017 4.0  3.5 - 5.1 mEq/L Final    Chloride 04/20/2017 107  98 - 107 mEq/L Final    CO2 04/20/2017 21  21 - 32 mEq/L Final    Glucose 04/20/2017 117* 74 - 106 mg/dl Final    BUN 04/20/2017 12  7 - 25 mg/dl Final    Creatinine 04/20/2017 1.4* 0.6 - 1.3 mg/dl Final    GFR est AA 04/20/2017 >60    Final    Comment: THE [facility] STATES THAT THE MDRD STUDY EQUATION SHOULD ONLY BE USED ON  INDIVIDUALS 18 OR OLDER. THE REPORT ALSO NOTES THAT THE MDRD STUDY EQUATION HAS NOT BEEN  VALIDATED FOR USE WITH THE ELDERLY (OVER 79YEARS OF AGE), PREGNANT WOMEN, PATIENTS WITH SERIOUS  COMORBID CONDITIONS, OR PERSONS WITH EXTREMES OF BODY SIZE, MUSCLE MASS, OR NUTRITIONAL STATUS. APPLICATION OF THE EQUATION TO THESE PATIENT GROUPS MAY LEAD TO ERRORS IN GFR ESTIMATION. GFR  ESTIMATING EQUATIONS HAVE POORER AGREEMENT WITH MEASURED GFR FOR ILL HOSPITALIZED PATIENTS AND  FOR PEOPLE WITH NEAR NORMAL KIDNEY FUNCTION THAN FOR SUBJECTS IN THE MDRD STUDY. VALIDATION  STUDIES ARE IN PROGRESS TO EVALUATE THE MDRD STUDY EQUATION FOR ADDITIONAL ETHNIC GROUPS, THE  ELDERLY, VARIOUS DISEASE CONDITIONS, AND PEOPLE WITH NORMAL KIDNEY FUNCTION. GFRA----REFERS TO   GFRO---REFERS TO OTHER RACES  REFERENCES AVAILABLE UPON REQUEST.      GFR est non-AA 04/20/2017 52    Final    Calcium 04/20/2017 8.6  8.5 - 10.1 mg/dl Final    AST (SGOT) 04/20/2017 15  15 - 37 U/L Final    ALT (SGPT) 04/20/2017 16  12 - 78 U/L Final    Alk.  phosphatase 04/20/2017 71  45 - 117 U/L Final    Bilirubin, total 04/20/2017 1.2* 0.2 - 1.0 mg/dl Final    Protein, total 04/20/2017 6.6  6.4 - 8.2 gm/dl Final    Albumin 04/20/2017 3.2* 3.4 - 5.0 gm/dl Final   Office Visit on 03/09/2017   Component Date Value Ref Range Status    Color (UA POC) 03/09/2017 Yellow   Final    Clarity (UA POC) 03/09/2017 Clear   Final    Glucose (UA POC) 03/09/2017 Negative  Negative Final    Bilirubin (UA POC) 03/09/2017 Negative Negative Final    Ketones (UA POC) 03/09/2017 Negative  Negative Final    Specific gravity (UA POC) 03/09/2017 1.015  1.001 - 1.035 Final    Blood (UA POC) 03/09/2017 Negative  Negative Final    pH (UA POC) 03/09/2017 6.5  4.6 - 8.0 Final    Protein (UA POC) 03/09/2017 Trace  Negative mg/dL Final    Urobilinogen (UA POC) 03/09/2017 0.2 mg/dL  0.2 - 1 Final    Nitrites (UA POC) 03/09/2017 Negative  Negative Final    Leukocyte esterase (UA POC) 03/09/2017 Negative  Negative Final       .No results found for any visits on 05/15/17. Assessment / Plan:  Recent CVA with right sided weakness -- showing improving  (I63.9)  Hypertension -- BP is a bit high (I10)  Hyperlipidemia -- on medications (E78.4)    Will add Benazepril for BP  he was advised to continue his maintenance medications  Medication Reconciliation:  Medications were reconciled with the patient and the hospital records during this visit  Arrange for outpatient physical therapy, speech therapy and occupational therapy  Discussed possible approaches for the patient in dealing with his wife    Follow up:  3 weeks    I asked Chelsea Toney [employer] if he has any questions and I answered the questions. [employer] states that he understands the treatment plan and agrees with the treatment plan      LOS:  15115  I63.9)

## 2017-05-17 PROCEDURE — 3331090002 HH PPS REVENUE DEBIT

## 2017-05-17 PROCEDURE — 3331090001 HH PPS REVENUE CREDIT

## 2017-05-18 ENCOUNTER — HOSPITAL ENCOUNTER (OUTPATIENT)
Dept: PHYSICAL THERAPY | Age: 77
Discharge: HOME | End: 2017-05-18
Payer: MEDICARE

## 2017-05-18 ENCOUNTER — PATIENT OUTREACH (OUTPATIENT)
Dept: INTERNAL MEDICINE CLINIC | Age: 77
End: 2017-05-18

## 2017-05-18 VITALS
OXYGEN SATURATION: 98 % | RESPIRATION RATE: 16 BRPM | HEART RATE: 69 BPM | DIASTOLIC BLOOD PRESSURE: 80 MMHG | TEMPERATURE: 98 F | SYSTOLIC BLOOD PRESSURE: 140 MMHG

## 2017-05-18 PROCEDURE — 97162 PT EVAL MOD COMPLEX 30 MIN: CPT

## 2017-05-18 PROCEDURE — 3331090001 HH PPS REVENUE CREDIT

## 2017-05-18 PROCEDURE — G8987 SELF CARE CURRENT STATUS: HCPCS

## 2017-05-18 PROCEDURE — G9162 LANG EXPRESS CURRENT STATUS: HCPCS

## 2017-05-18 PROCEDURE — 97165 OT EVAL LOW COMPLEX 30 MIN: CPT

## 2017-05-18 PROCEDURE — 92523 SPEECH SOUND LANG COMPREHEN: CPT

## 2017-05-18 PROCEDURE — G8978 MOBILITY CURRENT STATUS: HCPCS

## 2017-05-18 PROCEDURE — 97110 THERAPEUTIC EXERCISES: CPT

## 2017-05-18 PROCEDURE — 97530 THERAPEUTIC ACTIVITIES: CPT

## 2017-05-18 PROCEDURE — 3331090002 HH PPS REVENUE DEBIT

## 2017-05-18 PROCEDURE — G8979 MOBILITY GOAL STATUS: HCPCS

## 2017-05-18 PROCEDURE — G9163 LANG EXPRESS GOAL STATUS: HCPCS

## 2017-05-18 PROCEDURE — G8988 SELF CARE GOAL STATUS: HCPCS

## 2017-05-18 NOTE — PROGRESS NOTES
[facility]  [address] [facility]  [phone] fax    Plan of Care/ Statement of Necessity for Physical Therapy Services    Patient name: Alan Don Start of Care: 2017   Referral source: Brad Henao MD : 1940    Medical Diagnosis: Cerebral infarction ([address] [I63.9]  Weakness [R53.1]   Onset Date:17    Treatment Diagnosis: R hemiparesis    Prior Hospitalization: see medical history Provider#: [id number]   Medications: Verified on Patient summary List    Comorbidities: thyroid problems, HTN, CVA    Prior Level of Function: lives with wife in a 1 story home, no steps to enter, aide for housekeeping since CVA, caregiver for wife (Alzheimer's), walk-in shower, Ind with ADLs, yard work, Ind with ambulation       The Plan of Care and following information is based on the information from the initial evaluation. Assessment/ key information: Pt is a 65 yo M who presents s/p CVA 17. He had inpatient rehab following the stroke and now presents to outpatient therapy. Pt ambulates with decreased weight-shift to R, demonstrating decreased hip/knee flexion with circumduction compensation . He maintains RLE in slight flexion to consciously avoid knee hyperextension during stance phase of gait. Pt presents with minor strength deficits of RLE (ranging 4-/5 to 4+/5). He is Alexander for bed mobility and sit to stand transfers, requiring extra time. DGI score of 16/24 and TUG score of 16 seconds is indicative of fall risk. Pt presents with good static balance, with no LOB with Romberg EO/EC on compliant and non-compliant surfaces. B SLS is limited to 1 second, likely contributing to minor instability during stance phase of gait.   Pt will benefit from skilled PT to address strength, ROM, flexibility, balance, and functional mobility deficits in order to improve ease of caring for his wife and improved ease of daily activities. Evaluation Complexity History HIGH Complexity :3+ comorbidities / personal factors will impact the outcome/ POC ; Examination HIGH Complexity : 4+ Standardized tests and measures addressing body structure, function, activity limitation and / or participation in recreation  ;Presentation MEDIUM Complexity : Evolving with changing characteristics  ; Clinical Decision Making MEDIUM Complexity : FOTO score of 26-74  Overall Complexity Rating: MEDIUM  Problem List: decrease ROM, decrease strength, impaired gait/ balance, decrease ADL/ functional abilitiies, decrease activity tolerance, decrease flexibility/ joint mobility and decrease transfer abilities   Treatment Plan may include any combination of the following: Therapeutic exercise, Therapeutic activities, Neuromuscular re-education, Physical agent/modality, Gait/balance training, Manual therapy, Patient education, Self Care training, Functional mobility training, Home safety training and Stair training  Patient / Family readiness to learn indicated by: trying to perform skills and interest  Persons(s) to be included in education: patient (P) and family support person (FSP);list son, daughter-in-law  Barriers to Learning/Limitations: None  Patient Goal (s): be back where I was before this happened   Patient Self Reported Health Status: good  Rehabilitation Potential: good    Short Term Goals: To be accomplished in 1 weeks:  1. Pt will be compliant with initial HEP to improve therapy outcomes   Long Term Goals: To be accomplished in 6 weeks:  1. Pt will improve FOTO by points in order to demonstrate functional improvement   2. Pt will improve TUG to 10 seconds in order to demonstrate reducing fall risk  3. Pt will improve RLE strength by 1/2 MMT grade in order to improve ease of prolonged ambulation  4.  Pt will ambulate 48' with equal weight shift in order to progress ambulatory ability     Frequency / Duration: Patient to be seen 2-3 times per week for 6 weeks. Patient/ Caregiver education and instruction: Diagnosis, prognosis, activity modification and exercises   [x]  Plan of care has been reviewed with PTA    G-Codes (GP)  Mobility   Current  CK= 40-59%   Goal  CK= 40-59%    The severity rating is based on clinical judgment and the FOTO score. Certification Period: 5/18/17 to 8/16/17  Poppy White, PT 5/18/2017 11:06 AM    ________________________________________________________________________    I certify that the above Therapy Services are being furnished while the patient is under my care. I agree with the treatment plan and certify that this therapy is necessary.     Physician's Signature:____________________  Date:____________Time: _________    Please sign and return to [facility]  [address] [place] [address]  [phone] fax

## 2017-05-18 NOTE — PROGRESS NOTES
Transitions of Care Coordination    Follow up for hospitalization at [facility] 4/18-4/24/17 for an acute ischemic CVA, s/p tPA, AMS, CKD,GERD and transfer to the [facility] 4/24-5/12/17. The patient was discharged to home with outpatient therapies at [facility]. Mr. Kellee Castro was seen at home by Dr. Quinn Zhou on 5/15/17 and medications were reconciled at that time. Per visit note patient's right-sided weakness is improving. Benazepril was added for improved BP control. Follow up is planned for 3 weeks- 6/5/2017. Call to patient's home number. Reached patient's daughter who stated patient is resting after prolonged therapy appointments today. Daughter voiced no questions or concerns. Will follow.

## 2017-05-18 NOTE — PROGRESS NOTES
[facility]  [place]  [address] [facility]  [phone] fax    Plan of Care/Statement of Necessity for Occupational Therapy Services    Patient name: Eduin Yhe Start of Care: 2017   Referral source: Mario Lopez MD : 1940    Medical Diagnosis: There are no admission diagnoses documented for this encounter. 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 Assessment/ key information: 64year old RHD male who had sudden onset of weakness on r side and went to ER within 3 hours at [facility]. Patient was there 6 days, then transferred to [facility] and then home. He currently is receiving assistance from children for home care and cooking. A home care aide will be assisting with housekeeping. He presents with full AROM in RUE but with delayed responses and slight apraxia. His  is reduced at 45# . Fine motor skills are severely reduced on 9 hole peg test at 49, with in hand manipulation score of 281 on [place] Rate of manipulation Test.  He has poor legibility of writing. He reports difficulty with using r hand for tasks such as eating and writing. He is not currently driving, managing finances, or doing home care. His FOTO is 50/100 reflecting moderate impairment in function.   He will benefit from skilled occupational therapy to improve RUE coordination, strength and function for return to writing, home care and driving if cleared by MD .    Evaluation Complexity: History LOW Complexity : Brief history review Examination LOW Complexity : 1-3 performance deficits relating to physical, cognitive , or psychosocial skils that result in activity limitations and / or participation restrictions  Clinical Decision Making LOW Complexity : No comorbidities that affect functional and no verbal or physical assistance needed to complete eval tasks   Overall Complexity Rating: LOW     Problem List: Decreased strength, Decreased coordination/prehension, Decreased ADL/functional abilities  and Decreased activity tolerance     Treatment Plan may include any combination of the following: Therapeutic exercise, Therapeutic activities, Patient education and ADLs/IADLs    Patient / Family readiness to learn indicated by: asking questions, trying to perform skills and interest    Persons(s) to be included in education:   patient (P)    Barriers to Learning/Limitations: yes;  sensory deficits-vision/hearing/speech    Patient Goal (s): Drive, write, strength    Patient Self Reported Health Status: good    Rehabilitation Potential: excellent    Short Term Goals: To be accomplished in 2 weeks:  1. Patient will be independent in home exercise program for fine motor skills. 2.  Patient will be independent in home exercise program for RUE strengthening. 3.  Patient will be able to feed self with R hand for full meal due to improved coordination of movement. Long Term Goals: To be accomplished in 24 treatments:   1. Patient will increase  strength in R hand by 20 pounds to increase ease of opening jars. 2.  Patient will improve in hand manipulation speed and fine motor skills at least 20% for fasteners. 3.  Patient will be able to fill out forms legibly and return to managing finances. 4.  Patient will report reduced impairment in home care and self care function per [facility] as shown by improved score of at least 15 points.       Frequency / Duration: Patient to be seen 2-3 times per week for 24 treatments:    Patient/ Caregiver education and instruction: Diagnosis, prognosis, self care, activity modification and exercises  [x]  Plan of care has been reviewed with IN[facility]     Self Care   Current  CK= 40-59%   Goal  CJ= 20-39%    The severity rating is based on clinical judgment and the FOTO score. Certification Period: 5/19/17-8/17/17    Julio VillaseñorDONR/L 5/18/2017 1:02 PM      ________________________________________________________________________    I certify that the above Therapy Services are being furnished while the patient is under my care. I agree with the treatment plan and certify that this therapy is necessary.     Physician's Signature:____________________Date:____________Time:__________    Please sign and return to [facility]   [address] [place] [address]  [phone] fax

## 2017-05-18 NOTE — PROGRESS NOTES
OT DAILY TREATMENT NOTE - [facility]     Patient Name: Rk Foster [address]  Date:2017  : 1940  [x]  Patient  Verified  Payor: Renetta Devine / Plan: [place] / Product Type: Medicare /    In time:1235  Out time:125  Total Treatment Time (min): 50  Total Timed Codes (min): 10  1:1 Treatment Time ( [address] only): 50   Visit #: 1 of 24    Treatment Area: There are no admission diagnoses documented for this encounter. SUBJECTIVE  Pain Level (0-10 scale): 0/10  Any medication changes, allergies to medications, adverse drug reactions, diagnosis change, or new procedure performed?: [x] No    [] Yes (see summary sheet for update)  Subjective functional status/changes:   [] No changes reported  Want to write and drive    OBJECTIVE        10 min Therapeutic Activity:  []  See flow sheet :   Rationale: improve coordination  to improve the patients ability to manipulate itesm  Review and practice of select home activities for FM skill       With   [] TE   [x] TA   [] neuro   [] other: Patient Education: [x] Review HEP    [] Progressed/Changed HEP based on: FM HEP  [] positioning   [] body mechanics   [] transfers   [] heat/ice application   [] Splint wear/care   [] Sensory re-education   [] scar management      [] other:            Other Objective/Functional Measures:   Subjective: pt is a right hand dominant, 64 y.o.y/o, male who had sudden onset of R side weakness went to [facility] 6 days, [facility]  Until 17   Prior level of function: I self care, home care, yard care, wife with Alzheimers who requires supervision, daughter helps with housework, cooking some, driving,   Pain EHJAQ:(9-UB pain 10-debilitating pain) no    Current functional limitations/living situation: With wife in house driving, home health assistance making meals, housekeeping,  daughters helping,     Medical hx: Thyroid    Medications: See the written copy of this report in the patient's paper medical record.        Objective:  Edema: Circumference    Right  Left  Mild edema present in r hand PIPs, does not preclude tight fist or cause discomfort    Range of Motion: WFL with slight delay in RUE  Strength:R grossly 4/5, L 4+/5  Hand ROM WNL  Hand Strength:   Gross Grasp 3pt Pinch Lateral Pinch Tip Pinch   Right  45 12 17 8   Left 68 12 18 12     Nine-Hole Peg Test:  Left= _24____seconds  Right=__49___seconds     L  106   R 281  Finger Opposition:Slight difficulty with accuracy          ADLs  Feeding:        []MaxA   []ModA   [x]Tracey   [] CGA   []SBA   []Carleen   []Independent  UE Dressing:       []MaxA   []ModA   []Tracey   [] CGA   []SBA   []Carleen   [x]Independent  LE Dressing:       []MaxA   []ModA   []Tracey   [] CGA   []SBA   []Carleen   [x]Independent  Grooming:       []MaxA   []ModA   [x]Tracey   [] CGA   []SBA   []Carleen   []Independent  Toileting:       []MaxA   []ModA   []Tracey   [] CGA   []SBA   []Carleen   [x]Independent  Bathing:       []MaxA   []ModA   []Tracey   [] CGA   []SBA   []Carleen   [x]Independent  Light Meal Prep:    []MaxA   []ModA   []Tracey   [] CGA   []SBA   []Carleen   []Independent  Household/Other:  [x]MaxA   []ModA   []Tracey   [] CGA   []SBA   []Carleen   []Independent  Adaptive Equip:     []MaxA   []ModA   []Tracey   [] CGA   []SBA   []Carleen   []Independent  Driving:       [x]MaxA   []ModA   []Tracey   [] CGA   []SBA   []Carleen   []Independent  Money Mgmt:        []MaxA   []ModA   []Tracey   [] CGA   []SBA   []Carleen   []Independent    Coordination   GM                           FM []WFL          [x] Impaired   []WFL          [x] Impaired    Tone/Motor Control []WFL          [x] Impaired    Midline Symmetry [x]WFL          [] Impaired    Visual Perception:                    R/L   Neglect           R/L Discrimination                   Body Scheme [x]WFL          [] Impaired   [x]WFL          [] Impaired     [x]WFL          [] Impaired     [x]WFL          [] Impaired    Visual Motor Skills                           Tracking                           Tracing Writing   [x]WFL          [] Impaired   [x]WFL          [] Impaired   []WFL          [x] Impaired    Cognition [x]Person         [x]Place            [x]Date   [x]Situation/ Behavior    Follows Commands  []     1-step  [] 2-step   [x]Multi-step      Memory:                             STM                             LTM   [x]WFL          [] Impaired   [x]WFL          [] Impaired    Safety Awareness [x]WFL          [] Impaired    Judgment  [x]WFL          [] Impaired    Express Needs [x]WFL          [] Impaired           Pain Level (0-10 scale) post treatment: 0/10    ASSESSMENT/Changes in Function:    [x]  See Plan of Care  []  See progress note/recertification  []  See Discharge Summary           PLAN  []  Upgrade activities as tolerated     []  Continue plan of care  []  Update interventions per flow sheet       []  Discharge due to:_  []  Other:_      Mayi Gómez OTR/L 5/18/2017  12:30 PM    Future Appointments  Date Time Provider Nelson Hurtado   6/5/2017 3:15 PM José Miguel Reyna  [address] [place] [address]

## 2017-05-18 NOTE — PROGRESS NOTES
PT DAILY TREATMENT NOTE/NEURO EVAL 3-16    Patient Name: Kurt Mann  Date:2017  : 1940  [x]  Patient  Verified  Payor: Kan Muro / Plan: [place] MEDICARE PART A & B / Product Type: Medicare /    In time:11:10  Out time:11:45  Total Treatment Time (min): 35  Total Timed Codes (min): 12  1:1 Treatment Time ([address] only): 35   Visit #: 1 of     Treatment Area: Cerebral infarction ([address] [I63.9]  Weakness [R53.1]    SUBJECTIVE  Pain Level (0-10 scale): 0/10  []constant []intermittent []improving []worsening []no change since onset    Any medication changes, allergies to medications, adverse drug reactions, diagnosis change, or new procedure performed?: [x] No    [] Yes (see summary sheet for update)  Subjective functional status/changes:       R hemiparesis 17. Ind with yard work and household tasks, and caregiver for wife with Alzheimer's prior. Significant improvement with inpatient rehab. Was using a RW after the stroke and now ambulates without AD. No new pains since stroke. No problems with vision since stroke. Had swallowing deficits initially but is now on a normal diet and cleared to drink water. Has an aide helping with housework currently.       Barriers: []pain []financial []time [x]transportation []other  Motivation: high  Substance use: []Alcohol []Tobacco [x]other: none  FABQ Score: [x]low []elevate - based on FOTO  Cognition: A & O x 4    Other:    OBJECTIVE/EXAMINATION    23 min [x]Eval                  []Re-Eval       8 min Therapeutic Exercise:  [] See flow sheet :   Rationale: increase ROM, increase strength and improve balance to improve the patients ability to improve gait pattern     4 min Therapeutic Activity:  []  See flow sheet :   Rationale: Educated patient regarding findings of evaluation, 3 systems of balance, proper gait pattern, new therex technique and purpose, purpose of therapy, and therapy plan in order to ensure pt understanding/compliance with therapy              With   [] TE   [] TA   [] neuro   [] other: Patient Education: [x] Review HEP    [] Progressed/Changed HEP based on:   [] positioning   [] body mechanics   [] transfers   [] heat/ice application    [] other:      Other Objective/Functional Measures:     /68 taken manually prior to therapy     Physical Therapy Evaluation - Neurologic    Gait: [] Normal    [] Abnormal    Device:      Describe: decreased weight-shift to R, decreased hip/knee flexion with circumduction on R, decreased riccardo     ROM: WFL  Slightly reduced ankle DF on R     Strength (MMT):                    Hip L (1-5) R (1-5)   Hip Flexion 4+ 4+   Hip Ext 4 4-   Hip ABD 4+ 4   Hip ADD     Hip ER 5 4+   Hip IR 5 4+     Knee L (1-5) R (1-5)   Knee Flexion 5 4+   Knee Extension 5 4   Ankle PF 4+ 4+   Ankle DF 5 4+   Other       PF measured in sitting     Tone: 0 on Modified Yaritza Scale BLE    Proprioception: intact BLE    Sensation: grossly intact to light touch BLE    Balance/ Equilibrium:            Sitting Balance: Static:  [x] Good    [] Fair    [] Poor     Dynamic:   [x] Good    [] Fair    [] Poor        Standing Balance: Static:   [x] Good    [] Fair    [] Poor     Dynamic:   [] Good    [x] Fair    [] Poor        Single Leg Stance:         Eyes Open  Eyes Closed   L 1 second L    R 1 second  R        Functional Mobility      Bed Mobility:      Scooting: Independent        Rolling: Alexander, requiring extra time       Sit-Supine: Alexander, requiring extra time      Transfers:       Sit-Stand: Alexander, able to perform without UE support without difficulty        Floor-Stand:        Behavior: [x] Cooperative    [] Impulsive    [] Agitated    [] Perseverative    [] Confused   Oriented x: 4    Cognition: [] One Step Commands   [x] Multiple Commands   [] Displays Neglect [] R  [] L    Other:       Impaired Judgement: [] Y    [x] N      Impaired Vision:  [] Y    [x] N      Safety Awareness Deficits  [x] Y    [] N      Impaired Hearing  [] Y    [x] N      Able to Express Needs [] Y    [x] N    Optional Tests:       Dynamic Gait Index (24pt scale): 16/24       TUG:        Trial 1: 19 seconds   Trial 2: 13 seconds   Average: 16 seconds     Other test /comments:  Able to perform isolated hip flexion and knee flexion during standing HEP  Slightly reduced DF evident during standing TR  No pain with therapy     Pain Level (0-10 scale) post treatment: 0/10    ASSESSMENT/Changes in Function: See POC    Patient will continue to benefit from skilled PT services to modify and progress therapeutic interventions, address functional mobility deficits, address ROM deficits, address strength deficits, analyze and address soft tissue restrictions, analyze and cue movement patterns, analyze and modify body mechanics/ergonomics, assess and modify postural abnormalities, address imbalance/dizziness and instruct in home and community integration to attain remaining goals.      [x]  See Plan of Care  []  See progress note/recertification  []  See Discharge Summary         Progress towards goals / Updated goals:  See POC    PLAN  [x]  Upgrade activities as tolerated     []  Continue plan of care  [x]  Update interventions per flow sheet       []  Discharge due to:_  []  Other:_      Chloe Auguste, PT 5/18/2017  11:07 AM

## 2017-05-19 PROCEDURE — 3331090002 HH PPS REVENUE DEBIT

## 2017-05-19 PROCEDURE — 3331090001 HH PPS REVENUE CREDIT

## 2017-05-20 PROCEDURE — 3331090002 HH PPS REVENUE DEBIT

## 2017-05-20 PROCEDURE — 3331090001 HH PPS REVENUE CREDIT

## 2017-05-21 PROCEDURE — 3331090001 HH PPS REVENUE CREDIT

## 2017-05-21 PROCEDURE — 3331090002 HH PPS REVENUE DEBIT

## 2017-05-22 PROCEDURE — 3331090002 HH PPS REVENUE DEBIT

## 2017-05-22 PROCEDURE — 3331090001 HH PPS REVENUE CREDIT

## 2017-05-22 NOTE — PROGRESS NOTES
[facility] at 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 Pt is right-hand dominant; wears glasses for reading. Pt further reports that he does not \"read well\" as a result of 9-grade education; \"I don't enjoy reading; I only read when I have to. \"     The Plan of Care and following information is based on the information from the initial evaluation. Assessment/ key information: Pt is a 73-year-old male referred for speech therapy evaluation to assess cognitive-linguistic function s/p stroke. Pt reports difficulty with memory (specifically names and numbers) and word recall in conversation since stroke. Based on the objective data from the Western Aphasia Battery (WAB) and the [employer], pt presents with a mild cognitive-linguistic deficit with errors noted in: fluency at sentence level due to word recall difficulties and phonemic paraphasias; immediate memory (60% acc); delayed recall/working memory (1 of 4 words after 5 minute delay); and linguistic organization (6 items per concrete category). Pt further assessed with Clock Drawing Test (CDT) revealed deficits in visual-spatial organization.  Skilled speech therapy intervention warranted to address the aforementioned deficits to facilitate completion of daily communication activities necessary for community re-integration. Problem List:   Impaired Cognition, OtherMemory  and Other Word recall    Treatment Plan may include any combination of the following: Cognitive/Language Treatment and Patient Education    Patient / Family readiness to learn indicated by: asking questions, trying to perform skills and interest    Persons(s) to be included in education:   patient (P) and family support person (FSP);list  daughters    Barriers to Learning/Limitations: None    Patient Goal (s): \"I don't want to feel exhausted with I'm talking with people. \"    Rehabilitation Potential: good    Short Term Goals: To be accomplished in 8-10 treatments  GOALS:  1. Recall 3-5 words after delay/distraction and 1-2 (6-8 word) sentences to increase recall abilities as related to scheduling appointments with min-mod A in 3/5 trials with visual/verbal cues. 2. Answer egocentric information via open-ended wh-?'s with < 2 word recall error at the sentence level, utilizing word-recall techniques, min-mod visual/verbal cues  3. Complete moderately complex linguistic organization tasks with 90% acc with min A visual/verbal cues. 4. Complete visual-spatial tasks related to functional ADLs for safe, independent social reintegration with min A    Long Term Goals: To be accomplished in 12 weeks   Pt with demonstrate increase in cognitive-linguistic tasks necessary for completion of daily activities and safe ADL/IADL completion in 90% of tasks, with minimal cueing and self-monitoring.       GCODEExpression: [id number] Current  CJ= 20-39%  [id number] Goals  CI= 1-19%    The severity rating is based on the following outcomes:    Western Aphasia Battery- WAB and National Outcomes Measures (NOMS)   Ross Information Processing Assessment (RIPA) Clock Drawing Test (CDT)  Professional Judgement    Frequency / Duration: Patient to be seen 1-2 times per week for 4 weeks:    Patient/ Caregiver education and instruction: Alternate Communication modes and Compensatory Techniques    Certification Period: 5/18/2017 - 6/15/2017      AUGUSTINA Orosco 5/21/2017 8:13 PM  ________________________________________________________________________    I certify that the above Therapy Services are being furnished while the patient is under my care. I agree with the treatment plan and certify that this therapy is necessary. Y or N I have read the above and request that my patient continue as recommended.   Y or N I have read the above report and request that my patient continue therapy with the following changes/special instructions:  Y or N I have read the above report and request that my patient be discharged from therapy    Physician's Signature:____________________  Date/Time:________________    Please sign and return to [facility]   Fax: [phone]

## 2017-05-23 ENCOUNTER — PATIENT OUTREACH (OUTPATIENT)
Dept: INTERNAL MEDICINE CLINIC | Age: 77
End: 2017-05-23

## 2017-05-23 PROCEDURE — 3331090002 HH PPS REVENUE DEBIT

## 2017-05-23 PROCEDURE — 3331090001 HH PPS REVENUE CREDIT

## 2017-05-24 ENCOUNTER — OFFICE VISIT (OUTPATIENT)
Dept: INTERNAL MEDICINE CLINIC | Age: 77
End: 2017-05-24

## 2017-05-24 ENCOUNTER — HOSPITAL ENCOUNTER (OUTPATIENT)
Dept: PHYSICAL THERAPY | Age: 77
Discharge: HOME | End: 2017-05-24
Payer: MEDICARE

## 2017-05-24 DIAGNOSIS — T17.908D ASPIRATION INTO AIRWAY, SUBSEQUENT ENCOUNTER: Primary | ICD-10-CM

## 2017-05-24 DIAGNOSIS — I69.351 HEMIPARESIS AFFECTING RIGHT SIDE AS LATE EFFECT OF CEREBROVASCULAR ACCIDENT (CVA) (HCC): ICD-10-CM

## 2017-05-24 DIAGNOSIS — I69.391 DYSPHAGIA AS LATE EFFECT OF CEREBROVASCULAR ACCIDENT (CVA): ICD-10-CM

## 2017-05-24 DIAGNOSIS — T17.908A ASPIRATION INTO AIRWAY, INITIAL ENCOUNTER: Primary | ICD-10-CM

## 2017-05-24 PROCEDURE — 97116 GAIT TRAINING THERAPY: CPT

## 2017-05-24 PROCEDURE — 3331090002 HH PPS REVENUE DEBIT

## 2017-05-24 PROCEDURE — 97112 NEUROMUSCULAR REEDUCATION: CPT

## 2017-05-24 PROCEDURE — 92507 TX SP LANG VOICE COMM INDIV: CPT

## 2017-05-24 PROCEDURE — 3331090001 HH PPS REVENUE CREDIT

## 2017-05-24 PROCEDURE — 97530 THERAPEUTIC ACTIVITIES: CPT

## 2017-05-24 NOTE — PROGRESS NOTES
OT DAILY TREATMENT NOTE - [facility]     Patient Name: Jessica Daigle [address]  Date:2017  : 1940  [x]  Patient  Verified  Payor: Katerine Bird / Plan: [place] MED[place] & [place] / Product Type: Medicare /    In time:915  Out time:945  Total Treatment Time (min): 30  Total Timed Codes (min): 30  1:1 Treatment Time ( [address] only): 15   Visit #: 2 of 24    Treatment Area: There are no admission diagnoses documented for this encounter. SUBJECTIVE  Pain Level (0-10 scale): 0/10  Any medication changes, allergies to medications, adverse drug reactions, diagnosis change, or new procedure performed?: [x] No    [] Yes (see summary sheet for update)  Subjective functional status/changes:   [] No changes reported  Just don't feel good, trouble swallowing, drainage, feel worn out.   Feel like I am going backwards    OBJECTIVE      15 min Therapeutic Exercise:  [] See flow sheet :   Rationale: increase strength to improve the patients ability to grasp  Gripper 35# x 50 1 in pegs    15 min Therapeutic Activity:  []  See flow sheet :   Rationale: improve coordination  to improve the patients ability to manipulate items  1/4 in pegs placement to complete design no errors  Sets of 3 1 in pegs x 50       With   [] TE   [] TA   [] neuro   [] other: Patient Education: [x] Review HEP    [] Progressed/Changed HEP based on:   [] positioning   [] body mechanics   [] transfers   [] heat/ice application   [] Splint wear/care   [] Sensory re-education   [] scar management      [x] other: aspiration pneumonia-check in with MD if not feeling better           Other Objective/Functional Measures:   Patient with frequent throat clearing, slow movements     Pain Level (0-10 scale) post treatment: 0/10    ASSESSMENT/Changes in Function: Lethargic with slow movements today    Patient will continue to benefit from skilled OT services to modify and progress therapeutic interventions, address strength deficits, analyze and cue movement patterns and instruct in home and community integration to attain remaining goals. []  See Plan of Care  []  See progress note/recertification  []  See Discharge Summary         Progress towards goals / Updated goals:  1. Patient will be independent in home exercise program for fine motor skills. met 5/24/17  2. Patient will be independent in home exercise program for RUE strengthening. 3.  Patient will be able to feed self with R hand for full meal due to improved coordination of movement. Long Term Goals: To be accomplished in 24 treatments:   1. Patient will increase  strength in R hand by 20 pounds to increase ease of opening jars. 2.  Patient will improve in hand manipulation speed and fine motor skills at least 20% for fasteners. 3.  Patient will be able to fill out forms legibly and return to managing finances. 4.  Patient will report reduced impairment in home care and self care function per [facility] as shown by improved score of at least 15 points.           PLAN  [x]  Upgrade activities as tolerated     [x]  Continue plan of care  []  Update interventions per flow sheet       []  Discharge due to:_  []  Other:_      SERENITY Robertson/L 5/24/2017  9:19 AM    Future Appointments  Date Time Provider Nelson Hurtado   5/24/2017 10:45 AM Catalina Szymanski, AUGUSTINA MMCPTPB [facility]   5/24/2017 11:30 AM Jhony Shows [facility]CPTPB [facility]   5/26/2017 9:00 AM Maikol Cough, PTA ROCNPTL [facility]   5/30/2017 10:30 AM [facility] PT [address] [id number] [facility]   5/30/2017 11:00 AM Poppy White PT [id number] [facility]   6/5/2017 3:15 PM Howard Tyson  [address]   6/6/2017 10:30 AM [facility] PT [address] DTVASWR [facility]   6/6/2017 11:00 AM Poppy White PT BCNINDP [facility]   6/8/2017 10:30 AM [facility] PT [address] [id number] [facility]   6/8/2017 11:00 AM Maikol Cough, PTA MMCPTPB [facility]   6/13/2017 10:00 AM Fuentes Rea, OTR/L [facility]CPTPB [facility]   6/13/2017 10:30 AM Maikol Cough, PTA MMCPTPB [facility]   6/15/2017 10:30 AM Poppy Lemoore, PT MMCPTPB [facility] 6/15/2017 11:00 AM Khari Beltran OTR/L [facility]CPTPB [facility]

## 2017-05-24 NOTE — PROGRESS NOTES
ST DAILY TREATMENT NOTE    Patient Name: Carline Rangel  Date:2017  : 1940  [x]  Patient  Verified  Payor: Tacho Hayward / Plan: [place] MEDICARE PART [place] & B / Product Type: Medicare /   In time: 10:50  Out time: 11:30  Total Treatment Time (min): 40  Visit #: 2 of 8    Treatment Diagnosis: Cognitive change [R41.89]  Dysphagia, unspecified [R13.10]    SUBJECTIVE  Pain Level (0-10 scale): 0  Any medication changes, allergies to medications, adverse drug reactions, diagnosis change, or new procedure performed?: [x] No    [] Yes (see summary sheet for update)    Subjective functional status/changes:   [] No changes reported  Patient's dtr in law said that she had some concerns \"we are not sure but he seems to be  having more difficulty swallowing. He said that his throat is constricted trying to get the food or liquid down\" He has PND and he took Claritin yesterday. He also has GERD. He has been throat clearing for years due to reflux. I was wondering if you could do a swallowing assessment. Last night he took 2 Tums due to reflux and then he I got up and took two more. He is congested in his throat and very tired. He said that he the mucous in his throat is thick but it is not colored. He is up several times at night due to his wife having Alzheimer's so he doesn't get much sleep because he has to sleep in the same bed with her. OT Nadia Nicholson had told this SLP that she wondered it patient was aspirating due to congestion, very lethargic. OBJECTIVE  Treatment provided includes:  Increase/Improve:  []  Voice Quality []  Cognitive Linguistic Skills []  Laryngeal/Pharyngeal Exercises   []  Vocal Loudness []  Reading Comprehension []  Swallowing Skills    []  Vocal Cord Function []  Auditory Comprehension []  Oral Motor Skills   []  Resonance []  Writing Skills []  Compensatory strategies    []  Speech Intelligibility []  Expressive Language []     []  Breath Support/Coord.  []  Receptive language [] []  Articulation []  Safety Awareness []    []  Fluency []  Word Retrieval []        Treatment Provided:  SLP checked to see if there was a script for swallowing eval-there was none. Observed patient with a few isolated sip of thin liquid: increase in throat clearing observed with a mild wet vocal quality. SLP called Dr Dixon Ruby, his PCP and explained patient's symptoms, complaints and results of patient with trial of thin liquid. SLP requested a script for a swallowing evaluation or an repeat MBS since the last one that he had was a few days after his stroke at [facility]-on pureed and thickened liquids he dtr in law stated. Told Dr. Alcides Brar patient was advanced to regular consistency with thin liquid in acute rehab during his course of tx there but his dtr in law thinks that his swallowing is worse since he was discharged. SLP stated that even with a bedside swallowing evaluation based on how patient did today with thin liquid sips that I would be recommending a repeat MBS. He said that he would fax over a script and for patient to come to his office after his therapies today. Asked patient how he takes his reflux med-with his other pills with meals usually. 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 He spoke for 15 minutes and did not not exhibit any word finding difficulty. Excellent verbal sequencing. No errors. Patient/Caregiver  Education: [x] Review HEP      S/s of aspiration, recommended swallowing evaluation and MBS process. Recommended that patient continue to take small bites, small sips, extra dry swallows which were taught to him in rehab.  Instructed pt/cg how to take his reflux med (Pantoprozole) for best absorption: 30-60 minutes prior to breakfast on an empty stomach and then eat a substantial meal. (handout with this information also given). HEP/Handouts given: How to take his reflux med (handout). Other Objective/Functional Measures:    Pain Level (0-10 scale) post treatment: 0    ASSESSMENT  Patient with increased complaints of swallowing difficulty/or increased symptoms of reflux. With thin liquid trial today he exhibited increased throat clearing,a change in vocal quality (mildly wet). Recommended that patient see his PCP and script for MBS will be faxed over today. Patient has not been taking his PPI correctly for the best absorption. He spoke for 15 minutes today about his lifetime occupation/business, the family business and many details about it with no abnormalites nor word finding difficulty. Excellent verbal sequencing noted. []  See Plan of Care  []  See progress note/recertification  []  See Discharge Summary    Patient will continue to benefit from skilled therapy to address remaining functional deficits:  S/s of aspiration/dysphagia, cognitive-linguistic deficits: decreased recall-immediate, word-finding difficulty     Progress towards goals / Updated goals:  GOALS:  1. Recall 3-5 words after delay/distraction and 1-2 (6-8 word) sentences to increase recall abilities as related to scheduling appointments with min-mod A in 3/5 trials with visual/verbal cues. 2. Answer egocentric information via open-ended wh-?'s with < 2 word recall error at the sentence level, utilizing word-recall techniques, min-mod visual/verbal cues. progressing  3. Complete moderately complex linguistic organization tasks with 90% acc with min A visual/verbal cues. 4. Complete visual-spatial tasks related to functional ADLs for safe, independent social reintegration with min A     Long Term Goals:  To be accomplished in 12 weeks   Pt with demonstrate increase in cognitive-linguistic tasks necessary for completion of daily activities and safe ADL/IADL completion in 90% of tasks, with minimal cueing and self-monitoring.      PLAN  [x]  Continue plan of care  []  Modify Goals/Treatment Plan      []  Discharge due to:  [x] Other:  Script for MBS was faxed over from patient's PCP and the script was faxed to speech therapy at [facility] so that the SLP could set up the appointment.      Hansa Hernandez, SLP 5/24/2017  10:54 AM    Future Appointments  Date Time Provider Department Center   5/24/2017 11:30 AM Nnamdi Chaparro [id number] [facility]   5/26/2017 9:00 AM Nuria Cintron PTA MMCPTPB [facility]   5/30/2017 10:30 AM [facility] PT [address] MMCPTPB [facility]   5/30/2017 11:00 AM Laureano Roman, PT REYDUED [facility]   6/5/2017 3:15 PM Marcelo Friend  [address] [place] [address]   6/6/2017 10:30 AM [facility] PT [address] MMCPTPB [facility]   6/6/2017 11:00 AM Laureano Roman, PT [id number] [facility]   6/8/2017 10:30 AM [facility] PT [address] MMCPTPB [facility]   6/8/2017 11:00 AM Nuria Cintron PTA [id number] [facility]   6/13/2017 10:00 AM Jaye Torres, OTR/L [facility]CPTP[facility]   6/13/2017 10:30 AM Nuria Cintron PTA MMCPTPB [facility]   6/15/2017 10:30 AM Laureano Roman, PT MMCPTPB [facility]   6/15/2017 11:00 AM Jaye Torres, OTR/L [facility]CP[facility]

## 2017-05-24 NOTE — PROGRESS NOTES
PT DAILY TREATMENT NOTE - [facility]    Patient Name: Dell Story [address]  Date:2017  : 1940  [x]  Patient  Verified  Payor: Dara Sahni / Plan: [place] MEDICARE PART A & B / Product Type: Medicare /    In time:11:34  Out time:12:09  Total Treatment Time (min): 35 (patient 9 minutes in rest room)  Total Timed Codes (min): 26  1:1 Treatment Time ( [address] only): 26   Visit #: 2 of     Treatment Area: Weakness [R53.1]  Cerebral infarction, unspecified [I63.9]    SUBJECTIVE  Pain Level (0-10 scale): 0  Any medication changes, allergies to medications, adverse drug reactions, diagnosis change, or new procedure performed?: [x] No    [] Yes (see summary sheet for update)  Subjective functional status/changes:   [] No changes reported  Patient reports no new complaints.      OBJECTIVE  8 min Therapeutic Exercise:  [x] See flow sheet :   Rationale: increase ROM, increase strength and improve coordination to improve the patients ability to increase ease of ADLs    8 min Neuromuscular Re-education:  [x]  See flow sheet :   Rationale: increase strength, improve coordination, improve balance and increase proprioception  to improve the patients ability to improve stability and normalize gait     10 min Gait Trainin feet without device on level surfaces with SBA level of assist   Rationale: to normalize gait and increase ambulation tolerance            With   [] TE   [] TA   [] neuro   [] other: Patient Education: [x] Review HEP    [] Progressed/Changed HEP based on:   [] positioning   [] body mechanics   [] transfers   [] heat/ice application    [] other:      Other Objective/Functional Measures:   Patient uses restroom (9 minutes)  Frequent cueing to slow down marching reps     Patient is able to ambulate x100 feet without AD and SBA (with mirror), cues heel/toe pattern and reciprocal arm swing, presents with limp on left, cues for symmetrical weight bear, c/o dizziness post gait training, post seated rest break, patient reports no dizziness    Frequent cueing to increase right hip flexion and avoid hip circumduction during obstacle course, one LOB with low bev requiring Jerardo to regain balance    Pain Level (0-10 scale) post treatment: 0    ASSESSMENT/Changes in Function: Initiated POC per flowsheet. Patient is challenged with obstacle course, requiring Jerardo to regain balance with low bev step over x 1 and frequent cues to increase hip flexion to clear toes with low hurdles. Patient puts forth good effort with therex, will continue POC. Patient will continue to benefit from skilled PT services to modify and progress therapeutic interventions, address functional mobility deficits, address ROM deficits, address strength deficits, analyze and address soft tissue restrictions, analyze and cue movement patterns, analyze and modify body mechanics/ergonomics, assess and modify postural abnormalities, address imbalance/dizziness and instruct in home and community integration to attain remaining goals. []  See Plan of Care  []  See progress note/recertification  []  See Discharge Summary         Short Term Goals: To be accomplished in 1 weeks:  1. Pt will be compliant with initial HEP to improve therapy outcomes   Long Term Goals: To be accomplished in 6 weeks:  1. Pt will improve FOTO by points in order to demonstrate functional improvement   2. Pt will improve TUG to 10 seconds in order to demonstrate reducing fall risk  3. Pt will improve RLE strength by 1/2 MMT grade in order to improve ease of prolonged ambulation  4.  Pt will ambulate 48' with equal weight shift in order to progress ambulatory ability     PLAN  []  Upgrade activities as tolerated     [x]  Continue plan of care  []  Update interventions per flow sheet       []  Discharge due to:_  []  Other:_      Aric Antunez 5/24/2017  9:40 AM    Future Appointments  Date Time Provider Nelson Hurtado   5/24/2017 10:45 AM AUGUSTINA Busby MMCPTPB [facility] 5/24/2017 11:30 AM Stoney Denilson [facility]CPTPB [facility]   5/26/2017 9:00 AM Antonieta Jama PTA MMCPTPB [facility]   5/30/2017 10:30 AM [facility] PT [address] MMCPTPB [facility]   5/30/2017 11:00 AM Vernestine Som, PT [id number] [facility]   6/5/2017 3:15 PM Iwona Anaya  [address] [place]   6/6/2017 10:30 AM [facility] PT [address] MMCPTPB [facility]   6/6/2017 11:00 AM Vernestine Som, PT BGKEALS [facility]   6/8/2017 10:30 AM [facility] PT [address] MMCPTPB [facility]   6/8/2017 11:00 AM Antonieta Jama PTA IKQUYHL [facility]   6/13/2017 10:00 AM Nicholas Kashif, OTR/L [facility]CPTPB [facility]   6/13/2017 10:30 AM Antonieta Jama PTA MMCPTPB [facility]   6/15/2017 10:30 AM Rea Kearns, PT MMCPTPB [facility]   6/15/2017 11:00 AM Nicholas Kashif, OTR/L MMCPTPB [facility]

## 2017-05-25 VITALS
HEIGHT: 71 IN | BODY MASS INDEX: 27.09 KG/M2 | OXYGEN SATURATION: 99 % | WEIGHT: 193.5 LBS | DIASTOLIC BLOOD PRESSURE: 78 MMHG | HEART RATE: 78 BPM | TEMPERATURE: 98.2 F | SYSTOLIC BLOOD PRESSURE: 126 MMHG

## 2017-05-25 PROCEDURE — 3331090002 HH PPS REVENUE DEBIT

## 2017-05-25 PROCEDURE — 3331090001 HH PPS REVENUE CREDIT

## 2017-05-26 ENCOUNTER — HOSPITAL ENCOUNTER (OUTPATIENT)
Dept: PHYSICAL THERAPY | Age: 77
Discharge: HOME | End: 2017-05-26
Payer: MEDICARE

## 2017-05-26 PROCEDURE — 3331090001 HH PPS REVENUE CREDIT

## 2017-05-26 PROCEDURE — 97110 THERAPEUTIC EXERCISES: CPT

## 2017-05-26 PROCEDURE — 3331090002 HH PPS REVENUE DEBIT

## 2017-05-26 NOTE — PROGRESS NOTES
The patient presents to the office today with the chief complaint of aspiration    HPI    The patient is status post a stroke with right sided weakness. The patient has mild trouble swallowing. He is being evaluated and helped by speech therapy. The patient has been observed to have aspiration by the speech therapist.  The patient notes coughing after eating. Review of Systems   Respiratory: Positive for cough. Negative for shortness of breath. Cardiovascular: Negative for chest pain and leg swelling. Neurological:        Right sided weakness       Allergies   Allergen Reactions    Pcn [Penicillins] Rash       Current Outpatient Prescriptions   Medication Sig Dispense Refill    atorvastatin (LIPITOR) 20 mg tablet 1 tablet daily  Indications: DYSLIPIDEMIA 30 Tab 6    clopidogrel (PLAVIX) 75 mg tab Take 1 Tab by mouth daily (with dinner). Indications: Cerebral Thromboembolism Prevention 30 Tab 4    benazepril (LOTENSIN) 20 mg tablet 1 tablet daily (for BP) 30 Tab 4    Omeprazole delayed release (PRILOSEC D/R) 20 mg tablet Take 20 mg by mouth Daily (before breakfast). Indications: gastroesophageal reflux disease      aspirin 81 mg chewable tablet Take 1 Tab by mouth daily (with breakfast). Indications: prevention of cerebrovascular accident 15 Tab 0    FOLIC ACID/MULTIVIT-MIN/LUTEIN (CENTRUM SILVER PO) Take 1 Tab by mouth daily.  levothyroxine (SYNTHROID) 150 mcg tablet Take 1 Tab by mouth daily. 90 Tab 3    Cholecalciferol, Vitamin D3, (VITAMIN D3) 1,000 unit Cap Take 1,000 Units by mouth daily.  Indications: PREVENTION OF VITAMIN D DEFICIENCY         Past Medical History:   Diagnosis Date    Acute ischemic stroke ([facility] [address] 4/19/2017    Acute Ischemic Stroke (acute to subacute ischemia involving the posterior limb of the left internal capsule, along the lateral aspect of the left thalamus) with residual right hemiparesis, dysphagia and dysarthria    Asbestosis ([address]     Chronic obstructive pulmonary disease (COPD) ([facility] [address]     CKD stage G3a/A1, GFR 45-59 and albumin creatinine ratio <30 mg/g 5/3/2017    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 HDL 42, LDL 97    Dysphagia as late effect of cerebrovascular accident (CVA) 4/19/2017    Erectile dysfunction     Gastroesophageal reflux disease     Hemiparesis affecting right side as late effect of cerebrovascular accident (CVA) ([facility] [address] 4/19/2017    History of endogenous hypertriglyceridemia     History of malignant neoplasm of prostate     Wellman score 7     Hypothyroidism     Osteoarthrosis involving multiple sites     Procedure refused 4/21/2017    Speech Pathologist recommended NPO and PEG placement; Patient refused    Urinary incontinence     Male incontinence        Past Surgical History:   Procedure Laterality Date    COLONOSCOPY      HX CHOLECYSTECTOMY      HX HERNIA REPAIR Bilateral     inguinal    HX RADICAL PROSTATECTOMY  1-    perineal approach       Social History     Social History    Marital status:      Spouse name: N/A    Number of children: N/A    Years of education: N/A     Occupational History    Not on file. Social History Main Topics    Smoking status: Never Smoker    Smokeless tobacco: Never Used    Alcohol use No    Drug use: No    Sexual activity: Yes     Partners: Female     Other Topics Concern    Not on file     Social History Narrative       Patient does not have an advanced directive on file    Visit Vitals    /78 (BP 1 Location: Left arm, BP Patient Position: Sitting)    Pulse 78    Temp 98.2 °F (36.8 °C)    Ht 5' 11\" (1.803 m)    Wt 193 lb 8 oz (87.8 kg)    SpO2 99%    BMI 26.99 kg/m2       Physical Exam   HENT:   Mouth/Throat: No oropharyngeal exudate. Cardiovascular: Normal rate and regular rhythm. Exam reveals no gallop.     No murmur heard.  Pulmonary/Chest: He has no wheezes. He has no rales. Abdominal: Soft. He exhibits no distension. There is no tenderness. Musculoskeletal: He exhibits no edema. BMI:  OK    Admission on 04/24/2017, Discharged on 05/12/2017   Component Date Value Ref Range Status    WBC 04/25/2017 12.5  4.6 - 13.2 K/uL Final    RBC 04/25/2017 5.18  4.70 - 5.50 M/uL Final    HGB 04/25/2017 15.0  13.0 - 16.0 g/dL Final    HCT 04/25/2017 43.2  36.0 - 48.0 % Final    MCV 04/25/2017 83.4  74.0 - 97.0 FL Final    MCH 04/25/2017 29.0  24.0 - 34.0 PG Final    MCHC 04/25/2017 34.7  31.0 - 37.0 g/dL Final    RDW 04/25/2017 13.8  11.6 - 14.5 % Final    PLATELET 19/29/5540 434  135 - 420 K/uL Final    MPV 04/25/2017 11.3  9.2 - 11.8 FL Final    NEUTROPHILS 04/25/2017 67  40 - 73 % Final    LYMPHOCYTES 04/25/2017 22  21 - 52 % Final    MONOCYTES 04/25/2017 9  3 - 10 % Final    EOSINOPHILS 04/25/2017 2  0 - 5 % Final    BASOPHILS 04/25/2017 0  0 - 2 % Final    ABS. NEUTROPHILS 04/25/2017 8.3* 1.8 - 8.0 K/UL Final    ABS. LYMPHOCYTES 04/25/2017 2.8  0.9 - 3.6 K/UL Final    ABS. MONOCYTES 04/25/2017 1.1  0.05 - 1.2 K/UL Final    ABS. EOSINOPHILS 04/25/2017 0.3  0.0 - 0.4 K/UL Final    ABS.  BASOPHILS 04/25/2017 0.0  0.0 - 0.06 K/UL Final    DF 04/25/2017 AUTOMATED    Final    Magnesium 04/25/2017 1.9  1.6 - 2.6 mg/dL Final    PLEASE NOTE NEW REFERENCE RANGE    Sodium 04/25/2017 135* 136 - 145 mmol/L Final    Potassium 04/25/2017 4.2  3.5 - 5.5 mmol/L Final    Chloride 04/25/2017 101  100 - 108 mmol/L Final    CO2 04/25/2017 24  21 - 32 mmol/L Final    Anion gap 04/25/2017 10  3.0 - 18 mmol/L Final    Glucose 04/25/2017 100* 74 - 99 mg/dL Final    BUN 04/25/2017 9  7.0 - 18 MG/DL Final    Creatinine 04/25/2017 1.44* 0.6 - 1.3 MG/DL Final    BUN/Creatinine ratio 04/25/2017 6* 12 - 20   Final    GFR est AA 04/25/2017 58* >60 ml/min/1.73m2 Final    GFR est non-AA 04/25/2017 47* >60 ml/min/1.73m2 Final Comment: (NOTE)  Estimated GFR is calculated using the Modification of Diet in Renal   Disease (MDRD) Study equation, reported for both  Americans   (GFRAA) and non- Americans (GFRNA), and normalized to 1.73m2   body surface area. The physician must decide which value applies to   the patient. The MDRD study equation should only be used in   individuals age 25 or older. It has not been validated for the   following: pregnant women, patients with serious comorbid conditions,   or on certain medications, or persons with extremes of body size,   muscle mass, or nutritional status.  Calcium 04/25/2017 9.3  8.5 - 10.1 MG/DL Final    Vitamin D 25-Hydroxy 04/25/2017 30.1  30 - 100 ng/mL Final    Comment: (NOTE)  Deficiency               <20 ng/mL  Insufficiency          20-30 ng/mL  Sufficient             ng/mL  Possible toxicity       >100 ng/mL    The Method used is Siemens Advia Centaur currently standardized to a   Center of Disease Control and Prevention (CDC) certified reference   [address]. Samples containing fluorescein dye can produce falsely   elevated values when tested with the ADVIA Centaur Vitamin D Assay. It is recommended that results in the toxic range, >100 ng/mL, be   retested 72 hours post fluorescein exposure.       Vitamin B12 04/25/2017 903  211 - 911 pg/mL Final    Folate 04/25/2017 >20.0* 3.10 - 17.50 ng/mL Final    Homocysteine, plasma 04/25/2017 11.9  0.0 - 15.0 umol/L Final    Comment: (NOTE)  Performed At: [address] [id number]  Fred Olivier MD [id number]      HGB 04/27/2017 14.2  13.0 - 16.0 g/dL Final    HCT 04/27/2017 40.5  36.0 - 48.0 % Final    PLATELET 91/28/3248 433  135 - 420 K/uL Final    WBC 05/01/2017 9.7  4.6 - 13.2 K/uL Final    RBC 05/01/2017 5.20  4.70 - 5.50 M/uL Final    HGB 05/01/2017 15.0  13.0 - 16.0 g/dL Final    HCT 05/01/2017 43.4  36.0 - 48.0 % Final    MCV 05/01/2017 83.5  74.0 - 97.0 FL Final    MCH 05/01/2017 28.8  24.0 - 34.0 PG Final    MCHC 05/01/2017 34.6  31.0 - 37.0 g/dL Final    RDW 05/01/2017 13.8  11.6 - 14.5 % Final    PLATELET 82/77/4414 972  135 - 420 K/uL Final    MPV 05/01/2017 11.1  9.2 - 11.8 FL Final    NEUTROPHILS 05/01/2017 57  40 - 73 % Final    LYMPHOCYTES 05/01/2017 30  21 - 52 % Final    MONOCYTES 05/01/2017 11* 3 - 10 % Final    EOSINOPHILS 05/01/2017 2  0 - 5 % Final    BASOPHILS 05/01/2017 0  0 - 2 % Final    ABS. NEUTROPHILS 05/01/2017 5.5  1.8 - 8.0 K/UL Final    ABS. LYMPHOCYTES 05/01/2017 2.9  0.9 - 3.6 K/UL Final    ABS. MONOCYTES 05/01/2017 1.0  0.05 - 1.2 K/UL Final    ABS. EOSINOPHILS 05/01/2017 0.2  0.0 - 0.4 K/UL Final    ABS. BASOPHILS 05/01/2017 0.0  0.0 - 0.06 K/UL Final    DF 05/01/2017 AUTOMATED    Final    Sodium 05/01/2017 132* 136 - 145 mmol/L Final    Potassium 05/01/2017 4.0  3.5 - 5.5 mmol/L Final    Chloride 05/01/2017 97* 100 - 108 mmol/L Final    CO2 05/01/2017 26  21 - 32 mmol/L Final    Anion gap 05/01/2017 9  3.0 - 18 mmol/L Final    Glucose 05/01/2017 114* 74 - 99 mg/dL Final    BUN 05/01/2017 13  7.0 - 18 MG/DL Final    Creatinine 05/01/2017 1.49* 0.6 - 1.3 MG/DL Final    BUN/Creatinine ratio 05/01/2017 9* 12 - 20   Final    GFR est AA 05/01/2017 55* >60 ml/min/1.73m2 Final    GFR est non-AA 05/01/2017 46* >60 ml/min/1.73m2 Final    Comment: (NOTE)  Estimated GFR is calculated using the Modification of Diet in Renal   Disease (MDRD) Study equation, reported for both  Americans   (GFRAA) and non- Americans (GFRNA), and normalized to 1.73m2   body surface area. The physician must decide which value applies to   the patient. The MDRD study equation should only be used in   individuals age 25 or older.  It has not been validated for the   following: pregnant women, patients with serious comorbid conditions,   or on certain medications, or persons with extremes of body size,   muscle mass, or nutritional status.  Calcium 05/01/2017 9.4  8.5 - 10.1 MG/DL Final    Bilirubin, total 05/01/2017 0.8  0.2 - 1.0 MG/DL Final    ALT (SGPT) 05/01/2017 26  16 - 61 U/L Final    AST (SGOT) 05/01/2017 21  15 - 37 U/L Final    Alk. phosphatase 05/01/2017 88  45 - 117 U/L Final    Protein, total 05/01/2017 8.3* 6.4 - 8.2 g/dL Final    Albumin 05/01/2017 3.8  3.4 - 5.0 g/dL Final    Globulin 05/01/2017 4.5* 2.0 - 4.0 g/dL Final    A-G Ratio 05/01/2017 0.8  0.8 - 1.7   Final    TSH 05/01/2017 3.93* 0.36 - 3.74 uIU/mL Final    Microalbumin,urine random 05/02/2017 6.37* 0 - 3.0 MG/DL Final    Creatinine, urine 05/02/2017 216.00* 30 - 125 mg/dL Final    Microalbumin/Creat ratio (mg/g cre* 05/02/2017 29  0 - 30 mg/g Final    HGB 05/04/2017 13.6  13.0 - 16.0 g/dL Final    HCT 05/04/2017 40.1  36.0 - 48.0 % Final    PLATELET 51/32/0763 696  135 - 420 K/uL Final    Sodium 05/08/2017 130* 136 - 145 mmol/L Final    Potassium 05/08/2017 4.5  3.5 - 5.5 mmol/L Final    Chloride 05/08/2017 95* 100 - 108 mmol/L Final    CO2 05/08/2017 26  21 - 32 mmol/L Final    Anion gap 05/08/2017 9  3.0 - 18 mmol/L Final    Glucose 05/08/2017 95  74 - 99 mg/dL Final    BUN 05/08/2017 12  7.0 - 18 MG/DL Final    Creatinine 05/08/2017 1.45* 0.6 - 1.3 MG/DL Final    BUN/Creatinine ratio 05/08/2017 8* 12 - 20   Final    GFR est AA 05/08/2017 57* >60 ml/min/1.73m2 Final    GFR est non-AA 05/08/2017 47* >60 ml/min/1.73m2 Final    Comment: (NOTE)  Estimated GFR is calculated using the Modification of Diet in Renal   Disease (MDRD) Study equation, reported for both  Americans   (GFRAA) and non- Americans (GFRNA), and normalized to 1.73m2   body surface area. The physician must decide which value applies to   the patient. The MDRD study equation should only be used in   individuals age 25 or older.  It has not been validated for the   following: pregnant women, patients with serious comorbid conditions,   or on certain medications, or persons with extremes of body size,   muscle mass, or nutritional status.  Calcium 05/08/2017 9.5  8.5 - 10.1 MG/DL Final    HGB 05/08/2017 14.4  13.0 - 16.0 g/dL Final    HCT 05/08/2017 41.8  36.0 - 48.0 % Final    PLATELET 87/54/8105 794  135 - 420 K/uL Final    HGB 05/11/2017 13.5  13.0 - 16.0 g/dL Final    HCT 05/11/2017 38.5  36.0 - 48.0 % Final    PLATELET 69/34/5002 049  135 - 420 K/uL Final   Admission on 04/18/2017, Discharged on 04/24/2017   Component Date Value Ref Range Status    WBC 04/18/2017 7.6  4.0 - 11.0 1000/mm3 Final    RBC 04/18/2017 5.34  3.80 - 5.70 M/uL Final    HGB 04/18/2017 14.8  12.4 - 17.2 gm/dl Final    HCT 04/18/2017 44.6  37.0 - 50.0 % Final    MCV 04/18/2017 83.5  80.0 - 98.0 fL Final    MCH 04/18/2017 27.7  23.0 - 34.6 pg Final    MCHC 04/18/2017 33.2  30.0 - 36.0 gm/dl Final    PLATELET 51/91/5299 125  140 - 450 1000/mm3 Final    MPV 04/18/2017 10.6* 6.0 - 10.0 fL Final    RDW-SD 04/18/2017 41.7  35.1 - 43.9   Final    NRBC 04/18/2017 0  0 - 0   Final    IMMATURE GRANULOCYTES 04/18/2017 0.3  0.0 - 3.0 % Final    Comment: IG - Immature granulocytes (promyelocytes + myelocytes + metamyelocytes), their presence  indicates a left shift. An IG > 3% may predict positive blood cultures with 98% specificity.  (P<0.04) and 92% Positive Predictive Value (Jessica)1. Increased immature granulocytes  assist with the detection of infection and/or inflammation and may be present at an early stage  and are more sensitive and specific than band counts.       NEUTROPHILS 04/18/2017 48.6  34 - 64 % Final    LYMPHOCYTES 04/18/2017 36.5  28 - 48 % Final    MONOCYTES 04/18/2017 10.3  1 - 13 % Final    EOSINOPHILS 04/18/2017 3.8  0 - 5 % Final    BASOPHILS 04/18/2017 0.5  0 - 3 % Final    Sodium 04/18/2017 136  136 - 145 mEq/L Final    Potassium 04/18/2017 4.7  3.5 - 5.1 mEq/L Final    Chloride 04/18/2017 102  98 - 107 mEq/L Final    CO2 04/18/2017 28 21 - 32 mEq/L Final    Glucose 04/18/2017 100  74 - 106 mg/dl Final    BUN 04/18/2017 18  7 - 25 mg/dl Final    Creatinine 04/18/2017 1.6* 0.6 - 1.3 mg/dl Final    GFR est AA 04/18/2017 54.0    Final    Comment: THE [facility] [employer] STATES THAT THE MDRD STUDY EQUATION SHOULD ONLY BE USED ON  INDIVIDUALS 18 OR OLDER. THE REPORT ALSO NOTES THAT THE MDRD STUDY EQUATION HAS NOT BEEN  VALIDATED FOR USE WITH THE ELDERLY (OVER 79YEARS OF AGE), PREGNANT WOMEN, PATIENTS WITH SERIOUS  COMORBID CONDITIONS, OR PERSONS WITH EXTREMES OF BODY SIZE, MUSCLE MASS, OR NUTRITIONAL STATUS. APPLICATION OF THE EQUATION TO THESE PATIENT GROUPS MAY LEAD TO ERRORS IN GFR ESTIMATION. GFR  ESTIMATING EQUATIONS HAVE POORER AGREEMENT WITH MEASURED GFR FOR ILL HOSPITALIZED PATIENTS AND  FOR PEOPLE WITH NEAR NORMAL KIDNEY FUNCTION THAN FOR SUBJECTS IN THE MDRD STUDY. VALIDATION  STUDIES ARE IN PROGRESS TO EVALUATE THE MDRD STUDY EQUATION FOR ADDITIONAL ETHNIC GROUPS, THE  ELDERLY, VARIOUS DISEASE CONDITIONS, AND PEOPLE WITH NORMAL KIDNEY FUNCTION. GFRA----REFERS TO   GFRO---REFERS TO OTHER RACES  REFERENCES AVAILABLE UPON REQUEST.      GFR est non-AA 04/18/2017 45    Final    Calcium 04/18/2017 9.0  8.5 - 10.1 mg/dl Final    AST (SGOT) 04/18/2017 24  15 - 37 U/L Final    ALT (SGPT) 04/18/2017 20  12 - 78 U/L Final    Alk.  phosphatase 04/18/2017 74  45 - 117 U/L Final    Bilirubin, total 04/18/2017 0.9  0.2 - 1.0 mg/dl Final    Protein, total 04/18/2017 7.4  6.4 - 8.2 gm/dl Final    Albumin 04/18/2017 3.9  3.4 - 5.0 gm/dl Final    Ventricular Rate 04/18/2017 57  BPM Final    Atrial Rate 04/18/2017 57  BPM Final    P-R Interval 04/18/2017 156  ms Final    QRS Duration 04/18/2017 88  ms Final    Q-T Interval 04/18/2017 430  ms Final    QTC Calculation (Bezet) 04/18/2017 418  ms Final    Calculated P Axis 04/18/2017 39  degrees Final    Calculated R Axis 04/18/2017 21  degrees Final    Calculated T [place]ele 04/18/2017 57  degrees Final    Diagnosis 04/18/2017    Final                    Value:Sinus bradycardia  Otherwise normal ECG  When compared with ECG of 21-APR-2015 21:43,  No significant change was found  Confirmed by Nimesh Greco M.D., [facility] ([id number]) on 4/19/2017 7:46:58 AM      Sodium 04/18/2017 138  136 - 145 mEq/L Final    Potassium 04/18/2017 4.6  3.5 - 4.9 mEq/L Final    Chloride 04/18/2017 100  98 - 107 mEq/L Final    CO2, TOTAL 04/18/2017 26  21 - 32 mmol/L Final    Glucose 04/18/2017 103  74 - 106 mg/dL Final    BUN 04/18/2017 19  7 - 25 mg/dl Final    Creatinine 04/18/2017 1.6* 0.6 - 1.3 mg/dl Final    HCT 04/18/2017 44  40 - 54 % Final    HGB 04/18/2017 15.0  12.4 - 17.2 gm/dl Final    CALCIUM,IONIZED 04/18/2017 4.70  4.40 - 5.40 mg/dL Final    Prothrombin time 04/18/2017 12.9  11.5 - 14.0 seconds Final    INR 04/18/2017 1.0  0.1 - 1.1   Final    aPTT 04/18/2017 31.6  24.9 - 35.6 seconds Final    Troponin-I 04/18/2017 0.00  0.00 - 0.07 ng/ml Final    WBC 04/19/2017 10.3  4.0 - 11.0 1000/mm3 Final    RBC 04/19/2017 5.13  3.80 - 5.70 M/uL Final    HGB 04/19/2017 14.1  12.4 - 17.2 gm/dl Final    HCT 04/19/2017 42.2  37.0 - 50.0 % Final    MCV 04/19/2017 82.3  80.0 - 98.0 fL Final    MCH 04/19/2017 27.5  23.0 - 34.6 pg Final    MCHC 04/19/2017 33.4  30.0 - 36.0 gm/dl Final    PLATELET 55/84/3705 009  140 - 450 1000/mm3 Final    MPV 04/19/2017 10.6* 6.0 - 10.0 fL Final    RDW-SD 04/19/2017 40.7  35.1 - 43.9   Final    NRBC 04/19/2017 0  0 - 0   Final    IMMATURE GRANULOCYTES 04/19/2017 0.3  0.0 - 3.0 % Final    Comment: IG - Immature granulocytes (promyelocytes + myelocytes + metamyelocytes), their presence  indicates a left shift. An IG > 3% may predict positive blood cultures with 98% specificity.  (P<0.04) and 92% Positive Predictive Value (Jessica)1.  Increased immature granulocytes  assist with the detection of infection and/or inflammation and may be present at an early stage  and are more sensitive and specific than band counts.  NEUTROPHILS 04/19/2017 70.6* 34 - 64 % Final    LYMPHOCYTES 04/19/2017 20.6* 28 - 48 % Final    MONOCYTES 04/19/2017 7.2  1 - 13 % Final    EOSINOPHILS 04/19/2017 1.0  0 - 5 % Final    BASOPHILS 04/19/2017 0.3  0 - 3 % Final    Sodium 04/19/2017 139  136 - 145 mEq/L Final    Potassium 04/19/2017 4.0  3.5 - 5.1 mEq/L Final    Chloride 04/19/2017 107  98 - 107 mEq/L Final    CO2 04/19/2017 22  21 - 32 mEq/L Final    Glucose 04/19/2017 106  74 - 106 mg/dl Final    BUN 04/19/2017 17  7 - 25 mg/dl Final    Creatinine 04/19/2017 1.4* 0.6 - 1.3 mg/dl Final    GFR est AA 04/19/2017 >60    Final    Comment: THE [facility] STATES THAT THE MDRD STUDY EQUATION SHOULD ONLY BE USED ON  INDIVIDUALS 18 OR OLDER. THE REPORT ALSO NOTES THAT THE MDRD STUDY EQUATION HAS NOT BEEN  VALIDATED FOR USE WITH THE ELDERLY (OVER 79YEARS OF AGE), PREGNANT WOMEN, PATIENTS WITH SERIOUS  COMORBID CONDITIONS, OR PERSONS WITH EXTREMES OF BODY SIZE, MUSCLE MASS, OR NUTRITIONAL STATUS. APPLICATION OF THE EQUATION TO THESE PATIENT GROUPS MAY LEAD TO ERRORS IN GFR ESTIMATION. GFR  ESTIMATING EQUATIONS HAVE POORER AGREEMENT WITH MEASURED GFR FOR ILL HOSPITALIZED PATIENTS AND  FOR PEOPLE WITH NEAR NORMAL KIDNEY FUNCTION THAN FOR SUBJECTS IN THE MDRD STUDY. VALIDATION  STUDIES ARE IN PROGRESS TO EVALUATE THE MDRD STUDY EQUATION FOR ADDITIONAL ETHNIC GROUPS, THE  ELDERLY, VARIOUS DISEASE CONDITIONS, AND PEOPLE WITH NORMAL KIDNEY FUNCTION. GFRA----REFERS TO   GFRO---REFERS TO OTHER RACES  REFERENCES AVAILABLE UPON REQUEST.      GFR est non-AA 04/19/2017 52    Final    Calcium 04/19/2017 8.7  8.5 - 10.1 mg/dl Final    AST (SGOT) 04/19/2017 18  15 - 37 U/L Final    ALT (SGPT) 04/19/2017 17  12 - 78 U/L Final    Alk.  phosphatase 04/19/2017 70  45 - 117 U/L Final    Bilirubin, total 04/19/2017 0.9  0.2 - 1.0 mg/dl Final    Protein, total 04/19/2017 6.9  6.4 - 8.2 gm/dl Final    Albumin 04/19/2017 3.4  3.4 - 5.0 gm/dl Final    Cholesterol, total 04/19/2017 162  140 - 199 mg/dl Final    HDL Cholesterol 04/19/2017 42  40 - 96 mg/dl Final    Triglyceride 04/19/2017 116  29 - 150 mg/dl Final    LDL, calculated 04/19/2017 97  0 - 130 mg/dl Final    Comment: TARGET/DECISION VALUES DEPEND ON A NUMBER OF RISK FACTORS. LDL CALCULATION NOT VALID IF TRIGLYCERIDES ARE GREATER THAN 400 mg/dL.  CHOL/HDL Ratio 04/19/2017 3.9  0.0 - 5.0 Ratio Final    Troponin-I 04/18/2017 <0.015  0.000 - 0.045 ng/ml Final    Comment: The presence of detectable troponin above the reference range indicates myocardial injury which  maybe due to ischemia, myocarditis, trauma, etc. Clinical correlation is necessary to establish  the significance of this finding. NOTE: The reference range is based on the 99th percentile of the referent population         Troponin-I 04/19/2017 <0.015  0.000 - 0.045 ng/ml Final    Comment: The presence of detectable troponin above the reference range indicates myocardial injury which  maybe due to ischemia, myocarditis, trauma, etc. Clinical correlation is necessary to establish  the significance of this finding. NOTE: The reference range is based on the 99th percentile of the referent population         Magnesium 04/18/2017 2.3  1.6 - 2.6 mg/dl Final    Hemoglobin A1c 04/19/2017 5.9  4.8 - 6.0 % Final    Troponin-I 04/19/2017 0.016  0.000 - 0.045 ng/ml Final    Comment: The presence of detectable troponin above the reference range indicates myocardial injury which  maybe due to ischemia, myocarditis, trauma, etc. Clinical correlation is necessary to establish  the significance of this finding.    NOTE: The reference range is based on the 99th percentile of the referent population         WBC 04/20/2017 11.1* 4.0 - 11.0 1000/mm3 Final    RBC 04/20/2017 4.89  3.80 - 5.70 M/uL Final    HGB 04/20/2017 13.5  12.4 - 17.2 gm/dl Final    HCT 04/20/2017 40.6  37.0 - 50.0 % Final    MCV 04/20/2017 83.0  80.0 - 98.0 fL Final    MCH 04/20/2017 27.6  23.0 - 34.6 pg Final    MCHC 04/20/2017 33.3  30.0 - 36.0 gm/dl Final    PLATELET 93/48/1573 913  140 - 450 1000/mm3 Final    MPV 04/20/2017 10.7* 6.0 - 10.0 fL Final    RDW-SD 04/20/2017 41.5  35.1 - 43.9   Final    NRBC 04/20/2017 0  0 - 0   Final    IMMATURE GRANULOCYTES 04/20/2017 0.4  0.0 - 3.0 % Final    Comment: IG - Immature granulocytes (promyelocytes + myelocytes + metamyelocytes), their presence  indicates a left shift. An IG > 3% may predict positive blood cultures with 98% specificity.  (P<0.04) and 92% Positive Predictive Value (Vimal-Jeanie)1. Increased immature granulocytes  assist with the detection of infection and/or inflammation and may be present at an early stage  and are more sensitive and specific than band counts.  NEUTROPHILS 04/20/2017 67.0* 34 - 64 % Final    LYMPHOCYTES 04/20/2017 22.4* 28 - 48 % Final    MONOCYTES 04/20/2017 9.1  1 - 13 % Final    EOSINOPHILS 04/20/2017 0.6  0 - 5 % Final    BASOPHILS 04/20/2017 0.5  0 - 3 % Final    Sodium 04/20/2017 137  136 - 145 mEq/L Final    Potassium 04/20/2017 4.0  3.5 - 5.1 mEq/L Final    Chloride 04/20/2017 107  98 - 107 mEq/L Final    CO2 04/20/2017 21  21 - 32 mEq/L Final    Glucose 04/20/2017 117* 74 - 106 mg/dl Final    BUN 04/20/2017 12  7 - 25 mg/dl Final    Creatinine 04/20/2017 1.4* 0.6 - 1.3 mg/dl Final    GFR est AA 04/20/2017 >60    Final    Comment: THE [facility] STATES THAT THE MDRD STUDY EQUATION SHOULD ONLY BE USED ON  INDIVIDUALS 18 OR OLDER. THE REPORT ALSO NOTES THAT THE MDRD STUDY EQUATION HAS NOT BEEN  VALIDATED FOR USE WITH THE ELDERLY (OVER 79YEARS OF AGE), PREGNANT WOMEN, PATIENTS WITH SERIOUS  COMORBID CONDITIONS, OR PERSONS WITH EXTREMES OF BODY SIZE, MUSCLE MASS, OR NUTRITIONAL STATUS.   APPLICATION OF THE EQUATION TO THESE PATIENT GROUPS MAY LEAD TO ERRORS IN GFR ESTIMATION. GFR  ESTIMATING EQUATIONS HAVE POORER AGREEMENT WITH MEASURED GFR FOR ILL HOSPITALIZED PATIENTS AND  FOR PEOPLE WITH NEAR NORMAL KIDNEY FUNCTION THAN FOR SUBJECTS IN THE MDRD STUDY. VALIDATION  STUDIES ARE IN PROGRESS TO EVALUATE THE MDRD STUDY EQUATION FOR ADDITIONAL ETHNIC GROUPS, THE  ELDERLY, VARIOUS DISEASE CONDITIONS, AND PEOPLE WITH NORMAL KIDNEY FUNCTION. GFRA----REFERS TO   GFRO---REFERS TO OTHER RACES  REFERENCES AVAILABLE UPON REQUEST.      GFR est non-AA 04/20/2017 52    Final    Calcium 04/20/2017 8.6  8.5 - 10.1 mg/dl Final    AST (SGOT) 04/20/2017 15  15 - 37 U/L Final    ALT (SGPT) 04/20/2017 16  12 - 78 U/L Final    Alk. phosphatase 04/20/2017 71  45 - 117 U/L Final    Bilirubin, total 04/20/2017 1.2* 0.2 - 1.0 mg/dl Final    Protein, total 04/20/2017 6.6  6.4 - 8.2 gm/dl Final    Albumin 04/20/2017 3.2* 3.4 - 5.0 gm/dl Final   Office Visit on 03/09/2017   Component Date Value Ref Range Status    Color (UA POC) 03/09/2017 Yellow   Final    Clarity (UA POC) 03/09/2017 Clear   Final    Glucose (UA POC) 03/09/2017 Negative  Negative Final    Bilirubin (UA POC) 03/09/2017 Negative  Negative Final    Ketones (UA POC) 03/09/2017 Negative  Negative Final    Specific gravity (UA POC) 03/09/2017 1.015  1.001 - 1.035 Final    Blood (UA POC) 03/09/2017 Negative  Negative Final    pH (UA POC) 03/09/2017 6.5  4.6 - 8.0 Final    Protein (UA POC) 03/09/2017 Trace  Negative mg/dL Final    Urobilinogen (UA POC) 03/09/2017 0.2 mg/dL  0.2 - 1 Final    Nitrites (UA POC) 03/09/2017 Negative  Negative Final    Leukocyte esterase (UA POC) 03/09/2017 Negative  Negative Final       .No results found for any visits on 05/24/17. Assessment / Plan      ICD-10-CM ICD-9-CM    1. Aspiration into airway, initial encounter T17.908A 934.9    2. Hemiparesis affecting right side as late effect of cerebrovascular accident (CVA) ([address] I69.351 438.20    3.  Dysphagia as late effect of cerebrovascular accident (CVA) I69.391 438.82      Modified barium swallow with video fluroscopy  he was advised to continue his maintenance medications  Continue with physical therapy and speech therapy     Follow-up Disposition:  Return in about 4 weeks (around 6/21/2017). I asked Margarette Rodriguez. [address] if he has any questions and I answered the questions. Stewart WADSWORTHEzra  [address] states that he understands the treatment plan and agrees with the treatment plan

## 2017-05-26 NOTE — PROGRESS NOTES
PT DAILY TREATMENT NOTE - [facility]     Patient Name: Dutch Herman [address]  Date:2017  : 1940  [x]  Patient  Verified  Payor: Alberto Rose / Plan: [place] MEDICARE PART [place] & B / Product Type: Medicare /    In time:902  Out time:930  Total Treatment Time (min): 28  Total Timed Codes (min): 28  1:1 Treatment Time ( only): 28   Visit #: 3 of     Treatment Area: Muscle weakness (generalized) [M62.81]    SUBJECTIVE  Pain Level (0-10 scale): 0/10  Any medication changes, allergies to medications, adverse drug reactions, diagnosis change, or new procedure performed?: [x] No    [] Yes (see summary sheet for update)  Subjective functional status/changes:   [] No changes reported  Pt stated that he is doing well today    OBJECTIVE    28 min Therapeutic Exercise:  [x] See flow sheet :   Rationale: increase ROM and increase strength to improve the patients ability to increase ease with aDLs    With   [x] TE   [] TA   [] neuro   [] other: Patient Education: [x] Review HEP    [] Progressed/Changed HEP based on:   [] positioning   [] body mechanics   [] transfers   [] heat/ice application    [] other:      Other Objective/Functional Measures:   Pt needed seated rest breaks during session  Pt had small nose bleed during session  Pt had some dizziness after stepups     Pain Level (0-10 scale) post treatment: 0/10    ASSESSMENT/Changes in Function:   Pt is slowly progressing toward goals. Pt cont with decreased B LE strength. Cont to ambulate with moderate limp with decreased weight shift. Cont to need cueing to use hand with sitting to reach for chair. Patient will continue to benefit from skilled PT services to modify and progress therapeutic interventions, address functional mobility deficits, address ROM deficits, address strength deficits and address imbalance/dizziness to attain remaining goals.      [x]  See Plan of Care  []  See progress note/recertification  []  See Discharge Summary         Progress towards goals / Updated goals:  Short Term Goals: To be accomplished in 1 weeks:  1. Pt will be compliant with initial HEP to improve therapy outcomes   Long Term Goals: To be accomplished in 6 weeks:  1. Pt will improve FOTO by points in order to demonstrate functional improvement   2. Pt will improve TUG to 10 seconds in order to demonstrate reducing fall risk  3. Pt will improve RLE strength by 1/2 MMT grade in order to improve ease of prolonged ambulation  4.  Pt will ambulate 48' with equal weight shift in order to progress ambulatory ability     PLAN  []  Upgrade activities as tolerated     [x]  Continue plan of care  []  Update interventions per flow sheet       []  Discharge due to:_  []  Other:_      Nehal Lobato PTA 5/26/2017  9:02 AM    Future Appointments  Date Time Provider Nelson Hurtado   5/30/2017 10:30 AM [facility] PT [address] HORIVBV [facility]   5/30/2017 11:00 AM Marielos Robledo, PT MMCPTPB [facility]   6/5/2017 3:15 PM Lamine Johnson  [address] [place] [address]   6/6/2017 10:30 AM [facility] PT [address] MMCPTPB [facility]   6/6/2017 11:00 AM Marielos Robledo, PT [id number] [facility]   6/8/2017 10:30 AM [facility] PT [address] [id number] [facility]   6/8/2017 11:00 AM Nehal Lobato PTA [id number] [facility]   6/13/2017 10:00 AM Arleene Rank, OTR/L MMCPTPB [facility]   6/13/2017 10:30 AM Nehal Lobato PTA [id number] [facility]   6/15/2017 10:30 AM Marielos Robledo, PT [id number] [facility]   6/15/2017 11:00 AM Arleene Rank, OTR/L MMCPTPB [facility]

## 2017-05-27 PROCEDURE — 3331090002 HH PPS REVENUE DEBIT

## 2017-05-27 PROCEDURE — 3331090001 HH PPS REVENUE CREDIT

## 2017-05-28 PROCEDURE — 3331090002 HH PPS REVENUE DEBIT

## 2017-05-28 PROCEDURE — 3331090001 HH PPS REVENUE CREDIT

## 2017-05-29 PROCEDURE — 3331090002 HH PPS REVENUE DEBIT

## 2017-05-29 PROCEDURE — 3331090001 HH PPS REVENUE CREDIT

## 2017-05-30 ENCOUNTER — HOSPITAL ENCOUNTER (OUTPATIENT)
Dept: PHYSICAL THERAPY | Age: 77
Discharge: HOME | End: 2017-05-30
Payer: MEDICARE

## 2017-05-30 PROCEDURE — 3331090002 HH PPS REVENUE DEBIT

## 2017-05-30 PROCEDURE — 97530 THERAPEUTIC ACTIVITIES: CPT

## 2017-05-30 PROCEDURE — 97110 THERAPEUTIC EXERCISES: CPT

## 2017-05-30 PROCEDURE — 97112 NEUROMUSCULAR REEDUCATION: CPT

## 2017-05-30 PROCEDURE — 3331090001 HH PPS REVENUE CREDIT

## 2017-05-30 RX ORDER — LEVOTHYROXINE SODIUM 150 UG/1
TABLET ORAL
Qty: 90 TAB | Refills: 3 | Status: SHIPPED | OUTPATIENT
Start: 2017-05-30 | End: 2018-06-07 | Stop reason: SDUPTHER

## 2017-05-30 NOTE — PROGRESS NOTES
OT DAILY TREATMENT NOTE - [facility]     Patient Name: Yinka Carrion  Date:2017  : 1940  [x]  Patient  Verified  Payor: [place] MEDICARE / Plan: [place] MEDICARE PART A & B / Product Type: Medicare /    In time:1030  Out time:1100  Total Treatment Time (min): 30  Total Timed Codes (min): 30  1:1 Treatment Time ( [address] only): 15   Visit #: 3 of 24    Treatment Area: Muscle weakness (generalized) [M62.81]    SUBJECTIVE  Pain Level (0-10 scale): 0/10  Any medication changes, allergies to medications, adverse drug reactions, diagnosis change, or new procedure performed?: [x] No    [] Yes (see summary sheet for update)  Subjective functional status/changes:   [] No changes reported  \"I am just tired today. \"    OBJECTIVE    15 min Therapeutic Exercise:  [x] See flow sheet :   Rationale: increase strength to improve the patients ability to grasp  Gripper 35# x 50 1 in pegs    15 min Therapeutic Activity:  [x]  See flow sheet :   Rationale: improve coordination  to improve the patients ability to manipulate items 1/4 in pegs placement to complete design no errors increased time. With   [x] TE   [x] TA   [] neuro   [] other: Patient Education: [x] Review HEP    [] Progressed/Changed HEP based on:   [] positioning   [] body mechanics   [] transfers   [] heat/ice application   [] Splint wear/care   [] Sensory re-education   [] scar management      [] other:            Other Objective/Functional Measures: Slow processing and movements to complete activities     Pain Level (0-10 scale) post treatment: 0/10    ASSESSMENT/Changes in Function: Slow and lethargic throughout session. Patient will continue to benefit from skilled OT services to modify and progress therapeutic interventions and address strength deficits to attain remaining goals. [x]  See Plan of Care  []  See progress note/recertification  []  See Discharge Summary         Progress towards goals / Updated goals:  1.  Patient will be independent in home exercise program for fine motor skills. met 5/24/17  2. Patient will be independent in home exercise program for RUE strengthening. 3. Patient will be able to feed self with R hand for full meal due to improved coordination of movement.      Long Term Goals: To be accomplished in 24 treatments:   1. Patient will increase  strength in R hand by 20 pounds to increase ease of opening jars. 2. Patient will improve in hand manipulation speed and fine motor skills at least 20% for fasteners. 3. Patient will be able to fill out forms legibly and return to managing finances. 4. Patient will report reduced impairment in home care and self care function per [facility] as shown by improved score of at least 15 points.      PLAN  [x]  Upgrade activities as tolerated     [x]  Continue plan of care  []  Update interventions per flow sheet       []  Discharge due to:_  []  Other:_      John Fried OT 5/30/2017  10:41 AM

## 2017-05-30 NOTE — PROGRESS NOTES
PT DAILY TREATMENT NOTE - [facility]     Patient Name: Alan Don  Date:2017  : 1940  [x]  Patient  Verified  Payor: VA MEDICARE / Plan: VA MEDICARE PART A & B / Product Type: Medicare /    In time:11:03  Out time:11:40  Total Treatment Time (min): 37  Total Timed Codes (min): 37 (-10 min while pt used restroom = 27)  1:1 Treatment Time ( [address] only): 26   Visit #: 4 of     Treatment Area: Muscle weakness (generalized) [M62.81]    SUBJECTIVE  Pain Level (0-10 scale): 0/10  Any medication changes, allergies to medications, adverse drug reactions, diagnosis change, or new procedure performed?: [x] No    [] Yes (see summary sheet for update)  Subjective functional status/changes:   [] No changes reported  Pt reports that he is having a hard time swallowing. He is having speech therapy for his swallowing. He is not on any liquid restrictions since his stay at the hospital, but he believes his swallowing is worse. He is scheduled to have a swallow study 17. His throat is very dry. Pt reports that he wishes he was doing better faster, and he is frustrated that the recovery is slow. He found his wife on the floor this morning. She is severely limited functionally d/t the Alzheimer's.         OBJECTIVE    19 min Therapeutic Exercise:  [x] See flow sheet :   Rationale: increase ROM, increase strength, improve coordination, improve balance and increase proprioception to improve the patients ability to improve ease of ambulation and daily tasks        8 min Neuromuscular Re-education:  []  See flow sheet : ambulation with mirror, ambulation holding sticks    Rationale: improve coordination  to improve the patients ability to improve ambulatory ability             With   [] TE   [] TA   [] neuro   [] other: Patient Education: [x] Review HEP    [] Progressed/Changed HEP based on:   [] positioning   [] body mechanics   [] transfers   [] heat/ice application    [] other:      Other Objective/Functional Measures:     Cues to reduce force with step down during slow marches  Verbal/tactile cues to reduce hip flexion with standing hamstring curls   Cues for increased knee flexion to reduce circumduction with 6\" step ups - correct form with cuing  Cues for increased hip/knee flexion with low bev step over- correct form with cuing  Tactile cues for soft knee during SLS on R     Ambulation with mirror for visual feedback:  Tactile cues at hips for increased weight-shift to right  Verbally cued for increased heel strike   Verbally cued for increased gait speed  Verbally cued for increased arm swing on R    Increased riccardo and arm swing on R when holding poles with therapist to simulate reciprocal arm swing while ambulating around clinic - cues to relax shoulders and avoid shoulder hike    No increased pain with therapy  Pt required multiple rest breaks and water breaks throughout therapy    No coughing or visible aspiration with water during therapy; however, silent aspiration may be occurring - pt is scheduled for a swallow study and is following up with MD      Pain Level (0-10 scale) post treatment: 0/10    ASSESSMENT/Changes in Function:     Pt tolerated therapy without complaint of increased sx and is making slow progress towards initial goals in therapy. He continues to demonstrate discoordination of RLE with ambulation, demonstrating decreased hip/knee flexion and circumduction. Gait pattern was improved with increased riccardo and cues throughout therapy for increased weight-shift to R, increased heel strike, and increased RUE swing. Will continue to address strength, balance, and coordination deficits for improved ease of ambulation and caring for his wife.       Patient will continue to benefit from skilled PT services to modify and progress therapeutic interventions, address functional mobility deficits, address ROM deficits, address strength deficits, analyze and address soft tissue restrictions, analyze and cue movement patterns, assess and modify postural abnormalities, address imbalance/dizziness and instruct in home and community integration to attain remaining goals. Progress towards goals / Updated goals:  Short Term Goals: To be accomplished in 1 weeks:  1. Pt will be compliant with initial HEP to improve therapy outcomes Goal met. 5/30/17  Long Term Goals: To be accomplished in 6 weeks:  1. Pt will improve FOTO by points in order to demonstrate functional improvement   2. Pt will improve TUG to 10 seconds in order to demonstrate reducing fall risk  3. Pt will improve RLE strength by 1/2 MMT grade in order to improve ease of prolonged ambulation  4.  Pt will ambulate 48' with equal weight shift in order to progress ambulatory ability     PLAN  []  Upgrade activities as tolerated     [x]  Continue plan of care  []  Update interventions per flow sheet       []  Discharge due to:_  []  Other:_      Navjot Mello PT 5/30/2017  11:00 AM    Future Appointments  Date Time Provider Nelson Genesis   6/2/2017 2:00 PM [facility] DX RM 5 MMCRAD [facility]   6/5/2017 3:15 PM Daxa Jacobsen MD [address]   6/6/2017 10:30 AM [facility] PT [address] MMCPTPB [facility]   6/6/2017 11:00 AM Navjot Mello PT [id number] [facility]   6/8/2017 10:30 AM [facility] PT [address] MMCPTPB [facility]   6/8/2017 11:00 AM Reyes Hugo PTA MMCPTPB [facility]   6/13/2017 10:00 AM Vj Matthews OTR/ERMELINDA MMCPTPB [facility]   6/13/2017 10:30 AM Reyes Hugo PTA MMCPTPB [facility]   6/15/2017 10:30 AM Navjot Mello PT MMCPTPB [facility]   6/15/2017 11:00 AM Zabala Byron, OTR/L MMCPTPB [facility]

## 2017-05-31 ENCOUNTER — HOSPITAL ENCOUNTER (OUTPATIENT)
Dept: PHYSICAL THERAPY | Age: 77
Discharge: HOME | End: 2017-05-31
Payer: MEDICARE

## 2017-05-31 PROCEDURE — 3331090001 HH PPS REVENUE CREDIT

## 2017-05-31 PROCEDURE — 97110 THERAPEUTIC EXERCISES: CPT

## 2017-05-31 PROCEDURE — 97116 GAIT TRAINING THERAPY: CPT

## 2017-05-31 PROCEDURE — 3331090002 HH PPS REVENUE DEBIT

## 2017-06-01 ENCOUNTER — HOSPITAL ENCOUNTER (OUTPATIENT)
Dept: PHYSICAL THERAPY | Age: 77
Discharge: HOME | End: 2017-06-01
Payer: MEDICARE

## 2017-06-01 PROCEDURE — 92507 TX SP LANG VOICE COMM INDIV: CPT

## 2017-06-01 PROCEDURE — 3331090002 HH PPS REVENUE DEBIT

## 2017-06-01 PROCEDURE — 3331090001 HH PPS REVENUE CREDIT

## 2017-06-01 NOTE — PROGRESS NOTES
[address] [place] [address]. Speech Language Pathology: Daily Note      Patient Name: Hien Morales   2017   : 1940  [x]  Patient  Verified  Payor: Jannetta Dancer / Plan: VA MEDICARE PART A & B / Product Type: Medicare /   In time:0900  Out time:0950  Total Treatment Time (min): 50  1:1 Treatment Time ( Only): 48  Visit #: 3 of 8    SUBJECTIVE  Pain Level (0-10 scale): 0    Subjective functional status/changes:   \"I think my memory problems were around as a kid; not from my stroke. \"     OBJECTIVE  Treatment provided includes the following. Increase/Improve:  []  Voice Quality []  Expressive Language []  Oral Motor Skills   []  Vocal Loudness []  Auditory Comprehension [x]  Eating/Swallowing Skills   []  Vocal Cord Function []  Writing Skills []  Laryngeal/Pharyngeal Function   []  Resonance []  Reading Comprehension []   []  Breath Support/Coord. [x]  Cognitive-Linguistic Skills []   []  Speech Intelligibility []  Safety Awareness []   []  Articulation []  Attention []   []  Fluency [x]  Memory []     Decrease:  [x]  Dysphagia []  Apraxia []  Dysphonia   []  Dysarthria []  Dysfluency [x]  Cognitive Ling. Deficit   []  Aphasia []  Vocal Cord Dysfunction []  Dysphonia     Tasks Completed:  - Open ended wh-?'s: in ~2-5 min responses, no word recall deficits noted this day. - 8+ word sentences recalled I'ly after ~15 minutes without error.  - Linguistic organization task concrete catogorization with 80% I'ly, increasing to 100% with min verbal cues  - Auditory retention of paragraph level information with 100% acc, I'ly  - linguistic organization  Via mental manipulation tasks of 3 words: 60% I'ly; increasing to 100% with mod verbal cues  - 4 oz of thin liquid intake via cups sips with throat clears and teary eyes; increased rate of intake. - Check writing: intact. Progress towards goals:   Word recall in response to wh-?'s with notable improvement; no word recall deficit in structured and unstructured tasks with minimal phonemic errors at 2%. Improved delayed recall with complex sentence structure after 15 minute latency. Pt continues to demo breakdown in mod complex working linguistic organization of mental manipulation re: mod cueing. Pt further continues to exhibit s/s silent aspiration with thin liquids; reports he is scheduled for MBS next am. SLP will add new goals for dysphagia accordingly. HEP: safe swallow techniques    Patient/Caregiver instruction/education: importance of completing HEP for carryover; pt verbalized comprehension. (minutes:10)    Pain Level (0-10 scale) post treatment: 0    ASSESSMENT  [x]   Improving appropriately and progressing toward goals  []   Improving slowly and progressing toward goals  []   Approximating goals/maximum potential  []   Continues to benefit from skilled therapy to address remaining functional deficits  []   Not progressing toward goals and plan of care will be adjusted    PLAN   [x]  Continue Plan of Care    []  See progress note/recertification  []  Upgrade activities as tolerated      []  Discharge due to:  [x]  Other: add dysphagia goals    Short Term Goals: To be accomplished in 8-10 treatments  GOALS:  1. Recall 3-5 words after delay/distraction and 1-2 (6-8 word) sentences to increase recall abilities as related to scheduling appointments with min-mod A in 3/5 trials with visual/verbal cues. 2. Answer egocentric information via open-ended wh-?'s with < 2 word recall error at the sentence level, utilizing word-recall techniques, min-mod visual/verbal cues  3. Complete moderately complex linguistic organization tasks with 90% acc with min A visual/verbal cues.    4. Complete visual-spatial tasks related to functional ADLs for safe, independent social reintegration with min A  5. (NEW GOAL 6/1/17) - Participate in evaluation of swallow function (i.e. Modified Barium Swallow) as per MD order to assess swallow integrity and rule out aspiration, min A  6.  (NEW GOAL 6/1/17) Complete pharyngeal swallow exercises (hard swallow, breath hold, push, pull, charmaine, mendelsohn, supraglottic, super-supraglottic, shaker, as appropriate) in therapy and at home in 4/5 trials with min A given visual/verbal cues to increase pharyngeal muscle strength. 7.  (NEW GOAL 6/1/17) Demonstrate ability to utilize aspiration/reflux precautions and compensatory strategies (double swallow; diet consistency adjustment; alternate liquids/solids; small sips/bites, meds crushed) to decrease the reported incidences of dysphagia and s/sx of aspiration with min A with visual/verbal cues. Long Term Goals: To be accomplished in 12 weeks   1. Pt with demonstrate increase in cognitive-linguistic tasks necessary for completion of daily activities and safe ADL/IADL completion in 90% of tasks, with minimal cueing and self-monitoring.    2.  (NEW GOAL 6/1/17) Pt will tolerate regular solid/thin liquid diet without s/s aspiration for meeting nutritional needs and dining in social situations, minimal cueing.  [address], SLP   6/1/2017, 9:33 AM

## 2017-06-02 ENCOUNTER — HOSPITAL ENCOUNTER (OUTPATIENT)
Dept: GENERAL RADIOLOGY | Age: 77
Discharge: HOME | End: 2017-06-02
Attending: INTERNAL MEDICINE
Payer: MEDICARE

## 2017-06-02 DIAGNOSIS — T17.908D ASPIRATION INTO AIRWAY, SUBSEQUENT ENCOUNTER: ICD-10-CM

## 2017-06-02 PROCEDURE — G8996 SWALLOW CURRENT STATUS: HCPCS

## 2017-06-02 PROCEDURE — 74011000255 HC RX REV CODE- 255: Performed by: INTERNAL MEDICINE

## 2017-06-02 PROCEDURE — 3331090002 HH PPS REVENUE DEBIT

## 2017-06-02 PROCEDURE — 3331090001 HH PPS REVENUE CREDIT

## 2017-06-02 PROCEDURE — G8998 SWALLOW D/C STATUS: HCPCS

## 2017-06-02 PROCEDURE — G8997 SWALLOW GOAL STATUS: HCPCS

## 2017-06-02 PROCEDURE — 74230 X-RAY XM SWLNG FUNCJ C+: CPT

## 2017-06-02 PROCEDURE — 92611 MOTION FLUOROSCOPY/SWALLOW: CPT

## 2017-06-02 RX ADMIN — BARIUM SULFATE 15 ML: 400 SUSPENSION ORAL at 15:00

## 2017-06-02 RX ADMIN — BARIUM SULFATE 45 ML: 400 SUSPENSION ORAL at 15:00

## 2017-06-02 RX ADMIN — BARIUM SULFATE 15 ML: 400 PASTE ORAL at 15:00

## 2017-06-02 RX ADMIN — BARIUM SULFATE 700 MG: 700 TABLET ORAL at 15:00

## 2017-06-02 NOTE — PROGRESS NOTES
Outpatient Modified Barium Swallow Evaluation    Patient: Alan Don (42 y.o. male)  Date: 6/2/2017  Primary Diagnosis: Aspiration into airway, subsequent encounter [T17.908D]        Precautions: aspiration       Videofluoroscopy Results  MBS completed with results yielding mild oral and mod pharyngeal dysphagia. Per each bolus presentation: pt presented with complete airway closure on first swallow followed by mod penetration on premature spillage from oral residuals during second swallow with nectar-thick liquids. Chin tuck, small sips, double swallow, and effortful swallow were all ineffective at improving airway protection. Pt tolerated reg solid, puree, honey-thick liquid, and 13 mm Ba pill with honey-thick wash without aspiration/penetration events. Premature spillage and double swallow per bolus appreciated across all PO presentations with vallecular residuals remaining post swallow. Mastication and a-p transit appeared mildly delayed; however, functional for reg solid PO. Overall deficits include decreased base of tongue retraction, poor laryngeal elevation, and impaired pharyngeal motility. Rec reg diet with honey-thick liquids, aspiration precautions, oral care TID, and meds as tolerated. Rec continuation of OP SLP services to address above stated deficits. Results of MBS, diet recommendations, oropharyngeal anatomy/physiology, thickening product information, and recs for further SLP services d/w pt and daughter utilizing video feedback. Pt and daughter provided with worksheet for thickening product. Video Flouroscopic Procedures  [x] Lateral View   [] A-P View [] Scanned to level of Sternum    [x] Seated at 90 deg.   [] Other:    Presentation:    [x] Spoon   [x] Cup   [] Straw   [] Syringe   [] Consecutive Swallows  [] Other:    Consistencies:   [] Ba+ liquid   [x] Ba+ liquid (nectar)   [x] Ba+ liquid (honey)   [x] Ba+ puree [x] Ba+ cookie [x] 13 mm Ba pill with honey Ba wash    Treatment Techniques Attempted  [] Head Turn: [] Right [] Left     [] Head Tilt: [] Right [] Left     [x] Chin Down: ineffective  [x] Small Sips/bites: ineffective  [x] Effortful swallow: ineffective  [x] Double swallow: ineffective  [] Other:    Results  Dysphagia Present:     [x] Yes  [] No    Ratings of Dysphagia:     [x] Mild  [x] Moderate  [] Severe    Stages of Breakdown:   [x] Oral  [x] Pharyngeal  [] Esophageal    Aspiration:   [] Yes    [x] No  [x] At Risk     Cough: [] Yes      [] No     Penetration:   [] Yes    [] No     Cough: [x] Yes      [] No   [] Flash/trace   [x] Mod   [] To Chords          Consistency Aspirated:   Consistency Penetrated:   [] Thin Liquid     [] Thin Liquid  [] Nectar-thick Liquid    [x] Nectar-thick Liquid   [] Honey-thick Liquid    [] Honey-thick Liquid   [] Puree     [] Puree  [] Solid     [] Solid    Motility Problems with:  [] Lip Closure:    [x] Mastication:   [x] Bolus Formation/control:   [] A-P Transport:  [] Tongue Base Retraction:  [x] Swallow Response (delayed):  [] Velopharyngeal Closure:  [x] Pharyngeal Aspirations:  [x] Laryngeal Elevation/adduction:  [] Epiglottic Inversion:  [x] Pharyngeal motility/sensation:  [] Cricopharyngeal Relaxation:  [] Esophageal Peristalsis:  [] Other:    Timing of Aspiration/Penetration:  [x] Before second swallow with nectar-thick due to premature spillage  [] During Swallow:  [x] After first swallow with nectar-thick    Transit Time Delay:  [] >1 Second  Oral  [] >1 Second Pharyngeal  [] >20 Second Esophageal     Residuals:  [x] Vallecula:    [] Mild  [x] Mod  [] Severe  [] Pyriform Sinus:   [] Mild  [] Mod  [] Severe  [] Posterior Pharyngeal Wall:  [] Mild  [] Mod  [] Severe    GCODES(GN): GCODESwallowing:  Swallow Current Status CK= 40-59%   Swallow Goal Status CK= 40-59%   Swallow D/C Status CK= 40-59%    Thank you for this referral.    Montrell Parker M.S. CCC-SLP/L  Speech-Language Pathologist

## 2017-06-03 PROCEDURE — 3331090001 HH PPS REVENUE CREDIT

## 2017-06-03 PROCEDURE — 3331090002 HH PPS REVENUE DEBIT

## 2017-06-04 PROCEDURE — 3331090001 HH PPS REVENUE CREDIT

## 2017-06-04 PROCEDURE — 3331090002 HH PPS REVENUE DEBIT

## 2017-06-05 ENCOUNTER — OFFICE VISIT (OUTPATIENT)
Dept: INTERNAL MEDICINE CLINIC | Age: 77
End: 2017-06-05

## 2017-06-05 VITALS
SYSTOLIC BLOOD PRESSURE: 128 MMHG | TEMPERATURE: 97.9 F | DIASTOLIC BLOOD PRESSURE: 64 MMHG | OXYGEN SATURATION: 98 % | HEART RATE: 70 BPM | WEIGHT: 183 LBS | BODY MASS INDEX: 25.62 KG/M2 | HEIGHT: 71 IN

## 2017-06-05 DIAGNOSIS — Z13.39 SCREENING FOR ALCOHOLISM: ICD-10-CM

## 2017-06-05 DIAGNOSIS — Z13.31 SCREENING FOR DEPRESSION: ICD-10-CM

## 2017-06-05 DIAGNOSIS — R13.19 OTHER DYSPHAGIA: Primary | ICD-10-CM

## 2017-06-05 DIAGNOSIS — T17.908D ASPIRATION INTO AIRWAY, SUBSEQUENT ENCOUNTER: ICD-10-CM

## 2017-06-05 DIAGNOSIS — Z00.00 ROUTINE GENERAL MEDICAL EXAMINATION AT A HEALTH CARE FACILITY: ICD-10-CM

## 2017-06-05 PROBLEM — Z71.89 ADVANCE DIRECTIVE DISCUSSED WITH PATIENT: Status: ACTIVE | Noted: 2017-06-05

## 2017-06-05 PROCEDURE — 3331090002 HH PPS REVENUE DEBIT

## 2017-06-05 PROCEDURE — 3331090001 HH PPS REVENUE CREDIT

## 2017-06-05 RX ORDER — CETIRIZINE HCL 10 MG
10 TABLET ORAL DAILY
COMMUNITY
End: 2017-08-15 | Stop reason: ALTCHOICE

## 2017-06-05 RX ORDER — ASPIRIN 81 MG/1
81 TABLET ORAL DAILY
COMMUNITY

## 2017-06-05 RX ORDER — LANOLIN ALCOHOL/MO/W.PET/CERES
1000 CREAM (GRAM) TOPICAL DAILY
COMMUNITY

## 2017-06-05 RX ORDER — PANTOPRAZOLE SODIUM 20 MG/1
20 TABLET, DELAYED RELEASE ORAL DAILY
COMMUNITY
End: 2018-01-31

## 2017-06-05 RX ORDER — ACETAMINOPHEN 500 MG
2000 TABLET ORAL DAILY
COMMUNITY

## 2017-06-05 RX ORDER — MONTELUKAST SODIUM 10 MG/1
10 TABLET ORAL DAILY
COMMUNITY
End: 2018-06-25 | Stop reason: SDUPTHER

## 2017-06-05 NOTE — PATIENT INSTRUCTIONS
Medicare Wellness Visit, Male    The best way to improve and maintain good health is to have a healthy lifestyle by eating a well-balanced diet, exercising regularly, limiting alcohol and stopping smoking. Regular visits with your physician or non-physician health care provider also support your good health. Preventive screening tests can find health problems before they become diseases or illnesses. Preventive services such as immunizations prevent serious infections. All people over age 72 should have a Pneumovax and a Prevnar-13 shot to prevent potentially life threatening infections with the pneumococcus bacteria, a common cause of pneumonia. These are once in a lifetime unless you and your provider decide differently. Next due: Prevnar-13 first and then Pneumovax one year later    All people over 65 should have a yearly influenza vaccine or \"flu\" shot. This does not prevent infection with cold viruses but has been proven to prevent hospitalization and death from influenza. Next due: Fall    Although Medicare part B \"regular Medicare\" currently only covers tetanus vaccination in the context of an injury, a tetanus vaccine (Tdap or Td) is recommended every 10 years. Tdap is generally given once in a lifetime for older adults. Next due: Tdap    A shingles vaccine is recommended once in a lifetime after age 61. The Shingles vaccine is also not covered by Medicare part B. Next due: Zostavax     Note, however, that both the Shingles vaccine and Tdap/Td are generally covered by secondary carriers. Please check your coverage and out of pocket expenses. Your pharmacy benefits may cover these vaccines so please check with your pharmacist.  Also consider contacting your local health department because it may stock these vaccines for a reasonable charge.     We currently have documentation of the following immunization history for you:  Immunization History   Administered Date(s) Administered    Influenza Vaccine (Quad) PF 10/31/2016       Screening for infection with Hepatitis C is recommended for anyone born between [birth year] through [address]. The table at the bottom of this document indicates the status of this and other screening services. Screening for diabetes mellitus with a blood sugar test (glucose) should be done at least every 3 years until age 79. You and your health care provider may decide whether to continue screening after age 79. The most recent blood glucose we have on file for you is:   Lab Results   Component Value Date/Time    Glucose 95 05/08/2017 06:14 AM       Glaucoma is a disease of the eye due to increased ocular pressure that can lead to blindness. People with risk factors for glaucoma ( race, diabetes, family history) should be screened at least every 2 years by an eye professional. This may be covered annually if indicated as determined by you and your doctor. Last done: 2016 per patient  Next due: later 2017 per Dr. Rain Rivera    Cardiovascular screening tests that check for elevated lipids or cholesterol (fatty part of blood) which can lead to heart disease and strokes should be done every 4-6 years through age 79. You and your health care provider may decide whether to continue screening after age 79. The most recent lipid panel we have on file for you is:   Lab Results   Component Value Date/Time    Cholesterol, total 162 04/19/2017 05:47 AM    HDL Cholesterol 42 04/19/2017 05:47 AM    LDL, calculated 97 04/19/2017 05:47 AM    Triglyceride 116 04/19/2017 05:47 AM    CHOL/HDL Ratio 3.9 04/19/2017 05:47 AM     Next due:  Per Dr. Barb John (at least once a year while on atorvastatin)    Colorectal cancer screening that evaluates for blood or polyps in your colon for people with average risk should be done yearly as a stool test, every five years as a flexible sigmoidoscope or every 10 years as a colonoscopy up to age 76.  You and your health care provider may decide whether to continue screening after age 76. Last done: 8/12/2011  Next due: repeat in 10 years (2021) depending on health per Dr. Kennedi Boyd    Men up to age 76 may elect to screen for prostate cancer with a blood test called a PSA at certain intervals, depending on their personal and family history. This decision is between the patient and his provider. The most recent PSA values we have on file for you are:  Lab Results   Component Value Date/Time    Prostate Specific Ag <0.1 08/04/2016 02:10 PM    Prostate Specific Ag <0.1 02/23/2012 10:39 AM    Prostate Specific Ag <0.1 ng/mL 02/24/2011       People who are between age 54and [de-identified]years of age and have smoked the equivalent of 1 pack per day for 30 years or more may benefit from screening for lung cancer with a yearly low dose CT scan until they have been non smokers for 15 years. If you have questions regarding paulette please ask your health care provider    Your Medicare Wellness Exam is recommended annually. Please bring a copy of your Advanced Directives to the office so it can be scanned into your record.

## 2017-06-05 NOTE — PROGRESS NOTES
Dr. Deepa Chavis referred Shy Jackson , 1940, a 64 y.o. male for a Medicare Annual Wellness Visit (AWV), . This is an Initial Medicare Annual Wellness Exam (AWV) (Performed 12 months after IPPE or effective date of Medicare Part B enrollment, Once in a lifetime)    I have reviewed the patient's medical history in detail and updated the computerized patient record. 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  HDL 42, LDL 97    Dysphagia as late effect of cerebrovascular accident (CVA) 4/19/2017    Erectile dysfunction     Gastroesophageal reflux disease     Hemiparesis affecting right side as late effect of cerebrovascular accident (CVA) ([facility] [address] 4/19/2017    History of endogenous hypertriglyceridemia     History of malignant neoplasm of prostate     Genoveva score 7     Hypothyroidism     Osteoarthrosis involving multiple sites     Procedure refused 4/21/2017    Speech Pathologist recommended NPO and PEG placement; Patient refused    Urinary incontinence     Male incontinence       Past Surgical History:   Procedure Laterality Date    COLONOSCOPY      HX CHOLECYSTECTOMY      HX HERNIA REPAIR Bilateral     inguinal    HX RADICAL PROSTATECTOMY  1-    perineal approach     Current Outpatient Prescriptions   Medication Sig Dispense Refill    aspirin delayed-release 81 mg tablet Take 81 mg by mouth daily.      cetirizine (ZYRTEC) 10 mg tablet Take 10 mg by mouth daily.  Cholecalciferol, Vitamin D3, (VITAMIN D3) 2,000 unit cap capsule Take 2,000 Units by mouth daily.  pantoprazole (PROTONIX) 20 mg tablet Take 20 mg by mouth daily.  cyanocobalamin 1,000 mcg tablet Take 1,000 mcg by mouth daily.  montelukast (SINGULAIR) 10 mg tablet Take 10 mg by mouth daily.  levothyroxine (SYNTHROID) 150 mcg tablet TAKE 1 TABLET BY MOUTH ONCE DAILY 90 Tab 3    atorvastatin (LIPITOR) 20 mg tablet 1 tablet daily  Indications: DYSLIPIDEMIA 30 Tab 6    clopidogrel (PLAVIX) 75 mg tab Take 1 Tab by mouth daily (with dinner). Indications: Cerebral Thromboembolism Prevention 30 Tab 4    benazepril (LOTENSIN) 20 mg tablet 1 tablet daily (for BP) 30 Tab 4    FOLIC ACID/MULTIVIT-MIN/LUTEIN (CENTRUM SILVER PO) Take 1 Tab by mouth daily.        Medications Discontinued During This Encounter   Medication Reason    aspirin 81 mg chewable tablet Formulary Change    Cholecalciferol, Vitamin D3, (VITAMIN D3) 1,000 unit Cap Dose Adjustment    Omeprazole delayed release (PRILOSEC D/R) 20 mg tablet Formulary Change       Allergies   Allergen Reactions    Pcn [Penicillins] Rash     Family History   Problem Relation Age of Onset    Hypertension Father     Heart Disease Father     Alzheimer Mother      Social History   Substance Use Topics    Smoking status: Never Smoker    Smokeless tobacco: Never Used    Alcohol use No     Patient Active Problem List   Diagnosis Code    Urinary incontinence R32    History of malignant neoplasm of prostate Z85.46    Hypothyroidism E03.9    Chronic obstructive pulmonary disease (COPD) (HCC) J44.9    Asbestosis ([address] J61    Osteoarthrosis involving multiple sites M15.9    History of endogenous hypertriglyceridemia Z86.39    Acute ischemic stroke (HCC) I63.9    Impaired mobility and ADLs Z74.09    Dyslipidemia (high LDL; low HDL) E78.4    Gastroesophageal reflux disease K21.9    Hemiparesis affecting right side as late effect of cerebrovascular accident (CVA) ([facility] [address] I69.351    Dysphagia as late effect of cerebrovascular accident (CVA) I74.200    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 No    Do you average more than 14 drinks per week? No, patient does not drink alcohol    Functional Ability and Level of Safety:     Hearing Loss   normal-to-mild observed    Activities of Daily Living   Partial assistance. Requires assistance with: since stroke in April, patient has required help with filling his weekly AM/PM medication box, preparing meals, running errands, doing household chores, driving. Son and daughter-in-law Marylin Fair, live close by and provide a lot of assistance. Patient has been the primary caregiver for his wife who has dementia and is going to a \"home\" tomorrow (which has the patient upset today).    ADL Assessment 6/5/2017   Feeding yourself No Help Needed   Getting from bed to chair No Help Needed   Getting dressed No Help Needed   Bathing or showering No Help Needed   Walk across the room (includes cane/walker) No Help Needed   Using the telphone No Help Needed   Taking your medications Help Needed   Preparing meals Help Needed   Managing money (expenses/bills) Help Needed   Moderately strenuous housework (laundry) Help Needed   Shopping for personal items (toiletries/medicines) Help Needed Shopping for groceries Help Needed   Driving Help Needed   Climbing a flight of stairs No Help Needed   Getting to places beyond walking distances No Help Needed       Fall Risk     Fall Risk Assessment, last 12 mths 6/5/2017   Able to walk? Yes   Fall in past 12 months? No     Abuse Screen   Patient is not abused    Review of Systems   Not required    Physical Examination     No exam data present    Evaluation of Cognitive Function:  Mood/affect:  neutral  Appearance: age appropriate, casually dressed and within normal Limits  Family member/caregiver input: Daughter-in-law Simin Albright provided updated medication list    No exam performed by pharmacist today, not required for Medicare Annual Wellness Visit. Patient to be seen by Dr. Juan Luis Hayward separately. Visit Vitals    /64 (BP 1 Location: Left arm, BP Patient Position: Sitting)    Pulse 70    Temp 97.9 °F (36.6 °C)    Ht 5' 11\" (1.803 m)    Wt 183 lb (83 kg)    SpO2 98%    BMI 25.52 kg/m2       Patient Care Team:  Donny Combs MD as PCP - General (Internal Medicine)  Uvaldo Guzman MD as Physician (Pulmonary Disease)  Yari Noe MD as Physician (Ophthalmology)  Olive Mcgregor RN as Ambulatory Care Navigator    Advice/Referrals/Counseling   Education and counseling provided:  Are appropriate based on today's review and evaluation  End-of-Life planning (with patient's consent)  Pneumococcal Vaccine  Prostate cancer screening tests (PSA, covered annually)  Colorectal cancer screening tests  Cardiovascular screening blood test  Screening for glaucoma  Diabetes screening test  Tdap and Zostavax      Assessment/Plan       ICD-10-CM ICD-9-CM    1. Other dysphagia R13.19 787.29 REFERRAL TO GASTROENTEROLOGY   2. Routine general medical examination at a health care facility Z00.00 V70.0 [facility] 68   3. Screening for alcoholism Z13.89 V79.1    4. Screening for depression Z13.89 V79.0 DEPRESSION SCREEN ANNUAL   5.  Body mass index 25.0-25.9, adult Z68.25 V85.21      6. Deferred any possible medication changes to Dr. Desiree Lucas as patient is still having difficulty swallowing post CVA. Patient also complaining of post-nasal drip not improved by cetirizine - daughter-in-law plans to try loratadine next unless otherwise advised by PCP. Lab results and schedule of future lab studies reviewed with patient. Patient started benazepril in May for HTN and will be due for follow-up BMP soon - deferred to PCP for timing. Patient with history of CKD so need to monitor SCr and K closely while on ACEI. Cardiovascular risk and specific lipid/LDL goals reviewed - FLP in April was pre-stroke and now patient is on atorvastatin. Will need repeat FLP in 3-6 months to assess statin therapy although cholesterol was well controlled at the time of his stroke. 7.  Reviewed medications and side effects in detail. Patient did not bring his medications to the visit but daughter-in-law Maryjane Cao provided a detailed list.  During the patient interview,  the following was noted:    Omeprazole was switched to pantoprazole 20 mg once daily. Aspirin is 81 mg EC daily (he can swallow the tablet so is no longer using the chewable aspirin). He takes Singulair 10 mg daily. He takes several OTC medications: Zyrtec 10 mg daily (planning to switch to loratadine soon). Vitamin D3 is 2000 unit gel cap daily (not 1000 units). B12 1000 mcg tab daily.     8. Screenings and Immunizations (see patient instructions for chart):  PSA: Up to date August 2016, due for repeat monitoring per Dr. Desiree Lucas (history of prostate CA)   Colonoscopy: Up to date 2011, due for repeat depending on health in 2021 per GI/Dr. Jailyn Kenny (routine screening normally wouldn't be indicated by age)  Glaucoma screening/Eye exam: Up to date 2016 per patient, due for repeat later 2017 per Dr. Fadumo Schultz panel: Up to date April 2017 (before starting statin), due for repeat per Dr. Desiree Lucas (at least annually while on atorvastatin)   Diabetes: Up to date May 2017, due for repeat per  (at least every 3 years)    Immunizations:  Pneumococcal:  Recommended that patient receive Prevnar-13 first and PPSV23 one year later here or at his pharmacy with records faxed to the office. Influenza:  Recommended that patient receive here or at his pharmacy in Fall. Zoster:  Recommended that patient receive at his pharmacy and have pharmacy records faxed to the office. Tdap:  Recommended that patient receive at his pharmacy and have pharmacy records faxed to the office. 9. Advanced Care Planning:  The patient has advanced directives completed at home. Advised patient to bring a copy to the office to be scanned into the chart. Patient verbalized understanding of information presented. Answered all of the patient's questions. AVS information was reviewed with patient and will be printed on checkout.     Cyn Javed, PharmD, BCACP

## 2017-06-05 NOTE — PROGRESS NOTES
1. Have you been to the ER, urgent care clinic since your last visit? Hospitalized since your last visit? No    2. Have you seen or consulted any other health care providers outside of the [address] [place] [address] since your last visit? Include any pap smears or colon screening.  No

## 2017-06-05 NOTE — MR AVS SNAPSHOT
Visit Information Date & Time Provider Department Dept. Phone Encounter #  
 6/5/2017  3:15 PM Donny Combs MD [facility] INTERNAL MEDICINE OF Aminata Muro [phone] [id number] Your Appointments 6/28/2017 11:30 AM  
Follow Up with Donny Combs MD  
[address] [facility]) Appt Note: 3 wk f/u  
 [address], [address] [facility] [address]  
  
   
 [address], [address] [place] [address] Upcoming Health Maintenance Date Due DTaP/Tdap/Td series (1 - Tdap) 8/1/1959 ZOSTER VACCINE AGE 60> 8/1/1998 GLAUCOMA SCREENING Q2Y 8/1/2003 Pneumococcal 65+ High/Highest Risk (1 of 2 - PCV13) 8/1/2003 MEDICARE YEARLY EXAM 8/1/2003 INFLUENZA AGE 9 TO ADULT 8/1/2017 Allergies as of 6/5/2017  Review Complete On: 6/5/2017 By: Troy Junior LPN Severity Noted Reaction Type Reactions Pcn [Penicillins]    Rash Current Immunizations  Never Reviewed Name Date Influenza Vaccine (Quad) PF 10/31/2016 Not reviewed this visit You Were Diagnosed With   
  
 Codes Comments Other dysphagia    -  Primary ICD-10-CM: R13.19 ICD-9-CM: 787.29 Routine general medical examination at a health care facility     ICD-10-CM: Z00.00 ICD-9-CM: V70.0 Screening for alcoholism     ICD-10-CM: Z13.89 ICD-9-CM: V79.1 Screening for depression     ICD-10-CM: Z13.89 ICD-9-CM: V79.0 Vitals BP Pulse Temp Height(growth percentile) Weight(growth percentile) SpO2  
 128/64 (BP 1 Location: Left arm, BP Patient Position: Sitting) 70 97.9 °F (36.6 °C) 5' 11\" (1.803 m) 183 lb (83 kg) 98% BMI Smoking Status 25.52 kg/m2 Never Smoker BMI and BSA Data Body Mass Index Body Surface Area 25.52 kg/m 2 2.04 m 2 Preferred Pharmacy Pharmacy Name Phone [address], [address] [place] [address] [phone] Your Updated Medication List  
  
   
This list is accurate as of: 6/5/17  4:08 PM.  Always use your most recent med list.  
  
  
  
  
 aspirin delayed-release 81 mg tablet Take 81 mg by mouth daily. atorvastatin 20 mg tablet Commonly known as:  LIPITOR  
1 tablet daily  Indications: DYSLIPIDEMIA  
  
 benazepril 20 mg tablet Commonly known as:  LOTENSIN  
1 tablet daily (for BP) CENTRUM SILVER PO Take 1 Tab by mouth daily. Cholecalciferol (Vitamin D3) 2,000 unit Cap capsule Commonly known as:  VITAMIN D3 Take 2,000 Units by mouth daily. clopidogrel 75 mg Tab Commonly known as:  PLAVIX Take 1 Tab by mouth daily (with dinner). Indications: Cerebral Thromboembolism Prevention  
  
 cyanocobalamin 1,000 mcg tablet Take 1,000 mcg by mouth daily. levothyroxine 150 mcg tablet Commonly known as:  SYNTHROID  
TAKE 1 TABLET BY MOUTH ONCE DAILY pantoprazole 20 mg tablet Commonly known as:  PROTONIX Take 20 mg by mouth daily. SINGULAIR 10 mg tablet Generic drug:  montelukast  
Take 10 mg by mouth daily. ZyrTEC 10 mg tablet Generic drug:  cetirizine Take 10 mg by mouth daily. We Performed the Following [place] [address] [[id number] [facility]] REFERRAL TO GASTROENTEROLOGY [QQF02 Custom] Comments:  
 Please evaluate patient for dysphagia. To-Do List   
 06/06/2017 10:30 AM  
  Appointment with OT-[facility] PT Maribel Diaz at [facility] PT Maribel Diaz  ([phone]) 06/06/2017 11:00 AM  
  Appointment with Janell Villagomez PT at [address] [place] [address] ([phone]) 06/08/2017 10:30 AM  
  Appointment with OT-[facility] PT PTSSTEVE MIGUEL at [facility] PT Maribel Diaz  ([phone])  06/08/2017 11:00 AM  
  Appointment with Moisés Bhandari PTA at [facility] PT Maribel Diaz  ([phone])  
  
 06/13/2017 8:00 AM  
  Appointment with SP-MMC PT [address] at [facility] PT Maribel Diaz  ([phone])  
  
 06/13/2017 10:00 AM  
 Appointment with Brionna Damon, OTR/L at [facility] PT [address] ([phone])  
  
 06/13/2017 10:30 AM  
  Appointment with Evangelina Mckay PTA at [facility] PT [address] [place] ([phone])  
  
 06/15/2017 9:30 AM  
  Appointment with SP[facility] PT [address] at [facility] PT [address] [place] ([phone])  
  
 06/15/2017 10:30 AM  
  Appointment with Latanya Ulrich PT at [facility] PT [address] ([phone])  
  
 06/15/2017 11:00 AM  
  Appointment with Brionna Damon, OTR/L at [facility] PT [address] ([phone])  
  
 06/20/2017 1:00 PM  
  Appointment with Evangelina Mckay PTA at [facility] PT [address] [place] ([phone])  
  
 06/20/2017 1:30 PM  
  Appointment with Brionna Damon, OTR/L at [address] ([phone])  
  
 06/20/2017 3:00 PM  
  Appointment with JAGUAR-THAD PT [address] at [facility] PT [address] [place] ([phone])  
  
 06/22/2017 1:00 PM  
  Appointment with Evangelina Mckay PTA at [facility] PT [address] [place] ([phone])  
  
 06/22/2017 2:00 PM  
  Appointment with Brionna Damon, OTR/L at [address] ([phone])  
  
 06/22/2017 3:00 PM  
  Appointment with AUGUSTINA Grigsby at [facility] PT [address] ([phone])  
  
 06/27/2017 10:30 AM  
  Appointment with Brionna Damon, OTR/L at [address] ([phone])  
  
 06/27/2017 11:00 AM  
  Appointment with Braxton Sawyer, PT at [facility] PT [address] ([phone])  
  
 06/29/2017 11:00 AM  
  Appointment with Evangelina Mckay PTA at [facility] PT [address] [facility] ([phone])  
  
 06/29/2017 11:30 AM  
  Appointment with SERENITY Urban/ERMELINDA at [address] [place] [address] ([phone]) Referral Information Referral ID Referred By Referred To [id number] [address] [place], [address] Phone: [phone] Fax: [phone] Visits Status Start Date End Date 1 New Request 6/5/17 6/5/18 If your referral has a status of pending review or denied, additional information will be sent to support the outcome of this decision. Patient Instructions Medicare Wellness Visit, Male The best way to improve and maintain good health is to have a healthy lifestyle by eating a well-balanced diet, exercising regularly, limiting alcohol and stopping smoking. Regular visits with your physician or non-physician health care provider also support your good health. Preventive screening tests can find health problems before they become diseases or illnesses. Preventive services such as immunizations prevent serious infections. All people over age 72 should have a Pneumovax and a Prevnar-13 shot to prevent potentially life threatening infections with the pneumococcus bacteria, a common cause of pneumonia. These are once in a lifetime unless you and your provider decide differently. Next due: Prevnar-13 first and then Pneumovax one year later All people over 65 should have a yearly influenza vaccine or \"flu\" shot. This does not prevent infection with cold viruses but has been proven to prevent hospitalization and death from influenza. Next due: Fall Although Medicare part B \"regular Medicare\" currently only covers tetanus vaccination in the context of an injury, a tetanus vaccine (Tdap or Td) is recommended every 10 years. Tdap is generally given once in a lifetime for older adults. Next due: Tdap A shingles vaccine is recommended once in a lifetime after age 61. The Shingles vaccine is also not covered by Medicare part B. Next due: Zostavax Note, however, that both the Shingles vaccine and Tdap/Td are generally covered by secondary carriers. Please check your coverage and out of pocket expenses.  Your pharmacy benefits may cover these vaccines so please check with your pharmacist.  Also consider contacting your local [facility] department because it may stock these vaccines for a reasonable charge. We currently have documentation of the following immunization history for you: 
Immunization History Administered Date(s) Administered  Influenza Vaccine (Quad) PF 10/31/2016 Screening for infection with Hepatitis C is recommended for anyone born between [birth year] through [address]. The table at the bottom of this document indicates the status of this and other screening services. Screening for diabetes mellitus with a blood sugar test (glucose) should be done at least every 3 years until age 79. You and your health care provider may decide whether to continue screening after age 79. The most recent blood glucose we have on file for you is:  
Lab Results Component Value Date/Time Glucose 95 05/08/2017 06:14 AM  
 
 
Glaucoma is a disease of the eye due to increased ocular pressure that can lead to blindness. People with risk factors for glaucoma ( race, diabetes, family history) should be screened at least every 2 years by an eye professional. This may be covered annually if indicated as determined by you and your doctor. Last done: 2016 per patient  Next due: later 2017 per Dr. Satya Glasgow Cardiovascular screening tests that check for elevated lipids or cholesterol (fatty part of blood) which can lead to heart disease and strokes should be done every 4-6 years through age 79. You and your health care provider may decide whether to continue screening after age 79. The most recent lipid panel we have on file for you is:  
Lab Results Component Value Date/Time Cholesterol, total 162 04/19/2017 05:47 AM  
 HDL Cholesterol 42 04/19/2017 05:47 AM  
 LDL, calculated 97 04/19/2017 05:47 AM  
 Triglyceride 116 04/19/2017 05:47 AM  
 CHOL/HDL Ratio 3.9 04/19/2017 05:47 AM  
 
Next due:  Per Dr. Freddi Galeazzi (at least once a year while on atorvastatin) Colorectal cancer screening that evaluates for blood or polyps in your colon for people with average risk should be done yearly as a stool test, every five years as a flexible sigmoidoscope or every 10 years as a colonoscopy up to age 76. You and your health care provider may decide whether to continue screening after age 76. Last done: 8/12/2011 Next due: repeat in 10 years (2021) depending on health per Dr. Conor Soto Men up to age 76 may elect to screen for prostate cancer with a blood test called a PSA at certain intervals, depending on their personal and family history. This decision is between the patient and his provider. The most recent PSA values we have on file for you are: 
Lab Results Component Value Date/Time  
 Prostate Specific Ag <0.1 08/04/2016 02:10 PM  
 Prostate Specific Ag <0.1 02/23/2012 10:39 AM  
 Prostate Specific Ag <0.1 ng/mL 02/24/2011 People who are between age 54and [de-identified]years of age and have smoked the equivalent of 1 pack per day for 30 years or more may benefit from screening for lung cancer with a yearly low dose CT scan until they have been non smokers for 15 years. If you have questions regarding paulette please ask your health care provider Your Medicare Wellness Exam is recommended annually. Please bring a copy of your Advanced Directives to the office so it can be scanned into your record. Introducing [facility] & HEALTH SERVICES! [employer] introduces Optimal+ patient portal. Now you can access parts of your medical record, email your doctor's office, and request medication refills online. 1. In your internet browser, go to [email or web address] 2. Click on the First Time User? Click Here link in the Sign In box. You will see the New Member Sign Up page. 3. Enter your Optimal+ Access Code exactly as it appears below. You will not need to use this code after youve completed the sign-up process. If you do not sign up before the expiration date, you must request a new code.  
 
· Optimal+ Access Code: [email or web address] 
 Expires: 6/7/2017 12:22 PM 
 
4. Enter the last four digits of your Social Security Number (xxxx) and Date of Birth (mm/dd/yyyy) as indicated and click Submit. You will be taken to the next sign-up page. 5. Create a Apptive ID. This will be your Apptive login ID and cannot be changed, so think of one that is secure and easy to remember. 6. Create a Apptive password. You can change your password at any time. 7. Enter your Password Reset Question and Answer. This can be used at a later time if you forget your password. 8. Enter your e-mail address. You will receive e-mail notification when new information is available in [address] 9. Click Sign Up. You can now view and download portions of your medical record. 10. Click the Download Summary menu link to download a portable copy of your medical information. If you have questions, please visit the Frequently Asked Questions section of the Apptive website. Remember, Apptive is NOT to be used for urgent needs. For medical emergencies, dial 911. Now available from your iPhone and Android! Please provide this summary of care documentation to your next provider. Your primary care clinician is listed as Mallissa Body. If you have any questions after today's visit, please call [phone].

## 2017-06-06 ENCOUNTER — PATIENT OUTREACH (OUTPATIENT)
Dept: INTERNAL MEDICINE CLINIC | Age: 77
End: 2017-06-06

## 2017-06-06 ENCOUNTER — HOSPITAL ENCOUNTER (OUTPATIENT)
Dept: PHYSICAL THERAPY | Age: 77
Discharge: HOME | End: 2017-06-06
Payer: MEDICARE

## 2017-06-06 PROCEDURE — 3331090002 HH PPS REVENUE DEBIT

## 2017-06-06 PROCEDURE — 3331090001 HH PPS REVENUE CREDIT

## 2017-06-06 PROCEDURE — 97112 NEUROMUSCULAR REEDUCATION: CPT

## 2017-06-06 PROCEDURE — 97110 THERAPEUTIC EXERCISES: CPT

## 2017-06-06 PROCEDURE — 97530 THERAPEUTIC ACTIVITIES: CPT

## 2017-06-06 NOTE — PROGRESS NOTES
PT DAILY TREATMENT NOTE - [facility]     Patient Name: Yinka Carrion  Date:2017  : 1940  [x]  Patient  Verified  Payor: [place] MEDICARE / Plan: [place] MEDICARE PART A & B / Product Type: Medicare /    In time:11:02  Out time:11:40  Total Treatment Time (min): 38  Total Timed Codes (min): 38  1:1 Treatment Time ( only): 45  Visit #: 6     Treatment Area: Muscle weakness (generalized) [M62.81]    SUBJECTIVE  Pain Level (0-10 scale): 7/10  Any medication changes, allergies to medications, adverse drug reactions, diagnosis change, or new procedure performed?: [x] No    [] Yes (see summary sheet for update)  Subjective functional status/changes:   [] No changes reported  Pt reports that his wife is being moved to a nursing home today. She is falling all the time and preventing him from sleeping. He is still having a hard time swallowing. He followed up with his MD who instructed he follow up with an ENT, which he is scheduling soon. He feels like his breakfast is still caught in his throat today.       OBJECTIVE      23 min Therapeutic Exercise:  [x] See flow sheet :   Rationale: increase ROM, increase strength, improve coordination, improve balance and increase proprioception to improve the patients ability to improve ambulatory ability       15 min Neuromuscular Re-education:  []  See flow sheet :   Rationale: improve coordination, improve balance and increase proprioception  to improve the patients ability to improve ambulatory tolerance and improve of community access             With   [] TE   [] TA   [] neuro   [] other: Patient Education: [x] Review HEP    [] Progressed/Changed HEP based on:   [] positioning   [] body mechanics   [] transfers   [] heat/ice application    [] other:      Other Objective/Functional Measures:     Cues to reduce speed and force with step down for marches and hamstring curls  Tactile cues to avoid hip flexion with standing hamstring curls  Proper form with low bev step over, tactile cues to avoid knee hyperextension with stance    Neuromuscular Re-Education   Obstacle course emphasizing step over (no circumduction, increased hip/knee flexion and heel strike) with low hurdles and 1/2 foam  Ambulation with mirror emphasizing heel strike, reduced circumduction, increased arm swing, and increased weight-shift to R  Pt would focus on one aspect of gait before integrating another cue - significant improvement in gait after gait training   Reduced circumduction, increased heel strike, increased weight-shift to R   Continues to demonstrate reduced RUE swing and reduced weight-shift to R overall     Pain Level (0-10 scale) post treatment: 0/10    ASSESSMENT/Changes in Function:     Pt tolerated therapy without complaint of increased sx and is making steady progress towards initial goals in therapy. He was able to make significant improvement in gait pattern with increased weight-shift to R, increased UE swing, increased heel strike, and reduced circumduction with cuing. Will continue to address strength, coordination, and balance deficits for improved ambulatory ability and ease of daily tasks. Patient will continue to benefit from skilled PT services to modify and progress therapeutic interventions, address functional mobility deficits, address ROM deficits, address strength deficits, analyze and address soft tissue restrictions, analyze and cue movement patterns, assess and modify postural abnormalities, address imbalance/dizziness and instruct in home and community integration to attain remaining goals. Progress towards goals / Updated goals:  Short Term Goals: To be accomplished in 1 weeks:  1. Pt will be compliant with initial HEP to improve therapy outcomes Goal met. 5/30/17  Long Term Goals: To be accomplished in 6 weeks:  1. Pt will improve FOTO by points in order to demonstrate functional improvement   2.  Pt will improve TUG to 10 seconds in order to demonstrate reducing fall risk  3. Pt will improve RLE strength by 1/2 MMT grade in order to improve ease of prolonged ambulation  4.  Pt will ambulate 48' with equal weight shift in order to progress ambulatory ability     PLAN  []  Upgrade activities as tolerated     [x]  Continue plan of care  []  Update interventions per flow sheet       []  Discharge due to:_  []  Other:_      Chloe Auguste, PT 6/6/2017  11:06 AM    Future Appointments  Date Time Provider Nelson Hurtado   6/8/2017 10:30 AM OT-[facility] PT [address] MMCPTPB [facility]   6/8/2017 11:00 AM Sundar Kovacs, PTA MMCPTPB [facility]   6/13/2017 8:00 AM SP-[facility]MC PT [address] CBTHNGI [facility]   6/13/2017 10:00 AM Khari Beltran, OTR/L MMCPTPB [facility]   6/13/2017 10:30 AM Sundar Kovacs, PTA JSVVCFS [facility]   6/15/2017 9:30 AM SP-[facility] PT [address] MMCPTPB [facility]   6/15/2017 10:30 AM Chloe Auguste, PT HGNVUBD [facility]   6/15/2017 11:00 AM Khari Beltran, OTR/L MMCPTPB [facility]   6/15/2017 12:15 PM Gilberto Acosta, SLP MMCPTPB [facility]   6/20/2017 1:00 PM Sundar Kovacs, PTA MMCPTPB [facility]   6/20/2017 1:30 PM Khari Beltran, OTR/L MMCPTPB [facility]   6/20/2017 3:00 PM SP[facility] PT [address] MMCPTPB [facility]   6/22/2017 1:00 PM Sundar Kovacs, PTA MMCPTPB [facility]   6/22/2017 2:00 PM Khari Beltran, OTR/L MMCPTPB [facility]   6/22/2017 3:00 PM Gilberto Acosta, SLP MMCPTPB [facility]   6/27/2017 10:30 AM Khari Beltran, OTR/L MMCPTPB [facility]   6/27/2017 11:00 AM Yadira Padilla, PT MMCPTPB [facility]   6/28/2017 11:30 AM Ozzie Torres MD [facility]   6/29/2017 11:00 AM Sundar Kovacs, PTA MMCPTPB [facility]   6/29/2017 11:30 AM Khari Beltran, OTR/L MMCPTPB [facility]

## 2017-06-06 NOTE — PROGRESS NOTES
Transitions of Care Coordination    Follow up for hospitalization at [facility] 4/18-4/24/17 for an acute ischemic CVA, s/p tPA, AMS, CKD,GERD and transfer to the [facility] ARU 4/24-5/12/17. The patient was discharged to home with outpatient therapies at [facility]. The patient attended an office visit at [facility] on 6/5/17. An AWV was performed. No medication changes noted. Per chart patient continues to attend out patient PT and ST. Voice message left on home number identifying self/role and providing my direct contact number for questions, concerns or assistance. Will follow.

## 2017-06-06 NOTE — PROGRESS NOTES
OT DAILY TREATMENT NOTE - [facility]     Patient Name: Sudeep Aguilar  Date:2017  : 1940  [x]  Patient  Verified  Payor: VA MEDICARE / Plan: VA MEDICARE PART A & B / Product Type: Medicare /    In time:1035  Out time:1100  Total Treatment Time (min): 25  Total Timed Codes (min): 25  1:1 Treatment Time ( [address] only): 25  Visit #: 4 of 24    Treatment Area: Muscle weakness (generalized) [M62.81]    SUBJECTIVE  Pain Level (0-10 scale): 0/10  Any medication changes, allergies to medications, adverse drug reactions, diagnosis change, or new procedure performed?: [x] No    [] Yes (see summary sheet for update)  Subjective functional status/changes:   [] No changes reported  \"This is exhausting (nuts and bolts translation). \"    OBJECTIVE    25 min Therapeutic Activity:  [x]  See flow sheet :   Rationale: increase ROM and improve coordination  to improve the patients ability to manipulate objects, feed himself and perform fine motor ADLs such as buttoning and zipping. Small peg design board  Nuts and bolts in hand manipulation        With   [] TE   [x] TA   [] neuro   [] other: Patient Education: [x] Review HEP    [] Progressed/Changed HEP based on:   [] positioning   [] body mechanics   [] transfers   [] heat/ice application   [] Splint wear/care   [] Sensory re-education   [] scar management      [] other:         Other Objective/Functional Measures: movements appear to be quicker today than last visit. Patient clearing throat several times throughout session- reports he is having an endoscopy in the next 1-2 weeks. Pain Level (0-10 scale) post treatment: 0    ASSESSMENT/Changes in Function: Patient fatigued post session- appears more energetic today compared to last week. Patient will continue to benefit from skilled OT services to address ROM deficits, address strength deficits and instruct in home and community integration to attain remaining goals.      [x]  See Plan of Care  []  See progress note/recertification  []  See Discharge Summary         Progress towards goals / Updated goals:  1. Patient will be independent in home exercise program for fine motor skills. met 5/24/17  2. Patient will be independent in home exercise program for RUE strengthening. 3. Patient will be able to feed self with R hand for full meal due to improved coordination of movement.       Long Term Goals: To be accomplished in 24 treatments:   1. Patient will increase  strength in R hand by 20 pounds to increase ease of opening jars. 2. Patient will improve in hand manipulation speed and fine motor skills at least 20% for fasteners. 3. Patient will be able to fill out forms legibly and return to managing finances. 4. Patient will report reduced impairment in home care and self care function per [facility] as shown by improved score of at least 15 points.      PLAN  [x]  Upgrade activities as tolerated     [x]  Continue plan of care  []  Update interventions per flow sheet       []  Discharge due to:_  []  Other:_      Johann Marie OT 6/6/2017  10:45 AM

## 2017-06-07 PROCEDURE — 3331090002 HH PPS REVENUE DEBIT

## 2017-06-07 PROCEDURE — 3331090001 HH PPS REVENUE CREDIT

## 2017-06-07 NOTE — PROGRESS NOTES
The patient presents to the office today with the chief complaint of aspiration    HPI    The patient is still recovering from a recent CVA. He is doing ok but a recent video fluoroscopy revealed some penetration fo his upper airway even with some thickened liquids. The patient is trying to maintain his nutrition. The patient also is finding that food - even thickened liquids is sticking in his mid esophagus      Review of Systems   Respiratory: Negative for shortness of breath. Cardiovascular: Negative for chest pain and leg swelling. Allergies   Allergen Reactions    Pcn [Penicillins] Rash       Current Outpatient Prescriptions   Medication Sig Dispense Refill    aspirin delayed-release 81 mg tablet Take 81 mg by mouth daily.  cetirizine (ZYRTEC) 10 mg tablet Take 10 mg by mouth daily.  Cholecalciferol, Vitamin D3, (VITAMIN D3) 2,000 unit cap capsule Take 2,000 Units by mouth daily.  pantoprazole (PROTONIX) 20 mg tablet Take 20 mg by mouth daily.  cyanocobalamin 1,000 mcg tablet Take 1,000 mcg by mouth daily.  montelukast (SINGULAIR) 10 mg tablet Take 10 mg by mouth daily.  levothyroxine (SYNTHROID) 150 mcg tablet TAKE 1 TABLET BY MOUTH ONCE DAILY 90 Tab 3    atorvastatin (LIPITOR) 20 mg tablet 1 tablet daily  Indications: DYSLIPIDEMIA 30 Tab 6    clopidogrel (PLAVIX) 75 mg tab Take 1 Tab by mouth daily (with dinner). Indications: Cerebral Thromboembolism Prevention 30 Tab 4    benazepril (LOTENSIN) 20 mg tablet 1 tablet daily (for BP) 30 Tab 4    FOLIC ACID/MULTIVIT-MIN/LUTEIN (CENTRUM SILVER PO) Take 1 Tab by mouth daily.          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 HDL 42, LDL 97    Dysphagia as late effect of cerebrovascular accident (CVA) 4/19/2017    Erectile dysfunction     Gastroesophageal reflux disease     Hemiparesis affecting right side as late effect of cerebrovascular accident (CVA) ([facility] [address] [facility] 4/19/2017    History of endogenous hypertriglyceridemia     History of malignant neoplasm of prostate     [place] score 7     Hypothyroidism     Osteoarthrosis involving multiple sites     Procedure refused 4/21/2017    Speech Pathologist recommended NPO and PEG placement; Patient refused    Urinary incontinence     Male incontinence        Past Surgical History:   Procedure Laterality Date    COLONOSCOPY      HX CHOLECYSTECTOMY      HX HERNIA REPAIR Bilateral     inguinal    HX RADICAL PROSTATECTOMY  1-    perineal approach       Social History     Social History    Marital status:      Spouse name: N/A    Number of children: N/A    Years of education: N/A     Occupational History    Not on file.      Social History Main Topics    Smoking status: Never Smoker    Smokeless tobacco: Never Used    Alcohol use No    Drug use: No    Sexual activity: Yes     Partners: Female     Other Topics Concern    Not on file     Social History Narrative       Patient does not have an advanced directive on file    Visit Vitals    /64 (BP 1 Location: Left arm, BP Patient Position: Sitting)    Pulse 70    Temp 97.9 °F (36.6 °C)    Ht 5' 11\" (1.803 m)    Wt 183 lb (83 kg)    SpO2 98%    BMI 25.52 kg/m2       Physical Exam   No Cervical Lymphadenopathy  No Supraclavicular Lymphadenopathy  Thyroid is Normal  Lungs are clear to ausculation and percussion  Heart:  S1 S2 are normal, No gallops, No mummers  No Carotid Bruits  Abdomen:  Normal Bowel Sounds. No tenderness. No masses. No Hepatomegaly or Splenomegly  LE:  Strong Pedal Pulses. No Edema  Speech is slow but understandable  Weakness on right side    BMI:  OK    Admission on 04/24/2017, Discharged on 05/12/2017   Component Date Value Ref Range Status    WBC 04/25/2017 12.5  4.6 - 13.2 K/uL Final    RBC 04/25/2017 5.18  4.70 - 5.50 M/uL Final    HGB 04/25/2017 15.0  13.0 - 16.0 g/dL Final    HCT 04/25/2017 43.2  36.0 - 48.0 % Final    MCV 04/25/2017 83.4  74.0 - 97.0 FL Final    MCH 04/25/2017 29.0  24.0 - 34.0 PG Final    MCHC 04/25/2017 34.7  31.0 - 37.0 g/dL Final    RDW 04/25/2017 13.8  11.6 - 14.5 % Final    PLATELET 78/83/3859 473  135 - 420 K/uL Final    MPV 04/25/2017 11.3  9.2 - 11.8 FL Final    NEUTROPHILS 04/25/2017 67  40 - 73 % Final    LYMPHOCYTES 04/25/2017 22  21 - 52 % Final    MONOCYTES 04/25/2017 9  3 - 10 % Final    EOSINOPHILS 04/25/2017 2  0 - 5 % Final    BASOPHILS 04/25/2017 0  0 - 2 % Final    ABS. NEUTROPHILS 04/25/2017 8.3* 1.8 - 8.0 K/UL Final    ABS. LYMPHOCYTES 04/25/2017 2.8  0.9 - 3.6 K/UL Final    ABS. MONOCYTES 04/25/2017 1.1  0.05 - 1.2 K/UL Final    ABS. EOSINOPHILS 04/25/2017 0.3  0.0 - 0.4 K/UL Final    ABS.  BASOPHILS 04/25/2017 0.0  0.0 - 0.06 K/UL Final    DF 04/25/2017 AUTOMATED    Final    Magnesium 04/25/2017 1.9  1.6 - 2.6 mg/dL Final    PLEASE NOTE NEW REFERENCE RANGE    Sodium 04/25/2017 135* 136 - 145 mmol/L Final    Potassium 04/25/2017 4.2  3.5 - 5.5 mmol/L Final    Chloride 04/25/2017 101  100 - 108 mmol/L Final    CO2 04/25/2017 24  21 - 32 mmol/L Final    Anion gap 04/25/2017 10  3.0 - 18 mmol/L Final    Glucose 04/25/2017 100* 74 - 99 mg/dL Final    BUN 04/25/2017 9  7.0 - 18 MG/DL Final    Creatinine 04/25/2017 1.44* 0.6 - 1.3 MG/DL Final    BUN/Creatinine ratio 04/25/2017 6* 12 - 20   Final    GFR est AA 04/25/2017 58* >60 ml/min/1.73m2 Final    GFR est non-AA 04/25/2017 47* >60 ml/min/1.73m2 Final    Comment: (NOTE)  Estimated GFR is calculated using the Modification of Diet in Renal   Disease (MDRD) Study equation, reported for both  Americans   (GFRAA) and non- Americans (GFRNA), and normalized to 1.73m2   body surface area. The physician must decide which value applies to   the patient. The MDRD study equation should only be used in   individuals age 25 or older. It has not been validated for the   following: pregnant women, patients with serious comorbid conditions,   or on certain medications, or persons with extremes of body size,   muscle mass, or nutritional status.  Calcium 04/25/2017 9.3  8.5 - 10.1 MG/DL Final    Vitamin D 25-Hydroxy 04/25/2017 30.1  30 - 100 ng/mL Final    Comment: (NOTE)  Deficiency               <20 ng/mL  Insufficiency          20-30 ng/mL  Sufficient             ng/mL  Possible toxicity       >100 ng/mL    The Method used is Siemens Advia Centaur currently standardized to a   Center of Disease Control and Prevention (CDC) certified reference   [address]. Samples containing fluorescein dye can produce falsely   elevated values when tested with the ADVIA Centaur Vitamin D Assay. It is recommended that results in the toxic range, >100 ng/mL, be   retested 72 hours post fluorescein exposure.       Vitamin B12 04/25/2017 903  211 - 911 pg/mL Final    Folate 04/25/2017 >20.0* 3.10 - 17.50 ng/mL Final    Homocysteine, plasma 04/25/2017 11.9  0.0 - 15.0 umol/L Final    Comment: (NOTE)  Performed At: [address] [id number]  Hair Lewis MD IM:[id number]      HGB 04/27/2017 14.2  13.0 - 16.0 g/dL Final    HCT 04/27/2017 40.5  36.0 - 48.0 % Final    PLATELET 60/97/9999 486  135 - 420 K/uL Final    WBC 05/01/2017 9.7  4.6 - 13.2 K/uL Final    RBC 05/01/2017 5.20  4.70 - 5.50 M/uL Final    HGB 05/01/2017 15.0  13.0 - 16.0 g/dL Final    HCT 05/01/2017 43.4  36.0 - 48.0 % Final    MCV 05/01/2017 83.5  74.0 - 97.0 FL Final    MCH 05/01/2017 28.8  24.0 - 34.0 PG Final    MCHC 05/01/2017 34.6  31.0 - 37.0 g/dL Final    RDW 05/01/2017 13.8  11.6 - 14.5 % Final    PLATELET 55/22/4034 706  135 - 420 K/uL Final    MPV 05/01/2017 11.1  9.2 - 11.8 FL Final    NEUTROPHILS 05/01/2017 57  40 - 73 % Final    LYMPHOCYTES 05/01/2017 30  21 - 52 % Final    MONOCYTES 05/01/2017 11* 3 - 10 % Final    EOSINOPHILS 05/01/2017 2  0 - 5 % Final    BASOPHILS 05/01/2017 0  0 - 2 % Final    ABS. NEUTROPHILS 05/01/2017 5.5  1.8 - 8.0 K/UL Final    ABS. LYMPHOCYTES 05/01/2017 2.9  0.9 - 3.6 K/UL Final    ABS. MONOCYTES 05/01/2017 1.0  0.05 - 1.2 K/UL Final    ABS. EOSINOPHILS 05/01/2017 0.2  0.0 - 0.4 K/UL Final    ABS. BASOPHILS 05/01/2017 0.0  0.0 - 0.06 K/UL Final    DF 05/01/2017 AUTOMATED    Final    Sodium 05/01/2017 132* 136 - 145 mmol/L Final    Potassium 05/01/2017 4.0  3.5 - 5.5 mmol/L Final    Chloride 05/01/2017 97* 100 - 108 mmol/L Final    CO2 05/01/2017 26  21 - 32 mmol/L Final    Anion gap 05/01/2017 9  3.0 - 18 mmol/L Final    Glucose 05/01/2017 114* 74 - 99 mg/dL Final    BUN 05/01/2017 13  7.0 - 18 MG/DL Final    Creatinine 05/01/2017 1.49* 0.6 - 1.3 MG/DL Final    BUN/Creatinine ratio 05/01/2017 9* 12 - 20   Final    GFR est AA 05/01/2017 55* >60 ml/min/1.73m2 Final    GFR est non-AA 05/01/2017 46* >60 ml/min/1.73m2 Final    Comment: (NOTE)  Estimated GFR is calculated using the Modification of Diet in Renal   Disease (MDRD) Study equation, reported for both  Americans   (GFRAA) and non- Americans (GFRNA), and normalized to 1.73m2   body surface area. The physician must decide which value applies to   the patient. The MDRD study equation should only be used in   individuals age 25 or older.  It has not been validated for the   following: pregnant women, patients with serious comorbid conditions,   or on certain medications, or persons with extremes of body size,   muscle mass, or nutritional status.  Calcium 05/01/2017 9.4  8.5 - 10.1 MG/DL Final    Bilirubin, total 05/01/2017 0.8  0.2 - 1.0 MG/DL Final    ALT (SGPT) 05/01/2017 26  16 - 61 U/L Final    AST (SGOT) 05/01/2017 21  15 - 37 U/L Final    Alk. phosphatase 05/01/2017 88  45 - 117 U/L Final    Protein, total 05/01/2017 8.3* 6.4 - 8.2 g/dL Final    Albumin 05/01/2017 3.8  3.4 - 5.0 g/dL Final    Globulin 05/01/2017 4.5* 2.0 - 4.0 g/dL Final    A-G Ratio 05/01/2017 0.8  0.8 - 1.7   Final    TSH 05/01/2017 3.93* 0.36 - 3.74 uIU/mL Final    Microalbumin,urine random 05/02/2017 6.37* 0 - 3.0 MG/DL Final    Creatinine, urine 05/02/2017 216.00* 30 - 125 mg/dL Final    Microalbumin/Creat ratio (mg/g cre* 05/02/2017 29  0 - 30 mg/g Final    HGB 05/04/2017 13.6  13.0 - 16.0 g/dL Final    HCT 05/04/2017 40.1  36.0 - 48.0 % Final    PLATELET 07/15/6140 271  135 - 420 K/uL Final    Sodium 05/08/2017 130* 136 - 145 mmol/L Final    Potassium 05/08/2017 4.5  3.5 - 5.5 mmol/L Final    Chloride 05/08/2017 95* 100 - 108 mmol/L Final    CO2 05/08/2017 26  21 - 32 mmol/L Final    Anion gap 05/08/2017 9  3.0 - 18 mmol/L Final    Glucose 05/08/2017 95  74 - 99 mg/dL Final    BUN 05/08/2017 12  7.0 - 18 MG/DL Final    Creatinine 05/08/2017 1.45* 0.6 - 1.3 MG/DL Final    BUN/Creatinine ratio 05/08/2017 8* 12 - 20   Final    GFR est AA 05/08/2017 57* >60 ml/min/1.73m2 Final    GFR est non-AA 05/08/2017 47* >60 ml/min/1.73m2 Final    Comment: (NOTE)  Estimated GFR is calculated using the Modification of Diet in Renal   Disease (MDRD) Study equation, reported for both  Americans   (GFRAA) and non- Americans (GFRNA), and normalized to 1.73m2   body surface area. The physician must decide which value applies to   the patient. The MDRD study equation should only be used in   individuals age 25 or older.  It has not been validated for the   following: pregnant women, patients with serious comorbid conditions,   or on certain medications, or persons with extremes of body size,   muscle mass, or nutritional status.  Calcium 05/08/2017 9.5  8.5 - 10.1 MG/DL Final    HGB 05/08/2017 14.4  13.0 - 16.0 g/dL Final    HCT 05/08/2017 41.8  36.0 - 48.0 % Final    PLATELET 46/01/1797 065  135 - 420 K/uL Final    HGB 05/11/2017 13.5  13.0 - 16.0 g/dL Final    HCT 05/11/2017 38.5  36.0 - 48.0 % Final    PLATELET 46/11/2708 341  135 - 420 K/uL Final   Admission on 04/18/2017, Discharged on 04/24/2017   Component Date Value Ref Range Status    WBC 04/18/2017 7.6  4.0 - 11.0 1000/mm3 Final    RBC 04/18/2017 5.34  3.80 - 5.70 M/uL Final    HGB 04/18/2017 14.8  12.4 - 17.2 gm/dl Final    HCT 04/18/2017 44.6  37.0 - 50.0 % Final    MCV 04/18/2017 83.5  80.0 - 98.0 fL Final    MCH 04/18/2017 27.7  23.0 - 34.6 pg Final    MCHC 04/18/2017 33.2  30.0 - 36.0 gm/dl Final    PLATELET 67/15/1121 487  140 - 450 1000/mm3 Final    MPV 04/18/2017 10.6* 6.0 - 10.0 fL Final    RDW-SD 04/18/2017 41.7  35.1 - 43.9   Final    NRBC 04/18/2017 0  0 - 0   Final    IMMATURE GRANULOCYTES 04/18/2017 0.3  0.0 - 3.0 % Final    Comment: IG - Immature granulocytes (promyelocytes + myelocytes + metamyelocytes), their presence  indicates a left shift. An IG > 3% may predict positive blood cultures with 98% specificity.  (P<0.04) and 92% Positive Predictive Value (Vimal-Jeanie)1. Increased immature granulocytes  assist with the detection of infection and/or inflammation and may be present at an early stage  and are more sensitive and specific than band counts.       NEUTROPHILS 04/18/2017 48.6  34 - 64 % Final    LYMPHOCYTES 04/18/2017 36.5  28 - 48 % Final    MONOCYTES 04/18/2017 10.3  1 - 13 % Final    EOSINOPHILS 04/18/2017 3.8  0 - 5 % Final    BASOPHILS 04/18/2017 0.5  0 - 3 % Final    Sodium 04/18/2017 136  136 - 145 mEq/L Final    Potassium 04/18/2017 4.7  3.5 - 5.1 mEq/L Final  Chloride 04/18/2017 102  98 - 107 mEq/L Final    CO2 04/18/2017 28  21 - 32 mEq/L Final    Glucose 04/18/2017 100  74 - 106 mg/dl Final    BUN 04/18/2017 18  7 - 25 mg/dl Final    Creatinine 04/18/2017 1.6* 0.6 - 1.3 mg/dl Final    GFR est AA 04/18/2017 54.0    Final    Comment: THE [facility] [employer] STATES THAT THE MDRD STUDY EQUATION SHOULD ONLY BE USED ON  INDIVIDUALS 18 OR OLDER. THE REPORT ALSO NOTES THAT THE MDRD STUDY EQUATION HAS NOT BEEN  VALIDATED FOR USE WITH THE ELDERLY (OVER 79YEARS OF AGE), PREGNANT WOMEN, PATIENTS WITH SERIOUS  COMORBID CONDITIONS, OR PERSONS WITH EXTREMES OF BODY SIZE, MUSCLE MASS, OR NUTRITIONAL STATUS. APPLICATION OF THE EQUATION TO THESE PATIENT GROUPS MAY LEAD TO ERRORS IN GFR ESTIMATION. GFR  ESTIMATING EQUATIONS HAVE POORER AGREEMENT WITH MEASURED GFR FOR ILL HOSPITALIZED PATIENTS AND  FOR PEOPLE WITH NEAR NORMAL KIDNEY FUNCTION THAN FOR SUBJECTS IN THE MDRD STUDY. VALIDATION  STUDIES ARE IN PROGRESS TO EVALUATE THE MDRD STUDY EQUATION FOR ADDITIONAL ETHNIC GROUPS, THE  ELDERLY, VARIOUS DISEASE CONDITIONS, AND PEOPLE WITH NORMAL KIDNEY FUNCTION. GFRA----REFERS TO   GFRO---REFERS TO OTHER RACES  REFERENCES AVAILABLE UPON REQUEST.      GFR est non-AA 04/18/2017 45    Final    Calcium 04/18/2017 9.0  8.5 - 10.1 mg/dl Final    AST (SGOT) 04/18/2017 24  15 - 37 U/L Final    ALT (SGPT) 04/18/2017 20  12 - 78 U/L Final    Alk.  phosphatase 04/18/2017 74  45 - 117 U/L Final    Bilirubin, total 04/18/2017 0.9  0.2 - 1.0 mg/dl Final    Protein, total 04/18/2017 7.4  6.4 - 8.2 gm/dl Final    Albumin 04/18/2017 3.9  3.4 - 5.0 gm/dl Final    Ventricular Rate 04/18/2017 57  BPM Final    Atrial Rate 04/18/2017 57  BPM Final    P-R Interval 04/18/2017 156  ms Final    QRS Duration 04/18/2017 88  ms Final    Q-T Interval 04/18/2017 430  ms Final    QTC Calculation (Bezet) 04/18/2017 418  ms Final    Calculated P Axis 04/18/2017 39  degrees Final    Calculated R Axis 04/18/2017 21  degrees Final    Calculated T Axis 04/18/2017 57  degrees Final    Diagnosis 04/18/2017    Final                    Value:Sinus bradycardia  Otherwise normal ECG  When compared with ECG of 21-APR-2015 21:43,  No significant change was found  Confirmed by Jeremi Weinberg M.D., [place]iposa Chi ([id number]) on 4/19/2017 7:46:58 AM      Sodium 04/18/2017 138  136 - 145 mEq/L Final    Potassium 04/18/2017 4.6  3.5 - 4.9 mEq/L Final    Chloride 04/18/2017 100  98 - 107 mEq/L Final    CO2, TOTAL 04/18/2017 26  21 - 32 mmol/L Final    Glucose 04/18/2017 103  74 - 106 mg/dL Final    BUN 04/18/2017 19  7 - 25 mg/dl Final    Creatinine 04/18/2017 1.6* 0.6 - 1.3 mg/dl Final    HCT 04/18/2017 44  40 - 54 % Final    HGB 04/18/2017 15.0  12.4 - 17.2 gm/dl Final    CALCIUM,IONIZED 04/18/2017 4.70  4.40 - 5.40 mg/dL Final    Prothrombin time 04/18/2017 12.9  11.5 - 14.0 seconds Final    INR 04/18/2017 1.0  0.1 - 1.1   Final    aPTT 04/18/2017 31.6  24.9 - 35.6 seconds Final    Troponin-I 04/18/2017 0.00  0.00 - 0.07 ng/ml Final    WBC 04/19/2017 10.3  4.0 - 11.0 1000/mm3 Final    RBC 04/19/2017 5.13  3.80 - 5.70 M/uL Final    HGB 04/19/2017 14.1  12.4 - 17.2 gm/dl Final    HCT 04/19/2017 42.2  37.0 - 50.0 % Final    MCV 04/19/2017 82.3  80.0 - 98.0 fL Final    MCH 04/19/2017 27.5  23.0 - 34.6 pg Final    MCHC 04/19/2017 33.4  30.0 - 36.0 gm/dl Final    PLATELET 34/77/1197 559  140 - 450 1000/mm3 Final    MPV 04/19/2017 10.6* 6.0 - 10.0 fL Final    RDW-SD 04/19/2017 40.7  35.1 - 43.9   Final    NRBC 04/19/2017 0  0 - 0   Final    IMMATURE GRANULOCYTES 04/19/2017 0.3  0.0 - 3.0 % Final    Comment: IG - Immature granulocytes (promyelocytes + myelocytes + metamyelocytes), their presence  indicates a left shift. An IG > 3% may predict positive blood cultures with 98% specificity.  (P<0.04) and 92% Positive Predictive Value (Jessica)1.  Increased immature granulocytes  assist with the detection of infection and/or inflammation and may be present at an early stage  and are more sensitive and specific than band counts.  NEUTROPHILS 04/19/2017 70.6* 34 - 64 % Final    LYMPHOCYTES 04/19/2017 20.6* 28 - 48 % Final    MONOCYTES 04/19/2017 7.2  1 - 13 % Final    EOSINOPHILS 04/19/2017 1.0  0 - 5 % Final    BASOPHILS 04/19/2017 0.3  0 - 3 % Final    Sodium 04/19/2017 139  136 - 145 mEq/L Final    Potassium 04/19/2017 4.0  3.5 - 5.1 mEq/L Final    Chloride 04/19/2017 107  98 - 107 mEq/L Final    CO2 04/19/2017 22  21 - 32 mEq/L Final    Glucose 04/19/2017 106  74 - 106 mg/dl Final    BUN 04/19/2017 17  7 - 25 mg/dl Final    Creatinine 04/19/2017 1.4* 0.6 - 1.3 mg/dl Final    GFR est AA 04/19/2017 >60    Final    Comment: THE [facility] GROUP STATES THAT THE MDRD STUDY EQUATION SHOULD ONLY BE USED ON  INDIVIDUALS 18 OR OLDER. THE REPORT ALSO NOTES THAT THE MDRD STUDY EQUATION HAS NOT BEEN  VALIDATED FOR USE WITH THE ELDERLY (OVER 79YEARS OF AGE), PREGNANT WOMEN, PATIENTS WITH SERIOUS  COMORBID CONDITIONS, OR PERSONS WITH EXTREMES OF BODY SIZE, MUSCLE MASS, OR NUTRITIONAL STATUS. APPLICATION OF THE EQUATION TO THESE PATIENT GROUPS MAY LEAD TO ERRORS IN GFR ESTIMATION. GFR  ESTIMATING EQUATIONS HAVE POORER AGREEMENT WITH MEASURED GFR FOR ILL HOSPITALIZED PATIENTS AND  FOR PEOPLE WITH NEAR NORMAL KIDNEY FUNCTION THAN FOR SUBJECTS IN THE MDRD STUDY. VALIDATION  STUDIES ARE IN PROGRESS TO EVALUATE THE MDRD STUDY EQUATION FOR ADDITIONAL ETHNIC GROUPS, THE  ELDERLY, VARIOUS DISEASE CONDITIONS, AND PEOPLE WITH NORMAL KIDNEY FUNCTION. GFRA----REFERS TO   GFRO---REFERS TO OTHER RACES  REFERENCES AVAILABLE UPON REQUEST.      GFR est non-AA 04/19/2017 52    Final    Calcium 04/19/2017 8.7  8.5 - 10.1 mg/dl Final    AST (SGOT) 04/19/2017 18  15 - 37 U/L Final    ALT (SGPT) 04/19/2017 17  12 - 78 U/L Final    Alk.  phosphatase 04/19/2017 70  45 - 117 U/L Final    Bilirubin, total 04/19/2017 0.9  0.2 - 1.0 mg/dl Final    Protein, total 04/19/2017 6.9  6.4 - 8.2 gm/dl Final    Albumin 04/19/2017 3.4  3.4 - 5.0 gm/dl Final    Cholesterol, total 04/19/2017 162  140 - 199 mg/dl Final    HDL Cholesterol 04/19/2017 42  40 - 96 mg/dl Final    Triglyceride 04/19/2017 116  29 - 150 mg/dl Final    LDL, calculated 04/19/2017 97  0 - 130 mg/dl Final    Comment: TARGET/DECISION VALUES DEPEND ON A NUMBER OF RISK FACTORS. LDL CALCULATION NOT VALID IF TRIGLYCERIDES ARE GREATER THAN 400 mg/dL.  CHOL/HDL Ratio 04/19/2017 3.9  0.0 - 5.0 Ratio Final    Troponin-I 04/18/2017 <0.015  0.000 - 0.045 ng/ml Final    Comment: The presence of detectable troponin above the reference range indicates myocardial injury which  maybe due to ischemia, myocarditis, trauma, etc. Clinical correlation is necessary to establish  the significance of this finding. NOTE: The reference range is based on the 99th percentile of the referent population         Troponin-I 04/19/2017 <0.015  0.000 - 0.045 ng/ml Final    Comment: The presence of detectable troponin above the reference range indicates myocardial injury which  maybe due to ischemia, myocarditis, trauma, etc. Clinical correlation is necessary to establish  the significance of this finding. NOTE: The reference range is based on the 99th percentile of the referent population         Magnesium 04/18/2017 2.3  1.6 - 2.6 mg/dl Final    Hemoglobin A1c 04/19/2017 5.9  4.8 - 6.0 % Final    Troponin-I 04/19/2017 0.016  0.000 - 0.045 ng/ml Final    Comment: The presence of detectable troponin above the reference range indicates myocardial injury which  maybe due to ischemia, myocarditis, trauma, etc. Clinical correlation is necessary to establish  the significance of this finding.    NOTE: The reference range is based on the 99th percentile of the referent population         WBC 04/20/2017 11.1* 4.0 - 11.0 1000/mm3 Final    RBC 04/20/2017 4.89 3.80 - 5.70 M/uL Final    HGB 04/20/2017 13.5  12.4 - 17.2 gm/dl Final    HCT 04/20/2017 40.6  37.0 - 50.0 % Final    MCV 04/20/2017 83.0  80.0 - 98.0 fL Final    MCH 04/20/2017 27.6  23.0 - 34.6 pg Final    MCHC 04/20/2017 33.3  30.0 - 36.0 gm/dl Final    PLATELET 32/73/7724 469  140 - 450 1000/mm3 Final    MPV 04/20/2017 10.7* 6.0 - 10.0 fL Final    RDW-SD 04/20/2017 41.5  35.1 - 43.9   Final    NRBC 04/20/2017 0  0 - 0   Final    IMMATURE GRANULOCYTES 04/20/2017 0.4  0.0 - 3.0 % Final    Comment: IG - Immature granulocytes (promyelocytes + myelocytes + metamyelocytes), their presence  indicates a left shift. An IG > 3% may predict positive blood cultures with 98% specificity.  (P<0.04) and 92% Positive Predictive Value (Jessica)1. Increased immature granulocytes  assist with the detection of infection and/or inflammation and may be present at an early stage  and are more sensitive and specific than band counts.  NEUTROPHILS 04/20/2017 67.0* 34 - 64 % Final    LYMPHOCYTES 04/20/2017 22.4* 28 - 48 % Final    MONOCYTES 04/20/2017 9.1  1 - 13 % Final    EOSINOPHILS 04/20/2017 0.6  0 - 5 % Final    BASOPHILS 04/20/2017 0.5  0 - 3 % Final    Sodium 04/20/2017 137  136 - 145 mEq/L Final    Potassium 04/20/2017 4.0  3.5 - 5.1 mEq/L Final    Chloride 04/20/2017 107  98 - 107 mEq/L Final    CO2 04/20/2017 21  21 - 32 mEq/L Final    Glucose 04/20/2017 117* 74 - 106 mg/dl Final    BUN 04/20/2017 12  7 - 25 mg/dl Final    Creatinine 04/20/2017 1.4* 0.6 - 1.3 mg/dl Final    GFR est AA 04/20/2017 >60    Final    Comment: THE [facility] STATES THAT THE MDRD STUDY EQUATION SHOULD ONLY BE USED ON  INDIVIDUALS 18 OR OLDER.  THE REPORT ALSO NOTES THAT THE MDRD STUDY EQUATION HAS NOT BEEN  VALIDATED FOR USE WITH THE ELDERLY (OVER 79YEARS OF AGE), PREGNANT WOMEN, PATIENTS WITH SERIOUS  COMORBID CONDITIONS, OR PERSONS WITH EXTREMES OF BODY SIZE, MUSCLE MASS, OR NUTRITIONAL STATUS. APPLICATION OF THE EQUATION TO THESE PATIENT GROUPS MAY LEAD TO ERRORS IN GFR ESTIMATION. GFR  ESTIMATING EQUATIONS HAVE POORER AGREEMENT WITH MEASURED GFR FOR ILL HOSPITALIZED PATIENTS AND  FOR PEOPLE WITH NEAR NORMAL KIDNEY FUNCTION THAN FOR SUBJECTS IN THE MDRD STUDY. VALIDATION  STUDIES ARE IN PROGRESS TO EVALUATE THE MDRD STUDY EQUATION FOR ADDITIONAL ETHNIC GROUPS, THE  ELDERLY, VARIOUS DISEASE CONDITIONS, AND PEOPLE WITH NORMAL KIDNEY FUNCTION. GFRA----REFERS TO   GFRO---REFERS TO OTHER RACES  REFERENCES AVAILABLE UPON REQUEST.      GFR est non-AA 04/20/2017 52    Final    Calcium 04/20/2017 8.6  8.5 - 10.1 mg/dl Final    AST (SGOT) 04/20/2017 15  15 - 37 U/L Final    ALT (SGPT) 04/20/2017 16  12 - 78 U/L Final    Alk. phosphatase 04/20/2017 71  45 - 117 U/L Final    Bilirubin, total 04/20/2017 1.2* 0.2 - 1.0 mg/dl Final    Protein, total 04/20/2017 6.6  6.4 - 8.2 gm/dl Final    Albumin 04/20/2017 3.2* 3.4 - 5.0 gm/dl Final   Office Visit on 03/09/2017   Component Date Value Ref Range Status    Color (UA POC) 03/09/2017 Yellow   Final    Clarity (UA POC) 03/09/2017 Clear   Final    Glucose (UA POC) 03/09/2017 Negative  Negative Final    Bilirubin (UA POC) 03/09/2017 Negative  Negative Final    Ketones (UA POC) 03/09/2017 Negative  Negative Final    Specific gravity (UA POC) 03/09/2017 1.015  1.001 - 1.035 Final    Blood (UA POC) 03/09/2017 Negative  Negative Final    pH (UA POC) 03/09/2017 6.5  4.6 - 8.0 Final    Protein (UA POC) 03/09/2017 Trace  Negative mg/dL Final    Urobilinogen (UA POC) 03/09/2017 0.2 mg/dL  0.2 - 1 Final    Nitrites (UA POC) 03/09/2017 Negative  Negative Final    Leukocyte esterase (UA POC) 03/09/2017 Negative  Negative Final       .No results found for any visits on 06/05/17. Assessment / Plan      ICD-10-CM ICD-9-CM    1.  Other dysphagia R13.19 787.29 REFERRAL TO GASTROENTEROLOGY   2. Routine general medical examination at [facility] care facility Z00.00 V70.0 DEPRESSION SCREEN ANNUAL   3. Screening for alcoholism Z13.89 V79.1    4. Screening for depression Z13.89 V79.0 DEPRESSION SCREEN ANNUAL   5. Body mass index 25.0-25.9, adult Z68.25 V85.21    6. Aspiration into airway, subsequent encounter T17.908D V58.89      934.9      To GI  he was advised to continue his maintenance medications  Adjust thickened liqids    Follow-up Disposition:  Return in about 4 months (around 10/5/2017). I asked Tg Jeong [facility] if he has any questions and I answered the questions. Stewart APONTE  [facility] states that he understands the treatment plan and agrees with the treatment plan

## 2017-06-08 ENCOUNTER — APPOINTMENT (OUTPATIENT)
Dept: PHYSICAL THERAPY | Age: 77
End: 2017-06-08
Payer: MEDICARE

## 2017-06-08 ENCOUNTER — HOSPITAL ENCOUNTER (OUTPATIENT)
Dept: PHYSICAL THERAPY | Age: 77
Discharge: HOME | End: 2017-06-08
Payer: MEDICARE

## 2017-06-08 PROCEDURE — 3331090002 HH PPS REVENUE DEBIT

## 2017-06-08 PROCEDURE — 97110 THERAPEUTIC EXERCISES: CPT

## 2017-06-08 PROCEDURE — 3331090001 HH PPS REVENUE CREDIT

## 2017-06-08 NOTE — PROGRESS NOTES
PT DAILY TREATMENT NOTE - [facility]     Patient Name: Rubin Singh  Date:2017  : 1940  [x]  Patient  Verified  Payor: [place] MEDICARE / Plan: [place] MEDICARE PART A & B / Product Type: Medicare /    In time:1100  Out time:1134  Total Treatment Time (min): 34  Total Timed Codes (min): 34  1:1 Treatment Time ( only): 34   Visit #: 7 of     Treatment Area: Muscle weakness (generalized) [M62.81]    SUBJECTIVE  Pain Level (0-10 scale): 1-210  Any medication changes, allergies to medications, adverse drug reactions, diagnosis change, or new procedure performed?: [x] No    [] Yes (see summary sheet for update)  Subjective functional status/changes:   [] No changes reported  Pt stated that he has slight lower back pain today, probably 2* the way he walks    OBJECTIVE    34 min Therapeutic Exercise:  [x] See flow sheet :   Rationale: increase ROM and increase strength to improve the patients ability to increase ease with ADLs    With   [x] TE   [] TA   [] neuro   [] other: Patient Education: [x] Review HEP    [] Progressed/Changed HEP based on:   [] positioning   [] body mechanics   [] transfers   [] heat/ice application    [] other:      Other Objective/Functional Measures:   Pt cont to need seated rest breaks during exercises  Pt had fatigue after [address]  FOTO 43   Pt cont to have unequal weight shift with ambulation  Pain Level (0-10 scale) post treatment: 1-2/10 back    ASSESSMENT/Changes in Function:   Pt cont to slowly progress toward goals. Pt cont with decreased R LE strength. Pt cont to ambulate without equal weight shift. FOTO is slowly improving which indicates small improvement of overall function    Patient will continue to benefit from skilled PT services to modify and progress therapeutic interventions, address functional mobility deficits, address ROM deficits and address strength deficits to attain remaining goals.      [x]  See Plan of Care  []  See progress note/recertification  [] See Discharge Summary         Progress towards goals / Updated goals:  Short Term Goals: To be accomplished in 1 weeks:  1. Pt will be compliant with initial HEP to improve therapy outcomes   Goal met. 17  Long Term Goals: To be accomplished in 6 weeks:  1. Pt will improve FOTO by 16 points in order to demonstrate functional improvement  Progressing. Increased from 41 to 43. 17    2. Pt will improve TUG to 10 seconds in order to demonstrate reducing fall risk  3. Pt will improve RLE strength by 1/2 MMT grade in order to improve ease of prolonged ambulation  4.  Pt will ambulate 48' with equal weight shift in order to progress ambulatory ability     PLAN  []  Upgrade activities as tolerated     [x]  Continue plan of care  []  Update interventions per flow sheet       []  Discharge due to:_  []  Other:_      Juan Carlos Romero PTA 2017  10:56 AM    Future Appointments  Date Time Provider Nelson Hurtado   2017 11:00 AM Juan Carlos Romero PTA MMCPTPB [facility]   2017 8:00 AM [facility] [address] [id number] [facility]   2017 10:00 AM Siddhartha Ac, OTR/L MMCPTPB [facility]   2017 10:30 AM Juan Carols Romero PTA [id number] [facility]   6/15/2017 9:30 AM [facility] PT [address] MMCPTPB [facility]   6/15/2017 10:30 AM Wilbur Mcmillan, PT [id number] [facility]   6/15/2017 11:00 AM Siddhartha Ac, OTR/L MMCPTPB [facility]   6/15/2017 12:15 PM Arabella Joya, AUGUSTINA VHQNWPS [facility]   2017 1:00 PM Juan Carlos Romero PTA MMCPTPB [facility]   2017 1:30 PM Siddhartha Ac, OTR/L MMCPTPB [facility]   2017 3:00 PM [facility] PT [address] MMCPTPB [facility]   2017 1:00 PM Juan Carlos Romero PTA MMCPTPB [facility]   2017 2:00 PM Siddhartha Ac, OTR/L MMCPTPB [facility]   2017 3:00 PM Arabella Joya, SLP MMCPTPB [facility]   2017 10:30 AM Siddhartha Ac, OTR/L MMCPTPB [facility]   2017 11:00 AM Soledad Sheppard, PT [id number] [facility]   2017 11:30 AM Christie Gamble MD [address]   2017 11:00 AM Juan Carlos Romero, PTA MMCPTPB [facility]   2017 11:30 AM Siddhartha Ac, OTR/L MMCPTPB [facility]

## 2017-06-09 PROCEDURE — 3331090002 HH PPS REVENUE DEBIT

## 2017-06-09 PROCEDURE — 3331090001 HH PPS REVENUE CREDIT

## 2017-06-10 PROCEDURE — 3331090002 HH PPS REVENUE DEBIT

## 2017-06-10 PROCEDURE — 3331090001 HH PPS REVENUE CREDIT

## 2017-06-11 PROCEDURE — 3331090001 HH PPS REVENUE CREDIT

## 2017-06-11 PROCEDURE — 3331090002 HH PPS REVENUE DEBIT

## 2017-06-12 ENCOUNTER — PATIENT OUTREACH (OUTPATIENT)
Dept: INTERNAL MEDICINE CLINIC | Age: 77
End: 2017-06-12

## 2017-06-12 NOTE — TELEPHONE ENCOUNTER
Patients daughter in law called and stated that patient needs an anti-depressant. I advised that Dr Larry Nguyen wouldn't be back until Wednesday. Annamarie Montemayor can be reached at [phone] with any questions.

## 2017-06-12 NOTE — PROGRESS NOTES
Transitions of Care Coordination    Gerhardt Russel was hospitalized at [facility] 4/18-4/24/17 for an acute ischemic CVA, s/p tPA, AMS, CKD,GERD and transferred to the [facility] ARU 4/24-5/12/17. The patient was discharged to home with outpatient therapies at [facility] [place]. The patient was seen by his PCP at home on 5/15/17. Patient continues with outpatient therapies. Goals met. Episode resolved:  No hospitalization or ED visit 30 days from discharge on 4/2417. Voice message left providing my direct number for future questions, concerns or assistance.

## 2017-06-13 ENCOUNTER — HOSPITAL ENCOUNTER (OUTPATIENT)
Dept: PHYSICAL THERAPY | Age: 77
Discharge: HOME | End: 2017-06-13
Payer: MEDICARE

## 2017-06-13 PROCEDURE — 97530 THERAPEUTIC ACTIVITIES: CPT

## 2017-06-13 PROCEDURE — 97110 THERAPEUTIC EXERCISES: CPT

## 2017-06-13 PROCEDURE — 92507 TX SP LANG VOICE COMM INDIV: CPT

## 2017-06-13 NOTE — PROGRESS NOTES
PT DAILY TREATMENT NOTE - [facility]     Patient Name: Riaz Viera  Date:2017  : 1940  [x]  Patient  Verified  Payor: Guido Fierro / Plan: [place] MEDICARE PART A & B / Product Type: Medicare /    In time:1030  Out time:1059  Total Treatment Time (min): 29  Total Timed Codes (min): 19  1:1 Treatment Time ([facility] only): 19   Visit #: 8 of     Treatment Area: Muscle weakness (generalized) [M62.81]    SUBJECTIVE  Pain Level (0-10 scale): 0/10  Any medication changes, allergies to medications, adverse drug reactions, diagnosis change, or new procedure performed?: [x] No    [] Yes (see summary sheet for update)  Subjective functional status/changes:   [] No changes reported  Pt was in the bathroom for 10 minutes before session. Pt stated that he is fine today and has no pain    OBJECTIVE    19 min Therapeutic Exercise:  [x] See flow sheet :   Rationale: increase ROM and increase strength to improve the patients ability to increase ease with ADLs    With   [x] TE   [] TA   [] neuro   [] other: Patient Education: [x] Review HEP    [] Progressed/Changed HEP based on:   [] positioning   [] body mechanics   [] transfers   [] heat/ice application    [] other:      Other Objective/Functional Measures:   Pt cont to need seated rest breaks during session  Pt had no difficulty with exercises  Cont to need cueing to slow down with exercises     Pain Level (0-10 scale) post treatment: 0/10    ASSESSMENT/Changes in Function:   Pt cont to progress well toward goals. Pt cont with R LE weakness. Cont to have moderate limp with ambulation. Patient will continue to benefit from skilled PT services to modify and progress therapeutic interventions, address functional mobility deficits, address ROM deficits and address strength deficits to attain remaining goals.      [x]  See Plan of Care  []  See progress note/recertification  []  See Discharge Summary         Progress towards goals / Updated goals:  Short Term Goals: To be accomplished in 1 weeks:  1. Pt will be compliant with initial HEP to improve therapy outcomes   Goal met. 5/30/17  Long Term Goals: To be accomplished in 6 weeks:  1. Pt will improve FOTO by 16 points in order to demonstrate functional improvement  Progressing. Increased from 41 to 43. 6/8/17    2. Pt will improve TUG to 10 seconds in order to demonstrate reducing fall risk  3. Pt will improve RLE strength by 1/2 MMT grade in order to improve ease of prolonged ambulation  4.  Pt will ambulate 48' with equal weight shift in order to progress ambulatory ability     PLAN  []  Upgrade activities as tolerated     [x]  Continue plan of care  []  Update interventions per flow sheet       []  Discharge due to:_  []  Other:_      Madonna Galvin, PTA 6/13/2017  10:34 AM    Future Appointments  Date Time Provider Nelson Hurtado   6/15/2017 9:30 AM [facility] PT [address] MMCPTPB [facility]   6/15/2017 10:30 AM Lisbeth Thomas PT MMCPTPB [facility]   6/15/2017 11:00 AM Julio Villaseñor, OTR/L MMCPTPB [facility]   6/15/2017 12:15 PM AUGUSTINA Bhagat IESVWYA [facility]   6/20/2017 1:00 PM Madonna Galvin, PTA MMCPTPB [facility]   6/20/2017 1:30 PM Julio Villaseñor, OTR/L MMCPTPB [facility]   6/20/2017 3:00 PM [facility] PT [address] MMCPTPB [facility]   6/22/2017 1:00 PM Madonna Galvin, PTA MMCPTPB [facility]   6/22/2017 2:00 PM Julio Villaseñor, OTR/L MMCPTPB [facility]   6/22/2017 3:00 PM AUGUSTINA Bhagat MMCPTPB [facility]   6/27/2017 10:30 AM Julio Villaseñor, OTR/L MMCPTPB [facility]   6/27/2017 11:00 AM Nicole Mark, PT [id number] [facility]   6/28/2017 11:30 AM Mayur Urbina MD [address]   6/29/2017 11:00 AM Madonna Galvin, PTA MMCPTPB [facility]   6/29/2017 11:30 AM Julio Villaseñor, OTR/L MMCPTPB [facility]

## 2017-06-13 NOTE — PROGRESS NOTES
OT DAILY TREATMENT NOTE - [facility]     Patient Name: Luther Santo  Date:2017  : 1940  [x]  Patient  Verified  Payor: Pinky Perez / Plan: [place] MEDICARE PART A & B / Product Type: Medicare /    In time:1000  Out time:1030  Total Treatment Time (min): 30  Total Timed Codes (min): 30  1:1 Treatment Time ([facility] only): 25   Visit #: 5 of 24    Treatment Area: Muscle weakness (generalized) [M62.81]    SUBJECTIVE  Pain Level (0-10 scale): 0/10  Any medication changes, allergies to medications, adverse drug reactions, diagnosis change, or new procedure performed?: [x] No    [] Yes (see summary sheet for update)  Subjective functional status/changes:   [] No changes reported  Still can't get my muscle in my elbow right  Practicing handwriting-still not too good    OBJECTIVE      20 min Therapeutic Exercise:  [] See flow sheet :   Rationale: increase ROM and increase strength to improve the patients ability to use RUE  UE ex with 1# B x 10 with rest required x1 due to fatigue      10 min Therapeutic Activity:  []  See flow sheet :   Rationale: improve coordination  to improve the patients ability to manipulate items  Perfection r hand 2:47  Handwriting check writing with fair legibility     With   [x] TE   [] TA   [] neuro   [] other: Patient Education: [x] Review HEP    [] Progressed/Changed HEP based on: UE ex   [] positioning   [] body mechanics   [] transfers   [] heat/ice application   [] Splint wear/care   [] Sensory re-education   [] scar management      [] other:            Other Objective/Functional Measures:   Required rest x1 with UE ex  Perfection 2:47 R     Pain Level (0-10 scale) post treatment: 0/10    ASSESSMENT/Changes in Function: Still with increased biceps tone    Patient will continue to benefit from skilled OT services to modify and progress therapeutic interventions, address strength deficits, analyze and cue movement patterns and instruct in home and community integration to attain remaining goals. []  See Plan of Care  []  See progress note/recertification  []  See Discharge Summary         Progress towards goals / Updated goals:  1. Patient will be independent in home exercise program for fine motor skills. met 17  2. Patient will be independent in home exercise program for RUE strengthening. progressing 17  3. Patient will be able to feed self with R hand for full meal due to improved coordination of movement.       Long Term Goals: To be accomplished in 24 treatments:   1. Patient will increase  strength in R hand by 20 pounds to increase ease of opening jars. 2. Patient will improve in hand manipulation speed and fine motor skills at least 20% for fasteners. 3. Patient will be able to fill out forms legibly and return to managing finances. 4. Patient will report reduced impairment in home care and self care function per [facility] as shown by improved score of at least 15 points.        PLAN  [x]  Upgrade activities as tolerated     [x]  Continue plan of care  []  Update interventions per flow sheet       []  Discharge due to:_  []  Other:_      SERENITY Piper/L 2017  12:38 PM    Future Appointments  Date Time Provider Nelson Hurtado   6/15/2017 9:30 AM [facility] PT [address] MMCPTPB [facility]   6/15/2017 10:30 AM Wilbur Mcmillan, PT [id number] [facility]   6/15/2017 11:00 AM Siddhartha Ac OTR/L MMCPTPB [facility]   6/15/2017 12:15 PM Arabella Joya, SLP [id number] [facility]   2017 1:00 PM Juan Carlos Romero PTA MMCPTPB [facility]   2017 1:30 PM Siddhartha Ac OTR/L MMCPTPB [facility]   2017 3:00 PM [facility] PT [address] MMCPTPB [facility]   2017 1:00 PM Juan Carlos Romero PTA MMCPTPB [facility]   2017 2:00 PM Siddhartha Ac, OTR/L MMCPTPB [facility]   2017 3:00 PM AUGUSTINA Saunders MMCPTPB [facility]   2017 10:30 AM Siddhartha Ac, OTR/L MMCPTPB [facility]   2017 11:00 AM Soledad Sheppard, PT [id number] [facility]   2017 11:30 AM Christie Gamble MD [facility]   2017 11:00 AM Juan Carlos Romero PTA TVUM [facility]   6/29/2017 11:30 AM Nelly Tsang OTR/L MMCPTDC [facility]

## 2017-06-14 RX ORDER — SERTRALINE HYDROCHLORIDE 50 MG/1
50 TABLET, FILM COATED ORAL DAILY
Qty: 30 TAB | Refills: 2 | Status: SHIPPED | OUTPATIENT
Start: 2017-06-14 | End: 2017-07-19 | Stop reason: SDUPTHER

## 2017-06-15 ENCOUNTER — HOSPITAL ENCOUNTER (OUTPATIENT)
Dept: PHYSICAL THERAPY | Age: 77
Discharge: HOME | End: 2017-06-15
Payer: MEDICARE

## 2017-06-15 DIAGNOSIS — T17.908D ASPIRATION INTO AIRWAY, SUBSEQUENT ENCOUNTER: ICD-10-CM

## 2017-06-15 DIAGNOSIS — R13.10 DYSPHAGIA, UNSPECIFIED TYPE: Primary | ICD-10-CM

## 2017-06-15 PROCEDURE — G9162 LANG EXPRESS CURRENT STATUS: HCPCS

## 2017-06-15 PROCEDURE — G8979 MOBILITY GOAL STATUS: HCPCS

## 2017-06-15 PROCEDURE — G8978 MOBILITY CURRENT STATUS: HCPCS

## 2017-06-15 PROCEDURE — 92507 TX SP LANG VOICE COMM INDIV: CPT

## 2017-06-15 PROCEDURE — 97110 THERAPEUTIC EXERCISES: CPT

## 2017-06-15 PROCEDURE — G9163 LANG EXPRESS GOAL STATUS: HCPCS

## 2017-06-15 PROCEDURE — 97530 THERAPEUTIC ACTIVITIES: CPT

## 2017-06-15 PROCEDURE — G8987 SELF CARE CURRENT STATUS: HCPCS

## 2017-06-15 PROCEDURE — G8988 SELF CARE GOAL STATUS: HCPCS

## 2017-06-15 NOTE — PROGRESS NOTES
PT DAILY TREATMENT NOTE - [facility]     Patient Name: Yumiko Joyce [address]  Date:6/15/2017  : 1940  [x]  Patient  Verified  Payor: Tiagogerman Chamber / Plan: [place] MEDICARE PART A & B / Product Type: Medicare /    In time:10:27  Out time:11:01  Total Treatment Time (min): 33 (-9 min while pt used restroom = 23 min)  Total Timed Codes (min): 24  1:1 Treatment Time ( only): 24  Visit #: 9     Treatment Area: Muscle weakness (generalized) [M62.81]    SUBJECTIVE  Pain Level (0-10 scale): 0/10 \"tired\"  Any medication changes, allergies to medications, adverse drug reactions, diagnosis change, or new procedure performed?: [x] No    [] Yes (see summary sheet for update)  Subjective functional status/changes:   [] No changes reported  Pt reports that he did not sleep well last night so he's very tired. He was told he was dehydrated so he's been trying to drink more, but he had to get up and use the restroom constantly throughout the night. 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 He took an antidepressant last night which he thinks may have contributed to his difficulty sleeping. He is having a very hard time with his wife being in the nursing home.      OBJECTIVE    1 min Physical Performance Test: TUG    23 min Therapeutic Exercise:  [x] See flow sheet :   Rationale: increase ROM, increase strength, improve coordination, improve balance and increase proprioception to improve the patients ability to improve gait pattern for improved ambulatory ability         With   [] TE   [] TA   [] neuro   [] other: Patient Education: [x] Review HEP    [] Progressed/Changed HEP based on:   [] positioning   [] body mechanics   [] transfers   [] heat/ice application    [] other:      Other Objective/Functional Measures:     TUG  Trial 1: 10 seconds  Trial 2: 11 seconds  Trial 3:13 seconds    MMT BLE  Hip flex 5/5 B  Hip IR L 5/5, R 4+/5   Hip ER L 5/5, R 4+/5  Knee ext 5/5 B  Knee flex L 5/5, R 4+/5  DF 5/5 B  PF 4+/5 B - measured in sitting     Intermittent hip flexion with knee flexion during standing hamstring curls. Held gait training this visit d/t fatigue  Required 2 seated rest breaks during therapy  No pain with therapy     Pain Level (0-10 scale) post treatment: 0/10    ASSESSMENT/Changes in Function: See Progress Note    Patient will continue to benefit from skilled PT services to modify and progress therapeutic interventions, address functional mobility deficits, address ROM deficits, address strength deficits, analyze and address soft tissue restrictions, analyze and cue movement patterns, address imbalance/dizziness and instruct in home and community integration to attain remaining goals.      []  See Plan of Care  [x]  See progress note/recertification  []  See Discharge Summary         Progress towards goals / Updated goals:  See Progress Note       PLAN  []  Upgrade activities as tolerated     [x]  Continue plan of care  []  Update interventions per flow sheet       []  Discharge due to:_  []  Other:_      Navjot Mello, PT 6/15/2017  7:55 AM    Future Appointments  Date Time Provider Nelson Hurtado   6/15/2017 9:30 AM [facility] PT [address] MMCPTPB [facility]   6/15/2017 10:30 AM Navjot Mello, PT [id number] [facility]   6/15/2017 11:00 AM Vj Matthews, OTR/L MMCPTPB [facility]   6/20/2017 1:00 PM Reyes Hugo PTA MMCPTPB [facility]   6/20/2017 1:30 PM Vj Matthews, OTR/L MMCPTPB [facility]   6/20/2017 3:00 PM [facility] PT [address] MMCPTPB [facility]   6/22/2017 1:00 PM Reyes Hugo PTA MMCPTPB [facility]   6/22/2017 2:00 PM Vj Matthews, OTR/L MMCPTPB [facility]   6/22/2017 3:00 PM AUGUSTINA Holbrook MMCPTPB [facility]   6/27/2017 10:30 AM Vj Matthews, OTR/L MMCPTPB [facility]   6/27/2017 11:00 AM Devin Cash, PT [id number] [facility]   6/28/2017 11:30 AM Daxa Jacobsen MD [address]   6/29/2017 11:00 AM Reyes Hugo PTA MMCPTPB [facility]   6/29/2017 11:30 AM Vj Matthews, OTR/L MMCPTPB [facility]

## 2017-06-15 NOTE — PROGRESS NOTES
OT DAILY TREATMENT NOTE - [facility]     Patient Name: Finn Ahuja  Date:6/15/2017  : 1940  [x]  Patient  Verified  Payor: Fransisco Patino / Plan: [place] & B / Product Type: Medicare /    In time:1100  Out time:1130  Total Treatment Time (min): 30  Total Timed Codes (min): 30  1:1 Treatment Time ( only): 30   Visit #: 6 of 24    Treatment Area: Muscle weakness (generalized) [M62.81]    SUBJECTIVE  Pain Level (0-10 scale): 0/10  Any medication changes, allergies to medications, adverse drug reactions, diagnosis change, or new procedure performed?: [] No    [x] Yes (see summary sheet for update)  Subjective functional status/changes:   [] No changes reported  Now on an anti-depressant, did not sleep at all last night  Wife has been moved to a home and it makes me cry just thinking about it    OBJECTIVE    20 min Therapeutic Exercise:  [] See flow sheet :   Rationale: increase ROM, increase strength and improve coordination to improve the patients ability to move RUE,   Recheck for MD note  Completed 10 reps 1# UE ex no rests today, cues on 3 ex    10 min Therapeutic Activity:  []  See flow sheet :   Rationale: improve coordination  to improve the patients ability to manipulate items  Recheck coordination for note       With   [x] TE   [] TA   [] neuro   [] other: Patient Education: [x] Review HEP    [] Progressed/Changed HEP based on: progress made  [] positioning   [] body mechanics   [] transfers   [] heat/ice application   [] Splint wear/care   [] Sensory re-education   [] scar management      [] other:            Other Objective/Functional Measures:     Hand Strength: Gross Grasp 3pt Pinch Lateral Pinch Tip Pinch   Right  47 (45) 12 (12) 18 (17) 11 (8)   Left         9 hole  42  (49) Increased 14%  MRMT  192 (281)  Increased 32%  Pain Level (0-10 scale) post treatment: 0/10    ASSESSMENT/Changes in Function: Fatigue impacting performance today.  Improved fine motor    Patient will continue to benefit from skilled OT services to modify and progress therapeutic interventions, address strength deficits, analyze and cue movement patterns and instruct in home and community integration to attain remaining goals. []  See Plan of Care  [x]  See progress note/recertification  []  See Discharge Summary         Progress towards goals / Updated goals:  1. Patient will be independent in home exercise program for fine motor skills. met 5/24/17  2. Patient will be independent in home exercise program for RUE strengthening. progressing 6/13/17  3. Patient will be able to feed self with R hand for full meal due to improved coordination of movement. not met 6/15/17       Long Term Goals: To be accomplished in 24 treatments:   1. Patient will increase  strength in R hand by 20 pounds to increase ease of opening jars. progressing, increased 2#  2. Patient will improve in hand manipulation speed and fine motor skills at least 20% for fasteners. progressing, fine motor increased 14%, in hand manipulation met 32%  3. Patient will be able to fill out forms legibly and return to managing finances. progressing  4. Patient will report reduced impairment in home care and self care function per [facility] as shown by improved score of at least 15 points.  met        PLAN  [x]  Upgrade activities as tolerated     [x]  Continue plan of care  []  Update interventions per flow sheet       []  Discharge due to:_  []  Other:_      SERENITY Barrow/L 6/15/2017  11:06 AM    Future Appointments  Date Time Provider Nelson Hurtado   6/20/2017 1:00 PM Cami Avalos PTA [facility]   6/20/2017 1:30 PM Fidel Calderón OTR/L [facility]   6/20/2017 3:00 PM SP-[facility] PT [address] [facility]   6/22/2017 1:00 PM Cami Avalos PTA [facility]   6/22/2017 2:00 PM Fidel Calderón OTR/L [facility]   6/22/2017 3:00 PM AUGUSTINA Pearson [facility]   6/27/2017 10:30 AM SERENITY Barrow/ERMELINDA [facility]   6/27/2017 11:00 AM Anna Cooper, PT [id number] [facility]   6/28/2017 11:30 AM Kaylin Reynaga  Cristopher Fregoso,  [address] [place]   6/29/2017 11:00 AM Nancy Kovacs PTA [facility]   6/29/2017 11:30 AM Awais Gaffney OTR/L [facility]

## 2017-06-15 NOTE — PROGRESS NOTES
ST DAILY TREATMENT NOTE    Patient Name: Finn Ahuja  Date:6/15/2017  : 1940  [x]  Patient  Verified  Payor: Fransisco Patino / Plan: [place] MEDICARE PART A & B / Product Type: Medicare /   In time: 9:34  Out time:10:24  Total Treatment Time (min): 50    Visit #: 5 of 8    Treatment Diagnosis: Cerebral infarction ([address] [I63.9]  Weakness [R53.1]    SUBJECTIVE  Pain Level (0-10 scale): 0, tired   Any medication changes, allergies to medications, adverse drug reactions, diagnosis change, or new procedure performed?: [] No    [x] Yes (see summary sheet for update)    Subjective functional status/changes:   [] No changes reported  Added an antidepressant to his daily medication and patient stated that he has been drinking more water per the SLP-CF's recommendation, however patient reported decreased sleep due to fequent need to wake up in the evening to releave his bladder. OBJECTIVE  Treatment provided includes:  Increase/Improve:  []  Voice Quality [x]  Cognitive Linguistic Skills []  Laryngeal/Pharyngeal Exercises   []  Vocal Loudness []  Reading Comprehension []  Swallowing Skills    []  Vocal Cord Function []  Auditory Comprehension []  Oral Motor Skills   []  Resonance []  Writing Skills []  Compensatory strategies    []  Speech Intelligibility []  Expressive Language []     []  Breath Support/Coord. []  Receptive language []    []  Articulation []  Safety Awareness []    []  Fluency []  Word Retrieval []        Treatment Provided:  Discussion of future therapy needs and updated patient on SLP's contact with his GI, which was a request for swallowing therapy. Patient/Caregiver  Education: [] Review HEP      Patient was too fatigued to converse beyond the assessments provided.   HEP/Handouts given:  None  Other Objective/Functional Measures:  Cognitive Linguistic Quick Test:  Visuospatial Skills Task:   Mazes: score of 4, within functional limits (Criterion Cut Score: 4), completed half of the mazes accurately    Symbol trails: score of 1, below functional limits (Criterion Cut Score: 6), had difficulty alternating shapes and sizes    Symbol Cancellation: score of 0, below functional limits (Criterion Cut Score: 10), no inattention observed but patient had difficulty differentiating similar shapes   Ross Information Processing Assessment-2 (RIPA-2):    Immediate Memory: 16%ile ranking, Severe deficits, patient successfully recalled a 5 numbers sequence  Recent memory: 75%ile, Moderate deficits; Patient answered 9:10 questions correctly (difficulty recalling 3 words after a 5 min delay)  Spatial orientation: 50%ile ranking, Moderate deficits; patient answered 9:10 questions correctly  Problem Solving and Abstract Reasonin%ile ranking, Moderate deficits; patient had difficulty comparing and contrasting items (daphne vs watch) and did well problem solving functional scenarios (forgetting money at home when needing to pay a bill)  Organization: 25%ile ranking, Mild deficits, patient named as few as 5 items and as many as 15 items in 1 min in concrete categories      Pain Level (0-10 scale) post treatment: 1    ASSESSMENT  []  See Plan of Care  [x]  See progress note/recertification  []  See Discharge Summary    Patient will continue to benefit from skilled therapy to address remaining functional deficits: cognitive linguistic and swallow evaluation    Progress towards goals / Updated goals:  Short Term Goals: To be accomplished in 8-10 treatments  GOALS:  1. Recall 3-5 words after delay/distraction and 1-2 (6-8 word) sentences to increase recall abilities as related to scheduling appointments with min-mod A in 3/5 trials with visual/verbal cues. Progression observed during testing despite fatigue and environmental distractions  2.  Answer egocentric information via open-ended wh-?'s with < 2 word recall error at the sentence level, utilizing word-recall techniques, min-mod visual/verbal cues   Progression with inconsistent demonstration during therapy task. 3. Complete moderately complex linguistic organization tasks with 90% acc with min A visual/verbal cues. Not met, but shows progress during half of the trails. 4. Complete visual-spatial tasks related to functional ADLs for safe, independent social reintegration with min A   Not met, but shows proggress when provided Min A- Mod A.     Long Term Goals: To be accomplished in 12 weeks   1. Pt with demonstrate increase in cognitive-linguistic tasks necessary for completion of daily activities and safe ADL/IADL completion in 90% of tasks, with minimal cueing and self-monitoring.      PLAN  [x]  Continue plan of care  []  Modify Goals/Treatment Plan      []  Discharge due to:  [] Other:    Dominik Peguero 6/15/2017  9:35 AM    Future Appointments  Date Time Provider Nelson Hurtado   6/15/2017 10:30 AM David Paz PT [id number] [facility]   6/15/2017 11:00 AM Filiberto Baldwin, OTR/L [facility]   6/20/2017 1:00 PM Christopher Somers, PTA [facility]   6/20/2017 1:30 PM Filiberto Baldwin, OTR/L [facility]   6/20/2017 3:00 PM [facility] PT [address] [facility]   6/22/2017 1:00 PM Christopher Somers, PTA [facility]   6/22/2017 2:00 PM Filiberto Baldwin, OTR/L [facility]   6/22/2017 3:00 PM AUGUSTINA Meek [facility]   6/27/2017 10:30 AM Filiberto Baldwin, OTR/L [facility]   6/27/2017 11:00 AM Matthew Rivera PT [id number] [facility]   6/28/2017 11:30 AM Donny Combs MD [facility] [place]   6/29/2017 11:00 AM Christopher Somers PTA [facility]   6/29/2017 11:30 AM SERENITY Long/ERMELINDA [facility]

## 2017-06-15 NOTE — PROGRESS NOTES
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 Pt will be compliant with initial HEP to improve therapy outcomes    [x] met                 [] not met  [] progressing  Goal met. 2. Pt will improve FOTO by 16 points in order to demonstrate functional improvement   [] met                 [] not met  [x] progressing Slowly progressing. Improved 2 points    3. Pt will improve TUG to 10 seconds in order to demonstrate reducing fall risk   [] met                 [] not met  [x] progressing Progressing. 11.3 seconds   4. Pt will improve RLE strength by 1/2 MMT grade in order to improve ease of prolonged ambulation   [] met                 [] not met  [x] progressing Hip flex 5/5 B       (4+/5 B at eval)  Hip IR L 5/5, R 4+/5        (L 5/5, R 4+/5 at eval)  Hip ER L 5/5, R 4+/5       (L 5/5, R 4+/5 B at eval)  Knee ext 5/5 B       (L 5/5, R 4/5 at eval)  Knee flex L 5/5, R 4+/5       (L 5/5, R 4+/5 at eval)  DF 5/5 B       (L 5/5, R 4+/5 at eval)  PF 4+/5 B - measured in sitting         (4+/5 B at eval        measured in sitting)   5.  Pt will ambulate 48' with equal weight shift in order to progress ambulatory ability  [] met                 [] not met  [x] progressing Progressing. Improved weight-shift to R with verbal/visual cuing      Key functional changes: decreasing TUG score, improving strength, improving ambulatory ability     Problems/ barriers to goal attainment: fatigue limiting pt's ability with therapy     Assessment / Recommendations:Pt is making steady progress in therapy. RLE strength is improving and decreasing TUG score is indicative of decreasing fall risk. Pt is able to demonstrate significant improvement in gait pattern with increased weight-shift to R, increased UE swing, increased heel strike, and reduced circumduction with cuing; however he reverts to previous gait pattern when cuing is removed. Pt will benefit from continued skilled PT to address remaining strength, static/dynamic balance, coordination, and functional mobility deficits in order to improve ease of prolonged ambulation and household tasks     Problem List: pain affecting function, decrease ROM, decrease strength, impaired gait/ balance, decrease ADL/ functional abilitiies, decrease activity tolerance and decrease flexibility/ joint mobility   Treatment Plan: Therapeutic exercise, Therapeutic activities, Neuromuscular re-education, Physical agent/modality, Gait/balance training, Manual therapy, Patient education, Self Care training, Functional mobility training, Home safety training and Stair training    Patient Goal (s) has been updated and includes: walk better     Updated Goals to be accomplished in 9 treatments:  1. Pt will improve FOTO by 16 points in order to demonstrate functional improvement  2. Pt will improve TUG to 10 seconds in order to demonstrate reducing fall risk  3.  Pt will ambulate 48' with equal weight shift in order to progress ambulatory ability     Frequency / Duration: Patient to be seen 2-3 times per week for 9 treatments:    G-Codes (GP)  Mobility  [id number] Current  CK= 40-59%   Goal  CK= 40-59%    The severity rating is based on clinical judgment and the FOTO score.       Marielos Robledo, PT 6/15/2017 7:56 AM

## 2017-06-15 NOTE — PROGRESS NOTES
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 Pt is right-hand dominant; wears glasses for reading. Pt further reports that he does not \"read well\" as a result of 9-grade education; \"I don't enjoy reading; I only read when I have to. \"   Visits from Start of Care: 5    Missed Visits: 0      The Plan of Care and following information is based on the patient's current status:  Short Term Goals: To be accomplished in 8-10 treatments  GOALS:  1. Recall 3-5 words after delay/distraction and 1-2 (6-8 word) sentences to increase recall abilities as related to scheduling appointments with min-mod A in 3/5 trials with visual/verbal cues. Current Status: delayed recall/working memory (0 of 3 words after 5 minute delay Lon, 3 of 3 words after 5 minute delay provided Min A), progression observed given Min A during testing despite patient's fatigue and environmental distractions. Evaluation Status: delayed recall/working memory (1 of 4 words after 5 minute delay) word recall difficulties     2.  Answer egocentric information via open-ended wh-?'s with < 2 word recall error at the sentence level, utilizing word-recall techniques, min-mod visual/verbal cues  Current Status: Progression with inconsistent demonstration during therapy task, no word recall deficits have been noted on this day. Evaluation Status: word recall difficulties with phonemic errors    3. Complete moderately complex linguistic organization tasks with 90% acc with min A visual/verbal cues. Current Status: Not met, but shows progress during half of the trails. Patient was able to generate lists consisting of as many as 5 or 16 items in 1 minute given a concrete category. Evaluation Status: linguistic organization (6 items per concrete category)    4. Complete visual-spatial tasks related to functional ADLs for safe, independent social reintegration with min A  Current Status: Not met, but shows progress as patient was able to complete 50% of the mazes. Patient had more difficulty completing tasks which consisted of patterns and shapes. Evaluation Status: Clock Drawing Test (CDT) revealed deficits in visual-spatial organization      Long Term Goals: To be accomplished in 12 weeks   1. Pt with demonstrate increase in cognitive-linguistic tasks necessary for completion of daily activities and safe ADL/IADL completion in 90% of tasks, with minimal cueing and self-monitoring. Key functional changes: Patient has demonstrated improvement in linguistiec organization tasks as well as recalling 3 words with a 5 minute delay given Min A (verbal cue). Patient     Problems/ barriers to goal attainment: physical and cognitive fatigue    Problem List:      []aphasic  []dysarthric  []dysphagic       []alexic  []agraphic  []dysphonia       []dysfluency  [x]Cognitive-Linguistic Disorder       []other     Treatment Plan: Cognitive/Language Treatment and Patient Education    Patient Goal (s) has been updated and includes:  Goals have remained the same.  Current level of performance has been updated. Goals for this certification period to be accomplished in 4 weeks:    Frequency / Duration: Patient to be seen 2 times per week for 4 weeks:    Assessment:   Cognitive Linguistic Quick Test:  Visuospatial Skills Task:   Mazes: score of 4, within functional limits (Criterion Cut Score: 4), completed half of the mazes accurately    Symbol trails: score of 1, below functional limits (Criterion Cut Score: 6), had difficulty alternating shapes and sizes    Symbol Cancellation: score of 0, below functional limits (Criterion Cut Score: 10), no inattention observed but patient had difficulty differentiating similar shapes   Ross Information Processing Assessment-2 (RIPA-2):    Immediate Memory: 16%ile ranking, Severe deficits, patient successfully recalled a 5 numbers sequence  Recent memory: 75%ile, Moderate deficits; Patient answered 9:10 questions correctly (difficulty recalling 3 words after a 5 min delay)  Spatial orientation: 50%ile ranking, Moderate deficits; patient answered 9:10 questions correctly  Problem Solving and Abstract Reasonin%ile ranking, Moderate deficits; patient had difficulty comparing and contrasting items (daphne vs watch) and did well problem solving functional scenarios (forgetting money at home when needing to pay a bill)  Organization: 25%ile ranking, Mild deficits, patient named as few as 5 items and as many as 15 items in 1 min in concrete categories      Recommendations: Recommend skilled speech therapy to address cognitive/linguistic deficits to facilitate completion of daily communication activities necessary for community re-integration.     G Codes:  Expression: [id number] Current  CJ= 20-39%  [id number] Goals  CI= 1-19%    The severity rating is based on the following outcomes:    Ross Information Processing Assessment (RIPA) and National Outcomes Measures (NOMS)  Professional Judgement  Certification Period: 17-17    AUGUSTINA Buchanan-CF 6/15/2017 12:48 PM    ________________________________________________________________________  I certify that the above Therapy Services are being furnished while the patient is under my care. I agree with the treatment plan and certify that this therapy is necessary. []  I have read the above report and request that my patient continue as recommended. []  I have read the above report and request that my patient continue therapy with the following changes/special instructions:________________________________________  []I have read the above report and request that my patient be discharged from therapy.     Physician's Signature:_________________ Date:___________Time:__________      Please sign and return to [facility] - [facility]   [address] [facility]  [phone] fax

## 2017-06-15 NOTE — PROGRESS NOTES
[facility] - Franky Keane  [address] [facility]  [phone] fax    Medicare Progress Report    Patient name: Kurt Mann Start of Care: 17   Referral source: Susan Garcia MD : 1940   Medical/Treatment Diagnosis: Muscle weakness (generalized) [M62.81] Onset Date:17     Prior Hospitalization: see medical history Provider#: [id number]   Medications: Verified on Patient Summary List    Comorbidities: Thyroid, recent onset depression  Prior Level of Function:i self care home care driving, yard care, retired automotive repair  Visits from [facility]: 6    Missed Visits: 0    Reporting Period: 17 to 6/15/17    Subjective Reports: My wife is in a home now, it makes me so sad that I cry all the time. I am not sleeping well. The doctor gave me anti-depressant, but I did not sleep. I am still using my L to eat most of the time. 1. Patient will be independent in home exercise program for fine motor skills. met   2. Patient will be independent in home exercise program for RUE strengthening. progressing   3. Patient will be able to feed self with R hand for full meal due to improved coordination of movement. Not met    Current Status/ treatment goals Objective measures   1. Patient will increase  strength in R hand by 20 pounds to increase ease of opening jars. [] met                 [] not met  [x] progressing   increased 2#  to 47# but one trial met goal-patient fatigues rapidly   2. Patient will improve in hand manipulation speed and fine motor skills at least 20% for fasteners. [] met                 [] not met  [x] progressing In hand manipulation increased to 192 (32% improvement) , fine motor skills improved to 42 (14% improvement)   3. Patient will be able to fill out forms legibly and return to managing finances.    [] met                 [] not met  [x] progressing Still with difficulty with legibility 4. Patient will report reduced impairment in home care and self care function per [facility] as shown by improved score of at least 15 points. [x] met                 [] not met  [] progressing FOTO increased from 50 to 70     Key functional changes: Improved in hand manipulation      Problems/ barriers to goal attainment: Depression, fatigue     Assessment / Recommendations:Patient will benefit from continued skilled occupational therapy to increase upper extremity function and functional independence. Problem List: Decreased strength, Decreased coordination/prehension, Decreased ADL/functional abilities  and Decreased activity tolerance   Treatment Plan: Therapeutic exercise, Therapeutic activities, Patient education and ADLs/IADLs    Patient Goal (s) has been updated and includes: Better use of hand, writing     Updated Goals to be accomplished in 18 treatments:  1. Patient will be able to feed self with R hand for full meal due to improved coordination of movement  2. Patient will increase  strength in R hand by 20 pounds to increase ease of opening jars. 3.  Patient will improve   fine motor skills at least 20% from baseline for fasteners. 4.  Patient will be able to fill out forms legibly and return to managing finances. Frequency / Duration: Patient to be seen 2-3 times per week for 18 weeks:    G-Codes (GO)    Self Care   Current  CJ= 20-39%   Goal  CJ= 20-39%    The severity rating is based on clinical judgment and the FOTO score.       Nicholas Rowland OTR/L 6/15/2017 2:17 PM

## 2017-06-20 ENCOUNTER — HOSPITAL ENCOUNTER (OUTPATIENT)
Dept: PHYSICAL THERAPY | Age: 77
Discharge: HOME | End: 2017-06-20
Payer: MEDICARE

## 2017-06-20 PROCEDURE — G8996 SWALLOW CURRENT STATUS: HCPCS

## 2017-06-20 PROCEDURE — 97110 THERAPEUTIC EXERCISES: CPT

## 2017-06-20 PROCEDURE — G8997 SWALLOW GOAL STATUS: HCPCS

## 2017-06-20 PROCEDURE — 92610 EVALUATE SWALLOWING FUNCTION: CPT

## 2017-06-20 PROCEDURE — 97530 THERAPEUTIC ACTIVITIES: CPT

## 2017-06-20 NOTE — PROGRESS NOTES
PT DAILY TREATMENT NOTE - [facility]     Patient Name: Yinka Carrion  Date:2017  : 1940  [x]  Patient  Verified  Payor: Lynn Nuñez / Plan: [place] MEDICARE PART A & B / Product Type: Medicare /    In time:100  Out time:127  Total Treatment Time (min): 27  Total Timed Codes (min): 27  1:1 Treatment Time ( [address] only): 27   Visit #: 10 of     Treatment Area: Muscle weakness (generalized) [M62.81]    SUBJECTIVE  Pain Level (0-10 scale): 0/10  Any medication changes, allergies to medications, adverse drug reactions, diagnosis change, or new procedure performed?: [x] No    [] Yes (see summary sheet for update)  Subjective functional status/changes:   [] No changes reported  Pt reported that he has no pain today. He stated that he is just trying to fight the fatigue he gets with activity    OBJECTIVE    27 min Therapeutic Exercise:  [x] See flow sheet :   Rationale: increase ROM, increase strength and improve balance to improve the patients ability to increase ease with ADLs    With   [x] TE   [] TA   [] neuro   [] other: Patient Education: [x] Review HEP    [] Progressed/Changed HEP based on:   [] positioning   [] body mechanics   [] transfers   [] heat/ice application    [] other:      Other Objective/Functional Measures:   Pt needed multiple rest breaks during session  Had significant difficulty with near fall when performing obstacle course with BB#1, needed assist not to fall  No complaint of pain with exercises, just extreme fatigue     Pain Level (0-10 scale) post treatment: 0/10      ASSESSMENT/Changes in Function:   Pt cont to slowly progress toward goals. Strength in B LE cont to improve as is evident by ability to increase weights to 3# for standing exercises. Pt cont with decreased dynamic balance.     Patient will continue to benefit from skilled PT services to modify and progress therapeutic interventions, address functional mobility deficits, address ROM deficits and address strength deficits to attain remaining goals. []  See Plan of Care  [x]  See progress note/recertification  []  See Discharge Summary         Progress towards goals / Updated goals:  1. Pt will improve FOTO by 16 points in order to demonstrate functional improvement  2. Pt will improve TUG to 10 seconds in order to demonstrate reducing fall risk  3.  Pt will ambulate 48' with equal weight shift in order to progress ambulatory ability     PLAN  []  Upgrade activities as tolerated     [x]  Continue plan of care  []  Update interventions per flow sheet       []  Discharge due to:_  []  Other:_      Moisés Bhandari PTA 6/20/2017  12:58 PM    Future Appointments  Date Time Provider Nelson Genesis   6/20/2017 1:00 PM Moisés Bhandari PTA [facility]   6/20/2017 1:30 PM Mayi Gómez, OTR/L [facility]   6/20/2017 3:00 PM Cheri YOUNGZZIQIY [facility]   6/22/2017 1:00 PM Moisés Bhandari PTA [facility]   6/22/2017 2:00 PM Mayi Gómez, OTR/L [facility]   6/22/2017 3:00 PM Deana Coombs, AUGUSTINA [facility]   6/27/2017 10:30 AM Mayi Gómez, OTR/L [facility]   6/27/2017 11:00 AM Moisés Bhandari PTA ZTAZODO [facility]   6/28/2017 11:30 AM José Miguel Reyna MD [place] [id number] [facility] [address]   6/29/2017 11:00 AM Moisés Bhandari PTA [facility]   6/29/2017 11:30 AM [facility] [address] [facility]

## 2017-06-20 NOTE — PROGRESS NOTES
OT DAILY TREATMENT NOTE - [facility]     Patient Name: Fawn Allen  Date:2017  : 1940  [x]  Patient  Verified  Payor: Krystina Glasgow / Plan: [place] MEDICARE PART A & B / Product Type: Medicare /    In time:130  Out time:200  Total Treatment Time (min): 30  Total Timed Codes (min): 30  1:1 Treatment Time ( [address] only): 25   Visit #: 7 of 24    Treatment Area: Muscle weakness (generalized) [M62.81]    SUBJECTIVE  Pain Level (0-10 scale): 0/10  Any medication changes, allergies to medications, adverse drug reactions, diagnosis change, or new procedure performed?: [x] No    [] Yes (see summary sheet for update)  Subjective functional status/changes:   [] No changes reported  Still not sleeping well  Have medicine for prostate so I won't get up so often, but it hasn't had a chance to work. OBJECTIVE      10 min Therapeutic Exercise:  [] See flow sheet :   Rationale: increase strength to improve the patients ability to grasp  Green therabar x 15 flex ext sup pro     20 min Therapeutic Activity:  []  See flow sheet :   Rationale: improve coordination  to improve the patients ability to write  Handwriting task with standard pen with rubber section with good legibility slow speed copying phrases to make sentences.      With   [] TE   [] TA   [] neuro   [] other: Patient Education: [x] Review HEP    [] Progressed/Changed HEP based on: improtance of sleep, urinal vs getting up  [] positioning   [] body mechanics   [] transfers   [] heat/ice application   [] Splint wear/care   [] Sensory re-education   [] scar management      [] other:            Other Objective/Functional Measures: Handwritng legible, spacing issues     Pain Level (0-10 scale) post treatment: 0/10    ASSESSMENT/Changes in Function: Improving handwriting    Patient will continue to benefit from skilled OT services to modify and progress therapeutic interventions, address strength deficits, analyze and cue movement patterns and instruct in home and community integration to attain remaining goals. []  See Plan of Care  []  See progress note/recertification  []  See Discharge Summary         Progress towards goals / Updated goals:  1. Patient will be able to feed self with R hand for full meal due to improved coordination of movement  2. Patient will increase  strength in R hand by 20 pounds to increase ease of opening jars. 3.  Patient will improve   fine motor skills at least 20% from baseline for fasteners. 4.  Patient will be able to fill out forms legibly and return to managing finances. progressing 6/20/17        PLAN  [x]  Upgrade activities as tolerated     [x]  Continue plan of care  []  Update interventions per flow sheet       []  Discharge due to:_  []  Other:_      Fuentes Rea, OTR/L 6/20/2017  1:41 PM    Future Appointments  Date Time Provider Nelson Hurtado   6/20/2017 3:00 PM Lemuel Sevilla [id number]   6/22/2017 1:00 PM Maikol Cough, PTA [facility]   6/22/2017 2:00 PM Fuentes Rea, OTR/L [facility]   6/22/2017 3:00 PM AUGUSTINA Anglin [facility]   6/27/2017 10:30 AM Fuentes Rea, OTR/L [facility]   6/27/2017 11:00 AM Maikol Cough, PTA [facility]   6/28/2017 11:30 AM Howard Tyson MD [facility] [address]   6/29/2017 11:00 AM Maikol Cough, PTA [facility]   6/29/2017 11:30 AM [facility] [address] [facility]

## 2017-06-20 NOTE — PROGRESS NOTES
ST DAILY TREATMENT NOTE    Patient Name: Shy Jackson  Date:2017  : 1940  [x]  Patient  Verified  Payor: Lili Perdomo / Plan: [place] MEDICARE PART A & B / Product Type: Medicare /   In time:3:00  Out time: 4:05  Total Treatment Time (min): 65 min  Total Timed Codes (min): NA    Visit #: 6 of 8    SUBJECTIVE  Pain Level (0-10 scale): 2 (sore throat)   Any medication changes, allergies to medications, adverse drug reactions, diagnosis change, or new procedure performed?: [] No    [x] Yes (see summary sheet for update)  Subjective functional status/changes:   [x] No changes reported  Patient reports a feeling of soar throat and that water makes it feel better. Date of Onset: 17  Social History: Lives at home alone. Patient is retired spends most of his days relaxing in his recliner. He used to enjoy doing housework but since his stroke he has become more tired and will occasionally do chores. Prior Functional level: Regular food and thin liquids. Radiology: See information in connect care  Current diet: Regular and thin liquids   positionin upright   Mental Status:  [x]alert []lethargic []confused  Orientation: [x]person [x]place [x]time [x]situation  [facility] Directions: [x]1-step [x]2-step [x]3-step [x]complex  Motivation: []excellent []good  [x]fair  []poor   Barriers to learning: []none []aphasia [x]? cognition []lethargy/motivation [][place]   []?vision []language [x]Fatigue/pain []psych factors compensate with:   Dentition: []normal []abnormal []edentulous [x]dentures (5 lower partials) (upper plate of teeth)  Respiratory Status: []WFL [x]SOB (completing ADLs) []O2 L/min:  []NC []Mask               []Trach Tube:                     []Excess secretions  Lips:  [x]Symmetrical []asymmetrical  Retraction [x]WFL  []?min []?mod []?max  Protrusion [x]WFL  []?min []?mod []?max  Strength [x]WFL  []?min []?mod []?max  Puff  [x]WFL  []?min []?mod []?max  Tongue:  [x]Symmetrical []asymmetrical  Protrusion [x]WFL  []?min []?mod []?max  Elevation [x]WFL  []?min []?mod []?max  Depression [x]WFL  []?min []?mod []?max  Lateralization []WFL  [x]?min []?mod []?max  Strength [x]WFL  []?min []?mod []?max  Velum:   []Symmetrical [x]asymmetrical  Gag Reflex:  []Present []absent [x]DNT  Sensation:  [x]Intact []Diminished  Specify: WFL  Voice:  []Normal [x]Hoarse []harsh  []Breathy []Hypernasal []hyponasal  []Gurgly Other: Patient reported that his voice is \"weaker\" since his stroke. Swallow:  [x]Volitional []absent  [x]Reflexive []absent  Cough   Strength : [x]WNL []Diminished  [x]Volitional []absent  [x]Reflexive []absent  OBJECTIVE    OP SWALLOW EVALUATION    Observation of Swallow:  Thin Nectar Honey Puree  applesauce Solids  regular peanut butter cracker nabs Other  Mixed consistency canned fruit cocktail   Oral Phase      DNT   Anterior loss of bolus  (decreased labial seal [])         Decreased bolus formation  (?lingual ROM/Coord[])         Increased mastication         Increased oral transit time  (?A-P bolus transit[])         Abnormal chewing         Tongue pumping/tremors         Pocketing  (?labial/buccal tension/lingual control)     x    Other         Oral Pharyngeal Phase         Delayed swallow initiation    x x    Absent swallow         Stasis on lingual surface  (?lingual movement)     x    Adherence to hard palate   (? lingual elevation)         Pharyngeal Phase         Multiple swallows  (residue in pharynx []) x    x    Coughing post swallow x        Throat clear post swallow x x  x x    Wet vocal quality  (residue on vocal cords [x]) x    x    Reduced laryngeal elevation x x   x    Nasal Regurgitation  (? velopharyngeal closure)         Other [] No symptoms of dysphagia evidenced  [x] Symptoms of dysphagia observed  [x] Patient at risk for aspiration  [] Other:     Recommendations:  Diet:  [] NPO    [] Pureed [] Ground [] MechSoft [x] Regular  Liquids: [] Water  [] Regular [] Thickened   [] Nectar  [x] Honeythick  [] Pudding  Presentation: [x] Cup    [x] Spoon [x] Straw [x] Alternate liquids and solids  Monitor: [x] Sitting up at 90 deg [] Reclined to:  [] Head turned to:    [] Chin tuck  [] Head tilt to:      [x] Seated upright post meals (min): 30  Document: [] Coughing [] Temperatures [] Lung Sounds    Videoflouroscopy: [] Yes [x] No  Dysphagia Treatment: [] Yes [] No Sessions per week: 2  Other:     Remediation Techniques:  C = Compensatory techniques to use during meal      F = Facilitation/treatment techniques by SLP    [] Supraglottic Swallow (c,f)    [x] Oral motor exercises (f)  [x] Super-supraglottic swallow (c,f)   [] Labial closure  [x] Compensations for pocketing (c)   [x] Lingual elevation  [x] Sweep mouth with tongue    [x] Lingual lateralization  [] Sweep mouth with finger    [x] Lingual anterior-posterior  [] External pressure to check   [x] Lingual base of tongue  [] Rinse mouth/expel after meal   [] Vocal Fold Exercises (f)  [x] Alternate liquid swallows every _ bites (c)  [] Falsetto/laryngeal elevation exercises (f)  [] Discourage liquid wash between bites (c)  [] Thermal application (c,f)  [x] Multiple swallows     [] Sour bolus (f)  [x] Patient needs cues     [] Cold bolus (f)  [] Patient does not need cues   [x] Pharyngeal exercise (f)  [x] Mendelsohn Maneuver (c,f)    [x] Breath hold  [] Encourage/stimulate lip closure (c)   [x] Effortful Swallow (c,f)  [x] Empty mouth before next bite (c)   [x] Tongue base retraction  [] Cue patient to slow down (c)    [] Tongue hold  [] Encourage coughing (c)    [] Laryngeal closure  []Chin tuck (c)  []Head turn  (c)  []Head turn , chin tuck (c)    Patient Education: [x] Review HEP    [] Progressed/Changed HEP based on:_  HEP/Handouts given:_Information listing safe swallowing strategies/techniques and reminder to drink honey thick liquids  Patient/Caregiver instruction/education: See POC     Pain Level (0-10 scale) post treatment: 0     ASSESSMENT  [x] See Plan of Care     PLAN  [] Upgrade activities as tolerated [] Continue plan of care  [] Discharge due to:__  [x] Other:_Initiate dysphagia treatments  Cheri Bedolla SLP-CF 6/20/2017  2:46 PM

## 2017-06-22 ENCOUNTER — HOSPITAL ENCOUNTER (OUTPATIENT)
Dept: PHYSICAL THERAPY | Age: 77
Discharge: HOME | End: 2017-06-22
Payer: MEDICARE

## 2017-06-22 PROCEDURE — G8997 SWALLOW GOAL STATUS: HCPCS

## 2017-06-22 PROCEDURE — 97530 THERAPEUTIC ACTIVITIES: CPT

## 2017-06-22 PROCEDURE — G8996 SWALLOW CURRENT STATUS: HCPCS

## 2017-06-22 PROCEDURE — 97110 THERAPEUTIC EXERCISES: CPT

## 2017-06-22 PROCEDURE — 92526 ORAL FUNCTION THERAPY: CPT

## 2017-06-22 NOTE — PROGRESS NOTES
PT DAILY TREATMENT NOTE - [facility]     Patient Name: Linh Holt  Date:2017  : 1940  [x]  Patient  Verified  Payor: VA MEDICARE / Plan: VA MEDICARE PART A & B / Product Type: Medicare /    In time:100  Out time:130  Total Treatment Time (min): 30  Total Timed Codes (min): 30  1:1 Treatment Time ([facility] only): 25   Visit #:     Treatment Area: Muscle weakness (generalized) [M62.81]    SUBJECTIVE  Pain Level (0-10 scale): 0/10  Any medication changes, allergies to medications, adverse drug reactions, diagnosis change, or new procedure performed?: [x] No    [] Yes (see summary sheet for update)  Subjective functional status/changes:   [] No changes reported  Pt stated that he is doing fine today    OBJECTIVE    25 min Therapeutic Exercise:  [x] See flow sheet :   Rationale: increase ROM and increase strength to improve the patients ability to increase ease with ADLs     With   [x] TE   [] TA   [] neuro   [] other: Patient Education: [x] Review HEP    [] Progressed/Changed HEP based on:   [] positioning   [] body mechanics   [] transfers   [] heat/ice application    [] other:      Other Objective/Functional Measures:   Pt cont to be unable to perform B hip ext without bending knees, even with cueing  Pt was fatigued after   Cont to need multiple seated rest breaks during session   Pt had to use the rest room toward the end of the session which used 5 minutes of treatment time. Pain Level (0-10 scale) post treatment: 0/10    ASSESSMENT/Changes in Function:   Pt cont to slowly progress toward goals. Pt cont with significant fatigue during sessions, especially with weighted exercises. Strength in B LE is slowly improving. Patient will continue to benefit from skilled PT services to modify and progress therapeutic interventions, address functional mobility deficits, address ROM deficits, address strength deficits and address imbalance/dizziness to attain remaining goals. []  See Plan of Care  [x]  See progress note/recertification  []  See Discharge Summary         Progress towards goals / Updated goals:  1. Pt will improve FOTO by 16 points in order to demonstrate functional improvement  2. Pt will improve TUG to 10 seconds in order to demonstrate reducing fall risk  3.  Pt will ambulate 48' with equal weight shift in order to progress ambulatory ability     PLAN  []  Upgrade activities as tolerated     [x]  Continue plan of care  []  Update interventions per flow sheet       []  Discharge due to:_  []  Other:_      Adria Rausch PTA 6/22/2017  12:59 PM    Future Appointments  Date Time Provider Nelson Genesis   6/22/2017 1:00 PM Adria Rausch PTA [facility]   6/22/2017 2:00 PM Annamarie Garcia, OTR/L [facility]   6/22/2017 3:00 PM Catarino Essex, SLP [facility]   6/27/2017 10:30 AM Annamarie Garcia, OTR/L [facility]   6/27/2017 11:00 AM Adria Rausch PTA [facility]   6/27/2017 1:00 PM Stevo Berman [id number] [facility]   6/28/2017 11:30 AM Angel Darling MD [place] [id number]   6/29/2017 11:00 AM Adria Rausch PTA [facility]   6/29/2017 11:30 AM [facility] [address] [facility]   6/29/2017 1:00 PM Stevo Berman [id number] [facility]   7/6/2017 2:00 PM Annamarie Garcia, OTR/L [facility]   7/6/2017 2:30 PM Adria Rausch PTA [facility]   7/6/2017 3:15 PM Stevo HUNTER [facility]   7/11/2017 10:00 AM MICHAEL Ricketts [facility]   7/11/2017 10:30 AM Annamarie Garcia, OTR/L [facility]

## 2017-06-22 NOTE — PROGRESS NOTES
OT DAILY TREATMENT NOTE - [facility]     Patient Name: Dominique Bowden  Date:2017  : 1940  [x]  Patient  Verified  Payor: Aure Caceres / Plan: [place] & [place] / Product Type: Medicare /    In time:200  Out time:230  Total Treatment Time (min): 30  Total Timed Codes (min): 30  1:1 Treatment Time ( [address] only): 30   Visit #: 8 of 24    Treatment Area: Muscle weakness (generalized) [M62.81]    SUBJECTIVE  Pain Level (0-10 scale): 0/10  Any medication changes, allergies to medications, adverse drug reactions, diagnosis change, or new procedure performed?: [x] No    [] Yes (see summary sheet for update)  Subjective functional status/changes:   [] No changes reported  Still not sleeping. I see Dr. Jerrell Saint on Weds    OBJECTIVE        30 min Therapeutic Activity:  []  See flow sheet :   Rationale: improve coordination  to improve the patients ability to manipulate items  1/4 in pegs incline board with R with 1# cuff weight no difficulty with individual placement       With   [] TE   [] TA   [] neuro   [] other: Patient Education: [x] Review HEP    [] Progressed/Changed HEP based on:   [] positioning   [] body mechanics   [] transfers   [] heat/ice application   [] Splint wear/care   [] Sensory re-education   [] scar management      [] other:            Other Objective/Functional Measures:   [employer] assembly 10 sets R  4:18,  L 2:30    Pain Level (0-10 scale) post treatment: 0/10    ASSESSMENT/Changes in Function: Improving fine motor skills, still fatigued    Patient will continue to benefit from skilled OT services to modify and progress therapeutic interventions, address strength deficits, analyze and cue movement patterns and instruct in home and community integration to attain remaining goals. []  See Plan of Care  []  See progress note/recertification  []  See Discharge Summary         Progress towards goals / Updated goals:  1.   Patient will be able to feed self with R hand for full meal due to improved coordination of movement  2. Patient will increase  strength in R hand by 20 pounds to increase ease of opening jars. 3.  Patient will improve   fine motor skills at least 20% from baseline for fasteners. progressing 6/22/17  4. Patient will be able to fill out forms legibly and return to managing finances. progressing 6/20/17        PLAN  [x]  Upgrade activities as tolerated     [x]  Continue plan of care  []  Update interventions per flow sheet       []  Discharge due to:_  []  Other:_      Manisha Phelps, OTR/L 6/22/2017  2:10 PM    Future Appointments  Date Time Provider Nelson Huitroni   6/22/2017 3:00 PM AUGUSTINA Jenkins [facility]CPTPB [facility]   6/27/2017 10:30 AM Manisha Phelps OTR/L [facility]   6/27/2017 11:00 AM Jessica Mccloud, PTA [facility]   6/27/2017 1:00 PM Mohan Payne WCOUCMV [facility]   6/28/2017 11:30 AM Gricel Barroso MD [address]   6/29/2017 11:00 AM Jessica Mccloud, PTA [facility]   6/29/2017 11:30 AM OT-MMC PT [address] [facility]   6/29/2017 1:00 PM Mohan Payne UZLVQON [facility]   7/6/2017 2:00 PM Manisha Phelps OTR/L [facility]   7/6/2017 2:30 PM Jessica Mccloud, PTA [facility]   7/6/2017 3:15 PM Mohan BOUDREAUX [facility]   7/11/2017 10:00 AM Ranjit Perez, PT [id number] [facility]   7/11/2017 10:30 AM Manisha Phelps OTR/L [facility]

## 2017-06-22 NOTE — PROGRESS NOTES
ST DAILY TREATMENT NOTE    Patient Name: Hien Pack  Date:2017  : 1940  [x]  Patient  Verified  Payor: Jannetta Dancer / Plan: [place] MEDICARE PART A & B / Product Type: Medicare /   In time: 3:00  Out time:3:40  Total Treatment Time (min): 40  Visit #: 7 of 8    Treatment Diagnosis: Muscle weakness (generalized) [M62.81]    SUBJECTIVE  Pain Level (0-10 scale): 2-3 (sore throat-a constant nag)  Any medication changes, allergies to medications, adverse drug reactions, diagnosis change, or new procedure performed?: [x] No    [] Yes (see summary sheet for update)    Subjective functional status/changes:   [] No changes reported  He said that he is supposed to see Dr Hi zhnog. He said that he is drinking only honey thick liquids. OBJECTIVE  Treatment provided includes: 2-3 (sore throat-a constant nag)  Increase/Improve:  []  Voice Quality []  Cognitive Linguistic Skills []  Laryngeal/Pharyngeal Exercises   []  Vocal Loudness []  Reading Comprehension []  Swallowing Skills    []  Vocal Cord Function []  Auditory Comprehension []  Oral Motor Skills   []  Resonance []  Writing Skills []  Compensatory strategies    []  Speech Intelligibility []  Expressive Language []     []  Breath Support/Coord. []  Receptive language []    []  Articulation []  Safety Awareness []    []  Fluency []  Word Retrieval []      Treatment Provided:  Questioned him regarding diet and lifestyle related to GERD  He has no caffiene, he drinks apple or grape juice, he used to drink a lot of  cranberry juice prior to CVA. he sucks on tums, he sometimes will stop eating 1-2 hours before bedtime. He was able to correctly state how to take his PPI. Faina: min cues  Bolus formation exercises: with mechanical soft: show bolus: mod cues: decreased bolus-spread over side of left tongue with trials. Increase bolus formation with HTL's small sip via cup: 50% acc with mod-min cues.    HTL trials via cup to moisten his oral cavity and during bolus formation/control tasks: periodic throat clear and frequent eruction afterwards. Also eructions between trials. Patient/Caregiver  Education: [] Review HEP      Avoid cranberry juice-it is acidic. Purpose/technique for Faina ex, bolus formation exercises. Do not swill the liquid in your mouth prior to swallowing it. Showed him what can happen with his liquids/solids with premature spillage and penetration: with drawings with food or liquid going into the airway. Teaching about bolus formation and control with solids and liquids. HEP/Handouts given:  Faina ex/charting form; bolus formation with solids; safe swallowing guidelines  Other Objective/Functional Measures:    Pain Level (0-10 scale) post treatment:2-3 (sore throat-a constant nag)      ASSESSMENT  Patient exhibited frequent eructions today during the session with and without HTL trials. He also exhibited occasional throat clear post swallow with bolus formation/control tasks with HTL's. Progressing with Faina. He is not really having offending foods or behaviors which would aggrevate GERD yet he continues to complain of a sore throat . []  See Plan of Care  []  See progress note/recertification  []  See Discharge Summary    Patient will continue to benefit from skilled therapy to address remaining functional deficits: moderate penetration     Progress towards goals / Updated goals:  Progressing with tongue base retraction exercises.     PLAN  []  Continue plan of care  []  Modify Goals/Treatment Plan      []  Discharge due to:  [] Other:    Deana Coombs, SLP 6/22/2017  3:00 PM    Future Appointments  Date Time Provider Nelson Hurtado   6/27/2017 10:30 AM SERENITY Uribe/L [facility]   6/27/2017 11:00 AM Moisés Bhandari PTA [facility]   6/27/2017 1:00 PM Cheri Bedolla IDBFHFW [facility]   6/28/2017 11:30 AM José Miguel Reyna MD [facility]   6/29/2017 11:00 AM Moisés Bhandari PTA [facility]   6/29/2017 11:30 AM OT-[facility] PT [address] KJAODEO [facility]   6/29/2017 1:00 PM Adalberto Berkowitz [facility]TP[facility]   7/6/2017 2:00 PM Lina Chappell, OTR/L [facility]CP[facility]   7/6/2017 2:30 PM Gabriele Jones PTA [facility]   7/6/2017 3:15 PM Foard Situ CGNPHZZ [facility]   7/11/2017 10:00 AM Joseph Patel, PT [facility]   7/11/2017 10:30 AM Lina Chappell, OTR/L [facility]

## 2017-06-22 NOTE — PROGRESS NOTES
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 Pt is right-hand dominant; wears glasses for reading. Pt further reports that he has a \"soar throat\" when he eats, that food feels like it gets \"stuck\" in his throat, and that he \"took 45 mins to eat a biscuit the other day\".    The Plan of Care and following information is based on the information from the initial evaluation. Assessment/ key information: 64 yom who presents to this clinic with c/o difficulty swallowing. Patient was oriented 4x. Patient was alert throughout the entire evaluation. Patient was able to follow 3-step commands or complex directions. The patient is presently receiving Speech-Language services to address mild cognitive -linguistic deficits since May 18, 2017.  Patient's daughter-in-law informed the SLP-CF, over the phone with patient's verbal and written consent, that her father-in-law has not wanted to be compliant with his recommended thickened liquids since his MBS and that she would be helping to make sure he follows through at home with therapy recommendations. Patient was educated about the risks for aspiration pneumonia and stated that he \"has to do it\", regarding following the SLP-CF's recommendations at home for his health and was observed by the CF-SLP to be more engaged in his swallowing evaluation. [facility]. The SLP, Rocael Avitia, documented the MBS and reported: MBS completed with results yielding mild oral and mod pharyngeal dysphagia. Per each bolus presentation: pt presented with complete airway closure on first swallow followed by mod penetration on premature spillage from oral residuals during second swallow with nectar-thick liquids. Chin tuck, small sips, double swallow, and effortful swallow were all ineffective at improving airway protection. Pt tolerated reg solid, puree, honey-thick liquid, and 13 mm Ba pill with honey-thick wash without aspiration/penetration events. Premature spillage and double swallow per bolus appreciated across all PO presentations with vallecular residuals remaining post swallow. Mastication and a-p transit appeared mildly delayed; however, functional for reg solid PO. Overall deficits include decreased base of tongue retraction, poor laryngeal elevation, and impaired pharyngeal motility. Rec reg diet with honey-thick liquids, aspiration precautions, oral care TID, and meds as tolerated. Rec continuation of OP SLP services to address above stated deficits. Results of MBS, diet recommendations, oropharyngeal anatomy/physiology, thickening product information, and recs for further SLP services d/w pt and daughter utilizing video feedback. Pt and daughter provided with worksheet for thickening product. Today the patient participated in a Oral Motor Examination and Bedside Swallow Examination. Bedside swallow eval revealed mild oral and moderate pharyngeal dysphagia.  Patient has decreased pharyngeal elevation, suspected oral to pharyngeal premature spillage. Minimal oral dysphagia was present c/b moderate pocketing with regular consistencies. Moderate pharyngeal dysphagia c/b reduced laryngeal elevation with all consistencies excluding honey thick liquids. Multiple swallows noted per bolus presentation with wet vocal quality post thin liquid and regular trials. Improvement with vocal quality noted with use of compensatory strategies including: multiple swallows and alternating liquids (honey thick) and solids. Rec regular diet with honey thick liquids, with alternating liquids and solids, for safest PO and to reduce risk for aspiration. Patient was observed to throat clear following presentation of thin, nectar, puree, and regular. Patient had 1 instance of coughing following think liquids. Edu completed regarding recommended diet consistencies and safe swallowing guidelines. It is recommended that Mr. Nettles receive skilled speech therapy services as it is medically necessary to improve his Swallowing skills for safe oral intake, to decrease his risk of aspiration and for his QOL tasks related to home and community. Problem List:      []aphasic  []dysarthric  [x]dysphagic       []alexic  []agraphic  []dysphonia       []dysfluency   []Cognitive-Linguistic Disorder       []other   Treatment Plan may include any combination of the following: Treatment of Swallowing      Patient / Family readiness to learn indicated by: asking questions, trying to perform skills and interest    Persons(s) to be included in education:   patient (P) and family support person (FSP); Heena Mcneal. (son), Simon Demarco (duagther-in-law), Rachana Andersen (daughter)    Barriers to Learning/Limitations: cognitive deficits and physical fatigue     Patient Goal (s): get better    Patient Self Reported Health Status: fair    Rehabilitation Potential: fair    Short Term Goals:  To be accomplished in 8-10 treatments     1) The patient will tolerate a regular diet with honey thick liquids with <5% s/sx of aspiration x 4 weeks. 2) The patient will complete tongue base retraction, laryngeal/pharyngeal strengthening exercises (including Faina, Mendelsohn, Shaker, etc.),  and lingual strengthening, ROM, and coordination exercises with 90% accuracy with mod cues. 3) The patient will identify and implement safe swallowing techniques and strategies during PO intake with 90% accuracy with . Mod cues  4) The patient will correctly thicken liquids to honey thick viscosity with 90% accuracy with min-mod cues. Long Term Goals: To be accomplished in 12 weeks     1) The patient will consume the least restrictive diet with <5% s/sx of aspiration. 2) The patient will increase strength, ROM, and coordination of orofacial and pharyngeal musculature. 3) The patient will implement safe swallowing techniques/strategies with min cues.        Frequency / Duration: Patient to be seen 2 times per week for 4 weeks:    G Codes:  Swallowing:  Swallow Current Status CL= 60-79%   Swallow Goal Status CK= 40-59%    The severity rating is based on the following outcomes:                   National Outcomes Measures (NOMS)  and professional judgement   Patient/ Caregiver education and instruction: Diagnosis, prognosis, safe swallowing strategies, and thickening information for liquids    Certification Period: 6/20//17    AUGUSTINA Avery-CF 6/20/17 2:04 PM  ________________________________________________________________________    I certify that the above Therapy Services are being furnished while the patient is under my care. I agree with the treatment plan and certify that this therapy is necessary.     Physician's Signature:____________________  Date:___________Time:_________    Please sign and return to [facility] VLADIMIR  [address] [place] [address]  [phone] fax Thank you

## 2017-06-27 ENCOUNTER — HOSPITAL ENCOUNTER (OUTPATIENT)
Dept: PHYSICAL THERAPY | Age: 77
Discharge: HOME | End: 2017-06-27
Payer: MEDICARE

## 2017-06-27 PROCEDURE — G8996 SWALLOW CURRENT STATUS: HCPCS

## 2017-06-27 PROCEDURE — 92526 ORAL FUNCTION THERAPY: CPT

## 2017-06-27 PROCEDURE — G8997 SWALLOW GOAL STATUS: HCPCS

## 2017-06-27 PROCEDURE — 97110 THERAPEUTIC EXERCISES: CPT

## 2017-06-27 PROCEDURE — 97535 SELF CARE MNGMENT TRAINING: CPT

## 2017-06-27 NOTE — PROGRESS NOTES
OT DAILY TREATMENT NOTE - [facility]     Patient Name: Lamberto Franks  Date:2017  : 1940  [x]  Patient  Verified  Payor: Ayleen Fosteracosta / Plan: [place] MEDICARE PART A & B / Product Type: Medicare /    In time:1030  Out time:1100  Total Treatment Time (min): 30  Total Timed Codes (min): 30  1:1 Treatment Time ( [address] only): 30   Visit #: 9 of 24    Treatment Area: Muscle weakness (generalized) [M62.81]    SUBJECTIVE  Pain Level (0-10 scale): 0/10  Any medication changes, allergies to medications, adverse drug reactions, diagnosis change, or new procedure performed?: [x] No    [] Yes (see summary sheet for update)  Subjective functional status/changes:   [] No changes reported  Still not sleeping well, see Dr. Wei Apodaca tomorrow.     It is amazing how much you can lose after a stroke  This is much harder than it used to be    OBJECTIVE         30 min Self Care/Home Management: Financial management   Rationale: checkwriting skills  to improve the patients ability to manage finances  checkwriting task writing numbers to correspond to written amounts with moderate difficulty with numbers involving thousands  Able to correct with moderate cueing    With   [] TE   [] TA   [] neuro   [] other: Patient Education: [x] Review HEP    [] Progressed/Changed HEP based on:   [] positioning   [] body mechanics   [] transfers   [] heat/ice application   [] Splint wear/care   [] Sensory re-education   [] scar management      [x] other: places and numbers           Other Objective/Functional Measures:   checkwriting task  correct first trial     Pain Level (0-10 scale) post treatment: 0/10    ASSESSMENT/Changes in Function: Fatigue impacting performance    Patient will continue to benefit from skilled OT services to modify and progress therapeutic interventions, address ROM deficits, address strength deficits, analyze and address soft tissue restrictions and instruct in home and community integration to attain remaining goals.     []  See Plan of Care  []  See progress note/recertification  []  See Discharge Summary         Progress towards goals / Updated goals:  1. Patient will be able to feed self with R hand for full meal due to improved coordination of movement  2. Patient will increase  strength in R hand by 20 pounds to increase ease of opening jars. 3.  Patient will improve   fine motor skills at least 20% from baseline for fasteners. progressing 6/22/17  4. Patient will be able to fill out forms legibly and return to managing finances. difficulty with writing digits for written out numbers 6/27/17      PLAN  [x]  Upgrade activities as tolerated     [x]  Continue plan of care  []  Update interventions per flow sheet       []  Discharge due to:_  []  Other:_      Alexandria Ordoñez OTR/L 6/27/2017  10:39 AM    Future Appointments  Date Time Provider Nelson Hurtado   6/27/2017 11:00 AM Shannan Pacheco PTA [facility]   6/27/2017 1:00 PM Madelin Spears [facility]   6/28/2017 8:00 AM Almita Akbar  [address] [place]   6/29/2017 11:00 AM Shannan Pacheco PTA [facility]   6/29/2017 11:30 AM OT[facility] PT [address] [facility]   6/29/2017 1:00 PM Madelin Spears ORNJBJC [facility]   7/6/2017 2:00 PM Alexandria Ordoñez OTR/ERMELINDA [facility]   7/6/2017 2:30 PM Shannan Pacheco PTA [facility]   7/6/2017 3:15 PM Madelin Spears [facility]CPTP[facility]   7/11/2017 10:00 AM Farrah Lofton PT [facility]   7/11/2017 10:30 AM Alexandria Del, OTR/L [facility]

## 2017-06-27 NOTE — PROGRESS NOTES
PT DAILY TREATMENT NOTE - [facility]     Patient Name: Christiano Barone  Date:2017  : 1940  [x]  Patient  Verified  Payor: Holli Messer / Plan: [place] MEDICARE PART A & B / Product Type: Medicare /    In time:1100  Out time:1126  Total Treatment Time (min): 26  Total Timed Codes (min): 26  1:1 Treatment Time ( [address] only): 26   Visit #: 12 of     Treatment Area: Muscle weakness (generalized) [M62.81]    SUBJECTIVE  Pain Level (0-10 scale): 0/10  Any medication changes, allergies to medications, adverse drug reactions, diagnosis change, or new procedure performed?: [x] No    [] Yes (see summary sheet for update)  Subjective functional status/changes:   [] No changes reported  Pt reports that he has no pain, but he is mentally and physically drained. Reported that he has no been sleeping well and is going to MD tomorrow to have medication adjusted    OBJECTIVE    26 min Therapeutic Exercise:  [x] See flow sheet :   Rationale: increase ROM and increase strength to improve the patients ability to increase ease with ADLs    With   [x] TE   [] TA   [] neuro   [] other: Patient Education: [x] Review HEP    [] Progressed/Changed HEP based on:   [] positioning   [] body mechanics   [] transfers   [] heat/ice application    [] other:      Other Objective/Functional Measures: No weights were used today 2* severe fatigue  Pt needed multiple seated rest breaks during session     Pain Level (0-10 scale) post treatment: 0/10    ASSESSMENT/Changes in Function:   Pt is making slow progress toward goals. Pt cont with severe fatigue. Pt had shuffling gait today. Patient will continue to benefit from skilled PT services to modify and progress therapeutic interventions, address functional mobility deficits, address ROM deficits, address strength deficits and address imbalance/dizziness to attain remaining goals.      []  See Plan of Care  [x]  See progress note/recertification  []  See Discharge Summary Progress towards goals / Updated goals:  1. Pt will improve FOTO by 16 points in order to demonstrate functional improvement  2. Pt will improve TUG to 10 seconds in order to demonstrate reducing fall risk  3.  Pt will ambulate 48' with equal weight shift in order to progress ambulatory ability     PLAN  []  Upgrade activities as tolerated     [x]  Continue plan of care  []  Update interventions per flow sheet       []  Discharge due to:_  []  Other:_      Shama Naidu PTA 6/27/2017  10:59 AM    Future Appointments  Date Time Provider Nelson Hurtado   6/27/2017 11:00 AM Shama Naidu PTA [facility]   6/27/2017 1:00 PM Ramses Feldman [id number] [facility]   6/28/2017 8:00 AM Sofia Navarro  [address] [place] [address]   6/29/2017 11:00 AM Shama Naidu PTA [id number] [facility]   6/29/2017 11:30 AM [facility] PT [address] [facility]CPTPB [facility]   6/29/2017 1:00 PM Ramses Feldman [id number] [facility]   7/6/2017 2:00 PM Karla Fall, OTR/L RGEOPVG [facility]   7/6/2017 2:30 PM Shama Naidu PTA [facility]CPTP[facility]   7/6/2017 3:15 PM Ramses Gaston CCNSGXL [facility]   7/11/2017 10:00 AM Lokesh Countess, PT MMCPTPB [facility]   7/11/2017 10:30 AM Karla Fall, OTR/L [facility]CPTP[facility]

## 2017-06-27 NOTE — PROGRESS NOTES
[facility] [employer]  [address] [facility]  [phone] fax    Continued Plan of Care/ Re-certification for Speech Therapy Services    Patient name: Lola Vigil Start of Care: 2017   Referral source: Olam Cushing, MD : 1940   Medical/Treatment Diagnosis: Dysphagia [R13.10] Onset Date: 2017     Prior Hospitalization: see medical history Provider#: [id number]   Medications: Verified on Patient Summary List    Comorbidities: Acute ischemic stroke of left internal capsule with residual right hemiparesis, dysphagia, and dysarthria; UI; Malignant neoplasm of prostate; COPD; Hypothyroidism; Osteoarthritis; Dyslipidemia  Prior Level of Function: Regular diet with thin liquids. 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 Pt is right-hand dominant; wears glasses for reading. Pt further reports that he does not \"read well\" as a result of 9-grade education; \"I don't enjoy reading; I only read when I have to. \"   Visits from Start of Care: 8    Missed Visits: 0    The Plan of Care and following information is based on the patient's current status:  GOALS:      ST Goal 1.) The patient will tolerate a regular diet with honey thick liquids with <5% s/sx of aspiration x 4 weeks. Status at last note/certification: Progressing:  Tolerated HTLS with ~ 5% s/sx of aspiration  Current Status: Not met, progressing    ST Goal 2.) The patient will complete tongue base retraction, laryngeal/pharyngeal strengthening exercises (including Faina, Mendelsohn, Shaker, etc.(current), ModA provided  Status at last note/certification: Progressing: Faina: 100%, Lon on 2017  Current Status: Not met, progressing     ST Goal 3.) The patient will identify and implement safe swallowing techniques and strategies during PO intake with 90% accuracy with Mod cues. Status at last note/certification:Progressing: mod cues required to decrease oral \"swishing\" prior to swallow 2* to decreased bolus control and premature spillage. Mod ed re: effortful swallow  Current Status: Not met, progressing    ST Goal 4.) The patient will correctly thicken liquids to honey thick viscosity with 90% accuracy with min-mod cues. Status at last note/certification: Progressin%, Max A on 2017   Current Status:  Not met, progressing    ST Goal 5.) Recall 3-5 words after delay/distraction and 1-2 (6-8 word) sentences to increase recall abilities as related to scheduling appointments with min-mod A in 3/5 trials with visual/verbal cues. Status at last note/certification: delayed recall/working memory (1 of 4 words after 5 minute delay) word recall difficulties  Current Status: Not met, progressing      ST Goal 6.) Answer egocentric information via open-ended wh-?'s with < 2 word recall error at the sentence level, utilizing word-recall techniques, min-mod visual/verbal cues  Status at last note/certification: Not met, word recall difficulties with phonemic errors  Current Status: Not met    ST Goal 7.) Complete moderately complex linguistic organization tasks with 90% acc with min A visual/verbal cues. Status at last note/certification:progress during half of the trails. Patient was able to generate lists consisting of as many as 5 or 16 items in 1 minute given a concrete category. Current Status: Not met, progressing     ST Goal 8.) Complete visual-spatial tasks related to functional ADLs for safe, independent social reintegration with min A  Status at last note/certification: shows progress as patient was able to complete 50% of the mazes.  Patient had more difficulty completing tasks which consisted of patterns and shapes  Current Status: Not met, progressing       LT Goal 1.) The patient will consume the least restrictive diet with <5% s/sx of aspiration. Current Status: Not met    LT Goal 2.) The patient will increase strength, ROM, and coordination of orofacial and pharyngeal musculature. Current Status: Not met    LT Goal 3.) The patient will implement safe swallowing techniques/strategies with min cues. Current Status: Not met    LT Goal 4.) Pt with demonstrate increase in cognitive-linguistic tasks necessary for completion of daily activities and safe ADL/IADL completion in 90% of tasks, with minimal cueing and self-monitoring. Current Status: Not met      Key functional changes: patient is more aware of safe swallowing strategies, is able tothicken liquids with verbal cues, merged separate POC for cognition and swallowing onto one POC     Problems/ barriers to goal attainment: fatigue due to lack of sleep     Problem List:    []aphasic  []dysarthric  [x]dysphagic       []alexic  []agraphic  []dysphonia       []dysfluency  [x]Cognitive-Linguistic Disorder       []other     Treatment Plan: Cognitive/Language Treatment, Dysphagia Treatment, Treatment of Swallowing and Patient Education    Patient Goal (s) has been updated and includes: visual-spatial goal was deferred to OT       Goals for this certification period   Short Term Goals: To be accomplished in 8-10 treatments  1.) The patient will tolerate a regular diet with honey thick liquids with <5% s/sx of aspiration x 4 weeks. 2.) The patient will complete tongue base retraction, laryngeal/pharyngeal strengthening exercises (including Faina, Mendelsohn, Shaker, etc.),  and lingual strengthening, ROM, and coordination exercises with 90% accuracy with mod cues. 3.) The patient will identify and implement safe swallowing techniques and strategies during PO intake with 90% accuracy with Mod cues  4.) The patient will correctly thicken liquids to honey thick viscosity with 90% accuracy with min-mod cues.      5.) Recall 3-5 words after delay/distraction and 1-2 (6-8 word) sentences to increase recall abilities as related to scheduling appointments with min-mod A in 3/5 trials with visual/verbal cues. 6.) Answer egocentric information via open-ended wh-?'s with < 2 word recall error at the sentence level, utilizing word-recall techniques, min-mod visual/verbal cues  7.) Complete moderately complex linguistic organization tasks with 90% acc with min A visual/verbal cues.      Long Term Goals: To be accomplished in 12 weeks                       1.) The patient will consume the least restrictive diet with <5% s/sx of aspiration. 2.) The patient will increase strength, ROM, and coordination of orofacial and pharyngeal musculature. 3.) The patient will implement safe swallowing techniques/strategies with min cues. 4.) Pt with demonstrate increase in cognitive-linguistic tasks necessary for completion of daily activities and safe ADL/IADL completion in 90% of tasks, with minimal cueing and self-monitoring. Frequency / Duration: Patient to be seen 2 times per week for 12 weeks:    Assessment: Patient is making slow and steady progress in therapy and continues to benefit from speech therapy services to address his cognitive and swallowing deficits. Recommendations: It is recommended that Mr. Nettles receive skilled speech therapy services for dysphagia as it is medically necessary to improve his Swallowing skills for safe oral intake, to decrease his risk of aspiration, and for his QOL tasks related to home and community. Skilled speech therapy intervention is also warranted to address cognative deficits to facilitate completion of daily communication activities necessary for community re-integration.    G Codes:  Swallowing:  Swallow Current Status CL= 60-79%   Swallow Goal Status CK= 40-59%    The severity rating is based on the following outcomes:    National Outcomes Measures (NOMS) and professional judgment     Certification Period: 6/27/2017-9/25/2017    Michael Whelan SLP-CF 6/27/2017 4:43 PM    ________________________________________________________________________  I certify that the above Therapy Services are being furnished while the patient is under my care. I agree with the treatment plan and certify that this therapy is necessary. []  I have read the above report and request that my patient continue as recommended. []  I have read the above report and request that my patient continue therapy with the following changes/special instructions:________________________________________  []I have read the above report and request that my patient be discharged from therapy.     Physician's Signature:_________________ Date:___________Time:__________      Please sign and return to [facility] - [address]  [phone] fax

## 2017-06-27 NOTE — PROGRESS NOTES
ST DAILY TREATMENT NOTE    Patient Name: Manuela Screen  Date:2017  : 1940  [x]  Patient  Verified  Payor: Bridgett Butler / Plan: [place] & B / Product Type: Medicare /   In time: 1:00 Out time:1:45  Total Treatment Time (min): 45  Visit #: 8 of 8    Treatment Diagnosis: Dysphagia [R13.10]    SUBJECTIVE  Pain Level (0-10 scale): 0  Any medication changes, allergies to medications, adverse drug reactions, diagnosis change, or new procedure performed?:  [] No    [] Yes (see summary sheet for update)    Subjective functional status/changes:   [] No changes reported  Patient states that life \"has been revery stressful\" and that he is taking antidepressants. Patient will be seeing his  tomorrow to discuss his fatigue. OBJECTIVE  Treatment provided includes:  Increase/Improve:  []  Voice Quality []  Cognitive Linguistic Skills [x]  Laryngeal/Pharyngeal Exercises   []  Vocal Loudness []  Reading Comprehension [x]  Swallowing Skills    []  Vocal Cord Function []  Auditory Comprehension [x]  Oral Motor Skills   []  Resonance []  Writing Skills [x]  Compensatory strategies    []  Speech Intelligibility []  Expressive Language []     []  Breath Support/Coord.  []  Receptive language []    []  Articulation []  Safety Awareness []    []  Fluency []  Word Retrieval []        Treatment Provided:  Swallowing strategies, techniques    Patient/Caregiver  Education: [x] Review HEP        HEP/Handouts given: no handout provided, encouraged to continue to work on bolus formation and faina at home  Other Objective/Functional Measures:  Faina: 100%, Lon  Double swallow: ~60%, Lon  Bolus formation with mechanical soft: 50%, Max A  Thicken liquids to honey consistency: 100%, Max A  Tolerate a regular diet with honey thick liquids with <5% s/sx of aspiration: ~<5% s/sx of aspiration on honey thick liquids  Pain Level (0-10 scale) post treatment: 0    ASSESSMENT    Patient is able to complete pharyngeal elevation exercises with fewer cues and higher accuracy. Patient is doing Faina at home regularly. Patient responds well to verbal cues and has demonstrated a greater desire to work hard in therapy. Patient is drinking only honey this liquids at home and is utilizing double swallows most of the time, per patient report. Patient continues to benefit from therapy to address his dysphagia. []  See Plan of Care  []  See progress note/recertification  []  See Discharge Summary    Patient will continue to benefit from skilled therapy to address remaining functional deficits: dysphagia    Progress towards goals / Updated goals:    Short Term Goals: To be accomplished in 8-10 treatments     1) The patient will tolerate a regular diet with honey thick liquids with <5% s/sx of aspiration x 4 weeks. Current: Progressing: Tolerated a regular diet with honey thick liquids with <5% s/sx of aspiration: ~<5% s/sx of aspiration on honey thick liquids on 2017    2) The patient will complete tongue base retraction, laryngeal/pharyngeal strengthening exercises (including Faina, Mendelsohn, Shaker, etc.),  and lingual strengthening, ROM, and coordination exercises with 90% accuracy with mod cues. Current: Progressing: Faina: 100%, Lon on 2017    3) The patient will identify and implement safe swallowing techniques and strategies during PO intake with 90% accuracy with Mod cues. Current: Progressing: Patient was upright 100% Lon during PO trials and Double swallow ~60% Lon on 2017.    4) The patient will correctly thicken liquids to honey thick viscosity with 90% accuracy with min-mod cues. Current:  Progressin%, Max A on 2017     Long Term Goals: To be accomplished in 12 weeks                          1) The patient will consume the least restrictive diet with <5% s/sx of aspiration. 2) The patient will increase strength, ROM, and coordination of orofacial and pharyngeal musculature.   3) The patient will implement safe swallowing techniques/strategies with min cues.      PLAN  [x]  Continue plan of care  []  Modify Goals/Treatment Plan      []  Discharge due to:  [] Other:    Megan Rose 6/27/2017  1:01 PM    Future Appointments  Date Time Provider Nelson Hurtado   6/28/2017 8:00 AM Mary Todd  [address],  [address] [place]   6/29/2017 11:00 AM Dany Abreu, PTA [facility]   6/29/2017 11:30 AM [facility] PT [address] [facility]   6/29/2017 1:00 PM Megan Rose [facility]   7/6/2017 2:00 PM Leila Lopez, OTR/L [facility]   7/6/2017 2:30 PM Dany Abreu PTA [facility]   7/6/2017 3:15 PM Megan Rose [facility]DUP[facility]   7/11/2017 10:00 AM Riaz Mao, PT [facility]   7/11/2017 10:30 AM Leila Lopez, OTR/L [facility]

## 2017-06-28 ENCOUNTER — OFFICE VISIT (OUTPATIENT)
Dept: INTERNAL MEDICINE CLINIC | Age: 77
End: 2017-06-28

## 2017-06-28 ENCOUNTER — HOSPITAL ENCOUNTER (OUTPATIENT)
Dept: LAB | Age: 77
Discharge: HOME | End: 2017-06-28
Payer: MEDICARE

## 2017-06-28 VITALS
SYSTOLIC BLOOD PRESSURE: 122 MMHG | DIASTOLIC BLOOD PRESSURE: 58 MMHG | TEMPERATURE: 98.2 F | BODY MASS INDEX: 25.76 KG/M2 | OXYGEN SATURATION: 98 % | WEIGHT: 184 LBS | RESPIRATION RATE: 16 BRPM | HEIGHT: 71 IN | HEART RATE: 80 BPM

## 2017-06-28 DIAGNOSIS — I69.993 CVA, OLD, ATAXIA: ICD-10-CM

## 2017-06-28 DIAGNOSIS — Z85.46 HISTORY OF MALIGNANT NEOPLASM OF PROSTATE: Chronic | ICD-10-CM

## 2017-06-28 DIAGNOSIS — I10 ESSENTIAL HYPERTENSION: ICD-10-CM

## 2017-06-28 DIAGNOSIS — E78.5 DYSLIPIDEMIA (HIGH LDL; LOW HDL): Chronic | ICD-10-CM

## 2017-06-28 DIAGNOSIS — I10 ESSENTIAL HYPERTENSION: Primary | ICD-10-CM

## 2017-06-28 PROBLEM — I63.9 ACUTE ISCHEMIC STROKE (HCC): Status: RESOLVED | Noted: 2017-04-19 | Resolved: 2017-06-28

## 2017-06-28 LAB
ANION GAP BLD CALC-SCNC: 10 MMOL/L (ref 3–18)
BUN SERPL-MCNC: 22 MG/DL (ref 7–18)
BUN/CREAT SERPL: 17 (ref 12–20)
CALCIUM SERPL-MCNC: 9.1 MG/DL (ref 8.5–10.1)
CHLORIDE SERPL-SCNC: 96 MMOL/L (ref 100–108)
CO2 SERPL-SCNC: 24 MMOL/L (ref 21–32)
CREAT SERPL-MCNC: 1.33 MG/DL (ref 0.6–1.3)
GLUCOSE SERPL-MCNC: 112 MG/DL (ref 74–99)
POTASSIUM SERPL-SCNC: 4.4 MMOL/L (ref 3.5–5.5)
SODIUM SERPL-SCNC: 130 MMOL/L (ref 136–145)

## 2017-06-28 PROCEDURE — 80048 BASIC METABOLIC PNL TOTAL CA: CPT | Performed by: INTERNAL MEDICINE

## 2017-06-28 NOTE — PROGRESS NOTES
1. Have you been to the ER, urgent care clinic since your last visit? Hospitalized since your last visit? No    2. Have you seen or consulted any other health care providers outside of the [address] [place] [address] since your last visit? Include any pap smears or colon screening.  GI - Dr. Lizzy Durbin

## 2017-06-28 NOTE — MR AVS SNAPSHOT
Visit Information Date & Time Provider Department Dept. Phone Encounter #  
 6/28/2017  8:00 AM Iwona Anaya MD [facility] [place] [phone] [id number] Follow-up Instructions Return in about 3 months (around 9/28/2017). Your Appointments 9/20/2017  9:30 AM  
Follow Up with Iwona Anaya MD  
[facility] OF [place] [address] [facility]) Appt Note: 3MO  
 [address], [address]  
  
   
 [address], [address] [facility] [address] Upcoming Health Maintenance Date Due DTaP/Tdap/Td series (1 - Tdap) 8/1/1959 ZOSTER VACCINE AGE 60> 8/1/1998 GLAUCOMA SCREENING Q2Y 8/1/2003 Pneumococcal 65+ High/Highest Risk (1 of 2 - PCV13) 8/1/2003 INFLUENZA AGE 9 TO ADULT 8/1/2017 MEDICARE YEARLY EXAM 6/6/2018 Allergies as of 6/28/2017  Review Complete On: 6/28/2017 By: Iwona Anaya MD  
  
 Severity Noted Reaction Type Reactions Pcn [Penicillins]    Rash Current Immunizations  Never Reviewed Name Date Influenza Vaccine (Quad) PF 10/31/2016 Not reviewed this visit You Were Diagnosed With   
  
 Codes Comments Essential hypertension    -  Primary ICD-10-CM: I10 
ICD-9-CM: 401.9 History of malignant neoplasm of prostate     ICD-10-CM: Z85.46 
ICD-9-CM: V10.46 Dyslipidemia (high LDL; low HDL)     ICD-10-CM: E78.4 ICD-9-CM: 272.4   
 CVA, old, ataxia     ICD-10-CM: B85.800 ICD-9-CM: 438.84 Vitals BP Pulse Temp Resp Height(growth percentile) 122/58 (BP 1 Location: Left arm, BP Patient Position: Sitting) 80 98.2 °F (36.8 °C) (Tympanic) 16 5' 11\" (1.803 m) Weight(growth percentile) SpO2 BMI Smoking Status 184 lb (83.5 kg) 98% 25.66 kg/m2 Never Smoker BMI and BSA Data Body Mass Index Body Surface Area  
 25.66 kg/m 2 2.05 m 2 Preferred Pharmacy Pharmacy Name Phone [address] [place] [address] [phone] Your Updated Medication List  
  
   
This list is accurate as of: 6/28/17  9:01 AM.  Always use your most recent med list.  
  
  
  
  
 aspirin delayed-release 81 mg tablet Take 81 mg by mouth daily. atorvastatin 20 mg tablet Commonly known as:  LIPITOR  
1 tablet daily  Indications: DYSLIPIDEMIA  
  
 benazepril 20 mg tablet Commonly known as:  LOTENSIN  
1 tablet daily (for BP) CENTRUM SILVER PO Take 1 Tab by mouth daily. Cholecalciferol (Vitamin D3) 2,000 unit Cap capsule Commonly known as:  VITAMIN D3 Take 2,000 Units by mouth daily. clopidogrel 75 mg Tab Commonly known as:  PLAVIX Take 1 Tab by mouth daily (with dinner). Indications: Cerebral Thromboembolism Prevention  
  
 cyanocobalamin 1,000 mcg tablet Take 1,000 mcg by mouth daily. levothyroxine 150 mcg tablet Commonly known as:  SYNTHROID  
TAKE 1 TABLET BY MOUTH ONCE DAILY pantoprazole 20 mg tablet Commonly known as:  PROTONIX Take 20 mg by mouth daily. sertraline 50 mg tablet Commonly known as:  ZOLOFT Take 1 Tab by mouth daily. SINGULAIR 10 mg tablet Generic drug:  montelukast  
Take 10 mg by mouth daily. ZyrTEC 10 mg tablet Generic drug:  cetirizine Take 10 mg by mouth daily. Follow-up Instructions Return in about 3 months (around 9/28/2017). To-Do List   
 06/28/2017 Lab:  METABOLIC PANEL, BASIC   
  
 06/29/2017 11:00 AM  
  Appointment with Joann Gujaardo PTA at [facility] PT [address]  ([phone])  
  
 06/29/2017 11:30 AM  
  Appointment with [facility] PT [address] at [facility] PT [address] [facility] ([phone])  
  
 06/29/2017 1:00 PM  
  Appointment with Shalini Monterroso at [address] [place] [address] ([phone]) 07/06/2017 2:00 PM  
  Appointment with Anjali Valdez OTR/L at [address] [place] [address] ([phone])  07/06/2017 2:30 PM  
 Appointment with Madonna Galvin, PTA at [facility] PT [address] [facility] ([phone]) 07/06/2017 3:15 PM  
  Appointment with Conchis Francis at [address] ([phone])  
  
 07/11/2017 10:00 AM  
  Appointment with Lisbeth Thomas, PT at [facility] PT [address] ([phone])  
  
 07/11/2017 10:30 AM  
  Appointment with Julio Villaseñor, OTR/L at [address] [place] [address] ([phone]) Patient Instructions Health Maintenance Due Topic Date Due  
 DTaP/Tdap/Td  (1 - Tdap) 08/01/1959  Shingles Vaccine  08/01/1998  Glaucoma Screening   08/01/2003  Pneumococcal Vaccine (1 of 2 - PCV13) 08/01/2003 Introducing [facility] SERVICES! Tico Rose introduces [employer] patient portal. Now you can access parts of your medical record, email your doctor's office, and request medication refills online. 1. In your internet browser, go to [email or web address] 2. Click on the First Time User? Click Here link in the Sign In box. You will see the New Member Sign Up page. 3. Enter your [email or web address] Access Code exactly as it appears below. You will not need to use this code after youve completed the sign-up process. If you do not sign up before the expiration date, you must request a new code. · [email or web address] Access Code: [email or web address] Expires: 9/5/2017  4:39 PM 
 
4. Enter the last four digits of your Social Security Number (xxxx) and Date of Birth (mm/dd/yyyy) as indicated and click Submit. You will be taken to the next sign-up page. 5. Create a bMobilizedt ID. This will be your [employer] login ID and cannot be changed, so think of one that is secure and easy to remember. 6. Create a [employer]t password. You can change your password at any time. 7. Enter your Password Reset Question and Answer. This can be used at a later time if you forget your password. 8. Enter your e-mail address. You will receive e-mail notification when new information is available in [address] 9. Click Sign Up. You can now view and download portions of your medical record. 10. Click the Download Summary menu link to download a portable copy of your medical information. If you have questions, please visit the Frequently Asked Questions section of the [employer] website. Remember, [employer] is NOT to be used for urgent needs. For medical emergencies, dial 911. Now available from your iPhone and Android! Please provide this summary of care documentation to your next provider. Your primary care clinician is listed as Ayan Spangler. If you have any questions after today's visit, please call [phone].

## 2017-06-28 NOTE — PROGRESS NOTES
The patient presents to the office today with the chief complaint of hypertension    HPI    The patient remains on medications for hypertension. he finds that he is lethargic in the AM after taking his medications. This then improves as the day goes on. The patient feels that this may be from the BP medications. The patient is 2 months post a CVA   The patient has continued complaints of problems with his gait and swallowing. These are showing slow but continued improvement. The patient is living by himself with support of his family      Review of Systems   Respiratory: Negative for shortness of breath. Cardiovascular: Negative for chest pain and leg swelling. 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 30 Tab 2    aspirin delayed-release 81 mg tablet Take 81 mg by mouth daily.  cetirizine (ZYRTEC) 10 mg tablet Take 10 mg by mouth daily.  Cholecalciferol, Vitamin D3, (VITAMIN D3) 2,000 unit cap capsule Take 2,000 Units by mouth daily.  pantoprazole (PROTONIX) 20 mg tablet Take 20 mg by mouth daily.  cyanocobalamin 1,000 mcg tablet Take 1,000 mcg by mouth daily.  montelukast (SINGULAIR) 10 mg tablet Take 10 mg by mouth daily.  levothyroxine (SYNTHROID) 150 mcg tablet TAKE 1 TABLET BY MOUTH ONCE DAILY 90 Tab 3    atorvastatin (LIPITOR) 20 mg tablet 1 tablet daily  Indications: DYSLIPIDEMIA 30 Tab 6    clopidogrel (PLAVIX) 75 mg tab Take 1 Tab by mouth daily (with dinner). Indications: Cerebral Thromboembolism Prevention 30 Tab 4    benazepril (LOTENSIN) 20 mg tablet 1 tablet daily (for BP) 30 Tab 4    FOLIC ACID/MULTIVIT-MIN/LUTEIN (CENTRUM SILVER PO) Take 1 Tab by mouth daily.          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 HDL 42, LDL 97    Dysphagia as late effect of cerebrovascular accident (CVA) 4/19/2017    Erectile dysfunction     Gastroesophageal reflux disease     Hemiparesis affecting right side as late effect of cerebrovascular accident (CVA) ([facility] 4/19/2017    History of endogenous hypertriglyceridemia     History of malignant neoplasm of prostate     Taylor score 7     Hypothyroidism     Osteoarthrosis involving multiple sites     Procedure refused 4/21/2017    Speech Pathologist recommended NPO and PEG placement; Patient refused    Urinary incontinence     Male incontinence        Past Surgical History:   Procedure Laterality Date    COLONOSCOPY      HX CHOLECYSTECTOMY      HX HERNIA REPAIR Bilateral     inguinal    HX RADICAL PROSTATECTOMY  1-    perineal approach       Social History     Social History    Marital status:      Spouse name: N/A    Number of children: N/A    Years of education: N/A     Occupational History    Not on file.      Social History Main Topics    Smoking status: Never Smoker    Smokeless tobacco: Never Used    Alcohol use No    Drug use: No    Sexual activity: Yes     Partners: Female     Other Topics Concern    Not on file     Social History Narrative       Patient does have an advanced directive on file    Visit Vitals    /58 (BP 1 Location: Left arm, BP Patient Position: Sitting)    Pulse 80    Temp 98.2 °F (36.8 °C) (Tympanic)    Resp 16    Ht 5' 11\" (1.803 m)    Wt 184 lb (83.5 kg)    SpO2 98%    BMI 25.66 kg/m2       Physical Exam   Neck: Carotid bruit is not present. Cardiovascular: Regular rhythm. Exam reveals no gallop. No murmur heard. Pulmonary/Chest: He has no wheezes. He has no rales. Abdominal: Soft. He exhibits no distension. There is no tenderness. Neurological:   Good muscle strength bilaterally but apraxia of gait and slightly slow speech       BMI:  [facility] Outpatient Visit on 06/28/2017   Component Date Value Ref Range Status    Sodium 06/28/2017 130* 136 - 145 mmol/L Final    Potassium 06/28/2017 4.4  3.5 - 5.5 mmol/L Final    Chloride 06/28/2017 96* 100 - 108 mmol/L Final    CO2 06/28/2017 24  21 - 32 mmol/L Final    Anion gap 06/28/2017 10  3.0 - 18 mmol/L Final    Glucose 06/28/2017 112* 74 - 99 mg/dL Final    BUN 06/28/2017 22* 7.0 - 18 MG/DL Final    Creatinine 06/28/2017 1.33* 0.6 - 1.3 MG/DL Final    BUN/Creatinine ratio 06/28/2017 17  12 - 20   Final    GFR est AA 06/28/2017 >60  >60 ml/min/1.73m2 Final    GFR est non-AA 06/28/2017 52* >60 ml/min/1.73m2 Final    Comment: (NOTE)  Estimated GFR is calculated using the Modification of Diet in Renal   Disease (MDRD) Study equation, reported for both  Americans   (GFRAA) and non- Americans (GFRNA), and normalized to 1.73m2   body surface area. The physician must decide which value applies to   the patient. The MDRD study equation should only be used in   individuals age 25 or older. It has not been validated for the   following: pregnant women, patients with serious comorbid conditions,   or on certain medications, or persons with extremes of body size,   muscle mass, or nutritional status.       Calcium 06/28/2017 9.1  8.5 - 10.1 MG/DL Final   Admission on 04/24/2017, Discharged on 05/12/2017   Component Date Value Ref Range Status    WBC 04/25/2017 12.5  4.6 - 13.2 K/uL Final    RBC 04/25/2017 5.18  4.70 - 5.50 M/uL Final    HGB 04/25/2017 15.0  13.0 - 16.0 g/dL Final    HCT 04/25/2017 43.2  36.0 - 48.0 % Final    MCV 04/25/2017 83.4  74.0 - 97.0 FL Final    MCH 04/25/2017 29.0  24.0 - 34.0 PG Final    MCHC 04/25/2017 34.7  31.0 - 37.0 g/dL Final    RDW 04/25/2017 13.8  11.6 - 14.5 % Final    PLATELET 76/33/0386 897  135 - 420 K/uL Final    MPV 04/25/2017 11.3  9.2 - 11.8 FL Final    NEUTROPHILS 04/25/2017 67  40 - 73 % Final    LYMPHOCYTES 04/25/2017 22  21 - 52 % Final    MONOCYTES 04/25/2017 9  3 - 10 % Final    EOSINOPHILS 04/25/2017 2  0 - 5 % Final    BASOPHILS 04/25/2017 0  0 - 2 % Final    ABS. NEUTROPHILS 04/25/2017 8.3* 1.8 - 8.0 K/UL Final    ABS. LYMPHOCYTES 04/25/2017 2.8  0.9 - 3.6 K/UL Final    ABS. MONOCYTES 04/25/2017 1.1  0.05 - 1.2 K/UL Final    ABS. EOSINOPHILS 04/25/2017 0.3  0.0 - 0.4 K/UL Final    ABS. BASOPHILS 04/25/2017 0.0  0.0 - 0.06 K/UL Final    DF 04/25/2017 AUTOMATED    Final    Magnesium 04/25/2017 1.9  1.6 - 2.6 mg/dL Final    PLEASE NOTE NEW REFERENCE RANGE    Sodium 04/25/2017 135* 136 - 145 mmol/L Final    Potassium 04/25/2017 4.2  3.5 - 5.5 mmol/L Final    Chloride 04/25/2017 101  100 - 108 mmol/L Final    CO2 04/25/2017 24  21 - 32 mmol/L Final    Anion gap 04/25/2017 10  3.0 - 18 mmol/L Final    Glucose 04/25/2017 100* 74 - 99 mg/dL Final    BUN 04/25/2017 9  7.0 - 18 MG/DL Final    Creatinine 04/25/2017 1.44* 0.6 - 1.3 MG/DL Final    BUN/Creatinine ratio 04/25/2017 6* 12 - 20   Final    GFR est AA 04/25/2017 58* >60 ml/min/1.73m2 Final    GFR est non-AA 04/25/2017 47* >60 ml/min/1.73m2 Final    Comment: (NOTE)  Estimated GFR is calculated using the Modification of Diet in Renal   Disease (MDRD) Study equation, reported for both  Americans   (GFRAA) and non- Americans (GFRNA), and normalized to 1.73m2   body surface area. The physician must decide which value applies to   the patient. The MDRD study equation should only be used in   individuals age 25 or older. It has not been validated for the   following: pregnant women, patients with serious comorbid conditions,   or on certain medications, or persons with extremes of body size,   muscle mass, or nutritional status.  Calcium 04/25/2017 9.3  8.5 - 10.1 MG/DL Final    Vitamin D 25-Hydroxy 04/25/2017 30.1  30 - 100 ng/mL Final    Comment: (NOTE)  Deficiency               <20 ng/mL  Insufficiency          20-30 ng/mL  Sufficient             ng/mL  Possible toxicity       >100 ng/mL    The Method used is Siemens Advia Centaur currently standardized to a   Center of Disease Control and Prevention (CDC) certified reference   [facility]. Samples containing fluorescein dye can produce falsely   elevated values when tested with the ADVIA Centaur Vitamin D Assay. It is recommended that results in the toxic range, >100 ng/mL, be   retested 72 hours post fluorescein exposure.       Vitamin B12 04/25/2017 903  211 - 911 pg/mL Final    Folate 04/25/2017 >20.0* 3.10 - 17.50 ng/mL Final    Homocysteine, plasma 04/25/2017 11.9  0.0 - 15.0 umol/L Final    Comment: (NOTE)  Performed At: [address] [id number]  Robert Mclain MD QN:[id number]      HGB 04/27/2017 14.2  13.0 - 16.0 g/dL Final    HCT 04/27/2017 40.5  36.0 - 48.0 % Final    PLATELET 49/12/9531 566  135 - 420 K/uL Final    WBC 05/01/2017 9.7  4.6 - 13.2 K/uL Final    RBC 05/01/2017 5.20  4.70 - 5.50 M/uL Final    HGB 05/01/2017 15.0  13.0 - 16.0 g/dL Final    HCT 05/01/2017 43.4  36.0 - 48.0 % Final    MCV 05/01/2017 83.5  74.0 - 97.0 FL Final    MCH 05/01/2017 28.8  24.0 - 34.0 PG Final    MCHC 05/01/2017 34.6  31.0 - 37.0 g/dL Final    RDW 05/01/2017 13.8  11.6 - 14.5 % Final    PLATELET 73/53/6692 733  135 - 420 K/uL Final    MPV 05/01/2017 11.1  9.2 - 11.8 FL Final    NEUTROPHILS 05/01/2017 57  40 - 73 % Final    LYMPHOCYTES 05/01/2017 30  21 - 52 % Final    MONOCYTES 05/01/2017 11* 3 - 10 % Final    EOSINOPHILS 05/01/2017 2  0 - 5 % Final    BASOPHILS 05/01/2017 0  0 - 2 % Final    ABS. NEUTROPHILS 05/01/2017 5.5  1.8 - 8.0 K/UL Final    ABS. LYMPHOCYTES 05/01/2017 2.9  0.9 - 3.6 K/UL Final    ABS. MONOCYTES 05/01/2017 1.0  0.05 - 1.2 K/UL Final    ABS. EOSINOPHILS 05/01/2017 0.2  0.0 - 0.4 K/UL Final    ABS. BASOPHILS 05/01/2017 0.0  0.0 - 0.06 K/UL Final    DF 05/01/2017 AUTOMATED    Final    Sodium 05/01/2017 132* 136 - 145 mmol/L Final    Potassium 05/01/2017 4.0  3.5 - 5.5 mmol/L Final    Chloride 05/01/2017 97* 100 - 108 mmol/L Final    CO2 05/01/2017 26  21 - 32 mmol/L Final    Anion gap 05/01/2017 9  3.0 - 18 mmol/L Final    Glucose 05/01/2017 114* 74 - 99 mg/dL Final    BUN 05/01/2017 13  7.0 - 18 MG/DL Final    Creatinine 05/01/2017 1.49* 0.6 - 1.3 MG/DL Final    BUN/Creatinine ratio 05/01/2017 9* 12 - 20   Final    GFR est AA 05/01/2017 55* >60 ml/min/1.73m2 Final    GFR est non-AA 05/01/2017 46* >60 ml/min/1.73m2 Final    Comment: (NOTE)  Estimated GFR is calculated using the Modification of Diet in Renal   Disease (MDRD) Study equation, reported for both  Americans   (GFRAA) and non- Americans (GFRNA), and normalized to 1.73m2   body surface area. The physician must decide which value applies to   the patient. The MDRD study equation should only be used in   individuals age 25 or older. It has not been validated for the   following: pregnant women, patients with serious comorbid conditions,   or on certain medications, or persons with extremes of body size,   muscle mass, or nutritional status.  Calcium 05/01/2017 9.4  8.5 - 10.1 MG/DL Final    Bilirubin, total 05/01/2017 0.8  0.2 - 1.0 MG/DL Final    ALT (SGPT) 05/01/2017 26  16 - 61 U/L Final    AST (SGOT) 05/01/2017 21  15 - 37 U/L Final    Alk.  phosphatase 05/01/2017 88  45 - 117 U/L Final    Protein, total 05/01/2017 8.3* 6.4 - 8.2 g/dL Final    Albumin 05/01/2017 3.8  3.4 - 5.0 g/dL Final    Globulin 05/01/2017 4.5* 2.0 - 4.0 g/dL Final    A-G Ratio 05/01/2017 0.8  0.8 - 1.7   Final    TSH 05/01/2017 3.93* 0.36 - 3.74 uIU/mL Final    Microalbumin,urine random 05/02/2017 6.37* 0 - 3.0 MG/DL Final    Creatinine, urine 05/02/2017 216.00* 30 - 125 mg/dL Final    Microalbumin/Creat ratio (mg/g cre* 05/02/2017 29  0 - 30 mg/g Final    HGB 05/04/2017 13.6  13.0 - 16.0 g/dL Final    HCT 05/04/2017 40.1  36.0 - 48.0 % Final    PLATELET 17/26/0123 693  135 - 420 K/uL Final    Sodium 05/08/2017 130* 136 - 145 mmol/L Final    Potassium 05/08/2017 4.5  3.5 - 5.5 mmol/L Final    Chloride 05/08/2017 95* 100 - 108 mmol/L Final    CO2 05/08/2017 26  21 - 32 mmol/L Final    Anion gap 05/08/2017 9  3.0 - 18 mmol/L Final    Glucose 05/08/2017 95  74 - 99 mg/dL Final    BUN 05/08/2017 12  7.0 - 18 MG/DL Final    Creatinine 05/08/2017 1.45* 0.6 - 1.3 MG/DL Final    BUN/Creatinine ratio 05/08/2017 8* 12 - 20   Final    GFR est AA 05/08/2017 57* >60 ml/min/1.73m2 Final    GFR est non-AA 05/08/2017 47* >60 ml/min/1.73m2 Final    Comment: (NOTE)  Estimated GFR is calculated using the Modification of Diet in Renal   Disease (MDRD) Study equation, reported for both  Americans   (GFRAA) and non- Americans (GFRNA), and normalized to 1.73m2   body surface area. The physician must decide which value applies to   the patient. The MDRD study equation should only be used in   individuals age 25 or older. It has not been validated for the   following: pregnant women, patients with serious comorbid conditions,   or on certain medications, or persons with extremes of body size,   muscle mass, or nutritional status.       Calcium 05/08/2017 9.5  8.5 - 10.1 MG/DL Final    HGB 05/08/2017 14.4  13.0 - 16.0 g/dL Final    HCT 05/08/2017 41.8  36.0 - 48.0 % Final    PLATELET 79/11/0283 832  135 - 420 K/uL Final    HGB 05/11/2017 13.5 13.0 - 16.0 g/dL Final    HCT 05/11/2017 38.5  36.0 - 48.0 % Final    PLATELET 91/72/2434 080  135 - 420 K/uL Final   Admission on 04/18/2017, Discharged on 04/24/2017   Component Date Value Ref Range Status    WBC 04/18/2017 7.6  4.0 - 11.0 1000/mm3 Final    RBC 04/18/2017 5.34  3.80 - 5.70 M/uL Final    HGB 04/18/2017 14.8  12.4 - 17.2 gm/dl Final    HCT 04/18/2017 44.6  37.0 - 50.0 % Final    MCV 04/18/2017 83.5  80.0 - 98.0 fL Final    MCH 04/18/2017 27.7  23.0 - 34.6 pg Final    MCHC 04/18/2017 33.2  30.0 - 36.0 gm/dl Final    PLATELET 15/90/0770 188  140 - 450 1000/mm3 Final    MPV 04/18/2017 10.6* 6.0 - 10.0 fL Final    RDW-SD 04/18/2017 41.7  35.1 - 43.9   Final    NRBC 04/18/2017 0  0 - 0   Final    IMMATURE GRANULOCYTES 04/18/2017 0.3  0.0 - 3.0 % Final    Comment: IG - Immature granulocytes (promyelocytes + myelocytes + metamyelocytes), their presence  indicates a left shift. An IG > 3% may predict positive blood cultures with 98% specificity.  (P<0.04) and 92% Positive Predictive Value (Vimal-Jeanie)1. Increased immature granulocytes  assist with the detection of infection and/or inflammation and may be present at an early stage  and are more sensitive and specific than band counts.       NEUTROPHILS 04/18/2017 48.6  34 - 64 % Final    LYMPHOCYTES 04/18/2017 36.5  28 - 48 % Final    MONOCYTES 04/18/2017 10.3  1 - 13 % Final    EOSINOPHILS 04/18/2017 3.8  0 - 5 % Final    BASOPHILS 04/18/2017 0.5  0 - 3 % Final    Sodium 04/18/2017 136  136 - 145 mEq/L Final    Potassium 04/18/2017 4.7  3.5 - 5.1 mEq/L Final    Chloride 04/18/2017 102  98 - 107 mEq/L Final    CO2 04/18/2017 28  21 - 32 mEq/L Final    Glucose 04/18/2017 100  74 - 106 mg/dl Final    BUN 04/18/2017 18  7 - 25 mg/dl Final    Creatinine 04/18/2017 1.6* 0.6 - 1.3 mg/dl Final    GFR est AA 04/18/2017 54.0    Final    Comment: THE [facility] [employer] GROUP STATES THAT THE MDRD STUDY EQUATION SHOULD ONLY BE USED ON  INDIVIDUALS 18 OR OLDER. THE REPORT ALSO NOTES THAT THE MDRD STUDY EQUATION HAS NOT BEEN  VALIDATED FOR USE WITH THE ELDERLY (OVER 79YEARS OF AGE), PREGNANT WOMEN, PATIENTS WITH SERIOUS  COMORBID CONDITIONS, OR PERSONS WITH EXTREMES OF BODY SIZE, MUSCLE MASS, OR NUTRITIONAL STATUS. APPLICATION OF THE EQUATION TO THESE PATIENT GROUPS MAY LEAD TO ERRORS IN GFR ESTIMATION. GFR  ESTIMATING EQUATIONS HAVE POORER AGREEMENT WITH MEASURED GFR FOR ILL HOSPITALIZED PATIENTS AND  FOR PEOPLE WITH NEAR NORMAL KIDNEY FUNCTION THAN FOR SUBJECTS IN THE MDRD STUDY. VALIDATION  STUDIES ARE IN PROGRESS TO EVALUATE THE MDRD STUDY EQUATION FOR ADDITIONAL ETHNIC GROUPS, THE  ELDERLY, VARIOUS DISEASE CONDITIONS, AND PEOPLE WITH NORMAL KIDNEY FUNCTION. GFRA----REFERS TO   GFRO---REFERS TO OTHER RACES  REFERENCES AVAILABLE UPON REQUEST.      GFR est non-AA 04/18/2017 45    Final    Calcium 04/18/2017 9.0  8.5 - 10.1 mg/dl Final    AST (SGOT) 04/18/2017 24  15 - 37 U/L Final    ALT (SGPT) 04/18/2017 20  12 - 78 U/L Final    Alk.  phosphatase 04/18/2017 74  45 - 117 U/L Final    Bilirubin, total 04/18/2017 0.9  0.2 - 1.0 mg/dl Final    Protein, total 04/18/2017 7.4  6.4 - 8.2 gm/dl Final    Albumin 04/18/2017 3.9  3.4 - 5.0 gm/dl Final    Ventricular Rate 04/18/2017 57  BPM Final    Atrial Rate 04/18/2017 57  BPM Final    P-R Interval 04/18/2017 156  ms Final    QRS Duration 04/18/2017 88  ms Final    Q-T Interval 04/18/2017 430  ms Final    QTC Calculation (Bezet) 04/18/2017 418  ms Final    Calculated P Axis 04/18/2017 39  degrees Final    Calculated R Axis 04/18/2017 21  degrees Final    Calculated T Axis 04/18/2017 57  degrees Final    Diagnosis 04/18/2017    Final                    Value:Sinus bradycardia  Otherwise normal ECG  When compared with ECG of 21-APR-2015 21:43,  No significant change was found  Confirmed by Brenda Lutz M.D., [place] ([id number]) on 4/19/2017 7:46:58 AM      Sodium 04/18/2017 138 136 - 145 mEq/L Final    Potassium 04/18/2017 4.6  3.5 - 4.9 mEq/L Final    Chloride 04/18/2017 100  98 - 107 mEq/L Final    CO2, TOTAL 04/18/2017 26  21 - 32 mmol/L Final    Glucose 04/18/2017 103  74 - 106 mg/dL Final    BUN 04/18/2017 19  7 - 25 mg/dl Final    Creatinine 04/18/2017 1.6* 0.6 - 1.3 mg/dl Final    HCT 04/18/2017 44  40 - 54 % Final    HGB 04/18/2017 15.0  12.4 - 17.2 gm/dl Final    CALCIUM,IONIZED 04/18/2017 4.70  4.40 - 5.40 mg/dL Final    Prothrombin time 04/18/2017 12.9  11.5 - 14.0 seconds Final    INR 04/18/2017 1.0  0.1 - 1.1   Final    aPTT 04/18/2017 31.6  24.9 - 35.6 seconds Final    Troponin-I 04/18/2017 0.00  0.00 - 0.07 ng/ml Final    WBC 04/19/2017 10.3  4.0 - 11.0 1000/mm3 Final    RBC 04/19/2017 5.13  3.80 - 5.70 M/uL Final    HGB 04/19/2017 14.1  12.4 - 17.2 gm/dl Final    HCT 04/19/2017 42.2  37.0 - 50.0 % Final    MCV 04/19/2017 82.3  80.0 - 98.0 fL Final    MCH 04/19/2017 27.5  23.0 - 34.6 pg Final    MCHC 04/19/2017 33.4  30.0 - 36.0 gm/dl Final    PLATELET 77/47/2372 422  140 - 450 1000/mm3 Final    MPV 04/19/2017 10.6* 6.0 - 10.0 fL Final    RDW-SD 04/19/2017 40.7  35.1 - 43.9   Final    NRBC 04/19/2017 0  0 - 0   Final    IMMATURE GRANULOCYTES 04/19/2017 0.3  0.0 - 3.0 % Final    Comment: IG - Immature granulocytes (promyelocytes + myelocytes + metamyelocytes), their presence  indicates a left shift. An IG > 3% may predict positive blood cultures with 98% specificity.  (P<0.04) and 92% Positive Predictive Value (Vimal-Jeanie)1. Increased immature granulocytes  assist with the detection of infection and/or inflammation and may be present at an early stage  and are more sensitive and specific than band counts.       NEUTROPHILS 04/19/2017 70.6* 34 - 64 % Final    LYMPHOCYTES 04/19/2017 20.6* 28 - 48 % Final    MONOCYTES 04/19/2017 7.2  1 - 13 % Final    EOSINOPHILS 04/19/2017 1.0  0 - 5 % Final    BASOPHILS 04/19/2017 0.3  0 - 3 % Final    Sodium 04/19/2017 139  136 - 145 mEq/L Final    Potassium 04/19/2017 4.0  3.5 - 5.1 mEq/L Final    Chloride 04/19/2017 107  98 - 107 mEq/L Final    CO2 04/19/2017 22  21 - 32 mEq/L Final    Glucose 04/19/2017 106  74 - 106 mg/dl Final    BUN 04/19/2017 17  7 - 25 mg/dl Final    Creatinine 04/19/2017 1.4* 0.6 - 1.3 mg/dl Final    GFR est AA 04/19/2017 >60    Final    Comment: THE [facility] [employer] GROUP STATES THAT THE MDRD STUDY EQUATION SHOULD ONLY BE USED ON  INDIVIDUALS 18 OR OLDER. THE REPORT ALSO NOTES THAT THE MDRD STUDY EQUATION HAS NOT BEEN  VALIDATED FOR USE WITH THE ELDERLY (OVER 79YEARS OF AGE), PREGNANT WOMEN, PATIENTS WITH SERIOUS  COMORBID CONDITIONS, OR PERSONS WITH EXTREMES OF BODY SIZE, MUSCLE MASS, OR NUTRITIONAL STATUS. APPLICATION OF THE EQUATION TO THESE PATIENT GROUPS MAY LEAD TO ERRORS IN GFR ESTIMATION. GFR  ESTIMATING EQUATIONS HAVE POORER AGREEMENT WITH MEASURED GFR FOR ILL HOSPITALIZED PATIENTS AND  FOR PEOPLE WITH NEAR NORMAL KIDNEY FUNCTION THAN FOR SUBJECTS IN THE MDRD STUDY. VALIDATION  STUDIES ARE IN PROGRESS TO EVALUATE THE MDRD STUDY EQUATION FOR ADDITIONAL ETHNIC GROUPS, THE  ELDERLY, VARIOUS DISEASE CONDITIONS, AND PEOPLE WITH NORMAL KIDNEY FUNCTION. GFRA----REFERS TO   GFRO---REFERS TO OTHER RACES  REFERENCES AVAILABLE UPON REQUEST.      GFR est non-AA 04/19/2017 52    Final    Calcium 04/19/2017 8.7  8.5 - 10.1 mg/dl Final    AST (SGOT) 04/19/2017 18  15 - 37 U/L Final    ALT (SGPT) 04/19/2017 17  12 - 78 U/L Final    Alk.  phosphatase 04/19/2017 70  45 - 117 U/L Final    Bilirubin, total 04/19/2017 0.9  0.2 - 1.0 mg/dl Final    Protein, total 04/19/2017 6.9  6.4 - 8.2 gm/dl Final    Albumin 04/19/2017 3.4  3.4 - 5.0 gm/dl Final    Cholesterol, total 04/19/2017 162  140 - 199 mg/dl Final    HDL Cholesterol 04/19/2017 42  40 - 96 mg/dl Final    Triglyceride 04/19/2017 116  29 - 150 mg/dl Final    LDL, calculated 04/19/2017 97  0 - 130 mg/dl Final    Comment: TARGET/DECISION VALUES DEPEND ON A NUMBER OF RISK FACTORS. LDL CALCULATION NOT VALID IF TRIGLYCERIDES ARE GREATER THAN 400 mg/dL.  CHOL/HDL Ratio 04/19/2017 3.9  0.0 - 5.0 Ratio Final    Troponin-I 04/18/2017 <0.015  0.000 - 0.045 ng/ml Final    Comment: The presence of detectable troponin above the reference range indicates myocardial injury which  maybe due to ischemia, myocarditis, trauma, etc. Clinical correlation is necessary to establish  the significance of this finding. NOTE: The reference range is based on the 99th percentile of the referent population         Troponin-I 04/19/2017 <0.015  0.000 - 0.045 ng/ml Final    Comment: The presence of detectable troponin above the reference range indicates myocardial injury which  maybe due to ischemia, myocarditis, trauma, etc. Clinical correlation is necessary to establish  the significance of this finding. NOTE: The reference range is based on the 99th percentile of the referent population         Magnesium 04/18/2017 2.3  1.6 - 2.6 mg/dl Final    Hemoglobin A1c 04/19/2017 5.9  4.8 - 6.0 % Final    Troponin-I 04/19/2017 0.016  0.000 - 0.045 ng/ml Final    Comment: The presence of detectable troponin above the reference range indicates myocardial injury which  maybe due to ischemia, myocarditis, trauma, etc. Clinical correlation is necessary to establish  the significance of this finding.    NOTE: The reference range is based on the 99th percentile of the referent population         WBC 04/20/2017 11.1* 4.0 - 11.0 1000/mm3 Final    RBC 04/20/2017 4.89  3.80 - 5.70 M/uL Final    HGB 04/20/2017 13.5  12.4 - 17.2 gm/dl Final    HCT 04/20/2017 40.6  37.0 - 50.0 % Final    MCV 04/20/2017 83.0  80.0 - 98.0 fL Final    MCH 04/20/2017 27.6  23.0 - 34.6 pg Final    MCHC 04/20/2017 33.3  30.0 - 36.0 gm/dl Final    PLATELET 51/92/9986 026  140 - 450 1000/mm3 Final    MPV 04/20/2017 10.7* 6.0 - 10.0 fL Final    RDW-SD 04/20/2017 41.5 35.1 - 43.9   Final    NRBC 04/20/2017 0  0 - 0   Final    IMMATURE GRANULOCYTES 04/20/2017 0.4  0.0 - 3.0 % Final    Comment: IG - Immature granulocytes (promyelocytes + myelocytes + metamyelocytes), their presence  indicates a left shift. An IG > 3% may predict positive blood cultures with 98% specificity.  (P<0.04) and 92% Positive Predictive Value (Vimal-Jeanie)1. Increased immature granulocytes  assist with the detection of infection and/or inflammation and may be present at an early stage  and are more sensitive and specific than band counts.  NEUTROPHILS 04/20/2017 67.0* 34 - 64 % Final    LYMPHOCYTES 04/20/2017 22.4* 28 - 48 % Final    MONOCYTES 04/20/2017 9.1  1 - 13 % Final    EOSINOPHILS 04/20/2017 0.6  0 - 5 % Final    BASOPHILS 04/20/2017 0.5  0 - 3 % Final    Sodium 04/20/2017 137  136 - 145 mEq/L Final    Potassium 04/20/2017 4.0  3.5 - 5.1 mEq/L Final    Chloride 04/20/2017 107  98 - 107 mEq/L Final    CO2 04/20/2017 21  21 - 32 mEq/L Final    Glucose 04/20/2017 117* 74 - 106 mg/dl Final    BUN 04/20/2017 12  7 - 25 mg/dl Final    Creatinine 04/20/2017 1.4* 0.6 - 1.3 mg/dl Final    GFR est AA 04/20/2017 >60    Final    Comment: THE [facility] STATES THAT THE MDRD STUDY EQUATION SHOULD ONLY BE USED ON  INDIVIDUALS 18 OR OLDER. THE REPORT ALSO NOTES THAT THE MDRD STUDY EQUATION HAS NOT BEEN  VALIDATED FOR USE WITH THE ELDERLY (OVER 79YEARS OF AGE), PREGNANT WOMEN, PATIENTS WITH SERIOUS  COMORBID CONDITIONS, OR PERSONS WITH EXTREMES OF BODY SIZE, MUSCLE MASS, OR NUTRITIONAL STATUS. APPLICATION OF THE EQUATION TO THESE PATIENT GROUPS MAY LEAD TO ERRORS IN GFR ESTIMATION. GFR  ESTIMATING EQUATIONS HAVE POORER AGREEMENT WITH MEASURED GFR FOR ILL HOSPITALIZED PATIENTS AND  FOR PEOPLE WITH NEAR NORMAL KIDNEY FUNCTION THAN FOR SUBJECTS IN THE MDRD STUDY.  VALIDATION  STUDIES ARE IN PROGRESS TO EVALUATE THE MDRD STUDY EQUATION FOR ADDITIONAL ETHNIC GROUPS, THE  ELDERLY, VARIOUS DISEASE CONDITIONS, AND PEOPLE WITH NORMAL KIDNEY FUNCTION. GFRA----REFERS TO   GFRO---REFERS TO OTHER RACES  REFERENCES AVAILABLE UPON REQUEST.      GFR est non-AA 04/20/2017 52    Final    Calcium 04/20/2017 8.6  8.5 - 10.1 mg/dl Final    AST (SGOT) 04/20/2017 15  15 - 37 U/L Final    ALT (SGPT) 04/20/2017 16  12 - 78 U/L Final    Alk. phosphatase 04/20/2017 71  45 - 117 U/L Final    Bilirubin, total 04/20/2017 1.2* 0.2 - 1.0 mg/dl Final    Protein, total 04/20/2017 6.6  6.4 - 8.2 gm/dl Final    Albumin 04/20/2017 3.2* 3.4 - 5.0 gm/dl Final       .  Results for orders placed or performed during the hospital encounter of 87/67/90   METABOLIC PANEL, BASIC   Result Value Ref Range    Sodium 130 (L) 136 - 145 mmol/L    Potassium 4.4 3.5 - 5.5 mmol/L    Chloride 96 (L) 100 - 108 mmol/L    CO2 24 21 - 32 mmol/L    Anion gap 10 3.0 - 18 mmol/L    Glucose 112 (H) 74 - 99 mg/dL    BUN 22 (H) 7.0 - 18 MG/DL    Creatinine 1.33 (H) 0.6 - 1.3 MG/DL    BUN/Creatinine ratio 17 12 - 20      GFR est AA >60 >60 ml/min/1.73m2    GFR est non-AA 52 (L) >60 ml/min/1.73m2    Calcium 9.1 8.5 - 10.1 MG/DL       Assessment / Plan      ICD-10-CM ICD-9-CM    1. Essential hypertension R58 373.1 METABOLIC PANEL, BASIC      OR COLLECTION VENOUS BLOOD,VENIPUNCTURE   2. History of malignant neoplasm of prostate Z85.46 V10.46 OR COLLECTION VENOUS BLOOD,VENIPUNCTURE   3. Dyslipidemia (high LDL; low HDL) E78.4 272.4 OR COLLECTION VENOUS BLOOD,VENIPUNCTURE   4. 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 I asked Flor Sparks. [facility] if he has any questions and I answered the questions. Stewart APONTE  [address] [facility] states that he understands the treatment plan and agrees with the treatment plan

## 2017-06-28 NOTE — PATIENT INSTRUCTIONS
Health Maintenance Due   Topic Date Due    DTaP/Tdap/Td  (1 - Tdap) 08/01/1959    Shingles Vaccine  08/01/1998    Glaucoma Screening   08/01/2003    Pneumococcal Vaccine (1 of 2 - PCV13) 08/01/2003

## 2017-06-29 ENCOUNTER — HOSPITAL ENCOUNTER (OUTPATIENT)
Dept: PHYSICAL THERAPY | Age: 77
Discharge: HOME | End: 2017-06-29
Payer: MEDICARE

## 2017-06-29 PROCEDURE — 97530 THERAPEUTIC ACTIVITIES: CPT

## 2017-06-29 PROCEDURE — 92526 ORAL FUNCTION THERAPY: CPT

## 2017-06-29 PROCEDURE — 97110 THERAPEUTIC EXERCISES: CPT

## 2017-06-29 NOTE — PROGRESS NOTES
PT DAILY TREATMENT NOTE - [facility]     Patient Name: Tc Pay  Date:2017  : 1940  [x]  Patient  Verified  Payor: Jhoana Coker / Plan: [place] MEDICARE PART A & B / Product Type: Medicare /    In time:1100  Out time:1125  Total Treatment Time (min): 25  Total Timed Codes (min): 25  1:1 Treatment Time ( [address] only): 25   Visit #: 13 of     Treatment Area: Muscle weakness (generalized) [M62.81]    SUBJECTIVE  Pain Level (0-10 scale): 0/10  Any medication changes, allergies to medications, adverse drug reactions, diagnosis change, or new procedure performed?: [x] No    [] Yes (see summary sheet for update)  Subjective functional status/changes:   [] No changes reported  Pt stated that the MD adjusted his medication and switched one to night time. He reported that he has a little less fatigue today    OBJECTIVE    25 min Therapeutic Exercise:  [x] See flow sheet :   Rationale: increase ROM, increase strength and improve balance to improve the patients ability to increase ease with ADLs and decrease fall risk    With   [x] TE   [] TA   [] neuro   [] other: Patient Education: [x] Review HEP    [] Progressed/Changed HEP based on:   [] positioning   [] body mechanics   [] transfers   [] heat/ice application    [] other:      Other Objective/Functional Measures:   Pt was able to perform exercises with weights today  Pt reported that he slept better last night  Needed 2 seated rest breaks today  Pt had much less fatigue today and had a better attitude     Pain Level (0-10 scale) post treatment: 0/10    ASSESSMENT/Changes in Function:   Pt cont to progress fairly well toward goals. Pt cont to have unequal weight shift with walking, but is slowly improving.     Patient will continue to benefit from skilled PT services to modify and progress therapeutic interventions, address functional mobility deficits, address ROM deficits, address strength deficits and address imbalance/dizziness to attain remaining goals. []  See Plan of Care  [x]  See progress note/recertification  []  See Discharge Summary         Progress towards goals / Updated goals:  1. Pt will improve FOTO by 16 points in order to demonstrate functional improvement  2. Pt will improve TUG to 10 seconds in order to demonstrate reducing fall risk  3.  Pt will ambulate 48' with equal weight shift in order to progress ambulatory ability     PLAN  []  Upgrade activities as tolerated     [x]  Continue plan of care  []  Update interventions per flow sheet       []  Discharge due to:_  []  Other:_      Anton Skiff, PTA 6/29/2017  10:55 AM    Future Appointments  Date Time Provider [place]   6/29/2017 11:00 AM Anton Skiff, PTA MMCPTPB [facility]   6/29/2017 11:30 AM [facility] PT [address] PHVBVDM [facility]   6/29/2017 1:00 PM Michael Whelan SHKCDZY [facility]   7/6/2017 2:00 PM Tahir Skelton, OTR/L MMCPTPB [facility]   7/6/2017 2:30 PM Anton Skiff, PTA MMCPTPB [facility]   7/6/2017 3:15 PM Michael Whelan RQIIUPN [facility]   7/11/2017 10:00 AM Jesse Beltrán PT MMCPTPB [facility]   7/11/2017 10:30 AM Tahir Skelton, OTR/L MMCPTPB [facility]   9/20/2017 9:30 AM Maureen Martinez  [address] [place]

## 2017-06-29 NOTE — PROGRESS NOTES
ST DAILY TREATMENT NOTE    Patient Name: Levi Roach  Date:2017  : 1940  [x]  Patient  Verified  Payor: Alba Brooks / Plan: [place] MEDICARE PART A & B / Product Type: Medicare /   In time:13:15  Out time:14:00  Total Treatment Time (min): 45  Visit #:     Treatment Diagnosis: Dysphagia [R13.10]    SUBJECTIVE  Pain Level (0-10 scale):0  Any medication changes, allergies to medications, adverse drug reactions, diagnosis change, or new procedure performed?: [x] No    [] Yes (see summary sheet for update)    Subjective functional status/changes:   [] No changes reported  \"I've been doing my exercises. \" Pt demonstrated Faina. \"I'm feeling a little drowzy. It's my medication. \" Pt reports his depression medication was switched to evening instead of morning in order to decrease drowziness. OBJECTIVE  Treatment provided includes:  Increase/Improve:  []  Voice Quality []  Cognitive Linguistic Skills [x]  Laryngeal/Pharyngeal Exercises   []  Vocal Loudness []  Reading Comprehension [x]  Swallowing Skills    []  Vocal Cord Function []  Auditory Comprehension []  Oral Motor Skills   []  Resonance []  Writing Skills []  Compensatory strategies    []  Speech Intelligibility []  Expressive Language []     []  Breath Support/Coord. []  Receptive language []    []  Articulation []  Safety Awareness []    []  Fluency []  Word Retrieval []        Treatment Provided:  8 oz HTLS with ~5% s/sx of aspiration, restorative swallowing exercises to decrease premature spillage, pt educ re: effortful swallow vs oral \"swishing\"  Patient/Caregiver  Education: [] Review HEP      Tongue-based retraction with /ga/ & continue Faina.   HEP/Handouts given:  Verbal instruction and modeling    Other Objective/Functional Measures:  Pain Level (0-10 scale) post treatment: 0    ASSESSMENT    [x]  See Plan of Care  []  See progress note/recertification  []  See Discharge Summary    Patient will continue to benefit from skilled therapy to address remaining functional deficits dysphagia and cognitive linguistic skills    Progress towards goals / Updated goals:  Short Term Goals: To be accomplished in 8-10 treatments      1) The patient will tolerate a regular diet with honey thick liquids with <5% s/sx of aspiration x 4 weeks. Current: Progressing: Tolerated HTLS with ~ 5% s/sx of aspiration   2) The patient will complete tongue base retraction, laryngeal/pharyngeal strengthening exercises (including Faina, Mendelsohn, Shaker, etc.  Current: ModA provided  3) The patient will identify and implement safe swallowing techniques and strategies during PO intake with 90% accuracy with Mod cues. Current: Progressing: mod cues required to decrease oral \"swishing\" prior to swallow 2* to decreased bolus control and premature spillage. Mod ed re: effortful swallow  4) The patient will correctly thicken liquids to honey thick viscosity with 90% accuracy with min-mod cues. Current:  Not addressed this session. Long Term Goals: To be accomplished in 12 weeks                           1) The patient will consume the least restrictive diet with <5% s/sx of aspiration. 2) The patient will increase strength, ROM, and coordination of orofacial and pharyngeal musculature. 3) The patient will implement safe swallowing techniques/strategies with min cues.     PLAN  [x]  Continue plan of care  []  Modify Goals/Treatment Plan      []  Discharge due to:  [] Other:    Manuelito [place] [address] [facility] 6/29/2017  1:19 PM  Speech-Language Pathologist    Future Appointments  Date Time Provider [place] Hurtado   7/6/2017 2:00 PM Elysia Le, OTR/L MMCPTPB [facility]   7/6/2017 2:30 PM George Arias PTA MMCPTPB [facility]   7/6/2017 3:15 PM Dione Herrera UCWUDLL [facility]   7/11/2017 10:00 AM Rosalie Rock PT AKAUVCESAR [facility]   7/11/2017 10:30 AM Naidu Rey, OTR/L MMCPTPB [facility]   7/13/2017 1:30 PM Naidu Rey, OTR/L MMCPTPB [facility]   7/13/2017 2:00 PM Rosalie Rock, PT [id number] [facility]   7/19/2017 9:30 AM Lorry Stagers, OTR/L MMCPTPB [facility]   7/19/2017 10:00 AM [facility] PT [address] MMCPTPB [facility]   7/19/2017 10:45 AM Tommy Buitrago, SLP PYGHECR [facility]   7/20/2017 11:00 AM Sonia Del Valle, PTA MMCPTPB [facility]   7/20/2017 11:30 AM Lorry Stagers, OTR/L MMCPTPB [facility]   7/25/2017 10:30 AM Lorry Stagers, OTR/L JSNCNHR [facility]   7/25/2017 11:00 AM Sonia Del Valle, PTA UFGFQHI [facility]   7/28/2017 2:00 PM Sonia Del Valle, PTA MMCPTPB [facility]   7/28/2017 2:30 PM Lorry Stagers, OTR/L MMCPTPB [facility]   7/28/2017 3:00 PM Nick Matson [id number] [facility]   9/20/2017 9:30 AM Bhavesh Sal  [address], [address]

## 2017-06-29 NOTE — PROGRESS NOTES
OT DAILY TREATMENT NOTE - [facility]     Patient Name: Celeste Delcid  Date:2017  : 1940  [x]  Patient  Verified  Payor: Amena Polk / Plan: [place] MEDICARE PART A & B / Product Type: Medicare /    In time:1130  Out time:1200  Total Treatment Time (min): 30  Total Timed Codes (min): 30  1:1 Treatment Time ( [address] only): 30   Visit #: 10 of 24    Treatment Area: Muscle weakness (generalized) [M62.81]    SUBJECTIVE  Pain Level (0-10 scale): 0/10  Any medication changes, allergies to medications, adverse drug reactions, diagnosis change, or new procedure performed?: [x] No    [] Yes (see summary sheet for update)  Subjective functional status/changes:   [] No changes reported  I am never in any pain. I want more goals, I want to be able to do more things.     OBJECTIVE        10 min Therapeutic Exercise:  [] See flow sheet :   Rationale: increase strength to improve the patients ability to grasp    I inch pegs with gripper: Pt used R hand with gripper set on 35lbs to remove 1 inch pegs from resistive peg board 50x    20 min Therapeutic Activity:  []  See flow sheet :   Rationale: increase ROM and improve coordination  to improve the patients ability to manipulate small items   1/4 inch pegs: Pt used R hand to manipulate pegs and place into pegboard on an incline 45x  Pt used R hand finger reciprocal opposition to remove pegs  45x    Perfection: Pt used R hand Palm to digit translation to place perfection pieces into board; timed          With   [] TE   [] TA   [] neuro   [] other: Patient Education: [x] Review HEP    [] Progressed/Changed HEP based on:   [] positioning   [] body mechanics   [] transfers   [] heat/ice application   [] Splint wear/care   [] Sensory re-education   [] scar management      [] other:            Other Objective/Functional Measures:   Perfection: R Hand- 6:00 with one error  1/4 inch pegs: Pt had 3 drops during peg placement and required increased time,  0 drops for removal  FOTO: 63 (71)       Pain Level (0-10 scale) post treatment: 0/10    ASSESSMENT/Changes in Function: fine motor skills progressing, increased time required for activity      Patient will continue to benefit from skilled OT services to modify and progress therapeutic interventions, analyze and cue movement patterns and instruct in home and community integration to attain remaining goals. [x]  See Plan of Care  []  See progress note/recertification  []  See Discharge Summary         Progress towards goals / Updated goals:  1. Patient will be able to feed self with R hand for full meal due to improved coordination of movement  2. Patient will increase  strength in R hand by 20 pounds to increase ease of opening jars. Progressing, Pt using 35# gripper 6/29/17  3. Patient will improve   fine motor skills at least 20% from baseline for fasteners. progressing 6/29/17  4. Patient will be able to fill out forms legibly and return to managing finances. difficulty with writing digits for written out numbers 6/27/17    PLAN  [x]  Upgrade activities as tolerated     [x]  Continue plan of care  []  Update interventions per flow sheet       []  Discharge due to:_  []  Other:_      LeolaULI Neri 6/29/2017  12:18 PM    Future Appointments  Date Time Provider [place] Hurtado   6/29/2017 1:00 PM Ramses Feldman GCKWRHO [facility]   7/6/2017 2:00 PM Karla Fall, OTR/L MMCPTPB [facility]   7/6/2017 2:30 PM Shama Naidu PTA MMCPTPB [facility]   7/6/2017 3:15 PM Ramses Feldman GLASUTB [facility]   7/11/2017 10:00 AM Lokesh Countess, PT [id number] [facility]   7/11/2017 10:30 AM Karla Fall, OTR/L MMCPTPB [facility]   7/13/2017 1:30 PM Karla Fall, OTR/L MMCPTPB [facility]   7/13/2017 2:00 PM Lokesh Countess, PT UFRMALS [facility]   7/19/2017 9:30 AM Karla Fall, OTR/L MMCPTPB [facility]   7/19/2017 10:00 AM [facility] PT [address] MMCPTPB [facility]   7/19/2017 10:45 AM Laureano Hilario, SLP DVTIAQE [facility]   7/20/2017 11:00 AM Shama Naidu, PTA MMCPTPB [facility]   7/20/2017 11:30 AM Ishmael Lester, OTR/L MMCPTPB [facility]   7/25/2017 10:30 AM Ishmael Lester, OTR/L MMCPTPB [facility]   7/25/2017 11:00 AM Greta Saab PTA EXFQCHS [facility]   7/28/2017 2:00 PM Greta Saab PTA MMCPTPB [facility]   7/28/2017 2:30 PM Ishmael Lester, OTR/L MMCPTPB [facility]   7/28/2017 3:00 PM Simi Gracia LMKTBBV [facility]   9/20/2017 9:30 AM Amadou Mtz  [address] [place]

## 2017-07-03 RX ORDER — TAMSULOSIN HYDROCHLORIDE 0.4 MG/1
0.4 CAPSULE ORAL DAILY
Qty: 30 CAP | Refills: 6 | Status: SHIPPED | OUTPATIENT
Start: 2017-07-03 | End: 2017-09-20 | Stop reason: ALTCHOICE

## 2017-07-06 ENCOUNTER — HOSPITAL ENCOUNTER (OUTPATIENT)
Dept: PHYSICAL THERAPY | Age: 77
Discharge: HOME | End: 2017-07-06
Payer: MEDICARE

## 2017-07-06 PROCEDURE — 92526 ORAL FUNCTION THERAPY: CPT

## 2017-07-06 PROCEDURE — 97530 THERAPEUTIC ACTIVITIES: CPT

## 2017-07-06 PROCEDURE — 97110 THERAPEUTIC EXERCISES: CPT

## 2017-07-06 PROCEDURE — 97535 SELF CARE MNGMENT TRAINING: CPT

## 2017-07-06 PROCEDURE — 97112 NEUROMUSCULAR REEDUCATION: CPT

## 2017-07-06 NOTE — PROGRESS NOTES
ST DAILY TREATMENT NOTE    Patient Name: Cookie Tee  Date:2017  : 1940  [x]  Patient  Verified  Payor: Renny Frankel / Plan: [place] MEDICARE PART A & B / Product Type: Medicare /   In time: 3:20  Out time:4:00  Total Treatment Time (min): 40  Visit #:  2 of 24    Treatment Diagnosis: Dysphagia [R13.10]    SUBJECTIVE  Pain Level (0-10 scale): 0  Any medication changes, allergies to medications, adverse drug reactions, diagnosis change, or new procedure performed?: [] No    [x] Yes (see summary sheet for update). Medications are the same but was instructed to take them at different times of day. Subjective functional status/changes:   [] No changes reported  Patient reported \"I think I'm doing pretty good, I don't have no problem\" . He demonstrated a Faina informing the SLP-CF that he has been working on that at home to strengthen his tongue. Pt stated that he may have a procedure with his doctor to dilate his esophagus. OBJECTIVE  Treatment provided includes:  Increase/Improve:  []  Voice Quality []  Cognitive Linguistic Skills [x]  Laryngeal/Pharyngeal Exercises   []  Vocal Loudness []  Reading Comprehension [x]  Swallowing Skills    []  Vocal Cord Function []  Auditory Comprehension [x]  Oral Motor Skills   []  Resonance []  Writing Skills [x]  Compensatory strategies    []  Speech Intelligibility []  Expressive Language []     []  Breath Support/Coord. []  Receptive language []    []  Articulation [x]  Safety Awareness []    []  Fluency []  Word Retrieval []        Treatment Provided: Thicken liquids to Honey: Min  4 oz Honey thick s/sx of aspiration: 25% s/sx of aspiration, Throat clearing  Double swallow: 100%, Lon  Tongue elevation and hold 5 seconds: 100%, Max A  Masko: 100%, Lon  Mendelsohn: 3 completed, Max A  Patient/Caregiver  Education: [x] Review HEP      Reviewed importance of exercises to be done in groups of 10 or more at a time.   HEP/Handouts given:  Pharyngeal exercises and Oral exercises  Other Objective/Functional Measures:    Pain Level (0-10 scale) post treatment: 0    ASSESSMENT  Patient is demonstrating progress with completion of Faina exercises since his last session and is able to complete more trials Lon. Patient continues to struggle with constant fatigue due to lack of sleep, but still engages in therapy with full effort despite being exhausted. Patient does well with verbal cues from SLP-CF. Patient continues to benefit to address his oral and pharyngeal dysphagia. []  See Plan of Care  []  See progress note/recertification  []  See Discharge Summary    Patient will continue to benefit from skilled therapy to address remaining functional deficits: Cognition and Swallowing       Progress towards goals / Updated goals:  Short Term Goals: To be accomplished in 8-10 treatments      1) The patient will tolerate a regular diet with honey thick liquids with <5% s/sx of aspiration x 4 weeks. Current status: Progressing: Tolerated HTLS with >5% s/sx of aspiration (25% noted on 7/6/17)  2) The patient will complete tongue base retraction, laryngeal/pharyngeal strengthening exercises (including Faina, Mendelsohn, Shaker, etc.(current), ModA provided  Current status: Pt is Lon to Max A at this time   3) The patient will identify and implement safe swallowing techniques and strategies during PO intake with 90% accuracy with Mod cues. Current status:  Progressing: mod cues to take decreased bolus control and premature spillage. Mod ed re: effortful swallow  4) The patient will correctly thicken liquids to honey thick viscosity with 90% accuracy with min-mod cues. Current status:   Min A on 7/6/17      Long Term Goals: To be accomplished in 12 weeks                           1) The patient will consume the least restrictive diet with <5% s/sx of aspiration. 2) The patient will increase strength, ROM, and coordination of orofacial and pharyngeal musculature.   3) The patient will implement safe swallowing techniques/strategies with min cues.      PLAN  [x]  Continue plan of care  []  Modify Goals/Treatment Plan      []  Discharge due to:  [] Other:    Nick Matson, SLP-CF 7/6/2017  3:19 PM    Future Appointments  Date Time Provider Nelson Hurtado   7/11/2017 10:00 AM Alabaster Tia, PT MMCPTPB [facility]   7/11/2017 10:30 AM Lorry Stagers, OTR/L MMCPTPB [facility]   7/13/2017 1:30 PM Lorry Stagers, OTR/L MMCPTPB [facility]   7/13/2017 2:00 PM Luis Weber, PT GASTDDT [facility]   7/19/2017 9:30 AM Lorry Stagers, OTR/L MMCPTPB [facility]   7/19/2017 10:00 AM Sonia Del Valle PTA STEAVML [facility]   7/19/2017 10:45 AM [facility] PT [address] MMCPTPB [facility]   7/20/2017 11:00 AM Sonia Del Valle PTA MMCPTPB [facility]   7/20/2017 11:30 AM Lorry Stagers, OTR/L MMCPTPB [facility]   7/25/2017 10:30 AM Lorry Stagers, OTR/L MMCPTPB [facility]   7/25/2017 11:00 AM Sonia Del Valle PTA GZMMUOE [facility]   7/28/2017 2:00 PM Sonia Del Valle PTA MMCPTPB [facility]   7/28/2017 2:30 PM Lorry Stagers, OTR/L MMCPTPB [facility]   7/28/2017 3:00 PM Nick Matson CRQVHNO [facility]   9/20/2017 9:30 AM Bhavesh Sal  [address] [place]

## 2017-07-06 NOTE — PROGRESS NOTES
OT DAILY TREATMENT NOTE - [facility]     Patient Name: Kiesha Aleman  Date:2017  : 1940  [x]  Patient  Verified  Payor: [place] MEDICARE / Plan: [place] MEDICARE PART A & B / Product Type: Medicare /    In time:200  Out time:230  Total Treatment Time (min): 30  Total Timed Codes (min): 30  1:1 Treatment Time ( [address] only): 30   Visit #: 11 of 24    Treatment Area: Dysphagia [R13.10]    SUBJECTIVE  Pain Level (0-10 scale): 0/10  Any medication changes, allergies to medications, adverse drug reactions, diagnosis change, or new procedure performed?: [x] No    [] Yes (see summary sheet for update)  Subjective functional status/changes:   [] No changes reported  Eating with just my right now  Still not sleeping too well-been on the new meds one week    OBJECTIVE      10 min Therapeutic Activity:  []  See flow sheet :   Rationale: improve coordination  to improve the patients ability to attend respond quickly  Scanning sequencing task with cards with r hand with difficulty with shuffling and some cueing for card sequence but good success learning new task and following instructions in scanning       20 min Self Care/Home Management: Financial management   Rationale: checkwriting skills  to improve the patients ability to mange own finances  Completed 10 samples of filling out written number portion of check-no errors    With   [] TE   [] TA   [] neuro   [] other: Patient Education: [x] Review HEP    [] Progressed/Changed HEP based on: checkwriting homework  ech  [] positioning   [] body mechanics   [] transfers   [] heat/ice application   [] Splint wear/care   [] Sensory re-education   [] scar management      [] other:            Other Objective/Functional Measures:   Improving checkwriting and handwriting skills     Pain Level (0-10 scale) post treatment: 0/10    ASSESSMENT/Changes in Function: Still appears very sleepy but performed well today    Patient will continue to benefit from skilled OT services to modify and progress therapeutic interventions, address strength deficits, analyze and cue movement patterns and instruct in home and community integration to attain remaining goals. []  See Plan of Care  []  See progress note/recertification  []  See Discharge Summary         Progress towards goals / Updated goals:  1. Patient will be able to feed self with R hand for full meal due to improved coordination of movement-met 7/6/17  2. Patient will increase  strength in R hand by 20 pounds to increase ease of opening jars. 3.  Patient will improve   fine motor skills at least 20% from baseline for fasteners. progressing 6/22/17  4. Patient will be able to fill out forms legibly and return to managing finances. difficulty with writing digits for written out numbers 6/27/17, excellent reverse process 7/6/17    PLAN  [x]  Upgrade activities as tolerated     [x]  Continue plan of care  []  Update interventions per flow sheet       []  Discharge due to:_  []  Other:_      Alexandria Del, OTR/L 7/6/2017  2:18 PM    Future Appointments  Date Time Provider Nelson Hurtado   7/6/2017 2:30 PM [facility] PT [address] MMCPTPB [facility]   7/6/2017 3:15 PM Madelin LEONARDHZFHANNAH [facility]   7/11/2017 10:00 AM Farrah Lofton PT MMCPTPB [facility]   7/11/2017 10:30 AM Alexandria Del, OTR/L MMCPTPB [facility]   7/13/2017 1:30 PM Alexandria Del, OTR/L MMCPTPB [facility]   7/13/2017 2:00 PM Farrah Lofton PT [id number] [facility]   7/19/2017 9:30 AM Alexandria Del, OTR/L MMCPTPB [facility]   7/19/2017 10:00 AM Shannan Pacheco PTA [id number] [facility]   7/19/2017 10:45 AM [facility] PT [address] MMCPTPB [facility]   7/20/2017 11:00 AM Shannan Pacheco PTA MMCPTPB [facility]   7/20/2017 11:30 AM Alexandria Del, OTR/L MMCPTPB [facility]   7/25/2017 10:30 AM Alexandria Del, OTR/L MMCPTPB [facility]   7/25/2017 11:00 AM Shannan Pacheco PTA MMCPTPB [facility]   7/28/2017 2:00 PM Shannan Pacheco PTA MMCPTPB [facility]   7/28/2017 2:30 PM Alexandria Del, OTR/L MMCPTPB [facility]   7/28/2017 3:00 PM Victoriano Berkowitz MMCPTPB [facility]   9/20/2017 9:30 AM Maureen Martinez  [address], [address] [place]

## 2017-07-06 NOTE — PROGRESS NOTES
PT DAILY TREATMENT NOTE - [facility]     Patient Name: Lawrence Santacruz[address]  Date:2017  : 1940  [x]  Patient  Verified  Payor: Fermín Ochoa / Plan: [place] MEDICARE PART A & B / Product Type: Medicare /    In time: 2:30  Out time: 3:14  Total Treatment Time (min): 44  Total Timed Codes (min): 44  1:1 Treatment Time ( [address] only): 30   Visit #: 10 of 13    Treatment Area: Muscle weakness (generalized) [M62.81]    SUBJECTIVE  Pain Level (0-10 scale): 0/10  Any medication changes, allergies to medications, adverse drug reactions, diagnosis change, or new procedure performed?: [x] No    [] Yes (see summary sheet for update)  Subjective functional status/changes:   [] No changes reported  Pt reported fatigue and difficulty with standing exercises/activities    OBJECTIVE    34 min Therapeutic Exercise:  [x] See flow sheet :   Rationale: increase ROM and increase strength to improve the patients ability to amb for a longer distance safely. 10 min Neuromuscular Re-education:  [x]  See flow sheet: obstacles/hundle (different heigth) stepping & weaving, tandem walking, sideway walking    Rationale: improve coordination, improve balance and increase proprioception  to improve the patients ability to amb safely          With   [] TE   [] TA   [] neuro   [] other: Patient Education: [x] Review HEP    [] Progressed/Changed HEP based on:   [] positioning   [] body mechanics   [] transfers   [] heat/ice application    [] other:      Other Objective/Functional Measures: Tolerated Anand with weight but demonstrated tendency of using momentum during hips 3 ways   Demonstrated fair balance during obstacle course: min LOB, no falling, mod difficulty with taller obstacle and tandem walking  Required multiple sitting rest break  FOTO score: 65/100    Pain Level (0-10 scale) post treatment: 0/10    ASSESSMENT/Changes in Function: pt progress slowly toward goals.  He was challenged with activities that require higher level of balance and coordination. Patient will continue to benefit from skilled PT services to modify and progress therapeutic interventions, address functional mobility deficits, address ROM deficits, address strength deficits, analyze and address soft tissue restrictions, analyze and cue movement patterns, analyze and modify body mechanics/ergonomics, assess and modify postural abnormalities, address imbalance/dizziness and instruct in home and community integration to attain remaining goals. [x]  See Plan of Care  []  See progress note/recertification  []  See Discharge Summary         Progress towards goals / Updated goals:  1. Pt will improve FOTO by 16 points in order to demonstrate functional improvement Not met, increased 2 points to 65/100 7-6-17  2. Pt will improve TUG to 10 seconds in order to demonstrate reducing fall risk  3.  Pt will ambulate 48' with equal weight shift in order to progress ambulatory ability     PLAN  []  Upgrade activities as tolerated     [x]  Continue plan of care  []  Update interventions per flow sheet       []  Discharge due to:_  []  Other:_      Fiona Salazar, PT 7/6/2017  2:48 PM    Future Appointments  Date Time Provider Nelson Hurtado   7/6/2017 3:15 PM Adrian Don [id number] [facility]   7/11/2017 10:00 AM Tamika Shirley, PT [id number] [facility]   7/11/2017 10:30 AM Sukh Hunt, OTR/L MMCPTPB [facility]   7/13/2017 1:30 PM Sukh Hunt, OTR/L MMCPTPB [facility]   7/13/2017 2:00 PM Tamika Shirley, PT ATPQFGI [facility]   7/19/2017 9:30 AM Sukh Hunt, OTR/L [id number] [facility]   7/19/2017 10:00 AM Karla Laguna, PTA [id number] [facility]   7/19/2017 10:45 AM [facility] PT [address] MMCPTPB [facility]   7/20/2017 11:00 AM Karla Laguna, PTA MMCPTPB [facility]   7/20/2017 11:30 AM Sukh Hunt, OTR/L MMCPTPB [facility]   7/25/2017 10:30 AM Sukh Hunt, OTR/L MMCPTPB [facility]   7/25/2017 11:00 AM Karla Laguna, PTA [id number] [facility]   7/28/2017 2:00 PM Karla Laguna, PTA MMCPTPB [facility]   7/28/2017 2:30 PM Sukh Hunt, OTR/L MMCPTPB [facility]   7/28/2017 3:00 PM Alejandra Lyons [id number] [facility]   9/20/2017 9:30 AM Mo Godfrey  [address],  [address] [place]

## 2017-07-11 ENCOUNTER — HOSPITAL ENCOUNTER (OUTPATIENT)
Dept: PHYSICAL THERAPY | Age: 77
Discharge: HOME | End: 2017-07-11
Payer: MEDICARE

## 2017-07-11 PROCEDURE — 97530 THERAPEUTIC ACTIVITIES: CPT

## 2017-07-11 PROCEDURE — G8987 SELF CARE CURRENT STATUS: HCPCS

## 2017-07-11 PROCEDURE — 97116 GAIT TRAINING THERAPY: CPT

## 2017-07-11 PROCEDURE — 97110 THERAPEUTIC EXERCISES: CPT

## 2017-07-11 PROCEDURE — G8979 MOBILITY GOAL STATUS: HCPCS

## 2017-07-11 PROCEDURE — 97535 SELF CARE MNGMENT TRAINING: CPT

## 2017-07-11 PROCEDURE — G8988 SELF CARE GOAL STATUS: HCPCS

## 2017-07-11 PROCEDURE — G8978 MOBILITY CURRENT STATUS: HCPCS

## 2017-07-11 NOTE — PROGRESS NOTES
PT DAILY TREATMENT NOTE - [facility]     Patient Name: Mona Negre[facility]  Date:2017  : 1940  [x]  Patient  Verified  Payor: Gardenia Cross / Plan: [place] MEDICARE PART A & B / Product Type: Medicare /    In time:9:51  Out time:10:30  Total Treatment Time (min): 29  Total Timed Codes (min): 29  1:1 Treatment Time ( only): 29  Visit #: 15 of     Treatment Area: Muscle weakness (generalized) [M62.81]    SUBJECTIVE  Pain Level (0-10 scale): 0/10  Any medication changes, allergies to medications, adverse drug reactions, diagnosis change, or new procedure performed?: [x] No    [] Yes (see summary sheet for update)  Subjective functional status/changes:   [] No changes reported  Pt reports that he has been tired because of the medications he's been taking. He believes he has come a long way with therapy. He still feels like his balance is a little off, but he has not had any falls or near falls. Later is session, pt reported feeling light-headed during standing therex. BP, HR, and O2 were taken and were WNL. Pt reports he always feels light-headed and he believes it's a side effect of his medications. He cannot remember his medications, but he will have his daughter bring the list next time. He follows up with his MD this week.      OBJECTIVE      20 min Therapeutic Exercise:  [x] See flow sheet :   Rationale: increase ROM, increase strength, improve coordination, improve balance and increase proprioception to improve the patients ability to improve gait pattern for improved ambulatory ability     9 min Gait Training: ambulation with mirror focusing on increased heel strike, decreased ER, equal WS, and increased UE swing  Rationale: to improve ambulatory ability and reduce future orthopedic injury d/t asymmetric gait          With   [] TE   [] TA   [] neuro   [] other: Patient Education: [x] Review HEP    [] Progressed/Changed HEP based on:   [] positioning   [] body mechanics   [] transfers [] heat/ice application    [x] other: advised pt to inform MD of consistent light-headed sensation       Other Objective/Functional Measures:     No difficulty with 8\" step ups  Cues to perform marches slowly  Cues to maintain knee extension with standing hip extension and pause between each repetition   Nose began bleeding during standing therex, pt was given seated rest break  /65, HR 93 bpm, O2 98-99%   Minor nose bleed stopped in <2 minutes     Gait Training: verbal/visual cues for increased heel strike, decreased ER of RLE, increased UE swing, and increased weight-shift to R  Manual weight-shift to R after pt was consistently performing correct heel strike  Weight-shift approximately 90% equal during equal training   Used mirror for visual feedback    TUG:  Trial 1: 13 seconds  Trial 2: 14 seconds  Trial 3: 13 seconds  Average: 13.3 seconds      No pain with therapy  Pt declined water as he does not like the thickener     Pain Level (0-10 scale) post treatment: 0/10    ASSESSMENT/Changes in Function: See Recert    Patient will continue to benefit from skilled PT services to modify and progress therapeutic interventions, address functional mobility deficits, address ROM deficits, address strength deficits, analyze and address soft tissue restrictions, analyze and cue movement patterns, assess and modify postural abnormalities, address imbalance/dizziness and instruct in home and community integration to attain remaining goals.      []  See Plan of Care  [x]  See progress note/recertification  []  See Discharge Summary         Progress towards goals / Updated goals:  See Progress Note    PLAN  []  Upgrade activities as tolerated     [x]  Continue plan of care  []  Update interventions per flow sheet       []  Discharge due to:_  []  Other:_      Gabi Rowley PT 7/11/2017  9:57 AM    Future Appointments  Date Time Provider Nelson Hurtado   7/11/2017 10:00 AM Gabi Rowley, PT MMCPTPB [facility] 7/11/2017 10:30 AM Ayana Dean, OTR/L [id number] [facility]   7/13/2017 1:30 PM [facility] PT [address] [id number] [facility]   7/13/2017 2:00 PM Yanet Corcoran, PT [id number] [facility]   7/19/2017 9:30 AM Ayana Dean, OTR/L MWVNROU [facility]   7/19/2017 10:00 AM Leilani Kay, PTA CDQJKHT [facility]   7/19/2017 10:45 AM [facility] PT [address] MMCPTPB [facility]   7/20/2017 11:00 AM Leilani Kay, PTA SVPSKCG [facility]   7/20/2017 11:30 AM Ayana Dean, OTR/L MMCPTPB [facility]   7/25/2017 10:30 AM Ayana Dean, OTR/L MMCPTPB [facility]   7/25/2017 11:00 AM Leilani Kay, PTA AXVVPON [facility]   7/28/2017 2:00 PM Leilani Kay PTA MMCPTPB [facility]   7/28/2017 2:30 PM Ayana Dean, OTR/L MMCPTPB [facility]   7/28/2017 3:00 PM Nia Leblanc EIOEZEP [facility]   9/20/2017 9:30 AM Nicole Ramirez  [address], [address] [place]

## 2017-07-11 NOTE — PROGRESS NOTES
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 I still have a hard time brushing my hair. Current Status/ treatment goals Objective measures   1. Pt will improve FOTO by 16 points in order to demonstrate functional improvement    [] met                 [] not met  [x] progressing  Progressing. 15 point improvement from eval.  Improved 2 points since last assessed    2. Pt will improve TUG to 10 seconds in order to demonstrate reducing fall risk   [] met                 [x] not met  [] progressing Not met. 13.3 seconds (11.3 seconds last assessed)    3. Pt will ambulate 48' with equal weight shift in order to progress ambulatory ability    [] met                 [] not met  [x] progressing Progressing. Near equal weight-shift for prolonged distances. Approximately 90% equal weight-shift      Key functional changes: improving gait pattern, improving FOTO indicative of functional improvement     Problems/ barriers to goal attainment: fatigue     Assessment / Recommendations: Pt is making slow, steady progress in therapy, progressng towards 2/3 goals.   Increasing FOTO score is indicative of functional improvement, and pt demonstrates near equal weight shift with ambulation, demonstrating approximately 10% decreased weight-shift/stance time on R.  TUG score has increased with fatigue likely contributing to slower gait speed. Pt believes that fatigue is a side effect of his medications as fatigue has been slightly reduced since medication change; however fatigue continues to persist and limits pt's participation in therapy, requiring extra rest breaks. Pt is able to demonstrate proper heel strike with gait pattern and increased UE swing. He will benefit from continued skilled PT for 3 sessions to address remaining strength/coordination deficits and transition to HEP for continued progression independently upon DC. Problem List: decrease ROM, decrease strength, impaired gait/ balance, decrease ADL/ functional abilitiies, decrease activity tolerance and decrease flexibility/ joint mobility   Treatment Plan: Therapeutic exercise, Therapeutic activities, Neuromuscular re-education, Physical agent/modality, Gait/balance training, Manual therapy, Patient education, Self Care training, Functional mobility training, Home safety training and Stair training    Patient Goal (s) has been updated and includes:   1. Pt will improve FOTO by 16 points in order to demonstrate functional improvement  2. Pt will demonstrate understanding/compliance with final HEP for continued progression independently upon DC     Updated Goals to be accomplished in 3 treatments:  1. Pt will improve FOTO by 16 points in order to demonstrate functional improvement  2. Pt will demonstrate understanding/compliance with final HEP for continued progression independently upon DC     Frequency / Duration: Patient to be seen 2-3 times per week for 3 treatments:    G-Codes (GP)  Mobility  [id number] Current  CJ= 20-39%   Goal  CJ= 20-39%    The severity rating is based on clinical judgment and the FOTO score.       Gray Giraldo, PT 7/11/2017 10:16 AM

## 2017-07-11 NOTE — PROGRESS NOTES
OT DAILY TREATMENT NOTE - [facility]     Patient Name: Carolynn Salcedo  Date:2017  : 1940  [x]  Patient  Verified  Payor: Jeb Elizalde / Plan: VA MEDICARE PART A & B / Product Type: Medicare /    In time:1030  Out time:1100  Total Treatment Time (min): 30  Total Timed Codes (min): 30  1:1 Treatment Time ( only): 30   Visit #: 12 of 24    Treatment Area: Dysphagia [R13.10]    SUBJECTIVE  Pain Level (0-10 scale): 0/10  Any medication changes, allergies to medications, adverse drug reactions, diagnosis change, or new procedure performed?: [x] No    [] Yes (see summary sheet for update)  Subjective functional status/changes:   [] No changes reported  I still feel tired all the time    OBJECTIVE      5 min Therapeutic Exercise:  [] See flow sheet :   Rationale: increase strength to improve the patients ability to grasp  recheck     10 min Therapeutic Activity:  []  See flow sheet :   Rationale: improve coordination  to improve the patients ability to manipulate items  Recheck fine motor skills with r hand       15 min Self Care/Home Management: check writing   Rationale: improve coordination  to improve the patients ability to manage finances  Check writing 2 checks in extended time with no errors    With   [] TE   [] TA   [] neuro   [] other: Patient Education: [x] Review HEP    [] Progressed/Changed HEP based on:   [] positioning   [] body mechanics   [] transfers   [] heat/ice application   [] Splint wear/care   [] Sensory re-education   [] scar management      [] other:            Other Objective/Functional Measures:    52# (47#)  [address]  [place] [id number]     Pain Level (0-10 scale) post treatment: 0/10    ASSESSMENT/Changes in Function: Improved , fine motor skills unchanged, patient generally slowed motorically    Patient will continue to benefit from skilled OT services to modify and progress therapeutic interventions, address strength deficits, analyze and cue movement patterns and instruct in home and community integration to attain remaining goals. []  See Plan of Care  [x]  See progress note/recertification  []  See Discharge Summary         Progress towards goals / Updated goals:  1. Patient will be able to feed self with R hand for full meal due to improved coordination of movement-met 7/6/17  2. Patient will increase  strength in R hand by 20 pounds to increase ease of opening jarsprogressing increased 5#.  3.  Patient will improve   fine motor skills at least 20% from baseline for fasteners. progressing 6/22/17  4. Patient will be able to fill out forms legibly and return to managing finances. difficulty with writing digits for written out numbers 6/27/17, excellent reverse process 7/6/17, wrote 2 checks without errors today 7/11/17      PLAN  [x]  Upgrade activities as tolerated     [x]  Continue plan of care  []  Update interventions per flow sheet       []  Discharge due to:_  []  Other:_      Angela Mann, OTR/L 7/11/2017  10:33 AM    Future Appointments  Date Time Provider Nelson Hurtado   7/13/2017 1:30 PM [facility] PT [address] MMCPTPB [facility]   7/13/2017 2:00 PM Nicky Zaragoza PT SON [facility]   7/19/2017 9:30 AM Angela Mann, OTR/L [id number] [facility]   7/19/2017 10:00 AM Jon Bumpers, PTA MMCPTPB [facility]   7/19/2017 10:45 AM [facility] PT [address] MMCPTPB [facility]   7/20/2017 11:00 AM Jon Bumpers, PTA MMCPTPB [facility]   7/20/2017 11:30 AM Angela Mann, OTR/L MMCPTPB [facility]   7/25/2017 10:30 AM Angela Mann, OTR/L MMCPTPB [facility]   7/25/2017 11:00 AM Jon Bumpers, PTA MMCPTPB [facility]   7/28/2017 2:00 PM Jon Bumpers, PTA MMCPTPB [facility]   7/28/2017 2:30 PM Angela Mann OTR/L MMCPTPB [facility]   7/28/2017 3:00 PM Aurelia Hung [id number] [facility]   9/20/2017 9:30 AM Jose Luis Moyer  [address] [place]

## 2017-07-12 NOTE — PROGRESS NOTES
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 1.  met 17  2. progressing increased 5#. 3.  progressing 17  4. difficulty with writing digits for written out numbers 17, excellent reverse process 17, wrote 2 checks without errors today 17    Current Status/ treatment goals Objective measures   1. Patient will be able to feed self with R hand for full meal due to improved coordination of movement-   [x] met                 [] not met  [] progressing  Now using r consistently   2. Patient will increase  strength in R hand by 20 pounds to increase ease of opening jars   [] met                 [] not met  [x] progressing Has increased 7# to 52#   3. Patient will improve   fine motor skills at least 20% from baseline for fasteners. [] met                 [] not met  [x] progressing Fine motor has increased from 49 to 41  (16%)  Most recent [place] score was 213 (24% improvement)  Marked bradykinesia noted. 4. Patient will be able to fill out forms legibly and return to managing finances. [] met                 [] not met  [x] progressing Good success but slow completion of checks.        Key functional changes: Improved  strength, use of r hand for eating      Problems/ barriers to goal attainment: Severe fatigue, poor sleep, depression     Assessment / Recommendations:Patient will benefit from continued skilled occupational therapy to increase upper extremity function and functional independence. Problem List: Decreased strength, Decreased coordination/prehension, Decreased ADL/functional abilities  and Decreased activity tolerance   Treatment Plan: Therapeutic exercise, Therapeutic activities, Patient education and ADLs/IADLs    Patient Goal (s) has been updated and includes: Get my energy back, get stronger     Updated Goals to be accomplished in 12 treatments:  1. Patient will complete checks accurately and legibly 100% of trials. 2.  Patient will improve fine motor speed 15% to allow for increased speed of check completion. 3.  Patient will increase r hand  at least 13# to allow for completion of carrying garbage, opening jars etc.      Frequency / Duration: Patient to be seen 2-3 times per week for 12 visits:    G-Codes (GO)  Self Care   Current  CJ= 20-39%   Goal  CJ= 20-39%    The severity rating is based on clinical judgment and the FOTO score.       FANI Smith 7/12/2017 8:22 AM

## 2017-07-13 ENCOUNTER — HOSPITAL ENCOUNTER (OUTPATIENT)
Dept: PHYSICAL THERAPY | Age: 77
Discharge: HOME | End: 2017-07-13
Payer: MEDICARE

## 2017-07-13 PROCEDURE — 97110 THERAPEUTIC EXERCISES: CPT

## 2017-07-13 PROCEDURE — 97530 THERAPEUTIC ACTIVITIES: CPT

## 2017-07-13 NOTE — PROGRESS NOTES
PT DAILY TREATMENT NOTE - [facility]     Patient Name: Michael Mehta [address]  Date:2017  : 1940  [x]  Patient  Verified  Payor: Mariam Font / Plan: [place] MEDICARE PART A & B / Product Type: Medicare /    In time:2:00  Out time:2:28  Total Treatment Time (min): 28  Total Timed Codes (min): 28  1:1 Treatment Time ( [address] only): 28   Visit #: 16 of     Treatment Area: There are no admission diagnoses documented for this encounter. SUBJECTIVE  Pain Level (0-10 scale): 0/10  Any medication changes, allergies to medications, adverse drug reactions, diagnosis change, or new procedure performed?: [x] No    [] Yes (see summary sheet for update)  Subjective functional status/changes:   [] No changes reported  Pt reports that he's still tired all the time. He told his MD yesterday who said he believes the fatigue is from the stroke and will take time to get better. He didn't feel this tired when he was in rehab, but he wasn't on all of this medication then. He forgot his list of medications this visit. He believes that he is sleeping well. His R knee hyperextends with walking in the morning when he first gets up, but it's better throughout the day. He feels confident walking on uneven surfaces. He also feels confident with standing still and with reaching. He only worries about falling if he doesn't pick his his foot up enough.        OBJECTIVE    23 min Therapeutic Exercise:  [x] See flow sheet :   Rationale: increase ROM, increase strength, improve coordination and improve balance to improve the patients ability to improve ambulatory ability     5 min Neuromuscular Re-education:  []  See flow sheet : dynamic gait, obstacle course    Rationale: improve coordination and improve balance  to improve the patients ability to reduce fall risk for safe ambulation        With   [] TE   [] TA   [] neuro   [] other: Patient Education: [x] Review HEP    [] Progressed/Changed HEP based on:   [] positioning   [] body mechanics   [] transfers   [] heat/ice application    [] other:      Other Objective/Functional Measures:     No difficulty with standing therex, pt performed without cuing  Required seated rest break after standing therex  Gave and reviewed final HEP - reviewed tips for proper gait and importance of walking program at home  Instructed pt to walk with someone and slowly progress his walking time, focusing on proper gait    No LOB with Romberg Foam EO  Near posterior LOB with Romberg Foam EC after 29 seconds, corrected with hands on parallel bars and Jerardo from therapist  Proper form with eccentric step down on 4\" step  Minor L hip pain discomfort reported after therapy, which pt reports he's been having and believes it's because he's keeping too much weight on his L  TTP L greater trochanter  Instructed to use ice 10-15 minutes for likely trochanteric bursitis      Dynamic gait: fast/slow ambulation, ambulation with horizontal/vertical head turns  No LOB or near LOB with dynamic gait - cues for increased heel strike on R during dynamic gait    Obstacle course: high bev step overs, step ups onto BB#1   Pt had to pause before each high bev step over with some instability noted - performed with CGA  No pain with therapy  Pt appeared fatigued throughout session  Pt declined water as he does not like the thickener     Pain Level (0-10 scale) post treatment: 0/10    ASSESSMENT/Changes in Function:     Pt tolerated therapy without complaint of increased sx and is making slow, steady progress towards final goals in therapy. Fatigue continues to limit pt's participation, but he is highly motivated to continue to improve. Will continue to address strength, coordination, and proper gait mechanics for improved ambulatory tolerance. Final HEP was established this session and will be reviewed for 2 final sessions. DC in 2 visits.      Patient will continue to benefit from skilled PT services to modify and progress therapeutic interventions, address functional mobility deficits, address ROM deficits, address strength deficits, analyze and address soft tissue restrictions, analyze and cue movement patterns, assess and modify postural abnormalities, address imbalance/dizziness and instruct in home and community integration to attain remaining goals. Progress towards goals / Updated goals:  1. Pt will improve FOTO by 16 points in order to demonstrate functional improvement  2.  Pt will demonstrate understanding/compliance with final HEP for continued progression independently upon DC     PLAN  []  Upgrade activities as tolerated     [x]  Continue plan of care  []  Update interventions per flow sheet       []  Discharge due to:_  []  Other:_      Gabi Rowley, PT 7/13/2017  2:08 PM    Future Appointments  Date Time Provider Nelson Hurtado   7/13/2017 2:30 PM Camron Harris [id number] [facility]   7/19/2017 9:30 AM Roseline Bryson, OTR/L MMCPTPB [facility]   7/19/2017 10:00 AM James Lemos PTA [id number] [facility]   7/19/2017 10:30 AM AUGUSTINA Alfonos RLDHABD [facility]   7/20/2017 11:00 AM James Lemos PTA MMCPTPB [facility]   7/20/2017 11:30 AM Stuart Nacho Grogg MMCPTPB [facility]   7/25/2017 10:30 AM Roseline Bryson, OTR/L DUFRYYB [facility]   7/25/2017 11:00 AM James Lemos PTA OAFIHSX [facility]   7/28/2017 2:00 PM James Lemos PTA MMCPTPB [facility]   7/28/2017 2:30 PM Roseline Bryson, OTR/L MMCPTPB [facility]   7/28/2017 3:00 PM Mike TRENTGZHI [facility]   9/20/2017 9:30 AM Jie Cid  Cristopher Fregoso, [address] [place]

## 2017-07-13 NOTE — PROGRESS NOTES
OT DAILY TREATMENT NOTE - [facility]     Patient Name: Caleb Dubon [facility]  Date:2017  : 1940  [x]  Patient  Verified  Payor: Amarjit Macias / Plan: [place] MEDICARE PART A & B / Product Type: Medicare /    In time:230  Out time:305  Total Treatment Time (min): 35  Total Timed Codes (min): 35  1:1 Treatment Time ( [address] only): 35   Visit #: 13 of 24    Treatment Area: Muscle weakness (generalized) [M62.81]    SUBJECTIVE  Pain Level (0-10 scale): 0/10  Any medication changes, allergies to medications, adverse drug reactions, diagnosis change, or new procedure performed?: [x] No    [] Yes (see summary sheet for update)  Subjective functional status/changes:   [] No changes reported  I had to put my wife in a nursing home because I couldn't take care of her after I had the stroke, It's been really hard on me. Its really hard.     OBJECTIVE      10 min Therapeutic Exercise:  [] See flow sheet :   Rationale: increase strength to improve the patients ability to     1 inch pegs: Pt used R Hand with gripper set on 55# to remove 1 inch pegs from resistive pegboard, 50x    25 min Therapeutic Activity:  []  See flow sheet :   Rationale: improve coordination  to improve the patients ability to manipulate small items with R hand    Theraputty: Pt used R hand to manipulate Firm theraputty to reveal and remove 1/4 inch pegs, 10x    Perfection: Pt used right hand to manipulate game pieces and place them into specific spots; timed    [place]: Pt used R hand to complete 10; timed      With   [] TE   [] TA   [] neuro   [] other: Patient Education: [x] Review HEP    [] Progressed/Changed HEP based on:  [] positioning   [] body mechanics   [] transfers   [] heat/ice application   [] Splint wear/care   [] Sensory re-education   [] scar management      [] other:            Other Objective/Functional Measures:     Perfection time: 3:00  (6:00)  [place] time:  3:28      Pain Level (0-10 scale) post treatment: 0/10    ASSESSMENT/Changes in Function:  Improvement in FM speed    Patient will continue to benefit from skilled OT services to modify and progress therapeutic interventions, address strength deficits, analyze and address soft tissue restrictions and instruct in home and community integration to attain remaining goals. []  See Plan of Care  []  See progress note/recertification  []  See Discharge Summary         Progress towards goals / Updated goals:  Updated Goals to be accomplished in 12 treatments:  1. Patient will complete checks accurately and legibly 100% of trials. Improving 7/13/17  2. Patient will improve fine motor speed 15% to allow for increased speed of check completion. Progressing, 50% improvement time for perfection 7/13/17  3.   Patient will increase r hand  at least 13# to allow for completion of carrying garbage, opening jars etc. Progressing, Pt used gripper set on 55# 7/13/17    PLAN  []  Upgrade activities as tolerated     [x]  Continue plan of care  []  Update interventions per flow sheet       []  Discharge due to:_  []  Other:_      ULI Glover 7/13/2017  3:08 PM    Future Appointments  Date Time Provider Nelson Hurtado   7/19/2017 9:30 AM Angela Mann, OTR/L MMCPTPB [facility]   7/19/2017 10:00 AM Jon Bumpers, PTA MMCPTPB [facility]   7/19/2017 10:30 AM AUGUSTINA Palumbo MMCPTPB [facility]   7/20/2017 11:00 AM Jon Bumpers, PTA MMCPTPB [facility]   7/20/2017 11:30 AM Renée CHAPARRO [facility]   7/20/2017 1:00 PM Sarah Berkowitz MMCPTPB [facility]   7/25/2017 10:30 AM Angela Mann OTR/L MMCPTPB [facility]   7/25/2017 11:00 AM Jon Bumpers, PTA MMCPTPB [facility]   7/28/2017 2:00 PM Jon Bumpers, PTA MMCPTPB [facility]   7/28/2017 2:30 PM Angela Mann, OTR/L MMCPTPB [facility]   7/28/2017 3:00 PM Aurelia Hung MMCPTPB [facility]   9/20/2017 9:30 AM Jose Luis Moyer  Cristopher Fregoso, [address] [place]

## 2017-07-18 ENCOUNTER — APPOINTMENT (OUTPATIENT)
Dept: PHYSICAL THERAPY | Age: 77
End: 2017-07-18
Payer: MEDICARE

## 2017-07-19 ENCOUNTER — APPOINTMENT (OUTPATIENT)
Dept: PHYSICAL THERAPY | Age: 77
End: 2017-07-19
Payer: MEDICARE

## 2017-07-19 RX ORDER — TRAZODONE HYDROCHLORIDE 50 MG/1
TABLET ORAL
Qty: 50 TAB | Refills: 3 | Status: ON HOLD | OUTPATIENT
Start: 2017-07-19 | End: 2017-11-10

## 2017-07-19 RX ORDER — SERTRALINE HYDROCHLORIDE 50 MG/1
TABLET, FILM COATED ORAL
Qty: 50 TAB | Refills: 2 | Status: SHIPPED | OUTPATIENT
Start: 2017-07-19 | End: 2017-11-14 | Stop reason: SDUPTHER

## 2017-07-20 ENCOUNTER — APPOINTMENT (OUTPATIENT)
Dept: PHYSICAL THERAPY | Age: 77
End: 2017-07-20
Payer: MEDICARE

## 2017-07-25 ENCOUNTER — APPOINTMENT (OUTPATIENT)
Dept: PHYSICAL THERAPY | Age: 77
End: 2017-07-25
Payer: MEDICARE

## 2017-07-28 ENCOUNTER — APPOINTMENT (OUTPATIENT)
Dept: PHYSICAL THERAPY | Age: 77
End: 2017-07-28
Payer: MEDICARE

## 2017-08-02 ENCOUNTER — HOSPITAL ENCOUNTER (OUTPATIENT)
Dept: PHYSICAL THERAPY | Age: 77
Discharge: HOME | End: 2017-08-02
Payer: MEDICARE

## 2017-08-02 PROCEDURE — 97530 THERAPEUTIC ACTIVITIES: CPT

## 2017-08-02 PROCEDURE — 97110 THERAPEUTIC EXERCISES: CPT

## 2017-08-02 PROCEDURE — G8997 SWALLOW GOAL STATUS: HCPCS

## 2017-08-02 PROCEDURE — 92526 ORAL FUNCTION THERAPY: CPT

## 2017-08-02 PROCEDURE — 97112 NEUROMUSCULAR REEDUCATION: CPT

## 2017-08-02 PROCEDURE — G8996 SWALLOW CURRENT STATUS: HCPCS

## 2017-08-02 NOTE — PROGRESS NOTES
PT DAILY TREATMENT NOTE - [facility]    Patient Name: Renea Reyes  Date:2017  : 1940  [x]  Patient  Verified  Payor: VA MEDICARE / Plan: [place] MEDICARE PART A & B / Product Type: Medicare /    In time:11:00  Out time:11:27  Total Treatment Time (min): 27  Total Timed Codes (min): 27  1:1 Treatment Time ( only): 27   Visit #: 17 of     Treatment Area: Muscle weakness (generalized) [M62.81]    SUBJECTIVE  Pain Level (0-10 scale): 0  Any medication changes, allergies to medications, adverse drug reactions, diagnosis change, or new procedure performed?: [x] No    [] Yes (see summary sheet for update)  Subjective functional status/changes:   [] No changes reported  \"I lost my wife last week; we had been  61 years. That is why I had two weeks off from therapy. \"    OBJECTIVE  12 min Therapeutic Exercise:  [x] See flow sheet :   Rationale: increase ROM, increase strength and improve coordination to improve the patients ability to increase ease with ambulation    15 min Neuromuscular Re-education:  [x]  See flow sheet : high bev step overs, obstacle course with high hurdles and blue board #1, dynamic gait to include: modified carioca, marching, backward ambulation, forward ambulation with horizontal head turns   Rationale: increase strength, improve coordination, improve balance and increase proprioception  to improve the patients ability to improve stability with ADLs             With   [] TE   [] TA   [] neuro   [] other: Patient Education: [x] Review HEP    [] Progressed/Changed HEP based on:   [] positioning   [] body mechanics   [] transfers   [] heat/ice application    [] other:      Other Objective/Functional Measures:   Progressed to 4# for standing therex  Patient presents with decreased TR range  Moderate cueing to increase right hip flexion with high bev step overs  Patient ambulates throughout therapy gym with decreased reciprocal arm swing on R UE    Pain Level (0-10 scale) post treatment: 0    ASSESSMENT/Changes in Function: Patient continues to be limited due to fatigue, requires x3 seated rest breaks today. Patient most challenged with modified carioca with near LOB, however, is able to self-correct via stepping strategy. Patient also continues with difficulty with right hip flexion, occasionally demonstrates circumduction to compensate for weakness. Patient to be D/C next visit. Patient will continue to benefit from skilled PT services to modify and progress therapeutic interventions, address functional mobility deficits, address ROM deficits, address strength deficits, analyze and address soft tissue restrictions, analyze and cue movement patterns, analyze and modify body mechanics/ergonomics, assess and modify postural abnormalities, address imbalance/dizziness and instruct in home and community integration to attain remaining goals. []  See Plan of Care  []  See progress note/recertification  []  See Discharge Summary         Progress towards goals / Updated goals:  1. Pt will improve FOTO by 16 points in order to demonstrate functional improvement  2.  Pt will demonstrate understanding/compliance with final HEP for continued progression independently upon DC     PLAN  []  Upgrade activities as tolerated     [x]  Continue plan of care  []  Update interventions per flow sheet       []  Discharge due to:_  []  Other:_      Ramirez Courts 8/2/2017  11:02 AM    Future Appointments  Date Time Provider Nelson Hurtado   8/2/2017 11:45 AM Lizandro Berkowitz MMCPTPB [facility]   8/2/2017 1:00 PM Ayana Dean, OTR/L MMCPTPB [facility]   8/4/2017 12:30 PM Nia Leblanc HXZTDNA [facility]   8/4/2017 1:30 PM Dorethea Claude QNXFIOV [facility]   8/4/2017 2:00 PM Leilani Kay, PTA MMCPTPB [facility]   8/7/2017 1:30 PM Yanet Corcoran, PT [id number] [facility]   8/7/2017 2:00 PM Ayana Dean, OTR/L MMCPTPB [facility]   8/7/2017 3:00 PM Nia Leblanc MMCPTPB [facility]   8/9/2017 1:00 PM Ayana Dean, OTR/L MMCPTPB [facility]   8/9/2017 1:30 PM Shaye Nicho, PTA MMCPTPB [facility]   8/9/2017 2:15 PM Aurea Adjutant SXBHYED [facility]   8/14/2017 2:00 PM Shaye Nicho, PTA MMCPTPB [facility]   8/14/2017 2:30 PM Dorise Alpha, OTR/L MMCPTPB [facility]   8/14/2017 3:00 PM Aurea Adjutant MMCPTPB [facility]   8/16/2017 1:00 PM Aurea Adjutant [id number] [facility]   8/16/2017 2:00 PM Dorise Alpha, OTR/L MMCPTPB [facility]   8/16/2017 2:30 PM Shaye Nicho, PTA MMCPTPB [facility]   8/21/2017 1:30 PM Tanner Segal, PT [id number] [facility]   8/21/2017 2:00 PM Dorise Alpha, OTR/L MMCPTPB [facility]   8/21/2017 3:00 PM Aurea Adjutant CNDIETR [facility]   8/23/2017 2:00 PM Shaye Nicho, PTA MMCPTPB [facility]   8/23/2017 2:30 PM Dorise Alpha, OTR/L MMCPTPB [facility]   8/23/2017 3:00 PM Aurea Adjutant [id number] [facility]   8/28/2017 1:30 PM Tanner Segal, PT [id number] [facility]   8/28/2017 2:00 PM Dorise Alpha, OTR/L MMCPTPB [facility]   8/28/2017 3:00 PM Aurea Adjutant ENMNGQU [facility]   8/30/2017 2:30 PM Shaye Nicho, PTA MMCPTPB [facility]   8/30/2017 3:00 PM Aurea Adjutant [id number] [facility]   9/20/2017 9:30 AM Navid George  Cristopher Fregoso, [address] [place]

## 2017-08-02 NOTE — PROGRESS NOTES
ST DAILY TREATMENT NOTE    Patient Name: Henry Suh  Date:2017  : 1940  [x]  Patient  Verified  Payor: Payor: Chelsea Goddard / Plan: [place] MEDICARE PART [place] & B / Product Type: Medicare /   In time: 11:46 Out time: 12:16  Total Treatment Time (min): 45  Visit #: 1 of 8    Treatment Diagnosis: Muscle weakness (generalized) [M62.81]    SUBJECTIVE  Pain Level (0-10 scale): 0  Any medication changes, allergies to medications, adverse drug reactions, diagnosis change, or new procedure performed?: [x] No    [] Yes (see summary sheet for update)    Subjective functional status/changes:   [] No changes reported  Sister reported that he was observed only 1 coughing with thin liquids over the course of a week. Pt stated that his memory is worse than his swallowing and has difficulty recalling names. OBJECTIVE  Treatment provided includes:  Increase/Improve:  []  Voice Quality []  Cognitive Linguistic Skills [x]  Laryngeal/Pharyngeal Exercises   []  Vocal Loudness []  Reading Comprehension [x]  Swallowing Skills    []  Vocal Cord Function []  Auditory Comprehension []  Oral Motor Skills   []  Resonance []  Writing Skills [x]  Compensatory strategies    []  Speech Intelligibility []  Expressive Language []  Attention   []  Breath Support/Coord. []  Receptive language []  Memory   []  Articulation [x]  Safety Awareness []    []  Fluency []  Word Retrieval []        Treatment Provided:  -thin liquid trials with double swallow in 2/6 trials independently  - thin liquid with s/sx of aspiration (throat clear) in 4/6 trials.  -completed exercises with 100% Jerardo-independently.      Patient/Caregiver  Education: [x] Review HEP      HEP/Handouts given: Pharyngeal exercises 3-5x/day    Pain Level (0-10 scale) post treatment: 0    ASSESSMENT   []   Improving appropriately and progressing toward goals  [x]   Improving slowly and progressing toward goals  []   Approximating goals/maximum potential  [x]   Continues to benefit from skilled therapy to address remaining functional deficits  []   Not progressing toward goals and plan of care will be adjusted    Patient will continue to benefit from skilled therapy to address remaining functional deficits: compensatory strategies training for dysphagia, cognitive therapy. Progress towards goals / Updated goals:  Patient is showing improvement with his dysphagia. Patient reports less difficulty at home with meals. Patient has not been performing HEP regularly due to recent loss of wife. Patient educated on importance of completing HEP as instructed; verbalized comprehension. No-minimal assistance needed with exercises. Requires verbal cues for double swallow with trials. PLAN  [x]  Continue plan of care  []  Modify Goals/Treatment Plan      []  Discharge due to:  [] Other:    Short Term Goals:  Goal: 1.) The patient will tolerate a regular diet with nectar thick liquids with 0 s/sx of aspiration in 9 out of 10 trials. Goal: 2.) The patient will complete tongue base retraction, laryngeal/pharyngeal strengthening exercises (including Faina, Mendelsohn, Shaker, etc.),  and lingual strengthening, ROM, and coordination exercises with 90% accuracy Lon. for carryover with HEP. Goal 3.) Recall 3-5 words after delay/distraction and 1-2 (6-8 word) sentences to increase recall abilities as related to scheduling appointments with min-mod A in 3/5 trials with visual/verbal cues. Goal 4.) Answer egocentric information via open-ended wh-?'s with < 2 word recall error at the sentence level, utilizing word-recall techniques, min-mod visual/verbal cues. Goal 5.) Complete moderately complex linguistic organization tasks with 90% acc with min.   Goal 6.) Complete visual-spatial tasks related to functional ADLs for safe, independent social reintegration with bharat Espinosa SLP-CF 8/2/2017  12:15 PM    Future Appointments  Date Time Provider Nelson Hurtado   8/2/2017 1:00 PM Alexa Jones, OTR/L MMCPTPB [facility]   8/4/2017 12:30 PM Macdestiny Almaguer MMCPTPB [facility]   8/4/2017 1:30 PM Kirby Blum [email or web address] [facility]   8/4/2017 2:00 PM Mela Millan, PTA MMCPTPB [facility]   8/7/2017 1:30 PM Spenser Can, PT [id number] [facility]   8/7/2017 2:00 PM Alexa Jones[facility] OTR[facility]L [facility]   8/7/2017 3:00 PM Abilio Almaguer [email or web address] [facility]   8/9/2017 1:00 PM Alexa Jones, OTR/L [facility]CP[facility]   8/9/2017 1:30 PM Mela Millan, PTA [facility]   8/9/2017 2:15 PM Oriana Ponceader MMCPTPB [facility]   8/14/2017 2:00 PM Mela Millan, PTA [facility]CP[facility]   8/14/2017 2:30 PM Alexa Jones, OTR/L MMCPTPB [facility]   8/14/2017 3:00 PM Oriana Cooper Swiader MMCPTPB [facility]   8/16/2017 1:00 PM Oriana Cooper Swiader MMCPTPB [facility]   8/16/2017 2:00 PM lAexa Jones, OTR/L MMCPTPB [facility]   8/16/2017 2:30 PM Mela Millan, PTA MMCPTPB [facility]   8/21/2017 1:30 PM Spenser Can, PT [id number] [facility]   8/21/2017 2:00 PM Alexa Jones, OTR/L MMCPTPB [facility]   8/21/2017 3:00 PM Abilio Almaguer ZUANOAN [facility]   8/23/2017 2:00 PM Mela Millan, PTA MMCPTPB [facility]   8/23/2017 2:30 PM Alexa Jones, OTR/L MMCPTPB [facility]   8/23/2017 3:00 PM Abilio Almaguer MMCPTPB [facility]   8/28/2017 1:30 PM Spenser Can, PT [email or web address] [facility]   8/28/2017 2:00 PM Alexa Jones, OTR/L MMCPTPB [facility]   8/28/2017 3:00 PM Abilio Tripp [id number] [facility]   8/30/2017 2:30 PM Mela Millan, PTA MMCPTPB [facility]   8/30/2017 3:00 PM Abilio Tripp MMCPTPB [facility]   9/20/2017 9:30 AM Phyllis Malagon  Cristopher Fregoso, [address] [place]

## 2017-08-02 NOTE — PROGRESS NOTES
OT DAILY TREATMENT NOTE - [facility]     Patient Name: Rene Perez  Date:2017  : 1940  [x]  Patient  Verified  Payor: [place] MEDICARE / Plan: [place] MEDICARE PART A & B / Product Type: Medicare /    In time:100  Out time:130  Total Treatment Time (min): 30  Total Timed Codes (min): 30  1:1 Treatment Time (MC only): 30   Visit #: 14 of 24    Treatment Area: Muscle weakness (generalized) [M62.81]    SUBJECTIVE  Pain Level (0-10 scale): 0/10  Any medication changes, allergies to medications, adverse drug reactions, diagnosis change, or new procedure performed?: [x] No    [] Yes (see summary sheet for update)  Subjective functional status/changes:   [] No changes reported  Wife passed away  Pain in r elbow with this (green therabar)  Getting some sleep now    OBJECTIVE      10 min Therapeutic Exercise:  [] See flow sheet :   Rationale: increase strength to improve the patients ability to grasp  Red therabar x 15 each flex ext and sup pro        20 min Self Care/Home Management: Check writing   Rationale: Improve checkwriting subtask to improve the patients ability to *manage finances**  Errors in writing numbers out correctly (one vs. One hundred etc)    With   [] TE   [x] TA   [] neuro   [] other: Patient Education: [x] Review HEP    [x] Progressed/Changed HEP based on:  Financial management sub tasks  [] positioning   [] body mechanics   [] transfers   [] heat/ice application   [] Splint wear/care   [] Sensory re-education   [] scar management      [] other:            Other Objective/Functional Measures:   Pain with green therabar today in r elbow     Pain Level (0-10 scale) post treatment: 0/10    ASSESSMENT/Changes in Function: Improving awareness of errors in math task    Patient will continue to benefit from skilled OT services to modify and progress therapeutic interventions, address strength deficits, analyze and address soft tissue restrictions and instruct in home and community integration to attain remaining goals. []  See Plan of Care  []  See progress note/recertification  []  See Discharge Summary         Progress towards goals / Updated goals:  1. Patient will complete checks accurately and legibly 100% of trials. Progressing 8/2/17  2. Patient will improve fine motor speed 15% to allow for increased speed of check completion. Progressing, 50% improvement time for perfection 7/13/17  3.   Patient will increase r hand  at least 13# to allow for completion of carrying garbage, opening jars etc. Progressing, Pt used gripper set on 55# 7/13/17        PLAN  [x]  Upgrade activities as tolerated     [x]  Continue plan of care  []  Update interventions per flow sheet       []  Discharge due to:_  []  Other:_      Lorenzo Steward OTR/L 8/2/2017  1:08 PM    Future Appointments  Date Time Provider Nelson Hurtado   8/4/2017 12:30 PM Monty Harding MMCPTPB [facility]   8/4/2017 1:30 PM Rosey Campbell [id number] [facility]   8/4/2017 2:00 PM Hamlet Guajardo PTA MMCPTPB [facility]   8/7/2017 1:30 PM Rebecca Villareal, PT [email or web address] [facility]   8/7/2017 2:00 PM Lorenzo Steward OTR/L MMCPTPB [facility]   8/7/2017 3:00 PM Monty Harding MMCPTPB [facility]   8/9/2017 1:00 PM Lorenzo Steward OTR/L MMCPTPB [facility]   8/9/2017 1:30 PM Hamlet Guajardo PTA MMCPTPB [facility]   8/9/2017 2:15 PM Monty Harding EHYEQJR [facility]   8/14/2017 2:00 PM Hamlte Guajardo PTA MMCPTPB [facility]   8/14/2017 2:30 PM Lorenzo Bin, OTR/L MMCPTPB [facility]   8/14/2017 3:00 PM Monty Harding LXDHGRE [facility]   8/16/2017 1:00 PM Monty Harding KJOVQJN [facility]   8/16/2017 2:00 PM Lorenzo Steward, OTR/L MMCPTPB [facility]   8/16/2017 2:30 PM Hamlet Guajardo, PTA MMCPTPB [facility]   8/21/2017 1:30 PM Rebecca Villareal, PT [id number] [facility]   8/21/2017 2:00 PM Lorenzo Steward, OTR/L MMCPTPB [facility]   8/21/2017 3:00 PM Monty Harding LVLANLH [facility]   8/23/2017 2:00 PM Hamlet Guajardo, PTA MMCPTPB [facility]   8/23/2017 2:30 PM Lorenzo Steward, OTR/L MMCPTPB [facility]   8/23/2017 3:00 PM Monty Harding MMCPTPB [facility]   8/28/2017 1:30 PM Adela Turner Doug Richard MXVZGBT [facility]   8/28/2017 2:00 PM Tahir Skelton, OTR/L MMCPTPB [facility]   8/28/2017 3:00 PM Michael Whelan TQVAPPL [facility]   8/30/2017 2:30 PM Anton Skiff, PTA MMCPTPB [facility]   8/30/2017 3:00 PM Michael Whelan BMGFMHI [facility]   9/20/2017 9:30 AM Maureen Martinez  Cristopher Fregoso, [address] [place]

## 2017-08-02 NOTE — PROGRESS NOTES
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 Pt is right-hand dominant; wears glasses for reading. Pt further reports that he does not \"read well\" as a result of 9-grade education; \"I don't enjoy reading; I only read when I have to. \"   Visits from Start of Care: 12    Missed Visits: 0    The Plan of Care and following information is based on the patient's current status:  Goal: 1.) The patient will tolerate a regular diet with honey thick liquids with <5% s/sx of aspiration x 4 weeks. Status at last note/certification: Not met, progressing  Current Status: Progressing: Tolerated HTLS with >5% s/sx of aspiration (25% noted on 17)    Goal: 2.) The patient will complete tongue base retraction, laryngeal/pharyngeal strengthening exercises (including Faina, Mendelsohn, Shaker, etc.),  and lingual strengthening, ROM, and coordination exercises with 90% accuracy with mod cues. A visual/verbal cues.    Status at last note/certification: Progressing: Faina: 100%, Lon  Current Status: Progressing: Pt is Lon to Max A at this time     Goal: 3.) The patient will identify and implement safe swallowing techniques and strategies during PO intake with 90% accuracy with Mod cues  Status at last note/certification:Progressing: mod cues required to decrease oral \"swishing\" prior to swallow 2* to decreased bolus control and premature spillage. Mod ed re: effortful swallow  Current Status: Goal MET: mod cues to take decreased bolus control and premature spillage. Mod ed re: effortful swallow    Goal: 4.) The patient will correctly thicken liquids to honey thick viscosity with 90% accuracy with min-mod cues. Status at last note/evaluation: Not met, progressing  Current Status: MET, Min A on 7/6/17    Goal 5.) Recall 3-5 words after delay/distraction and 1-2 (6-8 word) sentences to increase recall abilities as related to scheduling appointments with min-mod A in 3/5 trials with visual/verbal cues. Status at last note/certification: Not met, progressing  Current Status: Not met, will address in future therapy    Goal 6.) Answer egocentric information via open-ended wh-?'s with < 2 word recall error at the sentence level, utilizing word-recall techniques, min-mod visual/verbal cues  Status at last note/certification: Not met  Current Status: Not met, will address in future therapy    Goal 7.) Complete moderately complex linguistic organization tasks with 90% acc with min  Status at last note/certification: Not met, progressing  Current Status: Not met, will address in future therapy    Goal 8.) Complete visual-spatial tasks related to functional ADLs for safe, independent social reintegration with min A  Status at last note/certification: shows progress as patient was able to complete 50% of the Status at last note/certification:mazes.  Not met, progressing  Current Status: Not met, progressing    Key functional changes: Pt has not made measurable progress with cognitive goals as he had family matters which prevented him from attending therapy after cognition goals had been added to POC. Patient has made good progress regarding dysphagia therapy and has met 2:4 goals since his last therapy note. Patient is able to complete familiar exercises Lon, but requires Max A when a new exercise is introduced. Pt is able to thicken liquids with Min A and is able to use safe swallowing strategies with Min A. Problems/ barriers to goal attainment: cognition, physical factors (fatigue),  Motivation     Problem List:      []aphasic  []dysarthric  [x]dysphagic       []alexic  []agraphic  []dysphonia       []dysfluency  [x]Cognitive-Linguistic Disorder       []other     Treatment Plan: Cognitive/Language Treatment, Oral Motor Therapeutic Excerise, Dysphagia Treatment, Treatment of Swallowing and Patient Education    Patient Goal (s) has been updated and includes: Revision of goals and removal of met goals. Goals for this certification period to be accomplished in  4weeks:  Goal: 1.) The patient will tolerate a regular diet with nectar thick liquids with 0 s/sx of aspiration in 9 out of 10 trials. Goal: 2.) The patient will complete tongue base retraction, laryngeal/pharyngeal strengthening exercises (including Faina, Mendelsohn, Shaker, etc.),  and lingual strengthening, ROM, and coordination exercises with 90% accuracy Lon. for carryover with HEP. Goal 3.) Recall 3-5 words after delay/distraction and 1-2 (6-8 word) sentences to increase recall abilities as related to scheduling appointments with min-mod A in 3/5 trials with visual/verbal cues. Goal 4.) Answer egocentric information via open-ended wh-?'s with < 2 word recall error at the sentence level, utilizing word-recall techniques, min-mod visual/verbal cues. Goal 5.) Complete moderately complex linguistic organization tasks with 90% acc with min. Goal 6.) Complete visual-spatial tasks related to functional ADLs for safe, independent social reintegration with min A.       Frequency / Duration: Patient to be seen 2 times per week for 4 weeks. Assessment/Recommendations: Continue to treat patient for dysphagia and cognitive deficits. Repeat MBS at end of certification to assess if patient has made progress regarding swallowing. G Codes:  Swallowing:  Swallow Current Status CJ= 20-39%   Swallow Goal Status CI= 1-19%    The severity rating is based on the following outcomes:    National Outcomes Measures (NOMS) and professional judgment     Certification Period: 8/2/2017 - 9/1/2017    AUGUSTINA Dalal-CF 8/2/2017 10:09 AM    _____________________________________________________________________    I certify that the above Therapy Services are being furnished while the patient is under my care. I agree with the treatment plan and certify that this therapy is necessary. []  I have read the above report and request that my patient continue as recommended. []  I have read the above report and request that my patient continue therapy with the following changes/special instructions:________________________________________  []I have read the above report and request that my patient be discharged from therapy.     Physician's Signature:_________________ Date:___________Time:__________      Please sign and return to [facility] - [employer]   [address] [facility]  [phone] fax

## 2017-08-04 ENCOUNTER — HOSPITAL ENCOUNTER (OUTPATIENT)
Dept: PHYSICAL THERAPY | Age: 77
Discharge: HOME | End: 2017-08-04
Payer: MEDICARE

## 2017-08-04 PROCEDURE — 97535 SELF CARE MNGMENT TRAINING: CPT

## 2017-08-04 PROCEDURE — G8980 MOBILITY D/C STATUS: HCPCS

## 2017-08-04 PROCEDURE — G8979 MOBILITY GOAL STATUS: HCPCS

## 2017-08-04 PROCEDURE — 97110 THERAPEUTIC EXERCISES: CPT

## 2017-08-04 PROCEDURE — 97530 THERAPEUTIC ACTIVITIES: CPT

## 2017-08-04 PROCEDURE — 92507 TX SP LANG VOICE COMM INDIV: CPT

## 2017-08-04 PROCEDURE — 92526 ORAL FUNCTION THERAPY: CPT

## 2017-08-04 NOTE — PROGRESS NOTES
ST DAILY TREATMENT NOTE    Patient Name: Kiesha Aleman  Date:2017  : 1940  [x]  Patient  Verified  Payor: Payor: Tila Reeves / Plan: [place] MEDICARE PART A & B / Product Type: Medicare /   In time: 12:33  Out time:1317  Total Treatment Time (min): 44  Visit #: 2 of 8    Treatment Diagnosis: Muscle weakness (generalized) [M62.81]    SUBJECTIVE  Pain Level (0-10 scale): 0  Any medication changes, allergies to medications, adverse drug reactions, diagnosis change, or new procedure performed?: [x] No    [] Yes (see summary sheet for update)    Subjective functional status/changes:   [] No changes reported  Patient reported he is still clearing his throat when eat/drinking at home. OBJECTIVE  Treatment provided includes:  Increase/Improve:  []  Voice Quality [x]  Cognitive Linguistic Skills []  Laryngeal/Pharyngeal Exercises   []  Vocal Loudness []  Reading Comprehension [x]  Swallowing Skills    []  Vocal Cord Function []  Auditory Comprehension []  Oral Motor Skills   []  Resonance []  Writing Skills [x]  Compensatory strategies    []  Speech Intelligibility []  Expressive Language []  Attention   []  Breath Support/Coord.  []  Receptive language [x]  Memory   []  Articulation [x]  Safety Awareness []    []  Fluency []  Word Retrieval []        Treatment Provided:  Recalling safe swallowing strategies: 1/5 Lon, Lon  Double swallow, thin and mechanical soft: 90% Lon  S/sx of aspiration, thin and mechanical soft: 6/9 trials, Min A  Memory strategies utilized during Tx session: 3 Lon  Recalling 3 words, 5 min delay: 100% acc, Lon  Repeating sentences, 5 sec delay: 57% acc, Lon    Patient/Caregiver  Education: [x] Review HEP      HEP/Handouts given: swallowing strategies, memory strategies    Pain Level (0-10 scale) post treatment: 0    ASSESSMENT   []   Improving appropriately and progressing toward goals  [x]   Improving slowly and progressing toward goals  []   Approximating goals/maximum potential  [x]   Continues to benefit from skilled therapy to address remaining functional deficits  []   Not progressing toward goals and plan of care will be adjusted    Patient will continue to benefit from skilled therapy to address remaining functional deficits: dysphagia, memory     Progress towards goals / Updated goals:  Patient is using memory strategies at home, however he would benefit from addressing functional breakdown in socializing and home life regarding recalling names and medication management in therapy. Patient was introduced to memory strategies and was given examples. Dysphagia therapy is still warranted as Pt continues to have difficulty completing trials of regular and thin with s/sx of aspiration. Pt was noncompliant with the SLP-CF's recommendation to practices exercises 3x daily and was reminded to do so for maximum benefit. Short Term Goals:  Goal: 1.) The patient will tolerate a regular diet with nectar thick liquids with 0 s/sx of aspiration in 9 out of 10 trials. Goal: 2.) The patient will complete tongue base retraction, laryngeal/pharyngeal strengthening exercises (including Faina, Mendelsohn, Shaker, etc.),  and lingual strengthening, ROM, and coordination exercises with 90% accuracy Lon. for carryover with HEP. Goal 3.) Recall 3-5 words after delay/distraction and 1-2 (6-8 word) sentences to increase recall abilities as related to scheduling appointments with min-mod A in 3/5 trials with visual/verbal cues. Goal 4.) Answer egocentric information via open-ended wh-?'s with < 2 word recall error at the sentence level, utilizing word-recall techniques, min-mod visual/verbal cues. Goal 5.) Complete moderately complex linguistic organization tasks with 90% acc with min. Goal 6.) Complete visual-spatial tasks related to functional ADLs for safe, independent social reintegration with min A.     PLAN  [x]  Continue plan of care  []  Modify Goals/Treatment Plan      []  Discharge due to:  [] Other:    April Palma, AUGUSTINA-CF 8/4/2017  12:32 PM    Future Appointments  Date Time Provider Nelson Hurtado   8/4/2017 1:30 PM Dav Mcrae PKLGTOH [facility]   8/4/2017 2:00 PM Hayden Millan, PTA MMCPTPB [facility]   8/7/2017 1:30 PM Hayden Millan PTA MMCPTPB [facility]   8/7/2017 2:00 PM Monica Pacheco, OTR/L MMCPTPB [facility]   8/7/2017 3:00 PM April Palma FOCFHQA [facility]   8/9/2017 1:00 PM Monica Pacheco, OTR/L MMCPTPB [facility]   8/9/2017 1:30 PM Sanna Perdomo RRUJJAO [facility]   8/9/2017 2:15 PM April Palma SYMJFGJ [facility]   8/14/2017 2:00 PM Hayden Millan PTA MMCPTPB [facility]   8/14/2017 2:30 PM Monica Pacheco, OTR/L MMCPTPB [facility]   8/14/2017 3:00 PM April Palma TYLKXHV [facility]   8/16/2017 1:00 PM Rosmery Hunter Swiader MMCPTPB [facility]   8/16/2017 2:00 PM Monica Pacheco, OTR/L MMCPTPB [facility]   8/16/2017 2:30 PM Hayden Millan PTA MMCPTPB [facility]   8/21/2017 1:30 PM Angelita Ng, PT RQQLEPQ [facility]   8/21/2017 2:00 PM Monica Pacheco, OTR/L MMCPTPB [facility]   8/21/2017 3:00 PM Mojgancorrineadrienne Palma OEPHEPX [facility]   8/23/2017 2:00 PM Hayden Millan, PTA MMCPTPB [facility]   8/23/2017 2:30 PM Monica Pacheco, OTR/L MMCPTPB [facility]   8/23/2017 3:00 PM April Evansy MMCPTPB [facility]   8/28/2017 1:30 PM Angelita Ng, PT MMCPTPB [facility]   8/28/2017 2:00 PM Monica Pacheco, OTR/L MMCPTPB [facility]   8/28/2017 3:00 PM Mojgancorrineadrienne Palma WQAIMJG [facility]   8/30/2017 2:30 PM Hayden Millan, PTA MMCPTPB [facility]   8/30/2017 3:00 PM Mojgankamila Nathany MMCPTPB [facility]   9/20/2017 9:30 AM Taran Moralez  [address], [address] [place]

## 2017-08-04 NOTE — PROGRESS NOTES
PT DAILY TREATMENT NOTE - [facility]     Patient Name: Sandre Buerger  Date:2017  : 1940  [x]  Patient  Verified  Payor: VA MEDICARE / Plan: VA MEDICARE PART A & B / Product Type: Medicare /    In time:205  Out time:229  Total Treatment Time (min): 24  Total Timed Codes (min): 24  1:1 Treatment Time ( only): 24   Visit #: 18 of     Treatment Area: Muscle weakness (generalized) [M62.81]    SUBJECTIVE  Pain Level (0-10 scale): 0/10  Any medication changes, allergies to medications, adverse drug reactions, diagnosis change, or new procedure performed?: [x] No    [] Yes (see summary sheet for update)  Subjective functional status/changes:   [] No changes reported  Pt stated that he is fine and has been doing his HEP    OBJECTIVE    24 min Therapeutic Exercise:  [x] See flow sheet :   Rationale: increase ROM and increase strength to improve the patients ability to increase ease with walking and performing ADLs    With   [x] TE   [] TA   [] neuro   [] other: Patient Education: [x] Review HEP    [] Progressed/Changed HEP based on:   [] positioning   [] body mechanics   [] transfers   [] heat/ice application    [] other:      Other Objective/Functional Measures:   Pt showed understanding of final HEP     Pain Level (0-10 scale) post treatment: 0/10    ASSESSMENT/Changes in Function:   []  See Plan of Care  []  See progress note/recertification  [x]  See Discharge Summary         Progress towards goals / Updated goals:  1. Pt will improve FOTO by 16 points in order to demonstrate functional improvement  Progressing. Increased from 47 to 52. 17  2. Pt will demonstrate understanding/compliance with final HEP for continued progression independently upon DC   Goal met.  17    PLAN  []  Upgrade activities as tolerated     []  Continue plan of care  []  Update interventions per flow sheet       [x]  Discharge   []  Other:_      Hollis Álvarez PTA 2017  2:00 PM    Future Appointments  Date Time Provider Nelson Genesis   8/7/2017 1:30 PM Hamlet Guajardo PTA MMCPTPB [facility]   8/7/2017 2:00 PM Lorenzo Steward, OTR/L MMCPTPB [facility]   8/7/2017 3:00 PM Monty Harding MMCPTPB [facility]   8/9/2017 1:00 PM Lorenzo Steward, OTR/L MMCPTPB [facility]   8/9/2017 1:30 PM José Jolly NZLPONS [facility]   8/9/2017 2:15 PM Monty Harding GCOPACZ [facility]   8/14/2017 2:00 PM Hamlet Guajardo PTA MMCPTPB [facility]   8/14/2017 2:30 PM Lorenzo Steward, OTR/L MMCPTPB [facility]   8/14/2017 3:00 PM Monty Harding UMODKFG [facility]   8/16/2017 1:00 PM Aileen Berkowitz MMCPTPB [facility]   8/16/2017 2:00 PM Lorenzo Steward, OTR/L MMCPTPB [facility]   8/16/2017 2:30 PM Hamlet Guajardo PTA MMCPTPB [facility]   8/21/2017 1:30 PM Rebeccabart Villareal, PT UFASFPA [facility]   8/21/2017 2:00 PM Lorenzo Steward, OTR/L MMCPTPB [facility]   8/21/2017 3:00 PM Monty Harding MBVJKTL [facility]   8/23/2017 2:00 PM Hamlet Guajardo PTA MMCPTPB [facility]   8/23/2017 2:30 PM Lorenzo Steward, OTR/L MMCPTPB [facility]   8/23/2017 3:00 PM Monty Harding MMCPTPB [facility]   8/28/2017 1:30 PM Rebecca Villareal, PT KEAUZQH [facility]   8/28/2017 2:00 PM Lorenzo Steward, OTR/L MMCPTPB [facility]   8/28/2017 3:00 PM Monty Harding QRWFMOP [facility]   8/30/2017 2:30 PM Hamlet Guajardo, PTA MMCPTPB [facility]   8/30/2017 3:00 PM Monty Harding MMCPTPB [facility]   9/20/2017 9:30 AM Alcides Rios  [address], [address] [place]

## 2017-08-04 NOTE — PROGRESS NOTES
OT DAILY TREATMENT NOTE - [facility]    Patient Name: Trever Giraldo  Date:2017  : 1940  [x]  Patient  Verified  Payor: VA MEDICARE / Plan: VA MEDICARE PART A & B / Product Type: Medicare /    In time:130  Out time:200  Total Treatment Time (min): 30  Total Timed Codes (min): 30  1:1 Treatment Time ( [address] only): 30   Visit #: 15 of 24    Treatment Area: Muscle weakness (generalized) [M62.81]    SUBJECTIVE  Pain Level (0-10 scale): 0/10  Any medication changes, allergies to medications, adverse drug reactions, diagnosis change, or new procedure performed?: [x] No    [] Yes (see summary sheet for update)  Subjective functional status/changes:   [] No changes reported  I have written a lot of checks over the years. It's frustrating, you wouldn't get it. OBJECTIVE     10 min Therapeutic Exercise:  [] See flow sheet :   Rationale: increase strength to improve the patients ability to use RUE    Red Therabar: flexion, extension, supination, pronation 15x each  Firm Theraputty: Pt used R hand to manipulate putty to reveal and remove 1/4 in pegs 10x    10 min Therapeutic Activity:  []  See flow sheet :   Rationale: improve coordination  to improve the patients ability to manipulate small items    Purdue Pegboard: timed, x2     10 min Self Care/Home Management: check writing   Rationale: improve checkwriting subtask  to improve the patients ability to manage finances    Pt was given worksheet with numerical numbers, asked to write out price as if witting a check. Pt completed 8 exampled and will bring back finished worksheet at next session.      With   [] TE   [] TA   [] neuro   [] other: Patient Education: [x] Review HEP    [] Progressed/Changed HEP based on:   [] positioning   [] body mechanics   [] transfers   [] heat/ice application   [] Splint wear/care   [] Sensory re-education   [] scar management      [] other:            Other Objective/Functional Measures:     Purdue: 3:28, 3:00 (3:28)     Check Writing: Pt repeatidly left out the word \"and\" but was able to self-correct on each occurrence. Pt became frustrated as activity progressed. Pain Level (0-10 scale) post treatment: 0/10    ASSESSMENT/Changes in Function: Improving awareness of errors in check writing. Patient will continue to benefit from skilled OT services to modify and progress therapeutic interventions, address ROM deficits, address strength deficits and instruct in home and community integration to attain remaining goals. []  See Plan of Care  []  See progress note/recertification  []  See Discharge Summary         Progress towards goals / Updated goals:  1.  Patient will complete checks accurately and legibly 100% of trials. Progressing 8/4/17  2.  Patient will improve fine motor speed 15% to allow for increased speed of check completion.  Progressing, 14% improvement time for Purdue 8/4/17  3.  Patient will increase r hand  at least 13# to allow for completion of carrying garbage, opening jars etc. Progressing, Pt used gripper set on 55# 7/13/17    PLAN  []  Upgrade activities as tolerated     [x]  Continue plan of care  []  Update interventions per flow sheet       []  Discharge due to:_  []  Other:_      ULI Mckee-Applicant 6/3/7255  0:09 PM    Future Appointments  Date Time Provider Nelson Hurtado   8/4/2017 2:00 PM Ashley Gross, PTA MMCPTPB [facility]   8/7/2017 1:30 PM Ashley Gross, PTA MMCPTPB [facility]   8/7/2017 2:00 PM Alex Antis, OTR/L MMCPTPB [facility]   8/7/2017 3:00 PM Alejandra Lyons CKLOGUF [facility]   8/9/2017 1:00 PM Alex Antis, OTR/L MMCPTPB [facility]   8/9/2017 1:30 PM Brandin Napoles JJPXQNS [facility]   8/9/2017 2:15 PM Alejandra Lyons SSPVZDV [facility]   8/14/2017 2:00 PM Ramirez Plump, PTA MMCPTPB [facility]   8/14/2017 2:30 PM Alex Antis, OTR/L MMCPTPB [facility]   8/14/2017 3:00 PM Alejandra Lyons MMCPTPB [facility]   8/16/2017 1:00 PM Alejandra Lyons ESLMKPJ [facility]   8/16/2017 2:00 PM Alex Antis, OTR/L MMCPTPB [facility] 8/16/2017 2:30 PM Jon Bumpers, PTA MMCPTPB [facility]   8/21/2017 1:30 PM Nicky Zaragoza, PT HLEXXKO [facility]   8/21/2017 2:00 PM Angela Mann, OTR/L MMCPTPB [facility]   8/21/2017 3:00 PM Aurelia Hung ZLRXWRP [facility]   8/23/2017 2:00 PM Jon Bumpers, PTA MMCPTPB [facility]   8/23/2017 2:30 PM Angela Mann, OTR/L MMCPTPB [facility]   8/23/2017 3:00 PM Aurelia Hung PQKLMEE [facility]   8/28/2017 1:30 PM Nicky Zaragoza, PT QZSHAMK [facility]   8/28/2017 2:00 PM Angela Mann, OTR/L MMCPTPB [facility]   8/28/2017 3:00 PM Aurelia Hung AWGRPWV [facility]   8/30/2017 2:30 PM Jon Bumpers, PTA MMCPTPB [facility]   8/30/2017 3:00 PM Aurelia Hung BHQGTVK [facility]   9/20/2017 9:30 AM Jose Luis Moyer  [address] [place] [address]

## 2017-08-07 ENCOUNTER — CLINICAL SUPPORT (OUTPATIENT)
Dept: INTERNAL MEDICINE CLINIC | Age: 77
End: 2017-08-07

## 2017-08-07 ENCOUNTER — APPOINTMENT (OUTPATIENT)
Dept: PHYSICAL THERAPY | Age: 77
End: 2017-08-07
Payer: MEDICARE

## 2017-08-07 ENCOUNTER — HOSPITAL ENCOUNTER (OUTPATIENT)
Dept: PHYSICAL THERAPY | Age: 77
Discharge: HOME | End: 2017-08-07
Payer: MEDICARE

## 2017-08-07 VITALS — DIASTOLIC BLOOD PRESSURE: 60 MMHG | SYSTOLIC BLOOD PRESSURE: 118 MMHG

## 2017-08-07 DIAGNOSIS — I10 ESSENTIAL HYPERTENSION: Primary | ICD-10-CM

## 2017-08-07 PROCEDURE — 97535 SELF CARE MNGMENT TRAINING: CPT

## 2017-08-07 RX ORDER — MECLIZINE HCL 12.5 MG 12.5 MG/1
12.5 TABLET ORAL
Qty: 30 TAB | Refills: 1 | Status: SHIPPED | OUTPATIENT
Start: 2017-08-07 | End: 2017-08-17

## 2017-08-07 NOTE — PROGRESS NOTES
OT DAILY TREATMENT NOTE - [facility]     Patient Name: Urbano Vizcarra  Date:2017  : 1940  [x]  Patient  Verified  Payor: VA MEDICARE / Plan: VA MEDICARE PART A & B / Product Type: Medicare /    In time:200  Out time:230  Total Treatment Time (min): 30  Total Timed Codes (min): 30  1:1 Treatment Time ( [address] only): 30   Visit #: 16 of 24    Treatment Area: Muscle weakness (generalized) [M62.81]    SUBJECTIVE  Pain Level (0-10 scale): 0/10  Any medication changes, allergies to medications, adverse drug reactions, diagnosis change, or new procedure performed?: [x] No    [] Yes (see summary sheet for update)  Subjective functional status/changes:   [] No changes reported  I feel like I could pass out    OBJECTIVE        30 min Self Care/Home Management: Checkbook management   Rationale: improve financial management  to improve the patients ability to write checks  Checkbook balancing task    With   [] TE   [] TA   [] neuro   [] other: Patient Education: [x] Review HEP    [] Progressed/Changed HEP based on:   [] positioning   [] body mechanics   [] transfers   [] heat/ice application   [] Splint wear/care   [] Sensory re-education   [] scar management      [] other:            Other Objective/Functional Measures:   /67  Pulse 70     Pain Level (0-10 scale) post treatment: 0/10    ASSESSMENT/Changes in Function: Requires verbal cueing for format and initial calculator use. Patient with lower than ususal BP, shaking in L hand ( brief). Spoke with daughter to go to MD or hospital.      Patient will continue to benefit from skilled OT services to modify and progress therapeutic interventions and instruct in home and community integration to attain remaining goals. []  See Plan of Care  []  See progress note/recertification  []  See Discharge Summary         Progress towards goals / Updated goals:  1.  Patient will complete checks accurately and legibly 100% of trials.  Progressing 17, working on [employer] Mikayla, verbal cues for format 8/7/17  2.  Patient will improve fine motor speed 15% to allow for increased speed of check completion.  Progressing, 14% improvement time for [place]ue 8/4/17  3.  Patient will increase r hand  at least 13# to allow for completion of carrying garbage, opening jars etc. Progressing, Pt used gripper set on 55# 7/13/17      PLAN  [x]  Upgrade activities as tolerated     [x]  Continue plan of care  []  Update interventions per flow sheet       []  Discharge due to:_  []  Other:_      Pam Adorno OTR/L 8/7/2017  2:17 PM    Future Appointments  Date Time Provider Nelson Hurtado   8/7/2017 3:00 PM Heidy Sage VNMQLHT [facility]   8/9/2017 1:00 PM Pam Adorno OTR/L MMCPTPB [facility]   8/9/2017 2:15 PM Heidy Sage [id number] [facility]   8/14/2017 2:30 PM Pam Adorno, OTR/L MMCPTPB [facility]   8/14/2017 3:00 PM Heidy Sage WXVJKRC [facility]   8/16/2017 1:00 PM Heidy Sage JYVVKFB [facility]   8/16/2017 2:00 PM Pam Adorno, OTR/L MMCPTPB [facility]   8/21/2017 2:00 PM Pam Adorno, OTR/L MMCPTPB [facility]   8/21/2017 3:00 PM Heidy Sage HHKRICQ [facility]   8/23/2017 2:30 PM Pam Adorno, OTR/L MMCPTPB [facility]   8/23/2017 3:00 PM Heidy Sage RBTCADM [facility]   8/28/2017 2:00 PM Pam Adorno, OTR/L MMCPTPB [facility]   8/28/2017 3:00 PM Heidy Sage MMCPTPB [facility]   8/30/2017 3:00 PM Heidy KING [facility]   9/20/2017 9:30 AM Maria E Leroy  [address], [address] [place]

## 2017-08-07 NOTE — PROGRESS NOTES
Patient came into the office today for a blood pressure check. Blood pressure was checked in the left arm at 118/60. Dr Christopher Sheikh informed and spoke with patient. Patient able to leave office ambulatory.

## 2017-08-09 ENCOUNTER — APPOINTMENT (OUTPATIENT)
Dept: PHYSICAL THERAPY | Age: 77
End: 2017-08-09
Payer: MEDICARE

## 2017-08-09 ENCOUNTER — HOSPITAL ENCOUNTER (OUTPATIENT)
Dept: PHYSICAL THERAPY | Age: 77
Discharge: HOME | End: 2017-08-09
Payer: MEDICARE

## 2017-08-09 PROCEDURE — 97530 THERAPEUTIC ACTIVITIES: CPT

## 2017-08-09 PROCEDURE — 92507 TX SP LANG VOICE COMM INDIV: CPT

## 2017-08-09 PROCEDURE — 97110 THERAPEUTIC EXERCISES: CPT

## 2017-08-09 PROCEDURE — 92526 ORAL FUNCTION THERAPY: CPT

## 2017-08-09 NOTE — PROGRESS NOTES
[facility] [employer]  [address] [place] [address]  [phone] fax    Continued Plan of Care/ Re-certification for Occupational Therapy Services    Patient name: Alison Esteban Start of Care: 17   Referral source: Kvng Marin MD : 1940   Medical/Treatment Diagnosis: Muscle weakness (generalized) [M62.81] Onset Date:17     Prior Hospitalization: see medical history Provider#: [id number]   Medications: Verified on Patient Summary List    Comorbidities: Thyroid, depression  Prior Level of Function:I self care home care driving, 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 The Plan of Care and following information is based on the patient's current status:        Trinh Martin will complete checks accurately and legibly 100% of trials. Status at last note/certification:unable  Current Status: progressing, patient now at 955 level but states son will continue to do    Trinh Martin will improve fine motor speed 15% to allow for increased speed of check completion. Status at last note/certification:9 hole peg [place]  Current Status: metMet 9 hole peg increased 17%, and [place] increased 26%    Trinh Martin will increase r hand  at least 13# to allow for completion of carrying garbage, opening jars etc.   Status at last note/certification: 93#  Current Status: not met  51#Ongoing fatigue limits improvement      Key functional changes: Ongoing improvement in checkwriting and math skills, fine motor speed and handwriting.         Problems/ barriers to goal attainment: Fluctuating energy level     Treatment Plan: Therapeutic exercise, Therapeutic activities, Patient education and ADLs/IADLs      Patient Goal (s) has been updated and includes: Better handwriting, I think my son will continue to do checkbook     Goals for this certification period to be accomplished in 7 treatments:  1. Patient will increase r hand  at least 13# to allow for completion of carrying garbage, opening jars etc.   2.  Patient will be able to write legibly to fill out paperwork in medical appointments. 3.  Patient will achieve ongoing improvement in fine motor speed and praxis for improved letter formation. Frequency / Duration: Patient to be seen 2-3 times per week for 7 treatments:    Assessment / Recommendations:Patient will benefit from continued skilled occupational therapy to increase upper extremity function and functional independence. G-Codes (GO)  Self Care   Current  CJ= 20-39%   Goal  CJ= 20-39%    The severity rating is based on clinical judgment and the FOTO score. Certification Period: 8/10/17-9/9/17    FANI Stinson 8/9/2017 1:27 PM    ________________________________________________________________________  I certify that the above Therapy Services are being furnished while the patient is under my care. I agree with the treatment plan and certify that this therapy is necessary.       Physician's Signature:_________________ Date:___________Time:__________      Please sign and return to [facility]  [address] [place] [address]            [phone] fax

## 2017-08-09 NOTE — PROGRESS NOTES
OT DAILY TREATMENT NOTE - [facility]     Patient Name: Lamberto Franks  Date:2017  : 1940  [x]  Patient  Verified  Payor: [place] MEDICARE / Plan: [place] MEDICARE PART A & B / Product Type: Medicare /    In time:100  Out time:130  Total Treatment Time (min): 30  Total Timed Codes (min): 30  1:1 Treatment Time (MC only): 30   Visit #: 17 of 24    Treatment Area: Muscle weakness (generalized) [M62.81]    SUBJECTIVE  Pain Level (0-10 scale): 0/10  Any medication changes, allergies to medications, adverse drug reactions, diagnosis change, or new procedure performed?: [] No    [x] Yes (see summary sheet for update)  Subjective functional status/changes:   [] No changes reported  Feeling a little better, MD changed my medicine  Feeling a litle short of breath  My son is doing my bill son line now, I guess I will let him keep doing that  I would like to make my handwriting better    OBJECTIVE      20 min Therapeutic Exercise:  [] See flow sheet :   Rationale: increase strength to improve the patients ability to grasp  Recheck for note  Red therabar and green therabar ( patient request) x 15 each    10 min Therapeutic Activity:  []  See flow sheet :   Rationale: improve coordination  to improve the patients ability to manipulate items  Recheck for note       With   [] TE   [] TA   [] neuro   [] other: Patient Education: [x] Review HEP    [] Progressed/Changed HEP based on: handwriting homework  [] positioning   [] body mechanics   [] transfers   [] heat/ice application   [] Splint wear/care   [] Sensory re-education   [] scar management      [] other:            Other Objective/Functional Measures:     51# (52)  9 hole  34 (41) 17%  [place] 158 (213)    Pain Level (0-10 scale) post treatment: 0/10    ASSESSMENT/Changes in Function: Improved fine motor and checkwriting skills    Patient will continue to benefit from skilled OT services to modify and progress therapeutic interventions, address strength deficits, analyze and cue movement patterns and instruct in home and community integration to attain remaining goals. []  See Plan of Care  []  See progress note/recertification  []  See Discharge Summary         Progress towards goals / Updated goals:  1.  Patient will complete checks accurately and legibly 100% of trials. Progressing 95% success, goal discontinued  2.  Patient will improve fine motor speed 15% to allow for increased speed of check completion.  Met 17%, and 26%  3.  Patient will increase r hand  at least 13# to allow for completion of carrying garbage, opening jars etc. Not met Ongoing fatigue limits improvement      PLAN  []  Upgrade activities as tolerated     []  Continue plan of care  []  Update interventions per flow sheet       []  Discharge due to:_  []  Other:_      Lorry Stagers, OTR/L 8/9/2017  1:01 PM    Future Appointments  Date Time Provider Nelson Hurtado   8/9/2017 2:15 PM Nick Matson [id number] [facility]   8/14/2017 2:30 PM Lorry Stagers, OTR/L MMCPTPB [facility]   8/14/2017 3:00 PM Nick Matson [id number] [facility]   8/16/2017 1:00 PM Nick Matson [id number] [facility]   8/16/2017 2:00 PM Lorry Stagers, OTR/L MMCPTPB [facility]   8/21/2017 2:00 PM Lorry Stagers, OTR/L MMCPTPB [facility]   8/21/2017 3:00 PM Nick Matson [id number] [facility]   8/23/2017 2:30 PM Lorry Stagers, OTR/L MMCPTPB [facility]   8/23/2017 3:00 PM Nick Matson [id number] [facility]   8/28/2017 2:00 PM Lorry Stagers, OTR/L MMCPTPB [facility]   8/28/2017 3:00 PM Nick Caras MMCPTPB [facility]   8/30/2017 3:00 PM Nick THOMPSONGYSANJAY [facility]   9/20/2017 9:30 AM Bhavesh Sal  [address] [place]

## 2017-08-09 NOTE — PROGRESS NOTES
ST DAILY TREATMENT NOTE    Patient Name: Marlene Gaston  Date:2017  : 1940  [x]  Patient  Verified  Payor: Payor: VA MEDICARE / Plan: VA MEDICARE PART A & B / Product Type: Medicare /   In time: 2:10 Out time: 2:50  Total Treatment Time (min): 40  Visit #: 3 of 8    Treatment Diagnosis: Muscle weakness (generalized) [M62.81]    SUBJECTIVE  Pain Level (0-10 scale): 0  Any medication changes, allergies to medications, adverse drug reactions, diagnosis change, or new procedure performed?: [] No    [x] Yes (see summary sheet for update)  Pt will call office later today to call in his updated medications. Pt stated he discontinued his antidepressants without consultation with his Dr and was advised by the SLP-CF the risks for stopping medication without Dr orders. Pt acknowledged SLP-CF advice to call his Dr and stated he will follow up with his Dr on the matter. Subjective functional status/changes:   [] No changes reported  Pt reported that his swallowing at home has been \"pretty good\". Pt stated he is completing HEP for laryngeal/pharyngeal exercises at home 2x daily. OBJECTIVE  Treatment provided includes:  Increase/Improve:  []  Voice Quality [x]  Cognitive Linguistic Skills [x]  Laryngeal/Pharyngeal Exercises   []  Vocal Loudness []  Reading Comprehension [x]  Swallowing Skills    []  Vocal Cord Function []  Auditory Comprehension []  Oral Motor Skills   []  Resonance []  Writing Skills [x]  Compensatory strategies    []  Speech Intelligibility []  Expressive Language []  Attention   []  Breath Support/Coord. []  Receptive language [x]  Memory   []  Articulation [x]  Safety Awareness []    []  Fluency []  Word Retrieval []        Treatment Provided:  PO trials of thin and regular diet, # s/sx of aspiration: Unable to assess as Pt throat clears and double swallows after each PO trial as a self taught compensatory strategy.  Pt vocal quality not changed after each swallow of regular or thin PO trials on (8/9/2017), thus reflecting no s/sx of aspiration observed. Review HEP, perform pharyngeal/largyngeal exercises: 100% Lon, completed 2x daily  Egocentric information via open-ended wh-?'s with < 2 word recall error: 100% accuracy, Lon   Recall of 3 words with 5 min delay: 3:3 trials Lon    Patient/Caregiver  Education: [x] Review HEP      HEP/Handouts given: Functional memory worksheet, scheduling appointments    Pain Level (0-10 scale) post treatment: 0    ASSESSMENT   [x]   Improving appropriately and progressing toward goals  []   Improving slowly and progressing toward goals  []   Approximating goals/maximum potential  [x]   Continues to benefit from skilled therapy to address remaining functional deficits  []   Not progressing toward goals and plan of care will be adjusted    Patient will continue to benefit from skilled therapy to address remaining functional deficits: dysphagia, cognitive linguistic: memory    Progress towards goals / Updated goals:  Pt is noncompliant in performing HEP at home as he is completing fewer repetitions than recommended (3x daily 15x each trial). Pt completed PO trials while utilizing safe swallowing strategies 100% of the time Lon. Pt throat cleared in conversation several times during his session and during PO trials. Pt stated that he throat clears from a result of a \"thick dry mucus\" which he is currently taking medication for and not because of a poor swallow. Pt MET the following goals today in his therapy session: \"tolerate a regular diet with thin thick liquids with 0 s/sx of aspiration in 9 out of 10 trials (Reveision of goal for liquids to be thin on this day 8/9/2017)\", \"answer egocentric information via open-ended wh-?'s with < 2 word recall error at the sentence level, utilizing word-recall techniques, min-mod visual/verbal cues\".  Pt continues to benefit from therapy to address his cognitive linguistic goals and is approximating his goals for dysphagia. Pt recommended to complete MBS later this month to assess Pt's need to continue dysphagia therapy and to assess his progress. PLAN  [x]  Continue plan of care  []  Modify Goals/Treatment Plan      []  Discharge due to:  [] Other:    Short Term Goals:  1.) The patient will tolerate a regular diet with thin thick liquids with 0 s/sx of aspiration in 9 out of 10 trials. (Reveision of goal for liquids to be thin on this day 8/9/2017). Approaching, will varify goal MET with completion of MBS. as Pt throat clears and double swallows after each PO trial as a self taught compensatory strategy. Pt vocal quality not changed after each swallow of regular or thin on (8/9/2017)  2.) The patient will complete tongue base retraction, laryngeal/pharyngeal strengthening exercises (including Faina, Mendelsohn, Shaker, etc.), and lingual strengthening, ROM, and coordination exercises with 90% accuracy Lon for carryover with HEP. GOAL MET (8/9/2017)  3. ) Recall 3-5 words after delay/distraction and 1-2 (6-8 word) sentences to increase recall abilities as related to scheduling appointments with min-mod A in 3/5 trials with visual/verbal cues. 4.) Answer egocentric information via open-ended wh-?'s with < 2 word recall error at the sentence level, utilizing word-recall techniques, min-mod visual/verbal cues. GOAL MET (8/9/2017)  5.) Complete moderately complex linguistic organization tasks with 90% acc with min.   6.) Complete visual-spatial tasks related to functional ADLs for safe, independent social reintegration with min AEzra Carrillo, SLP-CF 8/9/2017  12:48 PM    Future Appointments  Date Time Provider Nelson Hurtado   8/9/2017 1:00 PM Roseline Bryson, OTR/L MMCPTPB [facility]   8/9/2017 2:15 PM Mike Carrillo [id number] [facility]   8/14/2017 2:30 PM Roseline Bryson, OTR/L MMCPTPB [facility]   8/14/2017 3:00 PM Mike Carrillo GPHBHKR [facility]   8/16/2017 1:00 PM Mike Carrillo UARGRZX [facility]   8/16/2017 2:00 PM SERENITY Smith/L MMQODAV [facility]   8/21/2017 2:00 PM Monica Pacheco, OTR/L MMCPTPB [facility]   8/21/2017 3:00 PM April Palma [id number] [facility]   8/23/2017 2:30 PM Monica Pacheco, OTR/L MMCPTPB [facility]   8/23/2017 3:00 PM April Palma JAXTDCF [facility]   8/28/2017 2:00 PM Monica Pacheco, OTR/L MMCPTPB [facility]   8/28/2017 3:00 PM April Palma YHVVHEX [facility]   8/30/2017 3:00 PM April Palma [id number] [facility]   9/20/2017 9:30 AM Taran Moralez  [address], [address] [place]

## 2017-08-14 ENCOUNTER — OFFICE VISIT (OUTPATIENT)
Dept: INTERNAL MEDICINE CLINIC | Age: 77
End: 2017-08-14

## 2017-08-14 ENCOUNTER — APPOINTMENT (OUTPATIENT)
Dept: PHYSICAL THERAPY | Age: 77
End: 2017-08-14
Payer: MEDICARE

## 2017-08-14 ENCOUNTER — HOSPITAL ENCOUNTER (OUTPATIENT)
Dept: LAB | Age: 77
Discharge: HOME | End: 2017-08-14
Payer: MEDICARE

## 2017-08-14 ENCOUNTER — HOSPITAL ENCOUNTER (OUTPATIENT)
Dept: PHYSICAL THERAPY | Age: 77
End: 2017-08-14
Payer: MEDICARE

## 2017-08-14 VITALS
WEIGHT: 184 LBS | HEART RATE: 68 BPM | TEMPERATURE: 97.4 F | DIASTOLIC BLOOD PRESSURE: 64 MMHG | BODY MASS INDEX: 25.76 KG/M2 | RESPIRATION RATE: 16 BRPM | SYSTOLIC BLOOD PRESSURE: 124 MMHG | OXYGEN SATURATION: 97 % | HEIGHT: 71 IN

## 2017-08-14 DIAGNOSIS — R42 DIZZINESS: ICD-10-CM

## 2017-08-14 DIAGNOSIS — I10 ESSENTIAL HYPERTENSION: Primary | ICD-10-CM

## 2017-08-14 DIAGNOSIS — I10 ESSENTIAL HYPERTENSION: ICD-10-CM

## 2017-08-14 LAB
ALBUMIN SERPL BCP-MCNC: 3.9 G/DL (ref 3.4–5)
ALBUMIN/GLOB SERPL: 1.1 {RATIO} (ref 0.8–1.7)
ALP SERPL-CCNC: 75 U/L (ref 45–117)
ALT SERPL-CCNC: 26 U/L (ref 16–61)
ANION GAP BLD CALC-SCNC: 11 MMOL/L (ref 3–18)
AST SERPL W P-5'-P-CCNC: 18 U/L (ref 15–37)
BASOPHILS # BLD AUTO: 0 K/UL (ref 0–0.06)
BASOPHILS # BLD: 0 % (ref 0–2)
BILIRUB SERPL-MCNC: 0.6 MG/DL (ref 0.2–1)
BUN SERPL-MCNC: 26 MG/DL (ref 7–18)
BUN/CREAT SERPL: 18 (ref 12–20)
CALCIUM SERPL-MCNC: 9 MG/DL (ref 8.5–10.1)
CHLORIDE SERPL-SCNC: 97 MMOL/L (ref 100–108)
CO2 SERPL-SCNC: 24 MMOL/L (ref 21–32)
CREAT SERPL-MCNC: 1.45 MG/DL (ref 0.6–1.3)
DIFFERENTIAL METHOD BLD: NORMAL
EOSINOPHIL # BLD: 0.2 K/UL (ref 0–0.4)
EOSINOPHIL NFR BLD: 3 % (ref 0–5)
ERYTHROCYTE [DISTWIDTH] IN BLOOD BY AUTOMATED COUNT: 13.8 % (ref 11.6–14.5)
GLOBULIN SER CALC-MCNC: 3.6 G/DL (ref 2–4)
GLUCOSE SERPL-MCNC: 99 MG/DL (ref 74–99)
HCT VFR BLD AUTO: 43 % (ref 36–48)
HGB BLD-MCNC: 14.7 G/DL (ref 13–16)
LYMPHOCYTES # BLD AUTO: 25 % (ref 21–52)
LYMPHOCYTES # BLD: 1.8 K/UL (ref 0.9–3.6)
MCH RBC QN AUTO: 28.8 PG (ref 24–34)
MCHC RBC AUTO-ENTMCNC: 34.2 G/DL (ref 31–37)
MCV RBC AUTO: 84.3 FL (ref 74–97)
MONOCYTES # BLD: 0.7 K/UL (ref 0.05–1.2)
MONOCYTES NFR BLD AUTO: 10 % (ref 3–10)
NEUTS SEG # BLD: 4.2 K/UL (ref 1.8–8)
NEUTS SEG NFR BLD AUTO: 62 % (ref 40–73)
PLATELET # BLD AUTO: 221 K/UL (ref 135–420)
PMV BLD AUTO: 11 FL (ref 9.2–11.8)
POTASSIUM SERPL-SCNC: 4.3 MMOL/L (ref 3.5–5.5)
PROT SERPL-MCNC: 7.5 G/DL (ref 6.4–8.2)
RBC # BLD AUTO: 5.1 M/UL (ref 4.7–5.5)
SODIUM SERPL-SCNC: 132 MMOL/L (ref 136–145)
WBC # BLD AUTO: 6.9 K/UL (ref 4.6–13.2)

## 2017-08-14 PROCEDURE — 85025 COMPLETE CBC W/AUTO DIFF WBC: CPT | Performed by: INTERNAL MEDICINE

## 2017-08-14 PROCEDURE — 80053 COMPREHEN METABOLIC PANEL: CPT | Performed by: INTERNAL MEDICINE

## 2017-08-14 RX ORDER — FLUDROCORTISONE ACETATE 0.1 MG/1
TABLET ORAL
Qty: 30 TAB | Refills: 2 | Status: SHIPPED | OUTPATIENT
Start: 2017-08-14 | End: 2017-10-27 | Stop reason: SDUPTHER

## 2017-08-14 NOTE — PATIENT INSTRUCTIONS
Health Maintenance Due   Topic Date Due    DTaP/Tdap/Td  (1 - Tdap) 08[birth year]01/1959    Shingles Vaccine  06/01/1998    Glaucoma Screening   08/01/2003    Pneumococcal Vaccine (1 of 2 - PCV13) 08/01/2003    Flu Vaccine  08/01/2017

## 2017-08-14 NOTE — PROGRESS NOTES
1. Have you been to the ER, urgent care clinic since your last visit? Hospitalized since your last visit? No    2. Have you seen or consulted any other health care providers outside of the [facility] since your last visit? Include any pap smears or colon screening.  No      Patient complains of an 8/10 when questioned about dizziness--states he feels that he will pass out but doesn't

## 2017-08-16 ENCOUNTER — APPOINTMENT (OUTPATIENT)
Dept: PHYSICAL THERAPY | Age: 77
End: 2017-08-16
Payer: MEDICARE

## 2017-08-16 ENCOUNTER — HOSPITAL ENCOUNTER (OUTPATIENT)
Dept: PHYSICAL THERAPY | Age: 77
Discharge: HOME | End: 2017-08-16
Payer: MEDICARE

## 2017-08-16 PROCEDURE — 97530 THERAPEUTIC ACTIVITIES: CPT

## 2017-08-16 PROCEDURE — 92526 ORAL FUNCTION THERAPY: CPT

## 2017-08-16 PROCEDURE — 92507 TX SP LANG VOICE COMM INDIV: CPT

## 2017-08-16 NOTE — PROGRESS NOTES
The patient presents to the office today with the chief complaint of dizziness    HPI    The patient complains of moderately severe dizziness - orthostatic in nature. Not vertigo. The patient has had his orthostatic BP checked several times without any drops found. The patient is weak. He continues with grieving due to the recent death of his wife. The patient recently had restarted Zoloft. The patient had had high BPs off Benazepril. His BP had dropped too low if the Benazepril - the patient now is Benazepril 10 mg. Review of Systems   Constitutional: Positive for malaise/fatigue. Respiratory: Negative for shortness of breath. Cardiovascular: Negative for chest pain and leg swelling. Neurological: Positive for dizziness. Allergies   Allergen Reactions    Pcn [Penicillins] Rash       Current Outpatient Prescriptions   Medication Sig Dispense Refill    fludrocortisone (FLORINEF) 0.1 mg tablet 1 tablet daily 30 Tab 2    meclizine (ANTIVERT) 12.5 mg tablet Take 1 Tab by mouth three (3) times daily as needed for up to 10 days. (Patient taking differently: Take 6.25 mg by mouth three (3) times daily as needed.) 30 Tab 1    sertraline (ZOLOFT) 50 mg tablet 1 1/2 tablets daily 50 Tab 2    aspirin delayed-release 81 mg tablet Take 81 mg by mouth daily.  Cholecalciferol, Vitamin D3, (VITAMIN D3) 2,000 unit cap capsule Take 2,000 Units by mouth daily.  pantoprazole (PROTONIX) 20 mg tablet Take 20 mg by mouth daily.  cyanocobalamin 1,000 mcg tablet Take 1,000 mcg by mouth daily.  montelukast (SINGULAIR) 10 mg tablet Take 10 mg by mouth daily.  levothyroxine (SYNTHROID) 150 mcg tablet TAKE 1 TABLET BY MOUTH ONCE DAILY 90 Tab 3    atorvastatin (LIPITOR) 20 mg tablet 1 tablet daily  Indications: DYSLIPIDEMIA 30 Tab 6    clopidogrel (PLAVIX) 75 mg tab Take 1 Tab by mouth daily (with dinner).  Indications: Cerebral Thromboembolism Prevention 30 Tab 4    benazepril (LOTENSIN) 20 mg tablet 1 tablet daily (for BP) 30 Tab 4    traZODone (DESYREL) 50 mg tablet 1 1/2 tablets daily 50 Tab 3    tamsulosin (FLOMAX) 0.4 mg capsule Take 1 Cap by mouth daily. 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 HDL 42, LDL 97    Dysphagia as late effect of cerebrovascular accident (CVA) 4/19/2017    Erectile dysfunction     Gastroesophageal reflux disease     Hemiparesis affecting right side as late effect of cerebrovascular accident (CVA) ([facility] [address] 4/19/2017    History of endogenous hypertriglyceridemia     History of malignant neoplasm of prostate     [place] score 7     Hypothyroidism     Osteoarthrosis involving multiple sites     Procedure refused 4/21/2017    Speech Pathologist recommended NPO and PEG placement; Patient refused    Urinary incontinence     Male incontinence        Past Surgical History:   Procedure Laterality Date    COLONOSCOPY      HX CHOLECYSTECTOMY      HX HERNIA REPAIR Bilateral     inguinal    HX RADICAL PROSTATECTOMY  1-    perineal approach       Social History     Social History    Marital status:      Spouse name: N/A    Number of children: N/A    Years of education: N/A     Occupational History    Not on file.      Social History Main Topics    Smoking status: Never Smoker    Smokeless tobacco: Never Used    Alcohol use No    Drug use: No    Sexual activity: Yes     Partners: Female     Other Topics Concern    Not on file     Social History Narrative       Patient does have an advanced directive on file    Visit Vitals    /64 (BP 1 Location: Left arm, BP Patient Position: Sitting)    Pulse 68    Temp 97.4 °F (36.3 °C) (Tympanic)    Resp 16    Ht 5' 11\" (1.803 m)    Wt 184 lb (83.5 kg)    SpO2 97%    BMI 25.66 kg/m2       Physical Exam   Neck: Carotid bruit is not present. Cardiovascular: Normal rate and regular rhythm. Exam reveals no gallop. No murmur heard. Systolic BP with a 20 mm Hg drop in systolic BP   Pulmonary/Chest: He has no wheezes. He has no rales. Musculoskeletal: He exhibits no edema. BMI:  [facility] Outpatient Visit on 08/14/2017   Component Date Value Ref Range Status    Sodium 08/14/2017 132* 136 - 145 mmol/L Final    Potassium 08/14/2017 4.3  3.5 - 5.5 mmol/L Final    Chloride 08/14/2017 97* 100 - 108 mmol/L Final    CO2 08/14/2017 24  21 - 32 mmol/L Final    Anion gap 08/14/2017 11  3.0 - 18 mmol/L Final    Glucose 08/14/2017 99  74 - 99 mg/dL Final    BUN 08/14/2017 26* 7.0 - 18 MG/DL Final    Creatinine 08/14/2017 1.45* 0.6 - 1.3 MG/DL Final    BUN/Creatinine ratio 08/14/2017 18  12 - 20   Final    GFR est AA 08/14/2017 57* >60 ml/min/1.73m2 Final    GFR est non-AA 08/14/2017 47* >60 ml/min/1.73m2 Final    Comment: (NOTE)  Estimated GFR is calculated using the Modification of Diet in Renal   Disease (MDRD) Study equation, reported for both  Americans   (GFRAA) and non- Americans (GFRNA), and normalized to 1.73m2   body surface area. The physician must decide which value applies to   the patient. The MDRD study equation should only be used in   individuals age 25 or older. It has not been validated for the   following: pregnant women, patients with serious comorbid conditions,   or on certain medications, or persons with extremes of body size,   muscle mass, or nutritional status.       Calcium 08/14/2017 9.0  8.5 - 10.1 MG/DL Final    Bilirubin, total 08/14/2017 0.6  0.2 - 1.0 MG/DL Final    ALT (SGPT) 08/14/2017 26  16 - 61 U/L Final    AST (SGOT) 08/14/2017 18  15 - 37 U/L Final    Alk. phosphatase 08/14/2017 75  45 - 117 U/L Final    Protein, total 08/14/2017 7.5  6.4 - 8.2 g/dL Final    Albumin 08/14/2017 3.9  3.4 - 5.0 g/dL Final    Globulin 08/14/2017 3.6  2.0 - 4.0 g/dL Final    A-G Ratio 08/14/2017 1.1  0.8 - 1.7   Final    WBC 08/14/2017 6.9  4.6 - 13.2 K/uL Final    RBC 08/14/2017 5.10  4.70 - 5.50 M/uL Final    HGB 08/14/2017 14.7  13.0 - 16.0 g/dL Final    HCT 08/14/2017 43.0  36.0 - 48.0 % Final    MCV 08/14/2017 84.3  74.0 - 97.0 FL Final    MCH 08/14/2017 28.8  24.0 - 34.0 PG Final    MCHC 08/14/2017 34.2  31.0 - 37.0 g/dL Final    RDW 08/14/2017 13.8  11.6 - 14.5 % Final    PLATELET 43/34/0173 715  135 - 420 K/uL Final    MPV 08/14/2017 11.0  9.2 - 11.8 FL Final    NEUTROPHILS 08/14/2017 62  40 - 73 % Final    LYMPHOCYTES 08/14/2017 25  21 - 52 % Final    MONOCYTES 08/14/2017 10  3 - 10 % Final    EOSINOPHILS 08/14/2017 3  0 - 5 % Final    BASOPHILS 08/14/2017 0  0 - 2 % Final    ABS. NEUTROPHILS 08/14/2017 4.2  1.8 - 8.0 K/UL Final    ABS. LYMPHOCYTES 08/14/2017 1.8  0.9 - 3.6 K/UL Final    ABS. MONOCYTES 08/14/2017 0.7  0.05 - 1.2 K/UL Final    ABS. EOSINOPHILS 08/14/2017 0.2  0.0 - 0.4 K/UL Final    ABS.  BASOPHILS 08/14/2017 0.0  0.0 - 0.06 K/UL Final    DF 08/14/2017 AUTOMATED    Final   Hospital Outpatient Visit on 06/28/2017   Component Date Value Ref Range Status    Sodium 06/28/2017 130* 136 - 145 mmol/L Final    Potassium 06/28/2017 4.4  3.5 - 5.5 mmol/L Final    Chloride 06/28/2017 96* 100 - 108 mmol/L Final    CO2 06/28/2017 24  21 - 32 mmol/L Final    Anion gap 06/28/2017 10  3.0 - 18 mmol/L Final    Glucose 06/28/2017 112* 74 - 99 mg/dL Final    BUN 06/28/2017 22* 7.0 - 18 MG/DL Final    Creatinine 06/28/2017 1.33* 0.6 - 1.3 MG/DL Final    BUN/Creatinine ratio 06/28/2017 17  12 - 20   Final    GFR est AA 06/28/2017 >60  >60 ml/min/1.73m2 Final    GFR est non-AA 06/28/2017 52* >60 ml/min/1.73m2 Final    Comment: (NOTE)  Estimated GFR is calculated using the Modification of Diet in Renal   Disease (MDRD) Study equation, reported for both  Americans   (GFRAA) and non- Americans (GFRNA), and normalized to 1.73m2   body surface area. The physician must decide which value applies to   the patient. The MDRD study equation should only be used in   individuals age 25 or older. It has not been validated for the   following: pregnant women, patients with serious comorbid conditions,   or on certain medications, or persons with extremes of body size,   muscle mass, or nutritional status.  Calcium 06/28/2017 9.1  8.5 - 10.1 MG/DL Final       .  Results for orders placed or performed during the hospital encounter of 48/38/84   METABOLIC PANEL, COMPREHENSIVE   Result Value Ref Range    Sodium 132 (L) 136 - 145 mmol/L    Potassium 4.3 3.5 - 5.5 mmol/L    Chloride 97 (L) 100 - 108 mmol/L    CO2 24 21 - 32 mmol/L    Anion gap 11 3.0 - 18 mmol/L    Glucose 99 74 - 99 mg/dL    BUN 26 (H) 7.0 - 18 MG/DL    Creatinine 1.45 (H) 0.6 - 1.3 MG/DL    BUN/Creatinine ratio 18 12 - 20      GFR est AA 57 (L) >60 ml/min/1.73m2    GFR est non-AA 47 (L) >60 ml/min/1.73m2    Calcium 9.0 8.5 - 10.1 MG/DL    Bilirubin, total 0.6 0.2 - 1.0 MG/DL    ALT (SGPT) 26 16 - 61 U/L    AST (SGOT) 18 15 - 37 U/L    Alk.  phosphatase 75 45 - 117 U/L    Protein, total 7.5 6.4 - 8.2 g/dL    Albumin 3.9 3.4 - 5.0 g/dL    Globulin 3.6 2.0 - 4.0 g/dL    A-G Ratio 1.1 0.8 - 1.7     CBC WITH AUTOMATED DIFF   Result Value Ref Range    WBC 6.9 4.6 - 13.2 K/uL    RBC 5.10 4.70 - 5.50 M/uL    HGB 14.7 13.0 - 16.0 g/dL    HCT 43.0 36.0 - 48.0 %    MCV 84.3 74.0 - 97.0 FL    MCH 28.8 24.0 - 34.0 PG    MCHC 34.2 31.0 - 37.0 g/dL    RDW 13.8 11.6 - 14.5 %    PLATELET 765 040 - 241 K/uL    MPV 11.0 9.2 - 11.8 FL    NEUTROPHILS 62 40 - 73 %    LYMPHOCYTES 25 21 - 52 %    MONOCYTES 10 3 - 10 %    EOSINOPHILS 3 0 - 5 %    BASOPHILS 0 0 - 2 %    ABS. NEUTROPHILS 4.2 1.8 - 8.0 K/UL    ABS. LYMPHOCYTES 1.8 0.9 - 3.6 K/UL    ABS. MONOCYTES 0.7 0.05 - 1.2 K/UL    ABS. EOSINOPHILS 0.2 0.0 - 0.4 K/UL    ABS. BASOPHILS 0.0 0.0 - 0.06 K/UL    DF AUTOMATED         Assessment / Plan      ICD-10-CM ICD-9-CM    1. Essential hypertension D41 468.9 METABOLIC PANEL, COMPREHENSIVE      CBC WITH AUTOMATED DIFF      CO COLLECTION VENOUS BLOOD,VENIPUNCTURE   2. Dizziness B88 361.6 METABOLIC PANEL, COMPREHENSIVE      CBC WITH AUTOMATED DIFF      CO COLLECTION VENOUS BLOOD,VENIPUNCTURE     Labs  Add Florinef  he was advised to continue his maintenance medications    Follow-up Disposition:  Return in about 4 weeks (around 9/11/2017). I asked Delilah Patino [id number] if he has any questions and I answered the questions. Stewart APONTE  [id number] states that he understands the treatment plan and agrees with the treatment plan

## 2017-08-16 NOTE — PROGRESS NOTES
OT DAILY TREATMENT NOTE - [facility]     Patient Name: Heriberto Rausch  Date:2017  : 1940  [x]  Patient  Verified  Payor: Harinder Marino / Plan: [place] MEDICARE PART A & B / Product Type: Medicare /    In time:200  Out time:230  Total Treatment Time (min): 30  Total Timed Codes (min): 30  1:1 Treatment Time ([facility] only): 30   Visit #: 18 of 24    Treatment Area: Muscle weakness (generalized) [M62.81]    SUBJECTIVE  Pain Level (0-10 scale): 0/10  Any medication changes, allergies to medications, adverse drug reactions, diagnosis change, or new procedure performed?: [] No    [x] Yes (see summary sheet for update)  Subjective functional status/changes:   [] No changes reported  Went to see MD and he changed my meds-I feel a little better  This is harder than PT-it wears me out ( writing)    OBJECTIVE      5 min Therapeutic Exercise:  [] See flow sheet :   Rationale: increase strength to improve the patients ability to grasp  Green therabar x 15 each       25 min Self Care/Home Management: writing   Rationale: improve coordination  to improve the patients ability to communicate in writing  Handwriting activity completing sentences with standard pen in printing and cursive    With   [] TE   [] TA   [] neuro   [] other: Patient Education: [x] Review HEP    [] Progressed/Changed HEP based on:   [] positioning   [] body mechanics   [] transfers   [] heat/ice application   [] Splint wear/care   [] Sensory re-education   [] scar management      [] other:            Other Objective/Functional Measures:    All writing legible     Pain Level (0-10 scale) post treatment: 0/10    ASSESSMENT/Changes in Function: Improved spacing and staying on line when compared with prior trial    Patient will continue to benefit from skilled OT services to modify and progress therapeutic interventions, address strength deficits, analyze and cue movement patterns and instruct in home and community integration to attain remaining goals.     []  See Plan of Care  []  See progress note/recertification  []  See Discharge Summary         Progress towards goals / Updated goals:  1. Patient will increase r hand  at least 13# to allow for completion of carrying garbage, opening jars etc.   2.  Patient will be able to write legibly to fill out paperwork in medical appointments. improving 8/16/17  3. Patient will achieve ongoing improvement in fine motor speed and praxis for improved letter formation.           PLAN  [x]  Upgrade activities as tolerated     [x]  Continue plan of care  []  Update interventions per flow sheet       []  Discharge due to:_  []  Other:_      Sukh Hunt OTR/L 8/16/2017  2:06 PM    Future Appointments  Date Time Provider Nelson Hurtado   8/21/2017 2:00 PM Sukh Hunt OTR/L MMCPTPB [facility]   8/21/2017 3:00 PM Adrian Don [id number] [facility]   8/23/2017 2:30 PM Sukh Hunt OTR/L MMCPTPB [facility]   8/23/2017 3:00 [facility] KKLSP[facility]   8/28/2017 2:00 PM Sukh Hunt OTR/L MMCPTPB [facility]   8/28/2017 3:00 PM [facility] [id number] [facility]   8/30/2017 3:00 PM [facility] FHNSPRD [facility]   9/20/2017 9:30 AM Elle Royal  [address] [place]

## 2017-08-16 NOTE — PROGRESS NOTES
ST DAILY TREATMENT NOTE    Patient Name: Celeste Delcid  Date:2017  : 1940  [x]  Patient  Verified  Payor: Payor: Amena Jam / Plan: [place] MEDICARE PART A & B / Product Type: Medicare /   In time: 1:10  Out time:1:52  Total Treatment Time (min): 42  Visit #: 4 of 8    Treatment Diagnosis: Muscle weakness (generalized) [M62.81]    SUBJECTIVE  Pain Level (0-10 scale): 0  Any medication changes, allergies to medications, adverse drug reactions, diagnosis change, or new procedure performed?: [] No    [x] Yes (see summary sheet for update)    Subjective functional status/changes:   [] No changes reported  Pt reported that he misplaced his HEP and is swallowing with no difficulty at home. OBJECTIVE  Treatment provided includes:  Increase/Improve:  []  Voice Quality [x]  Cognitive Linguistic Skills []  Laryngeal/Pharyngeal Exercises   []  Vocal Loudness []  Reading Comprehension [x]  Swallowing Skills    []  Vocal Cord Function []  Auditory Comprehension []  Oral Motor Skills   []  Resonance []  Writing Skills [x]  Compensatory strategies    []  Speech Intelligibility []  Expressive Language []  Attention   []  Breath Support/Coord.  []  Receptive language [x]  Memory   []  Articulation [x]  Safety Awareness []    []  Fluency []  Word Retrieval []        Treatment Provided:  Social visual scanning task: 100% Lon  Moderately complex organization: 100% accuracy, Lon  Recall sentences with delay: 4/5, Lon  Immediate recall of sentences: 4/5, Lon  PO trials of thin and regular, use of safe swallowing strategies: 100%, Lon    Patient/Caregiver  Education: [x] Review HEP      HEP/Handouts given: [facility] protocol    Pain Level (0-10 scale) post treatment: 0    ASSESSMENT   [x]   Improving appropriately and progressing toward goals  []   Improving slowly and progressing toward goals  [x]   Approximating goals/maximum potential  [x]   Continues to benefit from skilled therapy to address remaining functional deficits  []   Not progressing toward goals and plan of care will be adjusted    Patient will continue to benefit from skilled therapy to address remaining functional deficits: dysphagia    Progress towards goals / Updated goals:  Pt is approaching swallowing goal, continues to throat clear after trials of thin liquid. Of note Pt throat clearing at start of session and throughout session. SLP-CF is recomending [place] thick liquids at home. Will continue thin trials in therapy. Pt educated on [employer] protocol and recommended to utilize it at home. Pt MET cognition goals on this day Lon. Pt stated he is not having difficulty at home regarding diet recommendations and cognitive deficits. Pt recommended to complete MBS to reassess swallow functioning. PLAN  [x]  Continue plan of care  []  Modify Goals/Treatment Plan      []  Discharge due to:  [x] Other: Continue swallowing goal. Pt recommended to complete MBS to reassess swallow functioning.         1.) The patient will tolerate a regular diet with thin thick liquids with 0 s/sx of aspiration in 9 out of 10 trials. (Reveision of goal for liquids to be thin on this day 8/9/2017). Approaching, will varify goal MET with completion of MBS. as Pt throat clears and double swallows after each PO trial as a self taught compensatory strategy. Pt vocal quality not changed after each swallow of regular or thin on (8/9/2017)  2.) The patient will complete tongue base retraction, laryngeal/pharyngeal strengthening exercises (including Faina, Mendelsohn, Shaker, etc.), and lingual strengthening, ROM, and coordination exercises with 90% accuracy Lon for carryover with HEP. GOAL MET (8/9/2017)  3. ) Recall 3-5 words after delay/distraction and 1-2 (6-8 word) sentences to increase recall abilities as related to scheduling appointments with min-mod A in 3/5 trials with visual/verbal cues. GOAL MET (8/16/2017)  4. ) Answer egocentric information via open-ended wh-?'s with < 2 word recall error at the sentence level, utilizing word-recall techniques, min-mod visual/verbal cues. GOAL MET (8/9/2017)  5.) Complete moderately complex linguistic organization tasks with 90% acc with min. GOAL MET (8/16/2017)  6. ) Complete visual-spatial tasks related to functional ADLs for safe, independent social reintegration with min A.  GOAL MET (8/16/2017)    AUGUSTINA Payne-CF 8/16/2017  2:02 PM    Future Appointments  Date Time Provider Nelson Hurtado   8/21/2017 2:00 PM Angeles Lua, OTR/L MMCPTPB [facility]   8/21/2017 3:00 PM Ellen Mckeon LZEMFGN [facility]   8/23/2017 2:30 PM Angeles Lua, OTR/L MMCPTPB [facility]   8/23/2017 3:00 PM Ellen Mckeon IACQCES [facility]   8/28/2017 2:00 PM Angeles Lua, OTR/L MMCPTPB [facility]   8/28/2017 3:00 PM Ellen Mckeon TQVICPN [facility]   8/30/2017 3:00 PM Ellen Mckeon VSUOELN [facility]   9/20/2017 9:30 AM Ady Dominguez  [address] [place] [address]

## 2017-08-21 ENCOUNTER — HOSPITAL ENCOUNTER (OUTPATIENT)
Dept: PHYSICAL THERAPY | Age: 77
Discharge: HOME | End: 2017-08-21
Payer: MEDICARE

## 2017-08-21 ENCOUNTER — APPOINTMENT (OUTPATIENT)
Dept: PHYSICAL THERAPY | Age: 77
End: 2017-08-21
Payer: MEDICARE

## 2017-08-21 PROCEDURE — 92526 ORAL FUNCTION THERAPY: CPT

## 2017-08-21 PROCEDURE — 92507 TX SP LANG VOICE COMM INDIV: CPT

## 2017-08-21 PROCEDURE — 97110 THERAPEUTIC EXERCISES: CPT

## 2017-08-21 PROCEDURE — 97530 THERAPEUTIC ACTIVITIES: CPT

## 2017-08-21 NOTE — PROGRESS NOTES
OT DAILY TREATMENT NOTE - [facility]     Patient Name: Marlene Gaston  Date:2017  : 1940  [x]  Patient  Verified  Payor: Kimberly Plan / Plan: [place] MEDICARE PART A & B / Product Type: Medicare /    In time:200  Out time:230  Total Treatment Time (min): 30  Total Timed Codes (min): 30  1:1 Treatment Time ( [address] only): 30   Visit #: 19 of 24    Treatment Area: Muscle weakness (generalized) [M62.81]    SUBJECTIVE  Pain Level (0-10 scale): 0/10  Any medication changes, allergies to medications, adverse drug reactions, diagnosis change, or new procedure performed?: [x] No    [] Yes (see summary sheet for update)  Subjective functional status/changes:   [] No changes reported  My R shoulder feels a little stiff    OBJECTIVE      20 min Therapeutic Exercise:  [] See flow sheet :   Rationale: increase ROM to improve the patients ability to move RUE without stiffness  Beach ball BUEs shoulder flex, hor ab add and diagonals x 10 each  Green therabar x 15 each flex ext and sup pro    10 min Therapeutic Activity:  []  See flow sheet :   Rationale: improve coordination  to improve the patients ability to manipulate small items, write   Writing to make up ad for [employer] and first job  [address] 10 sets       With   [] TE   [] TA   [] neuro   [] other: Patient Education: [x] Review HEP    [] Progressed/Changed HEP based on:   [] positioning   [] body mechanics   [] transfers   [] heat/ice application   [] Splint wear/care   [] Sensory re-education   [] scar management      [] other:            Other Objective/Functional Measures:   [place] 3:53 no glasses, 2:56 with glasses     Pain Level (0-10 scale) post treatment: 0/10    ASSESSMENT/Changes in Function: Improved fine motor skills, endurance for green therabar ( no rests)    Patient will continue to benefit from skilled OT services to modify and progress therapeutic interventions, address ROM deficits, analyze and address soft tissue restrictions, analyze and cue movement patterns and instruct in home and community integration to attain remaining goals. []  See Plan of Care  []  See progress note/recertification  []  See Discharge Summary         Progress towards goals / Updated goals:  1. Patient will increase r hand  at least 13# to allow for completion of carrying garbage, opening jars etc.   2.  Patient will be able to write legibly to fill out paperwork in medical appointments. improving 8/16/17  3. Patient will achieve ongoing improvement in fine motor speed and praxis for improved letter formationprogressing, improved speed 8/21/17.       PLAN  [x]  Upgrade activities as tolerated     [x]  Continue plan of care  []  Update interventions per flow sheet       []  Discharge due to:_  []  Other:_      Roseline Bryson OTR/L 8/21/2017  2:09 PM    Future Appointments  Date Time Provider Nelson Hurtado   8/21/2017 3:00 PM Mike Carrillo [id number] [facility]   8/23/2017 2:30 PM Roseline Bryson OTR/L MMCPTPB [facility]   8/23/2017 3:00 PM Mike Carrillo BCFPVFS [facility]   8/28/2017 2:00 PM Roseline Bryson OTR/L MMCPTPB [facility]   8/28/2017 3:00 PM Mike Carrillo ACAHYLR [facility]   8/30/2017 3:00 PM Mike Carrillo IGKLZMT [facility]   9/20/2017 9:30 AM Jie Cid  [address] [place]

## 2017-08-21 NOTE — PROGRESS NOTES
ST DAILY TREATMENT NOTE - Diagnostic Therapy    Patient Name: Rashaun Cortés  Date:2017  : 1940  [x]  Patient  Verified  Payor: Payor: Nirmal Keto / Plan: [place] MEDICARE PART A & B / Product Type: Medicare /   In time: 3:00 Out time:3:38  Total Treatment Time (min): 38  Visit #: 5 of 8    Treatment Diagnosis: Muscle weakness (generalized) [M62.81]    SUBJECTIVE  Pain Level (0-10 scale): 0  Any medication changes, allergies to medications, adverse drug reactions, diagnosis change, or new procedure performed?: [x] No    [] Yes (see summary sheet for update)    Subjective functional status/changes:   [] No changes reported  Pt reported improvement with his swallow and cognitive linguistic tasks. OBJECTIVE  Treatment provided includes:  Increase/Improve:  []  Voice Quality [x]  Cognitive Linguistic Skills []  Laryngeal/Pharyngeal Exercises   []  Vocal Loudness []  Reading Comprehension [x]  Swallowing Skills    []  Vocal Cord Function []  Auditory Comprehension []  Oral Motor Skills   []  Resonance []  Writing Skills [x]  Compensatory strategies    []  Speech Intelligibility []  Expressive Language []  Attention   []  Breath Support/Coord. []  Receptive language [x]  Memory   []  Articulation []  Safety Awareness []    []  Fluency []  Word Retrieval []        Treatment Provided:  PO trials, thin liquid: 3 s/sx of aspiration across 14 trials Lon  Effortful swallow, thin liquid: 4:4 trials Lon    Patient completed portions of the Cognitive Linguistic Quick Test (CLQT) with the following results: Memory WFL, Language WFL. Memory: Pt's total score 164. Total score of  is WFL for Pt's age group (71-81 yo). Language:  Pt's total score 31. Total score of 37-28 is WFL for Pt's age group (71-81 yo).   CLQT:   Task: Personal facts, Criterion score: 8, Patient's score: 8   Task: Confrontation naming, Criterion score: 10, Patient's score: 10   Task: Story Retelling , Criterion score: 5, Patient's score: 9  Task: Generative Naming , Criterion score: 4, Patient's score: 4  Task: Design Memory, Criterion score: 4, Patient's score: 5       Patient/Caregiver  Education: [x] Review HEP      HEP/Handouts given: Continue HEP    Pain Level (0-10 scale) post treatment: 0    ASSESSMENT   [x]   Improving appropriately and progressing toward goals  []   Improving slowly and progressing toward goals  [x]   Approximating goals/maximum potential  [x]   Continues to benefit from skilled therapy to address remaining functional deficits  []   Not progressing toward goals and plan of care will be adjusted    Patient will continue to benefit from skilled therapy to address remaining functional deficits: dysphagia    Progress towards goals / Updated goals:  Pt's cognitive linguistic skills were assessed from subtests of the Cognitive Linguistic Quick Test (CLQT) yielding the following results: Memory WFL, Language WFL. Pt has MET all cognitive goals. Pt will discontinue cognitive therapy on this day as he is [facility]. Pt is approaching swallowing goal, continues to throat clear after trials of thin liquid. Of note Pt throat clearing at start of session and throughout session. Throat clearing resolved with compensatory strategy of effortful swallows in 4:4 trials Lon. Further reviewed effortful swallows with PO with verbalized understanding. Pt is recommended to continue with reg diet with nectar thick liquids at this time. Pt will complete a MBS on 8/24/2017 to further assess his oropharyngeal swallow fxn. At this time, will hold further dysphagia therapy sessions until completion of MBS if further services are warranted by SLP. PLAN  [x]  Continue plan of care  []  Modify Goals/Treatment Plan      []  Discharge due to:  [x] Other: Discontinue cognitive linguistic therapy.  At this time, will hold further dysphagia therapy sessions until completion of MBS if further services are warranted by SLP.      1.) The patient will tolerate a regular diet with thin thick liquids with 0 s/sx of aspiration in 9 out of 10 trials. (Reveision of goal for liquids to be thin on this day 8/9/2017). Approaching, will varify goal MET with completion of MBS as Pt throat clears and double swallows after PO trial as a self taught compensatory strategy. Pt vocal quality not changed after each swallow of regular or thin on (8/9/2017 and 8/21/2017)  2.) The patient will complete tongue base retraction, laryngeal/pharyngeal strengthening exercises (including Faina, Mendelsohn, Shaker, etc.), and lingual strengthening, ROM, and coordination exercises with 90% accuracy Lon for carryover with HEP. GOAL MET (8/9/2017)  3. ) Recall 3-5 words after delay/distraction and 1-2 (6-8 word) sentences to increase recall abilities as related to scheduling appointments with min-mod A in 3/5 trials with visual/verbal cues. GOAL MET (8/16/2017)  4. ) Answer egocentric information via open-ended wh-?'s with < 2 word recall error at the sentence level, utilizing word-recall techniques, min-mod visual/verbal cues. GOAL MET (8/9/2017)  5.) Complete moderately complex linguistic organization tasks with 90% acc with min. GOAL MET (8/16/2017)  6. ) Complete visual-spatial tasks related to functional ADLs for safe, independent social reintegration with min A.  GOAL MET (8/16/2017)    AUGUSTINA Payne-CF 8/21/2017  10:05 AM    Future Appointments  Date Time Provider Nelson Hurtado   8/21/2017 2:00 PM Angeles Lua OTR/L MMCPTPB [facility]   8/21/2017 3:00 PM Ellen Mckeon NVDOHDB [facility]   8/23/2017 2:30 PM Angeles Lua OTR/L MMCPTPB [facility]   8/23/2017 3:00 PM Ellen Mckeon TLYVTRG [facility]   8/28/2017 2:00 PM Angeles Lua OTR/L MMCPTPB [facility]   8/28/2017 3:00 PM Ellen Finders BZZGWYP [facility]   8/30/2017 3:00 PM Ellen Finders OVKICNE [facility]   9/20/2017 9:30 AM Ady Dominguez  [address] [place]

## 2017-08-23 ENCOUNTER — APPOINTMENT (OUTPATIENT)
Dept: PHYSICAL THERAPY | Age: 77
End: 2017-08-23
Payer: MEDICARE

## 2017-08-24 ENCOUNTER — HOSPITAL ENCOUNTER (OUTPATIENT)
Dept: GENERAL RADIOLOGY | Age: 77
Discharge: HOME | End: 2017-08-24
Attending: INTERNAL MEDICINE
Payer: MEDICARE

## 2017-08-24 DIAGNOSIS — R13.10 PROBLEMS WITH SWALLOWING AND MASTICATION: ICD-10-CM

## 2017-08-24 PROCEDURE — 74230 X-RAY XM SWLNG FUNCJ C+: CPT

## 2017-08-24 PROCEDURE — G8997 SWALLOW GOAL STATUS: HCPCS

## 2017-08-24 PROCEDURE — 92611 MOTION FLUOROSCOPY/SWALLOW: CPT

## 2017-08-24 PROCEDURE — G8998 SWALLOW D/C STATUS: HCPCS

## 2017-08-24 PROCEDURE — G8996 SWALLOW CURRENT STATUS: HCPCS

## 2017-08-24 PROCEDURE — 74011000255 HC RX REV CODE- 255: Performed by: INTERNAL MEDICINE

## 2017-08-24 RX ADMIN — BARIUM SULFATE 700 MG: 700 TABLET ORAL at 14:00

## 2017-08-24 RX ADMIN — BARIUM SULFATE 90 G: 960 POWDER, FOR SUSPENSION ORAL at 14:00

## 2017-08-24 RX ADMIN — BARIUM SULFATE 45 ML: 400 PASTE ORAL at 14:00

## 2017-08-24 NOTE — PROGRESS NOTES
Outpatient Modified Barium Swallow Evaluation    Patient: Urbano Vizcarra (36 y.o. male)  Date: 8/24/2017  Primary Diagnosis: Problems with swallowing and mastication [R13.10]        Precautions: aspiration       Videofluoroscopy Results  MBS completed with results yielding mild pharyngeal dysphagia c/b penetration to laryngeal vestibule during the swallow with 13 mm Ba pill with thin liquid wash followed by throat clear. Pt tolerated reg solid, puree, and thin liquids +/- straw without any aspiration/penetration events. Bolus manipulation, a-p transit, and tongue base retraction appeared WFL. Pt with mild pharyngeal dysmotility resulting in mild vallecular stasis as well as penetration event. Double swallow effective at clearing pharyngeal stasis. Rec reg diet with thin liquids, meds crushed, oral care TID, and aspiration precautions. Rec two swallows per bite/sip. Results/recs, oropharyngeal anatomy, and compensatory strategies reviewed with pt and daughter utilizing video feedback. Pt would continue to benefit from skilled SLP services x 2-3 more visits to ensure safety and compliance with compensatory swallow strategies. Video Flouroscopic Procedures  [x] Lateral View   [] A-P View [] Scanned to level of Sternum    [x] Seated at 90 deg.   [] Other:    Presentation:    [x] Spoon   [x] Cup   [x] Straw   [] Syringe   [] Consecutive Swallows  [] Other:    Consistencies:   [x] Ba+ liquid   [] Ba+ liquid (nectar)   [] Ba+ liquid (honey)   [x] Ba+ puree [x] Ba+ cookie [x] 13 mm Ba pill with thin Ba wash    Treatment Techniques Attempted  [] Head Turn: [] Right [] Left     [] Head Tilt: [] Right [] Left     [] Chin Down:  [] Small Sips/bites:  [] Effortful swallow:  [] Double swallow:  [] Other:    Results  Dysphagia Present:     [x] Yes  [] No    Ratings of Dysphagia:     [x] Mild  [] Moderate  [] Severe    Stages of Breakdown:   [] Oral  [x] Pharyngeal  [] Esophageal    Aspiration:   [] Yes    [x] No  [x] At Risk     Cough: [] Yes      [] No     Penetration:   [] Yes    [] No     Cough: [x] Yes      [] No   [] Flash/trace   [x] Mod   [] To Chords          Consistency Aspirated:   Consistency Penetrated:   [] Thin Liquid     [] Thin Liquid  [] Nectar-thick Liquid    [] Nectar-thick Liquid   [] Honey-thick Liquid    [] Honey-thick Liquid   [] Puree     [] Puree  [] Solid     [] Solid        [x] 13 mm Ba pill with thin liquid wash     Motility Problems with:  [] Lip Closure:    [] Mastication:   [] Bolus Formation/control:   [] A-P Transport:  [] Tongue Base Retraction:  [] Swallow Response (delayed):  [] Velopharyngeal Closure:  [] Pharyngeal Aspirations:  [] Laryngeal Elevation/adduction:  [] Epiglottic Inversion:  [x] Pharyngeal motility/sensation: mildly impaired  [] Cricopharyngeal Relaxation:  [] Esophageal Peristalsis:  [] Other:    Timing of Aspiration/Penetration:  [] Before Swallow:  [x] During Swallow: with 13 mm Ba pill with thin liquid wash  [] After Swallow:    Transit Time Delay:  [] >1 Second  Oral  [] >1 Second Pharyngeal  [] >20 Second Esophageal     Residuals:  [x] Vallecula:    [x] Mild  [] Mod  [] Severe  [] Pyriform Sinus:   [] Mild  [] Mod  [] Severe  [] Posterior Pharyngeal Wall:  [] Mild  [] Mod  [] Severe    GCODES(GN): GCODESwallowing:  Swallow Current Status CI= 1-19%   Swallow Goal Status CI= 1-19%   Swallow D/C Status CI= 1-19%    Thank you for this referral.    Ami Vogel M.S. CCC-SLP/L  Speech-Language Pathologist

## 2017-08-28 ENCOUNTER — HOSPITAL ENCOUNTER (OUTPATIENT)
Dept: PHYSICAL THERAPY | Age: 77
Discharge: HOME | End: 2017-08-28
Payer: MEDICARE

## 2017-08-28 ENCOUNTER — APPOINTMENT (OUTPATIENT)
Dept: PHYSICAL THERAPY | Age: 77
End: 2017-08-28
Payer: MEDICARE

## 2017-08-28 PROCEDURE — G8997 SWALLOW GOAL STATUS: HCPCS

## 2017-08-28 PROCEDURE — G8996 SWALLOW CURRENT STATUS: HCPCS

## 2017-08-28 PROCEDURE — 97530 THERAPEUTIC ACTIVITIES: CPT

## 2017-08-28 PROCEDURE — 92526 ORAL FUNCTION THERAPY: CPT

## 2017-08-28 PROCEDURE — 97110 THERAPEUTIC EXERCISES: CPT

## 2017-08-28 NOTE — PROGRESS NOTES
ST DAILY TREATMENT NOTE    Patient Name: Renea Reyes  Date:2017  : 1940  [x]  Patient  Verified  Payor: Payor: Vicenta  / Plan: [place] MEDICARE PART A & B / Product Type: Medicare /   In time: 3:05 Out time: 3:38  Total Treatment Time (min): 33  Visit #: 6 of 8    Treatment Diagnosis: Muscle weakness (generalized) [M62.81]    SUBJECTIVE  Pain Level (0-10 scale): 0  Any medication changes, allergies to medications, adverse drug reactions, diagnosis change, or new procedure performed?: [x] No    [] Yes (see summary sheet for update)    Subjective functional status/changes:   [] No changes reported  Pt reported that he did well with MBS. OBJECTIVE  Treatment provided includes:  Increase/Improve:  []  Voice Quality []  Cognitive Linguistic Skills [x]  Laryngeal/Pharyngeal Exercises   []  Vocal Loudness []  Reading Comprehension [x]  Swallowing Skills    []  Vocal Cord Function []  Auditory Comprehension []  Oral Motor Skills   []  Resonance []  Writing Skills [x]  Compensatory strategies    []  Speech Intelligibility []  Expressive Language []  Attention   []  Breath Support/Coord.  []  Receptive language []  Memory   []  Articulation [x]  Safety Awareness []    []  Fluency []  Word Retrieval []        Treatment Provided:  Review of MBS results  Discuss safe swallowing strategies:   Faina, Mendelsohn Maneuver: 100% Lon     Patient/Caregiver  Education: [x] Review HEP      HEP/Handouts given: continue HEP    Pain Level (0-10 scale) post treatment: 0    ASSESSMENT   [x]   Improving appropriately and progressing toward goals  []   Improving slowly and progressing toward goals  [x]   Approximating goals/maximum potential  [x]   Continues to benefit from skilled therapy to address remaining functional deficits  []   Not progressing toward goals and plan of care will be adjusted    Patient will continue to benefit from skilled therapy to address remaining functional deficits: dysphagia    Progress towards goals / Updated goals:  Pt completed MBS on 8/24/2017 SLP, Enrique Connolly, reported: \"MBS completed with results yielding mild pharyngeal dysphagia c/b penetration to laryngeal vestibule during the swallow with 13 mm Ba pill with thin liquid wash followed by throat clear. Pt tolerated reg solid, puree, and thin liquids +/- straw without any aspiration/penetration events. Bolus manipulation, a-p transit, and tongue base retraction appeared WFL. Pt with mild pharyngeal dysmotility resulting in mild vallecular stasis as well as penetration event. Double swallow effective at clearing pharyngeal stasis. Rec reg diet with thin liquids, meds crushed, oral care TID, and aspiration precautions. Rec two swallows per bite/sip. Results/recs, oropharyngeal anatomy, and compensatory strategies reviewed with pt and daughter utilizing video feedback. Pt would continue to benefit from skilled SLP services x 2-3 more visits to ensure safety and compliance with comepensatory swallow strategies. \"    Today, Pt utilized compensatory strategy for double swallow 100% Lon with PO trials of thin liquid. Pt continues to throat clear after trials with no change in vocal quality or silent signs of aspiration observed. Pt was advised to take double swallow with a short delay before second swallow to allow laryngeal/pharyngeal anatomy to return to starting position. Short delay after double swallow reduced Pt's throat clearing after PO trials of thin liquid. Pt will continue 1-2 more therapy sessions to ensure compliance of swallowing strategies for Pt safety. Will trial regular and thin liquid in next therapy session. PLAN  [x]  Continue plan of care  []  Modify Goals/Treatment Plan      []  Discharge due to:  [x] Other: SLP services x 1-2 more visits to ensure safety and compliance with compensatory swallow strategies.      Aurea Adjutant, SLP-CF 8/28/2017  3:18 PM    Future Appointments  Date Time Provider Nelson Hurtado   8/30/2017 3:00 PM Author Anni [id number] [facility]   9/20/2017 9:30 AM Lionel Villela  [address] [place]

## 2017-08-28 NOTE — PROGRESS NOTES
OT DAILY TREATMENT NOTE - [facility]     Patient Name: Garrett Vanegas  Date:2017  : 1940  [x]  Patient  Verified  Payor: VA MEDICARE / Plan: VA MEDICARE PART A & B / Product Type: Medicare /    In time:200  Out time:230  Total Treatment Time (min): 30  Total Timed Codes (min): 30  1:1 Treatment Time ( [address] only): 30   Visit #: 20 of 24    Treatment Area: Muscle weakness (generalized) [M62.81]    SUBJECTIVE  Pain Level (0-10 scale): 0/10  Any medication changes, allergies to medications, adverse drug reactions, diagnosis change, or new procedure performed?: [x] No    [] Yes (see summary sheet for update)  Subjective functional status/changes:   [] No changes reported  Still feeeling a little sore in my arm    OBJECTIVE      20 min Therapeutic Exercise:  [] See flow sheet :   Rationale: increase ROM and increase strength to improve the patients ability to reaach  Pulleys abd and flex 2 mins each  Dowel shoulder flex, abd, reverse curl and ext 10x each  Green therabar x 15 each flex ext and sup pro    10 min Therapeutic Activity:  []  See flow sheet :   Rationale: improve coordination  to improve the patients ability to write legibly  Following if then instructions with R hand and larger pen       With   [x] TE   [] TA   [] neuro   [] other: Patient Education: [x] Review HEP    [x] Progressed/Changed HEP based on: dowel ex  [] positioning   [] body mechanics   [] transfers   [] heat/ice application   [] Splint wear/care   [] Sensory re-education   [] scar management      [] other:            Other Objective/Functional Measures:   Able to flex arm fully with use of cane as assist     Pain Level (0-10 scale) post treatment: 0/10    ASSESSMENT/Changes in Function: Reduced stiffness after dowel ex    Patient will continue to benefit from skilled OT services to modify and progress therapeutic interventions, address ROM deficits, address strength deficits, analyze and address soft tissue restrictions, analyze and cue movement patterns and instruct in home and community integration to attain remaining goals. []  See Plan of Care  []  See progress note/recertification  []  See Discharge Summary         Progress towards goals / Updated goals:  1. Patient will increase r hand  at least 13# to allow for completion of carrying garbage, opening jars etc.   2.  Patient will be able to write legibly to fill out paperwork in medical appointments. improving 8/28/17  3. Patient will achieve ongoing improvement in fine motor speed and praxis for improved letter formationprogressing, improved speed 8/21/17.       PLAN  [x]  Upgrade activities as tolerated     [x]  Continue plan of care  []  Update interventions per flow sheet       []  Discharge due to:_  []  Other:_      SERENITY Varela/L 8/28/2017  2:15 PM    Future Appointments  Date Time Provider Nelson Hurtado   8/28/2017 3:00 PM [employer] [id number] [facility]   8/30/2017 3:00 PM [employer] [id number] [facility]   9/20/2017 9:30 AM Nathan Spears  [address] [place]

## 2017-08-30 ENCOUNTER — HOSPITAL ENCOUNTER (OUTPATIENT)
Dept: PHYSICAL THERAPY | Age: 77
End: 2017-08-30
Payer: MEDICARE

## 2017-08-30 ENCOUNTER — APPOINTMENT (OUTPATIENT)
Dept: PHYSICAL THERAPY | Age: 77
End: 2017-08-30
Payer: MEDICARE

## 2017-08-30 NOTE — PROGRESS NOTES
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 Pt is right-hand dominant; wears glasses for reading. Pt further reports that he does not \"read well\" as a result of 9-grade education; \"I don't enjoy reading; I only read when I have to. \"   Visits from Start of Care: 17                                                                             Missed Visits: 0    The Plan of Care and following information is based on the patient's current status:    Goal: 1.) The patient will tolerate a regular diet with nectar thick liquids with 0 s/sx of aspiration in 9 out of 10 trials. Status at last note/certification: Not met, progressing: Tolerated HTLS with >5% s/sx of aspiration (25% noted on 17)  Current Status: Not met, Goal discontinued as Pt is recommended regular diet and thin liquids after completion of MBS on 2017. Pt completed MBS on 2017 SLP, Leonid Orr, reported: \"Pt tolerated reg solid, puree, and thin liquids +/- straw without any aspiration/penetration events. \"   Goal 1 revised on 2017:  The patient will tolerate a regular diet with thin thick liquids with 0 s/sx of aspiration in 9 out of 10 trials. Current status: Not met, approaching: (8/28/2017) Pt utilized compensatory strategy for double swallow 100% Lon with PO trials of thin liquid. Pt continues to throat clear after trials with no change in vocal quality or silent signs of aspiration observed. Goal: 2.) The patient will complete tongue base retraction, laryngeal/pharyngeal strengthening exercises (including Faina, Mendelsohn, Shaker, etc.),  and lingual strengthening, ROM, and coordination exercises with 90% accuracy Lon. for carryover with HEP. Status at last note/certification: Progressing: Pt is Lon to Max A at this time   Current Status: Met, 100% Lon, Pt reported completion of HEP 2x daily (8/9/2017)    Goal 3.) Recall 3-5 words after delay/distraction and 1-2 (6-8 word) sentences to increase recall abilities as related to scheduling appointments with min-mod A in 3/5 trials with visual/verbal cues. Status at last note/certification: Not met, will address in future therapy  Current Status: Met, 100% accuracy Lon with recalling 3-5 words after delay/distraction, Repeating sentences with 5 sec delay 57% accuracy Lon    Goal 4.) Answer egocentric information via open-ended wh-?'s with < 2 word recall error at the sentence level, utilizing word-recall techniques, min-mod visual/verbal cues. Status at last note/certification: Not met, will address in future therapy  Current Status: Met, 100% accuracy Lon     Goal 5.) Complete moderately complex linguistic organization tasks with 90% acc with min. Status at last note/certification: Not met, will address in future therapy  Current Status: Met, 100% accuracy, Lon    Goal 6.) Complete visual-spatial tasks related to functional ADLs for safe, independent social reintegration with min A.   Status at last note/certification: Not met, progressing  Current Status: Met, 100% Lon    Key functional changes: Pt      Problems/ barriers to goal attainment: physical strength     Problem List:      []aphasic  []dysarthric  [x]dysphagic       []alexic  []agraphic  []dysphonia       []dysfluency  []Cognitive-Linguistic Disorder       []other    Treatment Plan: Treatment of Swallowing and Patient Education    Patient Goal (s) has been updated and includes: Removal of met goals and addition of new goal.    Goals for this certification period to be accomplished in 2 weeks:  1.) Pt will utilize safe swallowing techniques/compensatory strategies (double swallow, small sips) during PO trials of thin liquids with 0 s/sx of aspiration in 9 out of 10 trials in order to reduce risk for aspiration. 2.) Pt will utilize safe swallowing techniques/compensatory strategies (double swallow, small sips) during PO trials of regular with 0 s/sx of aspiration in 9 out of 10 trials in order to reduce risk for aspiration. Frequency / Duration: Patient to be seen 1 time per week for 2 weeks:    Assessment/Recommendations:   Pt's cognitive linguistic skills were assessed on 8/21/2017 with subtests of the Cognitive Linguistic Quick Test (CLQT) yielding the following results: Memory WFL, Language WFL. Pt has MET all cognitive goals. Pt discontinued cognitive therapy on 8/21/2017 as he is [facility]. Pt and continued to receive therapy for his dysphagia. Memory: Pt's total score 164. Total score of  is WFL for Pt's age group (71-81 yo). Language:  Pt's total score 31. Total score of 37-28 is WFL for Pt's age group (71-81 yo).   CLQT:   Task: Personal facts, Criterion score: 8, Patient's score: 8   Task: Confrontation naming, Criterion score: 10, Patient's score: 10   Task: Story Retelling , Criterion score: 5, Patient's score: 9  Task: Generative Naming , Criterion score: 4, Patient's score: 4  Task: Design Memory, Criterion score: 4, Patient's score: 5       Pt completed MBS on 8/24/2017 SLP, Arabella Chowdhury, reported: [facility] completed with results yielding mild pharyngeal dysphagia c/b penetration to laryngeal vestibule during the swallow with 13 mm Ba pill with thin liquid wash followed by throat clear. Pt tolerated reg solid, puree, and thin liquids +/- straw without any aspiration/penetration events. Bolus manipulation, a-p transit, and tongue base retraction appeared WFL. Pt with mild pharyngeal dysmotility resulting in mild vallecular stasis as well as penetration event. Double swallow effective at clearing pharyngeal stasis. Rec reg diet with thin liquids, meds crushed, oral care TID, and aspiration precautions. Rec two swallows per bite/sip. Results/recs, oropharyngeal anatomy, and compensatory strategies reviewed with pt and daughter utilizing video feedback. Pt would continue to benefit from skilled SLP services x 2-3 more visits to ensure safety and compliance with compensatory swallow strategies. \"     On 8/28/2017 Pt utilized compensatory strategy for double swallow 100% Lon with PO trials of thin liquid. Pt continues to throat clear after trials with no change in vocal quality or silent signs of aspiration observed. Pt was advised to take double swallow with a short delay before second swallow to allow laryngeal/pharyngeal anatomy to return to starting position. Short delay after double swallow reduced Pt's throat clearing after PO trials of thin liquid. Pt will continue 1-2 more therapy sessions to ensure compliance of swallowing strategies for Pt safety. Will trial regular and thin liquid in next therapy session.     G Codes:  Swallowing:  Swallow Current Status CI= 1-19%   Swallow Goal Status CH= 0%    The severity rating is based on the following outcomes:    National Outcomes Measures (NOMS)  And professional judgment    Certification Period: 8/30/2017 to 9/13/2017  AUGUSTINA Morrison-CF 8/30/2017 3:06 PM    _____________________________________________________________________    I certify that the above Therapy Services are being furnished while the patient is under my care. I agree with the treatment plan and certify that this therapy is necessary. []  I have read the above report and request that my patient continue as recommended. []  I have read the above report and request that my patient continue therapy with the following changes/special instructions:________________________________________  []I have read the above report and request that my patient be discharged from therapy.     Physician's Signature:_________________ Date:___________Time:__________      Please sign and return to [facility] [employer]   [address] [place] [address]  [phone] fax

## 2017-09-04 NOTE — PROGRESS NOTES
[facility] - Tre Bagley  [address] [facility]  [phone] fax    Physical Therapy Discharge Summary    Patient name: Esau Nelson Start of Care: 17   Referral source: Jyotsna Cintron MD : 1940   Medical/Treatment Diagnosis: Muscle weakness (generalized) [M62.81] Onset Date:17     Prior Hospitalization: see medical history Provider#: [id number]   Comorbidities: thyroid problems, HTN, CVA   Prior Level of Function: lives with wife in a 1 story home, no steps to enter, aide for housekeeping since CVA, caregiver for wife (Alzheimer's), walk-in shower, Ind with ADLs, yard work, Ind with ambulation     Visits from [place] of Care: 18    Missed Visits: 0    Reporting Period : 17 to 17    Summary of Care:  Goal: Pt will improve FOTO by 16 points in order to demonstrate functional improvement  Status at last note/certification: Progressing. Improved 11 points from eval. Decreased 3 points since last assessed   Status at discharge: not met    Goal: Pt will demonstrate understanding/compliance with final HEP for continued progression independently upon DC   Status at last note/certification: Goal met. Status at discharge: met    G-Codes (GP)  Mobility    Goal  CK= 40-59%  D/C  CK= 40-59%      The severity rating is based on clinical judgment and the FOTO score. ASSESSMENT/RECOMMENDATIONS:  Pt has made slow, steady progress in therapy, meeting 1/2 final goals. FOTO score has improved since evaluation but has slightly declined since last assessed, consistent with pt's plateau in therapy. He demonstrates improved static/dynamic balance and reports improved confidence with balance. His only concern for balance is not clearing his R foot when he's fatigued. Pt continued to demonstrate fatigue in recent therapy sessions, which MD attributed to pt's CVA per pt report.   He also had a 2 week absence from therapy d/t the loss of his wife. Pt is compliant with final HEP for continued strength progression independently. DC at this time d/t plateau in progress.      [x]Discontinue therapy: []Patient has reached or is progressing toward set goals      []Patient is non-compliant or has abdicated      [x]Due to lack of appreciable progress towards set goals    Henny Rosas, PT 9/4/2017 9:11 AM

## 2017-09-05 ENCOUNTER — HOSPITAL ENCOUNTER (OUTPATIENT)
Dept: PHYSICAL THERAPY | Age: 77
Discharge: HOME | End: 2017-09-05
Payer: MEDICARE

## 2017-09-05 PROCEDURE — G8998 SWALLOW D/C STATUS: HCPCS

## 2017-09-05 PROCEDURE — 92526 ORAL FUNCTION THERAPY: CPT

## 2017-09-05 PROCEDURE — G8997 SWALLOW GOAL STATUS: HCPCS

## 2017-09-05 NOTE — PROGRESS NOTES
[facility] - [employer] [facility] [employer] [facility]  [address] [facility]  [phone] fax    Speech Therapy Daily Note Discharge Summary  Date:2017  Patient name: Garrett Vanegas Start of Care: 2017   Referral source: Rafa Fischer MD : 1940   Medical/Treatment Diagnosis: Muscle weakness (generalized) [M62.81] Onset Date:2017     Prior Hospitalization: see medical history Provider#: [id number]   Medications: Verified on Patient Summary List    Comorbidities: Acute ischemic stroke of left internal capsule with residual right hemiparesis, dysphagia, and dysarthria; UI; Malignant neoplasm of prostate; COPD; Hypothyroidism; Osteoarthritis; Dyslipidemia  Prior Level of Function: Regular diet with thin liquids. Visits from Start of Care: 18    Missed Visits: 0    Reporting Period : 2017 to 2017    [x]  Patient  Verified  Payor: Jessa Connors / Plan: VA MEDICARE PART A & B / Product Type: Medicare /   In time: 1:03 Out time: 1:30  Total Treatment Time (min): 27   Visit #: 1 of 2    SUBJECTIVE  Pain Level (0-10 scale): Any medication changes, allergies to medications, adverse drug reactions, diagnosis change, or new procedure performed?: [x] No    [] Yes (see summary sheet for update)    Subjective functional status/changes:   [] No changes reported  Pt reports his swallow is \"good\". OBJECTIVE  Treatment provided includes:  Increase/Improve:  []  Voice Quality []  Cognitive Linguistic Skills []  Laryngeal/Pharyngeal Exercises   []  Vocal Loudness []  Reading Comprehension [x]  Swallowing Skills    []  Vocal Cord Function []  Auditory Comprehension []  Oral Motor Skills   []  Resonance []  Writing Skills [x]  Compensatory strategies    []  Speech Intelligibility []  Expressive Language []  Attention   []  Breath Support/Coord.  []  Receptive language []  Memory   []  Articulation [x]  Safety Awareness []    []  Fluency []  Word Retrieval [] Treatment Provided:  Naming safe swallowing strategies: Lon  Utilizing double swallow with PO trials, mechanical soft/thin : 8:8 trials   #s/s of aspiration with PO trials: 4x across 15 trials    Throat clearing but Pt complained of sinus congestion with throat clearing prior to PO intake  Pulse oximetry: 97-98 BPM      Patient/Caregiver  Education: [x] Review HEP      HEP/Handouts given: continue safe swallowing strategies/compensatory strategies     Pain Level (0-10 scale) post treatment: 0    Summary of Care:    Goal: Pt will utilize safe swallowing techniques/compensatory strategies (double swallow, small sips) during PO trials of thin liquids with 0 s/sx of aspiration in 9 out of 10 trials in order to reduce risk for aspiration. Status at last note/certification: 206%, Lon; min throat clears. Status at discharge: approximated - 100% Lon used strategies; however, continues with intermittent s/sx of aspiration with solid/thin (throat clears 4/15 trials)    Goal: Pt will utilize safe swallowing techniques/compensatory strategies (double swallow, small sips) during PO trials of regular with 0 s/sx of aspiration in 9 out of 10 trials in order to reduce risk for aspiration. Status at last note/certification:100%, Lon; min throat clears. Status at discharge: approximated - 100% Lon used strategies; however, continues with intermittent s/sx of aspiration with solid/thin (throat clears 4/15 trials)    ASSESSMENT: Pt seen for dysphagia and was initially recommended a regular solid and honey thick liquid diet. MBS completed on 8/24/2017 revealing airway compromise with thin liquids. Skilled meal assessment, diet texture modifications, laryngeal strengthening exercises performed to decreased aspiration s/sx.  Pt benefited from therapy evidenced by tolerance of thin liquids and regular diet with independent use of compensatory swallow techniques; continues with intermittent throat clears which pt attributes to long standing sinus issues. Maximum therapeutic gains met; safest, least restrictive diet achieved. Accordingly, pt to be discharged from speech therapy services. Educated pt on importance of continuing with HEP upon discharge for carryover; pt verbalized comprehension and is agreeable to discharge at this time.      G Codes:  Swallowing:  Swallow Goal Status CH= 0%   Swallow D/C Status CI= 1-19%    The severity rating is based on the following outcomes:    National Outcomes Measures (NOMS) and professional judgment     RECOMMENDATIONS:  [x]Discontinue therapy: [x]Patient has reached or is progressing toward set goals      []Patient is non-compliant or has abdicated      []Due to lack of appreciable progress towards set goals    Lou Astorga, SLP-CF      9/5/2017 8:10 AM

## 2017-09-08 ENCOUNTER — HOSPITAL ENCOUNTER (OUTPATIENT)
Dept: PHYSICAL THERAPY | Age: 77
Discharge: HOME | End: 2017-09-08
Payer: MEDICARE

## 2017-09-08 PROCEDURE — 97110 THERAPEUTIC EXERCISES: CPT

## 2017-09-08 PROCEDURE — G8988 SELF CARE GOAL STATUS: HCPCS

## 2017-09-08 PROCEDURE — 97530 THERAPEUTIC ACTIVITIES: CPT

## 2017-09-08 PROCEDURE — G8987 SELF CARE CURRENT STATUS: HCPCS

## 2017-09-08 NOTE — PROGRESS NOTES
OT DAILY TREATMENT NOTE - [facility]     Patient Name: Sandre Buerger  Date:2017  : 1940  [x]  Patient  Verified  Payor: [place] MEDICARE / Plan: [place] MEDICARE PART A & B / Product Type: Medicare /    In time:*1030**  Out time:1100  Total Treatment Time (min): 30  Total Timed Codes (min): 30  1:1 Treatment Time ( only): 30   Visit #: 21 of 24    Treatment Area: Muscle weakness (generalized) [M62.81]    SUBJECTIVE  Pain Level (0-10 scale): 10  Any medication changes, allergies to medications, adverse drug reactions, diagnosis change, or new procedure performed?: [x] No    [] Yes (see summary sheet for update)  Subjective functional status/changes:   [] No changes reported  Feel a little dizzy  My shoulder and upper arm arm sore and weak    OBJECTIVE      20 min Therapeutic Exercise:  [] See flow sheet :   Rationale: increase ROM and increase strength to improve the patients ability to reach  Recheck for MD note  Scapular mobs and reaching activities    10 min Therapeutic Activity:  []  See flow sheet :   Rationale: improve coordination  to improve the patients ability to manipulate items  Recheck for MD note     With   [] TE   [] TA   [] neuro   [] other: Patient Education: [x] Review HEP    [] Progressed/Changed HEP based on:   [] positioning   [] body mechanics   [] transfers   [] heat/ice application   [] Splint wear/care   [] Sensory re-education   [] scar management      [] other:            Other Objective/Functional Measures:   9 hole 27 (34)  [place] 150 (155)    45     Pain Level (0-10 scale) post treatment: 5/10    ASSESSMENT/Changes in Function: Pain present in r shoulder and upper arm    Patient will continue to benefit from skilled OT services to modify and progress therapeutic interventions, address ROM deficits, address strength deficits, analyze and address soft tissue restrictions and instruct in home and community integration to attain remaining goals.      []  See Plan of Care  [x]  See progress note/recertification  []  See Discharge Summary         Progress towards goals / Updated goals:  See progress note    PLAN  []  Upgrade activities as tolerated     []  Continue plan of care  []  Update interventions per flow sheet       []  Discharge due to:_  []  Other:_      Ilana Killian OTR/L 9/8/2017  10:38 AM    Future Appointments  Date Time Provider Nelson Hurtado   9/12/2017 1:00 PM Ilana Killian OTR/L [id number]   9/14/2017 2:00 PM Yamile Turner BXIBRNC [facility]   9/20/2017 9:30 AM Samantha Guillaume  [address] [place]

## 2017-09-08 NOTE — PROGRESS NOTES
[facility] - [facility] Arthur  [address] [facility]  [phone] fax    Continued Plan of Care/ Re-certification for Occupational Therapy Services    Patient name: Garrett Vanegas Start of Care: 17   Referral source: Rafa Fischer MD : 1940   Medical/Treatment Diagnosis: Muscle weakness (generalized) [M62.81] Onset Date:17     Prior Hospitalization: see medical history Provider#: [id number]   Medications: Verified on Patient Summary List    Comorbidities: thyroid, depression  Prior Level of Function:I self care home care driving, caregiver for spouse  Visits from Start of Care: 21    Missed Visits: 1  Missed visits due to family emergencies and transportation    The Plan of Care and following information is based on the patient's current status:    Goal:1. Patient will increase r hand  at least 13# to allow for completion of carrying garbage, opening jars etc.   Status at last note/certification: 53#  Current Status: not met    Goal:2. Patient will be able to write legibly to fill out paperwork in medical appointments  Status at last note/certification:Unable  Current Status: not met progressing    Goal:3.   Patient will achieve ongoing improvement in fine motor speed and praxis for improved letter formation  Status at last note/certification:See scores below  Current Status: met      Key functional changes: Improved fine motor skills, recent reports of RUE pain in shoulder     Current measurements are listed with 17 in ( )  9 hole   27 (34) 20% improvement  [place] 150  (155) 3% improvement    45 (51)       Problems/ barriers to goal attainment: Gaps in attendance, fatigue     Treatment Plan: Therapeutic exercise, Therapeutic activities, Physical agent/modality and Patient education      Patient Goal (s) has been updated and includes: Get rid of shoulder pain, get stronger     Goals for this certification period to be accomplished in 3 treatments:  1. Finalize home program to address RUE strengthening and pain management/  2. Patient will report pain in r shoulder 0-2/10 with exercises. 3.  Patient will improve R  strength to 60# for opening jars. Frequency / Duration: Patient to be seen 2 times per week for 3 treatments:    Assessment / Recommendations:Patient will benefit from continued brief skilled therapy to resolve new onset r shoulder pain and provide home program.    G-Codes (GO)  Self Care   Current  CJ= 20-39%   Goal  CJ= 20-39%    The severity rating is based on clinical judgment and the FOTO score. Certification Period: 9/9/17-9/30/17    SERENITY Xiong/L 9/8/2017 3:52 PM    ________________________________________________________________________  I certify that the above Therapy Services are being furnished while the patient is under my care. I agree with the treatment plan and certify that this therapy is necessary.       Physician's Signature:_________________ Date:___________Time:__________      Please sign and return to [facility] - Trisha Meneses  [address] [place] [address]            [phone] fax

## 2017-09-12 ENCOUNTER — HOSPITAL ENCOUNTER (OUTPATIENT)
Dept: PHYSICAL THERAPY | Age: 77
Discharge: HOME | End: 2017-09-12
Payer: MEDICARE

## 2017-09-12 PROCEDURE — 97110 THERAPEUTIC EXERCISES: CPT

## 2017-09-12 NOTE — PROGRESS NOTES
OT DAILY TREATMENT NOTE - [facility]     Patient Name: Renea Reyes  Date:2017  : 1940  [x]  Patient  Verified  Payor: VA MEDICARE / Plan: VA MEDICARE PART A & B / Product Type: Medicare /    In time:100  Out time:130  Total Treatment Time (min): 30  Total Timed Codes (min): 30  1:1 Treatment Time ([facility] only): 30   Visit #: 22 of 24    Treatment Area: Muscle weakness (generalized) [M62.81]    SUBJECTIVE  Pain Level (0-10 scale): 8/10  Any medication changes, allergies to medications, adverse drug reactions, diagnosis change, or new procedure performed?: [x] No    [] Yes (see summary sheet for update)  Subjective functional status/changes:   [] No changes reported  I am starting to be able to sleep on it again  Still hurts when I bring it all the way up  Doing the exercises (t-band)    OBJECTIVE      30 min Therapeutic Exercise:  [x] See flow sheet :   Rationale: increase ROM and increase strength to improve the patients ability to lift,   Added wall slides, doorway stretch, scaption with 1#, 3 way elbow and wrist curls, supine protraction and circles    With   [x] TE   [] TA   [] neuro   [] other: Patient Education: [x] Review HEP    [] Progressed/Changed HEP based on: shoulder circles in supine, avoid pain, slow stretching  [] positioning   [] body mechanics   [] transfers   [] heat/ice application   [] Splint wear/care   [] Sensory re-education   [] scar management      [] other:            Other Objective/Functional Measures: Pain with horizontal adduction past midline (likely impingement)     Pain Level (0-10 scale) post treatment: 9/10    ASSESSMENT/Changes in Function: Improving ROM after exercise, still with pain    Patient will continue to benefit from skilled OT services to modify and progress therapeutic interventions, address strength deficits, analyze and cue movement patterns and instruct in home and community integration to attain remaining goals.      []  See Plan of Care  []  See progress note/recertification  []  See Discharge Summary         Progress towards goals / Updated goals:  1. Finalize home program to address RUE strengthening and pain management/  2. Patient will report pain in r shoulder 0-2/10 with exercises. 3.  Patient will improve R  strength to 60# for opening jars.       PLAN  [x]  Upgrade activities as tolerated     [x]  Continue plan of care  []  Update interventions per flow sheet       []  Discharge due to:_  []  Other:_      Lanie Donahue OTR/L 9/12/2017  1:39 PM    Future Appointments  Date Time Provider Nelson Hurtado   9/14/2017 2:00 PM Gabriele Charles TABRLHP [facility]   9/20/2017 9:30 AM Bhavesh Sal  [address] [place]

## 2017-09-14 ENCOUNTER — HOSPITAL ENCOUNTER (OUTPATIENT)
Dept: PHYSICAL THERAPY | Age: 77
Discharge: HOME | End: 2017-09-14
Payer: MEDICARE

## 2017-09-14 PROCEDURE — 97110 THERAPEUTIC EXERCISES: CPT

## 2017-09-14 NOTE — PROGRESS NOTES
OT DAILY TREATMENT NOTE - [facility] 3-16    Patient Name: Stephanie Aus  Date:2017  : 1940  [x]  Patient  Verified  Payor: Bonita Turner / Plan: VA MEDICARE PART A & B / Product Type: Medicare /    In time:200  Out time:230  Total Treatment Time (min): 30  Total Timed Codes (min): 30  1:1 Treatment Time ( [address] only): 30   Visit #: 23 of 24    Treatment Area: Muscle weakness (generalized) [M62.81]    SUBJECTIVE  Pain Level (0-10 scale): 0/10  Any medication changes, allergies to medications, adverse drug reactions, diagnosis change, or new procedure performed?: [x] No    [] Yes (see summary sheet for update)  Subjective functional status/changes:   [] No changes reported  It only hurts when I do this (shoulder extension while abducted)    OBJECTIVE    30 min Therapeutic Exercise:  [x] See flow sheet :   Rationale: increase ROM and increase strength to improve the patients ability to lift,    Wall slides, Shoulder flexion with 1# dowel, Horizontal Abduction/Adduction with 1# dowel, Door stretch, Marcille Congo, Theraputty 1/4 peg removal (Firm), Lumbical pull,Hook Fist, Flat Fist and full fist into firm theraputty       With   [] TE   [] TA   [] neuro   [] other: Patient Education: [x] Review HEP    [] Progressed/Changed HEP based on:   [] positioning   [] body mechanics   [] transfers   [] heat/ice application   [] Splint wear/care   [] Sensory re-education   [] scar management      [] other:            Other Objective/Functional Measures:   Pt required cueing for pacing to avoid fatigue. Pain Level (0-10 scale) post treatment: 3-4/10    ASSESSMENT/Changes in Function: improving strength    Patient will continue to benefit from skilled OT services to modify and progress therapeutic interventions, address ROM deficits, address strength deficits and instruct in home and community integration to attain remaining goals.      []  See Plan of Care  []  See progress note/recertification  []  See Discharge Summary         Progress towards goals / Updated goals:  1. Finalize home program to address RUE strengthening and pain management/  2. Patient will report pain in r shoulder 0-2/10 with exercises. 3.  Patient will improve R  strength to 60# for opening jars. Progressing 9/14/17    PLAN  []  Upgrade activities as tolerated     [x]  Continue plan of care  []  Update interventions per flow sheet       []  Discharge due to:_  []  Other:_      Chavo Narrow 9/14/2017  3:39 PM    Future Appointments  Date Time Provider Nelson Hurtado   9/18/2017 2:00 PM Kirby [facility]um [facility]   9/20/2017 9:30 AM Phyllis Malagon  [address] [place] [address]

## 2017-09-18 ENCOUNTER — HOSPITAL ENCOUNTER (OUTPATIENT)
Dept: PHYSICAL THERAPY | Age: 77
Discharge: HOME | End: 2017-09-18
Payer: MEDICARE

## 2017-09-18 PROCEDURE — G8988 SELF CARE GOAL STATUS: HCPCS

## 2017-09-18 PROCEDURE — 97110 THERAPEUTIC EXERCISES: CPT

## 2017-09-18 PROCEDURE — G8989 SELF CARE D/C STATUS: HCPCS

## 2017-09-18 NOTE — PROGRESS NOTES
OT DAILY TREATMENT NOTE - [facility] 3-16    Patient Name: Quynh Mtz  Date:2017  : 1940  [x]  Patient  Verified  Payor: Ken Barry / Plan: VA MEDICARE PART A & B / Product Type: Medicare /    In time:200  Out time:230  Total Treatment Time (min): 30  Total Timed Codes (min): 30  1:1 Treatment Time ( [address] only): 30   Visit #: 24 of 24    Treatment Area: Muscle weakness (generalized) [M62.81]    SUBJECTIVE  Pain Level (0-10 scale): 0/10  Any medication changes, allergies to medications, adverse drug reactions, diagnosis change, or new procedure performed?: [x] No    [] Yes (see summary sheet for update)  Subjective functional status/changes:   [] No changes reported  It has come a long way. OBJECTIVE    30 min Therapeutic Exercise:  [x] See flow sheet :   Rationale: increase ROM and increase strength to improve the patients ability to use R UE    D/C Review  Recheck:   Wall slides, Door stretch, Canda Decant, Theraputty 1/4 peg removal (Firm)      With   [x] TE   [] TA   [] neuro   [] other: Patient Education: [x] Review HEP    [] Progressed/Changed HEP based on:   [] positioning   [] body mechanics   [] transfers   [x] heat/ice application   [] Splint wear/care   [] Sensory re-education   [] scar management      [] other:            Other Objective/Functional Measures:     : 55 (51)       Pain Level (0-10 scale) post treatment: 0/10    ASSESSMENT/Changes in Function: Strength improvement        []  See Plan of Care  []  See progress note/recertification  [x]  See Discharge Summary         Progress towards goals / Updated goals:  1. Finalize home program to address RUE strengthening and pain management/Met 17  2.  Patient will report pain in r shoulder 0-2/10 with exercises. Met for today 17  3.  Patient will improve R  strength to 60# for opening jars. Not Met 17    PLAN  []  Upgrade activities as tolerated     []  Continue plan of care  []  Update interventions per flow sheet       [x]  Discharge due to: Goals Met  []  Other:_      Joseph ARELIS Aceves/L 9/18/2017  2:18 PM    Future Appointments  Date Time Provider Nelson Hurtado   9/20/2017 9:30 AM Vida Mars  [address] [place]

## 2017-09-20 ENCOUNTER — OFFICE VISIT (OUTPATIENT)
Dept: INTERNAL MEDICINE CLINIC | Age: 77
End: 2017-09-20

## 2017-09-20 VITALS
SYSTOLIC BLOOD PRESSURE: 132 MMHG | WEIGHT: 184 LBS | BODY MASS INDEX: 25.76 KG/M2 | DIASTOLIC BLOOD PRESSURE: 68 MMHG | HEART RATE: 74 BPM | OXYGEN SATURATION: 98 % | TEMPERATURE: 97.3 F | HEIGHT: 71 IN | RESPIRATION RATE: 16 BRPM

## 2017-09-20 DIAGNOSIS — E03.9 ACQUIRED HYPOTHYROIDISM: Chronic | ICD-10-CM

## 2017-09-20 DIAGNOSIS — E53.8 LOW VITAMIN B12 LEVEL: ICD-10-CM

## 2017-09-20 DIAGNOSIS — E78.5 DYSLIPIDEMIA (HIGH LDL; LOW HDL): Chronic | ICD-10-CM

## 2017-09-20 DIAGNOSIS — I10 ESSENTIAL HYPERTENSION: ICD-10-CM

## 2017-09-20 DIAGNOSIS — I69.351 HEMIPARESIS AFFECTING RIGHT SIDE AS LATE EFFECT OF CEREBROVASCULAR ACCIDENT (CVA) (HCC): Primary | ICD-10-CM

## 2017-09-20 DIAGNOSIS — Z12.5 PROSTATE CANCER SCREENING: ICD-10-CM

## 2017-09-20 RX ORDER — CLOTRIMAZOLE AND BETAMETHASONE DIPROPIONATE 10; .64 MG/G; MG/G
CREAM TOPICAL
Qty: 45 G | Refills: 1 | Status: SHIPPED | OUTPATIENT
Start: 2017-09-20 | End: 2018-01-28

## 2017-09-20 NOTE — PROGRESS NOTES
1. Have you been to the ER, urgent care clinic since your last visit? Hospitalized since your last visit? No    2. Have you seen or consulted any other health care providers outside of the [address] [place] [address] since your last visit? Include any pap smears or colon screening.  No

## 2017-09-20 NOTE — PATIENT INSTRUCTIONS
Health Maintenance Due   Topic    DTaP/Tdap/Td series (1 - Tdap)    ZOSTER VACCINE AGE 60>     GLAUCOMA SCREENING Q2Y     Pneumococcal 65+ Low/Medium Risk (1 of 2 - PCV13)    INFLUENZA AGE 9 TO ADULT

## 2017-09-20 NOTE — MR AVS SNAPSHOT
Visit Information Date & Time Provider Department Dept. Phone Encounter #  
 9/20/2017  9:30 AM Ady Dominguez MD [facility] [place] [phone] [id number] Upcoming Health Maintenance Date Due DTaP/Tdap/Td series (1 - Tdap) 8/1/1959 ZOSTER VACCINE AGE 60> 6/1/1998 GLAUCOMA SCREENING Q2Y 8/1/2003 Pneumococcal 65+ Low/Medium Risk (1 of 2 - PCV13) 8/1/2003 INFLUENZA AGE 9 TO ADULT 8/1/2017 MEDICARE YEARLY EXAM 6/6/2018 Allergies as of 9/20/2017  Review Complete On: 9/20/2017 By: Juan Taylor LPN Severity Noted Reaction Type Reactions Pcn [Penicillins]    Rash Current Immunizations  Never Reviewed Name Date Influenza Vaccine (Quad) PF 10/31/2016 Not reviewed this visit You Were Diagnosed With   
  
 Codes Comments Prostate cancer screening    -  Primary ICD-10-CM: Z12.5 ICD-9-CM: V76.44 Dyslipidemia (high LDL; low HDL)     ICD-10-CM: E78.4 ICD-9-CM: 272.4 Acquired hypothyroidism     ICD-10-CM: E03.9 ICD-9-CM: 244.9 Low vitamin B12 level     ICD-10-CM: E53.8 ICD-9-CM: 266.2 Vitals BP Pulse Temp Resp Height(growth percentile) 132/68 (BP 1 Location: Left arm, BP Patient Position: Sitting) 74 97.3 °F (36.3 °C) (Tympanic) 16 5' 11\" (1.803 m) Weight(growth percentile) SpO2 BMI Smoking Status 184 lb (83.5 kg) 98% 25.66 kg/m2 Never Smoker Vitals History BMI and BSA Data Body Mass Index Body Surface Area  
 25.66 kg/m 2 2.05 m 2 Preferred Pharmacy Pharmacy Name Phone [address], [address] [place] [address] [phone] Your Updated Medication List  
  
   
This list is accurate as of: 9/20/17  9:58 AM.  Always use your most recent med list.  
  
  
  
  
 aspirin delayed-release 81 mg tablet Take 81 mg by mouth daily. benazepril 20 mg tablet Commonly known as:  LOTENSIN  
1 tablet daily (for BP) Cholecalciferol (Vitamin D3) 2,000 unit Cap capsule Commonly known as:  VITAMIN D3 Take 2,000 Units by mouth daily. clopidogrel 75 mg Tab Commonly known as:  PLAVIX Take 1 Tab by mouth daily (with dinner). Indications: Cerebral Thromboembolism Prevention  
  
 clotrimazole-betamethasone topical cream  
Commonly known as:  Irven Sharon Apply twice per day  
  
 cyanocobalamin 1,000 mcg tablet Take 1,000 mcg by mouth daily. fludrocortisone 0.1 mg tablet Commonly known as:  FLORINEF  
1 tablet daily  
  
 levothyroxine 150 mcg tablet Commonly known as:  SYNTHROID  
TAKE 1 TABLET BY MOUTH ONCE DAILY pantoprazole 20 mg tablet Commonly known as:  PROTONIX Take 20 mg by mouth daily. sertraline 50 mg tablet Commonly known as:  ZOLOFT  
1 1/2 tablets daily SINGULAIR 10 mg tablet Generic drug:  montelukast  
Take 10 mg by mouth daily. traZODone 50 mg tablet Commonly known as:  DESYREL  
1 1/2 tablets daily Prescriptions Sent to Pharmacy Refills  
 clotrimazole-betamethasone (LOTRISONE) topical cream 1 Sig: Apply twice per day Class: Normal  
 Pharmacy: [id number] [place] [address], [address] [place] [address] Ph #: [phone] To-Do List   
 09/20/2017 Lab:  LIPID PANEL   
  
 09/20/2017 Lab:  METABOLIC PANEL, COMPREHENSIVE   
  
 09/20/2017 Lab:  METHYLMALONIC ACID   
  
 09/20/2017 Lab:  PSA SCREENING (SCREENING)   
  
 09/20/2017 Lab:  TSH 3RD GENERATION   
  
 01/20/2018 Lab:  CBC WITH AUTOMATED DIFF Patient Instructions Health Maintenance Due Topic  DTaP/Tdap/Td series (1 - Tdap)  ZOSTER VACCINE AGE 60>   
 GLAUCOMA SCREENING Q2Y  Pneumococcal 65+ Low/Medium Risk (1 of 2 - PCV13)  INFLUENZA AGE 9 TO ADULT Introducing [facility] [place] [facility]!    
 Jessica Hobson introduces [employer] patient portal. Now you can access parts of your medical record, email your doctor's office, and request medication refills online. 1. In your internet browser, go to [email or web address] 2. Click on the First Time User? Click Here link in the Sign In box. You will see the New Member Sign Up page. 3. Enter your [employer] Access Code exactly as it appears below. You will not need to use this code after youve completed the sign-up process. If you do not sign up before the expiration date, you must request a new code. · Game[employer] Access Code: [email or web address] Expires: 12/6/2017  6:20 PM 
 
4. Enter the last four digits of your Social Security Number (xxxx) and Date of Birth (mm/dd/yyyy) as indicated and click Submit. You will be taken to the next sign-up page. 5. Create a Gameleon ID. This will be your Game[employer]on login ID and cannot be changed, so think of one that is secure and easy to remember. 6. Create a [employer] password. You can change your password at any time. 7. Enter your Password Reset Question and Answer. This can be used at a later time if you forget your password. 8. Enter your e-mail address. You will receive e-mail notification when new information is available in [address] 9. Click Sign Up. You can now view and download portions of your medical record. 10. Click the Download Summary menu link to download a portable copy of your medical information. If you have questions, please visit the Frequently Asked Questions section of the Game[employer] website. Remember, Game[employer] is NOT to be used for urgent needs. For medical emergencies, dial 911. Now available from your iPhone and Android! Please provide this summary of care documentation to your next provider. Your primary care clinician is listed as [facility]. If you have any questions after today's visit, please call [phone].

## 2017-09-22 NOTE — PROGRESS NOTES
[facility] - [address]  [phone] fax    Occupational Therapy Discharge Summary  Patient name: Trever Giraldo Start of Care: 17   Referral source: Christel Shah MD : 1940   Medical/Treatment Diagnosis: Muscle weakness (generalized) [M62.81] Onset Date:17     Prior Hospitalization: see medical history Provider#: [id number]   Medications: Verified on Patient Summary List    Comorbidities: thyroid, depression  Prior Level of Function:I self care home care, caregiver for spouse, driving    Visits from Start of Care: 24    Missed Visits: 1  Pateint had gaps in care due to family issues    Reporting Period : 17 to 17    Summary of Care:Patient seen for therapeutic exercises and activities to improve RUE function. He has HEP. Goal:1. Finalize home program to address RUE strengthening and pain management/   Status at last note/certification:Needed update  Status at discharge: met    Kalie Mckay will report pain in r shoulder 0-2/10 with exercises. Status at last note/certification:Pain   Status at discharge: met    Kalie Mckay will improve R  strength to 60# for opening jars. N  Status at last note/certification: 66#  Status at discharge: not met Progressing  53#      ASSESSMENT/Changes in Function: patient made excellent progress in overall RUE function. G-Codes (GO)  Self Care   Goal  CJ= 20-39%   D/C  CJ= 20-39%    The severity rating is based on clinical judgment and the FOTO score.     ASSESSMENT/RECOMMENDATIONS:  [x]Discontinue therapy: [x]Patient has reached or is progressing toward set goals      []Patient is non-compliant or has abdicated      []Due to lack of appreciable progress towards set goals    Ilana Killian, OTR/L 2017 12:03 PM

## 2017-09-23 NOTE — PROGRESS NOTES
The patient presents to the office today with the chief complaint of old stroke    HPI    The patient continues with therapy for right sided weakness secondary to a previous stroke. The right side remains weak but it is slowly improving. The patient remains on medications for hypertension. He is tolerating the medications well. The patient remains on Synthroid for hypothyroidism and B12 replacement. The patient has hyperlipidemia. This is currently being treated with diet. Review of Systems   Respiratory: Negative for shortness of breath. Cardiovascular: Negative for chest pain and leg swelling. Allergies   Allergen Reactions    Pcn [Penicillins] Rash       Current Outpatient Prescriptions   Medication Sig Dispense Refill    clotrimazole-betamethasone (LOTRISONE) topical cream Apply twice per day 45 g 1    fludrocortisone (FLORINEF) 0.1 mg tablet 1 tablet daily 30 Tab 2    sertraline (ZOLOFT) 50 mg tablet 1 1/2 tablets daily 50 Tab 2    traZODone (DESYREL) 50 mg tablet 1 1/2 tablets daily 50 Tab 3    aspirin delayed-release 81 mg tablet Take 81 mg by mouth daily.  Cholecalciferol, Vitamin D3, (VITAMIN D3) 2,000 unit cap capsule Take 2,000 Units by mouth daily.  pantoprazole (PROTONIX) 20 mg tablet Take 20 mg by mouth daily.  cyanocobalamin 1,000 mcg tablet Take 1,000 mcg by mouth daily.  montelukast (SINGULAIR) 10 mg tablet Take 10 mg by mouth daily.  levothyroxine (SYNTHROID) 150 mcg tablet TAKE 1 TABLET BY MOUTH ONCE DAILY 90 Tab 3    clopidogrel (PLAVIX) 75 mg tab Take 1 Tab by mouth daily (with dinner).  Indications: Cerebral Thromboembolism Prevention 30 Tab 4    benazepril (LOTENSIN) 20 mg tablet 1 tablet daily (for BP) 30 Tab 4       Past Medical History:   Diagnosis Date    Acute ischemic stroke ([facility] [address] 4/19/2017    Acute Ischemic Stroke (acute to subacute ischemia involving the posterior limb of the left internal capsule, along the lateral aspect of the left thalamus) with residual right hemiparesis, dysphagia and dysarthria    Asbestosis ([facility] [address]     Chronic obstructive pulmonary disease (COPD) ([facility]     CKD stage G3a/A1, GFR 45-59 and albumin creatinine ratio <30 mg/g 5/3/2017    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 HDL 42, LDL 97    Dysphagia as late effect of cerebrovascular accident (CVA) 4/19/2017    Erectile dysfunction     Gastroesophageal reflux disease     Hemiparesis affecting right side as late effect of cerebrovascular accident (CVA) ([facility] [address] 4/19/2017    History of endogenous hypertriglyceridemia     History of malignant neoplasm of prostate     Genoveva score 7     Hypothyroidism     Osteoarthrosis involving multiple sites     Procedure refused 4/21/2017    Speech Pathologist recommended NPO and PEG placement; Patient refused    Urinary incontinence     Male incontinence        Past Surgical History:   Procedure Laterality Date    COLONOSCOPY      HX CHOLECYSTECTOMY      HX HERNIA REPAIR Bilateral     inguinal    HX RADICAL PROSTATECTOMY  1-    perineal approach       Social History     Social History    Marital status:      Spouse name: N/A    Number of children: N/A    Years of education: N/A     Occupational History    Not on file.      Social History Main Topics    Smoking status: Never Smoker    Smokeless tobacco: Never Used    Alcohol use No    Drug use: No    Sexual activity: Yes     Partners: Female     Other Topics Concern    Not on file     Social History Narrative       Patient does have an advanced directive on file    Visit Vitals    /68 (BP 1 Location: Left arm, BP Patient Position: Sitting)    Pulse 74    Temp 97.3 °F (36.3 °C) (Tympanic)    Resp 16    Ht 5' 11\" (1.803 m)    Wt 184 lb (83.5 kg)    SpO2 98%    BMI 25.66 kg/m2       Physical Exam   Neck: Carotid bruit is not present. No thyromegaly present. Cardiovascular: Normal rate and regular rhythm. Exam reveals no gallop. No murmur heard. Pulmonary/Chest: He has no wheezes. He has no rales. Abdominal: Soft. He exhibits no distension. There is no tenderness. Musculoskeletal: He exhibits no edema. Neurological:   Right upper extremity weakness with some spasicity  Mild weakness right leg with apraxia       BMI:  [facility] Outpatient Visit on 08/14/2017   Component Date Value Ref Range Status    Sodium 08/14/2017 132* 136 - 145 mmol/L Final    Potassium 08/14/2017 4.3  3.5 - 5.5 mmol/L Final    Chloride 08/14/2017 97* 100 - 108 mmol/L Final    CO2 08/14/2017 24  21 - 32 mmol/L Final    Anion gap 08/14/2017 11  3.0 - 18 mmol/L Final    Glucose 08/14/2017 99  74 - 99 mg/dL Final    BUN 08/14/2017 26* 7.0 - 18 MG/DL Final    Creatinine 08/14/2017 1.45* 0.6 - 1.3 MG/DL Final    BUN/Creatinine ratio 08/14/2017 18  12 - 20   Final    GFR est AA 08/14/2017 57* >60 ml/min/1.73m2 Final    GFR est non-AA 08/14/2017 47* >60 ml/min/1.73m2 Final    Comment: (NOTE)  Estimated GFR is calculated using the Modification of Diet in Renal   Disease (MDRD) Study equation, reported for both  Americans   (GFRAA) and non- Americans (GFRNA), and normalized to 1.73m2   body surface area. The physician must decide which value applies to   the patient. The MDRD study equation should only be used in   individuals age 25 or older. It has not been validated for the   following: pregnant women, patients with serious comorbid conditions,   or on certain medications, or persons with extremes of body size,   muscle mass, or nutritional status.  Calcium 08/14/2017 9.0  8.5 - 10.1 MG/DL Final    Bilirubin, total 08/14/2017 0.6  0.2 - 1.0 MG/DL Final    ALT (SGPT) 08/14/2017 26  16 - 61 U/L Final    AST (SGOT) 08/14/2017 18  15 - 37 U/L Final    Alk.  phosphatase 08/14/2017 75  45 - 117 U/L Final    Protein, total 08/14/2017 7.5  6.4 - 8.2 g/dL Final    Albumin 08/14/2017 3.9  3.4 - 5.0 g/dL Final    Globulin 08/14/2017 3.6  2.0 - 4.0 g/dL Final    A-G Ratio 08/14/2017 1.1  0.8 - 1.7   Final    WBC 08/14/2017 6.9  4.6 - 13.2 K/uL Final    RBC 08/14/2017 5.10  4.70 - 5.50 M/uL Final    HGB 08/14/2017 14.7  13.0 - 16.0 g/dL Final    HCT 08/14/2017 43.0  36.0 - 48.0 % Final    MCV 08/14/2017 84.3  74.0 - 97.0 FL Final    MCH 08/14/2017 28.8  24.0 - 34.0 PG Final    MCHC 08/14/2017 34.2  31.0 - 37.0 g/dL Final    RDW 08/14/2017 13.8  11.6 - 14.5 % Final    PLATELET 46/92/2634 065  135 - 420 K/uL Final    MPV 08/14/2017 11.0  9.2 - 11.8 FL Final    NEUTROPHILS 08/14/2017 62  40 - 73 % Final    LYMPHOCYTES 08/14/2017 25  21 - 52 % Final    MONOCYTES 08/14/2017 10  3 - 10 % Final    EOSINOPHILS 08/14/2017 3  0 - 5 % Final    BASOPHILS 08/14/2017 0  0 - 2 % Final    ABS. NEUTROPHILS 08/14/2017 4.2  1.8 - 8.0 K/UL Final    ABS. LYMPHOCYTES 08/14/2017 1.8  0.9 - 3.6 K/UL Final    ABS. MONOCYTES 08/14/2017 0.7  0.05 - 1.2 K/UL Final    ABS. EOSINOPHILS 08/14/2017 0.2  0.0 - 0.4 K/UL Final    ABS.  BASOPHILS 08/14/2017 0.0  0.0 - 0.06 K/UL Final    DF 08/14/2017 AUTOMATED    Final   Hospital Outpatient Visit on 06/28/2017   Component Date Value Ref Range Status    Sodium 06/28/2017 130* 136 - 145 mmol/L Final    Potassium 06/28/2017 4.4  3.5 - 5.5 mmol/L Final    Chloride 06/28/2017 96* 100 - 108 mmol/L Final    CO2 06/28/2017 24  21 - 32 mmol/L Final    Anion gap 06/28/2017 10  3.0 - 18 mmol/L Final    Glucose 06/28/2017 112* 74 - 99 mg/dL Final    BUN 06/28/2017 22* 7.0 - 18 MG/DL Final    Creatinine 06/28/2017 1.33* 0.6 - 1.3 MG/DL Final    BUN/Creatinine ratio 06/28/2017 17  12 - 20   Final    GFR est AA 06/28/2017 >60  >60 ml/min/1.73m2 Final    GFR est non-AA 06/28/2017 52* >60 ml/min/1.73m2 Final    Comment: (NOTE)  Estimated GFR is calculated using the Modification of Diet in Renal Disease (MDRD) Study equation, reported for both  Americans   (GFRAA) and non- Americans (GFRNA), and normalized to 1.73m2   body surface area. The physician must decide which value applies to   the patient. The MDRD study equation should only be used in   individuals age 25 or older. It has not been validated for the   following: pregnant women, patients with serious comorbid conditions,   or on certain medications, or persons with extremes of body size,   muscle mass, or nutritional status.  Calcium 06/28/2017 9.1  8.5 - 10.1 MG/DL Final       .No results found for any visits on 09/20/17. Assessment / Plan      ICD-10-CM ICD-9-CM    1. Hemiparesis affecting right side as late effect of cerebrovascular accident (CVA) ([facility] I69.351 438.20    2. Prostate cancer screening Z12.5 V76.44 PSA SCREENING (SCREENING)   3. Dyslipidemia (high LDL; low HDL) E78.4 272.4 CBC WITH AUTOMATED DIFF      METABOLIC PANEL, COMPREHENSIVE      LIPID PANEL   4. Acquired hypothyroidism E03.9 244.9 CBC WITH AUTOMATED DIFF      METABOLIC PANEL, COMPREHENSIVE      TSH 3RD GENERATION   5. Low vitamin B12 level E53.8 266.2 METHYLMALONIC ACID   6. Essential hypertension I10 401.9      Labs  he was advised to continue his maintenance medications      Follow-up Disposition:  Return in about 5 months (around 2/20/2018). I asked Alfredo Sanderson. [facility] [employer] if he has any questions and I answered the questions. Stewart [address]ON[id number] [employer] states that he understands the treatment plan and agrees with the treatment plan

## 2017-10-20 RX ORDER — ZOLPIDEM TARTRATE 5 MG/1
5 TABLET ORAL
Qty: 30 TAB | Refills: 1 | Status: ON HOLD | OUTPATIENT
Start: 2017-10-20 | End: 2017-11-10

## 2017-10-20 NOTE — TELEPHONE ENCOUNTER
Mr Megan Citizen does not like the way that the trazodone makes him feel and would like a different sleep aid to assist him to fall and stay to sleep. He would prefer Ambien due to daughter recommendation.

## 2017-10-28 RX ORDER — FLUDROCORTISONE ACETATE 0.1 MG/1
TABLET ORAL
Qty: 30 TAB | Refills: 2 | Status: SHIPPED | OUTPATIENT
Start: 2017-10-28 | End: 2018-02-05 | Stop reason: ALTCHOICE

## 2017-11-02 ENCOUNTER — LAB ONLY (OUTPATIENT)
Dept: INTERNAL MEDICINE CLINIC | Age: 77
End: 2017-11-02

## 2017-11-02 ENCOUNTER — HOSPITAL ENCOUNTER (OUTPATIENT)
Dept: LAB | Age: 77
Discharge: HOME | End: 2017-11-02
Payer: MEDICARE

## 2017-11-02 DIAGNOSIS — E03.9 ACQUIRED HYPOTHYROIDISM: ICD-10-CM

## 2017-11-02 DIAGNOSIS — I10 ESSENTIAL HYPERTENSION: ICD-10-CM

## 2017-11-02 DIAGNOSIS — E78.5 DYSLIPIDEMIA (HIGH LDL; LOW HDL): Chronic | ICD-10-CM

## 2017-11-02 DIAGNOSIS — Z12.5 PROSTATE CANCER SCREENING: ICD-10-CM

## 2017-11-02 DIAGNOSIS — E78.5 DYSLIPIDEMIA (HIGH LDL; LOW HDL): ICD-10-CM

## 2017-11-02 DIAGNOSIS — E53.8 LOW VITAMIN B12 LEVEL: ICD-10-CM

## 2017-11-02 DIAGNOSIS — E03.9 ACQUIRED HYPOTHYROIDISM: Chronic | ICD-10-CM

## 2017-11-02 DIAGNOSIS — Z12.5 PROSTATE CANCER SCREENING: Primary | ICD-10-CM

## 2017-11-02 LAB
ALBUMIN SERPL-MCNC: 3.6 G/DL (ref 3.4–5)
ALBUMIN/GLOB SERPL: 1.1 {RATIO} (ref 0.8–1.7)
ALP SERPL-CCNC: 69 U/L (ref 45–117)
ALT SERPL-CCNC: 26 U/L (ref 16–61)
ANION GAP SERPL CALC-SCNC: 11 MMOL/L (ref 3–18)
AST SERPL-CCNC: 21 U/L (ref 15–37)
BASOPHILS # BLD: 0 K/UL (ref 0–0.06)
BASOPHILS NFR BLD: 0 % (ref 0–2)
BILIRUB SERPL-MCNC: 0.4 MG/DL (ref 0.2–1)
BUN SERPL-MCNC: 21 MG/DL (ref 7–18)
BUN/CREAT SERPL: 14 (ref 12–20)
CALCIUM SERPL-MCNC: 8.4 MG/DL (ref 8.5–10.1)
CHLORIDE SERPL-SCNC: 98 MMOL/L (ref 100–108)
CHOLEST SERPL-MCNC: 191 MG/DL
CO2 SERPL-SCNC: 23 MMOL/L (ref 21–32)
CREAT SERPL-MCNC: 1.46 MG/DL (ref 0.6–1.3)
DIFFERENTIAL METHOD BLD: ABNORMAL
EOSINOPHIL # BLD: 0.2 K/UL (ref 0–0.4)
EOSINOPHIL NFR BLD: 3 % (ref 0–5)
ERYTHROCYTE [DISTWIDTH] IN BLOOD BY AUTOMATED COUNT: 13.9 % (ref 11.6–14.5)
GLOBULIN SER CALC-MCNC: 3.4 G/DL (ref 2–4)
GLUCOSE SERPL-MCNC: 116 MG/DL (ref 74–99)
HCT VFR BLD AUTO: 41.4 % (ref 36–48)
HDLC SERPL-MCNC: 37 MG/DL (ref 40–60)
HDLC SERPL: 5.2 {RATIO} (ref 0–5)
HGB BLD-MCNC: 14.1 G/DL (ref 13–16)
LDLC SERPL CALC-MCNC: 84.8 MG/DL (ref 0–100)
LIPID PROFILE,FLP: ABNORMAL
LYMPHOCYTES # BLD: 2.4 K/UL (ref 0.9–3.6)
LYMPHOCYTES NFR BLD: 31 % (ref 21–52)
MCH RBC QN AUTO: 29 PG (ref 24–34)
MCHC RBC AUTO-ENTMCNC: 34.1 G/DL (ref 31–37)
MCV RBC AUTO: 85 FL (ref 74–97)
MONOCYTES # BLD: 0.7 K/UL (ref 0.05–1.2)
MONOCYTES NFR BLD: 9 % (ref 3–10)
NEUTS SEG # BLD: 4.4 K/UL (ref 1.8–8)
NEUTS SEG NFR BLD: 57 % (ref 40–73)
PLATELET # BLD AUTO: 197 K/UL (ref 135–420)
PMV BLD AUTO: 11.9 FL (ref 9.2–11.8)
POTASSIUM SERPL-SCNC: 4.3 MMOL/L (ref 3.5–5.5)
PROT SERPL-MCNC: 7 G/DL (ref 6.4–8.2)
PSA SERPL-MCNC: <0.1 NG/ML (ref 0–4)
RBC # BLD AUTO: 4.87 M/UL (ref 4.7–5.5)
SODIUM SERPL-SCNC: 132 MMOL/L (ref 136–145)
TRIGL SERPL-MCNC: 346 MG/DL (ref ?–150)
TSH SERPL DL<=0.05 MIU/L-ACNC: 1.18 UIU/ML (ref 0.36–3.74)
VLDLC SERPL CALC-MCNC: 69.2 MG/DL
WBC # BLD AUTO: 7.7 K/UL (ref 4.6–13.2)

## 2017-11-02 PROCEDURE — 80061 LIPID PANEL: CPT | Performed by: INTERNAL MEDICINE

## 2017-11-02 PROCEDURE — 85025 COMPLETE CBC W/AUTO DIFF WBC: CPT | Performed by: INTERNAL MEDICINE

## 2017-11-02 PROCEDURE — 83921 ORGANIC ACID SINGLE QUANT: CPT | Performed by: INTERNAL MEDICINE

## 2017-11-02 PROCEDURE — 84443 ASSAY THYROID STIM HORMONE: CPT | Performed by: INTERNAL MEDICINE

## 2017-11-02 PROCEDURE — 84153 ASSAY OF PSA TOTAL: CPT | Performed by: INTERNAL MEDICINE

## 2017-11-02 PROCEDURE — 80053 COMPREHEN METABOLIC PANEL: CPT | Performed by: INTERNAL MEDICINE

## 2017-11-06 LAB — METHYLMALONATE SERPL-SCNC: 281 NMOL/L (ref 0–378)

## 2017-11-09 ENCOUNTER — ANESTHESIA EVENT (OUTPATIENT)
Dept: ENDOSCOPY | Age: 77
End: 2017-11-09
Payer: MEDICARE

## 2017-11-10 ENCOUNTER — HOSPITAL ENCOUNTER (OUTPATIENT)
Age: 77
Setting detail: OUTPATIENT SURGERY
Discharge: HOME | End: 2017-11-10
Attending: INTERNAL MEDICINE | Admitting: INTERNAL MEDICINE
Payer: MEDICARE

## 2017-11-10 ENCOUNTER — ANESTHESIA (OUTPATIENT)
Dept: ENDOSCOPY | Age: 77
End: 2017-11-10
Payer: MEDICARE

## 2017-11-10 VITALS
HEIGHT: 71 IN | RESPIRATION RATE: 20 BRPM | DIASTOLIC BLOOD PRESSURE: 85 MMHG | HEART RATE: 67 BPM | TEMPERATURE: 97.8 F | OXYGEN SATURATION: 96 % | WEIGHT: 185.4 LBS | BODY MASS INDEX: 25.96 KG/M2 | SYSTOLIC BLOOD PRESSURE: 137 MMHG

## 2017-11-10 PROCEDURE — 74011000250 HC RX REV CODE- 250: Performed by: NURSE ANESTHETIST, CERTIFIED REGISTERED

## 2017-11-10 PROCEDURE — 77030018846 HC SOL IRR STRL H20 ICUM -A: Performed by: INTERNAL MEDICINE

## 2017-11-10 PROCEDURE — 77030008565 HC TBNG SUC IRR ERBE -B: Performed by: INTERNAL MEDICINE

## 2017-11-10 PROCEDURE — 74011250636 HC RX REV CODE- 250/636

## 2017-11-10 PROCEDURE — 74011250636 HC RX REV CODE- 250/636: Performed by: NURSE ANESTHETIST, CERTIFIED REGISTERED

## 2017-11-10 PROCEDURE — 74011000250 HC RX REV CODE- 250

## 2017-11-10 PROCEDURE — 76060000032 HC ANESTHESIA 0.5 TO 1 HR: Performed by: INTERNAL MEDICINE

## 2017-11-10 PROCEDURE — 76040000007: Performed by: INTERNAL MEDICINE

## 2017-11-10 RX ORDER — SODIUM CHLORIDE, SODIUM LACTATE, POTASSIUM CHLORIDE, CALCIUM CHLORIDE 600; 310; 30; 20 MG/100ML; MG/100ML; MG/100ML; MG/100ML
75 INJECTION, SOLUTION INTRAVENOUS CONTINUOUS
Status: DISCONTINUED | OUTPATIENT
Start: 2017-11-10 | End: 2017-11-10 | Stop reason: HOSPADM

## 2017-11-10 RX ORDER — LIDOCAINE HYDROCHLORIDE 20 MG/ML
INJECTION, SOLUTION EPIDURAL; INFILTRATION; INTRACAUDAL; PERINEURAL AS NEEDED
Status: DISCONTINUED | OUTPATIENT
Start: 2017-11-10 | End: 2017-11-10 | Stop reason: HOSPADM

## 2017-11-10 RX ORDER — ONDANSETRON 2 MG/ML
4 INJECTION INTRAMUSCULAR; INTRAVENOUS AS NEEDED
Status: DISCONTINUED | OUTPATIENT
Start: 2017-11-10 | End: 2017-11-10 | Stop reason: HOSPADM

## 2017-11-10 RX ORDER — SODIUM CHLORIDE 0.9 % (FLUSH) 0.9 %
5-10 SYRINGE (ML) INJECTION EVERY 8 HOURS
Status: DISCONTINUED | OUTPATIENT
Start: 2017-11-10 | End: 2017-11-10 | Stop reason: HOSPADM

## 2017-11-10 RX ORDER — SODIUM CHLORIDE 0.9 % (FLUSH) 0.9 %
5-10 SYRINGE (ML) INJECTION AS NEEDED
Status: DISCONTINUED | OUTPATIENT
Start: 2017-11-10 | End: 2017-11-10 | Stop reason: HOSPADM

## 2017-11-10 RX ORDER — PROPOFOL 10 MG/ML
INJECTION, EMULSION INTRAVENOUS AS NEEDED
Status: DISCONTINUED | OUTPATIENT
Start: 2017-11-10 | End: 2017-11-10 | Stop reason: HOSPADM

## 2017-11-10 RX ADMIN — FAMOTIDINE 20 MG: 10 INJECTION INTRAVENOUS at 08:24

## 2017-11-10 RX ADMIN — LIDOCAINE HYDROCHLORIDE 40 MG: 20 INJECTION, SOLUTION EPIDURAL; INFILTRATION; INTRACAUDAL; PERINEURAL at 09:34

## 2017-11-10 RX ADMIN — SODIUM CHLORIDE, SODIUM LACTATE, POTASSIUM CHLORIDE, AND CALCIUM CHLORIDE 75 ML/HR: 600; 310; 30; 20 INJECTION, SOLUTION INTRAVENOUS at 08:24

## 2017-11-10 RX ADMIN — PROPOFOL 20 MG: 10 INJECTION, EMULSION INTRAVENOUS at 09:36

## 2017-11-10 RX ADMIN — PROPOFOL 20 MG: 10 INJECTION, EMULSION INTRAVENOUS at 09:38

## 2017-11-10 RX ADMIN — PROPOFOL 30 MG: 10 INJECTION, EMULSION INTRAVENOUS at 09:34

## 2017-11-10 NOTE — PERIOP NOTES
I have reviewed discharge instructions with the patient and daughter-in-law, Maria Isabel Phipps. The patient and daughter-in-law verbalized understanding. Patient armband removed and shredded.

## 2017-11-10 NOTE — DISCHARGE INSTRUCTIONS
Esophageal Dilation: What to Expect at [address] [place] [address]  After you have esophageal dilation, you will stay at the hospital or surgery center for 1 to 2 hours. This will allow the medicine to wear off. You will be able to go home after your doctor or nurse checks to make sure you are not having any problems. This care sheet gives you a general idea about how long it will take for you to recover. But each person recovers at a different pace. Follow the steps below to get better as quickly as possible. How can you care for yourself at home? Activity  ? · Rest as much as you need to after you go home. ? · You should be able to go back to your usual activities the day after the procedure. Diet  ? · Follow your doctor's directions for eating after the procedure. ? · Drink plenty of fluids (unless your doctor has told you not to). Medicines  ? · Your doctor will tell you if and when you can restart your medicines. He or she will also give you instructions about taking any new medicines. ? · If you take blood thinners, such as warfarin (Coumadin), clopidogrel (Plavix), or aspirin, be sure to talk to your doctor. He or she will tell you if and when to start taking those medicines again. Make sure that you understand exactly what your doctor wants you to do. ? · If you have a sore throat the day after the procedure, use an over-the-counter spray to numb your throat. Sucking on throat lozenges and gargling with warm salt water may also help relieve your symptoms. Follow-up care is a key part of your treatment and safety. Be sure to make and go to all appointments, and call your doctor if you are having problems. It's also a good idea to know your test results and keep a list of the medicines you take. When should you call for help? Call 911 anytime you think you may need emergency care. For example, call if:  ? · You passed out (lost consciousness). ? · You have trouble breathing.    ? · Your stools are maroon or very bloody   ? Call your doctor now or seek immediate medical care if:  ? · You have new or worse belly pain. ? · You have a fever. ? · You have new or more blood in your stools. ? · You are sick to your stomach and cannot drink fluids. ? · You cannot pass stools or gas. ? · You have pain that does not get better after you take pain medicine. ? Watch closely for changes in your health, and be sure to contact your doctor if:  ? · Your throat still hurts after a day or two. ? · You do not get better as expected. Where can you learn more? Go to http://sudarshan-shaneka.info/. Enter P312 in the search box to learn more about \"Esophageal Dilation: What to Expect at Home. \"  Current as of: May 12, 2017  Content Version: 11.4  © 5107-9850 [employer]. Care instructions adapted under license by [employer] (which disclaims liability or warranty for this information). If you have questions about a medical condition or this instruction, always ask your healthcare professional. [address] any warranty or liability for your use of this information. Hiatal Hernia: Care Instructions  Your Care Instructions  A hiatal hernia occurs when part of the stomach bulges into the chest cavity. A hiatal hernia may allow stomach acid and juices to back up into the esophagus (acid reflux). This can cause a feeling of burning, warmth, heat, or pain behind the breastbone. This feeling may often occur after you eat, soon after you lie down, or when you bend forward, and it may come and go. You also may have a sour taste in your mouth. These symptoms are commonly known as heartburn or reflux. But not all hiatal hernias cause symptoms. Follow-up care is a key part of your treatment and safety. Be sure to make and go to all appointments, and call your doctor if you are having problems.  It's also a good idea to know your test results and keep a list of the medicines you take. How can you care for yourself at home? · Take your medicines exactly as prescribed. Call your doctor if you think you are having a problem with your medicine. · Do not take aspirin or other nonsteroidal anti-inflammatory drugs (NSAIDs), such as ibuprofen (Advil, Motrin) or naproxen (Aleve), unless your doctor says it is okay. Ask your doctor what you can take for pain. · Your doctor may recommend over-the-counter medicine. For mild or occasional indigestion, antacids such as Tums, Gaviscon, Maalox, or Mylanta may help. Your doctor also may recommend over-the-counter acid reducers, such as famotidine (Pepcid AC), cimetidine (Tagamet HB), ranitidine (Zantac 75 and Zantac 150), or omeprazole (Prilosec). Read and follow all instructions on the label. If you use these medicines often, talk with your doctor. · Change your eating habits. ¨ It's best to eat several small meals instead of two or three large meals. ¨ After you eat, wait 2 to 3 hours before you lie down. Late-night snacks aren't a good idea. ¨ Chocolate, mint, and alcohol can make heartburn worse. They relax the valve between the esophagus and the stomach. ¨ Spicy foods, foods that have a lot of acid (like tomatoes and oranges), and coffee can make heartburn symptoms worse in some people. If your symptoms are worse after you eat a certain food, you may want to stop eating that food to see if your symptoms get better. · Do not smoke or chew tobacco.  · If you get heartburn at night, raise the head of your bed 6 to 8 inches by putting the frame on blocks or placing a foam wedge under the head of your mattress. (Adding extra pillows does not work.)  · Do not wear tight clothing around your middle. · Lose weight if you need to. Losing just 5 to 10 pounds can help. When should you call for help? Call your doctor now or seek immediate medical care if:  ? · You have new or worse belly pain. ? · You are vomiting. ? Watch closely for changes in your health, and be sure to contact your doctor if:  ? · You have new or worse symptoms of indigestion. ? · You have trouble or pain swallowing. ? · You are losing weight. ? · You do not get better as expected. Where can you learn more? Go to http://sudarshan-shaneka.info/. Enter P108 in the search box to learn more about \"Hiatal Hernia: Care Instructions. \"  Current as of: May 12, 2017  Content Version: 11.4  © 2768-4929 [employer]. Care instructions adapted under license by [employer] (which disclaims liability or warranty for this information). If you have questions about a medical condition or this instruction, always ask your healthcare professional. [address] any warranty or liability for your use of this information. DISCHARGE SUMMARY from Nurse    PATIENT INSTRUCTIONS:    After general anesthesia or intravenous sedation, for 24 hours or while taking prescription Narcotics:  · Limit your activities  · Do not drive and operate hazardous machinery  · Do not make important personal or business decisions  · Do  not drink alcoholic beverages  · If you have not urinated within 8 hours after discharge, please contact your surgeon on call. Report the following to your surgeon:  · Excessive pain, swelling, redness or odor of or around the surgical area  · Temperature over 100.5  · Nausea and vomiting lasting longer than 4 hours or if unable to take medications  · Any signs of decreased circulation or nerve impairment to extremity: change in color, persistent  numbness, tingling, coldness or increase pain  · Any questions    *  Please give a list of your current medications to your Primary Care Provider. *  Please update this list whenever your medications are discontinued, doses are      changed, or new medications (including over-the-counter products) are added.     *  Please carry medication information at all times in case of emergency situations. These are general instructions for a healthy lifestyle:    No smoking/ No tobacco products/ Avoid exposure to second hand smoke  Surgeon General's Warning:  Quitting smoking now greatly reduces serious risk to your health. Obesity, smoking, and sedentary lifestyle greatly increases your risk for illness    A healthy diet, regular physical exercise & weight monitoring are important for maintaining a healthy lifestyle    You may be retaining fluid if you have a history of heart failure or if you experience any of the following symptoms:  Weight gain of 3 pounds or more overnight or 5 pounds in a week, increased swelling in our hands or feet or shortness of breath while lying flat in bed. Please call your doctor as soon as you notice any of these symptoms; do not wait until your next office visit. Recognize signs and symptoms of STROKE:    F-face looks uneven    A-arms unable to move or move unevenly    S-speech slurred or non-existent    T-time-call 911 as soon as signs and symptoms begin-DO NOT go       Back to bed or wait to see if you get better-TIME IS BRAIN. Warning Signs of HEART ATTACK     Call 911 if you have these symptoms:   Chest discomfort. Most heart attacks involve discomfort in the center of the chest that lasts more than a few minutes, or that goes away and comes back. It can feel like uncomfortable pressure, squeezing, fullness, or pain.  Discomfort in other areas of the upper body. Symptoms can include pain or discomfort in one or both arms, the back, neck, jaw, or stomach.  Shortness of breath with or without chest discomfort.  Other signs may include breaking out in a cold sweat, nausea, or lightheadedness. Don't wait more than five minutes to call 911 - MINUTES MATTER! Fast action can save your life. Calling 911 is almost always the fastest way to get lifesaving treatment.  Emergency Medical Services staff can begin treatment when they arrive -- up to an hour sooner than if someone gets to the hospital by car. The discharge information has been reviewed with the patient. The patient verbalized understanding. Discharge medications reviewed with the patient and appropriate educational materials and side effects teaching were provided.   ___________________________________________________________________________________________________________________________________

## 2017-11-10 NOTE — IP AVS SNAPSHOT
[address] Patient: Donato Gold MRN: [id number] UTM:[id number]/4/[id number] About your hospitalization You were admitted on:  November 10, 2017 You last received care in the:  [facility] PHASE 2 RECOVERY You were discharged on:  November 10, 2017 Why you were hospitalized Your primary diagnosis was:  Not on File Things You Need To Do (next 8 weeks) Thursday Nov 16, 2017 Follow Up with Salvador Benjamin MD at 11:45 AM  
Where:  [address] ([facility]) Discharge Orders None A check seth indicates which time of day the medication should be taken. My Medications TAKE these medications as instructed Instructions Each Dose to Equal  
 Morning Noon Evening Bedtime  
 aspirin delayed-release 81 mg tablet Your last dose was: Your next dose is: Take 81 mg by mouth daily. 81 mg  
    
   
   
   
  
 benazepril 20 mg tablet Commonly known as:  LOTENSIN Your last dose was: Your next dose is:    
   
   
 1 tablet daily (for BP) Cholecalciferol (Vitamin D3) 2,000 unit Cap capsule Commonly known as:  VITAMIN D3 Your last dose was: Your next dose is: Take 2,000 Units by mouth daily. 2000 Units  
    
   
   
   
  
 clopidogrel 75 mg Tab Commonly known as:  PLAVIX Your last dose was: Your next dose is: Take 1 Tab by mouth daily (with dinner). Indications: Cerebral Thromboembolism Prevention 75 mg  
    
   
   
   
  
 clotrimazole-betamethasone topical cream  
Commonly known as:  Haig  Your last dose was: Your next dose is:    
   
   
 Apply twice per day  
     
   
   
   
  
 cyanocobalamin 1,000 mcg tablet Your last dose was: Your next dose is: Take 1,000 mcg by mouth daily. 1000 mcg  
    
   
   
   
  
 fludrocortisone 0.1 mg tablet Commonly known as:  FLORINEF Your last dose was: Your next dose is: TAKE 1 TABLET BY MOUTH ONCE DAILY  
     
   
   
   
  
 levothyroxine 150 mcg tablet Commonly known as:  SYNTHROID Your last dose was: Your next dose is: TAKE 1 TABLET BY MOUTH ONCE DAILY pantoprazole 20 mg tablet Commonly known as:  PROTONIX Your last dose was: Your next dose is: Take 20 mg by mouth daily. 20 mg  
    
   
   
   
  
 sertraline 50 mg tablet Commonly known as:  ZOLOFT Your last dose was: Your next dose is:    
   
   
 1 1/2 tablets daily SINGULAIR 10 mg tablet Generic drug:  montelukast  
   
Your last dose was: Your next dose is: Take 10 mg by mouth daily. 10 mg Discharge Instructions Esophageal Dilation: What to Expect at [facility] Your Recovery After you have esophageal dilation, you will stay at the hospital or surgery center for 1 to 2 hours. This will allow the medicine to wear off. You will be able to go home after your doctor or nurse checks to make sure you are not having any problems. This care sheet gives you a general idea about how long it will take for you to recover. But each person recovers at a different pace. Follow the steps below to get better as quickly as possible. How can you care for yourself at home? Activity ? · Rest as much as you need to after you go home. ? · You should be able to go back to your usual activities the day after the procedure. Diet ? · Follow your doctor's directions for eating after the procedure. ? · Drink plenty of fluids (unless your doctor has told you not to). Medicines ? · Your doctor will tell you if and when you can restart your medicines. He or she will also give you instructions about taking any new medicines. ? · If you take blood thinners, such as warfarin (Coumadin), clopidogrel (Plavix), or aspirin, be sure to talk to your doctor. He or she will tell you if and when to start taking those medicines again. Make sure that you understand exactly what your doctor wants you to do. ? · If you have a sore throat the day after the procedure, use an over-the-counter spray to numb your throat. Sucking on throat lozenges and gargling with warm salt water may also help relieve your symptoms. Follow-up care is a key part of your treatment and safety. Be sure to make and go to all appointments, and call your doctor if you are having problems. It's also a good idea to know your test results and keep a list of the medicines you take. When should you call for help? Call 911 anytime you think you may need emergency care. For example, call if: 
? · You passed out (lost consciousness). ? · You have trouble breathing. ? · Your stools are maroon or very bloody ? Call your doctor now or seek immediate medical care if: 
? · You have new or worse belly pain. ? · You have a fever. ? · You have new or more blood in your stools. ? · You are sick to your stomach and cannot drink fluids. ? · You cannot pass stools or gas. ? · You have pain that does not get better after you take pain medicine. ? Watch closely for changes in your health, and be sure to contact your doctor if: 
? · Your throat still hurts after a day or two. ? · You do not get better as expected. Where can you learn more? Go to http://sudarshan-shaneka.info/. Enter D481 in the search box to learn more about \"Esophageal Dilation: What to Expect at Home. \" Current as of: May 12, 2017 Content Version: 11.4 © 2808-1318 Healthwise, Incorporated.  Care instructions adapted under license by [employer] (which disclaims liability or warranty for this information). If you have questions about a medical condition or this instruction, always ask your healthcare professional. [address] any warranty or liability for your use of this information. Hiatal Hernia: Care Instructions Your Care Instructions A hiatal hernia occurs when part of the stomach bulges into the chest cavity. A hiatal hernia may allow stomach acid and juices to back up into the esophagus (acid reflux). This can cause a feeling of burning, warmth, heat, or pain behind the breastbone. This feeling may often occur after you eat, soon after you lie down, or when you bend forward, and it may come and go. You also may have a sour taste in your mouth. These symptoms are commonly known as heartburn or reflux. But not all hiatal hernias cause symptoms. Follow-up care is a key part of your treatment and safety. Be sure to make and go to all appointments, and call your doctor if you are having problems. It's also a good idea to know your test results and keep a list of the medicines you take. How can you care for yourself at home? · Take your medicines exactly as prescribed. Call your doctor if you think you are having a problem with your medicine. · Do not take aspirin or other nonsteroidal anti-inflammatory drugs (NSAIDs), such as ibuprofen (Advil, Motrin) or naproxen (Aleve), unless your doctor says it is okay. Ask your doctor what you can take for pain. · Your doctor may recommend over-the-counter medicine. For mild or occasional indigestion, antacids such as Tums, Gaviscon, Maalox, or Mylanta may help. Your doctor also may recommend over-the-counter acid reducers, such as famotidine (Pepcid AC), cimetidine (Tagamet HB), ranitidine (Zantac 75 and Zantac 150), or omeprazole (Prilosec). Read and follow all instructions on the label. If you use these medicines often, talk with your doctor. · Change your eating habits. ¨ It's best to eat several small meals instead of two or three large meals. ¨ After you eat, wait 2 to 3 hours before you lie down. Late-night snacks aren't a good idea. ¨ Chocolate, mint, and alcohol can make heartburn worse. They relax the valve between the esophagus and the stomach. ¨ Spicy foods, foods that have a lot of acid (like tomatoes and oranges), and coffee can make heartburn symptoms worse in some people. If your symptoms are worse after you eat a certain food, you may want to stop eating that food to see if your symptoms get better. · Do not smoke or chew tobacco. 
· If you get heartburn at night, raise the head of your bed 6 to 8 inches by putting the frame on blocks or placing a foam wedge under the head of your mattress. (Adding extra pillows does not work.) · Do not wear tight clothing around your middle. · Lose weight if you need to. Losing just 5 to 10 pounds can help. When should you call for help? Call your doctor now or seek immediate medical care if: 
? · You have new or worse belly pain. ? · You are vomiting. ? Watch closely for changes in your health, and be sure to contact your doctor if: 
? · You have new or worse symptoms of indigestion. ? · You have trouble or pain swallowing. ? · You are losing weight. ? · You do not get better as expected. Where can you learn more? Go to http://sudarshan-shaneka.info/. Enter M797 in the search box to learn more about \"Hiatal Hernia: Care Instructions. \" Current as of: May 12, 2017 Content Version: 11.4 © 4089-0951 [employer]. Care instructions adapted under license by [employer] (which disclaims liability or warranty for this information). If you have questions about a medical condition or this instruction, always ask your healthcare professional. [place] [address] any warranty or liability for your use of this information. DISCHARGE SUMMARY from Nurse PATIENT INSTRUCTIONS: 
 
 
F-face looks uneven A-arms unable to move or move unevenly S-speech slurred or non-existent T-time-call 911 as soon as signs and symptoms begin-DO NOT go Back to bed or wait to see if you get better-TIME IS BRAIN. Warning Signs of HEART ATTACK Call 911 if you have these symptoms: 
? Chest discomfort. Most heart attacks involve discomfort in the center of the chest that lasts more than a few minutes, or that goes away and comes back. It can feel like uncomfortable pressure, squeezing, fullness, or pain. ? Discomfort in other areas of the upper body. Symptoms can include pain or discomfort in one or both arms, the back, neck, jaw, or stomach. ? Shortness of breath with or without chest discomfort. ? Other signs may include breaking out in a cold sweat, nausea, or lightheadedness. Don't wait more than five minutes to call [address]! Fast action can save your life. Calling 911 is almost always the fastest way to get lifesaving treatment. Emergency Medical Services staff can begin treatment when they arrive  up to an hour sooner than if someone gets to the hospital by car. The discharge information has been reviewed with the patient. The patient verbalized understanding. Discharge medications reviewed with the patient and appropriate educational materials and side effects teaching were provided. ___________________________________________________________________________________________________________________________________ ACO Transitions of Care Introducing [employer] [address] offers a voluntary care coordination program to provide high quality service and care to [facility] fee-for-service beneficiaries. Nenita Cooper was designed to help you enhance your health and well-being through the following services: ? Transitions of Care  support for individuals who are transitioning from one care setting to another (example: Hospital to home). ? Chronic and Complex Care Coordination  support for individuals and caregivers of those with serious or chronic illnesses or with more than one chronic (ongoing) condition and those who take a number of different medications. If you meet specific medical criteria, a [facility] [address] may call you directly to coordinate your care with your primary care physician and your other care providers. For questions about the [facility] programs, please, contact your physicians office. For general questions or additional information about Accountable Care Organizations: 
Please visit www.medicare.gov/acos. html or call 1-800-MEDICARE (4-489.513.5205) TTY users should call 9-904.717.7416. Introducing [facility] SERVICES! Pam Dominguez introduces [employer] patient portal. Now you can access parts of your medical record, email your doctor's office, and request medication refills online. 1. In your internet browser, go to [email or web address] 2. Click on the First Time User? Click Here link in the Sign In box. You will see the New Member Sign Up page. 3. Enter your Celtro Access Code exactly as it appears below. You will not need to use this code after youve completed the sign-up process. If you do not sign up before the expiration date, you must request a new code. · [employer] Access Code: [email or web address] Expires: 12/6/2017  5:20 PM 
 
4. Enter the last four digits of your Social Security Number (xxxx) and Date of Birth (mm/dd/yyyy) as indicated and click Submit. You will be taken to the next sign-up page. 5. Create a Regent ID. This will be your Celtro login ID and cannot be changed, so think of one that is secure and easy to remember. 6. Create a Regent password. You can change your password at any time. 7. Enter your Password Reset Question and Answer. This can be used at a later time if you forget your password. 8. Enter your e-mail address. You will receive e-mail notification when new information is available in [address] 9. Click Sign Up. You can now view and download portions of your medical record. 10. Click the Download Summary menu link to download a portable copy of your medical information. If you have questions, please visit the Frequently Asked Questions section of the [employer] website. Remember, [employer] is NOT to be used for urgent needs. For medical emergencies, dial 911. Now available from your iPhone and Android! Providers Seen During Your Hospitalization Provider Specialty Primary office phone Urbano Mendenhall MD Gastroenterology [phone] Your Primary Care Physician (PCP) Primary Care Physician Office Phone Office Fax [address] [address] [phone] You are allergic to the following Allergen Reactions Pcn (Penicillins) Rash Recent Documentation Height Weight BMI Smoking Status 1.803 m 84.1 kg 25.86 kg/m2 Never Smoker Emergency Contacts Name Discharge Info Relation Home Work Mobile Asha Nettles DISCHARGE CAREGIVER [3] Daughter [21] [phone] [facility] CAREGIVER [3] Child [2] [phone] Patient Belongings The following personal items are in your possession at time of discharge: 
  Dental Appliances: Lowers, Uppers  Visual Aid: None Please provide this summary of care documentation to your next provider. Signatures-by signing, you are acknowledging that this After Visit Summary has been reviewed with you and you have received a copy. Patient Signature:  ____________________________________________________________ Date:  ____________________________________________________________ `    
    
 Provider Signature:  ____________________________________________________________ Date:  ____________________________________________________________

## 2017-11-14 RX ORDER — SERTRALINE HYDROCHLORIDE 50 MG/1
TABLET, FILM COATED ORAL
Qty: 50 TAB | Refills: 2 | Status: SHIPPED | OUTPATIENT
Start: 2017-11-14 | End: 2018-01-15 | Stop reason: SDUPTHER

## 2017-11-16 ENCOUNTER — OFFICE VISIT (OUTPATIENT)
Dept: INTERNAL MEDICINE CLINIC | Age: 77
End: 2017-11-16

## 2017-11-16 VITALS
TEMPERATURE: 97.7 F | BODY MASS INDEX: 25.9 KG/M2 | HEART RATE: 61 BPM | OXYGEN SATURATION: 98 % | RESPIRATION RATE: 16 BRPM | SYSTOLIC BLOOD PRESSURE: 142 MMHG | HEIGHT: 71 IN | DIASTOLIC BLOOD PRESSURE: 78 MMHG | WEIGHT: 185 LBS

## 2017-11-16 DIAGNOSIS — I69.322 DYSARTHRIA AS LATE EFFECT OF CEREBROVASCULAR ACCIDENT (CVA): Primary | ICD-10-CM

## 2017-11-16 DIAGNOSIS — I10 ESSENTIAL HYPERTENSION: ICD-10-CM

## 2017-11-16 DIAGNOSIS — Z23 ENCOUNTER FOR IMMUNIZATION: ICD-10-CM

## 2017-11-16 DIAGNOSIS — E78.5 DYSLIPIDEMIA (HIGH LDL; LOW HDL): Chronic | ICD-10-CM

## 2017-11-16 RX ORDER — AZELASTINE 1 MG/ML
SPRAY, METERED NASAL
Qty: 1 BOTTLE | Refills: 1 | Status: SHIPPED | OUTPATIENT
Start: 2017-11-16 | End: 2018-01-28

## 2017-11-16 NOTE — MR AVS SNAPSHOT
Visit Information Date & Time Provider Department Dept. Phone Encounter #  
 11/16/2017 11:45 AM Stormy Hughes MD [facility] OF [place] [phone] [id number] Your Appointments 2/28/2018  8:30 AM  
Nurse Visit with Stormy Hughes MD  
[facility] INTER[facility] MEDICINE OF [place] [address]) Appt Note: 3 mo f/u  
 [address] [facility] [address]  
  
   
 [address], [address] [facility] [address] Upcoming Health Maintenance Date Due DTaP/Tdap/Td series (1 - Tdap) 8[birth year]1/1961 ZOSTER VACCINE AGE 60> 6/1/1998 GLAUCOMA SCREENING Q2Y 8/1/2003 Pneumococcal 65+ Low/Medium Risk (1 of 2 - PCV13) 8/1/2003 Influenza Age 5 to Adult 8/1/2017 MEDICARE YEARLY EXAM 6/6/2018 Allergies as of 11/16/2017  Review Complete On: 11/16/2017 By: Alan Barrett LPN Severity Noted Reaction Type Reactions Pcn [Penicillins]    Rash Current Immunizations  Reviewed on 11/14/2017 Name Date Influenza High Dose Vaccine PF 11/16/2017 Influenza Vaccine (Quad) PF 10/31/2016 Pneumococcal Conjugate (PCV-13) 11/16/2017 12:54 PM  
  
 Not reviewed this visit You Were Diagnosed With   
  
 Codes Comments Encounter for immunization     ICD-10-CM: H67 ICD-9-CM: V03.89 Vitals BP Pulse Temp Resp Height(growth percentile) 142/78 (BP 1 Location: Right arm, BP Patient Position: Sitting) 61 97.7 °F (36.5 °C) (Tympanic) 16 5' 11\" (1.803 m) Weight(growth percentile) SpO2 BMI Smoking Status 185 lb (83.9 kg) 98% 25.8 kg/m2 Never Smoker Vitals History BMI and BSA Data Body Mass Index Body Surface Area  
 25.8 kg/m 2 2.05 m 2 Preferred Pharmacy Pharmacy Name Phone [address], [address] [place] [address] [phone] Your Updated Medication List  
  
   
This list is accurate as of: 11/16/17  1:13 PM.  Always use your most recent med list.  
  
  
  
  
 aspirin delayed-release 81 mg tablet Take 81 mg by mouth daily. azelastine 137 mcg (0.1 %) nasal spray Commonly known as:  ASTELIN  
1 spray up each nostril daily  
  
 benazepril 20 mg tablet Commonly known as:  LOTENSIN  
1 tablet daily (for BP) Cholecalciferol (Vitamin D3) 2,000 unit Cap capsule Commonly known as:  VITAMIN D3 Take 2,000 Units by mouth daily. clopidogrel 75 mg Tab Commonly known as:  PLAVIX Take 1 Tab by mouth daily (with dinner). Indications: Cerebral Thromboembolism Prevention  
  
 clotrimazole-betamethasone topical cream  
Commonly known as:  Colletta Dull Apply twice per day  
  
 cyanocobalamin 1,000 mcg tablet Take 1,000 mcg by mouth daily. fludrocortisone 0.1 mg tablet Commonly known as:  FLORINEF  
TAKE 1 TABLET BY MOUTH ONCE DAILY  
  
 levothyroxine 150 mcg tablet Commonly known as:  SYNTHROID  
TAKE 1 TABLET BY MOUTH ONCE DAILY pantoprazole 20 mg tablet Commonly known as:  PROTONIX Take 20 mg by mouth daily. sertraline 50 mg tablet Commonly known as:  ZOLOFT  
1 1/2 tablets daily SINGULAIR 10 mg tablet Generic drug:  montelukast  
Take 10 mg by mouth daily. Prescriptions Sent to Pharmacy Refills  
 azelastine (ASTELIN) 137 mcg (0.1 %) nasal spray 1 Si spray up each nostril daily Class: Normal  
 Pharmacy: [address] [place] [address], [address] [place] [address] Ph #: [phone] We Performed the Following ADMIN INFLUENZA VIRUS VAC [ HCPCS] ADMIN PNEUMOCOCCAL VACCINE [ HCP] INFLUENZA VIRUS VACCINE, HIGH DOSE SEASONAL, PRESERVATIVE FREE [73731 CPT(R)] PNEUMOCOCCAL CONJ VACCINE 13 VALENT IM [id number] CPT(R)] Patient Instructions Health Maintenance Due Topic Date Due  
 DTaP/Tdap/Td  (1 - Tdap) 1959  Shingles Vaccine  1998  Glaucoma Screening   2003  Pneumococcal Vaccine (1 of 2 - PCV13) 08/01/2003  Flu Vaccine  08/01/2017 Introducing [facility] SERVICES! [facility] introduces [employer] patient portal. Now you can access parts of your medical record, email your doctor's office, and request medication refills online. 1. In your internet browser, go to [email or web address] 2. Click on the First Time User? Click Here link in the Sign In box. You will see the New Member Sign Up page. 3. Enter your Wrnch Access Code exactly as it appears below. You will not need to use this code after youve completed the sign-up process. If you do not sign up before the expiration date, you must request a new code. · [employer] Access Code: [email or web address] Expires: 12/6/2017  5:20 PM 
 
4. Enter the last four digits of your Social Security Number (xxxx) and Date of Birth (mm/dd/yyyy) as indicated and click Submit. You will be taken to the next sign-up page. 5. Create a Wrnch ID. This will be your W[employer]ch login ID and cannot be changed, so think of one that is secure and easy to remember. 6. Create a [employer]ch password. You can change your password at any time. 7. Enter your Password Reset Question and Answer. This can be used at a later time if you forget your password. 8. Enter your e-mail address. You will receive e-mail notification when new information is available in [address] 9. Click Sign Up. You can now view and download portions of your medical record. 10. Click the Download Summary menu link to download a portable copy of your medical information. If you have questions, please visit the Frequently Asked Questions section of the [employer] website. Remember, [employer] is NOT to be used for urgent needs. For medical emergencies, dial 911. Now available from your iPhone and Android! Please provide this summary of care documentation to your next provider. Your primary care clinician is listed as Geneva Martin. If you have any questions after today's visit, please call [phone].

## 2017-11-16 NOTE — PROGRESS NOTES
1. Have you been to the ER, urgent care clinic since your last visit? Hospitalized since your last visit? No    2. Have you seen or consulted any other health care providers outside of the [address] [place] [address] since your last visit? Include any pap smears or colon screening.  No

## 2017-11-19 NOTE — PROGRESS NOTES
The patient presents to the office today with the chief complaint of hypertension    HPI    The patient remains on medications for hypertension. he is tolerating the medications well. The patient had problems with orthostatic hypotension. He had done well with Florinef. This medication has been stopped and the patient continues to do well off the medication. The patient remains on a statin due to hyperlipidemia. He is tolerating the statin well   The patient has continued complaints of problems with dysphasia. This is slowly but steadily improving. Review of Systems   Respiratory: Negative for shortness of breath. Cardiovascular: Negative for chest pain and leg swelling. Allergies   Allergen Reactions    Pcn [Penicillins] Rash       Current Outpatient Prescriptions   Medication Sig Dispense Refill    azelastine (ASTELIN) 137 mcg (0.1 %) nasal spray 1 spray up each nostril daily 1 Bottle 1    sertraline (ZOLOFT) 50 mg tablet 1 1/2 tablets daily 50 Tab 2    fludrocortisone (FLORINEF) 0.1 mg tablet TAKE 1 TABLET BY MOUTH ONCE DAILY 30 Tab 2    clotrimazole-betamethasone (LOTRISONE) topical cream Apply twice per day 45 g 1    aspirin delayed-release 81 mg tablet Take 81 mg by mouth daily.  Cholecalciferol, Vitamin D3, (VITAMIN D3) 2,000 unit cap capsule Take 2,000 Units by mouth daily.  pantoprazole (PROTONIX) 20 mg tablet Take 20 mg by mouth daily.  cyanocobalamin 1,000 mcg tablet Take 1,000 mcg by mouth daily.  montelukast (SINGULAIR) 10 mg tablet Take 10 mg by mouth daily.  levothyroxine (SYNTHROID) 150 mcg tablet TAKE 1 TABLET BY MOUTH ONCE DAILY 90 Tab 3    clopidogrel (PLAVIX) 75 mg tab Take 1 Tab by mouth daily (with dinner).  Indications: Cerebral Thromboembolism Prevention 30 Tab 4    benazepril (LOTENSIN) 20 mg tablet 1 tablet daily (for BP) 30 Tab 4       Past Medical History:   Diagnosis Date    Acute ischemic stroke (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 HDL 42, LDL 97    Dysphagia as late effect of cerebrovascular accident (CVA) 4/19/2017    Erectile dysfunction     Gastroesophageal reflux disease     Hemiparesis affecting right side as late effect of cerebrovascular accident (CVA) ([address] 4/19/2017    History of endogenous hypertriglyceridemia     History of malignant neoplasm of prostate     Genoveva score 7     Hypertension     Hypothyroidism     Osteoarthrosis involving multiple sites     Procedure refused 4/21/2017    Speech Pathologist recommended NPO and PEG placement; Patient refused    Urinary incontinence     Male incontinence        Past Surgical History:   Procedure Laterality Date    COLONOSCOPY      HX CHOLECYSTECTOMY      HX HERNIA REPAIR Bilateral     inguinal    HX RADICAL PROSTATECTOMY  1-    perineal approach       Social History     Social History    Marital status:      Spouse name: N/A    Number of children: N/A    Years of education: N/A     Occupational History    Not on file.      Social History Main Topics    Smoking status: Never Smoker    Smokeless tobacco: Never Used    Alcohol use No    Drug use: No    Sexual activity: Yes     Partners: Female     Other Topics Concern    Not on file     Social History Narrative       Patient does have an advanced directive on file    Visit Vitals    /78 (BP 1 Location: Right arm, BP Patient Position: Sitting)    Pulse 61    Temp 97.7 °F (36.5 °C) (Tympanic)    Resp 16    Ht 5' 11\" (1.803 m)    Wt 185 lb (83.9 kg)    SpO2 98%    BMI 25.8 kg/m2       Physical Exam   Neck: Carotid bruit is not present. Cardiovascular: Normal rate and regular rhythm. Exam reveals no gallop. No murmur heard. Pulmonary/Chest: He has no wheezes. He has no rales. Abdominal: Soft. He exhibits no distension. There is no tenderness. Musculoskeletal: He exhibits no edema. Neurological:   There is mild expressive difficulties but improved improved from before       BMI:  [facility] Outpatient Visit on 11/02/2017   Component Date Value Ref Range Status    WBC 11/02/2017 7.7  4.6 - 13.2 K/uL Final    RBC 11/02/2017 4.87  4.70 - 5.50 M/uL Final    HGB 11/02/2017 14.1  13.0 - 16.0 g/dL Final    HCT 11/02/2017 41.4  36.0 - 48.0 % Final    MCV 11/02/2017 85.0  74.0 - 97.0 FL Final    MCH 11/02/2017 29.0  24.0 - 34.0 PG Final    MCHC 11/02/2017 34.1  31.0 - 37.0 g/dL Final    RDW 11/02/2017 13.9  11.6 - 14.5 % Final    PLATELET 46/54/7193 958  135 - 420 K/uL Final    MPV 11/02/2017 11.9* 9.2 - 11.8 FL Final    NEUTROPHILS 11/02/2017 57  40 - 73 % Final    LYMPHOCYTES 11/02/2017 31  21 - 52 % Final    MONOCYTES 11/02/2017 9  3 - 10 % Final    EOSINOPHILS 11/02/2017 3  0 - 5 % Final    BASOPHILS 11/02/2017 0  0 - 2 % Final    ABS. NEUTROPHILS 11/02/2017 4.4  1.8 - 8.0 K/UL Final    ABS. LYMPHOCYTES 11/02/2017 2.4  0.9 - 3.6 K/UL Final    ABS. MONOCYTES 11/02/2017 0.7  0.05 - 1.2 K/UL Final    ABS. EOSINOPHILS 11/02/2017 0.2  0.0 - 0.4 K/UL Final    ABS.  BASOPHILS 11/02/2017 0.0  0.0 - 0.06 K/UL Final    DF 11/02/2017 AUTOMATED    Final    Sodium 11/02/2017 132* 136 - 145 mmol/L Final    Potassium 11/02/2017 4.3  3.5 - 5.5 mmol/L Final    Chloride 11/02/2017 98* 100 - 108 mmol/L Final    CO2 11/02/2017 23  21 - 32 mmol/L Final    Anion gap 11/02/2017 11  3.0 - 18 mmol/L Final    Glucose 11/02/2017 116* 74 - 99 mg/dL Final    BUN 11/02/2017 21* 7.0 - 18 MG/DL Final    Creatinine 11/02/2017 1.46* 0.6 - 1.3 MG/DL Final    BUN/Creatinine ratio 11/02/2017 14  12 - 20   Final    GFR est AA 11/02/2017 56* >60 ml/min/1.73m2 Final    GFR est non-AA 11/02/2017 47* >60 ml/min/1.73m2 Final    Comment: (NOTE)  Estimated GFR is calculated using the Modification of Diet in Renal   Disease (MDRD) Study equation, reported for both  Americans   (GFRAA) and non- Americans (GFRNA), and normalized to 1.73m2   body surface area. The physician must decide which value applies to   the patient. The MDRD study equation should only be used in   individuals age 25 or older. It has not been validated for the   following: pregnant women, patients with serious comorbid conditions,   or on certain medications, or persons with extremes of body size,   muscle mass, or nutritional status.  Calcium 11/02/2017 8.4* 8.5 - 10.1 MG/DL Final    Bilirubin, total 11/02/2017 0.4  0.2 - 1.0 MG/DL Final    ALT (SGPT) 11/02/2017 26  16 - 61 U/L Final    AST (SGOT) 11/02/2017 21  15 - 37 U/L Final    Alk. phosphatase 11/02/2017 69  45 - 117 U/L Final    Protein, total 11/02/2017 7.0  6.4 - 8.2 g/dL Final    Albumin 11/02/2017 3.6  3.4 - 5.0 g/dL Final    Globulin 11/02/2017 3.4  2.0 - 4.0 g/dL Final    A-G Ratio 11/02/2017 1.1  0.8 - 1.7   Final    Prostate Specific Ag 11/02/2017 <0.1  0.0 - 4.0 ng/mL Final    TSH 11/02/2017 1.18  0.36 - 3.74 uIU/mL Final    Methylmalonic acid 11/02/2017 281  0 - 378 nmol/L Final    Comment: (NOTE)  Performed At: [address] [id number]  Treasure Villatoro MD HQ:[id number]     [facility] LIPID PROFILE 11/02/2017        Final    Cholesterol, total 11/02/2017 191  <200 MG/DL Final    Triglyceride 11/02/2017 346* <150 MG/DL Final    Comment: The drugs N-acetylcysteine (NAC) and  Metamiszole have been found to cause falsely  low results in this chemical assay.  Please  be sure to submit blood samples obtained  BEFORE administration of either of these  drugs to assure correct results.  HDL Cholesterol 11/02/2017 37* 40 - 60 MG/DL Final    LDL, calculated 11/02/2017 84.8  0 - 100 MG/DL Final    VLDL, calculated 11/02/2017 69.2  MG/DL Final    CHOL/HDL Ratio 11/02/2017 5.2* 0 - 5.0   Final       .No results found for any visits on 11/16/17. Assessment / Plan      ICD-10-CM ICD-9-CM    1. 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 Encounter for immunization Z23 V03.89 INFLUENZA VIRUS VACCINE, HIGH DOSE SEASONAL, PRESERVATIVE FREE      PNEUMOCOCCAL CONJ VACCINE 13 VALENT IM      ADMIN INFLUENZA VIRUS VAC      ADMIN PNEUMOCOCCAL VACCINE   3. Dyslipidemia (high LDL; low HDL) E78.4 272.4    4. Essential hypertension I10 401.9        Flu shot given  Prevnar 13 given  he was advised to continue his maintenance medications    Follow-up Disposition:  Return in about 7 months (around 6/16/2018). I asked Pacheco Chandra. [id number] if he has any questions and I answered the questions. Stewart APONTE  [employer] [address] states that he understands the treatment plan and agrees with the treatment plan

## 2017-11-21 DIAGNOSIS — I63.9 ACUTE ISCHEMIC STROKE (HCC): ICD-10-CM

## 2017-11-21 RX ORDER — CLOPIDOGREL BISULFATE 75 MG/1
75 TABLET ORAL
Qty: 30 TAB | Refills: 4 | Status: SHIPPED | OUTPATIENT
Start: 2017-11-21 | End: 2018-01-15 | Stop reason: SDUPTHER

## 2017-11-30 RX ORDER — BENAZEPRIL HYDROCHLORIDE 20 MG/1
TABLET ORAL
Qty: 30 TAB | Refills: 4 | Status: SHIPPED | OUTPATIENT
Start: 2017-11-30 | End: 2018-06-25 | Stop reason: SDUPTHER

## 2018-01-15 ENCOUNTER — OFFICE VISIT (OUTPATIENT)
Dept: INTERNAL MEDICINE CLINIC | Age: 78
End: 2018-01-15

## 2018-01-15 VITALS
SYSTOLIC BLOOD PRESSURE: 122 MMHG | OXYGEN SATURATION: 98 % | RESPIRATION RATE: 18 BRPM | HEART RATE: 66 BPM | DIASTOLIC BLOOD PRESSURE: 72 MMHG | HEIGHT: 71 IN | WEIGHT: 191 LBS | BODY MASS INDEX: 26.74 KG/M2 | TEMPERATURE: 97.9 F

## 2018-01-15 DIAGNOSIS — J06.9 ACUTE URI: Primary | ICD-10-CM

## 2018-01-15 DIAGNOSIS — I69.322 DYSARTHRIA AS LATE EFFECT OF CEREBROVASCULAR ACCIDENT (CVA): ICD-10-CM

## 2018-01-15 PROBLEM — I69.391 DYSPHAGIA AS LATE EFFECT OF CEREBROVASCULAR ACCIDENT (CVA): Status: RESOLVED | Noted: 2017-04-19 | Resolved: 2018-01-15

## 2018-01-15 RX ORDER — SERTRALINE HYDROCHLORIDE 50 MG/1
TABLET, FILM COATED ORAL
Qty: 50 TAB | Refills: 2 | Status: SHIPPED | OUTPATIENT
Start: 2018-01-15 | End: 2018-06-25 | Stop reason: SDUPTHER

## 2018-01-15 RX ORDER — CLARITHROMYCIN 500 MG/1
TABLET, FILM COATED ORAL
Qty: 14 TAB | Refills: 0 | Status: SHIPPED | OUTPATIENT
Start: 2018-01-15 | End: 2018-01-28

## 2018-01-15 RX ORDER — CLOPIDOGREL BISULFATE 75 MG/1
75 TABLET ORAL
Qty: 30 TAB | Refills: 4 | Status: SHIPPED | OUTPATIENT
Start: 2018-01-15 | End: 2018-06-25 | Stop reason: SDUPTHER

## 2018-01-15 RX ORDER — BENZONATATE 200 MG/1
CAPSULE ORAL
Qty: 30 CAP | Refills: 0 | Status: SHIPPED | OUTPATIENT
Start: 2018-01-15 | End: 2018-01-28

## 2018-01-16 NOTE — PROGRESS NOTES
The patient presents to the office today with the chief complaint of Sinus Congestion    HPI    The patient complains of sinus congestion with sinus congestion and drainage. The patient also complains of a severe sore throat. He has a bothersome cough - aggravated by drainage. he denies fever. Review of Systems   HENT: Positive for congestion and sore throat. Respiratory: Positive for cough. Negative for shortness of breath. Cardiovascular: Negative for chest pain and leg swelling. Allergies   Allergen Reactions    Pcn [Penicillins] Rash       Current Outpatient Prescriptions   Medication Sig Dispense Refill    clarithromycin (BIAXIN) 500 mg tablet 1 tablet twice per day with food 14 Tab 0    benzonatate (TESSALON) 200 mg capsule 1 cap three times per day 30 Cap 0    clopidogrel (PLAVIX) 75 mg tab Take 1 Tab by mouth daily (with dinner). Indications: Cerebral Thromboembolism Prevention 30 Tab 4    sertraline (ZOLOFT) 50 mg tablet 1 1/2 tablets daily 50 Tab 2    benazepril (LOTENSIN) 20 mg tablet TAKE 1 TABLET BY MOUTH DAILY FOR BLOOD PRESSURE 30 Tab 4    azelastine (ASTELIN) 137 mcg (0.1 %) nasal spray 1 spray up each nostril daily 1 Bottle 1    fludrocortisone (FLORINEF) 0.1 mg tablet TAKE 1 TABLET BY MOUTH ONCE DAILY 30 Tab 2    clotrimazole-betamethasone (LOTRISONE) topical cream Apply twice per day 45 g 1    aspirin delayed-release 81 mg tablet Take 81 mg by mouth daily.  Cholecalciferol, Vitamin D3, (VITAMIN D3) 2,000 unit cap capsule Take 2,000 Units by mouth daily.  pantoprazole (PROTONIX) 20 mg tablet Take 20 mg by mouth daily.  cyanocobalamin 1,000 mcg tablet Take 1,000 mcg by mouth daily.  montelukast (SINGULAIR) 10 mg tablet Take 10 mg by mouth daily.       levothyroxine (SYNTHROID) 150 mcg tablet TAKE 1 TABLET BY MOUTH ONCE DAILY 90 Tab 3       Past Medical History:   Diagnosis Date    [facility]2017    Acute Ischemic Stroke (acute to subacute ischemia involving the posterior limb of the left internal capsule, along the lateral aspect of the left thalamus) with residual right hemiparesis, dysphagia and dysarthria    Asbestosis ([address]     Chronic obstructive pulmonary disease (COPD) ([address]     CKD stage G3a/A1, GFR 45-59 and albumin creatinine ratio <30 mg/g 5/3/2017    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 HDL 42, LDL 97    Dysphagia as late effect of cerebrovascular accident (CVA) 4/19/2017    Erectile dysfunction     Gastroesophageal reflux disease     Hemiparesis affecting right side as late effect of cerebrovascular accident (CVA) ([address] 4/19/2017    History of endogenous hypertriglyceridemia     History of malignant neoplasm of prostate     Franklin score 7     Hypertension     Hypothyroidism     Osteoarthrosis involving multiple sites     Procedure refused 4/21/2017    Speech Pathologist recommended NPO and PEG placement; Patient refused    Urinary incontinence     Male incontinence        Past Surgical History:   Procedure Laterality Date    COLONOSCOPY      HX CHOLECYSTECTOMY      HX HERNIA REPAIR Bilateral     inguinal    HX RADICAL PROSTATECTOMY  1-    perineal approach       Social History     Social History    Marital status:      Spouse name: N/A    Number of children: N/A    Years of education: N/A     Occupational History    Not on file.      Social History Main Topics    Smoking status: Never Smoker    Smokeless tobacco: Never Used    Alcohol use No    Drug use: No    Sexual activity: Yes     Partners: Female     Other Topics Concern    Not on file     Social History Narrative       Patient does not have an advanced directive on file    Visit Vitals    /72 (BP 1 Location: Left arm, BP Patient Position: Sitting)    Pulse 66    Temp 97.9 °F (36.6 °C) (Tympanic)    Resp 18    Ht 5' 11\" (1.803 m)    Wt 191 lb (86.6 kg)    SpO2 98%    BMI 26.64 kg/m2       Physical Exam   HENT:   Ears:  Normal  Oral pharynx:  slightly red   Neck: Carotid bruit is not present. Cardiovascular: Normal rate and regular rhythm. Exam reveals no gallop. No murmur heard. Pulmonary/Chest: He has no wheezes. He has no rales. Musculoskeletal: He exhibits no edema. Lymphadenopathy:     He has no cervical adenopathy. BMI:  [facility] Outpatient Visit on 11/02/2017   Component Date Value Ref Range Status    WBC 11/02/2017 7.7  4.6 - 13.2 K/uL Final    RBC 11/02/2017 4.87  4.70 - 5.50 M/uL Final    HGB 11/02/2017 14.1  13.0 - 16.0 g/dL Final    HCT 11/02/2017 41.4  36.0 - 48.0 % Final    MCV 11/02/2017 85.0  74.0 - 97.0 FL Final    MCH 11/02/2017 29.0  24.0 - 34.0 PG Final    MCHC 11/02/2017 34.1  31.0 - 37.0 g/dL Final    RDW 11/02/2017 13.9  11.6 - 14.5 % Final    PLATELET 92/58/4237 486  135 - 420 K/uL Final    MPV 11/02/2017 11.9* 9.2 - 11.8 FL Final    NEUTROPHILS 11/02/2017 57  40 - 73 % Final    LYMPHOCYTES 11/02/2017 31  21 - 52 % Final    MONOCYTES 11/02/2017 9  3 - 10 % Final    EOSINOPHILS 11/02/2017 3  0 - 5 % Final    BASOPHILS 11/02/2017 0  0 - 2 % Final    ABS. NEUTROPHILS 11/02/2017 4.4  1.8 - 8.0 K/UL Final    ABS. LYMPHOCYTES 11/02/2017 2.4  0.9 - 3.6 K/UL Final    ABS. MONOCYTES 11/02/2017 0.7  0.05 - 1.2 K/UL Final    ABS. EOSINOPHILS 11/02/2017 0.2  0.0 - 0.4 K/UL Final    ABS.  BASOPHILS 11/02/2017 0.0  0.0 - 0.06 K/UL Final    DF 11/02/2017 AUTOMATED    Final    Sodium 11/02/2017 132* 136 - 145 mmol/L Final    Potassium 11/02/2017 4.3  3.5 - 5.5 mmol/L Final    Chloride 11/02/2017 98* 100 - 108 mmol/L Final    CO2 11/02/2017 23  21 - 32 mmol/L Final    Anion gap 11/02/2017 11  3.0 - 18 mmol/L Final    Glucose 11/02/2017 116* 74 - 99 mg/dL Final    BUN 11/02/2017 21* 7.0 - 18 MG/DL Final    Creatinine 11/02/2017 1.46* 0.6 - 1.3 MG/DL Final    BUN/Creatinine ratio 11/02/2017 14  12 - 20   Final    GFR est AA 11/02/2017 56* >60 ml/min/1.73m2 Final    GFR est non-AA 11/02/2017 47* >60 ml/min/1.73m2 Final    Comment: (NOTE)  Estimated GFR is calculated using the Modification of Diet in Renal   Disease (MDRD) Study equation, reported for both  Americans   (GFRAA) and non- Americans (GFRNA), and normalized to 1.73m2   body surface area. The physician must decide which value applies to   the patient. The MDRD study equation should only be used in   individuals age 25 or older. It has not been validated for the   following: pregnant women, patients with serious comorbid conditions,   or on certain medications, or persons with extremes of body size,   muscle mass, or nutritional status.  Calcium 11/02/2017 8.4* 8.5 - 10.1 MG/DL Final    Bilirubin, total 11/02/2017 0.4  0.2 - 1.0 MG/DL Final    ALT (SGPT) 11/02/2017 26  16 - 61 U/L Final    AST (SGOT) 11/02/2017 21  15 - 37 U/L Final    Alk. phosphatase 11/02/2017 69  45 - 117 U/L Final    Protein, total 11/02/2017 7.0  6.4 - 8.2 g/dL Final    Albumin 11/02/2017 3.6  3.4 - 5.0 g/dL Final    Globulin 11/02/2017 3.4  2.0 - 4.0 g/dL Final    A-G Ratio 11/02/2017 1.1  0.8 - 1.7   Final    Prostate Specific Ag 11/02/2017 <0.1  0.0 - 4.0 ng/mL Final    TSH 11/02/2017 1.18  0.36 - 3.74 uIU/mL Final    Methylmalonic acid 11/02/2017 281  0 - 378 nmol/L Final    Comment: (NOTE)  Performed At: [address] [id number]  Evelina Cedillo MD QE:[id number]     [facility] LIPID PROFILE 11/02/2017        Final    Cholesterol, total 11/02/2017 191  <200 MG/DL Final    Triglyceride 11/02/2017 346* <150 MG/DL Final    Comment: The drugs N-acetylcysteine (NAC) and  Metamiszole have been found to cause falsely  low results in this chemical assay.  Please  be sure to submit blood samples obtained  BEFORE administration of either of these  drugs to assure correct results.  HDL Cholesterol 11/02/2017 37* 40 - 60 MG/DL Final    LDL, calculated 11/02/2017 84.8  0 - 100 MG/DL Final    VLDL, calculated 11/02/2017 69.2  MG/DL Final    CHOL/HDL Ratio 11/02/2017 5.2* 0 - 5.0   Final       .No results found for any visits on 01/15/18. Assessment / Plan      ICD-10-CM ICD-9-CM    1. Acute URI J06.9 465.9    2. 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 I asked Shira Moyer [address] if he has any questions and I answered the questions. Stewart APONTE  [address] states that he understands the treatment plan and agrees with the treatment plan

## 2018-01-17 ENCOUNTER — TELEPHONE (OUTPATIENT)
Dept: INTERNAL MEDICINE CLINIC | Age: 78
End: 2018-01-17

## 2018-01-17 RX ORDER — PREDNISONE 20 MG/1
TABLET ORAL
Qty: 20 TAB | Refills: 0 | Status: SHIPPED | OUTPATIENT
Start: 2018-01-17 | End: 2018-01-28

## 2018-01-24 RX ORDER — PANTOPRAZOLE SODIUM 40 MG/1
TABLET, DELAYED RELEASE ORAL
Qty: 90 TAB | Refills: 3 | Status: SHIPPED | OUTPATIENT
Start: 2018-01-24 | End: 2018-06-25 | Stop reason: SDUPTHER

## 2018-01-28 PROBLEM — K31.9: Status: ACTIVE | Noted: 2018-01-28

## 2018-01-28 PROBLEM — K59.00 CONSTIPATION: Status: ACTIVE | Noted: 2018-01-28

## 2018-01-28 PROBLEM — R10.32 LLQ PAIN: Status: ACTIVE | Noted: 2018-01-28

## 2018-01-28 PROBLEM — E87.6 HYPOKALEMIA: Status: ACTIVE | Noted: 2018-01-28

## 2018-01-28 PROBLEM — R11.2 NAUSEA AND VOMITING: Status: ACTIVE | Noted: 2018-01-28

## 2018-01-31 PROBLEM — K57.32 DIVERTICULITIS OF LARGE INTESTINE WITHOUT PERFORATION OR ABSCESS WITHOUT BLEEDING: Status: ACTIVE | Noted: 2018-01-28

## 2018-02-01 ENCOUNTER — PATIENT OUTREACH (OUTPATIENT)
Dept: INTERNAL MEDICINE CLINIC | Age: 78
End: 2018-02-01

## 2018-02-01 NOTE — PROGRESS NOTES
Transitions of Care Coordination    Harini Willard was hospitalized at [facility] 1/28-1/31/18 for diverticulitis, N/V, constipation and discharged home with [facility] care. RRAT = 21    Portions of the Discharge Summary written by Dr. Dedra Maxwell on 1/31/18 are below in italics. Hospital Problems as of 1/31/2018  Date Reviewed: 1/31/2018             Codes Class Noted - Resolved POA     Hypokalemia ICD-10-CM: E87.6  ICD-9-CM: 276.8   1/28/2018 - Present Unknown           Nausea and vomiting ICD-10-CM: R11.2  ICD-9-CM: 787.01   1/28/2018 - Present Unknown           * (Principal)Diverticulitis of large intestine without perforation or abscess without bleeding ICD-10-CM: K57.32  ICD-9-CM: 562.11   1/28/2018 - Present Yes           Duodenal disease ICD-10-CM: K31.9  ICD-9-CM: 537.9   1/28/2018 - Present             Constipation ICD-10-CM: K59.00  ICD-9-CM: 564.00   1/28/2018 - Present Unknown               History of presenting illness:( Per admitting M.D.)  Stewart is a 76 y.o. WHITE OR  male who presents with Left lower abdominal pain going to the back  Patient has been having pain which is radiating to the left hip and down to the left thigh  Initially patient thought this is secondary to his problems with his prostate considering he's had surgery done in the past  He has been feeling unwell and tired and had an episode where he almost passed out  The pain has also been associated with nausea and had an episode of vomiting also  He has not had a bowel movement for more than 2 days  Patient was last seen by PCP on Florinda 15 for upper respiratory symptoms and sore throat  He has history of hypertension, hypothyroidism and CVA     Hospital course:   Left lower quadrant pain likely due to diverticulitis of colon, clinical diagnosis. poa  Back pain due to Moderate central canal and severe left lateral recess stenosis at L4-L5 due  to disc extrusion  Left lower quadrant abdominal pain secondary to #1  Hypokalemia  Severe constipation  History of stroke with right-sided weakness and residual dysarthria  Dyslipidemia  Hypertension  Hypothyroidism  Gastroesophageal reflux disease  COPD  CK D stage III  Leukocytosis likely reactive due to steroids  Hyponatremia      Treatment plan  64-year-old male admitted to hospital with abdominal pain and vomiting. Pain was mostly left flank region and back. CT scan of abdomen was concerning for bowel obstruction, GI was consulted. Patient had endoscopy that showed no obstruction. Discussed with gastroenterologist and patient had high clinical suspicion of diverticulitis. Was tender left lower quadrant had leukocytosis. Started on ciprofloxacin and Flagyl and patient responded well. WBC was improved     lumbar spine MRI showed Moderate central canal and severe left lateral recess stenosis at L4-L5 due to disc extrusion, discussed with neurosurgeon Dr. Ila Alarcon , patient seen by neurosurgeon and recommended continue pain control and physical therapy and follow-up with surgery as outpatient for possible epidural steroid injection  Started on clear diet and tolerated well. Constipation was resolved with enema  he continued on home medication  Was  feeling much better and requested he wants to go home. Pain was controlled. No vomiting  Patient cleared by neurosurgeon and GI for discharge.   Recommended to return to hospital if any worsening or new symptoms     Discharge Diet:            Diet: Cardiac Diet     Consultants: GI and Neurosurgery     Procedures:   Procedure(s):  ENTEROSCOPY      Follow-Ups       · Schedule an appointment with Sinan Moses MD (Internal Medicine) in 1 week (2/7/2018); CBC & BMP IN PCP OFFICE; DAUGHTER WILL CALL FOR THE FOLLOW-UP APPOINTMENT   · Schedule an appointment with Velia Hills MD (Physical Medicine and Rehab); [phone]) DAUGHTER WILL CALL FOR THE FOLLOW-UP APPOINTMENT   · Schedule an appointment with Esmer Godinez MD (Gastroenterology) in 2 weeks (2/14/2018); DAUGHTER WILL CALL FOR THE FOLLOW-UP APPOINTMENT   · Follow up with [address] ([address]); [address] [facility] is a 76 y.o. male. This patient was received as a referral from inpatient admission. Summary of patients top three medical problems:     Problem 1: diverticulitis     Problem 2: constipation     Problem 3: back pain    Patient's challenges to self management identified:  None identified with family member. Prior to hospitalization patient was going to the [facility]. Medication Management:  Family fills two week pill minder box. Daughter-in-law stated patient is compliant with taking medications twice daily. Advance Care Planning: The patient has an ACP on file. [employer], Referrals, and Durable Medical Equipment: [address]    Follow up appointments:  Contacted office to schedule a follow up appointment with PCP. Discussed with Samantha Mathur to expect a call from the office with a follow up appointment. Samantha Mathur stated she will schedule GI and Physical Medicine appointments today. Goals        Post Hospitalization     Attends follow-up appointments as directed. Plan:  Monitor for attendance at apts       Knowledge and adherence of prescribed medication (ie. action, side effects, missed dose, etc.). Plan:  Complete medications reconciliation and patient/family understanding of all meds-done 2/1/18-family fills pill minder box and has excellent understanding of medications-patient is compliant with taking medications as directed.  Supportive resources in place to maintain patient in the community (ie.  Home Health, DME equipment, refer to, medication assistant plan, etc.)            Plan:  Verify start of [address] home care            Verify family resources to support patient-done 2/1/18-very supportive family          Reached patient's daughter in law Destini Stearns (Sus[place] josé luis) and introduced self/role. Destini Stearns is a nurse. Medications reconciled. Per Destini Stearns:    -All medications available and patient is taking as directed. Family fills 2   week pill minder box and monitors.  -Patient lives alone with several family member living nearby. Family is   staying with patient 24/7 at this time.  -Family will provide transportation to appointments until patient is  stronger. Prior to this hospitalization patient was able to drive  Self. Patient verbalized understanding of all information discussed. Patient has this Nurse Navigators contact information for any further questions, concerns, or needs.

## 2018-02-05 ENCOUNTER — HOSPITAL ENCOUNTER (OUTPATIENT)
Dept: LAB | Age: 78
Discharge: HOME | End: 2018-02-05
Payer: MEDICARE

## 2018-02-05 ENCOUNTER — OFFICE VISIT (OUTPATIENT)
Dept: INTERNAL MEDICINE CLINIC | Age: 78
End: 2018-02-05

## 2018-02-05 VITALS
SYSTOLIC BLOOD PRESSURE: 148 MMHG | RESPIRATION RATE: 16 BRPM | HEIGHT: 71 IN | WEIGHT: 189 LBS | TEMPERATURE: 97.7 F | BODY MASS INDEX: 26.46 KG/M2 | HEART RATE: 78 BPM | OXYGEN SATURATION: 97 % | DIASTOLIC BLOOD PRESSURE: 82 MMHG

## 2018-02-05 DIAGNOSIS — M54.32 SCIATICA OF LEFT SIDE: ICD-10-CM

## 2018-02-05 DIAGNOSIS — K59.00 CONSTIPATION, UNSPECIFIED CONSTIPATION TYPE: ICD-10-CM

## 2018-02-05 DIAGNOSIS — E87.6 HYPOKALEMIA: Primary | ICD-10-CM

## 2018-02-05 DIAGNOSIS — E87.6 HYPOKALEMIA: ICD-10-CM

## 2018-02-05 DIAGNOSIS — R10.84 GENERALIZED ABDOMINAL PAIN: ICD-10-CM

## 2018-02-05 LAB
ALBUMIN SERPL-MCNC: 3.1 G/DL (ref 3.4–5)
ALBUMIN/GLOB SERPL: 0.9 {RATIO} (ref 0.8–1.7)
ALP SERPL-CCNC: 64 U/L (ref 45–117)
ALT SERPL-CCNC: 38 U/L (ref 16–61)
ANION GAP SERPL CALC-SCNC: 10 MMOL/L (ref 3–18)
AST SERPL-CCNC: 14 U/L (ref 15–37)
BASOPHILS # BLD: 0 K/UL (ref 0–0.06)
BASOPHILS NFR BLD: 0 % (ref 0–2)
BILIRUB SERPL-MCNC: 0.4 MG/DL (ref 0.2–1)
BUN SERPL-MCNC: 35 MG/DL (ref 7–18)
BUN/CREAT SERPL: 23 (ref 12–20)
CALCIUM SERPL-MCNC: 8.8 MG/DL (ref 8.5–10.1)
CHLORIDE SERPL-SCNC: 98 MMOL/L (ref 100–108)
CO2 SERPL-SCNC: 25 MMOL/L (ref 21–32)
CREAT SERPL-MCNC: 1.54 MG/DL (ref 0.6–1.3)
DIFFERENTIAL METHOD BLD: ABNORMAL
EOSINOPHIL # BLD: 0 K/UL (ref 0–0.4)
EOSINOPHIL NFR BLD: 0 % (ref 0–5)
ERYTHROCYTE [DISTWIDTH] IN BLOOD BY AUTOMATED COUNT: 14.9 % (ref 11.6–14.5)
GLOBULIN SER CALC-MCNC: 3.5 G/DL (ref 2–4)
GLUCOSE SERPL-MCNC: 114 MG/DL (ref 74–99)
HCT VFR BLD AUTO: 43.1 % (ref 36–48)
HGB BLD-MCNC: 14.9 G/DL (ref 13–16)
LYMPHOCYTES # BLD: 1.5 K/UL (ref 0.9–3.6)
LYMPHOCYTES NFR BLD: 8 % (ref 21–52)
MCH RBC QN AUTO: 29.1 PG (ref 24–34)
MCHC RBC AUTO-ENTMCNC: 34.6 G/DL (ref 31–37)
MCV RBC AUTO: 84.2 FL (ref 74–97)
MONOCYTES # BLD: 2.1 K/UL (ref 0.05–1.2)
MONOCYTES NFR BLD: 11 % (ref 3–10)
NEUTS SEG # BLD: 14.6 K/UL (ref 1.8–8)
NEUTS SEG NFR BLD: 81 % (ref 40–73)
PLATELET # BLD AUTO: 244 K/UL (ref 135–420)
PMV BLD AUTO: 10.7 FL (ref 9.2–11.8)
POTASSIUM SERPL-SCNC: 4.7 MMOL/L (ref 3.5–5.5)
PROT SERPL-MCNC: 6.6 G/DL (ref 6.4–8.2)
RBC # BLD AUTO: 5.12 M/UL (ref 4.7–5.5)
SODIUM SERPL-SCNC: 133 MMOL/L (ref 136–145)
WBC # BLD AUTO: 18.3 K/UL (ref 4.6–13.2)

## 2018-02-05 PROCEDURE — 80053 COMPREHEN METABOLIC PANEL: CPT | Performed by: INTERNAL MEDICINE

## 2018-02-05 PROCEDURE — 85025 COMPLETE CBC W/AUTO DIFF WBC: CPT | Performed by: INTERNAL MEDICINE

## 2018-02-05 RX ORDER — PREDNISONE 20 MG/1
TABLET ORAL
Qty: 20 TAB | Refills: 0 | Status: SHIPPED | OUTPATIENT
Start: 2018-02-05 | End: 2018-03-21 | Stop reason: ALTCHOICE

## 2018-02-05 NOTE — MR AVS SNAPSHOT
[address] [place] [address] 
 
 
 [address], [address] [place] [address] 
[phone] Patient: Naheed Perez MRN: [id number] Visit Information Date & Time Provider Department Dept. Phone Encounter #  
 2/5/2018 10:15 AM Jay Shelton MD [facility] INTERNAL MEDICINE OF [facility] [phone] [id number] Follow-up Instructions Return in about 3 months (around 5/5/2018). Your Appointments 2/28/2018  8:30 AM  
Nurse Visit with Jay Shelton MD  
[facility] INTERNAL MEDICINE OF [facility]) Appt Note: 3 mo f/u  
 [address], [address] [place] [facility] [address]  
  
   
 [address], [address] [place] [address] Upcoming Health Maintenance Date Due DTaP/Tdap/Td series (1 - Tdap) 8/1/1961 ZOSTER VACCINE AGE 60> 6/1/1998 GLAUCOMA SCREENING Q2Y 8/1/2003 MEDICARE YEARLY EXAM 6/6/2018 Pneumococcal 65+ Low/Medium Risk (2 of 2 - PPSV23) 11/16/2018 Allergies as of 2/5/2018  Review Complete On: 2/5/2018 By: Khushi Fisher LPN Severity Noted Reaction Type Reactions Pcn [Penicillins]    Rash Current Immunizations  Reviewed on 2/2/2018 Name Date Influenza High Dose Vaccine PF 11/16/2017 Influenza Vaccine (Quad) PF 10/31/2016 Pneumococcal Conjugate (PCV-13) 11/16/2017 12:54 PM  
  
 Not reviewed this visit You Were Diagnosed With   
  
 Codes Comments Hypokalemia    -  Primary ICD-10-CM: E87.6 ICD-9-CM: 276.8 Sciatica of left side     ICD-10-CM: M54.32 
ICD-9-CM: 724.3 Vitals BP Pulse Temp Resp Height(growth percentile) 160/84 (BP 1 Location: Left arm, BP Patient Position: Sitting) 66 97.7 °F (36.5 °C) (Tympanic) 16 5' 11\" (1.803 m) Weight(growth percentile) SpO2 BMI Smoking Status 189 lb (85.7 kg) 97% 26.36 kg/m2 Never Smoker BMI and BSA Data  Body Mass Index Body Surface Area  
 26.36 kg/m 2 2.07 m 2  
  
  
 Preferred Pharmacy Pharmacy Name Phone [address], [address] [facility] [phone] Your Updated Medication List  
  
   
This list is accurate as of: 2/5/18 11:34 AM.  Always use your most recent med list.  
  
  
  
  
 aspirin delayed-release 81 mg tablet Take 81 mg by mouth daily. benazepril 20 mg tablet Commonly known as:  LOTENSIN  
TAKE 1 TABLET BY MOUTH DAILY FOR BLOOD PRESSURE Cholecalciferol (Vitamin D3) 2,000 unit Cap capsule Commonly known as:  VITAMIN D3 Take 2,000 Units by mouth daily. ciprofloxacin HCl 500 mg tablet Commonly known as:  CIPRO Take 1 Tab by mouth two (2) times a day. clopidogrel 75 mg Tab Commonly known as:  PLAVIX Take 1 Tab by mouth daily (with dinner). Indications: Cerebral Thromboembolism Prevention  
  
 cyanocobalamin 1,000 mcg tablet Take 1,000 mcg by mouth daily. diclofenac potassium 50 mg tablet Commonly known as:  CATAFLAM  
Take 1 Tab by mouth three (3) times daily as needed. levothyroxine 150 mcg tablet Commonly known as:  SYNTHROID  
TAKE 1 TABLET BY MOUTH ONCE DAILY  
  
 metroNIDAZOLE 500 mg tablet Commonly known as:  FLAGYL Take 1 Tab by mouth three (3) times daily. pantoprazole 40 mg tablet Commonly known as:  PROTONIX  
TAKE 1 TABLET BY MOUTH ONCE DAILY polyethylene glycol 17 gram/dose powder Commonly known as:  Precious Canter Take 17 g by mouth daily. predniSONE 20 mg tablet Commonly known as:  DELTASONE  
3 tabs daily x 3 days, 2 tabs daily x 3 days, 1 tab daily x 3 days, 1/2 tab daily x 3 days  
  
 sertraline 50 mg tablet Commonly known as:  ZOLOFT  
1 1/2 tablets daily SINGULAIR 10 mg tablet Generic drug:  montelukast  
Take 10 mg by mouth daily. Prescriptions Sent to Pharmacy Refills  
 predniSONE (DELTASONE) 20 mg tablet 0  Sig: 3 tabs daily x 3 days, 2 tabs daily x 3 days, 1 tab daily x 3 days, 1/2 tab daily x 3 days Class: Normal  
 Pharmacy: [id number] [facility] [address] #: [phone] Follow-up Instructions Return in about 3 months (around 5/5/2018). Patient Instructions Health Maintenance Due Topic Date Due  
 DTaP/Tdap/Td  (1 - Tdap) 08[birth year]01/1961  Shingles Vaccine  06/01/1998  Glaucoma Screening   08/01/2003 Introducing [facility]! Eduardo Friend introduces [employer] patient portal. Now you can access parts of your medical record, email your doctor's office, and request medication refills online. 1. In your internet browser, go to [email or web address] 2. Click on the First Time User? Click Here link in the Sign In box. You will see the New Member Sign Up page. 3. Enter your [employer] Access Code exactly as it appears below. You will not need to use this code after youve completed the sign-up process. If you do not sign up before the expiration date, you must request a new code. · [employer] Access Code: Adrien Fleming Expires: 4/11/2018  1:28 PM 
 
4. Enter the last four digits of your Social Security Number (xxxx) and Date of Birth (mm/dd/yyyy) as indicated and click Submit. You will be taken to the next sign-up page. 5. Create a Eventials ID. This will be your Eventials login ID and cannot be changed, so think of one that is secure and easy to remember. 6. Create a Eventials password. You can change your password at any time. 7. Enter your Password Reset Question and Answer. This can be used at a later time if you forget your password. 8. Enter your e-mail address. You will receive e-mail notification when new information is available in [address] 9. Click Sign Up. You can now view and download portions of your medical record. 10. Click the Download Summary menu link to download a portable copy of your medical information. If you have questions, please visit the Frequently Asked Questions section of the [facility] website. Remember, [employer] is NOT to be used for urgent needs. For medical emergencies, dial 911. Now available from your iPhone and Android! Please provide this summary of care documentation to your next provider. Your primary care clinician is listed as Rosa White. If you have any questions after today's visit, please call [phone].

## 2018-02-05 NOTE — PROGRESS NOTES
1. Have you been to the ER, urgent care clinic since your last visit? Hospitalized since your last visit? [address] - back pain    2. Have you seen or consulted any other health care providers outside of the [facility] since your last visit? Include any pap smears or colon screening.  No

## 2018-02-05 NOTE — PATIENT INSTRUCTIONS
Health Maintenance Due   Topic Date Due    DTaP/Tdap/Td  (1 - Tdap) 08/01/1959    Shingles Vaccine  06/01/1998    Glaucoma Screening   08/01/2003

## 2018-02-05 NOTE — PROGRESS NOTES
The patient presents to the office today with a chief complain of sciatica and for transition of care    HPI:    The patient was recently hospitalized at [facility] for abdominal pain and constipation. Shortly into the admission it became clear that that the pain was low back pain with radiation into his left leg. .  The patient was discharged on 1/31/2018 and contacted by Satya Hyde on 2/1/2018 for follow up. An appointment was made for the patient to see me today. ROS    Allergies   Allergen Reactions    Pcn [Penicillins] Rash       Current Outpatient Prescriptions   Medication Sig Dispense Refill    predniSONE (DELTASONE) 20 mg tablet 3 tabs daily x 3 days, 2 tabs daily x 3 days, 1 tab daily x 3 days, 1/2 tab daily x 3 days 20 Tab 0    ciprofloxacin HCl (CIPRO) 500 mg tablet Take 1 Tab by mouth two (2) times a day. 14 Tab 0    metroNIDAZOLE (FLAGYL) 500 mg tablet Take 1 Tab by mouth three (3) times daily. 21 Tab 0    diclofenac potassium (CATAFLAM) 50 mg tablet Take 1 Tab by mouth three (3) times daily as needed. 30 Tab 0    polyethylene glycol (MIRALAX) 17 gram/dose powder Take 17 g by mouth daily. 14 g 0    pantoprazole (PROTONIX) 40 mg tablet TAKE 1 TABLET BY MOUTH ONCE DAILY 90 Tab 3    clopidogrel (PLAVIX) 75 mg tab Take 1 Tab by mouth daily (with dinner). Indications: Cerebral Thromboembolism Prevention 30 Tab 4    sertraline (ZOLOFT) 50 mg tablet 1 1/2 tablets daily 50 Tab 2    benazepril (LOTENSIN) 20 mg tablet TAKE 1 TABLET BY MOUTH DAILY FOR BLOOD PRESSURE 30 Tab 4    aspirin delayed-release 81 mg tablet Take 81 mg by mouth daily.  Cholecalciferol, Vitamin D3, (VITAMIN D3) 2,000 unit cap capsule Take 2,000 Units by mouth daily.  cyanocobalamin 1,000 mcg tablet Take 1,000 mcg by mouth daily.  montelukast (SINGULAIR) 10 mg tablet Take 10 mg by mouth daily.      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 HDL 42, LDL 97    Dysphagia as late effect of cerebrovascular accident (CVA) 4/19/2017    Erectile dysfunction     Gastroesophageal reflux disease     Hemiparesis affecting right side as late effect of cerebrovascular accident (CVA) ([address] 4/19/2017    History of endogenous hypertriglyceridemia     History of malignant neoplasm of prostate     Genoveva score 7     Hypertension     Hypothyroidism     Osteoarthrosis involving multiple sites     Procedure refused 4/21/2017    Speech Pathologist recommended NPO and PEG placement; Patient refused    Urinary incontinence     Male incontinence        Past Surgical History:   Procedure Laterality Date    COLONOSCOPY      HX CHOLECYSTECTOMY      HX HERNIA REPAIR Bilateral     inguinal    HX RADICAL PROSTATECTOMY  1-    perineal approach       Social History     Social History    Marital status:      Spouse name: N/A    Number of children: N/A    Years of education: N/A     Occupational History    Not on file.      Social History Main Topics    Smoking status: Never Smoker    Smokeless tobacco: Never Used    Alcohol use No    Drug use: No    Sexual activity: Yes     Partners: Female     Other Topics Concern    Not on file     Social History Narrative       Patient does have an advanced directive on file    Visit Vitals    /82 (BP 1 Location: Right arm, BP Patient Position: Sitting)    Pulse 78    Temp 97.7 °F (36.5 °C) (Tympanic)    Resp 16    Ht 5' 11\" (1.803 m)    Wt 189 lb (85.7 kg)    SpO2 97%    BMI 26.36 kg/m2       Physical Exam   No Cervical Lymphadenopathy  No Supraclavicular Lymphadenopathy  Thyroid is Normal  Lungs are normal to percussion. Clear to auscultation   Heart:  S1 S2 are normal, No gallops, No mummers  No Carotid Bruits  Abdomen:  Normal Bowel Sounds. No tenderness. No masses. No Hepatomegaly or Splenomegly  LE:  Strong Pedal Pulses. No Edema      BMI:  [facility] Outpatient Visit on 02/05/2018   Component Date Value Ref Range Status    WBC 02/05/2018 18.3* 4.6 - 13.2 K/uL Final    RBC 02/05/2018 5.12  4.70 - 5.50 M/uL Final    HGB 02/05/2018 14.9  13.0 - 16.0 g/dL Final    HCT 02/05/2018 43.1  36.0 - 48.0 % Final    MCV 02/05/2018 84.2  74.0 - 97.0 FL Final    MCH 02/05/2018 29.1  24.0 - 34.0 PG Final    MCHC 02/05/2018 34.6  31.0 - 37.0 g/dL Final    RDW 02/05/2018 14.9* 11.6 - 14.5 % Final    PLATELET 00/59/8267 346  135 - 420 K/uL Final    MPV 02/05/2018 10.7  9.2 - 11.8 FL Final    NEUTROPHILS 02/05/2018 81* 40 - 73 % Final    LYMPHOCYTES 02/05/2018 8* 21 - 52 % Final    MONOCYTES 02/05/2018 11* 3 - 10 % Final    EOSINOPHILS 02/05/2018 0  0 - 5 % Final    BASOPHILS 02/05/2018 0  0 - 2 % Final    ABS. NEUTROPHILS 02/05/2018 14.6* 1.8 - 8.0 K/UL Final    ABS. LYMPHOCYTES 02/05/2018 1.5  0.9 - 3.6 K/UL Final    ABS. MONOCYTES 02/05/2018 2.1* 0.05 - 1.2 K/UL Final    ABS. EOSINOPHILS 02/05/2018 0.0  0.0 - 0.4 K/UL Final    ABS.  BASOPHILS 02/05/2018 0.0  0.0 - 0.06 K/UL Final    DF 02/05/2018 AUTOMATED    Final   Admission on 01/28/2018, Discharged on 01/31/2018   Component Date Value Ref Range Status    WBC 01/28/2018 15.1* 4.0 - 11.0 1000/mm3 Final    RBC 01/28/2018 5.29  3.80 - 5.70 M/uL Final    HGB 01/28/2018 15.2  12.4 - 17.2 gm/dl Final    HCT 01/28/2018 43.4  37.0 - 50.0 % Final    MCV 01/28/2018 82.0  80.0 - 98.0 fL Final    MCH 01/28/2018 28.7  23.0 - 34.6 pg Final    MCHC 01/28/2018 35.0  30.0 - 36.0 gm/dl Final    PLATELET 16/99/3255 755  140 - 450 1000/mm3 Final    MPV 01/28/2018 10.4* 6.0 - 10.0 fL Final    RDW-SD 01/28/2018 41.1  35.1 - 43.9   Final    NRBC 01/28/2018 0  0 - 0   Final    IMMATURE GRANULOCYTES 01/28/2018 1.3  0.0 - 3.0 % Final    Comment: IG - Immature granulocytes (promyelocytes + myelocytes + metamyelocytes), their presence  indicates a left shift. An IG > 3% may predict positive blood cultures with 98% specificity.  (P<0.04) and 92% Positive Predictive Value (Vimal-Jeanie)1. Increased immature granulocytes  assist with the detection of infection and/or inflammation and may be present at an early stage  and are more sensitive and specific than band counts.  NEUTROPHILS 01/28/2018 82.2* 34 - 64 % Final    LYMPHOCYTES 01/28/2018 8.7* 28 - 48 % Final    MONOCYTES 01/28/2018 7.0  1 - 13 % Final    EOSINOPHILS 01/28/2018 0.6  0 - 5 % Final    BASOPHILS 01/28/2018 0.2  0 - 3 % Final    Lipase 01/28/2018 289  73 - 393 U/L Final    Troponin-I 01/28/2018 <0.015  0.000 - 0.045 ng/ml Final    Comment: The presence of detectable troponin above the reference range indicates myocardial injury which  maybe due to ischemia, myocarditis, trauma, etc. Clinical correlation is necessary to establish  the significance of this finding. NOTE: The reference range is based on the 99th percentile of the referent population         AST (SGOT) 01/28/2018 23  15 - 37 U/L Final    ALT (SGPT) 01/28/2018 21  12 - 78 U/L Final    Alk.  phosphatase 01/28/2018 66  45 - 117 U/L Final    Bilirubin, total 01/28/2018 0.5  0.2 - 1.0 mg/dl Final    Protein, total 01/28/2018 6.9  6.4 - 8.2 gm/dl Final    Albumin 01/28/2018 3.1* 3.4 - 5.0 gm/dl Final    Bilirubin, direct 01/28/2018 0.1  0.0 - 0.2 mg/dl Final    Sodium 01/28/2018 122* 136 - 145 mEq/L Final    Potassium 01/28/2018 >9.0* 3.5 - 4.9 mEq/L Final    Chloride 01/28/2018 97* 98 - 107 mEq/L Final    CO2, TOTAL 01/28/2018 25  21 - 32 mmol/L Final    Glucose 01/28/2018 199* 74 - 106 mg/dL Final    BUN 01/28/2018 51* 7 - 25 mg/dl Final    Creatinine 01/28/2018 1.5* 0.6 - 1.3 mg/dl Final    HCT 01/28/2018 49  40 - 54 % Final    HGB 01/28/2018 16.7  12.4 - 17.2 gm/dl Final    CALCIUM,IONIZED 01/28/2018 3.70* 4.40 - 5.40 mg/dL Final    Sodium 01/28/2018 136  136 - 145 mEq/L Final    Potassium 01/28/2018 3.0* 3.5 - 5.1 mEq/L Final    Chloride 01/28/2018 106  98 - 107 mEq/L Final    CO2 01/28/2018 22  21 - 32 mEq/L Final    Glucose 01/28/2018 123* 74 - 106 mg/dl Final    BUN 01/28/2018 27* 7 - 25 mg/dl Final    Creatinine 01/28/2018 1.1  0.6 - 1.3 mg/dl Final    GFR est AA 01/28/2018 >60    Final    Comment: THE [facility] [employer] [facility] STATES THAT THE MDRD STUDY EQUATION SHOULD ONLY BE USED ON  INDIVIDUALS 18 OR OLDER. THE REPORT ALSO NOTES THAT THE MDRD STUDY EQUATION HAS NOT BEEN  VALIDATED FOR USE WITH THE ELDERLY (OVER 79YEARS OF AGE), PREGNANT WOMEN, PATIENTS WITH SERIOUS  COMORBID CONDITIONS, OR PERSONS WITH EXTREMES OF BODY SIZE, MUSCLE MASS, OR NUTRITIONAL STATUS. APPLICATION OF THE EQUATION TO THESE PATIENT GROUPS MAY LEAD TO ERRORS IN GFR ESTIMATION. GFR  ESTIMATING EQUATIONS HAVE POORER AGREEMENT WITH MEASURED GFR FOR ILL HOSPITALIZED PATIENTS AND  FOR PEOPLE WITH NEAR NORMAL KIDNEY FUNCTION THAN FOR SUBJECTS IN THE MDRD STUDY. VALIDATION  STUDIES ARE IN PROGRESS TO EVALUATE THE MDRD STUDY EQUATION FOR ADDITIONAL ETHNIC GROUPS, THE  ELDERLY, VARIOUS DISEASE CONDITIONS, AND PEOPLE WITH NORMAL KIDNEY FUNCTION.   GFRA----REFERS TO   GFRO---REFERS TO OTHER RACES  REFERENCES AVAILABLE UPON REQUEST.      GFR est non-AA 01/28/2018 >60    Final    Calcium 01/28/2018 6.0* 8.5 - 10.1 mg/dl Final    Ventricular Rate 01/28/2018 71  BPM Final    Atrial Rate 01/28/2018 71  BPM Final    P-R Interval 01/28/2018 168  ms Final    QRS Duration 01/28/2018 86  ms Final    Q-T Interval 01/28/2018 416  ms Final    QTC Calculation (Bezet) 01/28/2018 452  ms Final    Calculated P Axis 01/28/2018 45  degrees Final    Calculated R Axis 01/28/2018 59  degrees Final    Calculated T Axis 01/28/2018 56  degrees Final    Diagnosis 01/28/2018    Final                    Value:Normal sinus rhythm    When compared with ECG of 18-APR-2017 20:17,  No significant change was found  Confirmed by Magnus Durant M.D., Fabio Gross ([id number]) on 1/29/2018 8:01:53 AM      Lactic Acid 01/28/2018 2.7* 0.4 - 2.0 mmol/L Final    Lactic Acid 01/28/2018 1.7  0.4 - 2.0 mmol/L Final    Blood Culture Result 01/28/2018 No Growth at 5 days    Final    Blood Culture Result 01/28/2018 No Growth at 5 days    Final    Glucose 01/28/2018 Negative  NEGATIVE,Negative mg/dl Final    Bilirubin 01/28/2018 Negative  NEGATIVE,Negative   Final    Ketone 01/28/2018 Negative  NEGATIVE,Negative mg/dl Final    Specific gravity 01/28/2018 1.015  1.005 - 1.030   Final    Blood 01/28/2018 Trace-intact* NEGATIVE,Negative   Final    pH (UA) 01/28/2018 7.0  5 - 9   Final    Protein 01/28/2018 Negative  NEGATIVE,Negative mg/dl Final    Urobilinogen 01/28/2018 0.2  0.0 - 1.0 EU/dl Final    Nitrites 01/28/2018 Negative  NEGATIVE,Negative   Final    Leukocyte Esterase 01/28/2018 Negative  NEGATIVE,Negative   Final    Color 01/28/2018 Yellow    Final    Appearance 01/28/2018 Clear    Final    : [id number]    WBC 01/29/2018 23.7* 4.0 - 11.0 1000/mm3 Final    RBC 01/29/2018 4.69  3.80 - 5.70 M/uL Final    HGB 01/29/2018 13.5  12.4 - 17.2 gm/dl Final    HCT 01/29/2018 38.2  37.0 - 50.0 % Final    MCV 01/29/2018 81.4  80.0 - 98.0 fL Final    MCH 01/29/2018 28.8  23.0 - 34.6 pg Final    MCHC 01/29/2018 35.3  30.0 - 36.0 gm/dl Final    PLATELET 88/62/1117 750  140 - 450 1000/mm3 Final    MPV 01/29/2018 10.6* 6.0 - 10.0 fL Final    RDW-SD 01/29/2018 41.2  35.1 - 43.9   Final    NRBC 01/29/2018 0  0 - 0   Final    IMMATURE GRANULOCYTES 01/29/2018 0.8  0.0 - 3.0 % Final    Comment: IG - Immature granulocytes (promyelocytes + myelocytes + metamyelocytes), their presence  indicates a left shift. An IG > 3% may predict positive blood cultures with 98% specificity.  (P<0.04) and 92% Positive Predictive Value (Vimal-Jeanie)1. Increased immature granulocytes  assist with the detection of infection and/or inflammation and may be present at an early stage  and are more sensitive and specific than band counts.  Microcytes 01/29/2018 1    Final    Comment: ** ** ** ** **  CORRECTED REPORT ** ** ** ** **  The previous value of no value was corrected by [id number] on 01/29/18 06:41.      NEUTROPHILS 01/29/2018 88.9* 34 - 64 % Final    LYMPHOCYTES 01/29/2018 5.0* 28 - 48 % Final    MONOCYTES 01/29/2018 4.8  1 - 13 % Final    EOSINOPHILS 01/29/2018 0.2  0 - 5 % Final    BASOPHILS 01/29/2018 0.3  0 - 3 % Final    Smudge cells 01/29/2018 2.9    Final    Comment: ** ** ** ** **  CORRECTED REPORT ** ** ** ** **  The previous value of no value was corrected by [id number] on 01/29/18 06:41.      Giant platelets 51/34/8044 1.0    Final    Comment: ** ** ** ** **  CORRECTED REPORT ** ** ** ** **  The previous value of no value was corrected by [id number] on 01/29/18 06:41.  Sodium 01/29/2018 128* 136 - 145 mEq/L Final    Potassium 01/29/2018 5.0  3.5 - 5.1 mEq/L Final    not hemolyzed/icteric    Chloride 01/29/2018 98  98 - 107 mEq/L Final    CO2 01/29/2018 24  21 - 32 mEq/L Final    Glucose 01/29/2018 111* 74 - 106 mg/dl Final    BUN 01/29/2018 22  7 - 25 mg/dl Final    Creatinine 01/29/2018 1.3  0.6 - 1.3 mg/dl Final    GFR est AA 01/29/2018 >60    Final    Comment: THE [facility] STATES THAT THE MDRD STUDY EQUATION SHOULD ONLY BE USED ON  INDIVIDUALS 18 OR OLDER.  THE REPORT ALSO NOTES THAT THE MDRD STUDY EQUATION HAS NOT BEEN  VALIDATED FOR USE WITH THE ELDERLY (OVER 79YEARS OF AGE), PREGNANT WOMEN, PATIENTS WITH SERIOUS  COMORBID CONDITIONS, OR PERSONS WITH EXTREMES OF BODY SIZE, MUSCLE MASS, OR NUTRITIONAL STATUS. APPLICATION OF THE EQUATION TO THESE PATIENT GROUPS MAY LEAD TO ERRORS IN GFR ESTIMATION. GFR  ESTIMATING EQUATIONS HAVE POORER AGREEMENT WITH MEASURED GFR FOR ILL HOSPITALIZED PATIENTS AND  FOR PEOPLE WITH NEAR NORMAL KIDNEY FUNCTION THAN FOR SUBJECTS IN THE MDRD STUDY. VALIDATION  STUDIES ARE IN PROGRESS TO EVALUATE THE MDRD STUDY EQUATION FOR ADDITIONAL ETHNIC GROUPS, THE  ELDERLY, VARIOUS DISEASE CONDITIONS, AND PEOPLE WITH NORMAL KIDNEY FUNCTION. GFRA----REFERS TO   GFRO---REFERS TO OTHER RACES  REFERENCES AVAILABLE UPON REQUEST.      GFR est non-AA 01/29/2018 57    Final    Calcium 01/29/2018 8.0* 8.5 - 10.1 mg/dl Final    Sodium 01/29/2018 131* 136 - 145 mEq/L Final    Potassium 01/29/2018 4.8  3.5 - 5.1 mEq/L Final    Chloride 01/29/2018 97* 98 - 107 mEq/L Final    CO2 01/29/2018 27  21 - 32 mEq/L Final    Glucose 01/29/2018 95  74 - 106 mg/dl Final    BUN 01/29/2018 20  7 - 25 mg/dl Final    Creatinine 01/29/2018 1.3  0.6 - 1.3 mg/dl Final    GFR est AA 01/29/2018 >60    Final    Comment: THE [facility] STATES THAT THE MDRD STUDY EQUATION SHOULD ONLY BE USED ON  INDIVIDUALS 18 OR OLDER. THE REPORT ALSO NOTES THAT THE MDRD STUDY EQUATION HAS NOT BEEN  VALIDATED FOR USE WITH THE ELDERLY (OVER 79YEARS OF AGE), PREGNANT WOMEN, PATIENTS WITH SERIOUS  COMORBID CONDITIONS, OR PERSONS WITH EXTREMES OF BODY SIZE, MUSCLE MASS, OR NUTRITIONAL STATUS. APPLICATION OF THE EQUATION TO THESE PATIENT GROUPS MAY LEAD TO ERRORS IN GFR ESTIMATION. GFR  ESTIMATING EQUATIONS HAVE POORER AGREEMENT WITH MEASURED GFR FOR ILL HOSPITALIZED PATIENTS AND  FOR PEOPLE WITH NEAR NORMAL KIDNEY FUNCTION THAN FOR SUBJECTS IN THE MDRD STUDY.  VALIDATION  STUDIES ARE IN PROGRESS TO EVALUATE THE MDRD STUDY EQUATION FOR ADDITIONAL ETHNIC GROUPS, THE  ELDERLY, VARIOUS DISEASE CONDITIONS, AND PEOPLE WITH NORMAL KIDNEY FUNCTION. GFRA----REFERS TO   GFRO---REFERS TO OTHER RACES  REFERENCES AVAILABLE UPON REQUEST.      GFR est non-AA 01/29/2018 57    Final    Calcium 01/29/2018 8.4* 8.5 - 10.1 mg/dl Final    WBC 01/30/2018 10.7  4.0 - 11.0 1000/mm3 Final    RBC 01/30/2018 4.57  3.80 - 5.70 M/uL Final    HGB 01/30/2018 13.2  12.4 - 17.2 gm/dl Final    HCT 01/30/2018 37.0  37.0 - 50.0 % Final    MCV 01/30/2018 81.0  80.0 - 98.0 fL Final    MCH 01/30/2018 28.9  23.0 - 34.6 pg Final    MCHC 01/30/2018 35.7  30.0 - 36.0 gm/dl Final    PLATELET 30/09/4126 384  140 - 450 1000/mm3 Final    MPV 01/30/2018 10.5* 6.0 - 10.0 fL Final    RDW-SD 01/30/2018 41.6  35.1 - 43.9   Final    NRBC 01/30/2018 0  0 - 0   Final    IMMATURE GRANULOCYTES 01/30/2018 0.6  0.0 - 3.0 % Final    Comment: IG - Immature granulocytes (promyelocytes + myelocytes + metamyelocytes), their presence  indicates a left shift. An IG > 3% may predict positive blood cultures with 98% specificity.  (P<0.04) and 92% Positive Predictive Value (Vimal-Jeanie)1. Increased immature granulocytes  assist with the detection of infection and/or inflammation and may be present at an early stage  and are more sensitive and specific than band counts.       NEUTROPHILS 01/30/2018 75.0* 34 - 64 % Final    LYMPHOCYTES 01/30/2018 12.0* 28 - 48 % Final    MONOCYTES 01/30/2018 10.3  1 - 13 % Final    EOSINOPHILS 01/30/2018 1.8  0 - 5 % Final    BASOPHILS 01/30/2018 0.3  0 - 3 % Final    Sodium 01/30/2018 129* 136 - 145 mEq/L Final    Potassium 01/30/2018 4.3  3.5 - 5.1 mEq/L Final    Chloride 01/30/2018 98  98 - 107 mEq/L Final    CO2 01/30/2018 24  21 - 32 mEq/L Final    Glucose 01/30/2018 96  74 - 106 mg/dl Final    BUN 01/30/2018 20  7 - 25 mg/dl Final    Creatinine 01/30/2018 1.3  0.6 - 1.3 mg/dl Final    GFR est AA 01/30/2018 >60    Final    Comment: THE [facility] STATES THAT THE MDRD STUDY EQUATION SHOULD ONLY BE USED ON  INDIVIDUALS 18 OR OLDER. THE REPORT ALSO NOTES THAT THE MDRD STUDY EQUATION HAS NOT BEEN  VALIDATED FOR USE WITH THE ELDERLY (OVER 79YEARS OF AGE), PREGNANT WOMEN, PATIENTS WITH SERIOUS  COMORBID CONDITIONS, OR PERSONS WITH EXTREMES OF BODY SIZE, MUSCLE MASS, OR NUTRITIONAL STATUS. APPLICATION OF THE EQUATION TO THESE PATIENT GROUPS MAY LEAD TO ERRORS IN GFR ESTIMATION. GFR  ESTIMATING EQUATIONS HAVE POORER AGREEMENT WITH MEASURED GFR FOR ILL HOSPITALIZED PATIENTS AND  FOR PEOPLE WITH NEAR NORMAL KIDNEY FUNCTION THAN FOR SUBJECTS IN THE MDRD STUDY. VALIDATION  STUDIES ARE IN PROGRESS TO EVALUATE THE MDRD STUDY EQUATION FOR ADDITIONAL ETHNIC GROUPS, THE  ELDERLY, VARIOUS DISEASE CONDITIONS, AND PEOPLE WITH NORMAL KIDNEY FUNCTION. GFRA----REFERS TO   GFRO---REFERS TO OTHER RACES  REFERENCES AVAILABLE UPON REQUEST.      GFR est non-AA 01/30/2018 57    Final    Calcium 01/30/2018 8.5  8.5 - 10.1 mg/dl Final    WBC 01/31/2018 22.9* 4.0 - 11.0 1000/mm3 Final    RBC 01/31/2018 4.55  3.80 - 5.70 M/uL Final    HGB 01/31/2018 13.1  12.4 - 17.2 gm/dl Final    HCT 01/31/2018 36.6* 37.0 - 50.0 % Final    MCV 01/31/2018 80.4  80.0 - 98.0 fL Final    MCH 01/31/2018 28.8  23.0 - 34.6 pg Final    MCHC 01/31/2018 35.8  30.0 - 36.0 gm/dl Final    PLATELET 97/96/8976 272  140 - 450 1000/mm3 Final    MPV 01/31/2018 10.8* 6.0 - 10.0 fL Final    RDW-SD 01/31/2018 39.0  35.1 - 43.9   Final    NRBC 01/31/2018 0  0 - 0   Final    IMMATURE GRANULOCYTES 01/31/2018 1.5  0.0 - 3.0 % Final    Comment: IG - Immature granulocytes (promyelocytes + myelocytes + metamyelocytes), their presence  indicates a left shift. An IG > 3% may predict positive blood cultures with 98% specificity.  (P<0.04) and 92% Positive Predictive Value (Jessica)1.  Increased immature granulocytes  assist with the detection of infection and/or inflammation and may be present at an early stage  and are more sensitive and specific than band counts.  NEUTROPHILS 01/31/2018 89.4* 34 - 64 % Final    LYMPHOCYTES 01/31/2018 5.6* 28 - 48 % Final    MONOCYTES 01/31/2018 3.4  1 - 13 % Final    EOSINOPHILS 01/31/2018 0.0  0 - 5 % Final    BASOPHILS 01/31/2018 0.1  0 - 3 % Final    Sodium 01/31/2018 129* 136 - 145 mEq/L Final    Potassium 01/31/2018 4.3  3.5 - 5.1 mEq/L Final    Chloride 01/31/2018 99  98 - 107 mEq/L Final    CO2 01/31/2018 21  21 - 32 mEq/L Final    Glucose 01/31/2018 139* 74 - 106 mg/dl Final    BUN 01/31/2018 21  7 - 25 mg/dl Final    Creatinine 01/31/2018 1.3  0.6 - 1.3 mg/dl Final    GFR est AA 01/31/2018 >60    Final    Comment: THE [facility] STATES THAT THE MDRD STUDY EQUATION SHOULD ONLY BE USED ON  INDIVIDUALS 18 OR OLDER. THE REPORT ALSO NOTES THAT THE MDRD STUDY EQUATION HAS NOT BEEN  VALIDATED FOR USE WITH THE ELDERLY (OVER 79YEARS OF AGE), PREGNANT WOMEN, PATIENTS WITH SERIOUS  COMORBID CONDITIONS, OR PERSONS WITH EXTREMES OF BODY SIZE, MUSCLE MASS, OR NUTRITIONAL STATUS. APPLICATION OF THE EQUATION TO THESE PATIENT GROUPS MAY LEAD TO ERRORS IN GFR ESTIMATION. GFR  ESTIMATING EQUATIONS HAVE POORER AGREEMENT WITH MEASURED GFR FOR ILL HOSPITALIZED PATIENTS AND  FOR PEOPLE WITH NEAR NORMAL KIDNEY FUNCTION THAN FOR SUBJECTS IN THE MDRD STUDY. VALIDATION  STUDIES ARE IN PROGRESS TO EVALUATE THE MDRD STUDY EQUATION FOR ADDITIONAL ETHNIC GROUPS, THE  ELDERLY, VARIOUS DISEASE CONDITIONS, AND PEOPLE WITH NORMAL KIDNEY FUNCTION.   GFRA----REFERS TO   GFRO---REFERS TO OTHER RACES  REFERENCES AVAILABLE UPON REQUEST.      GFR est non-AA 01/31/2018 57    Final    Calcium 01/31/2018 8.3* 8.5 - 10.1 mg/dl Final       .  Results for orders placed or performed during the hospital encounter of 02/05/18   CBC WITH AUTOMATED DIFF   Result Value Ref Range    WBC 18.3 (H) 4.6 - 13.2 K/uL    RBC 5.12 4.70 - 5.50 M/uL HGB 14.9 13.0 - 16.0 g/dL    HCT 43.1 36.0 - 48.0 %    MCV 84.2 74.0 - 97.0 FL    MCH 29.1 24.0 - 34.0 PG    MCHC 34.6 31.0 - 37.0 g/dL    RDW 14.9 (H) 11.6 - 14.5 %    PLATELET 951 264 - 649 K/uL    MPV 10.7 9.2 - 11.8 FL    NEUTROPHILS 81 (H) 40 - 73 %    LYMPHOCYTES 8 (L) 21 - 52 %    MONOCYTES 11 (H) 3 - 10 %    EOSINOPHILS 0 0 - 5 %    BASOPHILS 0 0 - 2 %    ABS. NEUTROPHILS 14.6 (H) 1.8 - 8.0 K/UL    ABS. LYMPHOCYTES 1.5 0.9 - 3.6 K/UL    ABS. MONOCYTES 2.1 (H) 0.05 - 1.2 K/UL    ABS. EOSINOPHILS 0.0 0.0 - 0.4 K/UL    ABS. BASOPHILS 0.0 0.0 - 0.06 K/UL    DF AUTOMATED           Assessment / Plan:        ICD-10-CM ICD-9-CM    1. Hypokalemia S72.8 093.0 METABOLIC PANEL, COMPREHENSIVE      CBC WITH AUTOMATED DIFF      COLLECTION VENOUS BLOOD,VENIPUNCTURE   2. Sciatica of left side T94.89 088.0 METABOLIC PANEL, COMPREHENSIVE      CBC WITH AUTOMATED DIFF      COLLECTION VENOUS BLOOD,VENIPUNCTURE   3. Constipation, unspecified constipation type K59.00 564.00 COLLECTION VENOUS BLOOD,VENIPUNCTURE   4. Generalized abdominal pain R10.84 789.07 COLLECTION VENOUS BLOOD,VENIPUNCTURE       Labs  Prednisone -- at a higher dose  Medications were reconciled with the patient and the hospital record during this visit      Follow-up Disposition:  Return in about 3 months (around 5/5/2018). I asked Truman Nichols. [id number] [employer] if he has any questions and I answered the questions. Stewart APONTE  [email or web address] states that he understands the treatment plan and agrees with the treatment plan

## 2018-02-06 ENCOUNTER — PATIENT OUTREACH (OUTPATIENT)
Dept: INTERNAL MEDICINE CLINIC | Age: 78
End: 2018-02-06

## 2018-02-06 NOTE — PROGRESS NOTES
Transitions of Care Coordination    Follow up for hospitalization at [facility] 1/28-1/31/18 for diverticulitis, N/V, constipation, sciatica and discharge to home with [address] [place] [address]. The patient attended a post hospital visit with Dr. Steve Bae on 2/5/18. Per visit note a CBC and BMP were done. Prednisone was prescribed at a higher dose. Next appointment with Dr. Steve Bae is 2/12/18. Daughter Arcelia Oliva volunteered patient is still having sciatic nerve pain and is scheduled to see an orthopedic doctor tomorrow for a steroid injection. Daughter did not know name of provider. Per daughter the patient is taking the higher dose of prednisone as directed by Dr. Steve Bae. Family is taking turns staying with the patient at this time. Opportunity to ask questions was provided. Contact information was given for future questions, concerns or assistance. Goals Addressed             Most Recent       Post Hospitalization     Attends follow-up appointments as directed. On track (2/6/2018)             Plan:  Monitor for attendance at apts  2/5/18-Attended apt with Dr. Judith Daily and adherence of prescribed medication (ie. action, side effects, missed dose, etc.). On track (2/6/2018)             Plan:  Complete medications reconciliation and patient/family understanding of all meds-done 2/1/18-family fills pill minder box and has excellent understanding of medications-patient is compliant with taking medications as directed.  Supportive resources in place to maintain patient in the community (ie.  Home Health, DME equipment, refer to, medication assistant plan, etc.)   On track (2/6/2018)             Plan:  Verify start of [address] [facility] home care            Verify family resources to support patient-done 2/1/18-very supportive family

## 2018-02-15 ENCOUNTER — OFFICE VISIT (OUTPATIENT)
Dept: INTERNAL MEDICINE CLINIC | Age: 78
End: 2018-02-15

## 2018-02-15 VITALS
TEMPERATURE: 97.7 F | OXYGEN SATURATION: 97 % | BODY MASS INDEX: 26.46 KG/M2 | WEIGHT: 189 LBS | SYSTOLIC BLOOD PRESSURE: 142 MMHG | HEART RATE: 82 BPM | HEIGHT: 71 IN | DIASTOLIC BLOOD PRESSURE: 78 MMHG

## 2018-02-15 DIAGNOSIS — I10 ESSENTIAL HYPERTENSION: ICD-10-CM

## 2018-02-15 DIAGNOSIS — M54.32 SCIATICA OF LEFT SIDE: Primary | ICD-10-CM

## 2018-02-15 DIAGNOSIS — I69.351 HEMIPARESIS AFFECTING RIGHT SIDE AS LATE EFFECT OF CEREBROVASCULAR ACCIDENT (CVA) (HCC): ICD-10-CM

## 2018-02-15 RX ORDER — HYDROCODONE BITARTRATE AND ACETAMINOPHEN 7.5; 325 MG/1; MG/1
TABLET ORAL
Qty: 28 TAB | Refills: 0 | Status: SHIPPED | OUTPATIENT
Start: 2018-02-15 | End: 2018-02-28 | Stop reason: SDUPTHER

## 2018-02-15 RX ORDER — GABAPENTIN 100 MG/1
CAPSULE ORAL
Qty: 120 CAP | Refills: 1 | Status: SHIPPED | OUTPATIENT
Start: 2018-02-15 | End: 2018-06-25

## 2018-02-15 NOTE — MR AVS SNAPSHOT
[address], [address] [facility] [address] 
[phone] Patient: Anais Miranda MRN: [id number] HARDEEP:[birth year] Visit Information Date & Time Provider Department Dept. Phone Encounter #  
 2/15/2018  3:00 PM Sinan Moses MD [facility] [place] [phone] [id number] Your Appointments 2/28/2018  8:30 AM  
Nurse Visit with Sinan Moses MD  
[facility] [place] [address]) Appt Note: 3 mo f/u  
 [address] [place] [address], [address] [place]  
[phone]  
  
   
 [address] [place] [address], [address] [facility] [address]  
  
    
 3/8/2018  1:15 PM  
Follow Up with Sinan Moses MD  
[facility] [address]) Appt Note: FU  
 [address] [place] [address], [address] [facility] [address]  
  
   
 [address] [place] [address], [address] [facility] [address] 3/15/2018  3:30 PM  
Follow Up with Sinan Moses MD  
[address]) Appt Note: 1 wk f/u; r/s 2/12 due to   
 [address] [place] [address], [address] [facility] [address]  
[phone] Upcoming Health Maintenance Date Due DTaP/Tdap/Td series (1 - Tdap) 8/1/1961 ZOSTER VACCINE AGE 60> 6/1/1998 GLAUCOMA SCREENING Q2Y 8/1/2003 MEDICARE YEARLY EXAM 6/6/2018 Pneumococcal 65+ Low/Medium Risk (2 of 2 - PPSV23) 11/16/2018 Allergies as of 2/15/2018  Review Complete On: 2/15/2018 By: Sinan Moses MD  
  
 Severity Noted Reaction Type Reactions Pcn [Penicillins]    Rash Current Immunizations  Reviewed on 2/2/2018 Name Date Influenza High Dose Vaccine PF 11/16/2017 Influenza Vaccine (Quad) PF 10/31/2016 Pneumococcal Conjugate (PCV-13) 11/16/2017 12:54 PM  
  
 Not reviewed this visit You Were Diagnosed With   
  
 Codes Comments  Sciatica of left side    -  Primary ICD-10-CM: M54.32 
 ICD-9-CM: 724.3 Vitals Smoking Status Never Smoker Preferred Pharmacy Pharmacy Name Phone [address], [address] [phone] Your Updated Medication List  
  
   
This list is accurate as of: 2/15/18  3:48 PM.  Always use your most recent med list.  
  
  
  
  
 aspirin delayed-release 81 mg tablet Take 81 mg by mouth daily. benazepril 20 mg tablet Commonly known as:  LOTENSIN  
TAKE 1 TABLET BY MOUTH DAILY FOR BLOOD PRESSURE Cholecalciferol (Vitamin D3) 2,000 unit Cap capsule Commonly known as:  VITAMIN D3 Take 2,000 Units by mouth daily. ciprofloxacin HCl 500 mg tablet Commonly known as:  CIPRO Take 1 Tab by mouth two (2) times a day. clopidogrel 75 mg Tab Commonly known as:  PLAVIX Take 1 Tab by mouth daily (with dinner). Indications: Cerebral Thromboembolism Prevention  
  
 cyanocobalamin 1,000 mcg tablet Take 1,000 mcg by mouth daily. diclofenac potassium 50 mg tablet Commonly known as:  CATAFLAM  
Take 1 Tab by mouth three (3) times daily as needed. gabapentin 100 mg capsule Commonly known as:  NEURONTIN  
1 cap in AM, 1 cap mid day, 2 caps at bedtime HYDROcodone-acetaminophen 7.5-325 mg per tablet Commonly known as:  NORCO  
1 tab every six hours as needed for pain  
  
 levothyroxine 150 mcg tablet Commonly known as:  SYNTHROID  
TAKE 1 TABLET BY MOUTH ONCE DAILY  
  
 metroNIDAZOLE 500 mg tablet Commonly known as:  FLAGYL Take 1 Tab by mouth three (3) times daily. pantoprazole 40 mg tablet Commonly known as:  PROTONIX  
TAKE 1 TABLET BY MOUTH ONCE DAILY polyethylene glycol 17 gram/dose powder Commonly known as:  Verlon Books Take 17 g by mouth daily. predniSONE 20 mg tablet Commonly known as:  DELTASONE  
3 tabs daily x 3 days, 2 tabs daily x 3 days, 1 tab daily x 3 days, 1/2 tab daily x 3 days sertraline 50 mg tablet Commonly known as:  ZOLOFT  
1 1/2 tablets daily SINGULAIR 10 mg tablet Generic drug:  montelukast  
Take 10 mg by mouth daily. Prescriptions Printed Refills HYDROcodone-acetaminophen (NORCO) 7.5-325 mg per tablet 0 Si tab every six hours as needed for pain  
 Class: Print Prescriptions Sent to Pharmacy Refills  
 gabapentin (NEURONTIN) 100 mg capsule 1 Si cap in AM, 1 cap mid day, 2 caps at bedtime Class: Normal  
 Pharmacy: [address] [place] [address], [address] [facility] #: [phone] Introducing [facility]! Kamini Mays introduces [employer] patient portal. Now you can access parts of your medical record, email your doctor's office, and request medication refills online. 1. In your internet browser, go to [email or web address] 2. Click on the First Time User? Click Here link in the Sign In box. You will see the New Member Sign Up page. 3. Enter your [employer] Access Code exactly as it appears below. You will not need to use this code after youve completed the sign-up process. If you do not sign up before the expiration date, you must request a new code. · [employer] Access Code: Irasema Saxena Expires: 2018  1:28 PM 
 
4. Enter the last four digits of your Social Security Number (xxxx) and Date of Birth (mm/dd/yyyy) as indicated and click Submit. You will be taken to the next sign-up page. 5. Create a Highcont ID. This will be your Get[employer] login ID and cannot be changed, so think of one that is secure and easy to remember. 6. Create a Get[employer] password. You can change your password at any time. 7. Enter your Password Reset Question and Answer. This can be used at a later time if you forget your password. 8. Enter your e-mail address. You will receive e-mail notification when new information is available in [address] 9. Click Sign Up. You can now view and download portions of your medical record. 10. Click the Download Summary menu link to download a portable copy of your medical information. If you have questions, please visit the Frequently Asked Questions section of the [employer] website. Remember, [employer] is NOT to be used for urgent needs. For medical emergencies, dial 911. Now available from your iPhone and Android! Please provide this summary of care documentation to your next provider. Your primary care clinician is listed as Beatriz Mccollum. If you have any questions after today's visit, please call [phone].

## 2018-02-16 ENCOUNTER — PATIENT OUTREACH (OUTPATIENT)
Dept: INTERNAL MEDICINE CLINIC | Age: 78
End: 2018-02-16

## 2018-02-16 NOTE — PROGRESS NOTES
Transitions of Care Coordination  Follow up for hospitalization at [facility] 1/28-1/31/18 for diverticulitis, N/V, constipation, sciatica and discharge to home with [facility].     The patient attended an appointment with Dr. Lakisha Eubanks yesterday, 2/15/18. Per visit note patient has complaints of sciatica of the left leg which has persisted despite prednisone. Neurontin and Norco were prescribed and patient was advised to see the spine specialist.    Reached patient's daughter, Ele Figueroa (HIPPA verified,) and identified self/role. Per daughter patient had an appointment with Dr. Rosanna De La Paz on 2/7/18 and received a spinal injection which did not appear to help. Mr. Michael Vasquez has an appointment with Dr. Enrrique Murillo, orthopedic surgeon, on 2/21/18. Daughter stated a family member stays with the patient at all times. Opportunity to ask questions was provided. Contact information was given for future questions, concerns or assistance. Goals Addressed             Most Recent       Post Hospitalization     Attends follow-up appointments as directed. On track (2/16/2018)             Plan:  Monitor for attendance at apts  2/5/18, 2/15/18-Attended apts with Dr. Lakisha Eubanks  2/7/18-attended apt with Dr. Jim Kincaid resources in place to maintain patient in the community (ie.  Home Health, DME equipment, refer to, medication assistant plan, etc.)   On track (2/6/2018)             Plan:  Verify start of [address] [facility] Aiden Kline family resources to support patient-done 2/1/18-very supportive family

## 2018-02-16 NOTE — PROGRESS NOTES
The patient presents to the office today with the chief complaint of sciatica left leg    HPI    The patient persists with sciatica down his left leg with moderate pain. There has been no improvement with Prednisone. The patient has persistent mild right leg weakness from a previous CVA. The patient remains on medications for hypertension. The patient is tolerating the medications. Review of Systems   Respiratory: Negative for shortness of breath. Cardiovascular: Negative for chest pain and leg swelling. Allergies   Allergen Reactions    Pcn [Penicillins] Rash       Current Outpatient Prescriptions   Medication Sig Dispense Refill    gabapentin (NEURONTIN) 100 mg capsule 1 cap in AM, 1 cap mid day, 2 caps at bedtime 120 Cap 1    HYDROcodone-acetaminophen (NORCO) 7.5-325 mg per tablet 1 tab every six hours as needed for pain 28 Tab 0    predniSONE (DELTASONE) 20 mg tablet 3 tabs daily x 3 days, 2 tabs daily x 3 days, 1 tab daily x 3 days, 1/2 tab daily x 3 days 20 Tab 0    diclofenac potassium (CATAFLAM) 50 mg tablet Take 1 Tab by mouth three (3) times daily as needed. 30 Tab 0    polyethylene glycol (MIRALAX) 17 gram/dose powder Take 17 g by mouth daily. 14 g 0    pantoprazole (PROTONIX) 40 mg tablet TAKE 1 TABLET BY MOUTH ONCE DAILY 90 Tab 3    clopidogrel (PLAVIX) 75 mg tab Take 1 Tab by mouth daily (with dinner). Indications: Cerebral Thromboembolism Prevention 30 Tab 4    sertraline (ZOLOFT) 50 mg tablet 1 1/2 tablets daily 50 Tab 2    benazepril (LOTENSIN) 20 mg tablet TAKE 1 TABLET BY MOUTH DAILY FOR BLOOD PRESSURE 30 Tab 4    aspirin delayed-release 81 mg tablet Take 81 mg by mouth daily.  Cholecalciferol, Vitamin D3, (VITAMIN D3) 2,000 unit cap capsule Take 2,000 Units by mouth daily.  cyanocobalamin 1,000 mcg tablet Take 1,000 mcg by mouth daily.  montelukast (SINGULAIR) 10 mg tablet Take 10 mg by mouth daily.       levothyroxine (SYNTHROID) 150 mcg tablet TAKE 1 TABLET BY MOUTH ONCE DAILY 90 Tab 3       Past Medical History:   Diagnosis Date    Acute ischemic stroke ([facility] [address] 4/19/2017    Acute Ischemic Stroke (acute to subacute ischemia involving the posterior limb of the left internal capsule, along the lateral aspect of the left thalamus) with residual right hemiparesis, dysphagia and dysarthria    Asbestosis ([facility] [address]     Chronic obstructive pulmonary disease (COPD) ([facility] [address]     CKD stage G3a/A1, GFR 45-59 and albumin creatinine ratio <30 mg/g 5/3/2017    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 HDL 42, LDL 97    Dysphagia as late effect of cerebrovascular accident (CVA) 4/19/2017    Erectile dysfunction     Gastroesophageal reflux disease     Hemiparesis affecting right side as late effect of cerebrovascular accident (CVA) ([facility] [address] 4/19/2017    History of endogenous hypertriglyceridemia     History of malignant neoplasm of prostate     Genoveva score 7     Hypertension     Hypothyroidism     Osteoarthrosis involving multiple sites     Procedure refused 4/21/2017    Speech Pathologist recommended NPO and PEG placement; Patient refused    Urinary incontinence     Male incontinence        Past Surgical History:   Procedure Laterality Date    COLONOSCOPY      HX CHOLECYSTECTOMY      HX HERNIA REPAIR Bilateral     inguinal    HX RADICAL PROSTATECTOMY  1-    perineal approach       Social History     Social History    Marital status:      Spouse name: N/A    Number of children: N/A    Years of education: N/A     Occupational History    Not on file.      Social History Main Topics    Smoking status: Never Smoker    Smokeless tobacco: Never Used    Alcohol use No    Drug use: No    Sexual activity: Yes     Partners: Female     Other Topics Concern    Not on file     Social History Narrative       Patient does have an advanced directive on file    Visit Vitals    /78 (BP 1 Location: Right arm, BP Patient Position: Sitting)    Pulse 82    Temp 97.7 °F (36.5 °C)    Ht 5' 11\" (1.803 m)    Wt 189 lb (85.7 kg)    SpO2 97%    BMI 26.36 kg/m2       Physical Exam   Neck: No thyromegaly present. Cardiovascular: Normal rate and regular rhythm. Exam reveals no gallop. No murmur heard. Pulmonary/Chest: He has no wheezes. He has no rales. Abdominal: Soft. Bowel sounds are normal. He exhibits no distension and no mass. There is no tenderness. Musculoskeletal: He exhibits no edema. Straight leg raise -- negative       BMI:  [facility] Outpatient Visit on 02/05/2018   Component Date Value Ref Range Status    Sodium 02/05/2018 133* 136 - 145 mmol/L Final    Potassium 02/05/2018 4.7  3.5 - 5.5 mmol/L Final    Chloride 02/05/2018 98* 100 - 108 mmol/L Final    CO2 02/05/2018 25  21 - 32 mmol/L Final    Anion gap 02/05/2018 10  3.0 - 18 mmol/L Final    Glucose 02/05/2018 114* 74 - 99 mg/dL Final    BUN 02/05/2018 35* 7.0 - 18 MG/DL Final    Creatinine 02/05/2018 1.54* 0.6 - 1.3 MG/DL Final    BUN/Creatinine ratio 02/05/2018 23* 12 - 20   Final    GFR est AA 02/05/2018 53* >60 ml/min/1.73m2 Final    GFR est non-AA 02/05/2018 44* >60 ml/min/1.73m2 Final    Comment: (NOTE)  Estimated GFR is calculated using the Modification of Diet in Renal   Disease (MDRD) Study equation, reported for both  Americans   (GFRAA) and non- Americans (GFRNA), and normalized to 1.73m2   body surface area. The physician must decide which value applies to   the patient. The MDRD study equation should only be used in   individuals age 25 or older. It has not been validated for the   following: pregnant women, patients with serious comorbid conditions,   or on certain medications, or persons with extremes of body size,   muscle mass, or nutritional status.       Calcium 02/05/2018 8.8  8.5 - 10.1 MG/DL Final    Bilirubin, total 02/05/2018 0.4  0.2 - 1.0 MG/DL Final    ALT (SGPT) 02/05/2018 38  16 - 61 U/L Final    AST (SGOT) 02/05/2018 14* 15 - 37 U/L Final    Alk. phosphatase 02/05/2018 64  45 - 117 U/L Final    Protein, total 02/05/2018 6.6  6.4 - 8.2 g/dL Final    Albumin 02/05/2018 3.1* 3.4 - 5.0 g/dL Final    Globulin 02/05/2018 3.5  2.0 - 4.0 g/dL Final    A-G Ratio 02/05/2018 0.9  0.8 - 1.7   Final    WBC 02/05/2018 18.3* 4.6 - 13.2 K/uL Final    RBC 02/05/2018 5.12  4.70 - 5.50 M/uL Final    HGB 02/05/2018 14.9  13.0 - 16.0 g/dL Final    HCT 02/05/2018 43.1  36.0 - 48.0 % Final    MCV 02/05/2018 84.2  74.0 - 97.0 FL Final    MCH 02/05/2018 29.1  24.0 - 34.0 PG Final    MCHC 02/05/2018 34.6  31.0 - 37.0 g/dL Final    RDW 02/05/2018 14.9* 11.6 - 14.5 % Final    PLATELET 74/44/3354 173  135 - 420 K/uL Final    MPV 02/05/2018 10.7  9.2 - 11.8 FL Final    NEUTROPHILS 02/05/2018 81* 40 - 73 % Final    LYMPHOCYTES 02/05/2018 8* 21 - 52 % Final    MONOCYTES 02/05/2018 11* 3 - 10 % Final    EOSINOPHILS 02/05/2018 0  0 - 5 % Final    BASOPHILS 02/05/2018 0  0 - 2 % Final    ABS. NEUTROPHILS 02/05/2018 14.6* 1.8 - 8.0 K/UL Final    ABS. LYMPHOCYTES 02/05/2018 1.5  0.9 - 3.6 K/UL Final    ABS. MONOCYTES 02/05/2018 2.1* 0.05 - 1.2 K/UL Final    ABS. EOSINOPHILS 02/05/2018 0.0  0.0 - 0.4 K/UL Final    ABS.  BASOPHILS 02/05/2018 0.0  0.0 - 0.06 K/UL Final    DF 02/05/2018 AUTOMATED    Final   Admission on 01/28/2018, Discharged on 01/31/2018   Component Date Value Ref Range Status    WBC 01/28/2018 15.1* 4.0 - 11.0 1000/mm3 Final    RBC 01/28/2018 5.29  3.80 - 5.70 M/uL Final    HGB 01/28/2018 15.2  12.4 - 17.2 gm/dl Final    HCT 01/28/2018 43.4  37.0 - 50.0 % Final    MCV 01/28/2018 82.0  80.0 - 98.0 fL Final    MCH 01/28/2018 28.7  23.0 - 34.6 pg Final    MCHC 01/28/2018 35.0  30.0 - 36.0 gm/dl Final    PLATELET 44/12/2955 889  140 - 450 1000/mm3 Final    MPV 01/28/2018 10.4* 6.0 - 10.0 fL Final    RDW-SD 01/28/2018 41.1  35.1 - 43.9   Final    NRBC 01/28/2018 0  0 - 0   Final    IMMATURE GRANULOCYTES 01/28/2018 1.3  0.0 - 3.0 % Final    Comment: IG - Immature granulocytes (promyelocytes + myelocytes + metamyelocytes), their presence  indicates a left shift. An IG > 3% may predict positive blood cultures with 98% specificity.  (P<0.04) and 92% Positive Predictive Value (Vimal-Jeanie)1. Increased immature granulocytes  assist with the detection of infection and/or inflammation and may be present at an early stage  and are more sensitive and specific than band counts.  NEUTROPHILS 01/28/2018 82.2* 34 - 64 % Final    LYMPHOCYTES 01/28/2018 8.7* 28 - 48 % Final    MONOCYTES 01/28/2018 7.0  1 - 13 % Final    EOSINOPHILS 01/28/2018 0.6  0 - 5 % Final    BASOPHILS 01/28/2018 0.2  0 - 3 % Final    Lipase 01/28/2018 289  73 - 393 U/L Final    Troponin-I 01/28/2018 <0.015  0.000 - 0.045 ng/ml Final    Comment: The presence of detectable troponin above the reference range indicates myocardial injury which  maybe due to ischemia, myocarditis, trauma, etc. Clinical correlation is necessary to establish  the significance of this finding. NOTE: The reference range is based on the 99th percentile of the referent population         AST (SGOT) 01/28/2018 23  15 - 37 U/L Final    ALT (SGPT) 01/28/2018 21  12 - 78 U/L Final    Alk.  phosphatase 01/28/2018 66  45 - 117 U/L Final    Bilirubin, total 01/28/2018 0.5  0.2 - 1.0 mg/dl Final    Protein, total 01/28/2018 6.9  6.4 - 8.2 gm/dl Final    Albumin 01/28/2018 3.1* 3.4 - 5.0 gm/dl Final    Bilirubin, direct 01/28/2018 0.1  0.0 - 0.2 mg/dl Final    Sodium 01/28/2018 122* 136 - 145 mEq/L Final    Potassium 01/28/2018 >9.0* 3.5 - 4.9 mEq/L Final    Chloride 01/28/2018 97* 98 - 107 mEq/L Final    CO2, TOTAL 01/28/2018 25  21 - 32 mmol/L Final    Glucose 01/28/2018 199* 74 - 106 mg/dL Final    BUN 01/28/2018 51* 7 - 25 mg/dl Final    Creatinine 01/28/2018 1.5* 0.6 - 1.3 mg/dl Final    HCT 01/28/2018 49  40 - 54 % Final    HGB 01/28/2018 16.7  12.4 - 17.2 gm/dl Final    CALCIUM,IONIZED 01/28/2018 3.70* 4.40 - 5.40 mg/dL Final    Sodium 01/28/2018 136  136 - 145 mEq/L Final    Potassium 01/28/2018 3.0* 3.5 - 5.1 mEq/L Final    Chloride 01/28/2018 106  98 - 107 mEq/L Final    CO2 01/28/2018 22  21 - 32 mEq/L Final    Glucose 01/28/2018 123* 74 - 106 mg/dl Final    BUN 01/28/2018 27* 7 - 25 mg/dl Final    Creatinine 01/28/2018 1.1  0.6 - 1.3 mg/dl Final    GFR est AA 01/28/2018 >60    Final    Comment: THE [employer] STATES THAT THE MDRD STUDY EQUATION SHOULD ONLY BE USED ON  INDIVIDUALS 18 OR OLDER. THE REPORT ALSO NOTES THAT THE MDRD STUDY EQUATION HAS NOT BEEN  VALIDATED FOR USE WITH THE ELDERLY (OVER 79YEARS OF AGE), PREGNANT WOMEN, PATIENTS WITH SERIOUS  COMORBID CONDITIONS, OR PERSONS WITH EXTREMES OF BODY SIZE, MUSCLE MASS, OR NUTRITIONAL STATUS. APPLICATION OF THE EQUATION TO THESE PATIENT GROUPS MAY LEAD TO ERRORS IN GFR ESTIMATION. GFR  ESTIMATING EQUATIONS HAVE POORER AGREEMENT WITH MEASURED GFR FOR ILL HOSPITALIZED PATIENTS AND  FOR PEOPLE WITH NEAR NORMAL KIDNEY FUNCTION THAN FOR SUBJECTS IN THE MDRD STUDY. VALIDATION  STUDIES ARE IN PROGRESS TO EVALUATE THE MDRD STUDY EQUATION FOR ADDITIONAL ETHNIC GROUPS, THE  ELDERLY, VARIOUS DISEASE CONDITIONS, AND PEOPLE WITH NORMAL KIDNEY FUNCTION.   GFRA----REFERS TO   GFRO---REFERS TO OTHER RACES  REFERENCES AVAILABLE UPON REQUEST.      GFR est non-AA 01/28/2018 >60    Final    Calcium 01/28/2018 6.0* 8.5 - 10.1 mg/dl Final    Ventricular Rate 01/28/2018 71  BPM Final    Atrial Rate 01/28/2018 71  BPM Final    P-R Interval 01/28/2018 168  ms Final    QRS Duration 01/28/2018 86  ms Final    Q-T Interval 01/28/2018 416  ms Final    QTC Calculation (Bezet) 01/28/2018 452  ms Final    Calculated P Axis 01/28/2018 45  degrees Final    Calculated R Axis 01/28/2018 59 degrees Final    Calculated T Axis 01/28/2018 56  degrees Final    Diagnosis 01/28/2018    Final                    Value:Normal sinus rhythm    When compared with ECG of 18-APR-2017 20:17,  No significant change was found  Confirmed by Rodolfo Edwards M.D., Caye Beat ([id number]) on 1/29/2018 8:01:53 AM      Lactic Acid 01/28/2018 2.7* 0.4 - 2.0 mmol/L Final    Lactic Acid 01/28/2018 1.7  0.4 - 2.0 mmol/L Final    Blood Culture Result 01/28/2018 No Growth at 5 days    Final    Blood Culture Result 01/28/2018 No Growth at 5 days    Final    Glucose 01/28/2018 Negative  NEGATIVE,Negative mg/dl Final    Bilirubin 01/28/2018 Negative  NEGATIVE,Negative   Final    Ketone 01/28/2018 Negative  NEGATIVE,Negative mg/dl Final    Specific gravity 01/28/2018 1.015  1.005 - 1.030   Final    Blood 01/28/2018 Trace-intact* NEGATIVE,Negative   Final    pH (UA) 01/28/2018 7.0  5 - 9   Final    Protein 01/28/2018 Negative  NEGATIVE,Negative mg/dl Final    Urobilinogen 01/28/2018 0.2  0.0 - 1.0 EU/dl Final    Nitrites 01/28/2018 Negative  NEGATIVE,Negative   Final    Leukocyte Esterase 01/28/2018 Negative  NEGATIVE,Negative   Final    Color 01/28/2018 Yellow    Final    Appearance 01/28/2018 Clear    Final    : [id number]    WBC 01/29/2018 23.7* 4.0 - 11.0 1000/mm3 Final    RBC 01/29/2018 4.69  3.80 - 5.70 M/uL Final    HGB 01/29/2018 13.5  12.4 - 17.2 gm/dl Final    HCT 01/29/2018 38.2  37.0 - 50.0 % Final    MCV 01/29/2018 81.4  80.0 - 98.0 fL Final    MCH 01/29/2018 28.8  23.0 - 34.6 pg Final    MCHC 01/29/2018 35.3  30.0 - 36.0 gm/dl Final    PLATELET 87/56/6428 513  140 - 450 1000/mm3 Final    MPV 01/29/2018 10.6* 6.0 - 10.0 fL Final    RDW-SD 01/29/2018 41.2  35.1 - 43.9   Final    NRBC 01/29/2018 0  0 - 0   Final    IMMATURE GRANULOCYTES 01/29/2018 0.8  0.0 - 3.0 % Final    Comment: IG - Immature granulocytes (promyelocytes + myelocytes + metamyelocytes), their presence  indicates a left shift.  An IG > 3% may predict positive blood cultures with 98% specificity.  (P<0.04) and 92% Positive Predictive Value (Vimal-Jeanie)1. Increased immature granulocytes  assist with the detection of infection and/or inflammation and may be present at an early stage  and are more sensitive and specific than band counts.  Microcytes 01/29/2018 1    Final    Comment: ** ** ** ** **  CORRECTED REPORT ** ** ** ** **  The previous value of no value was corrected by [id number] on 01/29/18 06:41.      NEUTROPHILS 01/29/2018 88.9* 34 - 64 % Final    LYMPHOCYTES 01/29/2018 5.0* 28 - 48 % Final    MONOCYTES 01/29/2018 4.8  1 - 13 % Final    EOSINOPHILS 01/29/2018 0.2  0 - 5 % Final    BASOPHILS 01/29/2018 0.3  0 - 3 % Final    Smudge cells 01/29/2018 2.9    Final    Comment: ** ** ** ** **  CORRECTED REPORT ** ** ** ** **  The previous value of no value was corrected by [id number] on 01/29/18 06:41.      Giant platelets 57/67/5418 1.0    Final    Comment: ** ** ** ** **  CORRECTED REPORT ** ** ** ** **  The previous value of no value was corrected by [id number] on 01/29/18 06:41.  Sodium 01/29/2018 128* 136 - 145 mEq/L Final    Potassium 01/29/2018 5.0  3.5 - 5.1 mEq/L Final    not hemolyzed/icteric    Chloride 01/29/2018 98  98 - 107 mEq/L Final    CO2 01/29/2018 24  21 - 32 mEq/L Final    Glucose 01/29/2018 111* 74 - 106 mg/dl Final    BUN 01/29/2018 22  7 - 25 mg/dl Final    Creatinine 01/29/2018 1.3  0.6 - 1.3 mg/dl Final    GFR est AA 01/29/2018 >60    Final    Comment: THE [employer] STATES THAT THE MDRD STUDY EQUATION SHOULD ONLY BE USED ON  INDIVIDUALS 18 OR OLDER. THE REPORT ALSO NOTES THAT THE MDRD STUDY EQUATION HAS NOT BEEN  VALIDATED FOR USE WITH THE ELDERLY (OVER 79YEARS OF AGE), PREGNANT WOMEN, PATIENTS WITH SERIOUS  COMORBID CONDITIONS, OR PERSONS WITH EXTREMES OF BODY SIZE, MUSCLE MASS, OR NUTRITIONAL STATUS. APPLICATION OF THE EQUATION TO THESE PATIENT GROUPS MAY LEAD TO ERRORS IN GFR ESTIMATION. GFR  ESTIMATING EQUATIONS HAVE POORER AGREEMENT WITH MEASURED GFR FOR ILL HOSPITALIZED PATIENTS AND  FOR PEOPLE WITH NEAR NORMAL KIDNEY FUNCTION THAN FOR SUBJECTS IN THE MDRD STUDY. VALIDATION  STUDIES ARE IN PROGRESS TO EVALUATE THE MDRD STUDY EQUATION FOR ADDITIONAL ETHNIC GROUPS, THE  ELDERLY, VARIOUS DISEASE CONDITIONS, AND PEOPLE WITH NORMAL KIDNEY FUNCTION. GFRA----REFERS TO   GFRO---REFERS TO OTHER RACES  REFERENCES AVAILABLE UPON REQUEST.      GFR est non-AA 01/29/2018 57    Final    Calcium 01/29/2018 8.0* 8.5 - 10.1 mg/dl Final    Sodium 01/29/2018 131* 136 - 145 mEq/L Final    Potassium 01/29/2018 4.8  3.5 - 5.1 mEq/L Final    Chloride 01/29/2018 97* 98 - 107 mEq/L Final    CO2 01/29/2018 27  21 - 32 mEq/L Final    Glucose 01/29/2018 95  74 - 106 mg/dl Final    BUN 01/29/2018 20  7 - 25 mg/dl Final    Creatinine 01/29/2018 1.3  0.6 - 1.3 mg/dl Final    GFR est AA 01/29/2018 >60    Final    Comment: THE [facility] [facility] [employer] [facility] STATES THAT THE MDRD STUDY EQUATION SHOULD ONLY BE USED ON  INDIVIDUALS 18 OR OLDER. THE REPORT ALSO NOTES THAT THE MDRD STUDY EQUATION HAS NOT BEEN  VALIDATED FOR USE WITH THE ELDERLY (OVER 79YEARS OF AGE), PREGNANT WOMEN, PATIENTS WITH SERIOUS  COMORBID CONDITIONS, OR PERSONS WITH EXTREMES OF BODY SIZE, MUSCLE MASS, OR NUTRITIONAL STATUS. APPLICATION OF THE EQUATION TO THESE PATIENT GROUPS MAY LEAD TO ERRORS IN GFR ESTIMATION. GFR  ESTIMATING EQUATIONS HAVE POORER AGREEMENT WITH MEASURED GFR FOR ILL HOSPITALIZED PATIENTS AND  FOR PEOPLE WITH NEAR NORMAL KIDNEY FUNCTION THAN FOR SUBJECTS IN THE MDRD STUDY. VALIDATION  STUDIES ARE IN PROGRESS TO EVALUATE THE MDRD STUDY EQUATION FOR ADDITIONAL ETHNIC GROUPS, THE  ELDERLY, VARIOUS DISEASE CONDITIONS, AND PEOPLE WITH NORMAL KIDNEY FUNCTION.   GFRA----REFERS TO   GFRO---REFERS TO OTHER RACES  REFERENCES AVAILABLE UPON REQUEST.      GFR est non-AA 01/29/2018 57    Final    Calcium 01/29/2018 8.4* 8.5 - 10.1 mg/dl Final    WBC 01/30/2018 10.7  4.0 - 11.0 1000/mm3 Final    RBC 01/30/2018 4.57  3.80 - 5.70 M/uL Final    HGB 01/30/2018 13.2  12.4 - 17.2 gm/dl Final    HCT 01/30/2018 37.0  37.0 - 50.0 % Final    MCV 01/30/2018 81.0  80.0 - 98.0 fL Final    MCH 01/30/2018 28.9  23.0 - 34.6 pg Final    MCHC 01/30/2018 35.7  30.0 - 36.0 gm/dl Final    PLATELET 22/70/3326 340  140 - 450 1000/mm3 Final    MPV 01/30/2018 10.5* 6.0 - 10.0 fL Final    RDW-SD 01/30/2018 41.6  35.1 - 43.9   Final    NRBC 01/30/2018 0  0 - 0   Final    IMMATURE GRANULOCYTES 01/30/2018 0.6  0.0 - 3.0 % Final    Comment: IG - Immature granulocytes (promyelocytes + myelocytes + metamyelocytes), their presence  indicates a left shift. An IG > 3% may predict positive blood cultures with 98% specificity.  (P<0.04) and 92% Positive Predictive Value (Jessica)1. Increased immature granulocytes  assist with the detection of infection and/or inflammation and may be present at an early stage  and are more sensitive and specific than band counts.  NEUTROPHILS 01/30/2018 75.0* 34 - 64 % Final    LYMPHOCYTES 01/30/2018 12.0* 28 - 48 % Final    MONOCYTES 01/30/2018 10.3  1 - 13 % Final    EOSINOPHILS 01/30/2018 1.8  0 - 5 % Final    BASOPHILS 01/30/2018 0.3  0 - 3 % Final    Sodium 01/30/2018 129* 136 - 145 mEq/L Final    Potassium 01/30/2018 4.3  3.5 - 5.1 mEq/L Final    Chloride 01/30/2018 98  98 - 107 mEq/L Final    CO2 01/30/2018 24  21 - 32 mEq/L Final    Glucose 01/30/2018 96  74 - 106 mg/dl Final    BUN 01/30/2018 20  7 - 25 mg/dl Final    Creatinine 01/30/2018 1.3  0.6 - 1.3 mg/dl Final    GFR est AA 01/30/2018 >60    Final    Comment: THE [facility] [employer] [employer] STATES THAT THE MDRD STUDY EQUATION SHOULD ONLY BE USED ON  INDIVIDUALS 18 OR OLDER.  THE REPORT ALSO NOTES THAT THE MDRD STUDY EQUATION HAS NOT BEEN  VALIDATED FOR USE WITH THE ELDERLY (OVER 79YEARS OF AGE), PREGNANT WOMEN, PATIENTS WITH SERIOUS  COMORBID CONDITIONS, OR PERSONS WITH EXTREMES OF BODY SIZE, MUSCLE MASS, OR NUTRITIONAL STATUS. APPLICATION OF THE EQUATION TO THESE PATIENT GROUPS MAY LEAD TO ERRORS IN GFR ESTIMATION. GFR  ESTIMATING EQUATIONS HAVE POORER AGREEMENT WITH MEASURED GFR FOR ILL HOSPITALIZED PATIENTS AND  FOR PEOPLE WITH NEAR NORMAL KIDNEY FUNCTION THAN FOR SUBJECTS IN THE MDRD STUDY. VALIDATION  STUDIES ARE IN PROGRESS TO EVALUATE THE MDRD STUDY EQUATION FOR ADDITIONAL ETHNIC GROUPS, THE  ELDERLY, VARIOUS DISEASE CONDITIONS, AND PEOPLE WITH NORMAL KIDNEY FUNCTION. GFRA----REFERS TO   GFRO---REFERS TO OTHER RACES  REFERENCES AVAILABLE UPON REQUEST.      GFR est non-AA 01/30/2018 57    Final    Calcium 01/30/2018 8.5  8.5 - 10.1 mg/dl Final    WBC 01/31/2018 22.9* 4.0 - 11.0 1000/mm3 Final    RBC 01/31/2018 4.55  3.80 - 5.70 M/uL Final    HGB 01/31/2018 13.1  12.4 - 17.2 gm/dl Final    HCT 01/31/2018 36.6* 37.0 - 50.0 % Final    MCV 01/31/2018 80.4  80.0 - 98.0 fL Final    MCH 01/31/2018 28.8  23.0 - 34.6 pg Final    MCHC 01/31/2018 35.8  30.0 - 36.0 gm/dl Final    PLATELET 10/67/3463 826  140 - 450 1000/mm3 Final    MPV 01/31/2018 10.8* 6.0 - 10.0 fL Final    RDW-SD 01/31/2018 39.0  35.1 - 43.9   Final    NRBC 01/31/2018 0  0 - 0   Final    IMMATURE GRANULOCYTES 01/31/2018 1.5  0.0 - 3.0 % Final    Comment: IG - Immature granulocytes (promyelocytes + myelocytes + metamyelocytes), their presence  indicates a left shift. An IG > 3% may predict positive blood cultures with 98% specificity.  (P<0.04) and 92% Positive Predictive Value (Jessica)1. Increased immature granulocytes  assist with the detection of infection and/or inflammation and may be present at an early stage  and are more sensitive and specific than band counts.       NEUTROPHILS 01/31/2018 89.4* 34 - 64 % Final    LYMPHOCYTES 01/31/2018 5.6* 28 - 48 % Final    MONOCYTES 01/31/2018 3.4  1 - 13 % Final    EOSINOPHILS 01/31/2018 0.0  0 - 5 % Final    BASOPHILS 01/31/2018 0.1  0 - 3 % Final    Sodium 01/31/2018 129* 136 - 145 mEq/L Final    Potassium 01/31/2018 4.3  3.5 - 5.1 mEq/L Final    Chloride 01/31/2018 99  98 - 107 mEq/L Final    CO2 01/31/2018 21  21 - 32 mEq/L Final    Glucose 01/31/2018 139* 74 - 106 mg/dl Final    BUN 01/31/2018 21  7 - 25 mg/dl Final    Creatinine 01/31/2018 1.3  0.6 - 1.3 mg/dl Final    GFR est AA 01/31/2018 >60    Final    Comment: THE [facility] [employer] STATES THAT THE MDRD STUDY EQUATION SHOULD ONLY BE USED ON  INDIVIDUALS 18 OR OLDER. THE REPORT ALSO NOTES THAT THE MDRD STUDY EQUATION HAS NOT BEEN  VALIDATED FOR USE WITH THE ELDERLY (OVER 79YEARS OF AGE), PREGNANT WOMEN, PATIENTS WITH SERIOUS  COMORBID CONDITIONS, OR PERSONS WITH EXTREMES OF BODY SIZE, MUSCLE MASS, OR NUTRITIONAL STATUS. APPLICATION OF THE EQUATION TO THESE PATIENT GROUPS MAY LEAD TO ERRORS IN GFR ESTIMATION. GFR  ESTIMATING EQUATIONS HAVE POORER AGREEMENT WITH MEASURED GFR FOR ILL HOSPITALIZED PATIENTS AND  FOR PEOPLE WITH NEAR NORMAL KIDNEY FUNCTION THAN FOR SUBJECTS IN THE MDRD STUDY. VALIDATION  STUDIES ARE IN PROGRESS TO EVALUATE THE MDRD STUDY EQUATION FOR ADDITIONAL ETHNIC GROUPS, THE  ELDERLY, VARIOUS DISEASE CONDITIONS, AND PEOPLE WITH NORMAL KIDNEY FUNCTION. GFRA----REFERS TO   GFRO---REFERS TO OTHER RACES  REFERENCES AVAILABLE UPON REQUEST.      GFR est non-AA 01/31/2018 57    Final    Calcium 01/31/2018 8.3* 8.5 - 10.1 mg/dl Final       .No results found for any visits on 02/15/18. Assessment / Plan      ICD-10-CM ICD-9-CM    1. Sciatica of left side M54.32 724.3 gabapentin (NEURONTIN) 100 mg capsule      HYDROcodone-acetaminophen (NORCO) 7.5-325 mg per tablet   2. Essential hypertension I10 401.9    3.  Hemiparesis affecting right side as late effect of cerebrovascular accident (CVA) [facility]) I69.351 438.20        Neurontin  [place] - The Prescription Monitoring Program registry was checked prior to the issuing of this prescription for a controlled substance. he was advised to continue his maintenance medications  To spine specialist    Follow-up Disposition:  Return in about 6 weeks (around 3/29/2018). I asked Alysia Huffman [facility] if he has any questions and I answered the questions. Stewart APONTE  [facility] states that he understands the treatment plan and agrees with the treatment plan

## 2018-02-22 ENCOUNTER — PATIENT OUTREACH (OUTPATIENT)
Dept: INTERNAL MEDICINE CLINIC | Age: 78
End: 2018-02-22

## 2018-02-22 NOTE — PROGRESS NOTES
Transitions of Care Coordination    Follow up for hospitalization at [facility] 1/28-1/31/18 for diverticulitis, N/V, constipation, sciatica and discharge to home with [facility]. Reached patient's daughter, [facility] (HIPPA verified) and identified self/role. Per [facility] patient's main problem has been sciatica and the pain from this has improves since patient started taking Norco and gabapentin prescribed by Dr. Ashwini Alberts. Daughter stated:  Patient eating well  Participating with therapy provided by [id number]. Gait still unsteady-using walker  Taking medications as advised-meds reconciled this call  Family is looking into patient moving to assisted living  Patient has an abscessed tooth and dental apts being arranged. Upcoming appointments:  Dr. Ashwini Alberts 2/28/18  Dr. Aleck Babinski, surgeon, 2/28/18    Opportunity to ask questions was provided. Contact information was given for future questions, concerns or assistance. Goals Addressed             Most Recent       Post Hospitalization     Knowledge and adherence of prescribed medication (ie. action, side effects, missed dose, etc.). On track (2/22/2018)             Plan:  Complete medications reconciliation and patient/family understanding of all meds-done 2/1/18-family fills pill minder box and has excellent understanding of medications-patient is compliant with taking medications as directed. 2/26/18-Medications reviewed with dtr [address] [place], [place] [id number] [facility] resources in place to maintain patient in the community (ie.  Home Health, DME equipment, refer to, medication assistant plan, etc.)   On track (2/22/2018)             Plan:  Verify start of [address] [facility] family resources to support patient-done 2/1/18-very supportive family  2/22/18-[facility] continues to provide service in the home

## 2018-02-28 DIAGNOSIS — M54.32 SCIATICA OF LEFT SIDE: ICD-10-CM

## 2018-02-28 NOTE — TELEPHONE ENCOUNTER
Requested Prescriptions     Pending Prescriptions Disp Refills    HYDROcodone-acetaminophen (NORCO) 7.5-325 mg per tablet 28 Tab 0     Si tab every six hours as needed for pain

## 2018-03-01 RX ORDER — HYDROCODONE BITARTRATE AND ACETAMINOPHEN 7.5; 325 MG/1; MG/1
TABLET ORAL
Qty: 90 TAB | Refills: 0 | Status: SHIPPED | OUTPATIENT
Start: 2018-03-01 | End: 2018-03-21 | Stop reason: ALTCHOICE

## 2018-03-13 ENCOUNTER — PATIENT OUTREACH (OUTPATIENT)
Dept: INTERNAL MEDICINE CLINIC | Age: 78
End: 2018-03-13

## 2018-03-13 NOTE — PROGRESS NOTES
Transitions of Care Coordination    Ana Moise was hospitalized at [facility] 1/28-1/31/18 for diverticulitis, N/V, constipation and discharged home with [address] [place] [address]. Patient has graduated from the Transitions of Care Coordination  program on 3/13/18. Patient's symptoms are stable at this time. Patient/family has the ability to self-manage. Care management goals have been completed at this time. No further nurse navigator follow up scheduled. Pt has nurse navigator's contact information for any further questions, concerns, or needs. Patients upcoming visits:  Future Appointments  Date Time Provider Nelson Hurtado   3/19/2018 12:30 PM Gloria Edwards MD [facility]             Most Recent       Post Hospitalization     Attends follow-up appointments as directed. On track (3/13/2018)             Plan:  Monitor for attendance at apts  2/5/18, 2/15/18-Attended apts with Dr. Lizzeth Morrison  2/7/18-attended apt with Dr. Tres Fernandez and adherence of prescribed medication (ie. action, side effects, missed dose, etc.). On track (3/13/2018)             Plan:  Complete medications reconciliation and patient/family understanding of all meds-done 2/1/18-family fills pill minder box and has excellent understanding of medications-patient is compliant with taking medications as directed. 2/26/18-Medications reviewed with dtr [address] [place] [address] resources in place to maintain patient in the community (ie.  Home Health, DME equipment, refer to, medication assistant plan, etc.)   On track (3/13/2018)             Plan:  Verify start of [address] [facility] family resources to support patient-done 2/1/18-very supportive family  2/22/18-[facility] continues to provide service in the home

## 2018-03-19 ENCOUNTER — HOSPITAL ENCOUNTER (OUTPATIENT)
Dept: LAB | Age: 78
Discharge: HOME | End: 2018-03-19
Payer: MEDICARE

## 2018-03-19 ENCOUNTER — OFFICE VISIT (OUTPATIENT)
Dept: INTERNAL MEDICINE CLINIC | Age: 78
End: 2018-03-19

## 2018-03-19 VITALS
DIASTOLIC BLOOD PRESSURE: 58 MMHG | SYSTOLIC BLOOD PRESSURE: 118 MMHG | OXYGEN SATURATION: 97 % | RESPIRATION RATE: 16 BRPM | BODY MASS INDEX: 26.46 KG/M2 | HEART RATE: 68 BPM | HEIGHT: 71 IN | TEMPERATURE: 98 F | WEIGHT: 189 LBS

## 2018-03-19 DIAGNOSIS — N18.31 CKD STAGE G3A/A1, GFR 45-59 AND ALBUMIN CREATININE RATIO <30 MG/G (HCC): ICD-10-CM

## 2018-03-19 DIAGNOSIS — J44.9 CHRONIC OBSTRUCTIVE PULMONARY DISEASE, UNSPECIFIED COPD TYPE (HCC): Primary | Chronic | ICD-10-CM

## 2018-03-19 DIAGNOSIS — J44.9 CHRONIC OBSTRUCTIVE PULMONARY DISEASE, UNSPECIFIED COPD TYPE (HCC): Chronic | ICD-10-CM

## 2018-03-19 DIAGNOSIS — Z11.59 ENCOUNTER FOR HEPATITIS C SCREENING TEST FOR LOW RISK PATIENT: ICD-10-CM

## 2018-03-19 DIAGNOSIS — I10 ESSENTIAL HYPERTENSION: ICD-10-CM

## 2018-03-19 DIAGNOSIS — E03.9 ACQUIRED HYPOTHYROIDISM: Chronic | ICD-10-CM

## 2018-03-19 LAB
ANION GAP SERPL CALC-SCNC: 11 MMOL/L (ref 3–18)
BASOPHILS # BLD: 0 K/UL (ref 0–0.06)
BASOPHILS NFR BLD: 0 % (ref 0–2)
BNP SERPL-MCNC: 272 PG/ML (ref 0–1800)
BUN SERPL-MCNC: 25 MG/DL (ref 7–18)
BUN/CREAT SERPL: 18 (ref 12–20)
CALCIUM SERPL-MCNC: 9.5 MG/DL (ref 8.5–10.1)
CHLORIDE SERPL-SCNC: 100 MMOL/L (ref 100–108)
CO2 SERPL-SCNC: 23 MMOL/L (ref 21–32)
CREAT SERPL-MCNC: 1.37 MG/DL (ref 0.6–1.3)
DIFFERENTIAL METHOD BLD: ABNORMAL
EOSINOPHIL # BLD: 0.3 K/UL (ref 0–0.4)
EOSINOPHIL NFR BLD: 3 % (ref 0–5)
ERYTHROCYTE [DISTWIDTH] IN BLOOD BY AUTOMATED COUNT: 15.4 % (ref 11.6–14.5)
GLUCOSE SERPL-MCNC: 115 MG/DL (ref 74–99)
HCT VFR BLD AUTO: 40.1 % (ref 36–48)
HGB BLD-MCNC: 13.7 G/DL (ref 13–16)
LYMPHOCYTES # BLD: 2.3 K/UL (ref 0.9–3.6)
LYMPHOCYTES NFR BLD: 26 % (ref 21–52)
MCH RBC QN AUTO: 28.8 PG (ref 24–34)
MCHC RBC AUTO-ENTMCNC: 34.2 G/DL (ref 31–37)
MCV RBC AUTO: 84.4 FL (ref 74–97)
MONOCYTES # BLD: 1 K/UL (ref 0.05–1.2)
MONOCYTES NFR BLD: 12 % (ref 3–10)
NEUTS SEG # BLD: 5.2 K/UL (ref 1.8–8)
NEUTS SEG NFR BLD: 59 % (ref 40–73)
PLATELET # BLD AUTO: 264 K/UL (ref 135–420)
PMV BLD AUTO: 11.1 FL (ref 9.2–11.8)
POTASSIUM SERPL-SCNC: 5.4 MMOL/L (ref 3.5–5.5)
RBC # BLD AUTO: 4.75 M/UL (ref 4.7–5.5)
SODIUM SERPL-SCNC: 134 MMOL/L (ref 136–145)
TSH SERPL DL<=0.05 MIU/L-ACNC: 0.24 UIU/ML (ref 0.36–3.74)
WBC # BLD AUTO: 8.9 K/UL (ref 4.6–13.2)

## 2018-03-19 PROCEDURE — 84443 ASSAY THYROID STIM HORMONE: CPT | Performed by: INTERNAL MEDICINE

## 2018-03-19 PROCEDURE — 80048 BASIC METABOLIC PNL TOTAL CA: CPT | Performed by: INTERNAL MEDICINE

## 2018-03-19 PROCEDURE — 85025 COMPLETE CBC W/AUTO DIFF WBC: CPT | Performed by: INTERNAL MEDICINE

## 2018-03-19 PROCEDURE — 86803 HEPATITIS C AB TEST: CPT | Performed by: INTERNAL MEDICINE

## 2018-03-19 PROCEDURE — 83880 ASSAY OF NATRIURETIC PEPTIDE: CPT | Performed by: INTERNAL MEDICINE

## 2018-03-19 RX ORDER — LORATADINE 10 MG/1
10 TABLET ORAL
COMMUNITY
End: 2019-02-02 | Stop reason: ALTCHOICE

## 2018-03-20 LAB
HCV AB SER IA-ACNC: 0.04 INDEX
HCV AB SERPL QL IA: NEGATIVE
HCV COMMENT,HCGAC: NORMAL

## 2018-03-22 NOTE — PROGRESS NOTES
The patient presents to the office today with the chief complaint of COPD    HPI    The patient has COPD. He has mild dyspnea which is stable. The patient remains on medications for hypertension. The patient is doing ok on the medications. The patient has renal insuffiencey. The renal function has been stable. Recently the patient has been noted to have swelling of his LE. The patient is due for hepatitis C screening      Review of Systems   Respiratory: Positive for shortness of breath (Mild dyspea on exertion). Cardiovascular: Negative for chest pain and leg swelling. Allergies   Allergen Reactions    Pcn [Penicillins] Rash       Current Outpatient Prescriptions   Medication Sig Dispense Refill    loratadine (CLARITIN) 10 mg tablet Take 10 mg by mouth.  gabapentin (NEURONTIN) 100 mg capsule 1 cap in AM, 1 cap mid day, 2 caps at bedtime 120 Cap 1    polyethylene glycol (MIRALAX) 17 gram/dose powder Take 17 g by mouth daily. 14 g 0    pantoprazole (PROTONIX) 40 mg tablet TAKE 1 TABLET BY MOUTH ONCE DAILY 90 Tab 3    clopidogrel (PLAVIX) 75 mg tab Take 1 Tab by mouth daily (with dinner). Indications: Cerebral Thromboembolism Prevention 30 Tab 4    sertraline (ZOLOFT) 50 mg tablet 1 1/2 tablets daily 50 Tab 2    benazepril (LOTENSIN) 20 mg tablet TAKE 1 TABLET BY MOUTH DAILY FOR BLOOD PRESSURE 30 Tab 4    aspirin delayed-release 81 mg tablet Take 81 mg by mouth daily.  Cholecalciferol, Vitamin D3, (VITAMIN D3) 2,000 unit cap capsule Take 2,000 Units by mouth daily.  cyanocobalamin 1,000 mcg tablet Take 1,000 mcg by mouth daily.  montelukast (SINGULAIR) 10 mg tablet Take 10 mg by mouth daily.       levothyroxine (SYNTHROID) 150 mcg tablet TAKE 1 TABLET BY MOUTH ONCE DAILY 90 Tab 3       Past Medical History:   Diagnosis Date    Acute ischemic stroke ([facility] [address] 4/19/2017    Acute Ischemic Stroke (acute to subacute ischemia involving the posterior limb of the left internal capsule, along the lateral aspect of the left thalamus) with residual right hemiparesis, dysphagia and dysarthria    Asbestosis ([address]     Chronic obstructive pulmonary disease (COPD) ([address]     CKD stage G3a/A1, GFR 45-59 and albumin creatinine ratio <30 mg/g 5/3/2017    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 HDL 42, LDL 97    Dysphagia as late effect of cerebrovascular accident (CVA) 4/19/2017    Erectile dysfunction     Gastroesophageal reflux disease     Hemiparesis affecting right side as late effect of cerebrovascular accident (CVA) ([address] 4/19/2017    History of endogenous hypertriglyceridemia     History of malignant neoplasm of prostate     Wayland score 7     Hypertension     Hypothyroidism     Osteoarthrosis involving multiple sites     Procedure refused 4/21/2017    Speech Pathologist recommended NPO and PEG placement; Patient refused    Urinary incontinence     Male incontinence        Past Surgical History:   Procedure Laterality Date    COLONOSCOPY      HX CHOLECYSTECTOMY      HX HERNIA REPAIR Bilateral     inguinal    HX RADICAL PROSTATECTOMY  1-    perineal approach       Social History     Social History    Marital status:      Spouse name: N/A    Number of children: N/A    Years of education: N/A     Occupational History    Not on file.      Social History Main Topics    Smoking status: Never Smoker    Smokeless tobacco: Never Used    Alcohol use No    Drug use: No    Sexual activity: Yes     Partners: Female     Other Topics Concern    Not on file     Social History Narrative       Patient does have an advanced directive on file    Visit Vitals    /58 (BP 1 Location: Left arm, BP Patient Position: Sitting)    Pulse 68    Temp 98 °F (36.7 °C) (Tympanic)    Resp 16    Ht 5' 11\" (1.803 m)    Wt 189 lb (85.7 kg)    SpO2 97%    BMI 26.36 kg/m2       Physical Exam   Cardiovascular: Normal rate and regular rhythm. Exam reveals no gallop. No murmur heard. Pulmonary/Chest: He has no wheezes. He has no rales. Abdominal: Soft. He exhibits no distension. There is no tenderness. Musculoskeletal: He exhibits edema (1+ bilateral LE Edema). Neurological:   Mild right sided sided weakness. unchanged       BMI:  OK      Assessment / Plan      ICD-10-CM ICD-9-CM    1. Chronic obstructive pulmonary disease, unspecified COPD type (HCC) J44.9 496 loratadine (CLARITIN) 10 mg tablet      CBC WITH AUTOMATED DIFF      METABOLIC PANEL, BASIC      NT-PRO BNP      HEPATITIS C AB      TSH 3RD GENERATION   2. Acquired hypothyroidism E03.9 244.9 loratadine (CLARITIN) 10 mg tablet      CBC WITH AUTOMATED DIFF      METABOLIC PANEL, BASIC      NT-PRO BNP      HEPATITIS C AB      TSH 3RD GENERATION   3. CKD stage G3a/A1, GFR 45-59 and albumin creatinine ratio <30 mg/g N18.3 585.3 loratadine (CLARITIN) 10 mg tablet      CBC WITH AUTOMATED DIFF      METABOLIC PANEL, BASIC      NT-PRO BNP      HEPATITIS C AB      TSH 3RD GENERATION   4. Essential hypertension I10 401.9 loratadine (CLARITIN) 10 mg tablet      CBC WITH AUTOMATED DIFF      METABOLIC PANEL, BASIC      NT-PRO BNP      HEPATITIS C AB      TSH 3RD GENERATION   5. Encounter for hepatitis C screening test for low risk patient Z11.59 V73.89 loratadine (CLARITIN) 10 mg tablet      CBC WITH AUTOMATED DIFF      METABOLIC PANEL, BASIC      NT-PRO BNP      HEPATITIS C AB      TSH 3RD GENERATION       Labs  he was advised to continue his maintenance medications      Follow-up Disposition:  Return in about 3 months (around 6/19/2018). I asked Feliberto Garcia [facility] if he has any questions and I answered the questions. Stewart APONTE  [facility] states that he understands the treatment plan and agrees with the treatment plan

## 2018-06-07 RX ORDER — LEVOTHYROXINE SODIUM 150 UG/1
TABLET ORAL
Qty: 90 TAB | Refills: 3 | Status: SHIPPED | OUTPATIENT
Start: 2018-06-07 | End: 2018-06-25 | Stop reason: SDUPTHER

## 2018-06-25 ENCOUNTER — OFFICE VISIT (OUTPATIENT)
Dept: INTERNAL MEDICINE CLINIC | Age: 78
End: 2018-06-25

## 2018-06-25 VITALS
DIASTOLIC BLOOD PRESSURE: 68 MMHG | SYSTOLIC BLOOD PRESSURE: 130 MMHG | OXYGEN SATURATION: 97 % | TEMPERATURE: 98.1 F | RESPIRATION RATE: 16 BRPM | HEART RATE: 80 BPM | HEIGHT: 71 IN

## 2018-06-25 DIAGNOSIS — I10 ESSENTIAL HYPERTENSION: ICD-10-CM

## 2018-06-25 DIAGNOSIS — R05.9 COUGH: ICD-10-CM

## 2018-06-25 DIAGNOSIS — Z13.31 DEPRESSION SCREEN: ICD-10-CM

## 2018-06-25 DIAGNOSIS — I69.322 DYSARTHRIA AS LATE EFFECT OF CEREBROVASCULAR ACCIDENT (CVA): Primary | ICD-10-CM

## 2018-06-25 DIAGNOSIS — Z00.00 MEDICARE ANNUAL WELLNESS VISIT, SUBSEQUENT: ICD-10-CM

## 2018-06-25 DIAGNOSIS — E03.9 ACQUIRED HYPOTHYROIDISM: Chronic | ICD-10-CM

## 2018-06-25 RX ORDER — CLARITHROMYCIN 500 MG/1
TABLET, FILM COATED ORAL
Qty: 20 TAB | Refills: 0 | Status: SHIPPED | OUTPATIENT
Start: 2018-06-25 | End: 2018-09-17 | Stop reason: SDUPTHER

## 2018-06-25 RX ORDER — CLOPIDOGREL BISULFATE 75 MG/1
75 TABLET ORAL
Qty: 30 TAB | Refills: 4 | Status: SHIPPED | OUTPATIENT
Start: 2018-06-25 | End: 2018-11-29 | Stop reason: SDUPTHER

## 2018-06-25 RX ORDER — GUAIFENESIN DEXTROMETHORPHAN HYDROBROMIDE ORAL SOLUTION 10; 100 MG/5ML; MG/5ML
10 SOLUTION ORAL
COMMUNITY
End: 2018-10-17 | Stop reason: ALTCHOICE

## 2018-06-25 RX ORDER — MONTELUKAST SODIUM 10 MG/1
10 TABLET ORAL DAILY
Qty: 90 TAB | Refills: 3 | Status: SHIPPED | OUTPATIENT
Start: 2018-06-25 | End: 2019-06-03 | Stop reason: SDUPTHER

## 2018-06-25 RX ORDER — BENZONATATE 200 MG/1
CAPSULE ORAL
Qty: 30 CAP | Refills: 0 | Status: SHIPPED | OUTPATIENT
Start: 2018-06-25 | End: 2018-09-17 | Stop reason: SDUPTHER

## 2018-06-25 RX ORDER — PANTOPRAZOLE SODIUM 40 MG/1
TABLET, DELAYED RELEASE ORAL
Qty: 90 TAB | Refills: 3 | Status: SHIPPED | OUTPATIENT
Start: 2018-06-25 | End: 2018-07-06 | Stop reason: SDUPTHER

## 2018-06-25 RX ORDER — BENAZEPRIL HYDROCHLORIDE 20 MG/1
TABLET ORAL
Qty: 90 TAB | Refills: 3 | Status: SHIPPED | OUTPATIENT
Start: 2018-06-25 | End: 2018-06-26 | Stop reason: ALTCHOICE

## 2018-06-25 RX ORDER — LEVOTHYROXINE SODIUM 150 UG/1
TABLET ORAL
Qty: 90 TAB | Refills: 3 | Status: SHIPPED | OUTPATIENT
Start: 2018-06-25 | End: 2018-07-06 | Stop reason: SDUPTHER

## 2018-06-25 RX ORDER — SERTRALINE HYDROCHLORIDE 50 MG/1
TABLET, FILM COATED ORAL
Qty: 150 TAB | Refills: 2 | Status: SHIPPED | OUTPATIENT
Start: 2018-06-25 | End: 2019-02-13 | Stop reason: SDUPTHER

## 2018-06-25 NOTE — PATIENT INSTRUCTIONS
Medicare Wellness Visit, Male    The best way to improve and maintain good health is to have a healthy lifestyle by eating a well-balanced diet, exercising regularly, limiting alcohol and stopping smoking. Regular physical exams and screening tests are another way to keep healthy. Preventive exams provided by your health care provider can find health problems before they become diseases or illnesses. Preventive services including immunizations, screening tests, monitoring and exams can help you take care of your own health. Preventive services such as immunizations prevent serious infections. All people over age 72 should have a Pneumovax and a Prevnar-13 shot to prevent potentially life threatening infections with the pneumococcus bacteria, a common cause of pneumonia. These are once in a lifetime unless you and your provider decide differently. Next due: Pneumovax after 11/2018 if not previously given    All people over 65 should have a yearly influenza vaccine or \"flu\" shot. This does not prevent infection with cold viruses but has been proven to prevent hospitalization and death from influenza. Next due: Fall    Although Medicare part B \"regular Medicare\" currently only covers tetanus vaccination in the context of an injury, a tetanus vaccine (Tdap or Td) is recommended every 10 years. Tdap is generally given once in a lifetime for older adults. Next due: Tdap    A shingles vaccine is recommended as you get older. Zostavax is a once in a lifetime vaccine given over age 61years of age. There is also a new shingles vaccine, Shingrix, that is now preferred over Zostavax. Shingrix (a 2-dose series) is recommended if you are over age 48years of age and you've never received a shingles vaccine. It is also recommended for revaccination if you've previously received Zostavax. The Shingles vaccines are not covered by Medicare part B.  Next due: Shingrix    Note, however, that both the Shingles vaccine and Tdap/Td are generally covered by secondary carriers. Please check your coverage and out of pocket expenses. Your pharmacy benefits may cover these vaccines so please check with your pharmacist.  Also consider contacting your local health department because it may stock these vaccines for a reasonable charge. We currently have documentation of the following immunization history for you:  Immunization History   Administered Date(s) Administered    Influenza High Dose Vaccine PF 11/16/2017    Influenza Vaccine (Quad) PF 10/31/2016    Pneumococcal Conjugate (PCV-13) 11/16/2017       Screening for diabetes mellitus with a blood sugar test (glucose) should be done every year. If you have diabetes, this monitoring will be done more frequently (usually every 3-6 months) and will include A1C testing. The most recent blood glucose we have on file for you is:   Lab Results   Component Value Date/Time    Glucose 115 (H) 03/19/2018 02:01 PM       Glaucoma is a disease of the eye due to increased ocular pressure that can lead to blindness. People with risk factors for glaucoma ( race,  American race, diabetes, family history) should be screened every year by an eye professional.   Last done: 2018   Next due: 2019 per Dr. Adriel Medrano    Cardiovascular screening tests that check for elevated lipids or cholesterol (fatty part of blood) which can lead to heart disease and strokes should be done every 5 years.   The most recent lipid panel we have on file for you is:   Lab Results   Component Value Date/Time    Cholesterol, total 191 11/02/2017 02:00 PM    HDL Cholesterol 37 (L) 11/02/2017 02:00 PM    LDL, calculated 84.8 11/02/2017 02:00 PM    VLDL, calculated 69.2 11/02/2017 02:00 PM    Triglyceride 346 (H) 11/02/2017 02:00 PM    CHOL/HDL Ratio 5.2 (H) 11/02/2017 02:00 PM     Next due: every 2-5 years or per Dr. Shankar Lopez    Colorectal cancer screening that evaluates for blood or polyps in your colon for people with average risk should be done yearly as a stool test, every five years as a flexible sigmoidoscope or every 10 years as a colonoscopy up to age 76. You and your health care provider may decide whether to continue screening after age 76 or if you need to be screened more frequently. Last done:  8/12/2011  Next due: 2021 if appropriate for health per Dr. Hernandez Stager    Men up to age 76 may elect to screen for prostate cancer with a blood test called a PSA at certain intervals, depending on your personal and family history. This decision is between you and your provider. The most recent PSA values we have on file for you are:  Lab Results   Component Value Date/Time    Prostate Specific Ag <0.1 11/02/2017 02:00 PM    Prostate Specific Ag <0.1 08/04/2016 02:10 PM    Prostate Specific Ag <0.1 02/23/2012 10:39 AM    Prostate Specific Ag <0.1 ng/mL 02/24/2011       Screening for infection with Hepatitis C is recommended for anyone born between [birth year] through 1967. People who are between age 54and [de-identified]years of age and have smoked the equivalent of 1 pack per day for 30 years or more may benefit from screening for lung cancer with a yearly low dose CT scan until they have been non smokers for 15 years. Please ask your health care provider if you have any questions. Your Medicare Wellness Exam is recommended annually.     Here is a list of your current Health Maintenance items with a due date:  Health Maintenance Due   Topic Date Due    DTaP/Tdap/Td  (1 - Tdap) 08/01/1961    Shingles Vaccine  06/01/1998    Glaucoma Screening   08/01/2003    Annual Well Visit  06/06/2018

## 2018-06-25 NOTE — PROGRESS NOTES
1. Have you been to the ER, urgent care clinic since your last visit? Hospitalized since your last visit? No    2. Have you seen or consulted any other health care providers outside of the [address] [place] [address] since your last visit? Include any pap smears or colon screening.  No

## 2018-06-25 NOTE — MR AVS SNAPSHOT
[address] 
 
 
 [address], [address] [facility] [address] 
[phone] Patient: Carolynn Salcedo MRN: [id number] COV:6/9/0346 Visit Information Date & Time Provider Department Dept. Phone Encounter #  
 6/25/2018  9:30 AM Debbie Glover MD [facility] INTERNAL MEDICINE OF [facility] [phone] [id number] Your Appointments 9/17/2018  9:00 AM  
Follow Up with Debbie Glover MD  
[address] [facility]) Appt Note: 3mo  
 [address], [address] [facility] [address]  
  
   
 [address], [address] [facility] [address] Upcoming Health Maintenance Date Due DTaP/Tdap/Td series (1 - Tdap) 8/1/1959 ZOSTER VACCINE AGE 60> 6/1/1998 GLAUCOMA SCREENING Q2Y 8/1/2003 MEDICARE YEARLY EXAM 6/6/2018 Influenza Age 5 to Adult 8/1/2018 Pneumococcal 65+ Low/Medium Risk (2 of 2 - PPSV23) 11/16/2018 Allergies as of 6/25/2018  Review Complete On: 6/25/2018 By: Debbie Glover MD  
  
 Severity Noted Reaction Type Reactions Pcn [Penicillins]    Rash Current Immunizations  Reviewed on 6/22/2018 Name Date Influenza High Dose Vaccine PF 11/16/2017 Influenza Vaccine (Quad) PF 10/31/2016 Pneumococcal Conjugate (PCV-13) 11/16/2017 12:54 PM  
  
 Not reviewed this visit You Were Diagnosed With   
  
 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 
ICD-9-CM: 401.9 Cough     ICD-10-CM: R05 ICD-9-CM: 041. 2 Vitals BP Pulse Temp Resp Height(growth percentile) SpO2  
 130/68 (BP 1 Location: Right arm, BP Patient Position: Sitting) 80 98.1 °F (36.7 °C) (Tympanic) 16 5' 11\" (1.803 m) 97% Smoking Status Never Smoker Vitals History Preferred Pharmacy Pharmacy Name Phone [address] [phone] Your Updated Medication List  
  
   
This list is accurate as of 18 11:33 AM.  Always use your most recent med list.  
  
  
  
  
 aspirin delayed-release 81 mg tablet Take 81 mg by mouth daily. benazepril 20 mg tablet Commonly known as:  LOTENSIN  
TAKE 1 TABLET BY MOUTH DAILY FOR BLOOD PRESSURE  
  
 benzonatate 200 mg capsule Commonly known as:  TESSALON  
1 cap three times per day as needed for cough Cholecalciferol (Vitamin D3) 2,000 unit Cap capsule Commonly known as:  VITAMIN D3 Take 2,000 Units by mouth daily. clarithromycin 500 mg tablet Commonly known as:  BIAXIN  
1 tab twice per day with food CLARITIN 10 mg tablet Generic drug:  loratadine Take 10 mg by mouth daily as needed. clopidogrel 75 mg Tab Commonly known as:  PLAVIX Take 1 Tab by mouth daily (with dinner). Indications: Cerebral Thromboembolism Prevention  
  
 cyanocobalamin 1,000 mcg tablet Take 1,000 mcg by mouth daily. guaiFENesin-dextromethorphan  mg/5 mL Liqd Commonly known as:  TUSSI-ORGANIDIN DM Take 10 mL by mouth three (3) times daily as needed for Cough. levothyroxine 150 mcg tablet Commonly known as:  SYNTHROID  
TAKE 1 TABLET BY MOUTH ONCE DAILY  
  
 montelukast 10 mg tablet Commonly known as:  SINGULAIR Take 1 Tab by mouth daily. pantoprazole 40 mg tablet Commonly known as:  PROTONIX  
TAKE 1 TABLET BY MOUTH ONCE DAILY  
  
 sertraline 50 mg tablet Commonly known as:  ZOLOFT  
1 1/2 tablets daily Prescriptions Sent to Pharmacy Refills  
 clarithromycin (BIAXIN) 500 mg tablet 0 Si tab twice per day with food Class: Normal  
 Pharmacy: [id number] [place] [address], [address] [facility] Ph #: [phone] benzonatate (TESSALON) 200 mg capsule 0 Si cap three times per day as needed for cough Class: Normal  
 [facility] [address] [facility], [address] Ph #: [phone]  
 levothyroxine (SYNTHROID) 150 mcg tablet 3 Sig: TAKE 1 TABLET BY MOUTH ONCE DAILY Class: Normal  
 Pharmacy: [address] Ph #: [phone]  
 pantoprazole (PROTONIX) 40 mg tablet 3 Sig: TAKE 1 TABLET BY MOUTH ONCE DAILY Class: Normal  
 Pharmacy: [address] Ph #: [phone]  
 clopidogrel (PLAVIX) 75 mg tab 4 Sig: Take 1 Tab by mouth daily (with dinner). Indications: Cerebral Thromboembolism Prevention Class: Normal  
 Pharmacy: [address] [place] [address] Ph #: [phone] Route: Oral  
 sertraline (ZOLOFT) 50 mg tablet 2 Si 1/2 tablets daily Class: Normal  
 Pharmacy: [address] Ph #: [phone]  
 benazepril (LOTENSIN) 20 mg tablet 3 Sig: TAKE 1 TABLET BY MOUTH DAILY FOR BLOOD PRESSURE Class: Normal  
 Pharmacy: [address] Ph #: [phone]  
 montelukast (SINGULAIR) 10 mg tablet 3 Sig: Take 1 Tab by mouth daily. Class: Normal  
 Pharmacy: [address] [place] [address] Ph #: [phone] Route: Oral  
  
To-Do List   
 2018 Imaging:  XR CHEST PA LAT Patient Instructions Medicare Wellness Visit, Male The best way to improve and maintain good health is to have a healthy lifestyle by eating a well-balanced diet, exercising regularly, limiting alcohol and stopping smoking.  
 
Regular physical exams and screening tests are another way to keep healthy. Preventive exams provided by your health care provider can find health problems before they become diseases or illnesses. Preventive services including immunizations, screening tests, monitoring and exams can help you take care of your own health. Preventive services such as immunizations prevent serious infections. All people over age 72 should have a Pneumovax and a Prevnar-13 shot to prevent potentially life threatening infections with the pneumococcus bacteria, a common cause of pneumonia. These are once in a lifetime unless you and your provider decide differently. Next due: Pneumovax after 11/2018 if not previously given All people over 65 should have a yearly influenza vaccine or \"flu\" shot. This does not prevent infection with cold viruses but has been proven to prevent hospitalization and death from influenza. Next due: Fall Although Medicare part B \"regular Medicare\" currently only covers tetanus vaccination in the context of an injury, a tetanus vaccine (Tdap or Td) is recommended every 10 years. Tdap is generally given once in a lifetime for older adults. Next due: Tdap A shingles vaccine is recommended as you get older. Zostavax is a once in a lifetime vaccine given over age 61years of age. There is also a new shingles vaccine, Shingrix, that is now preferred over Zostavax. Shingrix (a 2-dose series) is recommended if you are over age 48years of age and you've never received a shingles vaccine. It is also recommended for revaccination if you've previously received Zostavax. The Shingles vaccines are not covered by Medicare part B. Next due: Shingrix Note, however, that both the Shingles vaccine and Tdap/Td are generally covered by secondary carriers. Please check your coverage and out of pocket expenses.  Your pharmacy benefits may cover these vaccines so please check with your pharmacist.  Also consider contacting your local health department because it may stock these vaccines for a reasonable charge. We currently have documentation of the following immunization history for you: 
Immunization History Administered Date(s) Administered  Influenza High Dose Vaccine PF 11/16/2017  Influenza Vaccine (Quad) PF 10/31/2016  Pneumococcal Conjugate (PCV-13) 11/16/2017 Screening for diabetes mellitus with a blood sugar test (glucose) should be done every year. If you have diabetes, this monitoring will be done more frequently (usually every 3-6 months) and will include A1C testing. The most recent blood glucose we have on file for you is:  
Lab Results Component Value Date/Time Glucose 115 (H) 03/19/2018 02:01 PM  
 
 
Glaucoma is a disease of the eye due to increased ocular pressure that can lead to blindness. People with risk factors for glaucoma ( race,  American race, diabetes, family history) should be screened every year by an eye professional.  
Last done: 2018   Next due: 2019 per Dr. Dennis Brenner Cardiovascular screening tests that check for elevated lipids or cholesterol (fatty part of blood) which can lead to heart disease and strokes should be done every 5 years. The most recent lipid panel we have on file for you is:  
Lab Results Component Value Date/Time Cholesterol, total 191 11/02/2017 02:00 PM  
 HDL Cholesterol 37 (L) 11/02/2017 02:00 PM  
 LDL, calculated 84.8 11/02/2017 02:00 PM  
 VLDL, calculated 69.2 11/02/2017 02:00 PM  
 Triglyceride 346 (H) 11/02/2017 02:00 PM  
 CHOL/HDL Ratio 5.2 (H) 11/02/2017 02:00 PM  
 
Next due: every 2-5 years or per Dr. Frida Rehman Colorectal cancer screening that evaluates for blood or polyps in your colon for people with average risk should be done yearly as a stool test, every five years as a flexible sigmoidoscope or every 10 years as a colonoscopy up to age 76.  You and your health care provider may decide whether to continue screening after age 76 or if you need to be screened more frequently. Last done:  8/12/2011 Next due: 2021 if appropriate for health per Dr. Estela Bardales Men up to age 76 may elect to screen for prostate cancer with a blood test called a PSA at certain intervals, depending on your personal and family history. This decision is between you and your provider. The most recent PSA values we have on file for you are: 
Lab Results Component Value Date/Time  
 Prostate Specific Ag <0.1 11/02/2017 02:00 PM  
 Prostate Specific Ag <0.1 08/04/2016 02:10 PM  
 Prostate Specific Ag <0.1 02/23/2012 10:39 AM  
 Prostate Specific Ag <0.1 ng/mL 02/24/2011 Screening for infection with Hepatitis C is recommended for anyone born between [birth year] through [address]. People who are between age 54and [de-identified]years of age and have smoked the equivalent of 1 pack per day for 30 years or more may benefit from screening for lung cancer with a yearly low dose CT scan until they have been non smokers for 15 years. Please ask your health care provider if you have any questions. Your Medicare Wellness Exam is recommended annually. Here is a list of your current Health Maintenance items with a due date: 
Health Maintenance Due Topic Date Due  
 DTaP/Tdap/Td  (1 - Tdap) 08/01/1959  Shingles Vaccine  06/01/1998  Glaucoma Screening   08/01/2003 [facility] Annual Well Visit  06/06/2018 Introducing [facility]! Jessica Hobson introduces [employer] patient portal. Now you can access parts of your medical record, email your doctor's office, and request medication refills online. 1. In your internet browser, go to [email or web address] 2. Click on the First Time User? Click Here link in the Sign In box. You will see the New Member Sign Up page. 3. Enter your [employer] Access Code exactly as it appears below. You will not need to use this code after youve completed the sign-up process.  If you do not sign up before the expiration date, you must request a new code. · [employer] Access Code: [email or web address] Expires: 9/23/2018 11:33 AM 
 
4. Enter the last four digits of your Social Security Number (xxxx) and Date of Birth (mm/dd/yyyy) as indicated and click Submit. You will be taken to the next sign-up page. 5. Create a [employer] ID. This will be your [employer] login ID and cannot be changed, so think of one that is secure and easy to remember. 6. Create a [employer] password. You can change your password at any time. 7. Enter your Password Reset Question and Answer. This can be used at a later time if you forget your password. 8. Enter your e-mail address. You will receive e-mail notification when new information is available in [address] 9. Click Sign Up. You can now view and download portions of your medical record. 10. Click the Download Summary menu link to download a portable copy of your medical information. If you have questions, please visit the Frequently Asked Questions section of the [employer] website. Remember, [employer] is NOT to be used for urgent needs. For medical emergencies, dial 911. Now available from your iPhone and Android! Please provide this summary of care documentation to your next provider. Your primary care clinician is listed as Geraldine Sheets. If you have any questions after today's visit, please call [phone].

## 2018-06-25 NOTE — PROGRESS NOTES
Dr. Sunshine Martins  referred Alison Esteban (1940) a 76 y.o. male for a P[place]ito Visit (Carine Lester). Patient is accompanied by his daughter-in-law, Gregory. This is a Subsequent Medicare Annual Wellness Visit providing Personalized Prevention Plan Services (PPPS) (Performed 12 months after initial AWV and PPPS )    I have reviewed the patient's medical history in detail and updated the computerized patient record. 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  HDL 42, LDL 97    Dysphagia as late effect of cerebrovascular accident (CVA) 4/19/2017    Erectile dysfunction     Gastroesophageal reflux disease     Hemiparesis affecting right side as late effect of cerebrovascular accident (CVA) ([address] 4/19/2017    History of endogenous hypertriglyceridemia     History of malignant neoplasm of prostate     Genoveva score 7     Hypertension     Hypothyroidism     Osteoarthrosis involving multiple sites     Procedure refused 4/21/2017    Speech Pathologist recommended NPO and PEG placement; Patient refused    Urinary incontinence     Male incontinence       Past Surgical History:   Procedure Laterality Date    COLONOSCOPY      HX CHOLECYSTECTOMY      HX HERNIA REPAIR Bilateral     inguinal    HX RADICAL PROSTATECTOMY  1-    perineal approach     Current Outpatient Prescriptions   Medication Sig Dispense Refill    levothyroxine (SYNTHROID) 150 mcg tablet TAKE 1 TABLET BY MOUTH ONCE DAILY 90 Tab 3    loratadine (CLARITIN) 10 mg tablet Take 10 mg by mouth.  pantoprazole (PROTONIX) 40 mg tablet TAKE 1 TABLET BY MOUTH ONCE DAILY 90 Tab 3    clopidogrel (PLAVIX) 75 mg tab Take 1 Tab by mouth daily (with dinner). Indications: Cerebral Thromboembolism Prevention 30 Tab 4    sertraline (ZOLOFT) 50 mg tablet 1 1/2 tablets daily 50 Tab 2    benazepril (LOTENSIN) 20 mg tablet TAKE 1 TABLET BY MOUTH DAILY FOR BLOOD PRESSURE 30 Tab 4    aspirin delayed-release 81 mg tablet Take 81 mg by mouth daily.  Cholecalciferol, Vitamin D3, (VITAMIN D3) 2,000 unit cap capsule Take 2,000 Units by mouth daily.  cyanocobalamin 1,000 mcg tablet Take 1,000 mcg by mouth daily.  montelukast (SINGULAIR) 10 mg tablet Take 10 mg by mouth daily.  gabapentin (NEURONTIN) 100 mg capsule 1 cap in AM, 1 cap mid day, 2 caps at bedtime 120 Cap 1    polyethylene glycol (MIRALAX) 17 gram/dose powder Take 17 g by mouth daily.  14 g 0     Allergies   Allergen Reactions    Pcn [Penicillins] Rash     Family History   Problem Relation Age of Onset    Hypertension Father     Heart Disease Father     Alzheimer Mother      Social History   Substance Use Topics    Smoking status: Never Smoker    Smokeless tobacco: Never Used    Alcohol use No     Patient Active Problem List   Diagnosis Code    Urinary incontinence R32    History of malignant neoplasm of prostate Z85.46    Hypothyroidism E03.9    Chronic obstructive pulmonary disease (COPD) ([facility] [address] J44.9    Asbestosis ([facility] [address] J61    Osteoarthrosis involving multiple sites M15.9    History of endogenous hypertriglyceridemia Z86.39    Impaired mobility and ADLs Z74.09    Dyslipidemia (high LDL; low HDL) E78.5    Gastroesophageal reflux disease K21.9    Hemiparesis affecting right side as late effect of cerebrovascular accident (CVA) ([facility] [address] I69.351    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) H20.419    Procedure refused Z48.34    CKD stage G3a/A1, GFR 45-59 and albumin creatinine ratio <30 mg/g N18.3    Essential hypertension I10    Advance directive discussed with patient Z70.80    Hypokalemia E87.6    Nausea and vomiting R11.2    Diverticulitis of large intestine without perforation or abscess without bleeding K57.32    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 Alcohol Risk Factor Screening: You do not drink alcohol or very rarely. Functional Ability and Level of Safety:     Hearing Loss   Hearing is good.     Activities of Daily Living   The home contains: handrails and grab bars, handicapped accessible shower, toilet, doorways, etc.  Patient needs help with:  managing medications - patient's granddaughter who is a nurse fills pillbox    ADL Assessment 6/25/2018   Feeding yourself No Help Needed   Getting from bed to chair No Help Needed   Getting dressed No Help Needed   Bathing or showering No Help Needed   Walk across the room (includes cane/walker) No Help Needed   Using the telphone No Help Needed   Taking your medications Help Needed   Preparing meals No Help Needed   Managing money (expenses/bills) No Help Needed   Moderately strenuous housework (laundry) No Help Needed   Shopping for personal items (toiletries/medicines) No Help Needed   Shopping for groceries No Help Needed   Driving No Help Needed   Climbing a flight of stairs Help Needed   Getting to places beyond walking distances Help Needed         Fall Risk     Fall Risk Assessment, last 12 mths 6/25/2018   Able to walk? Yes   Fall in past 12 months? Yes   Fall with injury? Yes   Number of falls in past 12 months 2   Fall Risk Score 3     Patient with right sided weakness post stroke. Has been to PT/OT but still occasionally trips while walking. Injuries included only brusing which patient attributes to clopidogrel/aspirin. Patient and family well educated on falls prevention strategies. Abuse Screen   Patient is not abused    No flowsheet data found. Cognitive Screening   Evaluation of Cognitive Function:  Has your family/caregiver stated any concerns about your memory: no, patient and daughter-in-law state memory is consistent with age - rare difficulty recalling people's names immediately but comes to patient in a few minutes  Normal, per patient and close family member  Mood/affect:  neutral  Appearance: age appropriate, casually dressed and within normal Limits  Family member/caregiver input: daughter-in-law present and provided some medical information    Physical Examination     No exam data present  Visit Vitals    /68 (BP 1 Location: Right arm, BP Patient Position: Sitting)    Pulse 80    Temp 98.1 °F (36.7 °C) (Tympanic)    Resp 16    Ht 5' 11\" (1.803 m)    SpO2 97%       No exam performed by pharmacist today, not required for Medicare Annual Wellness Visit. Patient to be seen by Dr. Dmitry Bedolla separately.     Patient Care Team     Patient Care Team:  Dmitry Bedolla MD as PCP - General (Internal Medicine)  Lita Navarro MD as Physician (Pulmonary Disease)  Scar Moreno MD as Physician (Ophthalmology)  Angeles Alaniz, RN as Ambulatory Care Navigator    Assessment/Plan     Education and counseling provided:  Are appropriate based on today's review and evaluation  Pneumococcal Vaccine  Prostate cancer screening tests (PSA, covered annually)  Colorectal cancer screening tests  Cardiovascular screening blood test  Screening for glaucoma  Diabetes screening test  Tdap / Zoster vaccination recommendations    Diagnoses and all orders for this visit:    1. 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 Acquired hypothyroidism    3. Essential hypertension         4. Medication Reconciliation: Performed today. Patient did bring some of his medications to the visit. During the patient interview, the following changes were made:    Discontinued: Miralax, Neurontin   Additions: guaifenesin DM (persistent cough associated with swallowing difficulties - worse with cold substances, started after his  stroke last year- of note benazepril started May 2017 - possible contributor/cause of cough, will discuss with Dr. Cehlsea Garner and defer  to his assessment - may be worth trial off ACEI/switch to ARB)   Changes / other discrepancies: clarified loratadine dosing    Medications Discontinued During This Encounter   Medication Reason    gabapentin (NEURONTIN) 100 mg capsule Not A Current Medication    polyethylene glycol (MIRALAX) 17 gram/dose powder Not A Current Medication    levothyroxine (SYNTHROID) 150 mcg tablet Reorder    pantoprazole (PROTONIX) 40 mg tablet Reorder    clopidogrel (PLAVIX) 75 mg tab Reorder    sertraline (ZOLOFT) 50 mg tablet Reorder    benazepril (LOTENSIN) 20 mg tablet Reorder    montelukast (SINGULAIR) 10 mg tablet Reorder       5.  Screenings and Immunizations (see patient instructions for chart/information):  PSA: Up to date 11/2/2017, due for repeat per Dr. Chelsea Garner  Colonoscopy: Up to date 8/12/2011, due for repeat in 2021 per Dr. Mariya Elizalde if appropriate for health status at that time  Glaucoma screening/Eye exam: Up to date 2018 per daughter-in-law, due for repeat in 2019 per Dr. Mili Clark panel: Up to date 11/2/2017, due for repeat at least every 2-5 years or per PCP  Diabetes: Up to date 3/19/2018 elevated, due for repeat every 6-12 months due to elevations over past year, recommend rechecking A1C (5.9% in April 2017) but will defer to PCP     Immunizations: Patient confirmed the following records of vaccinations are correct and current. Immunization History   Administered Date(s) Administered    Influenza High Dose Vaccine PF 11/16/2017    Influenza Vaccine (Quad) PF 10/31/2016    Pneumococcal Conjugate (PCV-13) 11/16/2017       Pneumococcal:  Recommended that patient receive /PPSV23 here or at his pharmacy after 11/2018 (one year after Prevnar). Influenza:  Recommended that patient receive here or at his pharmacy in Fall. Zoster:  Discussed new recommendations for Shingrix vaccination. Recommended that patient receive at his pharmacy and have pharmacy records faxed to the office. Tdap:  Recommended that patient receive at the office with a signed waiver, at the Health Department or at his pharmacy and have pharmacy records faxed to the office. 6. Advanced Care Planning: Patient currently has advanced directives on file. Confirmed that scanned copy is still most current. Patient verbalized understanding of information presented. Answered all of the patient's questions. AVS information was reviewed with patient and will be printed on checkout.     Tracey Kidd, PharmD, BCACP    Health Maintenance Due   Topic Date Due    DTaP/Tdap/Td series (1 - Tdap) 08/01/1959    ZOSTER VACCINE AGE 60>  06/01/1998    GLAUCOMA SCREENING Q2Y  08/01/2003    MEDICARE YEARLY EXAM  06/06/2018

## 2018-06-25 NOTE — PROGRESS NOTES
The patient presents to the office today with the chief complaint of cough    HPI    The patient continues with dysarthria secondary to an old CVA. The patient symptoms are stable. He is still able to function at home with assist of his family who do not live there. The patient remains on medications for hypertension. He is tolerating the medications well but he complains of a chronic cough. The cough is not productive. There is no sinus drainge. There is no dyspnea. The patient remains on Synthroid for hypothyroidism. His TSH is borderline low. The patient complains of several months a cough. The cough arises from the mid throat. Review of Systems   Respiratory: Negative for shortness of breath. Cardiovascular: Positive for leg swelling. Negative for chest pain. Allergies   Allergen Reactions    Pcn [Penicillins] Rash       Current Outpatient Prescriptions   Medication Sig Dispense Refill    telmisartan (MICARDIS) 40 mg tablet 1 tablet daily (stop Benazepril) 90 Tab 3    guaiFENesin-dextromethorphan (TUSSI-ORGANIDIN DM)  mg/5 mL liqd Take 10 mL by mouth three (3) times daily as needed for Cough.  clarithromycin (BIAXIN) 500 mg tablet 1 tab twice per day with food 20 Tab 0    benzonatate (TESSALON) 200 mg capsule 1 cap three times per day as needed for cough 30 Cap 0    levothyroxine (SYNTHROID) 150 mcg tablet TAKE 1 TABLET BY MOUTH ONCE DAILY 90 Tab 3    pantoprazole (PROTONIX) 40 mg tablet TAKE 1 TABLET BY MOUTH ONCE DAILY 90 Tab 3    clopidogrel (PLAVIX) 75 mg tab Take 1 Tab by mouth daily (with dinner). Indications: Cerebral Thromboembolism Prevention 30 Tab 4    sertraline (ZOLOFT) 50 mg tablet 1 1/2 tablets daily 150 Tab 2    montelukast (SINGULAIR) 10 mg tablet Take 1 Tab by mouth daily. 90 Tab 3    loratadine (CLARITIN) 10 mg tablet Take 10 mg by mouth daily as needed.  aspirin delayed-release 81 mg tablet Take 81 mg by mouth daily.       Cholecalciferol, Vitamin D3, (VITAMIN D3) 2,000 unit cap capsule Take 2,000 Units by mouth daily.  cyanocobalamin 1,000 mcg tablet Take 1,000 mcg by mouth daily. 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 HDL 42, LDL 97    Dysphagia as late effect of cerebrovascular accident (CVA) 4/19/2017    Erectile dysfunction     Gastroesophageal reflux disease     Hemiparesis affecting right side as late effect of cerebrovascular accident (CVA) ([facility] [address] 4/19/2017    History of endogenous hypertriglyceridemia     History of malignant neoplasm of prostate     Genoveva score 7     Hypertension     Hypothyroidism     Osteoarthrosis involving multiple sites     Procedure refused 4/21/2017    Speech Pathologist recommended NPO and PEG placement; Patient refused    Urinary incontinence     Male incontinence        Past Surgical History:   Procedure Laterality Date    COLONOSCOPY  08/12/2011    HX CHOLECYSTECTOMY      HX HERNIA REPAIR Bilateral     inguinal    HX RADICAL PROSTATECTOMY  1-    perineal approach       Social History     Social History    Marital status:      Spouse name: N/A    Number of children: N/A    Years of education: N/A     Occupational History    Not on file.      Social History Main Topics    Smoking status: Never Smoker    Smokeless tobacco: Never Used    Alcohol use No    Drug use: No    Sexual activity: Yes Partners: Female     Other Topics Concern    Not on file     Social History Narrative       Patient does have an advanced directive on file    Visit Vitals    /68 (BP 1 Location: Right arm, BP Patient Position: Sitting)    Pulse 80    Temp 98.1 °F (36.7 °C) (Tympanic)    Resp 16    Ht 5' 11\" (1.803 m)    SpO2 97%       Physical Exam   No Cervical Lymphadenopathy  No Supraclavicular Lymphadenopathy  Thyroid is Normal  Lungs are normal to percussion. Clear to auscultation   Heart:  S1 S2 are normal, No gallops, No mummers  No Carotid Bruits  Abdomen:  Normal Bowel Sounds. No tenderness. No masses. No Hepatomegaly or Splenomegly  LE:  Strong Pedal Pulses. No Edema      BMI:  OK        . No results found for any visits on 06/25/18. Assessment / Plan      ICD-10-CM ICD-9-CM    1. 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 Acquired hypothyroidism E03.9 244.9    3. Essential hypertension I10 401.9    4. Cough R05 786.2 XR CHEST PA LAT   5. Medicare annual wellness visit, subsequent Z00.00 V70.0    6. Depression screen Z13.89 V79.0        he was advised to continue his maintenance medications - will switch off and ACE to an ARB    Follow-up Disposition:  Return in about 4 months (around 10/25/2018). I asked Gisele Brand [employer] if he has any questions and I answered the questions. Stewart Brand [employer] states that he understands the treatment plan and agrees with the treatment plan

## 2018-06-25 NOTE — ACP (ADVANCE CARE PLANNING)
Non-Provider Advance Care Planning (ACP) Note    Date of ACP Conversation: 6/25/2018  Persons included in Conversation: patient and family  Length of ACP Conversation in minutes: <16 minutes (Non-Billable)    Conversation requested by: Other: included in Medicare AWV discussion      Authorized Decision Maker (if patient is incapable of making informed decisions): This person is:  Healthcare Agent/Medical Power of  under Advance Directive    General ACP for ALL Patients with Decision Making Capacity:    Review of Existing Advance Directive: (Select questions covered)  Does this advance directive still reflect your preferences?   Yes    Interventions Provided:  Recommended communicating the plan and providing copies for the healthcare agent, personal physician, and others as appropriate

## 2018-06-26 ENCOUNTER — TELEPHONE (OUTPATIENT)
Dept: INTERNAL MEDICINE CLINIC | Age: 78
End: 2018-06-26

## 2018-06-26 ENCOUNTER — HOSPITAL ENCOUNTER (OUTPATIENT)
Dept: GENERAL RADIOLOGY | Age: 78
Discharge: HOME | End: 2018-06-26
Payer: MEDICARE

## 2018-06-26 DIAGNOSIS — R05.9 COUGH: ICD-10-CM

## 2018-06-26 PROCEDURE — 71046 X-RAY EXAM CHEST 2 VIEWS: CPT

## 2018-06-26 RX ORDER — TELMISARTAN 40 MG/1
TABLET ORAL
Qty: 90 TAB | Refills: 3 | Status: SHIPPED | OUTPATIENT
Start: 2018-06-26 | End: 2019-06-03 | Stop reason: SDUPTHER

## 2018-07-06 ENCOUNTER — TELEPHONE (OUTPATIENT)
Dept: INTERNAL MEDICINE CLINIC | Age: 78
End: 2018-07-06

## 2018-07-06 RX ORDER — PANTOPRAZOLE SODIUM 40 MG/1
TABLET, DELAYED RELEASE ORAL
Qty: 90 TAB | Refills: 3 | Status: SHIPPED | OUTPATIENT
Start: 2018-07-06 | End: 2019-07-09 | Stop reason: SDUPTHER

## 2018-07-06 RX ORDER — LEVOTHYROXINE SODIUM 150 UG/1
TABLET ORAL
Qty: 90 TAB | Refills: 3 | Status: SHIPPED | OUTPATIENT
Start: 2018-07-06 | End: 2019-07-16 | Stop reason: SDUPTHER

## 2018-07-06 RX ORDER — FUROSEMIDE 20 MG/1
TABLET ORAL
Qty: 12 TAB | Refills: 1 | Status: SHIPPED | OUTPATIENT
Start: 2018-07-06 | End: 2018-08-31 | Stop reason: SDUPTHER

## 2018-07-07 NOTE — TELEPHONE ENCOUNTER
Patient on Micardis for five days. Still with a cough. BP up to 197 systolic. Will add Lasix to see if BP drops. Follow cough for one more week.

## 2018-08-31 RX ORDER — FUROSEMIDE 20 MG/1
TABLET ORAL
Qty: 12 TAB | Refills: 1 | Status: SHIPPED | OUTPATIENT
Start: 2018-08-31 | End: 2018-09-17 | Stop reason: ALTCHOICE

## 2018-09-17 ENCOUNTER — HOSPITAL ENCOUNTER (OUTPATIENT)
Dept: LAB | Age: 78
Discharge: HOME | End: 2018-09-17
Payer: MEDICARE

## 2018-09-17 ENCOUNTER — OFFICE VISIT (OUTPATIENT)
Dept: INTERNAL MEDICINE CLINIC | Age: 78
End: 2018-09-17

## 2018-09-17 VITALS
WEIGHT: 200 LBS | SYSTOLIC BLOOD PRESSURE: 132 MMHG | HEART RATE: 65 BPM | BODY MASS INDEX: 28 KG/M2 | DIASTOLIC BLOOD PRESSURE: 78 MMHG | HEIGHT: 71 IN | OXYGEN SATURATION: 97 % | TEMPERATURE: 97.5 F

## 2018-09-17 DIAGNOSIS — I69.322 DYSARTHRIA AS LATE EFFECT OF CEREBROVASCULAR ACCIDENT (CVA): ICD-10-CM

## 2018-09-17 DIAGNOSIS — I10 ESSENTIAL HYPERTENSION: Primary | ICD-10-CM

## 2018-09-17 DIAGNOSIS — E03.9 ACQUIRED HYPOTHYROIDISM: ICD-10-CM

## 2018-09-17 DIAGNOSIS — I10 ESSENTIAL HYPERTENSION: ICD-10-CM

## 2018-09-17 DIAGNOSIS — R42 DIZZINESS: ICD-10-CM

## 2018-09-17 LAB
ALBUMIN SERPL-MCNC: 3.9 G/DL (ref 3.4–5)
ALBUMIN/GLOB SERPL: 1.1 {RATIO} (ref 0.8–1.7)
ALP SERPL-CCNC: 76 U/L (ref 45–117)
ALT SERPL-CCNC: 20 U/L (ref 16–61)
ANION GAP SERPL CALC-SCNC: 11 MMOL/L (ref 3–18)
AST SERPL-CCNC: 19 U/L (ref 15–37)
BASOPHILS # BLD: 0 K/UL (ref 0–0.1)
BASOPHILS NFR BLD: 0 % (ref 0–2)
BILIRUB SERPL-MCNC: 0.5 MG/DL (ref 0.2–1)
BUN SERPL-MCNC: 27 MG/DL (ref 7–18)
BUN/CREAT SERPL: 18 (ref 12–20)
CALCIUM SERPL-MCNC: 9.5 MG/DL (ref 8.5–10.1)
CHLORIDE SERPL-SCNC: 97 MMOL/L (ref 100–108)
CO2 SERPL-SCNC: 25 MMOL/L (ref 21–32)
CREAT SERPL-MCNC: 1.5 MG/DL (ref 0.6–1.3)
DIFFERENTIAL METHOD BLD: ABNORMAL
EOSINOPHIL # BLD: 0.3 K/UL (ref 0–0.4)
EOSINOPHIL NFR BLD: 4 % (ref 0–5)
ERYTHROCYTE [DISTWIDTH] IN BLOOD BY AUTOMATED COUNT: 15.6 % (ref 11.6–14.5)
GLOBULIN SER CALC-MCNC: 3.5 G/DL (ref 2–4)
GLUCOSE SERPL-MCNC: 96 MG/DL (ref 74–99)
HCT VFR BLD AUTO: 44.7 % (ref 36–48)
HGB BLD-MCNC: 15.3 G/DL (ref 13–16)
LYMPHOCYTES # BLD: 2.3 K/UL (ref 0.9–3.6)
LYMPHOCYTES NFR BLD: 27 % (ref 21–52)
MCH RBC QN AUTO: 28.7 PG (ref 24–34)
MCHC RBC AUTO-ENTMCNC: 34.2 G/DL (ref 31–37)
MCV RBC AUTO: 83.9 FL (ref 74–97)
MONOCYTES # BLD: 1 K/UL (ref 0.05–1.2)
MONOCYTES NFR BLD: 12 % (ref 3–10)
NEUTS SEG # BLD: 4.9 K/UL (ref 1.8–8)
NEUTS SEG NFR BLD: 57 % (ref 40–73)
PLATELET # BLD AUTO: 223 K/UL (ref 135–420)
PMV BLD AUTO: 11.1 FL (ref 9.2–11.8)
POTASSIUM SERPL-SCNC: 4.9 MMOL/L (ref 3.5–5.5)
PROT SERPL-MCNC: 7.4 G/DL (ref 6.4–8.2)
RBC # BLD AUTO: 5.33 M/UL (ref 4.7–5.5)
SODIUM SERPL-SCNC: 133 MMOL/L (ref 136–145)
WBC # BLD AUTO: 8.5 K/UL (ref 4.6–13.2)

## 2018-09-17 PROCEDURE — 80053 COMPREHEN METABOLIC PANEL: CPT | Performed by: INTERNAL MEDICINE

## 2018-09-17 PROCEDURE — 36415 COLL VENOUS BLD VENIPUNCTURE: CPT | Performed by: INTERNAL MEDICINE

## 2018-09-17 PROCEDURE — 85025 COMPLETE CBC W/AUTO DIFF WBC: CPT | Performed by: INTERNAL MEDICINE

## 2018-09-17 RX ORDER — AZITHROMYCIN 250 MG/1
TABLET, FILM COATED ORAL
Qty: 6 TAB | Refills: 1 | Status: SHIPPED | OUTPATIENT
Start: 2018-09-17 | End: 2018-09-22

## 2018-09-17 RX ORDER — BENZONATATE 200 MG/1
CAPSULE ORAL
Qty: 50 CAP | Refills: 0 | Status: SHIPPED | OUTPATIENT
Start: 2018-09-17 | End: 2018-10-17 | Stop reason: ALTCHOICE

## 2018-09-17 RX ORDER — CLONIDINE HYDROCHLORIDE 0.1 MG/1
0.1 TABLET ORAL 2 TIMES DAILY
Qty: 60 TAB | Refills: 2 | Status: SHIPPED | OUTPATIENT
Start: 2018-09-17 | End: 2018-12-17 | Stop reason: ALTCHOICE

## 2018-09-17 RX ORDER — AZITHROMYCIN 250 MG/1
TABLET, FILM COATED ORAL
Refills: 0 | COMMUNITY
Start: 2018-08-18 | End: 2018-09-17 | Stop reason: ALTCHOICE

## 2018-09-17 RX ORDER — ZOSTER VACCINE RECOMBINANT, ADJUVANTED 50 MCG/0.5
KIT INTRAMUSCULAR
Refills: 0 | COMMUNITY
Start: 2018-06-27 | End: 2018-10-17 | Stop reason: ALTCHOICE

## 2018-09-17 NOTE — PROGRESS NOTES
The patient presents to the office today with the chief complaint of hypertension HPI The patient remains on medications for hypertension. he is tolerating the medications well. Over the past several months the BP had been high. The BP is doing better. The patient has continued complaints of difficulty with speech from a previous CVA. The problem is now stable. The patient complains of dizziness. At times he is presyncopal.  It is often orthostatic. The patient remains on Synthoid due to hypothyroidism Review of Systems Respiratory: Negative for shortness of breath. Cardiovascular: Negative for chest pain and leg swelling. Allergies Allergen Reactions  Pcn [Penicillins] Rash Current Outpatient Prescriptions Medication Sig Dispense Refill  cloNIDine HCl (CATAPRES) 0.1 mg tablet Take 1 Tab by mouth two (2) times a day. 60 Tab 2  
 benzonatate (TESSALON) 200 mg capsule 1 cap three times per day as needed for cough 50 Cap 0  
 azithromycin (ZITHROMAX) 250 mg tablet Take 2 tablets today, then take 1 tablet daily 6 Tab 1  
 levothyroxine (SYNTHROID) 150 mcg tablet TAKE 1 TABLET BY MOUTH ONCE DAILY 90 Tab 3  pantoprazole (PROTONIX) 40 mg tablet TAKE 1 TABLET BY MOUTH ONCE DAILY 90 Tab 3  
 telmisartan (MICARDIS) 40 mg tablet 1 tablet daily (stop Benazepril) 90 Tab 3  
 guaiFENesin-dextromethorphan (TUSSI-ORGANIDIN DM)  mg/5 mL liqd Take 10 mL by mouth three (3) times daily as needed for Cough.  clopidogrel (PLAVIX) 75 mg tab Take 1 Tab by mouth daily (with dinner). Indications: Cerebral Thromboembolism Prevention 30 Tab 4  
 sertraline (ZOLOFT) 50 mg tablet 1 1/2 tablets daily 150 Tab 2  
 montelukast (SINGULAIR) 10 mg tablet Take 1 Tab by mouth daily. 90 Tab 3  
 loratadine (CLARITIN) 10 mg tablet Take 10 mg by mouth daily as needed.  aspirin delayed-release 81 mg tablet Take 81 mg by mouth daily.  Cholecalciferol, Vitamin D3, (VITAMIN D3) 2,000 unit cap capsule Take 2,000 Units by mouth daily.  cyanocobalamin 1,000 mcg tablet Take 1,000 mcg by mouth daily.  SHINGRIX, PF, 50 mcg/0.5 mL susr injection TO BE ADMINISTERED BY PHARMACIST FOR IMMUNIZATION  0 Past Medical History:  
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 HDL 42, LDL 97  Dysphagia as late effect of cerebrovascular accident (CVA) 4/19/2017  Erectile dysfunction  Gastroesophageal reflux disease  Hemiparesis affecting right side as late effect of cerebrovascular accident (CVA) ([facility] [address] 4/19/2017  History of endogenous hypertriglyceridemia  History of malignant neoplasm of prostate [place] score 7  Hypertension  Hypothyroidism  Osteoarthrosis involving multiple sites  Procedure refused 4/21/2017 Speech Pathologist recommended NPO and PEG placement; Patient refused  Urinary incontinence Male incontinence Past Surgical History:  
Procedure Laterality Date  COLONOSCOPY  08/12/2011  HX CHOLECYSTECTOMY  HX HERNIA REPAIR Bilateral   
 inguinal  
 HX RADICAL PROSTATECTOMY  1-  
 perineal approach Social History Social History  Marital status:  Spouse name: N/A  
 Number of children: N/A  
 Years of education: N/A Occupational History  Not on file. Social History Main Topics  Smoking status: Never Smoker  Smokeless tobacco: Never Used  Alcohol use No  
 Drug use: No  
 Sexual activity: Yes  
  Partners: Female Other Topics Concern  Not on file Social History Narrative Patient does have an advanced directive on file Visit Vitals  /78 (BP 1 Location: Right arm, BP Patient Position: Sitting)  Pulse 65  Temp 97.5 °F (36.4 °C) (Tympanic)  Ht 5' 11\" (1.803 m)  Wt 200 lb (90.7 kg)  SpO2 97%  BMI 27.89 kg/m2 Physical Exam  
Cardiovascular:  
Patient with 30 mm Hg orthostatic drop sitting No Cervical Lymphadenopathy No Supraclavicular Lymphadenopathy Thyroid is Normal 
Lungs are normal to percussion. Clear to auscultation Heart:  S1 S2 are normal, No gallops, No mummers No Carotid Bruits Abdomen:  Normal Bowel Sounds. No tenderness. No masses. No Hepatomegaly or Splenomegaly LE:  Strong Pedal Pulses. No Edema BMI: OK Assessment / Plan ICD-10-CM ICD-9-CM 1. Essential hypertension I10 401.9 CBC WITH AUTOMATED DIFF  
   METABOLIC PANEL, COMPREHENSIVE 2. 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   
3. Acquired hypothyroidism E03.9 244.9 4. Dizziness R42 780.4 Hold Lasix for now Add Clonidine 
he was advised to continue his maintenance medications Labs drawn Follow-up Disposition: 
Return in about 1 month (around 10/17/2018). I asked Raffi Damon [facility] [employer] if he has any questions and I answered the questions. Stewart APONTE Noam [employer] states that he understands the treatment plan and agrees with the treatment plan

## 2018-09-17 NOTE — PROGRESS NOTES
Chief Complaint Patient presents with  Well Male Depression Screening: PHQ over the last two weeks 9/17/2018 PHQ Not Done - Little interest or pleasure in doing things Not at all Feeling down, depressed, irritable, or hopeless Not at all Total Score PHQ 2 0 Learning Assessment: 
No flowsheet data found. Abuse Screening: 
Abuse Screening Questionnaire 6/25/2018 Do you ever feel afraid of your partner? Rhetta Chidi Are you in a relationship with someone who physically or mentally threatens you? Rhefrida Chidi Is it safe for you to go home? [place] Fall Risk Fall Risk Assessment, last 12 mths 9/17/2018 Able to walk? Yes Fall in past 12 months? No  
Fall with injury? -  
Number of falls in past 12 months - Fall Risk Score - Advance Directive: 1. Do you have an advance directive in place? Patient Reply:on file 1. Have you been to the ER, urgent care clinic since your last visit? Hospitalized since your last visit? No 
 
2. Have you seen or consulted any other health care providers outside of the [facility] since your last visit? Include any pap smears or colon screening.  No

## 2018-10-12 ENCOUNTER — DOCUMENTATION ONLY (OUTPATIENT)
Dept: INTERNAL MEDICINE CLINIC | Age: 78
End: 2018-10-12

## 2018-10-12 NOTE — PROGRESS NOTES
Pharmacy Note: Immunization Update    Patient: Jordon Mills (45 y.o., 1940)     Patient's immunization history was updated to reflect information contained in the [place] and/or outside immunization/pharmacy records were reconciled within [address] [facility]. Health Maintenance schedule updated when appropriate.     Current immunizations now reflect:       Immunization History   Administered Date(s) Administered    Influenza High Dose Vaccine PF 11/16/2017    Influenza Vaccine (Quad) PF 10/31/2016    Pneumococcal Conjugate (PCV-13) 11/16/2017    Zoster Recombinant 06/27/2018       Lucila Mazariegos, PharmD, BCACP

## 2018-10-15 ENCOUNTER — OFFICE VISIT (OUTPATIENT)
Dept: INTERNAL MEDICINE CLINIC | Age: 78
End: 2018-10-15

## 2018-10-15 VITALS
HEART RATE: 58 BPM | HEIGHT: 71 IN | OXYGEN SATURATION: 97 % | BODY MASS INDEX: 27.86 KG/M2 | SYSTOLIC BLOOD PRESSURE: 122 MMHG | DIASTOLIC BLOOD PRESSURE: 68 MMHG | TEMPERATURE: 97.9 F | RESPIRATION RATE: 18 BRPM | WEIGHT: 199 LBS

## 2018-10-15 DIAGNOSIS — I10 ESSENTIAL HYPERTENSION: Primary | ICD-10-CM

## 2018-10-15 DIAGNOSIS — E78.5 DYSLIPIDEMIA (HIGH LDL; LOW HDL): ICD-10-CM

## 2018-10-15 DIAGNOSIS — E03.9 ACQUIRED HYPOTHYROIDISM: ICD-10-CM

## 2018-10-15 RX ORDER — CLOTRIMAZOLE AND BETAMETHASONE DIPROPIONATE 10; .64 MG/G; MG/G
CREAM TOPICAL
Qty: 45 G | Refills: 2 | Status: SHIPPED | OUTPATIENT
Start: 2018-10-15 | End: 2018-12-17 | Stop reason: ALTCHOICE

## 2018-10-15 RX ORDER — AZITHROMYCIN 250 MG/1
TABLET, FILM COATED ORAL
Refills: 1 | COMMUNITY
Start: 2018-09-26 | End: 2018-10-17 | Stop reason: ALTCHOICE

## 2018-10-15 RX ORDER — AMLODIPINE BESYLATE 2.5 MG/1
TABLET ORAL
Qty: 30 TAB | Refills: 2 | Status: SHIPPED | OUTPATIENT
Start: 2018-10-15 | End: 2019-02-01 | Stop reason: SDUPTHER

## 2018-10-15 RX ORDER — FUROSEMIDE 20 MG/1
TABLET ORAL
Refills: 1 | COMMUNITY
Start: 2018-10-04 | End: 2019-02-02 | Stop reason: ALTCHOICE

## 2018-10-15 NOTE — PROGRESS NOTES
Chief Complaint Patient presents with  Well Male Depression Screening: PHQ over the last two weeks 9/17/2018 PHQ Not Done - Little interest or pleasure in doing things Not at all Feeling down, depressed, irritable, or hopeless Not at all Total Score PHQ 2 0 Learning Assessment: 
No flowsheet data found. Abuse Screening: 
Abuse Screening Questionnaire 6/25/2018 Do you ever feel afraid of your partner? Janae Walden Are you in a relationship with someone who physically or mentally threatens you? Janae Walden Is it safe for you to go home? Amy Barlow Fall Risk Fall Risk Assessment, last 12 mths 9/17/2018 Able to walk? Yes Fall in past 12 months? No  
Fall with injury? -  
Number of falls in past 12 months - Fall Risk Score - Advance Directive: 1. Do you have an advance directive in place? Patient Reply:on file 1. Have you been to the ER, urgent care clinic since your last visit? Hospitalized since your last visit? No 
 
2. Have you seen or consulted any other health care providers outside of the [address] [place] [address] since your last visit? Include any pap smears or colon screening.  No

## 2018-10-18 NOTE — PROGRESS NOTES
The patient presents to the office today with the chief complaint of hypertension HPI The patient remains on medications for hypertension. He is tolerating the medications well. The patient's BP has been up at times. The patient remains on medications for hyperlipidemia. The patient is doing ok at home by himself (with support from his daughter) Review of Systems Respiratory: Negative for shortness of breath. Cardiovascular: Negative for chest pain and leg swelling. Allergies Allergen Reactions  Pcn [Penicillins] Rash Current Outpatient Medications Medication Sig Dispense Refill  furosemide (LASIX) 20 mg tablet TAKE 1 TABLET BY MOUTH EACH MORNING ON MONDAYS, WEDNESDAYS, AND FRIDAYS  1  
 amLODIPine (NORVASC) 2.5 mg tablet 1 tab daily 30 Tab 2  clotrimazole-betamethasone (LOTRISONE) topical cream Apply twice per day 45 g 2  cloNIDine HCl (CATAPRES) 0.1 mg tablet Take 1 Tab by mouth two (2) times a day. 60 Tab 2  
 levothyroxine (SYNTHROID) 150 mcg tablet TAKE 1 TABLET BY MOUTH ONCE DAILY 90 Tab 3  pantoprazole (PROTONIX) 40 mg tablet TAKE 1 TABLET BY MOUTH ONCE DAILY 90 Tab 3  
 telmisartan (MICARDIS) 40 mg tablet 1 tablet daily (stop Benazepril) 90 Tab 3  clopidogrel (PLAVIX) 75 mg tab Take 1 Tab by mouth daily (with dinner). Indications: Cerebral Thromboembolism Prevention 30 Tab 4  
 sertraline (ZOLOFT) 50 mg tablet 1 1/2 tablets daily 150 Tab 2  
 montelukast (SINGULAIR) 10 mg tablet Take 1 Tab by mouth daily. 90 Tab 3  
 loratadine (CLARITIN) 10 mg tablet Take 10 mg by mouth daily as needed.  aspirin delayed-release 81 mg tablet Take 81 mg by mouth daily.  Cholecalciferol, Vitamin D3, (VITAMIN D3) 2,000 unit cap capsule Take 2,000 Units by mouth daily.  cyanocobalamin 1,000 mcg tablet Take 1,000 mcg by mouth daily. Past Medical History:  
Diagnosis Date  Acute ischemic stroke ([facility] 4/19/2017 Acute Ischemic Stroke (acute to subacute ischemia involving the posterior limb of the left internal capsule, along the lateral aspect of the left thalamus) with residual right hemiparesis, dysphagia and dysarthria  [facility]bestosis ([facility] [address]  Chronic obstructive pulmonary disease (COPD) ([facility] [address] [facility]  CKD stage G3a/A1, GFR 45-59 and albumin creatinine ratio <30 mg/g (HCC) 5/3/2017  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 HDL 42, LDL 97  Dysphagia as late effect of cerebrovascular accident (CVA) 4/19/2017  Erectile dysfunction  Gastroesophageal reflux disease  Hemiparesis affecting right side as late effect of cerebrovascular accident (CVA) ([facility] [address] 4/19/2017  History of endogenous hypertriglyceridemia  History of malignant neoplasm of prostate Genoveva score 7  Hypertension  Hypothyroidism  Osteoarthrosis involving multiple sites  Procedure refused 4/21/2017 Speech Pathologist recommended NPO and PEG placement; Patient refused  Urinary incontinence Male incontinence Past Surgical History:  
Procedure Laterality Date  COLONOSCOPY  08/12/2011  HX CHOLECYSTECTOMY  HX HERNIA REPAIR Bilateral   
 inguinal  
 HX RADICAL PROSTATECTOMY  1-  
 perineal approach Social History Socioeconomic History  Marital status:  Spouse name: Not on file  Number of children: Not on file  Years of education: Not on file  Highest education level: Not on file Social Needs  Financial resource strain: Not on file  Food insecurity - worry: Not on file  Food insecurity - inability: Not on file  Transportation needs - medical: Not on file  Transportation needs - non-medical: Not on file Occupational History  Not on file Tobacco Use  Smoking status: Never Smoker  Smokeless tobacco: Never Used Substance and Sexual Activity  Alcohol use: No  
 Drug use: No  
 Sexual activity: Yes  
  Partners: Female Other Topics Concern  Not on file Social History Narrative  Not on file Patient does have an advanced directive on file Visit Vitals /68 (BP 1 Location: Right arm, BP Patient Position: Sitting) Pulse (!) 58 Temp 97.9 °F (36.6 °C) (Tympanic) Resp 18 Ht 5' 11\" (1.803 m) Wt 199 lb (90.3 kg) SpO2 97% BMI 27.75 kg/m² Physical Exam  
Neck: Carotid bruit is not present. Cardiovascular: Normal rate and regular rhythm. Exam reveals no gallop. No murmur heard. Pulmonary/Chest: He has no wheezes. He has no rales. Abdominal: Soft. He exhibits no distension. There is no tenderness. Musculoskeletal: He exhibits no edema. BMI:  [facility] Outpatient Visit on 09/17/2018 Component Date Value Ref Range Status  WBC 09/17/2018 8.5  4.6 - 13.2 K/uL Final  
 RBC 09/17/2018 5.33  4.70 - 5.50 M/uL Final  
 HGB 09/17/2018 15.3  13.0 - 16.0 g/dL Final  
 HCT 09/17/2018 44.7  36.0 - 48.0 % Final  
 MCV 09/17/2018 83.9  74.0 - 97.0 FL Final  
 MCH 09/17/2018 28.7  24.0 - 34.0 PG Final  
 MCHC 09/17/2018 34.2  31.0 - 37.0 g/dL Final  
 RDW 09/17/2018 15.6* 11.6 - 14.5 % Final  
 PLATELET 07/48/4072 739  135 - 420 K/uL Final  
 MPV 09/17/2018 11.1  9.2 - 11.8 FL Final  
 NEUTROPHILS 09/17/2018 57  40 - 73 % Final  
 LYMPHOCYTES 09/17/2018 27  21 - 52 % Final  
 MONOCYTES 09/17/2018 12* 3 - 10 % Final  
 EOSINOPHILS 09/17/2018 4  0 - 5 % Final  
 BASOPHILS 09/17/2018 0  0 - 2 % Final  
 ABS. NEUTROPHILS 09/17/2018 4.9  1.8 - 8.0 K/UL Final  
 ABS. LYMPHOCYTES 09/17/2018 2.3  0.9 - 3.6 K/UL Final  
 ABS. MONOCYTES 09/17/2018 1.0  0.05 - 1.2 K/UL Final  
 ABS. EOSINOPHILS 09/17/2018 0.3  0.0 - 0.4 K/UL Final  
 ABS.  BASOPHILS 09/17/2018 0.0  0.0 - 0.1 K/UL Final  
 DF 09/17/2018 AUTOMATED    Final  
 Sodium 09/17/2018 133* 136 - 145 mmol/L Final  
  Potassium 09/17/2018 4.9  3.5 - 5.5 mmol/L Final  
 Chloride 09/17/2018 97* 100 - 108 mmol/L Final  
 CO2 09/17/2018 25  21 - 32 mmol/L Final  
 Anion gap 09/17/2018 11  3.0 - 18 mmol/L Final  
 Glucose 09/17/2018 96  74 - 99 mg/dL Final  
 BUN 09/17/2018 27* 7.0 - 18 MG/DL Final  
 Creatinine 09/17/2018 1.50* 0.6 - 1.3 MG/DL Final  
 BUN/Creatinine ratio 09/17/2018 18  12 - 20   Final  
 GFR est AA 09/17/2018 55* >60 ml/min/1.73m2 Final  
 GFR est non-AA 09/17/2018 45* >60 ml/min/1.73m2 Final  
 Comment: (NOTE) Estimated GFR is calculated using the Modification of Diet in Renal  
Disease (MDRD) Study equation, reported for both  Americans [facility]) and non- Americans (GFRNA), and normalized to 1.73m2  
body surface area. The physician must decide which value applies to  
the patient. The MDRD study equation should only be used in  
individuals age 25 or older. It has not been validated for the  
following: pregnant women, patients with serious comorbid conditions,  
or on certain medications, or persons with extremes of body size,  
muscle mass, or nutritional status.  Calcium 09/17/2018 9.5  8.5 - 10.1 MG/DL Final  
 Bilirubin, total 09/17/2018 0.5  0.2 - 1.0 MG/DL Final  
 ALT (SGPT) 09/17/2018 20  16 - 61 U/L Final  
 AST (SGOT) 09/17/2018 19  15 - 37 U/L Final  
 Alk. phosphatase 09/17/2018 76  45 - 117 U/L Final  
 Protein, total 09/17/2018 7.4  6.4 - 8.2 g/dL Final  
 Albumin 09/17/2018 3.9  3.4 - 5.0 g/dL Final  
 Globulin 09/17/2018 3.5  2.0 - 4.0 g/dL Final  
 A-G Ratio 09/17/2018 1.1  0.8 - 1.7   Final  
 
 
.No results found for any visits on 10/15/18. Assessment / Plan ICD-10-CM ICD-9-CM 1. Essential hypertension I10 401.9 2. Acquired hypothyroidism E03.9 244.9 3. Dyslipidemia (high LDL; low HDL) E78.5 272.4 Add Norvasc 2.5 mg BD 
he was advised to continue his maintenance medications Follow-up Disposition: Return in about 5 years (around 10/15/2023). I asked Palbito Lo [facility] [employer] if he has any questions and I answered the questions. Stewart APONTE [facility] [employer] states that he understands the treatment plan and agrees with the treatment plan

## 2018-11-12 RX ORDER — FUROSEMIDE 20 MG/1
TABLET ORAL
Qty: 12 TAB | Refills: 1 | Status: SHIPPED | OUTPATIENT
Start: 2018-11-12 | End: 2018-12-11 | Stop reason: SDUPTHER

## 2018-11-29 DIAGNOSIS — I69.322 DYSARTHRIA AS LATE EFFECT OF CEREBROVASCULAR ACCIDENT (CVA): ICD-10-CM

## 2018-11-29 RX ORDER — CLOPIDOGREL BISULFATE 75 MG/1
75 TABLET ORAL
Qty: 90 TAB | Refills: 4 | Status: SHIPPED | OUTPATIENT
Start: 2018-11-29

## 2018-11-29 NOTE — TELEPHONE ENCOUNTER
Requested Prescriptions     Pending Prescriptions Disp Refills    clopidogrel (PLAVIX) 75 mg tab 30 Tab 4     Sig: Take 1 Tab by mouth daily (with dinner).

## 2018-12-11 RX ORDER — FUROSEMIDE 20 MG/1
TABLET ORAL
Qty: 45 TAB | Refills: 2 | Status: SHIPPED | OUTPATIENT
Start: 2018-12-11 | End: 2019-04-01 | Stop reason: SDUPTHER

## 2018-12-11 NOTE — TELEPHONE ENCOUNTER
Requested Prescriptions     Pending Prescriptions Disp Refills    furosemide (LASIX) 20 mg tablet 12 Tab 1     NOTE:  Patient's daughter would like 90 day Rx from [facility].

## 2018-12-14 ENCOUNTER — DOCUMENTATION ONLY (OUTPATIENT)
Dept: INTERNAL MEDICINE CLINIC | Age: 78
End: 2018-12-14

## 2018-12-14 NOTE — PROGRESS NOTES
Pharmacy Note: Immunization Update    Patient: Sharmila Dumas (64 y.o., 1940)     Patient's immunization history was updated to reflect information contained in the [place] and/or outside immunization/pharmacy records were reconciled within [address] [facility]. Health Maintenance schedule updated when appropriate.     Current immunizations now reflect:       Immunization History   Administered Date(s) Administered    Influenza High Dose Vaccine PF 11/16/2017, 11/25/2018    Influenza Vaccine (Quad) PF 10/31/2016    Pneumococcal Conjugate (PCV-13) 11/16/2017    Zoster Recombinant 06/27/2018       Lucila Mazariegos, PharmD, BCACP

## 2018-12-17 ENCOUNTER — OFFICE VISIT (OUTPATIENT)
Dept: INTERNAL MEDICINE CLINIC | Age: 78
End: 2018-12-17

## 2018-12-17 ENCOUNTER — HOSPITAL ENCOUNTER (OUTPATIENT)
Dept: LAB | Age: 78
Discharge: HOME | End: 2018-12-17
Payer: MEDICARE

## 2018-12-17 VITALS
WEIGHT: 206 LBS | SYSTOLIC BLOOD PRESSURE: 110 MMHG | OXYGEN SATURATION: 96 % | HEIGHT: 71 IN | TEMPERATURE: 97.7 F | DIASTOLIC BLOOD PRESSURE: 62 MMHG | HEART RATE: 64 BPM | BODY MASS INDEX: 28.84 KG/M2

## 2018-12-17 DIAGNOSIS — I69.351 HEMIPARESIS AFFECTING RIGHT SIDE AS LATE EFFECT OF CEREBROVASCULAR ACCIDENT (CVA) (HCC): ICD-10-CM

## 2018-12-17 DIAGNOSIS — I10 ESSENTIAL HYPERTENSION: ICD-10-CM

## 2018-12-17 DIAGNOSIS — Z12.11 COLON CANCER SCREENING: ICD-10-CM

## 2018-12-17 DIAGNOSIS — R68.2 DRY MOUTH: ICD-10-CM

## 2018-12-17 DIAGNOSIS — R42 DIZZINESS: Primary | ICD-10-CM

## 2018-12-17 LAB
ANION GAP SERPL CALC-SCNC: 9 MMOL/L (ref 3–18)
BUN SERPL-MCNC: 35 MG/DL (ref 7–18)
BUN/CREAT SERPL: 22 (ref 12–20)
CALCIUM SERPL-MCNC: 9.3 MG/DL (ref 8.5–10.1)
CHLORIDE SERPL-SCNC: 103 MMOL/L (ref 100–108)
CO2 SERPL-SCNC: 24 MMOL/L (ref 21–32)
CREAT SERPL-MCNC: 1.57 MG/DL (ref 0.6–1.3)
GLUCOSE SERPL-MCNC: 104 MG/DL (ref 74–99)
POTASSIUM SERPL-SCNC: 4.7 MMOL/L (ref 3.5–5.5)
SODIUM SERPL-SCNC: 136 MMOL/L (ref 136–145)

## 2018-12-17 PROCEDURE — 80048 BASIC METABOLIC PNL TOTAL CA: CPT

## 2018-12-17 PROCEDURE — 36415 COLL VENOUS BLD VENIPUNCTURE: CPT

## 2018-12-17 PROCEDURE — 86235 NUCLEAR ANTIGEN ANTIBODY: CPT

## 2018-12-17 RX ORDER — CLOPIDOGREL BISULFATE 75 MG/1
TABLET ORAL
COMMUNITY
End: 2018-12-17 | Stop reason: SDUPTHER

## 2018-12-17 RX ORDER — ATORVASTATIN CALCIUM 20 MG/1
TABLET, FILM COATED ORAL
COMMUNITY
End: 2018-12-17 | Stop reason: ALTCHOICE

## 2018-12-17 RX ORDER — OMEPRAZOLE 40 MG/1
CAPSULE, DELAYED RELEASE ORAL
COMMUNITY
End: 2018-12-17 | Stop reason: ALTCHOICE

## 2018-12-17 RX ORDER — MONTELUKAST SODIUM 10 MG/1
TABLET ORAL
COMMUNITY
End: 2018-12-17 | Stop reason: SDUPTHER

## 2018-12-17 RX ORDER — TAMSULOSIN HYDROCHLORIDE 0.4 MG/1
CAPSULE ORAL
COMMUNITY
End: 2018-12-17 | Stop reason: ALTCHOICE

## 2018-12-17 RX ORDER — GUAIFENESIN 100 MG/5ML
LIQUID (ML) ORAL
COMMUNITY
End: 2018-12-17 | Stop reason: SDUPTHER

## 2018-12-17 RX ORDER — SERTRALINE HYDROCHLORIDE 50 MG/1
TABLET, FILM COATED ORAL
COMMUNITY
End: 2018-12-17 | Stop reason: SDUPTHER

## 2018-12-17 RX ORDER — MECLIZINE HCL 12.5 MG 12.5 MG/1
TABLET ORAL
Qty: 50 TAB | Refills: 1 | Status: SHIPPED | OUTPATIENT
Start: 2018-12-17 | End: 2019-02-01 | Stop reason: SDUPTHER

## 2018-12-17 RX ORDER — LANOLIN ALCOHOL/MO/W.PET/CERES
CREAM (GRAM) TOPICAL
COMMUNITY
End: 2018-12-17 | Stop reason: SDUPTHER

## 2018-12-17 RX ORDER — ACETAMINOPHEN 500 MG
TABLET ORAL
COMMUNITY
End: 2018-12-17 | Stop reason: SDUPTHER

## 2018-12-17 RX ORDER — LEVOTHYROXINE SODIUM 150 UG/1
TABLET ORAL
COMMUNITY
End: 2018-12-17 | Stop reason: SDUPTHER

## 2018-12-17 NOTE — PROGRESS NOTES
Damariskuldeep Kaye presents today for   Chief Complaint   Patient presents with    Well Male       Damaris Kaye preferred language for health care discussion is [address] Is someone accompanying this pt? no    Is the patient using any DME equipment during OV? no    Depression Screening:  PHQ over the last two weeks 12/17/2018   PHQ Not Done -   Little interest or pleasure in doing things Not at all   Feeling down, depressed, irritable, or hopeless Not at all   Total Score PHQ 2 0       Learning Assessment:  No flowsheet data found. Abuse Screening:  Abuse Screening Questionnaire 6/25/2018   Do you ever feel afraid of your partner? N   Are you in a relationship with someone who physically or mentally threatens you? N   Is it safe for you to go home? Y       Fall Risk  Fall Risk Assessment, last 12 mths 12/17/2018   Able to walk? Yes   Fall in past 12 months? No   Fall with injury? -   Number of falls in past 12 months -   Fall Risk Score -       Health Maintenance reviewed and discussed and ordered per Provider. Health Maintenance Due   Topic Date Due    DTaP/Tdap/Td series (1 - Tdap) 08/01/1959    GLAUCOMA SCREENING Q2Y  08/01/2003    Shingrix Vaccine Age 50> (2 of 2) 08/22/2018    Pneumococcal 65+ Low/Medium Risk (2 of 2 - PPSV23) 11/16/2018   . Damaris Kaye is updated on all     Pt currently taking Antiplatelet therapy? Yes plavix  Coordination of Care:  1. Have you been to the ER, urgent care clinic since your last visit? no Hospitalized since your last visit? no    2. Have you seen or consulted any other health care providers outside of the [address] [place] [address] since your last visit? no Include any pap smears or colon screening.  no

## 2018-12-18 LAB
ENA SS-A AB SER-ACNC: <0.2 AI (ref 0–0.9)
ENA SS-B AB SER-ACNC: <0.2 AI (ref 0–0.9)

## 2018-12-18 NOTE — PROGRESS NOTES
The patient presents to the office today with the chief complaint of dizziness    HPI    The patient remains on Micardis for hypertension. he is tolerating the medications well. The patient complains of dizziness - orthostatic in nature. At times a rotational component   The patient has continued complaints of a dry mouth. The symptoms began when the patient was on Clonidine. The symptoms persist even though the patient is off Clonidiene. The patient persists with right sided weakness - apraxia. The leg is worse than the arm. This has not changed. The patient has noticed a change in her stool color to dark      Review of Systems   Respiratory: Negative for shortness of breath. Cardiovascular: Negative for chest pain and leg swelling. Neurological: Positive for dizziness. Allergies   Allergen Reactions    Pcn [Penicillins] Rash       Current Outpatient Medications   Medication Sig Dispense Refill    meclizine (ANTIVERT) 12.5 mg tablet 1 tab twice per day 50 Tab 1    furosemide (LASIX) 20 mg tablet TAKE 1 TABLET BY MOUTH EACH MORNING ON MONDAYS, WEDNESDAYS, AND FRIDAYS 45 Tab 2    clopidogrel (PLAVIX) 75 mg tab Take 1 Tab by mouth daily (with dinner). 90 Tab 4    furosemide (LASIX) 20 mg tablet TAKE 1 TABLET BY MOUTH EACH MORNING ON MONDAYS, WEDNESDAYS, AND FRIDAYS  1    amLODIPine (NORVASC) 2.5 mg tablet 1 tab daily 30 Tab 2    levothyroxine (SYNTHROID) 150 mcg tablet TAKE 1 TABLET BY MOUTH ONCE DAILY 90 Tab 3    pantoprazole (PROTONIX) 40 mg tablet TAKE 1 TABLET BY MOUTH ONCE DAILY 90 Tab 3    telmisartan (MICARDIS) 40 mg tablet 1 tablet daily (stop Benazepril) 90 Tab 3    sertraline (ZOLOFT) 50 mg tablet 1 1/2 tablets daily 150 Tab 2    montelukast (SINGULAIR) 10 mg tablet Take 1 Tab by mouth daily. 90 Tab 3    loratadine (CLARITIN) 10 mg tablet Take 10 mg by mouth daily as needed.  aspirin delayed-release 81 mg tablet Take 81 mg by mouth daily.       Cholecalciferol, Vitamin D3, (VITAMIN D3) 2,000 unit cap capsule Take 2,000 Units by mouth daily.  cyanocobalamin 1,000 mcg tablet Take 1,000 mcg by mouth daily. 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  HDL 42, LDL 97    Dysphagia as late effect of cerebrovascular accident (CVA) 4/19/2017    Erectile dysfunction     Gastroesophageal reflux disease     Hemiparesis affecting right side as late effect of cerebrovascular accident (CVA) ([facility] [address] 4/19/2017    History of endogenous hypertriglyceridemia     History of malignant neoplasm of prostate     Genoveva score 7     Hypertension     Hypothyroidism     Osteoarthrosis involving multiple sites     Procedure refused 4/21/2017    Speech Pathologist recommended NPO and PEG placement; Patient refused    Urinary incontinence     Male incontinence        Past Surgical History:   Procedure Laterality Date    COLONOSCOPY  08/12/2011    HX CHOLECYSTECTOMY      HX HERNIA REPAIR Bilateral     inguinal    HX RADICAL PROSTATECTOMY  1-    perineal approach       Social History     Socioeconomic History    Marital status:      Spouse name: Not on file    Number of children: Not on file    Years of education: Not on file    Highest education level: Not on file   Social Needs    Financial resource strain: Not on file    Food insecurity - worry: Not on file    Food insecurity - inability: Not on file   [employer] needs - medical: Not on file   [employer] needs - non-medical: Not on file   Occupational History    Not on file   Tobacco Use    Smoking status: Never Smoker    Smokeless tobacco: Never Used   Substance and Sexual Activity    Alcohol use: No    Drug use: No    Sexual activity: Yes     Partners: Female   Other Topics Concern    Not on file   Social History Narrative    Not on file       Patient does have an advanced directive on file    Visit Vitals  /62 (BP 1 Location: Left arm, BP Patient Position: Sitting)   Pulse 64   Temp 97.7 °F (36.5 °C)   Ht 5' 11\" (1.803 m)   Wt 206 lb (93.4 kg)   SpO2 96%   BMI 28.73 kg/m²       Physical Exam   No Cervical Lymphadenopathy  No Supraclavicular Lymphadenopathy  Thyroid is Normal  Lungs are normal to percussion. Clear to auscultation   Heart:  S1 S2 are normal, No gallops, No murmurs  No Carotid Bruits  Abdomen:  Normal Bowel Sounds. No tenderness. No masses. No Hepatomegaly or Splenomegaly  LE:  Strong Pedal Pulses. No Edema      BMI:  [facility]note 3[facility] Outpatient Visit on 12/17/2018   Component Date Value Ref Range Status    Sodium 12/17/2018 136  136 - 145 mmol/L Final    Potassium 12/17/2018 4.7  3.5 - 5.5 mmol/L Final    Chloride 12/17/2018 103  100 - 108 mmol/L Final    CO2 12/17/2018 24  21 - 32 mmol/L Final    Anion gap 12/17/2018 9  3.0 - 18 mmol/L Final    Glucose 12/17/2018 104* 74 - 99 mg/dL Final    BUN 12/17/2018 35* 7.0 - 18 MG/DL Final    Creatinine 12/17/2018 1.57* 0.6 - 1.3 MG/DL Final    BUN/Creatinine ratio 12/17/2018 22* 12 - 20   Final    GFR est AA 12/17/2018 52* >60 ml/min/1.73m2 Final    GFR est non-AA 12/17/2018 43* >60 ml/min/1.73m2 Final    Comment: (NOTE)  Estimated GFR is calculated using the Modification of Diet in Renal   Disease (MDRD) Study equation, reported for both  Americans   (GFRAA) and non- Americans (GFRNA), and normalized to 1.73m2   body surface area.  The physician must decide which value applies to   the patient. The MDRD study equation should only be used in   individuals age [facility] or older. It has not been validated for the   following: pregnant women, patients with serious comorbid conditions,   or on certain medications, or persons with extremes of body size,   muscle mass, or nutritional status.  Calcium 12/17/2018 9.3  8.5 - 10.1 MG/DL Final       .  Results for orders placed or performed during the hospital encounter of 86/25/00   METABOLIC PANEL, BASIC   Result Value Ref Range    Sodium 136 136 - 145 mmol/L    Potassium 4.7 3.5 - 5.5 mmol/L    Chloride 103 100 - 108 mmol/L    CO2 24 21 - 32 mmol/L    Anion gap 9 3.0 - 18 mmol/L    Glucose 104 (H) 74 - 99 mg/dL    BUN 35 (H) 7.0 - 18 MG/DL    Creatinine 1.57 (H) 0.6 - 1.3 MG/DL    BUN/Creatinine ratio 22 (H) 12 - 20      GFR est AA 52 (L) >60 ml/min/1.73m2    GFR est non-AA 43 (L) >60 ml/min/1.73m2    Calcium 9.3 8.5 - 10.1 MG/DL       Assessment / Plan      ICD-10-CM ICD-9-CM    1. Dizziness R42 780.4    2. Essential hypertension H54 167.3 METABOLIC PANEL, BASIC   3. Colon cancer screening Z12.11 V76.51 OCCULT BLOOD IMMUNOASSAY,DIAGNOSTIC   4. Dry mouth R68.2 527.7 SJOGREN'S ABS, SSA AND SSB   5. Hemiparesis affecting right side as late effect of cerebrovascular accident (CVA) [facility]) I69.351 438.20        Labs ordered  FIT test ordered  he was advised to continue his maintenance medications      Follow-up Disposition:  Return in about 4 months (around 4/17/2019). I asked Mayra Ramirez [id number] if he has any questions and I answered the questions. Stewart APONTE  [employer] states that he understands the treatment plan and agrees with the treatment plan

## 2018-12-22 ENCOUNTER — HOSPITAL ENCOUNTER (OUTPATIENT)
Dept: LAB | Age: 78
Discharge: HOME | End: 2018-12-22
Payer: MEDICARE

## 2018-12-22 DIAGNOSIS — Z12.11 COLON CANCER SCREENING: ICD-10-CM

## 2018-12-22 PROCEDURE — 82274 ASSAY TEST FOR BLOOD FECAL: CPT

## 2018-12-29 LAB — HEMOCCULT STL QL IA: NEGATIVE

## 2019-01-31 ENCOUNTER — OFFICE VISIT (OUTPATIENT)
Dept: INTERNAL MEDICINE CLINIC | Age: 79
End: 2019-01-31

## 2019-01-31 ENCOUNTER — HOSPITAL ENCOUNTER (OUTPATIENT)
Dept: GENERAL RADIOLOGY | Age: 79
Discharge: HOME | End: 2019-01-31
Payer: MEDICARE

## 2019-01-31 ENCOUNTER — HOSPITAL ENCOUNTER (OUTPATIENT)
Dept: LAB | Age: 79
Discharge: HOME | End: 2019-01-31
Payer: MEDICARE

## 2019-01-31 VITALS
BODY MASS INDEX: 27.58 KG/M2 | SYSTOLIC BLOOD PRESSURE: 140 MMHG | DIASTOLIC BLOOD PRESSURE: 70 MMHG | HEIGHT: 71 IN | TEMPERATURE: 97.4 F | OXYGEN SATURATION: 93 % | HEART RATE: 88 BPM | WEIGHT: 197 LBS

## 2019-01-31 DIAGNOSIS — J15.9 BACTERIAL PNEUMONIA: ICD-10-CM

## 2019-01-31 DIAGNOSIS — I10 ESSENTIAL HYPERTENSION: ICD-10-CM

## 2019-01-31 DIAGNOSIS — I69.993 CVA, OLD, ATAXIA: ICD-10-CM

## 2019-01-31 DIAGNOSIS — J15.9 BACTERIAL PNEUMONIA: Primary | ICD-10-CM

## 2019-01-31 DIAGNOSIS — Z91.89 ASPIRATION PRECAUTIONS: ICD-10-CM

## 2019-01-31 LAB
ALBUMIN SERPL-MCNC: 3.8 G/DL (ref 3.4–5)
ALBUMIN/GLOB SERPL: 1 {RATIO} (ref 0.8–1.7)
ALP SERPL-CCNC: 68 U/L (ref 45–117)
ALT SERPL-CCNC: 19 U/L (ref 16–61)
ANION GAP SERPL CALC-SCNC: 11 MMOL/L (ref 3–18)
AST SERPL-CCNC: 13 U/L (ref 15–37)
BASOPHILS # BLD: 0 K/UL (ref 0–0.1)
BASOPHILS NFR BLD: 0 % (ref 0–2)
BILIRUB SERPL-MCNC: 0.5 MG/DL (ref 0.2–1)
BUN SERPL-MCNC: 23 MG/DL (ref 7–18)
BUN/CREAT SERPL: 18 (ref 12–20)
CALCIUM SERPL-MCNC: 10.2 MG/DL (ref 8.5–10.1)
CHLORIDE SERPL-SCNC: 96 MMOL/L (ref 100–108)
CO2 SERPL-SCNC: 23 MMOL/L (ref 21–32)
CREAT SERPL-MCNC: 1.3 MG/DL (ref 0.6–1.3)
DIFFERENTIAL METHOD BLD: ABNORMAL
EOSINOPHIL # BLD: 0 K/UL (ref 0–0.4)
EOSINOPHIL NFR BLD: 0 % (ref 0–5)
ERYTHROCYTE [DISTWIDTH] IN BLOOD BY AUTOMATED COUNT: 15.6 % (ref 11.6–14.5)
GLOBULIN SER CALC-MCNC: 3.8 G/DL (ref 2–4)
GLUCOSE SERPL-MCNC: 103 MG/DL (ref 74–99)
HCT VFR BLD AUTO: 37.6 % (ref 36–48)
HGB BLD-MCNC: 12.7 G/DL (ref 13–16)
LYMPHOCYTES # BLD: 2.2 K/UL (ref 0.9–3.6)
LYMPHOCYTES NFR BLD: 14 % (ref 21–52)
MCH RBC QN AUTO: 28.8 PG (ref 24–34)
MCHC RBC AUTO-ENTMCNC: 33.8 G/DL (ref 31–37)
MCV RBC AUTO: 85.3 FL (ref 74–97)
MONOCYTES # BLD: 1.2 K/UL (ref 0.05–1.2)
MONOCYTES NFR BLD: 8 % (ref 3–10)
NEUTS SEG # BLD: 12.5 K/UL (ref 1.8–8)
NEUTS SEG NFR BLD: 78 % (ref 40–73)
PLATELET # BLD AUTO: 255 K/UL (ref 135–420)
PMV BLD AUTO: 10.9 FL (ref 9.2–11.8)
POTASSIUM SERPL-SCNC: 4.5 MMOL/L (ref 3.5–5.5)
PROT SERPL-MCNC: 7.6 G/DL (ref 6.4–8.2)
RBC # BLD AUTO: 4.41 M/UL (ref 4.7–5.5)
SODIUM SERPL-SCNC: 130 MMOL/L (ref 136–145)
WBC # BLD AUTO: 15.9 K/UL (ref 4.6–13.2)

## 2019-01-31 PROCEDURE — 36415 COLL VENOUS BLD VENIPUNCTURE: CPT

## 2019-01-31 PROCEDURE — 71046 X-RAY EXAM CHEST 2 VIEWS: CPT

## 2019-01-31 PROCEDURE — 85025 COMPLETE CBC W/AUTO DIFF WBC: CPT

## 2019-01-31 PROCEDURE — 80053 COMPREHEN METABOLIC PANEL: CPT

## 2019-01-31 RX ORDER — ACETAMINOPHEN 325 MG/1
650 TABLET ORAL
COMMUNITY
Start: 2019-01-12

## 2019-01-31 RX ORDER — BUDESONIDE AND FORMOTEROL FUMARATE DIHYDRATE 160; 4.5 UG/1; UG/1
2 AEROSOL RESPIRATORY (INHALATION) 2 TIMES DAILY
COMMUNITY
Start: 2019-01-29 | End: 2019-02-14 | Stop reason: SDUPTHER

## 2019-01-31 RX ORDER — ALBUTEROL SULFATE 108 UG/1
1 AEROSOL, METERED RESPIRATORY (INHALATION)
COMMUNITY
Start: 2019-01-29

## 2019-01-31 RX ORDER — CETIRIZINE HCL 10 MG
10 TABLET ORAL DAILY
COMMUNITY

## 2019-01-31 RX ORDER — PREDNISONE 10 MG/1
10 TABLET ORAL DAILY
COMMUNITY
Start: 2019-01-29 | End: 2019-02-18 | Stop reason: ALTCHOICE

## 2019-01-31 NOTE — PROGRESS NOTES
Freeman Standard presents today for Chief Complaint Patient presents with  Well Male Freeman Standard preferred language for health care discussion is english. Is someone accompanying this pt? Yes daughter in law Is the patient using any DME equipment during OV? oxygen Depression Screening: PHQ over the last two weeks 1/31/2019 PHQ Not Done - Little interest or pleasure in doing things Not at all Feeling down, depressed, irritable, or hopeless Not at all Total Score PHQ 2 0 Learning Assessment: 
No flowsheet data found. Abuse Screening: 
Abuse Screening Questionnaire 6/25/2018 Do you ever feel afraid of your partner? Bayard Epley Are you in a relationship with someone who physically or mentally threatens you? Bayard Epley Is it safe for you to go home? Ian Wendi Fall Risk Fall Risk Assessment, last 12 mths 1/31/2019 Able to walk? Yes Fall in past 12 months? No  
Fall with injury? -  
Number of falls in past 12 months - Fall Risk Score - Health Maintenance reviewed and discussed and ordered per Provider. Health Maintenance Due Topic Date Due  
 DTaP/Tdap/Td series (1 - Tdap) 08/01/1959  GLAUCOMA SCREENING Q2Y  08/01/2003  Shingrix Vaccine Age 50> (2 of 2) 08/22/2018  Pneumococcal 65+ Low/Medium Risk (2 of 2 - PPSV23) 11/16/2018 Radha Rivas [place] Standard is updated on all  Pt currently taking Antiplatelet therapy? Yes aspirin , plavix Coordination of Care: 1. Have you been to the ER, urgent care clinic since your last visit? Yes  Hospitalized since your last visit? [facility] for Pneumonia from 12/30/18 to 1/14/19  Then went to rehab from 1/14/19 to 1/25/19  Then went back in the hospital on 1/20/19 in [address] for Acute respiratory failure until 1/22/19 2. Have you seen or consulted any other health care providers outside of the [address] [place] [address] since your last visit?  Yes  Include any pap smears or colon screening.  no

## 2019-02-01 RX ORDER — AMLODIPINE BESYLATE 2.5 MG/1
TABLET ORAL
Qty: 30 TAB | Refills: 2 | Status: SHIPPED | OUTPATIENT
Start: 2019-02-01 | End: 2019-03-11 | Stop reason: SDUPTHER

## 2019-02-01 RX ORDER — MECLIZINE HCL 12.5 MG 12.5 MG/1
TABLET ORAL
Qty: 50 TAB | Refills: 2 | Status: SHIPPED | OUTPATIENT
Start: 2019-02-01 | End: 2019-04-01 | Stop reason: ALTCHOICE

## 2019-02-02 NOTE — PROGRESS NOTES
The patient presents to the office today to follow up pneumonia HPI The patient is status post a hospitalization (x2) for pneumonia. Swallowing studies showed a propensity for aspiration. The patient has been on medications for hypertension. The medications have been on hold due to a drop in the BP when sick. The patient is status post a stroke. He persists with intermittent dizziness. Review of Systems Respiratory: Positive for cough. Negative for shortness of breath. Cardiovascular: Negative for chest pain and leg swelling. Neurological: Positive for dizziness. Allergies Allergen Reactions  Pcn [Penicillins] Rash Current Outpatient Medications Medication Sig Dispense Refill  meclizine (ANTIVERT) 12.5 mg tablet 1 tab twice per day 50 Tab 2  
 amLODIPine (NORVASC) 2.5 mg tablet 1 tab daily 30 Tab 2  cetirizine (ZYRTEC) 10 mg tablet Take  by mouth daily.  PROVENTIL HFA 90 mcg/actuation inhaler 1 Puff every six (6) hours as needed.  SYMBICORT 160-4.5 mcg/actuation HFAA 2 Puffs two (2) times a day.  predniSONE (STERAPRED DS) 10 mg dose pack  acetaminophen (TYLENOL) 325 mg tablet Take 650 mg by mouth.  furosemide (LASIX) 20 mg tablet TAKE 1 TABLET BY MOUTH EACH MORNING ON MONDAYS, WEDNESDAYS, AND FRIDAYS 45 Tab 2  clopidogrel (PLAVIX) 75 mg tab Take 1 Tab by mouth daily (with dinner). 90 Tab 4  
 levothyroxine (SYNTHROID) 150 mcg tablet TAKE 1 TABLET BY MOUTH ONCE DAILY 90 Tab 3  pantoprazole (PROTONIX) 40 mg tablet TAKE 1 TABLET BY MOUTH ONCE DAILY 90 Tab 3  
 telmisartan (MICARDIS) 40 mg tablet 1 tablet daily (stop Benazepril) 90 Tab 3  
 sertraline (ZOLOFT) 50 mg tablet 1 1/2 tablets daily 150 Tab 2  
 montelukast (SINGULAIR) 10 mg tablet Take 1 Tab by mouth daily. 90 Tab 3  
 aspirin delayed-release 81 mg tablet Take 81 mg by mouth daily.     
 Cholecalciferol, Vitamin D3, (VITAMIN D3) 2,000 unit cap capsule Take 2,000 Units by mouth daily.  cyanocobalamin 1,000 mcg tablet Take 1,000 mcg by mouth daily. Past Medical History:  
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 HDL 42, LDL 97  Dysphagia as late effect of cerebrovascular accident (CVA) 4/19/2017  Erectile dysfunction  Gastroesophageal reflux disease  Hemiparesis affecting right side as late effect of cerebrovascular accident (CVA) ([facility] [address] 4/19/2017  History of endogenous hypertriglyceridemia  History of malignant neoplasm of prostate Genoveva score 7  Hypertension  Hypothyroidism  Osteoarthrosis involving multiple sites  Procedure refused 4/21/2017 Speech Pathologist recommended NPO and PEG placement; Patient refused  Urinary incontinence Male incontinence Past Surgical History:  
Procedure Laterality Date  COLONOSCOPY  08/12/2011  HX CHOLECYSTECTOMY  HX HERNIA REPAIR Bilateral   
 inguinal  
 HX RADICAL PROSTATECTOMY  1-  
 perineal approach Social History Socioeconomic History  Marital status:  Spouse name: Not on file  Number of children: Not on file  Years of education: Not on file  Highest education level: Not on file Social Needs  Financial resource strain: Not on file  Food insecurity - worry: Not on file  Food insecurity - inability: Not on file  Transportation needs - medical: Not on file  Transportation needs - non-medical: Not on file Occupational History  Not on file Tobacco Use  Smoking status: Never Smoker  Smokeless tobacco: Never Used Substance and Sexual Activity  Alcohol use: No  
 Drug use: No  
 Sexual activity: Yes  
  Partners: Female Other Topics Concern  Not on file Social History Narrative  Not on file Patient does have an advanced directive on file Visit Vitals /70 (BP 1 Location: Left arm, BP Patient Position: Sitting) Pulse 88 Temp 97.4 °F (36.3 °C) Ht 5' 11\" (1.803 m) Wt 197 lb (89.4 kg) SpO2 93% BMI 27.48 kg/m² Physical Exam  
Neck: Carotid bruit is not present. Cardiovascular: Normal rate and regular rhythm. Exam reveals no gallop. No murmur heard. Pulmonary/Chest: He has no wheezes. He has no rales. Abdominal: Soft. He exhibits no distension. There is no tenderness. Musculoskeletal: He exhibits no edema. BMI:  [facility] Outpatient Visit on 01/31/2019 Component Date Value Ref Range Status  WBC 01/31/2019 15.9* 4.6 - 13.2 K/uL Final  
 RBC 01/31/2019 4.41* 4.70 - 5.50 M/uL Final  
 HGB 01/31/2019 12.7* 13.0 - 16.0 g/dL Final  
 HCT 01/31/2019 37.6  36.0 - 48.0 % Final  
 MCV 01/31/2019 85.3  74.0 - 97.0 FL Final  
 MCH 01/31/2019 28.8  24.0 - 34.0 PG Final  
 MCHC 01/31/2019 33.8  31.0 - 37.0 g/dL Final  
 RDW 01/31/2019 15.6* 11.6 - 14.5 % Final  
 PLATELET 24/96/8103 298  135 - 420 K/uL Final  
 MPV 01/31/2019 10.9  9.2 - 11.8 FL Final  
 NEUTROPHILS 01/31/2019 78* 40 - 73 % Final  
 LYMPHOCYTES 01/31/2019 14* 21 - 52 % Final  
 MONOCYTES 01/31/2019 8  3 - 10 % Final  
 EOSINOPHILS 01/31/2019 0  0 - 5 % Final  
 BASOPHILS 01/31/2019 0  0 - 2 % Final  
 ABS. NEUTROPHILS 01/31/2019 12.5* 1.8 - 8.0 K/UL Final  
 ABS. LYMPHOCYTES 01/31/2019 2.2  0.9 - 3.6 K/UL Final  
 ABS.  MONOCYTES 01/31/2019 1.2  0.05 - 1.2 K/UL Final  
  ABS. EOSINOPHILS 01/31/2019 0.0  0.0 - 0.4 K/UL Final  
 ABS. BASOPHILS 01/31/2019 0.0  0.0 - 0.1 K/UL Final  
 DF 01/31/2019 AUTOMATED    Final  
 Sodium 01/31/2019 130* 136 - 145 mmol/L Final  
 Potassium 01/31/2019 4.5  3.5 - 5.5 mmol/L Final  
 Chloride 01/31/2019 96* 100 - 108 mmol/L Final  
 CO2 01/31/2019 23  21 - 32 mmol/L Final  
 Anion gap 01/31/2019 11  3.0 - 18 mmol/L Final  
 Glucose 01/31/2019 103* 74 - 99 mg/dL Final  
 BUN 01/31/2019 23* 7.0 - 18 MG/DL Final  
 Creatinine 01/31/2019 1.30  0.6 - 1.3 MG/DL Final  
 BUN/Creatinine ratio 01/31/2019 18  12 - 20   Final  
 GFR est AA 01/31/2019 >60  >60 ml/min/1.73m2 Final  
 GFR est non-AA 01/31/2019 53* >60 ml/min/1.73m2 Final  
 Comment: (NOTE) Estimated GFR is calculated using the Modification of Diet in Renal  
Disease (MDRD) Study equation, reported for both  [facility]) and non- Americans (GFRNA), and normalized to 1.73m2  
body surface area. The physician must decide which value applies to  
the patient. The MDRD study equation should only be used in  
individuals age [address] [facility] or older. It has not been validated for the  
following: pregnant women, patients with serious comorbid conditions,  
or on certain medications, or persons with extremes of body size,  
muscle mass, or nutritional status.  Calcium 01/31/2019 10.2* 8.5 - 10.1 MG/DL Final  
 Bilirubin, total 01/31/2019 0.5  0.2 - 1.0 MG/DL Final  
 ALT (SGPT) 01/31/2019 19  16 - 61 U/L Final  
 AST (SGOT) 01/31/2019 13* 15 - 37 U/L Final  
 Alk. phosphatase 01/31/2019 68  45 - 117 U/L Final  
 Protein, total 01/31/2019 7.6  6.4 - 8.2 g/dL Final  
 Albumin 01/31/2019 3.8  3.4 - 5.0 g/dL Final  
 Globulin 01/31/2019 3.8  2.0 - 4.0 g/dL Final  
 A-G Ratio 01/31/2019 1.0  0.8 - 1.7   Final  
Hospital Outpatient Visit on 12/22/2018 Component Date Value Ref Range Status  Occult blood fecal, by IA 12/22/2018 NEGATIVE   NEGATIVE   Final  
 Comment: (NOTE) Performed At: [address] [id number] Lori Smith MD [id number] Hospital Outpatient Visit on 12/17/2018 Component Date Value Ref Range Status  Sodium 12/17/2018 136  136 - 145 mmol/L Final  
 Potassium 12/17/2018 4.7  3.5 - 5.5 mmol/L Final  
 Chloride 12/17/2018 103  100 - 108 mmol/L Final  
 CO2 12/17/2018 24  21 - 32 mmol/L Final  
 Anion gap 12/17/2018 9  3.0 - 18 mmol/L Final  
 Glucose 12/17/2018 104* 74 - 99 mg/dL Final  
 BUN 12/17/2018 35* 7.0 - 18 MG/DL Final  
 Creatinine 12/17/2018 1.57* 0.6 - 1.3 MG/DL Final  
 BUN/Creatinine ratio 12/17/2018 22* 12 - 20   Final  
 GFR est AA 12/17/2018 52* >60 ml/min/1.73m2 Final  
 GFR est non-AA 12/17/2018 43* >60 ml/min/1.73m2 Final  
 Comment: (NOTE) Estimated GFR is calculated using the Modification of Diet in Renal  
Disease (MDRD) Study equation, reported for both  [facility]) and non- Americans (GFRNA), and normalized to 1.73m2  
body surface area. The physician must decide which value applies to  
the patient. The MDRD study equation should only be used in  
individuals age 25 or older. It has not been validated for the  
following: pregnant women, patients with serious comorbid conditions,  
or on certain medications, or persons with extremes of body size,  
muscle mass, or nutritional status.  Calcium 12/17/2018 9.3  8.5 - 10.1 MG/DL Final  
 Sjogren's Anti-SS-A 12/17/2018 <0.2  0.0 - 0.9 AI Final  
 Sjogren's Anti-SS-B 12/17/2018 <0.2  0.0 - 0.9 AI Final  
 Comment: (NOTE) Performed At: [address] [id number] Lori Smith MD [id number] [place] Results for orders placed or performed during the hospital encounter of 01/31/19 CBC WITH AUTOMATED DIFF Result Value Ref Range WBC 15.9 (H) 4.6 - 13.2 K/uL  
 RBC 4.41 (L) 4.70 - 5.50 M/uL  
 HGB 12.7 (L) 13.0 - 16.0 g/dL HCT 37.6 36.0 - 48.0 % MCV 85.3 74.0 - 97.0 FL  
 MCH 28.8 24.0 - 34.0 PG  
 MCHC 33.8 31.0 - 37.0 g/dL  
 RDW 15.6 (H) 11.6 - 14.5 % PLATELET 125 681 - 589 K/uL MPV 10.9 9.2 - 11.8 FL  
 NEUTROPHILS 78 (H) 40 - 73 % LYMPHOCYTES 14 (L) 21 - 52 % MONOCYTES 8 3 - 10 % EOSINOPHILS 0 0 - 5 % BASOPHILS 0 0 - 2 %  
 ABS. NEUTROPHILS 12.5 (H) 1.8 - 8.0 K/UL  
 ABS. LYMPHOCYTES 2.2 0.9 - 3.6 K/UL  
 ABS. MONOCYTES 1.2 0.05 - 1.2 K/UL  
 ABS. EOSINOPHILS 0.0 0.0 - 0.4 K/UL  
 ABS. BASOPHILS 0.0 0.0 - 0.1 K/UL  
 DF AUTOMATED METABOLIC PANEL, COMPREHENSIVE Result Value Ref Range Sodium 130 (L) 136 - 145 mmol/L Potassium 4.5 3.5 - 5.5 mmol/L Chloride 96 (L) 100 - 108 mmol/L  
 CO2 23 21 - 32 mmol/L Anion gap 11 3.0 - 18 mmol/L Glucose 103 (H) 74 - 99 mg/dL BUN 23 (H) 7.0 - 18 MG/DL Creatinine 1.30 0.6 - 1.3 MG/DL  
 BUN/Creatinine ratio 18 12 - 20 GFR est AA >60 >60 ml/min/1.73m2 GFR est non-AA 53 (L) >60 ml/min/1.73m2 Calcium 10.2 (H) 8.5 - 10.1 MG/DL Bilirubin, total 0.5 0.2 - 1.0 MG/DL  
 ALT (SGPT) 19 16 - 61 U/L  
 AST (SGOT) 13 (L) 15 - 37 U/L Alk. phosphatase 68 45 - 117 U/L Protein, total 7.6 6.4 - 8.2 g/dL Albumin 3.8 3.4 - 5.0 g/dL Globulin 3.8 2.0 - 4.0 g/dL A-G Ratio 1.0 0.8 - 1.7 Results for orders placed or performed during the hospital encounter of 01/31/19 XR CHEST PA LAT Narrative EXAM: Chest Radiographs INDICATION: Shortness of breath TECHNIQUE: PA and lateral views of the chest 
 
COMPARISON: 6/26/2018 and 5/18/2010 FINDINGS: No pneumothorax identified. Subpleural cystic changes suggestive of 
chronic interstitial lung disease is noted which is similar to mildly progressed 
since prior imaging. No effusions appreciated. Cardiomegaly is present. The 
pulmonary vascularity is unremarkable. Degenerative changes of the thoracic 
spine. Impression  IMPRESSION: 
 1.  Chronic cystic residual changes of the lungs predominantly in the lower lung 
fields. Assessment / Plan ICD-10-CM ICD-9-CM 1. Bacterial pneumonia J15.9 482.9 CBC WITH AUTOMATED DIFF  
   METABOLIC PANEL, COMPREHENSIVE  
   XR CHEST PA LAT 2. Essential hypertension I10 401.9 CBC WITH AUTOMATED DIFF  
   METABOLIC PANEL, COMPREHENSIVE 3. CVA, old, ataxia I69.993 438.84   
4. Aspiration precautions Z91.89 V49.89 Labs ordered Chest xray ordered 
he was advised to continue his maintenance medications - restart Norvasc Thickened liquids to less risk of aspiration Home health - nursing, physical therapy, speech therapy (for swallowing) Follow-up Disposition: 
Return in about 3 weeks (around 2/21/2019). I asked Gian Stewart. [employer] if he has any questions and I answered the questions. Stewart APONTE [address] [employer] states that he understands the treatment plan and agrees with the treatment plan

## 2019-02-04 ENCOUNTER — HOME HEALTH ADMISSION (OUTPATIENT)
Dept: HOME HEALTH SERVICES | Facility: HOME HEALTH | Age: 79
End: 2019-02-04
Payer: MEDICARE

## 2019-02-04 DIAGNOSIS — I69.993 CVA, OLD, ATAXIA: Primary | ICD-10-CM

## 2019-02-04 DIAGNOSIS — Z91.89 AT HIGH RISK FOR ASPIRATION: ICD-10-CM

## 2019-02-05 ENCOUNTER — HOME CARE VISIT (OUTPATIENT)
Dept: SCHEDULING | Facility: HOME HEALTH | Age: 79
End: 2019-02-05
Payer: MEDICARE

## 2019-02-05 PROCEDURE — 400013 HH SOC

## 2019-02-05 PROCEDURE — G0151 HHCP-SERV OF PT,EA 15 MIN: HCPCS

## 2019-02-05 PROCEDURE — 3331090002 HH PPS REVENUE DEBIT

## 2019-02-05 PROCEDURE — 3331090001 HH PPS REVENUE CREDIT

## 2019-02-06 ENCOUNTER — HOME CARE VISIT (OUTPATIENT)
Dept: HOME HEALTH SERVICES | Facility: HOME HEALTH | Age: 79
End: 2019-02-06
Payer: MEDICARE

## 2019-02-06 VITALS
SYSTOLIC BLOOD PRESSURE: 122 MMHG | HEART RATE: 72 BPM | TEMPERATURE: 98.9 F | OXYGEN SATURATION: 94 % | DIASTOLIC BLOOD PRESSURE: 64 MMHG

## 2019-02-06 VITALS
DIASTOLIC BLOOD PRESSURE: 61 MMHG | OXYGEN SATURATION: 99 % | WEIGHT: 195 LBS | HEART RATE: 92 BPM | TEMPERATURE: 97.3 F | SYSTOLIC BLOOD PRESSURE: 122 MMHG | RESPIRATION RATE: 18 BRPM | HEIGHT: 71 IN | BODY MASS INDEX: 27.3 KG/M2

## 2019-02-06 PROCEDURE — 3331090002 HH PPS REVENUE DEBIT

## 2019-02-06 PROCEDURE — G0157 HHC PT ASSISTANT EA 15: HCPCS

## 2019-02-06 PROCEDURE — 3331090001 HH PPS REVENUE CREDIT

## 2019-02-07 PROCEDURE — 3331090002 HH PPS REVENUE DEBIT

## 2019-02-07 PROCEDURE — 3331090001 HH PPS REVENUE CREDIT

## 2019-02-08 ENCOUNTER — HOME CARE VISIT (OUTPATIENT)
Dept: SCHEDULING | Facility: HOME HEALTH | Age: 79
End: 2019-02-08
Payer: MEDICARE

## 2019-02-08 PROCEDURE — 3331090002 HH PPS REVENUE DEBIT

## 2019-02-08 PROCEDURE — G0157 HHC PT ASSISTANT EA 15: HCPCS

## 2019-02-08 PROCEDURE — 3331090001 HH PPS REVENUE CREDIT

## 2019-02-09 VITALS
OXYGEN SATURATION: 98 % | DIASTOLIC BLOOD PRESSURE: 55 MMHG | TEMPERATURE: 98.8 F | SYSTOLIC BLOOD PRESSURE: 110 MMHG | HEART RATE: 70 BPM

## 2019-02-09 PROCEDURE — 3331090001 HH PPS REVENUE CREDIT

## 2019-02-09 PROCEDURE — 3331090002 HH PPS REVENUE DEBIT

## 2019-02-10 PROCEDURE — 3331090001 HH PPS REVENUE CREDIT

## 2019-02-10 PROCEDURE — 3331090002 HH PPS REVENUE DEBIT

## 2019-02-11 ENCOUNTER — HOME CARE VISIT (OUTPATIENT)
Dept: SCHEDULING | Facility: HOME HEALTH | Age: 79
End: 2019-02-11
Payer: MEDICARE

## 2019-02-11 VITALS
HEART RATE: 91 BPM | OXYGEN SATURATION: 98 % | DIASTOLIC BLOOD PRESSURE: 72 MMHG | SYSTOLIC BLOOD PRESSURE: 168 MMHG | TEMPERATURE: 97.3 F

## 2019-02-11 PROCEDURE — 3331090001 HH PPS REVENUE CREDIT

## 2019-02-11 PROCEDURE — G0153 HHCP-SVS OF S/L PATH,EA 15MN: HCPCS

## 2019-02-11 PROCEDURE — 3331090002 HH PPS REVENUE DEBIT

## 2019-02-11 PROCEDURE — G0157 HHC PT ASSISTANT EA 15: HCPCS

## 2019-02-12 VITALS
OXYGEN SATURATION: 98 % | HEART RATE: 84 BPM | TEMPERATURE: 98.7 F | SYSTOLIC BLOOD PRESSURE: 145 MMHG | DIASTOLIC BLOOD PRESSURE: 70 MMHG

## 2019-02-12 PROCEDURE — 3331090001 HH PPS REVENUE CREDIT

## 2019-02-12 PROCEDURE — 3331090002 HH PPS REVENUE DEBIT

## 2019-02-13 ENCOUNTER — OFFICE VISIT (OUTPATIENT)
Dept: INTERNAL MEDICINE CLINIC | Age: 79
End: 2019-02-13

## 2019-02-13 ENCOUNTER — HOME CARE VISIT (OUTPATIENT)
Dept: HOME HEALTH SERVICES | Facility: HOME HEALTH | Age: 79
End: 2019-02-13
Payer: MEDICARE

## 2019-02-13 VITALS
HEIGHT: 71 IN | DIASTOLIC BLOOD PRESSURE: 80 MMHG | TEMPERATURE: 98.4 F | OXYGEN SATURATION: 91 % | SYSTOLIC BLOOD PRESSURE: 150 MMHG | HEART RATE: 63 BPM | BODY MASS INDEX: 27.2 KG/M2

## 2019-02-13 DIAGNOSIS — R42 DIZZINESS: Primary | ICD-10-CM

## 2019-02-13 DIAGNOSIS — I10 ESSENTIAL HYPERTENSION: ICD-10-CM

## 2019-02-13 DIAGNOSIS — I69.30 LATE EFFECT OF ISCHEMIC CEREBRAL STROKE: ICD-10-CM

## 2019-02-13 PROCEDURE — 3331090002 HH PPS REVENUE DEBIT

## 2019-02-13 PROCEDURE — 3331090001 HH PPS REVENUE CREDIT

## 2019-02-13 PROCEDURE — G0157 HHC PT ASSISTANT EA 15: HCPCS

## 2019-02-13 RX ORDER — MODAFINIL 100 MG/1
100 TABLET ORAL
Qty: 30 TAB | Refills: 0 | Status: SHIPPED | OUTPATIENT
Start: 2019-02-13 | End: 2019-04-01 | Stop reason: ALTCHOICE

## 2019-02-13 RX ORDER — SERTRALINE HYDROCHLORIDE 50 MG/1
TABLET, FILM COATED ORAL
Qty: 150 TAB | Refills: 2 | Status: SHIPPED | OUTPATIENT
Start: 2019-02-13 | End: 2019-11-10 | Stop reason: SDUPTHER

## 2019-02-13 NOTE — PROGRESS NOTES
Chief Complaint   Patient presents with    Well Male       Depression Screening:  3 most recent PHQ Screens 1/31/2019   PHQ Not Done -   Little interest or pleasure in doing things Not at all   Feeling down, depressed, irritable, or hopeless Not at all   Total Score PHQ 2 0       Learning Assessment:  No flowsheet data found. Abuse Screening:  Abuse Screening Questionnaire 6/25/2018   Do you ever feel afraid of your partner? N   Are you in a relationship with someone who physically or mentally threatens you? N   Is it safe for you to go home? Y       Fall Risk  Fall Risk Assessment, last 12 mths 1/31/2019   Able to walk? Yes   Fall in past 12 months? No   Fall with injury? -   Number of falls in past 12 months -   Fall Risk Score -         Advance Directive:  1. Do you have an advance directive in place? Patient Reply:on file         1. Have you been to the ER, urgent care clinic since your last visit? Hospitalized since your last visit? No    2. Have you seen or consulted any other health care providers outside of the [address] [place] [address] since your last visit? Include any pap smears or colon screening.  No

## 2019-02-14 VITALS
OXYGEN SATURATION: 98 % | TEMPERATURE: 98.7 F | HEART RATE: 75 BPM | SYSTOLIC BLOOD PRESSURE: 135 MMHG | DIASTOLIC BLOOD PRESSURE: 85 MMHG

## 2019-02-14 PROCEDURE — 3331090002 HH PPS REVENUE DEBIT

## 2019-02-14 PROCEDURE — 3331090001 HH PPS REVENUE CREDIT

## 2019-02-14 NOTE — TELEPHONE ENCOUNTER
Requested Prescriptions     Pending Prescriptions Disp Refills    SYMBICORT 160-4.5 mcg/actuation HFAA       Sig: Take 2 Puffs by inhalation two (2) times a day.

## 2019-02-15 ENCOUNTER — HOME CARE VISIT (OUTPATIENT)
Dept: SCHEDULING | Facility: HOME HEALTH | Age: 79
End: 2019-02-15
Payer: MEDICARE

## 2019-02-15 PROCEDURE — G0151 HHCP-SERV OF PT,EA 15 MIN: HCPCS

## 2019-02-15 PROCEDURE — 3331090001 HH PPS REVENUE CREDIT

## 2019-02-15 PROCEDURE — 3331090002 HH PPS REVENUE DEBIT

## 2019-02-15 RX ORDER — BUDESONIDE AND FORMOTEROL FUMARATE DIHYDRATE 160; 4.5 UG/1; UG/1
2 AEROSOL RESPIRATORY (INHALATION) 2 TIMES DAILY
Qty: 1 INHALER | Refills: 2 | Status: SHIPPED | OUTPATIENT
Start: 2019-02-15 | End: 2019-04-25 | Stop reason: SDUPTHER

## 2019-02-16 PROCEDURE — 3331090002 HH PPS REVENUE DEBIT

## 2019-02-16 PROCEDURE — 3331090001 HH PPS REVENUE CREDIT

## 2019-02-17 PROCEDURE — 3331090002 HH PPS REVENUE DEBIT

## 2019-02-17 PROCEDURE — 3331090001 HH PPS REVENUE CREDIT

## 2019-02-18 VITALS — SYSTOLIC BLOOD PRESSURE: 112 MMHG | HEART RATE: 104 BPM | OXYGEN SATURATION: 93 % | DIASTOLIC BLOOD PRESSURE: 62 MMHG

## 2019-02-18 PROCEDURE — 3331090002 HH PPS REVENUE DEBIT

## 2019-02-18 PROCEDURE — 3331090001 HH PPS REVENUE CREDIT

## 2019-02-19 ENCOUNTER — HOME CARE VISIT (OUTPATIENT)
Dept: SCHEDULING | Facility: HOME HEALTH | Age: 79
End: 2019-02-19
Payer: MEDICARE

## 2019-02-19 PROCEDURE — G0157 HHC PT ASSISTANT EA 15: HCPCS

## 2019-02-19 PROCEDURE — 3331090002 HH PPS REVENUE DEBIT

## 2019-02-19 PROCEDURE — 3331090001 HH PPS REVENUE CREDIT

## 2019-02-19 NOTE — PROGRESS NOTES
The patient presents to the office today with the chief complaint of Dizziness    HPI    The patient remains on Micardis for hypertension. he is tolerating the medications well. His BP is slightly high. The patient has continued complaints of dizziness - not orthostatic in nature. The patient persists with mild right sided weakness and apraxia from a previous stroke. Review of Systems   Respiratory: Negative for shortness of breath. Cardiovascular: Negative for chest pain and leg swelling. Neurological: Positive for dizziness. Allergies   Allergen Reactions    Pcn [Penicillins] Rash       Current Outpatient Medications   Medication Sig Dispense Refill    SYMBICORT 160-4.5 mcg/actuation HFAA Take 2 Puffs by inhalation two (2) times a day. 1 Inhaler 2    sertraline (ZOLOFT) 50 mg tablet TAKE ONE AND ONE-HALF TABLETS DAILY 150 Tab 2    modafinil (PROVIGIL) 100 mg tablet Take 1 Tab by mouth every morning. Max Daily Amount: 100 mg. 30 Tab 0    OXYGEN-AIR DELIVERY SYSTEMS 2 L/min by Nasal route daily.  meclizine (ANTIVERT) 12.5 mg tablet 1 tab twice per day 50 Tab 2    amLODIPine (NORVASC) 2.5 mg tablet 1 tab daily (Patient taking differently: Take 2.5 mg by mouth daily. 1 tab daily) 30 Tab 2    cetirizine (ZYRTEC) 10 mg tablet Take 10 mg by mouth daily.  PROVENTIL HFA 90 mcg/actuation inhaler Take 1 Puff by inhalation every six (6) hours as needed for Shortness of Breath.  acetaminophen (TYLENOL) 325 mg tablet Take 650 mg by mouth every six (6) hours as needed for Pain.  furosemide (LASIX) 20 mg tablet TAKE 1 TABLET BY MOUTH EACH MORNING ON MONDAYS, WEDNESDAYS, AND FRIDAYS (Patient taking differently: Take 20 mg by mouth daily. TAKE 1 TABLET BY MOUTH EACH MORNING ON MONDAYS, WEDNESDAYS, AND FRIDAYS) 45 Tab 2    clopidogrel (PLAVIX) 75 mg tab Take 1 Tab by mouth daily (with dinner).  90 Tab 4    levothyroxine (SYNTHROID) 150 mcg tablet TAKE 1 TABLET BY MOUTH ONCE DAILY (Patient taking differently: Take 150 mcg by mouth Daily (before breakfast). TAKE 1 TABLET BY MOUTH ONCE DAILY) 90 Tab 3    pantoprazole (PROTONIX) 40 mg tablet TAKE 1 TABLET BY MOUTH ONCE DAILY (Patient taking differently: Take 40 mg by mouth daily. TAKE 1 TABLET BY MOUTH ONCE DAILY) 90 Tab 3    telmisartan (MICARDIS) 40 mg tablet 1 tablet daily (stop Benazepril) 90 Tab 3    montelukast (SINGULAIR) 10 mg tablet Take 1 Tab by mouth daily. (Patient taking differently: Take 10 mg by mouth daily.) 90 Tab 3    aspirin delayed-release 81 mg tablet Take 81 mg by mouth daily.  Cholecalciferol, Vitamin D3, (VITAMIN D3) 2,000 unit cap capsule Take 2,000 Units by mouth daily.  cyanocobalamin 1,000 mcg tablet Take 1,000 mcg by mouth daily. 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  HDL 42, LDL 97    Dysphagia as late effect of cerebrovascular accident (CVA) 4/19/2017    Erectile dysfunction     Gastroesophageal reflux disease     Hemiparesis affecting right side as late effect of cerebrovascular accident (CVA) ([address] 4/19/2017    History of endogenous hypertriglyceridemia     History of malignant neoplasm of prostate     Genoveva score 7     Hypertension     Hypothyroidism     Osteoarthrosis involving multiple sites     Procedure refused 4/21/2017    Speech Pathologist recommended NPO and PEG placement; Patient refused    Urinary incontinence     Male incontinence        Past Surgical History:   Procedure Laterality Date    COLONOSCOPY  08/12/2011    HX CHOLECYSTECTOMY      HX HERNIA REPAIR Bilateral     inguinal    HX RADICAL PROSTATECTOMY  1-    perineal approach       Social History     Socioeconomic History    Marital status:      Spouse name: Not on file    Number of children: Not on file    Years of education: Not on file    Highest education level: Not on file   Social Needs    Financial resource strain: Not on file    Food insecurity - worry: Not on file    Food insecurity - inability: Not on file   [employer] needs - medical: Not on file   [employer] needs - non-medical: Not on file   Occupational History    Not on file   Tobacco Use    Smoking status: Never Smoker    Smokeless tobacco: Never Used   Substance and Sexual Activity    Alcohol use: No    Drug use: No    Sexual activity: Yes     Partners: Female   Other Topics Concern    Not on file   Social History Narrative    Not on file       Patient does have an advanced directive on file    Visit Vitals  /80 (BP 1 Location: Right arm, BP Patient Position: Sitting)   Pulse 63   Temp 98.4 °F (36.9 °C) (Tympanic)   Ht 5' 11\" (1.803 m)   SpO2 91%   BMI 27.20 kg/m²       Physical Exam   Neck: Carotid bruit is not present. Cardiovascular: Normal rate and regular rhythm. Exam reveals no gallop. No murmur heard. Pulmonary/Chest: He has no wheezes. He has no rales. Abdominal: Soft. He exhibits no distension. There is no tenderness. Musculoskeletal: He exhibits no edema.        BMI:  [facility] Outpatient Visit on 01/31/2019   Component Date Value Ref Range Status    WBC 01/31/2019 15.9* 4.6 - 13.2 K/uL Final    RBC 01/31/2019 4.41* 4.70 - 5.50 M/uL Final    HGB 01/31/2019 12.7* 13.0 - 16.0 g/dL Final    HCT 01/31/2019 37.6  36.0 - 48.0 % Final    MCV 01/31/2019 85.3  74.0 - 97.0 FL Final    MCH 01/31/2019 28.8 24.0 - 34.0 PG Final    MCHC 01/31/2019 33.8  31.0 - 37.0 g/dL Final    RDW 01/31/2019 15.6* 11.6 - 14.5 % Final    PLATELET 93/45/2730 953  135 - 420 K/uL Final    MPV 01/31/2019 10.9  9.2 - 11.8 FL Final    NEUTROPHILS 01/31/2019 78* 40 - 73 % Final    LYMPHOCYTES 01/31/2019 14* 21 - 52 % Final    MONOCYTES 01/31/2019 8  3 - 10 % Final    EOSINOPHILS 01/31/2019 0  0 - 5 % Final    BASOPHILS 01/31/2019 0  0 - 2 % Final    ABS. NEUTROPHILS 01/31/2019 12.5* 1.8 - 8.0 K/UL Final    ABS. LYMPHOCYTES 01/31/2019 2.2  0.9 - 3.6 K/UL Final    ABS. MONOCYTES 01/31/2019 1.2  0.05 - 1.2 K/UL Final    ABS. EOSINOPHILS 01/31/2019 0.0  0.0 - 0.4 K/UL Final    ABS. BASOPHILS 01/31/2019 0.0  0.0 - 0.1 K/UL Final    DF 01/31/2019 AUTOMATED    Final    Sodium 01/31/2019 130* 136 - 145 mmol/L Final    Potassium 01/31/2019 4.5  3.5 - 5.5 mmol/L Final    Chloride 01/31/2019 96* 100 - 108 mmol/L Final    CO2 01/31/2019 23  21 - 32 mmol/L Final    Anion gap 01/31/2019 11  3.0 - 18 mmol/L Final    Glucose 01/31/2019 103* 74 - 99 mg/dL Final    BUN 01/31/2019 23* 7.0 - 18 MG/DL Final    Creatinine 01/31/2019 1.30  0.6 - 1.3 MG/DL Final    BUN/Creatinine ratio 01/31/2019 18  12 - 20   Final    GFR est AA 01/31/2019 >60  >60 ml/min/1.73m2 Final    GFR est non-AA 01/31/2019 53* >60 ml/min/1.73m2 Final    Comment: (NOTE)  Estimated GFR is calculated using the Modification of Diet in Renal   Disease (MDRD) Study equation, reported for both  Americans   (GFRAA) and non- Americans (GFRNA), and normalized to 1.73m2   body surface area. The physician must decide which value applies to   the patient. The MDRD study equation should only be used in   individuals age 25 or older. It has not been validated for the   following: pregnant women, patients with serious comorbid conditions,   or on certain medications, or persons with extremes of body size,   muscle mass, or nutritional status.       Calcium 01/31/2019 10.2* 8.5 - 10.1 MG/DL Final    Bilirubin, total 01/31/2019 0.5  0.2 - 1.0 MG/DL Final    ALT (SGPT) 01/31/2019 19  16 - 61 U/L Final    AST (SGOT) 01/31/2019 13* 15 - 37 U/L Final    Alk. phosphatase 01/31/2019 68  45 - 117 U/L Final    Protein, total 01/31/2019 7.6  6.4 - 8.2 g/dL Final    Albumin 01/31/2019 3.8  3.4 - 5.0 g/dL Final    Globulin 01/31/2019 3.8  2.0 - 4.0 g/dL Final    A-G Ratio 01/31/2019 1.0  0.8 - 1.7   Final   Hospital Outpatient Visit on 12/22/2018   Component Date Value Ref Range Status    Occult blood fecal, by IA 12/22/2018 NEGATIVE   NEGATIVE   Final    Comment: (NOTE)  Performed At: [address] [id number]   Tello Watts 97 [id number]     Hospital Outpatient Visit on 12/17/2018   Component Date Value Ref Range Status    Sodium 12/17/2018 136  136 - 145 mmol/L Final    Potassium 12/17/2018 4.7  3.5 - 5.5 mmol/L Final    Chloride 12/17/2018 103  100 - 108 mmol/L Final    CO2 12/17/2018 24  21 - 32 mmol/L Final    Anion gap 12/17/2018 9  3.0 - 18 mmol/L Final    Glucose 12/17/2018 104* 74 - 99 mg/dL Final    BUN 12/17/2018 35* 7.0 - 18 MG/DL Final    Creatinine 12/17/2018 1.57* 0.6 - 1.3 MG/DL Final    BUN/Creatinine ratio 12/17/2018 22* 12 - 20   Final    GFR est AA 12/17/2018 52* >60 ml/min/1.73m2 Final    GFR est non-AA 12/17/2018 43* >60 ml/min/1.73m2 Final    Comment: (NOTE)  Estimated GFR is calculated using the Modification of Diet in Renal   Disease (MDRD) Study equation, reported for both  Americans   (GFRAA) and non- Americans (GFRNA), and normalized to 1.73m2   body surface area. The physician must decide which value applies to   the patient. The MDRD study equation should only be used in   individuals age 25 or older.  It has not been validated for the   following: pregnant women, patients with serious comorbid conditions,   or on certain medications, or persons with extremes of body size,   muscle mass, or nutritional status.  Calcium 12/17/2018 9.3  8.5 - 10.1 MG/DL Final    Sjogren's Anti-SS-A 12/17/2018 <0.2  0.0 - 0.9 AI Final    Sjogren's Anti-SS-B 12/17/2018 <0.2  0.0 - 0.9 AI Final    Comment: (NOTE)  Performed At: [address] [id number]  Fiona Kaplan MD [id number]         . No results found for any visits on 02/13/19. Assessment / Plan      ICD-10-CM ICD-9-CM    1. Dizziness R42 780.4 MRI BRAIN W CONT      REFERRAL TO NEUROLOGY      CANCELED: REFERRAL TO NEUROLOGY   2. Late effect of ischemic cerebral stroke I69.30 438.9 modafinil (PROVIGIL) 100 mg tablet      MRI BRAIN W CONT      REFERRAL TO NEUROLOGY      CANCELED: REFERRAL TO NEUROLOGY   3. Essential hypertension I10 401.9        MRI head  Add Norvasc 2.5 mg  he was advised to continue his maintenance medications  To Neurology    Follow-up Disposition:  Return in about 3 months (around 5/13/2019). I asked Rosa Noriega [facility] if he has any questions and I answered the questions. Stewart APONTE  [facility] states that he understands the treatment plan and agrees with the treatment plan

## 2019-02-20 VITALS
TEMPERATURE: 98.7 F | OXYGEN SATURATION: 97 % | SYSTOLIC BLOOD PRESSURE: 140 MMHG | HEART RATE: 90 BPM | DIASTOLIC BLOOD PRESSURE: 75 MMHG

## 2019-02-20 PROCEDURE — 3331090001 HH PPS REVENUE CREDIT

## 2019-02-20 PROCEDURE — 3331090002 HH PPS REVENUE DEBIT

## 2019-02-21 ENCOUNTER — HOME CARE VISIT (OUTPATIENT)
Dept: SCHEDULING | Facility: HOME HEALTH | Age: 79
End: 2019-02-21
Payer: MEDICARE

## 2019-02-21 PROCEDURE — G0157 HHC PT ASSISTANT EA 15: HCPCS

## 2019-02-21 PROCEDURE — 3331090001 HH PPS REVENUE CREDIT

## 2019-02-21 PROCEDURE — 3331090002 HH PPS REVENUE DEBIT

## 2019-02-22 ENCOUNTER — HOME CARE VISIT (OUTPATIENT)
Dept: SCHEDULING | Facility: HOME HEALTH | Age: 79
End: 2019-02-22
Payer: MEDICARE

## 2019-02-22 VITALS
OXYGEN SATURATION: 97 % | TEMPERATURE: 98.6 F | SYSTOLIC BLOOD PRESSURE: 148 MMHG | TEMPERATURE: 98.7 F | DIASTOLIC BLOOD PRESSURE: 76 MMHG | HEART RATE: 92 BPM | DIASTOLIC BLOOD PRESSURE: 80 MMHG | SYSTOLIC BLOOD PRESSURE: 119 MMHG | HEART RATE: 86 BPM | OXYGEN SATURATION: 98 %

## 2019-02-22 PROCEDURE — G0157 HHC PT ASSISTANT EA 15: HCPCS

## 2019-02-22 PROCEDURE — 3331090002 HH PPS REVENUE DEBIT

## 2019-02-22 PROCEDURE — 3331090001 HH PPS REVENUE CREDIT

## 2019-02-23 PROCEDURE — 3331090001 HH PPS REVENUE CREDIT

## 2019-02-23 PROCEDURE — 3331090002 HH PPS REVENUE DEBIT

## 2019-02-24 PROCEDURE — 3331090001 HH PPS REVENUE CREDIT

## 2019-02-24 PROCEDURE — 3331090002 HH PPS REVENUE DEBIT

## 2019-02-25 ENCOUNTER — HOME CARE VISIT (OUTPATIENT)
Dept: SCHEDULING | Facility: HOME HEALTH | Age: 79
End: 2019-02-25
Payer: MEDICARE

## 2019-02-25 PROCEDURE — G0157 HHC PT ASSISTANT EA 15: HCPCS

## 2019-02-25 PROCEDURE — 3331090001 HH PPS REVENUE CREDIT

## 2019-02-25 PROCEDURE — 3331090002 HH PPS REVENUE DEBIT

## 2019-02-26 PROCEDURE — 3331090001 HH PPS REVENUE CREDIT

## 2019-02-26 PROCEDURE — 3331090002 HH PPS REVENUE DEBIT

## 2019-02-27 ENCOUNTER — HOME CARE VISIT (OUTPATIENT)
Dept: SCHEDULING | Facility: HOME HEALTH | Age: 79
End: 2019-02-27
Payer: MEDICARE

## 2019-02-27 VITALS
HEART RATE: 77 BPM | SYSTOLIC BLOOD PRESSURE: 127 MMHG | DIASTOLIC BLOOD PRESSURE: 70 MMHG | TEMPERATURE: 98.7 F | OXYGEN SATURATION: 96 %

## 2019-02-27 PROCEDURE — G0157 HHC PT ASSISTANT EA 15: HCPCS

## 2019-02-27 PROCEDURE — 3331090002 HH PPS REVENUE DEBIT

## 2019-02-27 PROCEDURE — 3331090001 HH PPS REVENUE CREDIT

## 2019-02-28 VITALS
SYSTOLIC BLOOD PRESSURE: 138 MMHG | OXYGEN SATURATION: 97 % | TEMPERATURE: 98.4 F | HEART RATE: 77 BPM | DIASTOLIC BLOOD PRESSURE: 84 MMHG

## 2019-02-28 PROCEDURE — 3331090002 HH PPS REVENUE DEBIT

## 2019-02-28 PROCEDURE — 3331090001 HH PPS REVENUE CREDIT

## 2019-03-01 ENCOUNTER — HOME CARE VISIT (OUTPATIENT)
Dept: SCHEDULING | Facility: HOME HEALTH | Age: 79
End: 2019-03-01
Payer: MEDICARE

## 2019-03-01 PROCEDURE — 3331090002 HH PPS REVENUE DEBIT

## 2019-03-01 PROCEDURE — 3331090001 HH PPS REVENUE CREDIT

## 2019-03-01 PROCEDURE — G0151 HHCP-SERV OF PT,EA 15 MIN: HCPCS

## 2019-03-01 PROCEDURE — 3331090003 HH PPS REVENUE ADJ

## 2019-03-02 PROCEDURE — 3331090002 HH PPS REVENUE DEBIT

## 2019-03-02 PROCEDURE — 3331090001 HH PPS REVENUE CREDIT

## 2019-03-03 VITALS
OXYGEN SATURATION: 93 % | SYSTOLIC BLOOD PRESSURE: 144 MMHG | DIASTOLIC BLOOD PRESSURE: 80 MMHG | TEMPERATURE: 98.4 F | HEART RATE: 100 BPM

## 2019-03-03 PROCEDURE — 3331090001 HH PPS REVENUE CREDIT

## 2019-03-03 PROCEDURE — 3331090002 HH PPS REVENUE DEBIT

## 2019-03-11 ENCOUNTER — OFFICE VISIT (OUTPATIENT)
Dept: INTERNAL MEDICINE CLINIC | Age: 79
End: 2019-03-11

## 2019-03-11 VITALS
RESPIRATION RATE: 16 BRPM | SYSTOLIC BLOOD PRESSURE: 120 MMHG | HEIGHT: 70 IN | DIASTOLIC BLOOD PRESSURE: 70 MMHG | HEART RATE: 82 BPM | BODY MASS INDEX: 28.06 KG/M2 | TEMPERATURE: 98.7 F | WEIGHT: 196 LBS | OXYGEN SATURATION: 93 %

## 2019-03-11 DIAGNOSIS — I10 ESSENTIAL HYPERTENSION: ICD-10-CM

## 2019-03-11 DIAGNOSIS — I69.30 LATE EFFECT OF ISCHEMIC CEREBRAL STROKE: ICD-10-CM

## 2019-03-11 DIAGNOSIS — R42 DIZZINESS: ICD-10-CM

## 2019-03-11 RX ORDER — AMLODIPINE BESYLATE 2.5 MG/1
TABLET ORAL
Qty: 90 TAB | Refills: 3 | Status: SHIPPED | OUTPATIENT
Start: 2019-03-11

## 2019-03-11 NOTE — PROGRESS NOTES
No chief complaint on file. Pt preferred language for health care discussion is english. Is someone accompanying this pt? Yes daughter-n-law    Is the patient using any DME equipment during OV? O2    Depression Screening:  3 most recent [facility] Screens 1/31/2019 12/17/2018 9/17/2018 6/25/2018 6/5/2017 6/28/2016   PHQ Not Done - - - Active Diagnosis of Depression or Bipolar Disorder - -   Little interest or pleasure in doing things Not at all Not at all Not at all - Not at all Not at all   Feeling down, depressed, irritable, or hopeless Not at all Not at all Not at all - Not at all Not at all   Total Score PHQ 2 0 0 0 - 0 0       Learning Assessment:  No flowsheet data found. Abuse Screening:  Abuse Screening Questionnaire 6/25/2018   Do you ever feel afraid of your partner? N   Are you in a relationship with someone who physically or mentally threatens you? N   Is it safe for you to go home? Y       Fall Risk  Fall Risk Assessment, last 12 mths 1/31/2019   Able to walk? Yes   Fall in past 12 months? No   Fall with injury? -   Number of falls in past 12 months -   Fall Risk Score -           Advance Directive:  1. Do you have an advance directive in place? Patient Reply:yes     2. If not, would you like material regarding how to put one in place? Patient Reply: no    .      Coordination of Care:  1. Have you been to the ER, urgent care clinic since your last visit? Hospitalized since your last visit? no    2. Have you seen or consulted any other health care providers outside of the [address] [place] [address] since your last visit? Include any pap smears or colon screening.  no

## 2019-03-13 NOTE — PROGRESS NOTES
The patient presents to the office today with the chief complaint of dizziness    HPI    The patient is status post an evaluation for dizziness. The MRI did not show any new events - the old CVA was still visible. The patient has had resolution of the dizziness. He has persistent right sided weakness and dysarthria from the old CVA which is unchanged. The patient remains on medications for hypertension. He is tolerating the medication well. Review of Systems   Respiratory: Negative for shortness of breath. Cardiovascular: Negative for chest pain and leg swelling. Allergies   Allergen Reactions    Pcn [Penicillins] Rash       Current Outpatient Medications   Medication Sig Dispense Refill    amLODIPine (NORVASC) 2.5 mg tablet 1 tab daily 90 Tab 3    budesonide (PULMICORT FLEXHALER) 90 mcg/actuation aepb inhaler Inhale 2 puffs daily by inhalation route. 3 Inhaler 3    SYMBICORT 160-4.5 mcg/actuation HFAA Take 2 Puffs by inhalation two (2) times a day. 1 Inhaler 2    sertraline (ZOLOFT) 50 mg tablet TAKE ONE AND ONE-HALF TABLETS DAILY 150 Tab 2    modafinil (PROVIGIL) 100 mg tablet Take 1 Tab by mouth every morning. Max Daily Amount: 100 mg. 30 Tab 0    OXYGEN-AIR DELIVERY SYSTEMS 2 L/min by Nasal route daily.  meclizine (ANTIVERT) 12.5 mg tablet 1 tab twice per day 50 Tab 2    cetirizine (ZYRTEC) 10 mg tablet Take 10 mg by mouth daily.  PROVENTIL HFA 90 mcg/actuation inhaler Take 1 Puff by inhalation every six (6) hours as needed for Shortness of Breath.  acetaminophen (TYLENOL) 325 mg tablet Take 650 mg by mouth every six (6) hours as needed for Pain.  furosemide (LASIX) 20 mg tablet TAKE 1 TABLET BY MOUTH EACH MORNING ON MONDAYS, WEDNESDAYS, AND FRIDAYS (Patient taking differently: Take 20 mg by mouth daily. TAKE 1 TABLET BY MOUTH EACH MORNING ON MONDAYS, WEDNESDAYS, AND FRIDAYS) 45 Tab 2    clopidogrel (PLAVIX) 75 mg tab Take 1 Tab by mouth daily (with dinner).  80 Tab 4    levothyroxine (SYNTHROID) 150 mcg tablet TAKE 1 TABLET BY MOUTH ONCE DAILY (Patient taking differently: Take 150 mcg by mouth Daily (before breakfast). TAKE 1 TABLET BY MOUTH ONCE DAILY) 90 Tab 3    pantoprazole (PROTONIX) 40 mg tablet TAKE 1 TABLET BY MOUTH ONCE DAILY (Patient taking differently: Take 40 mg by mouth daily. TAKE 1 TABLET BY MOUTH ONCE DAILY) 90 Tab 3    telmisartan (MICARDIS) 40 mg tablet 1 tablet daily (stop Benazepril) 90 Tab 3    montelukast (SINGULAIR) 10 mg tablet Take 1 Tab by mouth daily. (Patient taking differently: Take 10 mg by mouth daily.) 90 Tab 3    aspirin delayed-release 81 mg tablet Take 81 mg by mouth daily.  Cholecalciferol, Vitamin D3, (VITAMIN D3) 2,000 unit cap capsule Take 2,000 Units by mouth daily.  cyanocobalamin 1,000 mcg tablet Take 1,000 mcg by mouth daily. 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  HDL 42, LDL 97    Dysphagia as late effect of cerebrovascular accident (CVA) 4/19/2017    Erectile dysfunction     Gastroesophageal reflux disease     Hemiparesis affecting right side as late effect of cerebrovascular accident (CVA) ([address] 4/19/2017    History of endogenous hypertriglyceridemia     History of malignant neoplasm of prostate     Genoveva score 7     Hypertension     Hypothyroidism     Osteoarthrosis involving multiple sites     Procedure refused 4/21/2017    Speech Pathologist recommended NPO and PEG placement; Patient refused    Urinary incontinence     Male incontinence        Past Surgical History:   Procedure Laterality Date    COLONOSCOPY  08/12/2011    HX CHOLECYSTECTOMY      HX HERNIA REPAIR Bilateral     inguinal    HX RADICAL PROSTATECTOMY  1-    perineal approach       Social History     Socioeconomic History    Marital status:      Spouse name: Not on file    Number of children: Not on file    Years of education: Not on file    Highest education level: Not on file   Social Needs    Financial resource strain: Not on file    Food insecurity - worry: Not on file    Food insecurity - inability: Not on file   [employer] needs - medical: Not on file   [employer] needs - non-medical: Not on file   Occupational History    Not on file   Tobacco Use    Smoking status: Never Smoker    Smokeless tobacco: Never Used   Substance and Sexual Activity    Alcohol use: No    Drug use: No    Sexual activity: Yes     Partners: Female   Other Topics Concern    Not on file   Social History Narrative    Not on file       Patient does have an advanced directive on file    Visit Vitals  /70 (BP 1 Location: Right arm, BP Patient Position: Sitting)   Pulse 82   Temp 98.7 °F (37.1 °C) (Tympanic)   Resp 16   Ht 5' 10\" (1.778 m)   Wt 196 lb (88.9 kg)   SpO2 93%   BMI 28.12 kg/m²       Physical Exam   Neck: Carotid bruit is not present. Cardiovascular: Normal rate and regular rhythm. Exam reveals no gallop. No murmur heard. Pulmonary/Chest: He has no wheezes. He has no rales. Abdominal: Soft. He exhibits no distension. There is no tenderness. Musculoskeletal: He exhibits no edema.    Neurological:   Mild right sided weakness       BMI:  [facility] Outpatient Visit on 02/20/2019   Component Date Value Ref Range Status    Creatinine 02/20/2019 1.4* 0.6 - 1.3 mg/dl Final    : [facility] Outpatient Visit on 01/31/2019   Component Date Value Ref Range Status    WBC 01/31/2019 15.9* 4.6 - 13.2 K/uL Final    RBC 01/31/2019 4.41* 4.70 - 5.50 M/uL Final    HGB 01/31/2019 12.7* 13.0 - 16.0 g/dL Final    HCT 01/31/2019 37.6  36.0 - 48.0 % Final    MCV 01/31/2019 85.3  74.0 - 97.0 FL Final    MCH 01/31/2019 28.8  24.0 - 34.0 PG Final    MCHC 01/31/2019 33.8  31.0 - 37.0 g/dL Final    RDW 01/31/2019 15.6* 11.6 - 14.5 % Final    PLATELET 40/50/3745 694  135 - 420 K/uL Final    MPV 01/31/2019 10.9  9.2 - 11.8 FL Final    NEUTROPHILS 01/31/2019 78* 40 - 73 % Final    LYMPHOCYTES 01/31/2019 14* 21 - 52 % Final    MONOCYTES 01/31/2019 8  3 - 10 % Final    EOSINOPHILS 01/31/2019 0  0 - 5 % Final    BASOPHILS 01/31/2019 0  0 - 2 % Final    ABS. NEUTROPHILS 01/31/2019 12.5* 1.8 - 8.0 K/UL Final    ABS. LYMPHOCYTES 01/31/2019 2.2  0.9 - 3.6 K/UL Final    ABS. MONOCYTES 01/31/2019 1.2  0.05 - 1.2 K/UL Final    ABS. EOSINOPHILS 01/31/2019 0.0  0.0 - 0.4 K/UL Final    ABS. BASOPHILS 01/31/2019 0.0  0.0 - 0.1 K/UL Final    DF 01/31/2019 AUTOMATED    Final    Sodium 01/31/2019 130* 136 - 145 mmol/L Final    Potassium 01/31/2019 4.5  3.5 - 5.5 mmol/L Final    Chloride 01/31/2019 96* 100 - 108 mmol/L Final    CO2 01/31/2019 23  21 - 32 mmol/L Final    Anion gap 01/31/2019 11  3.0 - 18 mmol/L Final    Glucose 01/31/2019 103* 74 - 99 mg/dL Final    BUN 01/31/2019 23* 7.0 - 18 MG/DL Final    Creatinine 01/31/2019 1.30  0.6 - 1.3 MG/DL Final    BUN/Creatinine ratio 01/31/2019 18  12 - 20   Final    GFR est AA 01/31/2019 >60  >60 ml/min/1.73m2 Final    GFR est non-AA 01/31/2019 53* >60 ml/min/1.73m2 Final    Comment: (NOTE)  Estimated GFR is calculated using the Modification of Diet in Renal   Disease (MDRD) Study equation, reported for both  Americans   (GFRAA) and non- Americans (GFRNA), and normalized to 1.73m2   body surface area. The physician must decide which value applies to   the patient. The MDRD study equation should only be used in   individuals age 25 or older. It has not been validated for the   following: pregnant women, patients with serious comorbid conditions,   or on certain medications, or persons with extremes of body size,   muscle mass, or nutritional status.  Calcium 01/31/2019 10.2* 8.5 - 10.1 MG/DL Final    Bilirubin, total 01/31/2019 0.5  0.2 - 1.0 MG/DL Final    ALT (SGPT) 01/31/2019 19  16 - 61 U/L Final    AST (SGOT) 01/31/2019 13* 15 - 37 U/L Final    Alk. phosphatase 01/31/2019 68  45 - 117 U/L Final    Protein, total 01/31/2019 7.6  6.4 - 8.2 g/dL Final    Albumin 01/31/2019 3.8  3.4 - 5.0 g/dL Final    Globulin 01/31/2019 3.8  2.0 - 4.0 g/dL Final    A-G Ratio 01/31/2019 1.0  0.8 - 1.7   Final   Hospital Outpatient Visit on 12/22/2018   Component Date Value Ref Range Status    Occult blood fecal, by IA 12/22/2018 NEGATIVE   NEGATIVE   Final    Comment: (NOTE)  Performed At: [address] [id number]   Tello Watts 97 [id number]     Hospital Outpatient Visit on 12/17/2018   Component Date Value Ref Range Status    Sodium 12/17/2018 136  136 - 145 mmol/L Final    Potassium 12/17/2018 4.7  3.5 - 5.5 mmol/L Final    Chloride 12/17/2018 103  100 - 108 mmol/L Final    CO2 12/17/2018 24  21 - 32 mmol/L Final    Anion gap 12/17/2018 9  3.0 - 18 mmol/L Final    Glucose 12/17/2018 104* 74 - 99 mg/dL Final    BUN 12/17/2018 35* 7.0 - 18 MG/DL Final    Creatinine 12/17/2018 1.57* 0.6 - 1.3 MG/DL Final    BUN/Creatinine ratio 12/17/2018 22* 12 - 20   Final    GFR est AA 12/17/2018 52* >60 ml/min/1.73m2 Final    GFR est non-AA 12/17/2018 43* >60 ml/min/1.73m2 Final    Comment: (NOTE)  Estimated GFR is calculated using the Modification of Diet in Renal   Disease (MDRD) Study equation, reported for both  Americans   (GFRAA) and non- Americans (GFRNA), and normalized to 1.73m2   body surface area.  The physician must decide which value applies to   the patient. The MDRD study equation should only be used in   individuals age 25 or older. It has not been validated for the   following: pregnant women, patients with serious comorbid conditions,   or on certain medications, or persons with extremes of body size,   muscle mass, or nutritional status.  Calcium 12/17/2018 9.3  8.5 - 10.1 MG/DL Final    Sjogren's Anti-SS-A 12/17/2018 <0.2  0.0 - 0.9 AI Final    Sjogren's Anti-SS-B 12/17/2018 <0.2  0.0 - 0.9 AI Final    Comment: (NOTE)  Performed At: [address] [id number]  Nicole Cedeno MD :[id number]         . No results found for any visits on 03/11/19. Assessment / Plan      ICD-10-CM ICD-9-CM    1. Late effect of ischemic cerebral stroke I69.30 438.9    2. Essential hypertension I10 401.9    3. Dizziness R42 780.4        he was advised to continue his maintenance medications      Follow-up Disposition:  Return in about 3 months (around 6/11/2019). I asked [facility] if he has any questions and I answered the questions. Stewart APONTE  [facility] states that he understands the treatment plan and agrees with the treatment plan

## 2019-04-01 ENCOUNTER — OFFICE VISIT (OUTPATIENT)
Dept: FAMILY MEDICINE CLINIC | Facility: CLINIC | Age: 79
End: 2019-04-01

## 2019-04-01 VITALS
BODY MASS INDEX: 27.72 KG/M2 | HEART RATE: 82 BPM | DIASTOLIC BLOOD PRESSURE: 72 MMHG | OXYGEN SATURATION: 98 % | WEIGHT: 198 LBS | HEIGHT: 71 IN | SYSTOLIC BLOOD PRESSURE: 118 MMHG | TEMPERATURE: 97.4 F | RESPIRATION RATE: 16 BRPM

## 2019-04-01 DIAGNOSIS — J84.10 PULMONARY FIBROSIS (HCC): ICD-10-CM

## 2019-04-01 DIAGNOSIS — I10 ESSENTIAL HYPERTENSION: ICD-10-CM

## 2019-04-01 DIAGNOSIS — R42 DIZZINESS: Primary | ICD-10-CM

## 2019-04-01 DIAGNOSIS — I69.351 HEMIPARESIS AFFECTING RIGHT SIDE AS LATE EFFECT OF CEREBROVASCULAR ACCIDENT (CVA) (HCC): ICD-10-CM

## 2019-04-01 RX ORDER — FLUDROCORTISONE ACETATE 0.1 MG/1
TABLET ORAL
Qty: 30 TAB | Refills: 1 | Status: SHIPPED | OUTPATIENT
Start: 2019-04-01

## 2019-04-01 RX ORDER — FUROSEMIDE 20 MG/1
TABLET ORAL
Qty: 45 TAB | Refills: 2 | Status: SHIPPED | OUTPATIENT
Start: 2019-04-01

## 2019-04-03 NOTE — PROGRESS NOTES
The patient presents to the office today with the chief complaint of dizziness HPI The patient complains of months of intermittent dizziness. The problem occurs after the patient stands and persists several minutes after the patient is on his feet. The patient remains on Norvasc for hypertension. He is tolerating the medication well. The patient persists with right sided weakness from an old CVA. This has been stable. The patient has chronic respiratory failure requiring O2. The patient has been found to have interstitial fibrosis. Review of Systems Respiratory: Positive for shortness of breath. Cardiovascular: Negative for chest pain and leg swelling. Allergies Allergen Reactions  Pcn [Penicillins] Rash Current Outpatient Medications Medication Sig Dispense Refill  fludrocortisone (FLORINEF) 0.1 mg tablet 1 tab daily 30 Tab 1  
 furosemide (LASIX) 20 mg tablet TAKE 1 TABLET BY MOUTH EACH MORNING ON MONDAYS, WEDNESDAYS, AND FRIDAYS 45 Tab 2  
 amLODIPine (NORVASC) 2.5 mg tablet 1 tab daily 90 Tab 3  
 SYMBICORT 160-4.5 mcg/actuation HFAA Take 2 Puffs by inhalation two (2) times a day. 1 Inhaler 2  
 sertraline (ZOLOFT) 50 mg tablet TAKE ONE AND ONE-HALF TABLETS DAILY 150 Tab 2  
 OXYGEN-AIR DELIVERY SYSTEMS 2 L/min by Nasal route daily.  cetirizine (ZYRTEC) 10 mg tablet Take 10 mg by mouth daily.  PROVENTIL HFA 90 mcg/actuation inhaler Take 1 Puff by inhalation every six (6) hours as needed for Shortness of Breath.  acetaminophen (TYLENOL) 325 mg tablet Take 650 mg by mouth every six (6) hours as needed for Pain.  clopidogrel (PLAVIX) 75 mg tab Take 1 Tab by mouth daily (with dinner). 90 Tab 4  
 levothyroxine (SYNTHROID) 150 mcg tablet TAKE 1 TABLET BY MOUTH ONCE DAILY (Patient taking differently: Take 150 mcg by mouth Daily (before breakfast).  TAKE 1 TABLET BY MOUTH ONCE DAILY) 90 Tab 3  
  pantoprazole (PROTONIX) 40 mg tablet TAKE 1 TABLET BY MOUTH ONCE DAILY (Patient taking differently: Take 40 mg by mouth daily. TAKE 1 TABLET BY MOUTH ONCE DAILY) 90 Tab 3  
 telmisartan (MICARDIS) 40 mg tablet 1 tablet daily (stop Benazepril) 90 Tab 3  
 montelukast (SINGULAIR) 10 mg tablet Take 1 Tab by mouth daily. (Patient taking differently: Take 10 mg by mouth daily.) 90 Tab 3  
 aspirin delayed-release 81 mg tablet Take 81 mg by mouth daily.  Cholecalciferol, Vitamin D3, (VITAMIN D3) 2,000 unit cap capsule Take 2,000 Units by mouth daily.  cyanocobalamin 1,000 mcg tablet Take 1,000 mcg by mouth daily. Past Medical History:  
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 HDL 42, LDL 97  Dysphagia as late effect of cerebrovascular accident (CVA) 4/19/2017  Erectile dysfunction  Gastroesophageal reflux disease  Hemiparesis affecting right side as late effect of cerebrovascular accident (CVA) ([address] 4/19/2017  History of endogenous hypertriglyceridemia  History of malignant neoplasm of prostate Genoveva score 7  Hypertension  Hypothyroidism  Osteoarthrosis involving multiple sites  Procedure refused 4/21/2017 Speech Pathologist recommended NPO and PEG placement; Patient refused  Urinary incontinence Male incontinence Past Surgical History:  
Procedure Laterality Date  COLONOSCOPY  08/12/2011  HX CHOLECYSTECTOMY  HX HERNIA REPAIR Bilateral   
 inguinal  
 HX RADICAL PROSTATECTOMY  1-  
 perineal approach Social History Socioeconomic History  Marital status:  Spouse name: Not on file  Number of children: Not on file  Years of education: Not on file  Highest education level: Not on file Occupational History  Not on file Social Needs  Financial resource strain: Not on file  Food insecurity:  
  Worry: Not on file Inability: Not on file  Transportation needs:  
  Medical: Not on file Non-medical: Not on file Tobacco Use  Smoking status: Never Smoker  Smokeless tobacco: Never Used Substance and Sexual Activity  Alcohol use: No  
 Drug use: No  
 Sexual activity: Yes  
  Partners: Female Lifestyle  Physical activity:  
  Days per week: Not on file Minutes per session: Not on file  Stress: Not on file Relationships  Social connections:  
  Talks on phone: Not on file Gets together: Not on file Attends Gnosticist service: Not on file Active member of club or organization: Not on file Attends meetings of clubs or organizations: Not on file Relationship status: Not on file  Intimate partner violence:  
  Fear of current or ex partner: Not on file Emotionally abused: Not on file Physically abused: Not on file Forced sexual activity: Not on file Other Topics Concern  Not on file Social History Narrative  Not on file Patient does have an advanced directive on file Visit Vitals /72 Pulse 82 Temp 97.4 °F (36.3 °C) (Tympanic) Resp 16 Ht 5' 11\" (1.803 m) Wt 198 lb (89.8 kg) SpO2 98% BMI 27.62 kg/m² Physical Exam  
Neck: Carotid bruit is not present. Cardiovascular: Normal rate and regular rhythm. Exam reveals no gallop. No murmur heard. BP without orthostatic drop Pulmonary/Chest: He has no wheezes. He has no rales. Abdominal: Soft. He exhibits no distension. There is no tenderness. Musculoskeletal: He exhibits no edema. BMI:  OK Hospital Outpatient Visit on 03/25/2019 Component Date Value Ref Range Status  METHYLMALONIC ACID, SERUM 03/25/2019 353* 87 - 318 nmol/L Final  
 Comment: This test was developed and its analytical performance 
characteristics have been determined by [facility]. It has 
not been cleared or approved by the U.S. Food and Drug Administration. This assay has been validated pursuant 
to the CLIA regulations and is used for clinical 
purposes. Test Performed by Carlene Block, 
[facility], 
[address] [place] [address], [address] [place] [address] Lacy Charles M.D., Ph.D., Director of Laboratories 
[phone]  Vitamin B12 03/25/2019 705  193 - 986 pg/ml Final  
Hospital Outpatient Visit on 02/20/2019 Component Date Value Ref Range Status  Creatinine 02/20/2019 1.4* 0.6 - 1.3 mg/dl Final  
 : [id number] Hospital Outpatient Visit on 01/31/2019 Component Date Value Ref Range Status  WBC 01/31/2019 15.9* 4.6 - 13.2 K/uL Final  
 RBC 01/31/2019 4.41* 4.70 - 5.50 M/uL Final  
 HGB 01/31/2019 12.7* 13.0 - 16.0 g/dL Final  
 HCT 01/31/2019 37.6  36.0 - 48.0 % Final  
 MCV 01/31/2019 85.3  74.0 - 97.0 FL Final  
 MCH 01/31/2019 28.8  24.0 - 34.0 PG Final  
 MCHC 01/31/2019 33.8  31.0 - 37.0 g/dL Final  
 RDW 01/31/2019 15.6* 11.6 - 14.5 % Final  
 PLATELET 43/00/2249 506  135 - 420 K/uL Final  
 MPV 01/31/2019 10.9  9.2 - 11.8 FL Final  
 NEUTROPHILS 01/31/2019 78* 40 - 73 % Final  
 LYMPHOCYTES 01/31/2019 14* 21 - 52 % Final  
 MONOCYTES 01/31/2019 8  3 - 10 % Final  
 EOSINOPHILS 01/31/2019 0  0 - 5 % Final  
 BASOPHILS 01/31/2019 0  0 - 2 % Final  
 ABS.  NEUTROPHILS 01/31/2019 12.5* 1.8 - 8.0 K/UL Final  
  ABS. LYMPHOCYTES 01/31/2019 2.2  0.9 - 3.6 K/UL Final  
 ABS. MONOCYTES 01/31/2019 1.2  0.05 - 1.2 K/UL Final  
 ABS. EOSINOPHILS 01/31/2019 0.0  0.0 - 0.4 K/UL Final  
 ABS. BASOPHILS 01/31/2019 0.0  0.0 - 0.1 K/UL Final  
 DF 01/31/2019 AUTOMATED    Final  
 Sodium 01/31/2019 130* 136 - 145 mmol/L Final  
 Potassium 01/31/2019 4.5  3.5 - 5.5 mmol/L Final  
 Chloride 01/31/2019 96* 100 - 108 mmol/L Final  
 CO2 01/31/2019 23  21 - 32 mmol/L Final  
 Anion gap 01/31/2019 11  3.0 - 18 mmol/L Final  
 Glucose 01/31/2019 103* 74 - 99 mg/dL Final  
 BUN 01/31/2019 23* 7.0 - 18 MG/DL Final  
 Creatinine 01/31/2019 1.30  0.6 - 1.3 MG/DL Final  
 BUN/Creatinine ratio 01/31/2019 18  12 - 20   Final  
 GFR est AA 01/31/2019 >60  >60 ml/min/1.73m2 Final  
 GFR est non-AA 01/31/2019 53* >60 ml/min/1.73m2 Final  
 Comment: (NOTE) Estimated GFR is calculated using the Modification of Diet in Renal  
Disease (MDRD) Study equation, reported for both  [facility]) and non- Americans (GFRNA), and normalized to 1.73m2  
body surface area. The physician must decide which value applies to  
the patient. The MDRD study equation should only be used in  
individuals age 25 or older. It has not been validated for the  
following: pregnant women, patients with serious comorbid conditions,  
or on certain medications, or persons with extremes of body size,  
muscle mass, or nutritional status.  Calcium 01/31/2019 10.2* 8.5 - 10.1 MG/DL Final  
 Bilirubin, total 01/31/2019 0.5  0.2 - 1.0 MG/DL Final  
 ALT (SGPT) 01/31/2019 19  16 - 61 U/L Final  
 AST (SGOT) 01/31/2019 13* 15 - 37 U/L Final  
 Alk. phosphatase 01/31/2019 68  45 - 117 U/L Final  
 Protein, total 01/31/2019 7.6  6.4 - 8.2 g/dL Final  
 Albumin 01/31/2019 3.8  3.4 - 5.0 g/dL Final  
 Globulin 01/31/2019 3.8  2.0 - 4.0 g/dL Final  
 A-G Ratio 01/31/2019 1.0  0.8 - 1.7   Final  
 
 
.No results found for any visits on 04/01/19. Assessment / Plan ICD-10-CM ICD-9-CM 1. Dizziness R42 780.4 2. Hemiparesis affecting right side as late effect of cerebrovascular accident (CVA) ([facility] Q73.164 984.44 3. Pulmonary fibrosis ([facility] J84.10 515   
4. Essential hypertension I10 401.9 Add florinef and follow symptoms 
he was advised to continue his maintenance medications Continue O2 Follow-up and Dispositions · Return in about 3 months (around 7/1/2019). I asked Raúl Kaye. [id number] [employer] if he has any questions and I answered the questions. Stewart APONTE [id number] [employer] states that he understands the treatment plan and agrees with the treatment plan

## 2019-04-24 ENCOUNTER — HOSPITAL ENCOUNTER (OUTPATIENT)
Dept: LAB | Age: 79
Discharge: HOME | End: 2019-04-24
Payer: MEDICARE

## 2019-04-24 ENCOUNTER — OFFICE VISIT (OUTPATIENT)
Dept: FAMILY MEDICINE CLINIC | Facility: CLINIC | Age: 79
End: 2019-04-24

## 2019-04-24 VITALS
OXYGEN SATURATION: 95 % | TEMPERATURE: 99.5 F | HEART RATE: 80 BPM | HEIGHT: 71 IN | DIASTOLIC BLOOD PRESSURE: 70 MMHG | WEIGHT: 196 LBS | RESPIRATION RATE: 16 BRPM | BODY MASS INDEX: 27.44 KG/M2 | SYSTOLIC BLOOD PRESSURE: 115 MMHG

## 2019-04-24 DIAGNOSIS — I10 ESSENTIAL HYPERTENSION: ICD-10-CM

## 2019-04-24 DIAGNOSIS — J40 BRONCHITIS: ICD-10-CM

## 2019-04-24 DIAGNOSIS — J40 BRONCHITIS: Primary | ICD-10-CM

## 2019-04-24 LAB
ALBUMIN SERPL-MCNC: 4.1 G/DL (ref 3.4–5)
ALBUMIN/GLOB SERPL: 1 {RATIO} (ref 0.8–1.7)
ALP SERPL-CCNC: 84 U/L (ref 45–117)
ALT SERPL-CCNC: 22 U/L (ref 16–61)
ANION GAP SERPL CALC-SCNC: 10 MMOL/L (ref 3–18)
AST SERPL-CCNC: 23 U/L (ref 15–37)
BASOPHILS # BLD: 0 K/UL (ref 0–0.1)
BASOPHILS NFR BLD: 0 % (ref 0–2)
BILIRUB SERPL-MCNC: 0.6 MG/DL (ref 0.2–1)
BUN SERPL-MCNC: 33 MG/DL (ref 7–18)
BUN/CREAT SERPL: 20 (ref 12–20)
CALCIUM SERPL-MCNC: 9.7 MG/DL (ref 8.5–10.1)
CHLORIDE SERPL-SCNC: 98 MMOL/L (ref 100–108)
CO2 SERPL-SCNC: 25 MMOL/L (ref 21–32)
CREAT SERPL-MCNC: 1.65 MG/DL (ref 0.6–1.3)
DIFFERENTIAL METHOD BLD: ABNORMAL
EOSINOPHIL # BLD: 0.2 K/UL (ref 0–0.4)
EOSINOPHIL NFR BLD: 2 % (ref 0–5)
ERYTHROCYTE [DISTWIDTH] IN BLOOD BY AUTOMATED COUNT: 14.5 % (ref 11.6–14.5)
GLOBULIN SER CALC-MCNC: 4.1 G/DL (ref 2–4)
GLUCOSE SERPL-MCNC: 91 MG/DL (ref 74–99)
HCT VFR BLD AUTO: 43.3 % (ref 36–48)
HGB BLD-MCNC: 14.3 G/DL (ref 13–16)
LYMPHOCYTES # BLD: 3.1 K/UL (ref 0.9–3.6)
LYMPHOCYTES NFR BLD: 25 % (ref 21–52)
MCH RBC QN AUTO: 27.9 PG (ref 24–34)
MCHC RBC AUTO-ENTMCNC: 33 G/DL (ref 31–37)
MCV RBC AUTO: 84.6 FL (ref 74–97)
MONOCYTES # BLD: 1.8 K/UL (ref 0.05–1.2)
MONOCYTES NFR BLD: 15 % (ref 3–10)
NEUTS SEG # BLD: 7.1 K/UL (ref 1.8–8)
NEUTS SEG NFR BLD: 58 % (ref 40–73)
PLATELET # BLD AUTO: 212 K/UL (ref 135–420)
PMV BLD AUTO: 11 FL (ref 9.2–11.8)
POTASSIUM SERPL-SCNC: 5 MMOL/L (ref 3.5–5.5)
PROT SERPL-MCNC: 8.2 G/DL (ref 6.4–8.2)
RBC # BLD AUTO: 5.12 M/UL (ref 4.7–5.5)
SODIUM SERPL-SCNC: 133 MMOL/L (ref 136–145)
WBC # BLD AUTO: 12.2 K/UL (ref 4.6–13.2)

## 2019-04-24 PROCEDURE — 80053 COMPREHEN METABOLIC PANEL: CPT

## 2019-04-24 PROCEDURE — 85025 COMPLETE CBC W/AUTO DIFF WBC: CPT

## 2019-04-24 PROCEDURE — 36415 COLL VENOUS BLD VENIPUNCTURE: CPT

## 2019-04-24 RX ORDER — CLARITHROMYCIN 500 MG/1
TABLET, FILM COATED ORAL
Qty: 14 TAB | Refills: 0 | Status: SHIPPED | OUTPATIENT
Start: 2019-04-24

## 2019-04-24 RX ORDER — PREDNISONE 20 MG/1
TABLET ORAL
Qty: 20 TAB | Refills: 0 | Status: SHIPPED | OUTPATIENT
Start: 2019-04-24

## 2019-04-25 RX ORDER — BUDESONIDE AND FORMOTEROL FUMARATE DIHYDRATE 160; 4.5 UG/1; UG/1
2 AEROSOL RESPIRATORY (INHALATION) 2 TIMES DAILY
Qty: 1 INHALER | Refills: 2 | Status: SHIPPED | OUTPATIENT
Start: 2019-04-25 | End: 2019-06-26 | Stop reason: SDUPTHER

## 2019-04-25 NOTE — PROGRESS NOTES
The patient presents to the office today with the chief complaint of cough HPI The patient complains of 5 days of worsening chest congestion and cough. Initially the cough was dry but now there has been more \"substance\" to it. The patient denies fever. The patient notes moderate dyspnea. The patient remains on Norvasc for hypertension. He is doing well on his medication. ROS Positive for problems with his vision especially in the center of his focus. He describes as part of this area of the visual field being blacked out at times and studies looking through \"slides\". The patient is followed by ophthalmology for this. Negative for chest pain, abdominal pain, or lower extremity edema. Allergies Allergen Reactions  Pcn [Penicillins] Rash Current Outpatient Medications Medication Sig Dispense Refill  predniSONE (DELTASONE) 20 mg tablet 3 tabs daily x 3 days, 2 tabs daily x 3 days, 1 tab daily x 3 days, 1/2 tab daily x 3 days 20 Tab 0  
 clarithromycin (BIAXIN) 500 mg tablet 1 tab twice per day with food (Hold Norvasc while on Biaxin) 14 Tab 0  
 fludrocortisone (FLORINEF) 0.1 mg tablet 1 tab daily 30 Tab 1  
 furosemide (LASIX) 20 mg tablet TAKE 1 TABLET BY MOUTH EACH MORNING ON MONDAYS, WEDNESDAYS, AND FRIDAYS 45 Tab 2  
 amLODIPine (NORVASC) 2.5 mg tablet 1 tab daily 90 Tab 3  
 SYMBICORT 160-4.5 mcg/actuation HFAA Take 2 Puffs by inhalation two (2) times a day. 1 Inhaler 2  
 sertraline (ZOLOFT) 50 mg tablet TAKE ONE AND ONE-HALF TABLETS DAILY 150 Tab 2  
 OXYGEN-AIR DELIVERY SYSTEMS 2 L/min by Nasal route daily.  cetirizine (ZYRTEC) 10 mg tablet Take 10 mg by mouth daily.  PROVENTIL HFA 90 mcg/actuation inhaler Take 1 Puff by inhalation every six (6) hours as needed for Shortness of Breath.  acetaminophen (TYLENOL) 325 mg tablet Take 650 mg by mouth every six (6) hours as needed for Pain.  clopidogrel (PLAVIX) 75 mg tab Take 1 Tab by mouth daily (with dinner). 90 Tab 4  
 levothyroxine (SYNTHROID) 150 mcg tablet TAKE 1 TABLET BY MOUTH ONCE DAILY (Patient taking differently: Take 150 mcg by mouth Daily (before breakfast). TAKE 1 TABLET BY MOUTH ONCE DAILY) 90 Tab 3  pantoprazole (PROTONIX) 40 mg tablet TAKE 1 TABLET BY MOUTH ONCE DAILY (Patient taking differently: Take 40 mg by mouth daily. TAKE 1 TABLET BY MOUTH ONCE DAILY) 90 Tab 3  
 telmisartan (MICARDIS) 40 mg tablet 1 tablet daily (stop Benazepril) 90 Tab 3  
 montelukast (SINGULAIR) 10 mg tablet Take 1 Tab by mouth daily. (Patient taking differently: Take 10 mg by mouth daily.) 90 Tab 3  
 aspirin delayed-release 81 mg tablet Take 81 mg by mouth daily.  Cholecalciferol, Vitamin D3, (VITAMIN D3) 2,000 unit cap capsule Take 2,000 Units by mouth daily.  cyanocobalamin 1,000 mcg tablet Take 1,000 mcg by mouth daily. Past Medical History:  
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 HDL 42, LDL 97  Dysphagia as late effect of cerebrovascular accident (CVA) 4/19/2017  Erectile dysfunction  Gastroesophageal reflux disease  Hemiparesis affecting right side as late effect of cerebrovascular accident (CVA) ([address] 4/19/2017  History of endogenous hypertriglyceridemia  History of malignant neoplasm of prostate [place] score 7  Hypertension  Hypothyroidism  Osteoarthrosis involving multiple sites  Procedure refused 4/21/2017 Speech Pathologist recommended NPO and PEG placement; Patient refused  Urinary incontinence Male incontinence Past Surgical History:  
Procedure Laterality Date  COLONOSCOPY  08/12/2011  HX CHOLECYSTECTOMY  HX HERNIA REPAIR Bilateral   
 inguinal  
 HX RADICAL PROSTATECTOMY  1-  
 perineal approach Social History Socioeconomic History  Marital status:  Spouse name: Not on file  Number of children: Not on file  Years of education: Not on file  Highest education level: Not on file Occupational History  Not on file Social Needs  Financial resource strain: Not on file  Food insecurity:  
  Worry: Not on file Inability: Not on file  Transportation needs:  
  Medical: Not on file Non-medical: Not on file Tobacco Use  Smoking status: Never Smoker  Smokeless tobacco: Never Used Substance and Sexual Activity  Alcohol use: No  
 Drug use: No  
 Sexual activity: Yes  
  Partners: Female Lifestyle  Physical activity:  
  Days per week: Not on file Minutes per session: Not on file  Stress: Not on file Relationships  Social connections:  
  Talks on phone: Not on file Gets together: Not on file Attends Yarsani service: Not on file Active member of club or organization: Not on file Attends meetings of clubs or organizations: Not on file Relationship status: Not on file  Intimate partner violence:  
  Fear of current or ex partner: Not on file Emotionally abused: Not on file Physically abused: Not on file Forced sexual activity: Not on file Other Topics Concern  Not on file Social History Narrative  Not on file Patient does have an advanced directive on file Visit Vitals /70 Pulse 80 Temp 99.5 °F (37.5 °C) (Tympanic) Resp 16 Ht 5' 11\" (1.803 m) Wt 196 lb (88.9 kg) SpO2 95% BMI 27.34 kg/m² Physical Exam 
Lungs: Slight dullness to the right lower dorsum. Prolonged expiratory phase with rhonchi in all fields. S1-S2 normal no gallops or murmurs No carotid bruits Abdomen normal bowel sounds the abdomen is nontender there are no masses or hepatosplenomegaly. Extremities: No edema. BMI: [facility] Outpatient Visit on 04/24/2019 Component Date Value Ref Range Status  WBC 04/24/2019 12.2  4.6 - 13.2 K/uL Final  
 RBC 04/24/2019 5.12  4.70 - 5.50 M/uL Final  
 HGB 04/24/2019 14.3  13.0 - 16.0 g/dL Final  
 HCT 04/24/2019 43.3  36.0 - 48.0 % Final  
 MCV 04/24/2019 84.6  74.0 - 97.0 FL Final  
 MCH 04/24/2019 27.9  24.0 - 34.0 PG Final  
 MCHC 04/24/2019 33.0  31.0 - 37.0 g/dL Final  
 RDW 04/24/2019 14.5  11.6 - 14.5 % Final  
 PLATELET 12/85/4092 514  135 - 420 K/uL Final  
 MPV 04/24/2019 11.0  9.2 - 11.8 FL Final  
 NEUTROPHILS 04/24/2019 58  40 - 73 % Final  
 LYMPHOCYTES 04/24/2019 25  21 - 52 % Final  
 MONOCYTES 04/24/2019 15* 3 - 10 % Final  
 EOSINOPHILS 04/24/2019 2  0 - 5 % Final  
 BASOPHILS 04/24/2019 0  0 - 2 % Final  
 ABS. NEUTROPHILS 04/24/2019 7.1  1.8 - 8.0 K/UL Final  
 ABS. LYMPHOCYTES 04/24/2019 3.1  0.9 - 3.6 K/UL Final  
 ABS. MONOCYTES 04/24/2019 1.8* 0.05 - 1.2 K/UL Final  
 ABS. EOSINOPHILS 04/24/2019 0.2  0.0 - 0.4 K/UL Final  
 ABS.  BASOPHILS 04/24/2019 0.0  0.0 - 0.1 K/UL Final  
 DF 04/24/2019 AUTOMATED    Final  
 Sodium 04/24/2019 133* 136 - 145 mmol/L Final  
 Potassium 04/24/2019 5.0  3.5 - 5.5 mmol/L Final  
 Chloride 04/24/2019 98* 100 - 108 mmol/L Final  
 CO2 04/24/2019 25  21 - 32 mmol/L Final  
 Anion gap 04/24/2019 10  3.0 - 18 mmol/L Final  
 Glucose 04/24/2019 91  74 - 99 mg/dL Final  
 BUN 04/24/2019 33* 7.0 - 18 MG/DL Final  
 Creatinine 04/24/2019 1.65* 0.6 - 1.3 MG/DL Final  
 BUN/Creatinine ratio 04/24/2019 20  12 - 20   Final  
  GFR est AA 04/24/2019 49* >60 ml/min/1.73m2 Final  
 GFR est non-AA 04/24/2019 40* >60 ml/min/1.73m2 Final  
 Comment: (NOTE) Estimated GFR is calculated using the Modification of Diet in Renal  
Disease (MDRD) Study equation, reported for both  Americans [facility] [place]) and non- Americans (GFRNA), and normalized to 1.73m2  
body surface area. The physician must decide which value applies to  
the patient. The MDRD study equation should only be used in  
individuals age 25 or older. It has not been validated for the  
following: pregnant women, patients with serious comorbid conditions,  
or on certain medications, or persons with extremes of body size,  
muscle mass, or nutritional status.  Calcium 04/24/2019 9.7  8.5 - 10.1 MG/DL Final  
 Bilirubin, total 04/24/2019 0.6  0.2 - 1.0 MG/DL Final  
 ALT (SGPT) 04/24/2019 22  16 - 61 U/L Final  
 AST (SGOT) 04/24/2019 23  15 - 37 U/L Final  
 Alk. phosphatase 04/24/2019 84  45 - 117 U/L Final  
 Protein, total 04/24/2019 8.2  6.4 - 8.2 g/dL Final  
 Albumin 04/24/2019 4.1  3.4 - 5.0 g/dL Final  
 Globulin 04/24/2019 4.1* 2.0 - 4.0 g/dL Final  
 A-G Ratio 04/24/2019 1.0  0.8 - 1.7   Final  
Hospital Outpatient Visit on 03/25/2019 Component Date Value Ref Range Status  METHYLMALONIC ACID, SERUM 03/25/2019 353* 87 - 318 nmol/L Final  
 Comment: This test was developed and its analytical performance 
characteristics have been determined by [facility]. It has 
not been cleared or approved by the U.S. Food and Drug Administration. This assay has been validated pursuant 
to the CLIA regulations and is used for clinical 
purposes. Test Performed by [facility] 
[facility], 
[address] [place] [address] Lacy Murguia M.D., Ph.D., Director of Laboratories 
[phone]  Vitamin B12 03/25/2019 705  193 - 986 pg/ml Final  
 Hospital Outpatient Visit on 02/20/2019 Component Date Value Ref Range Status  Creatinine 02/20/2019 1.4* 0.6 - 1.3 mg/dl Final  
 : [id number] Hospital Outpatient Visit on 01/31/2019 Component Date Value Ref Range Status  WBC 01/31/2019 15.9* 4.6 - 13.2 K/uL Final  
 RBC 01/31/2019 4.41* 4.70 - 5.50 M/uL Final  
 HGB 01/31/2019 12.7* 13.0 - 16.0 g/dL Final  
 HCT 01/31/2019 37.6  36.0 - 48.0 % Final  
 MCV 01/31/2019 85.3  74.0 - 97.0 FL Final  
 MCH 01/31/2019 28.8  24.0 - 34.0 PG Final  
 MCHC 01/31/2019 33.8  31.0 - 37.0 g/dL Final  
 RDW 01/31/2019 15.6* 11.6 - 14.5 % Final  
 PLATELET 98/60/6252 054  135 - 420 K/uL Final  
 MPV 01/31/2019 10.9  9.2 - 11.8 FL Final  
 NEUTROPHILS 01/31/2019 78* 40 - 73 % Final  
 LYMPHOCYTES 01/31/2019 14* 21 - 52 % Final  
 MONOCYTES 01/31/2019 8  3 - 10 % Final  
 EOSINOPHILS 01/31/2019 0  0 - 5 % Final  
 BASOPHILS 01/31/2019 0  0 - 2 % Final  
 ABS. NEUTROPHILS 01/31/2019 12.5* 1.8 - 8.0 K/UL Final  
 ABS. LYMPHOCYTES 01/31/2019 2.2  0.9 - 3.6 K/UL Final  
 ABS. MONOCYTES 01/31/2019 1.2  0.05 - 1.2 K/UL Final  
 ABS. EOSINOPHILS 01/31/2019 0.0  0.0 - 0.4 K/UL Final  
 ABS. BASOPHILS 01/31/2019 0.0  0.0 - 0.1 K/UL Final  
 DF 01/31/2019 AUTOMATED    Final  
 Sodium 01/31/2019 130* 136 - 145 mmol/L Final  
 Potassium 01/31/2019 4.5  3.5 - 5.5 mmol/L Final  
 Chloride 01/31/2019 96* 100 - 108 mmol/L Final  
 CO2 01/31/2019 23  21 - 32 mmol/L Final  
 Anion gap 01/31/2019 11  3.0 - 18 mmol/L Final  
 Glucose 01/31/2019 103* 74 - 99 mg/dL Final  
 BUN 01/31/2019 23* 7.0 - 18 MG/DL Final  
 Creatinine 01/31/2019 1.30  0.6 - 1.3 MG/DL Final  
 BUN/Creatinine ratio 01/31/2019 18  12 - 20   Final  
 GFR est AA 01/31/2019 >60  >60 ml/min/1.73m2 Final  
 GFR est non-AA 01/31/2019 53* >60 ml/min/1.73m2 Final  
 Comment: (NOTE) Estimated GFR is calculated using the Modification of Diet in Renal  
 Disease (MDRD) Study equation, reported for both  Americans [facility]) and non- Americans (GFRNA), and normalized to 1.73m2  
body surface area. The physician must decide which value applies to  
the patient. The MDRD study equation should only be used in  
individuals age 25 or older. It has not been validated for the  
following: pregnant women, patients with serious comorbid conditions,  
or on certain medications, or persons with extremes of body size,  
muscle mass, or nutritional status.  Calcium 01/31/2019 10.2* 8.5 - 10.1 MG/DL Final  
 Bilirubin, total 01/31/2019 0.5  0.2 - 1.0 MG/DL Final  
 ALT (SGPT) 01/31/2019 19  16 - 61 U/L Final  
 AST (SGOT) 01/31/2019 13* 15 - 37 U/L Final  
 Alk. phosphatase 01/31/2019 68  45 - 117 U/L Final  
 Protein, total 01/31/2019 7.6  6.4 - 8.2 g/dL Final  
 Albumin 01/31/2019 3.8  3.4 - 5.0 g/dL Final  
 Globulin 01/31/2019 3.8  2.0 - 4.0 g/dL Final  
 A-G Ratio 01/31/2019 1.0  0.8 - 1.7   Final  
 
 
. Results for orders placed or performed during the hospital encounter of 04/24/19 CBC WITH AUTOMATED DIFF Result Value Ref Range WBC 12.2 4.6 - 13.2 K/uL  
 RBC 5.12 4.70 - 5.50 M/uL  
 HGB 14.3 13.0 - 16.0 g/dL HCT 43.3 36.0 - 48.0 % MCV 84.6 74.0 - 97.0 FL  
 MCH 27.9 24.0 - 34.0 PG  
 MCHC 33.0 31.0 - 37.0 g/dL  
 RDW 14.5 11.6 - 14.5 % PLATELET 207 672 - 311 K/uL MPV 11.0 9.2 - 11.8 FL  
 NEUTROPHILS 58 40 - 73 % LYMPHOCYTES 25 21 - 52 % MONOCYTES 15 (H) 3 - 10 % EOSINOPHILS 2 0 - 5 % BASOPHILS 0 0 - 2 %  
 ABS. NEUTROPHILS 7.1 1.8 - 8.0 K/UL  
 ABS. LYMPHOCYTES 3.1 0.9 - 3.6 K/UL  
 ABS. MONOCYTES 1.8 (H) 0.05 - 1.2 K/UL  
 ABS. EOSINOPHILS 0.2 0.0 - 0.4 K/UL  
 ABS. BASOPHILS 0.0 0.0 - 0.1 K/UL  
 DF AUTOMATED METABOLIC PANEL, COMPREHENSIVE Result Value Ref Range Sodium 133 (L) 136 - 145 mmol/L Potassium 5.0 3.5 - 5.5 mmol/L  Chloride 98 (L) 100 - 108 mmol/L  
 CO2 25 21 - 32 mmol/L  
 Anion gap 10 3.0 - 18 mmol/L Glucose 91 74 - 99 mg/dL BUN 33 (H) 7.0 - 18 MG/DL Creatinine 1.65 (H) 0.6 - 1.3 MG/DL  
 BUN/Creatinine ratio 20 12 - 20 GFR est AA 49 (L) >60 ml/min/1.73m2 GFR est non-AA 40 (L) >60 ml/min/1.73m2 Calcium 9.7 8.5 - 10.1 MG/DL Bilirubin, total 0.6 0.2 - 1.0 MG/DL  
 ALT (SGPT) 22 16 - 61 U/L  
 AST (SGOT) 23 15 - 37 U/L Alk. phosphatase 84 45 - 117 U/L Protein, total 8.2 6.4 - 8.2 g/dL Albumin 4.1 3.4 - 5.0 g/dL Globulin 4.1 (H) 2.0 - 4.0 g/dL A-G Ratio 1.0 0.8 - 1.7 Assessment / Plan ICD-10-CM ICD-9-CM 1. Bronchitis J40 490 XR CHEST PA LAT  
   CBC WITH AUTOMATED DIFF  
   METABOLIC PANEL, COMPREHENSIVE 2. Essential hypertension I10 401.9 CBC WITH AUTOMATED DIFF  
   METABOLIC PANEL, COMPREHENSIVE To treat the bronchitis I will use Biaxin and a course of prednisone. Because of the possible interaction between Norvasc and Biaxin the Norvasc will be held while the patient is on the antibiotic. Insert maintenance medications The patient will get a chest x-ray if his symptoms persist longer than 3 days or any new therapy. Follow-up and Dispositions · Return in about 3 months (around 7/24/2019). I asked Messi Ferreira. [id number] if he has any questions and I answered the questions. Stewart WADSWORTH. [id number] states that he understands the treatment plan and agrees with the treatment plan The patient is to seek immediate help if the respiratory symptoms worsen. THIS NOTE WAS CREATED WITH A VOICE ACTIVATED DICTATION SYSTEM.   IT MAY CONTAIN TRANSCRIBING ERRORS

## 2019-06-03 RX ORDER — MONTELUKAST SODIUM 10 MG/1
TABLET ORAL
Qty: 90 TAB | Refills: 3 | Status: SHIPPED | OUTPATIENT
Start: 2019-06-03

## 2019-06-03 RX ORDER — TELMISARTAN 40 MG/1
TABLET ORAL
Qty: 90 TAB | Refills: 3 | Status: SHIPPED | OUTPATIENT
Start: 2019-06-03

## 2019-06-26 RX ORDER — BUDESONIDE AND FORMOTEROL FUMARATE DIHYDRATE 160; 4.5 UG/1; UG/1
AEROSOL RESPIRATORY (INHALATION)
Qty: 10.2 INHALER | Refills: 2 | Status: SHIPPED | OUTPATIENT
Start: 2019-06-26

## 2019-07-09 RX ORDER — PANTOPRAZOLE SODIUM 40 MG/1
TABLET, DELAYED RELEASE ORAL
Qty: 90 TAB | Refills: 3 | Status: SHIPPED | OUTPATIENT
Start: 2019-07-09

## 2019-07-17 RX ORDER — LEVOTHYROXINE SODIUM 150 UG/1
TABLET ORAL
Qty: 90 TAB | Refills: 3 | Status: SHIPPED | OUTPATIENT
Start: 2019-07-17

## 2019-08-30 NOTE — PROGRESS NOTES
Pt's discharge date has been updated for Friday 5/12 to home with home health and no DME. Family training was today with pt's son, daughter in law, wife and private duty caregiver. Pt appears well.  No evidence of acute illness or infection.  No labs indicated.  Child life prep during our visit.  Instructed to notify PCP and surgeon if s/s of infection develop prior to procedure.

## 2019-11-10 RX ORDER — SERTRALINE HYDROCHLORIDE 50 MG/1
TABLET, FILM COATED ORAL
Qty: 150 TAB | Refills: 4 | Status: SHIPPED | OUTPATIENT
Start: 2019-11-10

## 2019-12-05 RX ORDER — AZITHROMYCIN 250 MG/1
TABLET, FILM COATED ORAL
Qty: 6 TAB | Refills: 0 | Status: SHIPPED | OUTPATIENT
Start: 2019-12-05 | End: 2019-12-10

## 2021-09-28 PROBLEM — R26.89 IMPAIRMENT OF BALANCE: Status: ACTIVE | Noted: 2019-03-06

## 2021-09-28 PROBLEM — E87.1 HYPONATREMIA: Status: ACTIVE | Noted: 2018-12-30

## 2021-09-28 PROBLEM — E53.8 COBALAMIN DEFICIENCY: Status: ACTIVE | Noted: 2019-04-03

## 2021-09-28 PROBLEM — J96.01 ACUTE RESPIRATORY FAILURE WITH HYPOXIA (HCC): Status: ACTIVE | Noted: 2018-12-31

## 2022-03-18 PROBLEM — E78.5 DYSLIPIDEMIA (HIGH LDL; LOW HDL): Status: ACTIVE | Noted: 2017-04-19

## 2022-03-18 PROBLEM — E87.1 HYPONATREMIA: Status: ACTIVE | Noted: 2018-12-30

## 2022-03-18 PROBLEM — K31.9: Status: ACTIVE | Noted: 2018-01-28

## 2022-03-18 PROBLEM — N18.31 CKD STAGE G3A/A1, GFR 45-59 AND ALBUMIN CREATININE RATIO <30 MG/G (HCC): Status: ACTIVE | Noted: 2017-05-03

## 2022-03-18 PROBLEM — I69.322 DYSARTHRIA AS LATE EFFECT OF CEREBROVASCULAR ACCIDENT (CVA): Status: ACTIVE | Noted: 2017-04-19

## 2022-03-18 PROBLEM — E87.6 HYPOKALEMIA: Status: ACTIVE | Noted: 2018-01-28

## 2022-03-18 PROBLEM — K59.00 CONSTIPATION: Status: ACTIVE | Noted: 2018-01-28

## 2022-03-19 PROBLEM — Z74.09 IMPAIRED MOBILITY AND ADLS: Status: ACTIVE | Noted: 2017-04-19

## 2022-03-19 PROBLEM — Z71.89 ADVANCE DIRECTIVE DISCUSSED WITH PATIENT: Status: ACTIVE | Noted: 2017-06-05

## 2022-03-19 PROBLEM — I10 ESSENTIAL HYPERTENSION: Status: ACTIVE | Noted: 2017-05-16

## 2022-03-19 PROBLEM — M54.32 SCIATICA OF LEFT SIDE: Status: ACTIVE | Noted: 2018-02-15

## 2022-03-19 PROBLEM — R11.2 NAUSEA AND VOMITING: Status: ACTIVE | Noted: 2018-01-28

## 2022-03-19 PROBLEM — Z53.20 PROCEDURE REFUSED: Status: ACTIVE | Noted: 2017-04-21

## 2022-03-19 PROBLEM — J96.01 ACUTE RESPIRATORY FAILURE WITH HYPOXIA (HCC): Status: ACTIVE | Noted: 2018-12-31

## 2022-03-19 PROBLEM — R26.89 IMPAIRMENT OF BALANCE: Status: ACTIVE | Noted: 2019-03-06

## 2022-03-19 PROBLEM — E53.8 COBALAMIN DEFICIENCY: Status: ACTIVE | Noted: 2019-04-03

## 2022-03-19 PROBLEM — Z78.9 IMPAIRED MOBILITY AND ADLS: Status: ACTIVE | Noted: 2017-04-19

## 2022-03-19 PROBLEM — I69.351 HEMIPARESIS AFFECTING RIGHT SIDE AS LATE EFFECT OF CEREBROVASCULAR ACCIDENT (CVA) (HCC): Status: ACTIVE | Noted: 2017-04-19

## 2022-03-20 PROBLEM — Z13.31 DEPRESSION SCREEN: Status: ACTIVE | Noted: 2018-06-25

## 2022-03-20 PROBLEM — K57.32 DIVERTICULITIS OF LARGE INTESTINE WITHOUT PERFORATION OR ABSCESS WITHOUT BLEEDING: Status: ACTIVE | Noted: 2018-01-28

## 2022-09-01 NOTE — PROGRESS NOTES
Problem: Self Care Deficits Care Plan (Adult)  Goal: *Acute Goals and Plan of Care (Insert Text)  Long Term Goals (to be met upon discharge date) in order to increase pts functional independence and safety, and decrease burden of care:  1. Pt will perform self-feeding with independence. 2. Pt will perform grooming with independence. 3. Pt will perform UB bathing with modified independence. 4. Pt will perform LB bathing with modified independence. 5. Pt will perform tshower transfer with modified independence. 6. Pt will perform UB dressing with independence. 7. Pt will perform LB dressing with modified independence. 8. Pt will perform toileting task with modified independence. 9. Pt will perform toilet transfer with modified independence. Short Term Weekly Goals for (2017 to 2017) in order to increase pts functional independence and safety, and decrease burden of care:  1. Pt will perform UB bathing with modified independence. 2. Pt will perform LB bathing with supervision. 3. Pt will perform shower transfer with CGA. 4. Pt will perform UB dressing with modified independence. 5. Pt will perform LB dressing with supervision. 6. Pt will perform toileting task with supervision. 7. Pt will perform toilet transfer with supervision   OCCUPATIONAL THERAPY DAILY NOTE  Patient Name:Stewart Thompson   Time In: 80  Time Out: 200     Medical Diagnosis:  Left CVA  Acute ischemic stroke ([address] Acute ischemic stroke ([address]      Pain at start of tx: 0/10  Pain at stop of tx: 0/10     Patient identified with name and : yes  Subjective: Pt reports his daughter comes over once a week to help pt with household chores. Objective:      THERAPEUTIC ACTIVITY Daily Assessment     Pt completed pegboard copying task, requiring pt to use 1/2\" pegs to copy complex pegboard pattern with only 2 verbal cues needed for accuracy.  Pt utilized pincer grasp to  pegs from box on the table and manipulate them to place them in pegboard with pt intermittently initiating using L arm for supporting the R as he fatigues. Pt also worked on pre-handwriting task to complete 3 mazes, each progressing in increased difficulty. Pt able to hold pencil and complete the maze, staying within the lines, however multiple deviations noted within the line due to decreased coordination. Pt also worked on handwriting, requiring pt to copy 4 sentences of 5-6 words each. Pt demonstrated moderate difficulty with writing and minimal-moderate readability due to ataxia and decreased coordination. IADL Daily Assessment     Pt ambulated in kitchen without an AD with CGA/Min A and max cues not to furniture surf and use countertops for support in order to promote improved function and decreased compensation. Pt was able to open cabinets and appliances in order obtain 12 items from various heights with mod verbal and visual cues for positioning of self close to items prior to reaching, especially with items placed below pt's waist level. When reaching low, pt intermittently uses LUE support on the countertop while reaching with his R arm. MOBILITY/TRANSFERS Daily Assessment      Pt encouraged to perform w/c mobility on the unit at end of tx session using his R UE/LE to promote improved R-sided strength and coordination. Pt propelled w/c       Assessment: Pt demonstrates improved ability to participate in functional IADL task in the kitchen today, however he continues to demo decreased coordination and requires assistance for balance with mobility. Plan of Care: Continue POC to maximize pt independence and safety performing ADLs and functional transfers/mobility.      Angela Presley, SERENITY  5/3/2017 Split-Thickness Skin Graft Text: The defect edges were debeveled with a #15 scalpel blade.  Given the location of the defect, shape of the defect and the proximity to free margins a split thickness skin graft was deemed most appropriate.  Using a sterile surgical marker, the primary defect shape was transferred to the donor site. The split thickness graft was then harvested.  The skin graft was then placed in the primary defect and oriented appropriately.

## 2023-01-30 RX ORDER — TRIAMTERENE CAPSULES 50 MG/1
1 CAPSULE ORAL
COMMUNITY

## 2023-01-30 RX ORDER — ATORVASTATIN CALCIUM 20 MG/1
TABLET, FILM COATED ORAL
COMMUNITY
Start: 2021-07-14

## 2023-01-30 RX ORDER — CLONIDINE HYDROCHLORIDE 0.2 MG/1
TABLET ORAL
COMMUNITY

## 2023-01-30 RX ORDER — AZELASTINE 1 MG/ML
SPRAY, METERED NASAL
COMMUNITY
Start: 2021-07-23

## 2023-01-30 RX ORDER — OMEPRAZOLE 20 MG/1
TABLET, DELAYED RELEASE ORAL
COMMUNITY

## 2023-01-30 RX ORDER — BENAZEPRIL HYDROCHLORIDE 20 MG/1
TABLET ORAL
COMMUNITY

## 2023-01-30 RX ORDER — ACETAMINOPHEN 325 MG/1
650 TABLET ORAL EVERY 6 HOURS PRN
COMMUNITY
Start: 2019-01-12

## 2023-01-30 RX ORDER — ASPIRIN 81 MG/1
81 TABLET ORAL DAILY
COMMUNITY

## 2023-01-30 RX ORDER — MONTELUKAST SODIUM 10 MG/1
TABLET ORAL
COMMUNITY
Start: 2019-06-03

## 2023-01-30 RX ORDER — PREDNISONE 20 MG/1
TABLET ORAL
COMMUNITY
Start: 2019-04-24

## 2023-01-30 RX ORDER — TELMISARTAN 40 MG/1
TABLET ORAL
COMMUNITY
Start: 2019-06-03

## 2023-01-30 RX ORDER — CLARITHROMYCIN 500 MG/1
TABLET, COATED ORAL
COMMUNITY
Start: 2019-04-24

## 2023-01-30 RX ORDER — CLOTRIMAZOLE AND BETAMETHASONE DIPROPIONATE 10; .64 MG/G; MG/G
CREAM TOPICAL
COMMUNITY

## 2023-01-30 RX ORDER — AMLODIPINE BESYLATE 2.5 MG/1
TABLET ORAL
COMMUNITY
Start: 2019-03-11

## 2023-01-30 RX ORDER — LEVOTHYROXINE SODIUM 0.15 MG/1
TABLET ORAL
COMMUNITY
Start: 2019-07-17

## 2023-01-30 RX ORDER — FAMOTIDINE 20 MG/1
TABLET, FILM COATED ORAL
COMMUNITY

## 2023-01-30 RX ORDER — GABAPENTIN 100 MG/1
1 CAPSULE ORAL
COMMUNITY

## 2023-01-30 RX ORDER — ALBUTEROL SULFATE 90 UG/1
1 AEROSOL, METERED RESPIRATORY (INHALATION) EVERY 6 HOURS PRN
COMMUNITY
Start: 2019-01-29

## 2023-01-30 RX ORDER — MEPERIDINE HYDROCHLORIDE 50 MG/1
TABLET ORAL
COMMUNITY

## 2023-01-30 RX ORDER — BUDESONIDE AND FORMOTEROL FUMARATE DIHYDRATE 160; 4.5 UG/1; UG/1
AEROSOL RESPIRATORY (INHALATION)
COMMUNITY
Start: 2019-06-26

## 2023-01-30 RX ORDER — CLOPIDOGREL BISULFATE 75 MG/1
75 TABLET ORAL
COMMUNITY
Start: 2018-11-29

## 2023-01-30 RX ORDER — CITALOPRAM 10 MG/1
TABLET ORAL
COMMUNITY

## 2023-01-30 RX ORDER — ZOSTER VACCINE RECOMBINANT, ADJUVANTED 50 MCG/0.5
KIT INTRAMUSCULAR
COMMUNITY

## 2023-01-30 RX ORDER — CETIRIZINE HYDROCHLORIDE 10 MG/1
10 TABLET ORAL DAILY
COMMUNITY

## 2023-01-30 RX ORDER — FUROSEMIDE 20 MG/1
TABLET ORAL
COMMUNITY
Start: 2019-04-01

## 2023-01-30 RX ORDER — PANTOPRAZOLE SODIUM 40 MG/1
TABLET, DELAYED RELEASE ORAL
COMMUNITY
Start: 2019-07-09

## 2023-01-30 RX ORDER — FERROUS SULFATE 325(65) MG
TABLET ORAL
COMMUNITY
Start: 2021-08-27

## 2023-01-30 RX ORDER — BENZONATATE 100 MG/1
CAPSULE ORAL
COMMUNITY

## 2023-01-30 RX ORDER — FLUDROCORTISONE ACETATE 0.1 MG/1
TABLET ORAL
COMMUNITY
Start: 2019-04-01

## 2023-01-30 RX ORDER — DOXYCYCLINE HYCLATE 100 MG/1
CAPSULE ORAL
COMMUNITY

## 2025-03-17 NOTE — PROGRESS NOTES
Problem: Adult Inpatient Plan of Care  Goal: Plan of Care Review  Outcome: Progressing  Goal: Patient-Specific Goal (Individualized)  Outcome: Progressing  Goal: Absence of Hospital-Acquired Illness or Injury  Outcome: Progressing  Intervention: Identify and Manage Fall Risk  Recent Flowsheet Documentation  Taken 3/17/2025 1600 by Rosita Reynolds RN  Safety Promotion/Fall Prevention:   clutter free environment maintained   safety round/check completed  Taken 3/17/2025 1400 by Rosita Reynolds RN  Safety Promotion/Fall Prevention:   clutter free environment maintained   safety round/check completed  Taken 3/17/2025 1200 by Rosita Reynolds RN  Safety Promotion/Fall Prevention:   clutter free environment maintained   safety round/check completed  Taken 3/17/2025 1000 by Rosita Reynolds RN  Safety Promotion/Fall Prevention:   clutter free environment maintained   safety round/check completed  Taken 3/17/2025 0800 by Rosita Reynolds RN  Safety Promotion/Fall Prevention:   clutter free environment maintained   safety round/check completed  Intervention: Prevent Skin Injury  Recent Flowsheet Documentation  Taken 3/17/2025 1600 by Rosita Reynolds RN  Body Position: position changed independently  Taken 3/17/2025 1400 by Rosita Reynolds RN  Body Position: position changed independently  Taken 3/17/2025 1200 by Rosita Reynolds RN  Body Position: position changed independently  Taken 3/17/2025 1000 by Rosita Reynolds RN  Body Position: position changed independently  Taken 3/17/2025 0800 by Rosita Reynolds RN  Body Position: position changed independently  Skin Protection: transparent dressing maintained  Intervention: Prevent Infection  Recent Flowsheet Documentation  Taken 3/17/2025 1600 by Rosita Reynolds RN  Infection Prevention: single patient room provided  Taken 3/17/2025 1400 by Rosita Reynolds RN  Infection Prevention: single patient room provided  Taken 3/17/2025 1200 by Rosita Reynolds RN  Infection  Problem: Neurolinguistics Impaired (Adult)  Goal: *Speech Goal: (INSERT TEXT)  Long term goals:  1. Patient will tolerate a regular/thin liquid diet without overt s/s of aspiration, 4 consecutive meals. 2. Patient will use safe swallowing techniques with supervision for all PO intake. 3. Patient will answer complex yes/no questions with 90% accuracy. 4 patient will follow complex commands for object manipulation wit 80-90% accuracy. 5. Patient will name 10-12 items within simple categories with supervision. 6. Patient will perform varied word retrieval tasks with % accuracy. 7. Patient will recall 3 words after 5 minutes, supervision. 8. Patient will perform functional problem solving/reasoning tasks with supervision. Short term goals (by 5/9/17):  1. Patient will tolerate a soft, nectar thick liquid diet without overt s/s of aspiration, 4 consecutive meals. 2. Patient will drink sips of thin liquids with the SLP using safe swallowing techniques without overt s/s of aspiration. 3. Patient will use safe swallowing techniques with min-mod cues for all PO intake. 4. Patient will answer complex yes/no questions with 80-90% accuracy. 5 patient will follow complex commands for object manipulation wit 80-90% accuracy. 6. Patient will name 7-9 items within simple categories with supervision. 7. Patient will perform varied word retrieval tasks with 80-90% accuracy. 8. Patient will recall 3 words after 5 minutes, min assist.  9. Patient will perform functional problem solving/reasoning tasks with 80-90% accuracy. SPEECH LANGUAGE PATHOLOGY TREATMENT     Patient: Kenneth Lemos (71 y.o. male)  Date: 5/6/2017  Diagnosis: Left CVA  Acute ischemic stroke ([facility] [address] Acute ischemic stroke [facility])       SUBJECTIVE:   Patient stated How come I can't get ice cream?.      OBJECTIVE:   Mental Status:  Mr. Carlos Ortez was awake and alert for therapy today.   He was less fatigued when seen prior to Prevention: single patient room provided  Taken 3/17/2025 1000 by Rosita Reynolds RN  Infection Prevention: single patient room provided  Taken 3/17/2025 0800 by Rosita Reynolds RN  Infection Prevention: single patient room provided  Goal: Optimal Comfort and Wellbeing  Outcome: Progressing  Intervention: Provide Person-Centered Care  Recent Flowsheet Documentation  Taken 3/17/2025 1400 by Rosita Reynolds RN  Trust Relationship/Rapport:   care explained   thoughts/feelings acknowledged  Taken 3/17/2025 0800 by Rosita Reynolds RN  Trust Relationship/Rapport:   care explained   thoughts/feelings acknowledged  Goal: Readiness for Transition of Care  Outcome: Progressing     Problem: Sepsis/Septic Shock  Goal: Optimal Coping  Outcome: Progressing  Intervention: Support Patient and Family Response  Recent Flowsheet Documentation  Taken 3/17/2025 1400 by Rosita Reynolds RN  Family/Support System Care: support provided  Taken 3/17/2025 0800 by Rosita Reynolds RN  Family/Support System Care: support provided  Goal: Absence of Bleeding  Outcome: Progressing  Goal: Blood Glucose Level Within Target Range  Outcome: Progressing  Intervention: Optimize Glycemic Control  Recent Flowsheet Documentation  Taken 3/17/2025 0800 by Rosita Reynolds RN  Hyperglycemia Management: blood glucose monitored  Goal: Absence of Infection Signs and Symptoms  Outcome: Progressing  Intervention: Initiate Sepsis Management  Recent Flowsheet Documentation  Taken 3/17/2025 1600 by Rosita Reynolds RN  Infection Prevention: single patient room provided  Taken 3/17/2025 1400 by Rosita Reynolds RN  Infection Prevention: single patient room provided  Taken 3/17/2025 1200 by Rosita Reynolds RN  Infection Prevention: single patient room provided  Taken 3/17/2025 1000 by Rosita Reynolds RN  Infection Prevention: single patient room provided  Taken 3/17/2025 0800 by Rosita Reynolds RN  Infection Prevention: single patient room  exercise. Treatment & Interventions:   Mr. Geri Rodriguez was seen in the dining room at lunch time today in the therapeutic dining group. He is now on a regular diet with thin liquids. He still needs some cuing to sip the liquid, but tolerated his meal quite well. Patient was without over s/s of aspiration or other difficulty. Patient was also seen for a thirty minute speech therapy session. He participated in the following treatment tasks:     Neuro-Linguistics:   Orientation:                             Independent  Complex commands:              75% accuracy  Sentence production:              100% accuracy/intelligibility  ID item from description:        100% accuracy  Things that need batteries:     Patient named 9     Response & Tolerance to Activities:  Mr. Geri Rodriguez is always cooperative in treatment. Today he ate his regular meal/thin liquids well. He is pleased to now be on a regular diet. Pain:  Pain Scale 1: Numeric (0 - 10)     After treatment:   [X]       Patient left in no apparent distress sitting up in chair  [ ]       Patient left in no apparent distress in bed  [ ]       Call bell left within reach  [ ]       Nursing notified  [ ]       Caregiver present  [ ]       Bed alarm activated      ASSESSMENT:   Progression toward goals:  [X]       Improving appropriately and progressing toward goals  [ ]       Improving slowly and progressing toward goals  [ ]       Not making progress toward goals and plan of care will be adjusted      PLAN:   Patient continues to benefit from skilled intervention to address the above impairments. Continue treatment per established plan of care. Discharge Recommendations:   To Be Determined     AUGUSTINA Israel  Time Calculation:  60 minutes provided  Intervention: Promote Recovery  Recent Flowsheet Documentation  Taken 3/17/2025 1400 by Rosita Reynolds, RN  Activity Management: activity encouraged  Taken 3/17/2025 0800 by Rosita Reynolds, RN  Activity Management: activity encouraged  Goal: Optimal Nutrition Delivery  Outcome: Progressing   Goal Outcome Evaluation:  Pt remain in ccu on 3L o2. A & O x 4. Little anxious. Notified DrPranay Received PRN Atarax. Tolerating well. MRI and vascular study done today without any complication. Plavix restarted. Vital stable. No signs of bleeding. Ayala in place. Ongoing plan of care.

## (undated) DEVICE — ENDOSCOPY PUMP TUBING/ CAP SET: Brand: ERBE

## (undated) DEVICE — FLUFF AND POLYMER UNDERPAD,EXTRA HEAVY: Brand: WINGS

## (undated) DEVICE — SOLUTION IRRIG 1000ML H2O STRL BLT

## (undated) DEVICE — BOUGIE MLNY ESOPH SZ 50F

## (undated) DEVICE — MEDI-VAC NON-CONDUCTIVE SUCTION TUBING: Brand: CARDINAL HEALTH

## (undated) DEVICE — FLEX ADVANTAGE 3000CC: Brand: FLEX ADVANTAGE

## (undated) DEVICE — BITE BLOCK ENDOSCP UNIV AD 6 TO 9.4 MM

## (undated) DEVICE — STERILE POLYISOPRENE POWDER-FREE SURGICAL GLOVES: Brand: PROTEXIS

## (undated) DEVICE — CATHETER SUCT TR FL TIP 14FR W/ O CTRL

## (undated) DEVICE — SYR 50ML SLIP TIP NSAF LF STRL --

## (undated) DEVICE — BASIN EMESIS 500CC ROSE 250/CS 60/PLT: Brand: MEDEGEN MEDICAL PRODUCTS, LLC

## (undated) DEVICE — AIRLIFE™ NASAL OXYGEN CANNULA CURVED, FLARED TIP WITH 14 FOOT (4.3 M) CRUSH-RESISTANT TUBING, OVER-THE-EAR STYLE: Brand: AIRLIFE™

## (undated) DEVICE — SYRINGE MED 25GA 3ML L5/8IN SUBQ PLAS W/ DETACH NDL SFTY

## (undated) DEVICE — GAUZE SPONGES,16 PLY: Brand: CURITY

## (undated) DEVICE — MEDI-VAC SUCTION HIGH CAPACITY: Brand: CARDINAL HEALTH